# Patient Record
Sex: MALE | Race: WHITE | NOT HISPANIC OR LATINO | Employment: OTHER | URBAN - METROPOLITAN AREA
[De-identification: names, ages, dates, MRNs, and addresses within clinical notes are randomized per-mention and may not be internally consistent; named-entity substitution may affect disease eponyms.]

---

## 2017-02-07 ENCOUNTER — HOSPITAL ENCOUNTER (OUTPATIENT)
Dept: RADIOLOGY | Facility: HOSPITAL | Age: 82
Discharge: HOME | End: 2017-02-07
Attending: INTERNAL MEDICINE
Payer: MEDICARE

## 2017-02-07 DIAGNOSIS — I10 ESSENTIAL HYPERTENSION: ICD-10-CM

## 2017-02-07 DIAGNOSIS — R60.1 GENERALIZED EDEMA: ICD-10-CM

## 2017-02-07 DIAGNOSIS — R06.01 ORTHOPNEA: ICD-10-CM

## 2017-02-07 DIAGNOSIS — R06.01 ORTHOPNEA: Primary | ICD-10-CM

## 2017-02-07 PROCEDURE — 71020 XR CHEST PA AND LATERAL: CPT | Mod: TC,PO

## 2017-02-07 PROCEDURE — 71020 XR CHEST PA AND LATERAL: CPT | Mod: 26,,, | Performed by: RADIOLOGY

## 2017-06-10 ENCOUNTER — HOSPITAL ENCOUNTER (OUTPATIENT)
Facility: HOSPITAL | Age: 82
Discharge: HOME | End: 2017-06-12
Attending: EMERGENCY MEDICINE | Admitting: HOSPITALIST
Payer: MEDICARE

## 2017-06-10 DIAGNOSIS — R06.09 DOE (DYSPNEA ON EXERTION): ICD-10-CM

## 2017-06-10 DIAGNOSIS — D50.9 HYPOCHROMIC-MICROCYTIC ANEMIA: ICD-10-CM

## 2017-06-10 DIAGNOSIS — K44.9 HIATAL HERNIA: Chronic | ICD-10-CM

## 2017-06-10 DIAGNOSIS — D64.9 SYMPTOMATIC ANEMIA: ICD-10-CM

## 2017-06-10 DIAGNOSIS — D62 ACUTE POST-HEMORRHAGIC ANEMIA: Primary | ICD-10-CM

## 2017-06-10 DIAGNOSIS — K57.30 DIVERTICULOSIS OF LARGE INTESTINE WITHOUT HEMORRHAGE: Chronic | ICD-10-CM

## 2017-06-10 DIAGNOSIS — K31.811 ANGIODYSPLASIA OF DUODENUM WITH HEMORRHAGE: ICD-10-CM

## 2017-06-10 DIAGNOSIS — K92.1 MELENA: ICD-10-CM

## 2017-06-10 PROBLEM — N17.9 AKI (ACUTE KIDNEY INJURY): Status: ACTIVE | Noted: 2017-06-10

## 2017-06-10 PROBLEM — E03.9 ACQUIRED HYPOTHYROIDISM: Chronic | Status: ACTIVE | Noted: 2017-06-10

## 2017-06-10 PROBLEM — N18.30 CHRONIC KIDNEY DISEASE, STAGE 3: Chronic | Status: ACTIVE | Noted: 2017-06-10

## 2017-06-10 PROBLEM — I48.91 ATRIAL FIBRILLATION: Chronic | Status: ACTIVE | Noted: 2017-06-10

## 2017-06-10 LAB
ALBUMIN SERPL BCP-MCNC: 3.7 G/DL
ALP SERPL-CCNC: 63 U/L
ALT SERPL W/O P-5'-P-CCNC: 11 U/L
ANION GAP SERPL CALC-SCNC: 9 MMOL/L
ANISOCYTOSIS BLD QL SMEAR: SLIGHT
APTT BLDCRRT: 24.4 SEC
AST SERPL-CCNC: 12 U/L
BASOPHILS # BLD AUTO: 0.08 K/UL
BASOPHILS NFR BLD: 1.1 %
BILIRUB SERPL-MCNC: 0.5 MG/DL
BNP SERPL-MCNC: 634 PG/ML
BUN SERPL-MCNC: 30 MG/DL
CALCIUM SERPL-MCNC: 8.5 MG/DL
CHLORIDE SERPL-SCNC: 108 MMOL/L
CO2 SERPL-SCNC: 23 MMOL/L
CREAT SERPL-MCNC: 2 MG/DL
DACRYOCYTES BLD QL SMEAR: ABNORMAL
DIFFERENTIAL METHOD: ABNORMAL
EOSINOPHIL # BLD AUTO: 0.5 K/UL
EOSINOPHIL NFR BLD: 6.1 %
ERYTHROCYTE [DISTWIDTH] IN BLOOD BY AUTOMATED COUNT: 20.5 %
EST. GFR  (AFRICAN AMERICAN): 33.7 ML/MIN/1.73 M^2
EST. GFR  (NON AFRICAN AMERICAN): 29.1 ML/MIN/1.73 M^2
FERRITIN SERPL-MCNC: 5 NG/ML
GLUCOSE SERPL-MCNC: 147 MG/DL
HCT VFR BLD AUTO: 18.7 %
HGB BLD-MCNC: 4.8 G/DL
HYPOCHROMIA BLD QL SMEAR: ABNORMAL
INR PPP: 1.2
IRON SERPL-MCNC: 18 UG/DL
LYMPHOCYTES # BLD AUTO: 1.3 K/UL
LYMPHOCYTES NFR BLD: 16.9 %
MCH RBC QN AUTO: 18.3 PG
MCHC RBC AUTO-ENTMCNC: 25.7 %
MCV RBC AUTO: 71 FL
MONOCYTES # BLD AUTO: 0.9 K/UL
MONOCYTES NFR BLD: 11.5 %
NEUTROPHILS # BLD AUTO: 4.9 K/UL
NEUTROPHILS NFR BLD: 64 %
OVALOCYTES BLD QL SMEAR: ABNORMAL
PLATELET # BLD AUTO: 304 K/UL
PMV BLD AUTO: 9.3 FL
POIKILOCYTOSIS BLD QL SMEAR: SLIGHT
POLYCHROMASIA BLD QL SMEAR: ABNORMAL
POTASSIUM SERPL-SCNC: 4.8 MMOL/L
PROT SERPL-MCNC: 6.6 G/DL
PROTHROMBIN TIME: 12.7 SEC
RBC # BLD AUTO: 2.63 M/UL
RETICS/RBC NFR AUTO: 3.4 %
SATURATED IRON: 4 %
SODIUM SERPL-SCNC: 140 MMOL/L
SPHEROCYTES BLD QL SMEAR: ABNORMAL
STOMATOCYTES BLD QL SMEAR: PRESENT
TOTAL IRON BINDING CAPACITY: 462 UG/DL
TRANSFERRIN SERPL-MCNC: 312 MG/DL
TROPONIN I SERPL DL<=0.01 NG/ML-MCNC: 0.01 NG/ML
WBC # BLD AUTO: 7.57 K/UL

## 2017-06-10 PROCEDURE — 84484 ASSAY OF TROPONIN QUANT: CPT

## 2017-06-10 PROCEDURE — 36430 TRANSFUSION BLD/BLD COMPNT: CPT

## 2017-06-10 PROCEDURE — 25000003 PHARM REV CODE 250: Performed by: EMERGENCY MEDICINE

## 2017-06-10 PROCEDURE — 82728 ASSAY OF FERRITIN: CPT

## 2017-06-10 PROCEDURE — 93005 ELECTROCARDIOGRAM TRACING: CPT

## 2017-06-10 PROCEDURE — 85730 THROMBOPLASTIN TIME PARTIAL: CPT

## 2017-06-10 PROCEDURE — 83540 ASSAY OF IRON: CPT

## 2017-06-10 PROCEDURE — 85610 PROTHROMBIN TIME: CPT

## 2017-06-10 PROCEDURE — 83880 ASSAY OF NATRIURETIC PEPTIDE: CPT

## 2017-06-10 PROCEDURE — 86920 COMPATIBILITY TEST SPIN: CPT

## 2017-06-10 PROCEDURE — 80053 COMPREHEN METABOLIC PANEL: CPT

## 2017-06-10 PROCEDURE — G0378 HOSPITAL OBSERVATION PER HR: HCPCS

## 2017-06-10 PROCEDURE — 82607 VITAMIN B-12: CPT

## 2017-06-10 PROCEDURE — 85025 COMPLETE CBC W/AUTO DIFF WBC: CPT

## 2017-06-10 PROCEDURE — C9113 INJ PANTOPRAZOLE SODIUM, VIA: HCPCS | Performed by: EMERGENCY MEDICINE

## 2017-06-10 PROCEDURE — 99900035 HC TECH TIME PER 15 MIN (STAT)

## 2017-06-10 PROCEDURE — 63600175 PHARM REV CODE 636 W HCPCS: Performed by: EMERGENCY MEDICINE

## 2017-06-10 PROCEDURE — 85045 AUTOMATED RETICULOCYTE COUNT: CPT

## 2017-06-10 PROCEDURE — 82746 ASSAY OF FOLIC ACID SERUM: CPT

## 2017-06-10 PROCEDURE — 99285 EMERGENCY DEPT VISIT HI MDM: CPT | Mod: 25

## 2017-06-10 PROCEDURE — 96374 THER/PROPH/DIAG INJ IV PUSH: CPT

## 2017-06-10 RX ORDER — AMIODARONE HYDROCHLORIDE 200 MG/1
200 TABLET ORAL DAILY
Status: DISCONTINUED | OUTPATIENT
Start: 2017-06-11 | End: 2017-06-12 | Stop reason: HOSPADM

## 2017-06-10 RX ORDER — AMIODARONE HYDROCHLORIDE 200 MG/1
TABLET ORAL
Status: ON HOLD | COMMUNITY
Start: 2017-02-22 | End: 2017-09-14 | Stop reason: HOSPADM

## 2017-06-10 RX ORDER — METFORMIN HYDROCHLORIDE 750 MG/1
TABLET, EXTENDED RELEASE ORAL
Status: ON HOLD | COMMUNITY
Start: 2017-03-24 | End: 2017-06-12 | Stop reason: HOSPADM

## 2017-06-10 RX ORDER — AMLODIPINE BESYLATE 10 MG/1
10 TABLET ORAL DAILY
Status: DISCONTINUED | OUTPATIENT
Start: 2017-06-11 | End: 2017-06-12 | Stop reason: HOSPADM

## 2017-06-10 RX ORDER — METFORMIN HYDROCHLORIDE 750 MG/1
750 TABLET, EXTENDED RELEASE ORAL NIGHTLY
Status: DISCONTINUED | OUTPATIENT
Start: 2017-06-10 | End: 2017-06-11

## 2017-06-10 RX ORDER — METOPROLOL SUCCINATE 25 MG/1
25 TABLET, EXTENDED RELEASE ORAL DAILY
Status: DISCONTINUED | OUTPATIENT
Start: 2017-06-11 | End: 2017-06-12 | Stop reason: HOSPADM

## 2017-06-10 RX ORDER — CARVEDILOL 6.25 MG/1
6.25 TABLET ORAL 2 TIMES DAILY WITH MEALS
Status: DISCONTINUED | OUTPATIENT
Start: 2017-06-11 | End: 2017-06-12 | Stop reason: HOSPADM

## 2017-06-10 RX ORDER — LEVOTHYROXINE SODIUM 50 UG/1
50 TABLET ORAL
Status: DISCONTINUED | OUTPATIENT
Start: 2017-06-11 | End: 2017-06-12 | Stop reason: HOSPADM

## 2017-06-10 RX ORDER — PRAVASTATIN SODIUM 40 MG/1
40 TABLET ORAL
Status: ON HOLD | COMMUNITY
Start: 2017-02-07 | End: 2017-09-14 | Stop reason: HOSPADM

## 2017-06-10 RX ORDER — CARVEDILOL 6.25 MG/1
6.25 TABLET ORAL 2 TIMES DAILY WITH MEALS
COMMUNITY

## 2017-06-10 RX ORDER — SELENIUM 50 MCG
200 TABLET ORAL NIGHTLY
COMMUNITY

## 2017-06-10 RX ORDER — FERROUS SULFATE 325(65) MG
325 TABLET, DELAYED RELEASE (ENTERIC COATED) ORAL 2 TIMES DAILY
Status: DISCONTINUED | OUTPATIENT
Start: 2017-06-10 | End: 2017-06-12 | Stop reason: HOSPADM

## 2017-06-10 RX ORDER — FUROSEMIDE 10 MG/ML
80 INJECTION INTRAMUSCULAR; INTRAVENOUS ONCE
Status: COMPLETED | OUTPATIENT
Start: 2017-06-11 | End: 2017-06-11

## 2017-06-10 RX ORDER — PRAVASTATIN SODIUM 20 MG/1
40 TABLET ORAL DAILY
Status: DISCONTINUED | OUTPATIENT
Start: 2017-06-11 | End: 2017-06-12 | Stop reason: HOSPADM

## 2017-06-10 RX ORDER — HYDROCODONE BITARTRATE AND ACETAMINOPHEN 500; 5 MG/1; MG/1
TABLET ORAL
Status: DISCONTINUED | OUTPATIENT
Start: 2017-06-10 | End: 2017-06-12 | Stop reason: HOSPADM

## 2017-06-10 RX ORDER — PANTOPRAZOLE SODIUM 40 MG/10ML
40 INJECTION, POWDER, LYOPHILIZED, FOR SOLUTION INTRAVENOUS
Status: COMPLETED | OUTPATIENT
Start: 2017-06-10 | End: 2017-06-10

## 2017-06-10 RX ORDER — PANTOPRAZOLE SODIUM 40 MG/10ML
40 INJECTION, POWDER, LYOPHILIZED, FOR SOLUTION INTRAVENOUS 2 TIMES DAILY
Status: DISCONTINUED | OUTPATIENT
Start: 2017-06-11 | End: 2017-06-12

## 2017-06-10 RX ORDER — CETIRIZINE HYDROCHLORIDE 10 MG/1
10 TABLET ORAL DAILY
Status: DISCONTINUED | OUTPATIENT
Start: 2017-06-11 | End: 2017-06-12 | Stop reason: HOSPADM

## 2017-06-10 RX ADMIN — PANTOPRAZOLE SODIUM 40 MG: 40 INJECTION, POWDER, FOR SOLUTION INTRAVENOUS at 06:06

## 2017-06-10 RX ADMIN — FERROUS SULFATE TAB EC 325 MG (65 MG FE EQUIVALENT) 325 MG: 325 (65 FE) TABLET DELAYED RESPONSE at 11:06

## 2017-06-10 NOTE — ED PROVIDER NOTES
Encounter Date: 6/10/2017       History     Chief Complaint   Patient presents with    Weakness     generalized weakness and off balance for 3 days.     Review of patient's allergies indicates:  No Known Allergies  Here with family for evaluation of generalized weakness and a sense of disequilibrium.  He states symptoms have been present for about 2 weeks without much change.  He describes a feeling of being off balance with walking but no sense that the room is spinning or that his head is spinning.  No convincing positional change in symptoms.  He has not fallen.  He does feel a little more short of breath than usual.  No pain.  No visible sign of bleeding.  He has had anemia requiring transfusion in the past, details are not clear.  He is on an anticoagulant but is not sure why.  No recent change in medications.  No other complaints.      The history is provided by the patient and a relative. No  was used.     Past Medical History:   Diagnosis Date    Emphysema lung     Hypertension     Thyroid disease      Past Surgical History:   Procedure Laterality Date    ABDOMINAL SURGERY      NO PAST SURGERIES       History reviewed. No pertinent family history.  Social History   Substance Use Topics    Smoking status: Former Smoker    Smokeless tobacco: Never Used    Alcohol use No     Review of Systems   Constitutional: Negative for chills and fever.   HENT: Negative for congestion, facial swelling, nosebleeds and sinus pressure.    Eyes: Negative for pain and redness.   Respiratory: Positive for shortness of breath. Negative for chest tightness and wheezing.    Cardiovascular: Negative for chest pain, palpitations and leg swelling.   Gastrointestinal: Negative for abdominal distention, abdominal pain, diarrhea, nausea and vomiting.   Endocrine: Negative for cold intolerance, polydipsia and polyphagia.   Genitourinary: Negative for difficulty urinating, dysuria, frequency and hematuria.    Musculoskeletal: Negative for arthralgias, back pain, myalgias and neck pain.   Skin: Negative for color change and rash.   Neurological: Negative for dizziness, weakness, numbness and headaches.   Hematological: Negative for adenopathy. Does not bruise/bleed easily.   Psychiatric/Behavioral: Negative for agitation and behavioral problems.   All other systems reviewed and are negative.      Physical Exam     Initial Vitals [06/10/17 1727]   BP Pulse Resp Temp SpO2   134/60 67 20 98.1 °F (36.7 °C) (!) 88 %     Physical Exam    Nursing note and vitals reviewed.  Constitutional: He appears well-developed and well-nourished. He is not diaphoretic. No distress.   Mildly pale/ mildly obese   HENT:   Head: Normocephalic and atraumatic.   Mouth/Throat: Oropharynx is clear and moist. No oropharyngeal exudate.   Eyes: Conjunctivae and EOM are normal. Pupils are equal, round, and reactive to light. Right eye exhibits no discharge. Left eye exhibits no discharge. No scleral icterus.   Neck: Normal range of motion. Neck supple. No thyromegaly present. No tracheal deviation present. No JVD present.   Cardiovascular: Normal rate, regular rhythm and normal heart sounds. Exam reveals no gallop and no friction rub.    No murmur heard.  Pulmonary/Chest: No respiratory distress. He has no wheezes. He has rhonchi. He has no rales. He exhibits no tenderness.   Scattered minor rhonchi   Abdominal: Soft. Bowel sounds are normal. He exhibits no distension and no mass. There is no tenderness. There is no rebound and no guarding.   Musculoskeletal: Normal range of motion. He exhibits no edema or tenderness.   Lymphadenopathy:     He has no cervical adenopathy.   Neurological: He is alert and oriented to person, place, and time. He has normal strength. No cranial nerve deficit.   Skin: Skin is warm and dry. No rash noted. No erythema.   Psychiatric: He has a normal mood and affect. His behavior is normal. Judgment and thought content normal.          ED Course   Procedures  Labs Reviewed   CBC W/ AUTO DIFFERENTIAL - Abnormal; Notable for the following:        Result Value    RBC 2.63 (*)     Hemoglobin 4.8 (*)     Hematocrit 18.7 (*)     MCV 71 (*)     MCH 18.3 (*)     MCHC 25.7 (*)     RDW 20.5 (*)     Lymph% 16.9 (*)     All other components within normal limits    Narrative:     HGB and HCT     critical result(s) called and verbal readback   obtained from Iram Walls., 06/10/2017 18:07   COMPREHENSIVE METABOLIC PANEL   TROPONIN I   B-TYPE NATRIURETIC PEPTIDE   PROTIME-INR   APTT   OCCULT BLOOD X 1, STOOL   FERRITIN   IRON AND TIBC   RETICULOCYTES   VITAMIN B12   FOLATE   TYPE & SCREEN   PREPARE RBC SOFT          Imaging Results          X-Ray Chest PA And Lateral (In process)                CT Head Without Contrast (In process)                      Medical Decision Making:   ED Management:  All historical, clinical, radiographic, and laboratory findings were reviewed with the patient/family in detail along with the indications for transport to the facility in [place] in order to receive blood transfusion and anemia workup.  All remaining questions and concerns were addressed at this time and the patient/family communicates understanding and agrees to proceed accordingly.  Similarly, all pertinent details of the encounter were discussed with Dr. Hernandez who agrees to receive the patient at [facility] for further care as outlined above.  The [facility] secondary to a need for ongoing IVF, monitoring, and transfusion en route.  Reginald Llamas MD  6:55 PM                       ED Course     Clinical Impression:       1. Hypochromic-microcytic anemia    2. [place] (dyspnea on exertion)                Reginald lLamas MD  06/10/17 1939

## 2017-06-11 PROBLEM — K92.1 MELENA: Status: ACTIVE | Noted: 2017-06-11

## 2017-06-11 PROBLEM — D62 ACUTE POST-HEMORRHAGIC ANEMIA: Status: ACTIVE | Noted: 2017-06-11

## 2017-06-11 LAB
ABO GROUP BLD: NORMAL
ALBUMIN SERPL BCP-MCNC: 3.7 G/DL
ALP SERPL-CCNC: 68 U/L
ALT SERPL W/O P-5'-P-CCNC: 12 U/L
ANION GAP SERPL CALC-SCNC: 11 MMOL/L
AST SERPL-CCNC: 15 U/L
BASOPHILS # BLD AUTO: 0.05 K/UL
BASOPHILS NFR BLD: 0.6 %
BILIRUB SERPL-MCNC: 3.5 MG/DL
BLD GP AB SCN CELLS X3 SERPL QL: NORMAL
BLD PROD TYP BPU: NORMAL
BLOOD UNIT EXPIRATION DATE: NORMAL
BLOOD UNIT TYPE CODE: 6200
BLOOD UNIT TYPE CODE: 6200
BLOOD UNIT TYPE CODE: 9500
BLOOD UNIT TYPE: NORMAL
BUN SERPL-MCNC: 28 MG/DL
CALCIUM SERPL-MCNC: 8.8 MG/DL
CHLORIDE SERPL-SCNC: 109 MMOL/L
CO2 SERPL-SCNC: 24 MMOL/L
CODING SYSTEM: NORMAL
CREAT SERPL-MCNC: 2 MG/DL
DIFFERENTIAL METHOD: ABNORMAL
DISPENSE STATUS: NORMAL
EOSINOPHIL # BLD AUTO: 0.3 K/UL
EOSINOPHIL NFR BLD: 4.2 %
ERYTHROCYTE [DISTWIDTH] IN BLOOD BY AUTOMATED COUNT: 21.4 %
EST. GFR  (AFRICAN AMERICAN): 34 ML/MIN/1.73 M^2
EST. GFR  (NON AFRICAN AMERICAN): 29 ML/MIN/1.73 M^2
FOLATE SERPL-MCNC: 10.7 NG/ML
GLUCOSE SERPL-MCNC: 139 MG/DL
HCT VFR BLD AUTO: 26.8 %
HGB BLD-MCNC: 8 G/DL
LYMPHOCYTES # BLD AUTO: 1.1 K/UL
LYMPHOCYTES NFR BLD: 13.4 %
MCH RBC QN AUTO: 22 PG
MCHC RBC AUTO-ENTMCNC: 29.9 %
MCV RBC AUTO: 74 FL
MONOCYTES # BLD AUTO: 0.7 K/UL
MONOCYTES NFR BLD: 8.3 %
NEUTROPHILS # BLD AUTO: 5.8 K/UL
NEUTROPHILS NFR BLD: 73.5 %
NUM UNITS TRANS PACKED RBC: NORMAL
OB PNL STL: POSITIVE
PLATELET # BLD AUTO: 231 K/UL
PMV BLD AUTO: 9.3 FL
POTASSIUM SERPL-SCNC: 4.3 MMOL/L
PROT SERPL-MCNC: 6.6 G/DL
RBC # BLD AUTO: 3.64 M/UL
RH BLD: NORMAL
SODIUM SERPL-SCNC: 144 MMOL/L
VIT B12 SERPL-MCNC: 323 PG/ML
WBC # BLD AUTO: 7.94 K/UL

## 2017-06-11 PROCEDURE — 85025 COMPLETE CBC W/AUTO DIFF WBC: CPT

## 2017-06-11 PROCEDURE — 80053 COMPREHEN METABOLIC PANEL: CPT

## 2017-06-11 PROCEDURE — P9016 RBC LEUKOCYTES REDUCED: HCPCS

## 2017-06-11 PROCEDURE — 82272 OCCULT BLD FECES 1-3 TESTS: CPT

## 2017-06-11 PROCEDURE — 25000003 PHARM REV CODE 250: Performed by: INTERNAL MEDICINE

## 2017-06-11 PROCEDURE — 63600175 PHARM REV CODE 636 W HCPCS: Performed by: HOSPITALIST

## 2017-06-11 PROCEDURE — 36430 TRANSFUSION BLD/BLD COMPNT: CPT

## 2017-06-11 PROCEDURE — 86920 COMPATIBILITY TEST SPIN: CPT

## 2017-06-11 PROCEDURE — 86900 BLOOD TYPING SEROLOGIC ABO: CPT

## 2017-06-11 PROCEDURE — 86850 RBC ANTIBODY SCREEN: CPT

## 2017-06-11 PROCEDURE — 99205 OFFICE O/P NEW HI 60 MIN: CPT | Mod: ,,, | Performed by: INTERNAL MEDICINE

## 2017-06-11 PROCEDURE — G0378 HOSPITAL OBSERVATION PER HR: HCPCS

## 2017-06-11 PROCEDURE — 63600175 PHARM REV CODE 636 W HCPCS: Performed by: INTERNAL MEDICINE

## 2017-06-11 PROCEDURE — C9113 INJ PANTOPRAZOLE SODIUM, VIA: HCPCS | Performed by: HOSPITALIST

## 2017-06-11 PROCEDURE — 25000003 PHARM REV CODE 250: Performed by: EMERGENCY MEDICINE

## 2017-06-11 PROCEDURE — 25000003 PHARM REV CODE 250: Performed by: HOSPITALIST

## 2017-06-11 PROCEDURE — 86901 BLOOD TYPING SEROLOGIC RH(D): CPT

## 2017-06-11 PROCEDURE — 36415 COLL VENOUS BLD VENIPUNCTURE: CPT

## 2017-06-11 RX ORDER — BISACODYL 5 MG
10 TABLET, DELAYED RELEASE (ENTERIC COATED) ORAL ONCE
Status: COMPLETED | OUTPATIENT
Start: 2017-06-11 | End: 2017-06-11

## 2017-06-11 RX ORDER — SYRING-NEEDL,DISP,INSUL,0.3 ML 29 G X1/2"
296 SYRINGE, EMPTY DISPOSABLE MISCELLANEOUS ONCE
Status: COMPLETED | OUTPATIENT
Start: 2017-06-11 | End: 2017-06-11

## 2017-06-11 RX ORDER — POLYETHYLENE GLYCOL 3350, SODIUM SULFATE ANHYDROUS, SODIUM BICARBONATE, SODIUM CHLORIDE, POTASSIUM CHLORIDE 236; 22.74; 6.74; 5.86; 2.97 G/4L; G/4L; G/4L; G/4L; G/4L
4000 POWDER, FOR SOLUTION ORAL ONCE
Status: COMPLETED | OUTPATIENT
Start: 2017-06-11 | End: 2017-06-11

## 2017-06-11 RX ORDER — FUROSEMIDE 10 MG/ML
20 INJECTION INTRAMUSCULAR; INTRAVENOUS ONCE
Status: COMPLETED | OUTPATIENT
Start: 2017-06-11 | End: 2017-06-11

## 2017-06-11 RX ORDER — HYDROCODONE BITARTRATE AND ACETAMINOPHEN 500; 5 MG/1; MG/1
TABLET ORAL
Status: DISCONTINUED | OUTPATIENT
Start: 2017-06-11 | End: 2017-06-12 | Stop reason: HOSPADM

## 2017-06-11 RX ADMIN — PANTOPRAZOLE SODIUM 40 MG: 40 INJECTION, POWDER, FOR SOLUTION INTRAVENOUS at 08:06

## 2017-06-11 RX ADMIN — LEVOTHYROXINE SODIUM 50 MCG: 50 TABLET ORAL at 05:06

## 2017-06-11 RX ADMIN — FUROSEMIDE 80 MG: 10 INJECTION, SOLUTION INTRAMUSCULAR; INTRAVENOUS at 05:06

## 2017-06-11 RX ADMIN — METOPROLOL SUCCINATE 25 MG: 25 TABLET, EXTENDED RELEASE ORAL at 08:06

## 2017-06-11 RX ADMIN — AMLODIPINE BESYLATE 10 MG: 10 TABLET ORAL at 08:06

## 2017-06-11 RX ADMIN — CETIRIZINE HYDROCHLORIDE 10 MG: 10 TABLET, FILM COATED ORAL at 08:06

## 2017-06-11 RX ADMIN — CARVEDILOL 6.25 MG: 6.25 TABLET, FILM COATED ORAL at 08:06

## 2017-06-11 RX ADMIN — AMIODARONE HYDROCHLORIDE 200 MG: 200 TABLET ORAL at 08:06

## 2017-06-11 RX ADMIN — CARVEDILOL 6.25 MG: 6.25 TABLET, FILM COATED ORAL at 05:06

## 2017-06-11 RX ADMIN — POLYETHYLENE GLYCOL 3350, SODIUM SULFATE ANHYDROUS, SODIUM BICARBONATE, SODIUM CHLORIDE, POTASSIUM CHLORIDE 4000 ML: 236; 22.74; 6.74; 5.86; 2.97 POWDER, FOR SOLUTION ORAL at 05:06

## 2017-06-11 RX ADMIN — FUROSEMIDE 20 MG: 10 INJECTION, SOLUTION INTRAMUSCULAR; INTRAVENOUS at 07:06

## 2017-06-11 RX ADMIN — PRAVASTATIN SODIUM 40 MG: 20 TABLET ORAL at 08:06

## 2017-06-11 RX ADMIN — MAGNESIUM CITRATE 296 ML: 1.75 LIQUID ORAL at 12:06

## 2017-06-11 RX ADMIN — FERROUS SULFATE TAB EC 325 MG (65 MG FE EQUIVALENT) 325 MG: 325 (65 FE) TABLET DELAYED RESPONSE at 08:06

## 2017-06-11 RX ADMIN — Medication 1 TABLET: at 08:06

## 2017-06-11 RX ADMIN — BISACODYL 10 MG: 5 TABLET, COATED ORAL at 12:06

## 2017-06-11 NOTE — ASSESSMENT & PLAN NOTE
Baseline creatinine around 1.2-1.4; it is 2 tonight; will monitor; avoid nephrotoxins; repeat bmp in AM

## 2017-06-11 NOTE — HPI
Anemia: Patient presents for presents evaluation of anemia. Anemia was found by ER visit.  It has been present for several days.  Associated signs & symptoms: dizziness/lightheadedness, dyspnea, fatigue and pallor.   GI Bleeding: Patient complains of melena. Symptoms have been present for approximately several days. The symptoms are stable. This has been associated with shortness of breath.  He denies chest pain, choking on food, difficulty swallowing, early satiety, fullness after meals, heartburn, hematemesis, midespigastric pain, nausea, need to clear throat frequently, nocturnal burning, regurgitation of undigested food, unexpected weight loss and wheezing.  He has not used nonsteroidal anti-inflammatory drugs on a regular bases; he is anticoagulated.  The patient currently denies significant abdominal pain or discomfort.  There is not a past history of gastrointestinal bleeding.   Mr. Young never had a colonoscopy or an EGD. He denies family history of GI cancer and states that he went to the ED when he was having progressive weakness and fatigue. He states he had some black stools.

## 2017-06-11 NOTE — PROGRESS NOTES
Pt arrived via [facility]. Blood infusion in progress from [facility]. Pt AAO x4, no complaints. Pt oriented to room and call bell in reach. Family at bedside.

## 2017-06-11 NOTE — PROGRESS NOTES
Pt arrived to floor from [facility]. PRBC unit #[id number] infusing. O neg. Expiration date June 29 2017. Unable to place stop infusion rate. Not scanned prior to patient arrival to the floor.

## 2017-06-11 NOTE — ED NOTES
Remains alert & pleasant, family at bedside.  Chest remains clear jamilah.  Blood infusing without problems.  Will continue to monitor

## 2017-06-11 NOTE — PROGRESS NOTES
Order to give Lasix between 1st and 2nd unit of PRBC per MD Hernandez. Clarified order with Mary and stated to give between 2nd and 3rd unit. Will continue to monitor.

## 2017-06-11 NOTE — HPI
87 year old male with h/o Afib and HTN presented to IB complaining of progressive weakness/fatigue over the last 7-10 days.  States he just felt like he had no energy.  Denies any chest pain, palpitations or shortness of breath.  In ER, found to have a hgb of 4.8.  He was started on Eliquis 4-5 months ago due to afib.  Says he thinks his stools may have been dark/black recently.  Denies any grossly bloody stools.  No abdominal pain.  He was admitted in 2015 for symptomatic anemia.  He says he has had 2 colonoscopies in the past but family is not sure.

## 2017-06-11 NOTE — SUBJECTIVE & OBJECTIVE
Interval History:     Review of Systems   Constitutional: Positive for activity change and appetite change.   HENT: Negative.    Eyes: Negative.    Respiratory: Negative.    Cardiovascular: Negative for palpitations and leg swelling.   Gastrointestinal: Negative for abdominal pain.   Genitourinary: Negative.    Musculoskeletal: Negative.    Skin: Negative.    Allergic/Immunologic: Negative.    Neurological: Negative.    Hematological: Negative.    Psychiatric/Behavioral: Negative.      Objective:     Vital Signs (Most Recent):  Temp: 97.5 °F (36.4 °C) (06/11/17 1100)  Pulse: 64 (06/11/17 1100)  Resp: 18 (06/11/17 1100)  BP: 129/63 (06/11/17 1100)  SpO2: 97 % (06/11/17 1100) Vital Signs (24h Range):  Temp:  [97.5 °F (36.4 °C)-99 °F (37.2 °C)] 97.5 °F (36.4 °C)  Pulse:  [64-96] 64  Resp:  [18-25] 18  SpO2:  [88 %-100 %] 97 %  BP: (115-167)/(51-82) 129/63     Weight: 85.6 kg (188 lb 11.2 oz)  Body mass index is 28.69 kg/m².    Intake/Output Summary (Last 24 hours) at 06/11/17 1248  Last data filed at 06/11/17 1200   Gross per 24 hour   Intake           951.25 ml   Output             2350 ml   Net         -1398.75 ml      Physical Exam   Constitutional: He is oriented to person, place, and time. He appears well-developed and well-nourished.   HENT:   Head: Normocephalic and atraumatic.   Eyes: Conjunctivae and EOM are normal. Pupils are equal, round, and reactive to light.   Neck: Normal range of motion. Neck supple.   Cardiovascular: Normal rate and regular rhythm.    Pulmonary/Chest: Effort normal and breath sounds normal.   Abdominal: Soft. Bowel sounds are normal.   Musculoskeletal: Normal range of motion.   Neurological: He is alert and oriented to person, place, and time.   Skin: Skin is warm and dry.   Psychiatric: He has a normal mood and affect. His behavior is normal. Judgment and thought content normal.   Nursing note and vitals reviewed.      Significant Labs:   BMP:   Recent Labs  Lab 06/10/17  2841   GLU  147*      K 4.8      CO2 23   BUN 30*   CREATININE 2.0*   CALCIUM 8.5*     CBC:   Recent Labs  Lab 06/10/17  1751 06/11/17  1100   WBC 7.57 7.94   HGB 4.8* 8.0*   HCT 18.7* 26.8*    231     CMP:   Recent Labs  Lab 06/10/17  1751      K 4.8      CO2 23   *   BUN 30*   CREATININE 2.0*   CALCIUM 8.5*   PROT 6.6   ALBUMIN 3.7   BILITOT 0.5   ALKPHOS 63   AST 12   ALT 11   ANIONGAP 9   EGFRNONAA 29.1*       Magnesium:  No results for input(s): MG in the last 48 hours.    Significant Imaging: I have reviewed all pertinent imaging results/findings within the past 24 hours.

## 2017-06-11 NOTE — SUBJECTIVE & OBJECTIVE
Past Medical History:   Diagnosis Date    Atrial fibrillation     Emphysema lung     Hypertension     Thyroid disease        Past Surgical History:   Procedure Laterality Date    ABDOMINAL SURGERY      NO PAST SURGERIES         Review of patient's allergies indicates:  No Known Allergies  Family History     Problem Relation (Age of Onset)    Hypertension Father        Social History Main Topics    Smoking status: Former Smoker    Smokeless tobacco: Never Used    Alcohol use No    Drug use: No    Sexual activity: Not on file     Review of Systems   Constitutional: Positive for activity change and fatigue. Negative for appetite change, fever and unexpected weight change.   HENT: Negative for congestion, ear pain, hearing loss, mouth sores, trouble swallowing and voice change.    Eyes: Negative for pain, redness and itching.   Respiratory: Positive for shortness of breath. Negative for apnea, cough, choking, chest tightness and wheezing.    Cardiovascular: Negative for chest pain, palpitations and leg swelling.   Gastrointestinal: Positive for blood in stool and diarrhea. Negative for abdominal distention, abdominal pain, anal bleeding, constipation, nausea and vomiting.   Endocrine: Negative for cold intolerance, heat intolerance and polyphagia.   Genitourinary: Negative for dysuria, hematuria and urgency.   Musculoskeletal: Negative for arthralgias, joint swelling and neck pain.   Skin: Positive for pallor. Negative for rash.   Allergic/Immunologic: Negative for environmental allergies and food allergies.   Neurological: Negative for dizziness, facial asymmetry and light-headedness.   Hematological: Negative for adenopathy. Does not bruise/bleed easily.   Psychiatric/Behavioral: Negative for agitation, behavioral problems, confusion and decreased concentration.     Objective:     Vital Signs (Most Recent):  Temp: 97.5 °F (36.4 °C) (06/11/17 1000)  Pulse: 67 (06/11/17 1000)  Resp: 18 (06/11/17 1000)  BP:  (!) 126/59 (06/11/17 1000)  SpO2: (!) 94 % (06/11/17 1000) Vital Signs (24h Range):  Temp:  [97.5 °F (36.4 °C)-99 °F (37.2 °C)] 97.5 °F (36.4 °C)  Pulse:  [64-96] 67  Resp:  [18-25] 18  SpO2:  [88 %-100 %] 94 %  BP: (115-167)/(51-82) 126/59     Weight: 85.6 kg (188 lb 11.2 oz) (06/10/17 2200)  Body mass index is 28.69 kg/m².      Intake/Output Summary (Last 24 hours) at 06/11/17 1212  Last data filed at 06/11/17 1000   Gross per 24 hour   Intake           951.25 ml   Output             1600 ml   Net          -648.75 ml       Lines/Drains/Airways     Peripheral Intravenous Line                 Peripheral IV - Single Lumen 06/10/17 1749 Right Antecubital less than 1 day         Peripheral IV - Single Lumen 06/10/17 1830 Left Antecubital less than 1 day                Physical Exam   Constitutional: He is oriented to person, place, and time. He appears well-developed and well-nourished.   HENT:   Head: Normocephalic and atraumatic.   Eyes: Conjunctivae are normal. Pupils are equal, round, and reactive to light.   Neck: Normal range of motion. Neck supple. No tracheal deviation present. No thyromegaly present.   Cardiovascular: Normal rate and regular rhythm.    Pulmonary/Chest: Effort normal and breath sounds normal. He has no wheezes. He has no rales. He exhibits no tenderness.   Abdominal: Soft. Bowel sounds are normal. He exhibits no distension and no mass. There is no tenderness. There is no guarding.   Musculoskeletal: Normal range of motion.   Lymphadenopathy:     He has no cervical adenopathy.   Neurological: He is alert and oriented to person, place, and time. No cranial nerve deficit.   Skin: Skin is warm and dry.   Psychiatric: He has a normal mood and affect. His behavior is normal.   Nursing note and vitals reviewed.      Significant Labs:  Amylase: No results for input(s): AMYLASE in the last 48 hours.  CBC:   Recent Labs  Lab 06/10/17  1751   WBC 7.57   HGB 4.8*   HCT 18.7*        BMP:   Recent  Labs  Lab 06/10/17  1751   *      K 4.8      CO2 23   BUN 30*   CREATININE 2.0*   CALCIUM 8.5*     Coagulation:   Recent Labs  Lab 06/10/17  1830   INR 1.2   APTT 24.4     Lipase: No results for input(s): LIPASE in the last 48 hours.  Liver Function Test:   Recent Labs  Lab 06/10/17  1751   ALT 11   AST 12   ALKPHOS 63   BILITOT 0.5   PROT 6.6   ALBUMIN 3.7       Significant Imaging:  Imaging results within the past 24 hours have been reviewed.

## 2017-06-11 NOTE — CONSULTS
[facility] -   Gastroenterology  Consult Note    Patient Name: Jimmy Young  MRN: [id number]  Admission Date: 6/10/2017  Hospital Length of Stay: 0 days  Code Status: Full Code   Attending Provider: Cleo Crisostomo MD   Consulting Provider: Boaz Rivera MD  Primary Care Physician: Nacho Richadrson MD  Principal Problem:Symptomatic anemia    Inpatient consult to Gastroenterology  Consult performed by: BOAZ RIVERA  Consult ordered by: RIGOBERTO THOMAS  Reason for consult: Symptomatic anemia        Subjective:     HPI:  Anemia: Patient presents for presents evaluation of anemia. Anemia was found by ER visit.  It has been present for several days.  Associated signs & symptoms: dizziness/lightheadedness, dyspnea, fatigue and pallor.   GI Bleeding: Patient complains of melena. Symptoms have been present for approximately several days. The symptoms are stable. This has been associated with shortness of breath.  He denies chest pain, choking on food, difficulty swallowing, early satiety, fullness after meals, heartburn, hematemesis, midespigastric pain, nausea, need to clear throat frequently, nocturnal burning, regurgitation of undigested food, unexpected weight loss and wheezing.  He has not used nonsteroidal anti-inflammatory drugs on a regular bases; he is anticoagulated.  The patient currently denies significant abdominal pain or discomfort.  There is not a past history of gastrointestinal bleeding.   Mr. Young never had a colonoscopy or an EGD. He denies family history of GI cancer and states that he went to the ED when he was having progressive weakness and fatigue. He states he had some black stools.           No current facility-administered medications on file prior to encounter.      Current Outpatient Prescriptions on File Prior to Encounter   Medication Sig Dispense Refill    amlodipine (NORVASC) 10 MG tablet Take 10 mg by mouth once daily.      cetirizine (ZYRTEC) 10 MG tablet Take  10 mg by mouth once daily.      ferrous sulfate 325 (65 FE) MG EC tablet Take 1 tablet (325 mg total) by mouth 2 (two) times daily. 60 tablet 1    folic acid-vit B6-vit B12 2.5-25-2 mg (FOLBIC OR EQUIV) 2.5-25-2 mg Tab Take 1 tablet by mouth once daily. 30 tablet 1    hydrocodone-acetaminophen 5-325mg (NORCO) 5-325 mg per tablet Take 1 tablet by mouth every 6 (six) hours as needed for Pain. 12 tablet 0    aspirin (ECOTRIN) 81 MG EC tablet Take 81 mg by mouth once daily.      levothyroxine (SYNTHROID) 50 MCG tablet Take 1 tablet (50 mcg total) by mouth before breakfast. 30 tablet 1    metoprolol succinate (TOPROL-XL) 25 MG 24 hr tablet Take 25 mg by mouth once daily.           Past Medical History:   Diagnosis Date    Atrial fibrillation     Emphysema lung     Hypertension     Thyroid disease        Past Surgical History:   Procedure Laterality Date    ABDOMINAL SURGERY      NO PAST SURGERIES         Review of patient's allergies indicates:  No Known Allergies  Family History     Problem Relation (Age of Onset)    Hypertension Father        Social History Main Topics    Smoking status: Former Smoker    Smokeless tobacco: Never Used    Alcohol use No    Drug use: No    Sexual activity: Not on file     Review of Systems   Constitutional: Positive for activity change and fatigue. Negative for appetite change, fever and unexpected weight change.   HENT: Negative for congestion, ear pain, hearing loss, mouth sores, trouble swallowing and voice change.    Eyes: Negative for pain, redness and itching.   Respiratory: Positive for shortness of breath. Negative for apnea, cough, choking, chest tightness and wheezing.    Cardiovascular: Negative for chest pain, palpitations and leg swelling.   Gastrointestinal: Positive for blood in stool and diarrhea. Negative for abdominal distention, abdominal pain, anal bleeding, constipation, nausea and vomiting.   Endocrine: Negative for cold intolerance, heat intolerance  and polyphagia.   Genitourinary: Negative for dysuria, hematuria and urgency.   Musculoskeletal: Negative for arthralgias, joint swelling and neck pain.   Skin: Positive for pallor. Negative for rash.   Allergic/Immunologic: Negative for environmental allergies and food allergies.   Neurological: Negative for dizziness, facial asymmetry and light-headedness.   Hematological: Negative for adenopathy. Does not bruise/bleed easily.   Psychiatric/Behavioral: Negative for agitation, behavioral problems, confusion and decreased concentration.     Objective:     Vital Signs (Most Recent):  Temp: 97.5 °F (36.4 °C) (06/11/17 1000)  Pulse: 67 (06/11/17 1000)  Resp: 18 (06/11/17 1000)  BP: (!) 126/59 (06/11/17 1000)  SpO2: (!) 94 % (06/11/17 1000) Vital Signs (24h Range):  Temp:  [97.5 °F (36.4 °C)-99 °F (37.2 °C)] 97.5 °F (36.4 °C)  Pulse:  [64-96] 67  Resp:  [18-25] 18  SpO2:  [88 %-100 %] 94 %  BP: (115-167)/(51-82) 126/59     Weight: 85.6 kg (188 lb 11.2 oz) (06/10/17 2200)  Body mass index is 28.69 kg/m².      Intake/Output Summary (Last 24 hours) at 06/11/17 1212  Last data filed at 06/11/17 1000   Gross per 24 hour   Intake           951.25 ml   Output             1600 ml   Net          -648.75 ml       Lines/Drains/Airways     Peripheral Intravenous Line                 Peripheral IV - Single Lumen 06/10/17 1749 Right Antecubital less than 1 day         Peripheral IV - Single Lumen 06/10/17 1830 Left Antecubital less than 1 day                Physical Exam   Constitutional: He is oriented to person, place, and time. He appears well-developed and well-nourished.   HENT:   Head: Normocephalic and atraumatic.   Eyes: Conjunctivae are normal. Pupils are equal, round, and reactive to light.   Neck: Normal range of motion. Neck supple. No tracheal deviation present. No thyromegaly present.   Cardiovascular: Normal rate and regular rhythm.    Pulmonary/Chest: Effort normal and breath sounds normal. He has no wheezes. He has  no rales. He exhibits no tenderness.   Abdominal: Soft. Bowel sounds are normal. He exhibits no distension and no mass. There is no tenderness. There is no guarding.   Musculoskeletal: Normal range of motion.   Lymphadenopathy:     He has no cervical adenopathy.   Neurological: He is alert and oriented to person, place, and time. No cranial nerve deficit.   Skin: Skin is warm and dry.   Psychiatric: He has a normal mood and affect. His behavior is normal.   Nursing note and vitals reviewed.      Significant Labs:  Amylase: No results for input(s): AMYLASE in the last 48 hours.  CBC:   Recent Labs  Lab 06/10/17  1751   WBC 7.57   HGB 4.8*   HCT 18.7*        BMP:   Recent Labs  Lab 06/10/17  1751   *      K 4.8      CO2 23   BUN 30*   CREATININE 2.0*   CALCIUM 8.5*     Coagulation:   Recent Labs  Lab 06/10/17  1830   INR 1.2   APTT 24.4     Lipase: No results for input(s): LIPASE in the last 48 hours.  Liver Function Test:   Recent Labs  Lab 06/10/17  1751   ALT 11   AST 12   ALKPHOS 63   BILITOT 0.5   PROT 6.6   ALBUMIN 3.7       Significant Imaging:  Imaging results within the past 24 hours have been reviewed.    Assessment/Plan:     Melena    EGD and Colon tomorrow. Agree with PPI. Clear liquid diet today and bowel prep.     New Orders to be Acknowledged     polyethylene glycol (GoLYTELY) solution   Start: 06/11/17, 4,000 mL, Oral, Once, Status: Sent   When 06/11/17 1220    Ordering Provider Boaz Toussaint MD          Case request GI: ESOPHAGOGASTRODUODENOSCOPY (EGD), COLONOSCOPY   Once, Standing Count: 1 Occurrences, Prio: Routine, Status: Completed   When 06/11/17 1220    Ordering Provider Boaz Toussaint MD          bisacodyl EC tablet 10 mg   Start: 06/11/17, 10 mg, Oral, Once, Status: Sent   When 06/11/17 1220    Ordering Provider Boaz Toussaint MD          Diet NPO   Diet effective midnight, Until Specified, Prio: Routine   When 06/11/17 1220    Ordering Provider Boaz Toussaint MD           magnesium citrate solution 296 mL   Start: 06/11/17, 296 mL, Oral, Once, Status: Sent   When 06/11/17 1220    Ordering Provider Boaz Toussaint MD                    Acute post-hemorrhagic anemia    Patient never had GI work up. Will set him up for EGD/Colon tomorrow.   Risks, benefits and alternative options were discussed with the patient. Risks including but not limited to infection, bleeding, heart or respiratory problems and perforation that may require surgery were all explained to the patient. The patient had a chance for questions if there were doubts or concerns about the test. The referring provider will be notified that our consultation is complete and available through the patient's records.              Thank you for your consult. I will follow-up with patient. Please contact us if you have any additional questions.    Boaz Toussaint MD  Gastroenterology  [facility] -

## 2017-06-11 NOTE — ASSESSMENT & PLAN NOTE
EGD and Colon tomorrow. Agree with PPI. Clear liquid diet today and bowel prep.     New Orders to be Acknowledged     polyethylene glycol (GoLYTELY) solution   Start: 06/11/17, 4,000 mL, Oral, Once, Status: Sent   When 06/11/17 1220    Ordering Provider Boaz Toussaint MD          Case request GI: ESOPHAGOGASTRODUODENOSCOPY (EGD), COLONOSCOPY   Once, Standing Count: 1 Occurrences, Prio: Routine, Status: Completed   When 06/11/17 1220    Ordering Provider Boaz Toussaint MD          bisacodyl EC tablet 10 mg   Start: 06/11/17, 10 mg, Oral, Once, Status: Sent   When 06/11/17 1220    Ordering Provider Boaz Toussaint MD          Diet NPO   Diet effective midnight, Until Specified, Prio: Routine   When 06/11/17 1220    Ordering Provider Boaz Toussaint MD          magnesium citrate solution 296 mL   Start: 06/11/17, 296 mL, Oral, Once, Status: Sent   When 06/11/17 1220    Ordering Provider Boaz Toussaint MD

## 2017-06-11 NOTE — ASSESSMENT & PLAN NOTE
Patient never had GI work up. Will set him up for EGD/Colon tomorrow.   Risks, benefits and alternative options were discussed with the patient. Risks including but not limited to infection, bleeding, heart or respiratory problems and perforation that may require surgery were all explained to the patient. The patient had a chance for questions if there were doubts or concerns about the test. The referring provider will be notified that our consultation is complete and available through the patient's records.

## 2017-06-11 NOTE — ASSESSMENT & PLAN NOTE
Suspect GI bleed - will transfuse 3 units of PRBC and monitor H/H; check stool for occult blood; consult GI in AM; IV Protonix

## 2017-06-11 NOTE — ED NOTES
Unit of blood will not scan into epic using scanner. See note by GEETA Bales, regarding unit number for blood administered.

## 2017-06-11 NOTE — SUBJECTIVE & OBJECTIVE
Past Medical History:   Diagnosis Date    Atrial fibrillation     Emphysema lung     Hypertension     Thyroid disease        Past Surgical History:   Procedure Laterality Date    ABDOMINAL SURGERY      NO PAST SURGERIES         Review of patient's allergies indicates:  No Known Allergies    No current facility-administered medications on file prior to encounter.      Current Outpatient Prescriptions on File Prior to Encounter   Medication Sig    amlodipine (NORVASC) 10 MG tablet Take 10 mg by mouth once daily.    cetirizine (ZYRTEC) 10 MG tablet Take 10 mg by mouth once daily.    ferrous sulfate 325 (65 FE) MG EC tablet Take 1 tablet (325 mg total) by mouth 2 (two) times daily.    folic acid-vit B6-vit B12 2.5-25-2 mg (FOLBIC OR EQUIV) 2.5-25-2 mg Tab Take 1 tablet by mouth once daily.    hydrocodone-acetaminophen 5-325mg (NORCO) 5-325 mg per tablet Take 1 tablet by mouth every 6 (six) hours as needed for Pain.    aspirin (ECOTRIN) 81 MG EC tablet Take 81 mg by mouth once daily.    levothyroxine (SYNTHROID) 50 MCG tablet Take 1 tablet (50 mcg total) by mouth before breakfast.    metoprolol succinate (TOPROL-XL) 25 MG 24 hr tablet Take 25 mg by mouth once daily.     Family History     Problem Relation (Age of Onset)    Hypertension Father        Social History Main Topics    Smoking status: Former Smoker    Smokeless tobacco: Never Used    Alcohol use No    Drug use: No    Sexual activity: Not on file     Review of Systems   All other systems reviewed and are negative.    Objective:     Vital Signs (Most Recent):  Temp: 98 °F (36.7 °C) (06/10/17 2225)  Pulse: 71 (06/10/17 2225)  Resp: 20 (06/10/17 2225)  BP: 135/60 (06/10/17 2225)  SpO2: (!) 91 % (06/10/17 2225) Vital Signs (24h Range):  Temp:  [98 °F (36.7 °C)-98.5 °F (36.9 °C)] 98 °F (36.7 °C)  Pulse:  [66-96] 71  Resp:  [18-25] 20  SpO2:  [88 %-100 %] 91 %  BP: (125-167)/(59-82) 135/60     Weight: 85.6 kg (188 lb 11.2 oz)  Body mass index is  28.69 kg/m².    Physical Exam   Constitutional: He is oriented to person, place, and time. He appears well-developed and well-nourished.   HENT:   Head: Normocephalic and atraumatic.   Eyes: Conjunctivae are normal. Pupils are equal, round, and reactive to light. No scleral icterus.   Neck: Normal range of motion. Neck supple. No JVD present.   Cardiovascular: Normal rate and regular rhythm.    Pulmonary/Chest: Effort normal and breath sounds normal.   Abdominal: Soft. Bowel sounds are normal. He exhibits no distension. There is no tenderness.   Genitourinary:   Genitourinary Comments: deferred   Musculoskeletal: Normal range of motion. He exhibits no edema.   Neurological: He is alert and oriented to person, place, and time.   No gross focal motor deficits    Skin: Skin is warm and dry.   Psychiatric: He has a normal mood and affect. His behavior is normal.        Significant Labs:   Recent Lab Results       06/10/17  1830 06/10/17  1751      Albumin  3.7     Alkaline Phosphatase  63     ALT  11     Anion Gap  9     Aniso  Slight     aPTT 24.4  Comment:  aPTT therapeutic range = 39-69 seconds      AST  12     Baso #  0.08     Basophil%  1.1     Total Bilirubin  0.5  Comment:  For infants and newborns, interpretation of results should be based  on gestational age, weight and in agreement with clinical  observations.  Premature Infant recommended reference ranges:  Up to 24 hours.............<8.0 mg/dL  Up to 48 hours............<12.0 mg/dL  3-5 days..................<15.0 mg/dL  6-29 days.................<15.0 mg/dL       BNP  634  Comment:  Values of less than 100 pg/ml are consistent with non-CHF populations.(H)     BUN, Bld  30(H)     Calcium  8.5(L)     Chloride  108     CO2  23     Creatinine  2.0(H)     Differential Method  Automated     eGFR if   33.7(A)     eGFR if non   29.1  Comment:  Calculation used to obtain the estimated glomerular filtration  rate (eGFR) is the CKD-EPI  equation. Since race is unknown   in our information system, the eGFR values for   -American and Non--American patients are given   for each creatinine result.  (A)     Eos #  0.5     Eosinophil%  6.1     Glucose  147(H)     Gran #  4.9     Gran%  64.0     Hematocrit  18.7  Comment:  Results confirmed, test repeated  HGB and HCT     critical result(s) called and verbal readback   obtained from [employer] 06/10/2017 18:07  (LL)     Hemoglobin  4.8  Comment:  Results confirmed, test repeated  HGB and HCT     critical result(s) called and verbal readback   obtained from [employer] 06/10/2017 18:07  (LL)     Hypo  Marked     Coumadin Monitoring INR 1.2  Comment:  Coumadin Therapy:  2.0 - 3.0 for INR for all indicators except mechanical heart valves  and antiphospholipid syndromes which should use 2.5 - 3.5.        Iron 18(L)      Lymph #  1.3     Lymph%  16.9(L)     MCH  18.3(L)     MCHC  25.7(L)     MCV  71(L)     Mono #  0.9     Mono%  11.5     MPV  9.3     Ovalocytes  Occasional     Platelets  304     Poik  Slight     Poly  Occasional     Potassium  4.8     Total Protein  6.6     Protime 12.7(H)      RBC  2.63(L)     RDW  20.5(H)     Retic 3.4(H)      Saturated Iron 4(L)      Sodium  140     Spherocytes  Occasional     Stomatocytes  Present     Tear Drop Cells  Occasional     TIBC 462(H)      Transferrin 312      Troponin I  0.015  Comment:  The reference interval for Troponin I represents the 99th percentile   cutoff   for our facility and is consistent with 3rd generation assay   performance.       WBC  7.57           Significant Imaging:     CXR - Decreased pleural fluid compared to the previous scan.  Streaky opacity within the left lower lobe may relate to atelectasis.  Persistent cardiomegaly.    CT Head - No acute intracranial CT abnormality.

## 2017-06-11 NOTE — HOSPITAL COURSE
Patient received a total of 5 PRBC for acute blood loss anemia.    His hemoglobin is stable at 11.3    Eliquis was discontinued on discharge and ASA may be resumed.    EGD /colonoscopy -Multiple bleeding angiodysplastic lesions in the                         duodenum. Treated with bipolar cautery. Injected.                        - Biopsies were taken with a cold forceps for                         Helicobacter pylori testing                        - Moderate diverticulosis in the sigmoid colon and                         in the descending colon. There was no evidence of                         diverticular bleeding        Follow up EGD in 2 weeks         Seen and examined , stable for discharge

## 2017-06-11 NOTE — ED NOTES
Pt resting in bed. NAD, VSS except for O2 sat RR unlabored & states his oxygen level is always low & feels a little SOB tonight..  Pt c/o progressive weakness x past week or 10 days.  Also unsteady or off balance recently. Will continue to monitorPatient verbally verified and Spelled Full Name and Date of Birth. LOC: The patient is awake, alert and aware of environment with an appropriate affect, the patient is oriented x 3 and speaking appropriately.  APPEARANCE: Patient  comfortable, patient is clean and well groomed, patient's clothing is properly fastened.  HEENT: Brief WNL  SKIN: color pale  & pastey, cool & dry   MUSCULOSKELETAL: Brief WNL,no edema   RESPIRATORY: chest sounds clear without wheezes , rhonchi or rales  CARDIAC: Brief WNL, sinus rhythm at 68/min  GASTRO: abdomen soft & nontender, pt denies N,V or D, BM yesterday  (darker than usual, but divya bright red blood per pt) decreased appetite over past few months  : Brief WNL  Peripheral Vasc: Brief WNL  NEURO: Brief WNL  PSYCH: Brief WNL

## 2017-06-11 NOTE — PLAN OF CARE
Problem: Patient Care Overview  Goal: Plan of Care Review  Outcome: Ongoing (interventions implemented as appropriate)  Plan of care reviewed with patient. AAO x3. V/S stable. Patient complaining of 0/10 pain at this time. Patient on telemetry NS rhythm noted. On 2 LNC. 3/3 unit of PBRCs currently infusing. Fall precautions in place, patient free from fall/injury. Patient has no questions at this time. Will continue to monitor.

## 2017-06-11 NOTE — H&P
[facility] Hospital Medicine  History & Physical    Patient Name: Jimmy Young  MRN: [id number]  Admission Date: 6/10/2017  Attending Physician: Hansel Hernandez MD   Primary Care Provider: Nacho Richardson MD         Patient information was obtained from patient, past medical records and ER records.     Subjective:     Principal Problem:Symptomatic anemia    Chief Complaint:   Chief Complaint   Patient presents with    Weakness     generalized weakness and off balance for 3 days.        HPI: 87 year old male with h/o Afib and HTN presented to IB complaining of progressive weakness/fatigue over the last 7-10 days.  States he just felt like he had no energy.  Denies any chest pain, palpitations or shortness of breath.  In ER, found to have a hgb of 4.8.  He was started on Eliquis 4-5 months ago due to afib.  Says he thinks his stools may have been dark/black recently.  Denies any grossly bloody stools.  No abdominal pain.  He was admitted in 2015 for symptomatic anemia.  He says he has had 2 colonoscopies in the past but family is not sure.    Past Medical History:   Diagnosis Date    Atrial fibrillation     Emphysema lung     Hypertension     Thyroid disease        Past Surgical History:   Procedure Laterality Date    ABDOMINAL SURGERY      NO PAST SURGERIES         Review of patient's allergies indicates:  No Known Allergies    No current facility-administered medications on file prior to encounter.      Current Outpatient Prescriptions on File Prior to Encounter   Medication Sig    amlodipine (NORVASC) 10 MG tablet Take 10 mg by mouth once daily.    cetirizine (ZYRTEC) 10 MG tablet Take 10 mg by mouth once daily.    ferrous sulfate 325 (65 FE) MG EC tablet Take 1 tablet (325 mg total) by mouth 2 (two) times daily.    folic acid-vit B6-vit B12 2.5-25-2 mg (FOLBIC OR EQUIV) 2.5-25-2 mg Tab Take 1 tablet by mouth once daily.    hydrocodone-acetaminophen 5-325mg (NORCO) 5-325 mg per  tablet Take 1 tablet by mouth every 6 (six) hours as needed for Pain.    aspirin (ECOTRIN) 81 MG EC tablet Take 81 mg by mouth once daily.    levothyroxine (SYNTHROID) 50 MCG tablet Take 1 tablet (50 mcg total) by mouth before breakfast.    metoprolol succinate (TOPROL-XL) 25 MG 24 hr tablet Take 25 mg by mouth once daily.     Family History     Problem Relation (Age of Onset)    Hypertension Father        Social History Main Topics    Smoking status: Former Smoker    Smokeless tobacco: Never Used    Alcohol use No    Drug use: No    Sexual activity: Not on file     Review of Systems   All other systems reviewed and are negative.    Objective:     Vital Signs (Most Recent):  Temp: 98 °F (36.7 °C) (06/10/17 2225)  Pulse: 71 (06/10/17 2225)  Resp: 20 (06/10/17 2225)  BP: 135/60 (06/10/17 2225)  SpO2: (!) 91 % (06/10/17 2225) Vital Signs (24h Range):  Temp:  [98 °F (36.7 °C)-98.5 °F (36.9 °C)] 98 °F (36.7 °C)  Pulse:  [66-96] 71  Resp:  [18-25] 20  SpO2:  [88 %-100 %] 91 %  BP: (125-167)/(59-82) 135/60     Weight: 85.6 kg (188 lb 11.2 oz)  Body mass index is 28.69 kg/m².    Physical Exam   Constitutional: He is oriented to person, place, and time. He appears well-developed and well-nourished.   HENT:   Head: Normocephalic and atraumatic.   Eyes: Conjunctivae are normal. Pupils are equal, round, and reactive to light. No scleral icterus.   Neck: Normal range of motion. Neck supple. No JVD present.   Cardiovascular: Normal rate and regular rhythm.    Pulmonary/Chest: Effort normal and breath sounds normal.   Abdominal: Soft. Bowel sounds are normal. He exhibits no distension. There is no tenderness.   Genitourinary:   Genitourinary Comments: deferred   Musculoskeletal: Normal range of motion. He exhibits no edema.   Neurological: He is alert and oriented to person, place, and time.   No gross focal motor deficits    Skin: Skin is warm and dry.   Psychiatric: He has a normal mood and affect. His behavior is  normal.        Significant Labs:   Recent Lab Results       06/10/17  1830 06/10/17  1751      Albumin  3.7     Alkaline Phosphatase  63     ALT  11     Anion Gap  9     Aniso  Slight     aPTT 24.4  Comment:  aPTT therapeutic range = 39-69 seconds      AST  12     Baso #  0.08     Basophil%  1.1     Total Bilirubin  0.5  Comment:  For infants and newborns, interpretation of results should be based  on gestational age, weight and in agreement with clinical  observations.  Premature Infant recommended reference ranges:  Up to 24 hours.............<8.0 mg/dL  Up to 48 hours............<12.0 mg/dL  3-5 days..................<15.0 mg/dL  6-29 days.................<15.0 mg/dL       BNP  634  Comment:  Values of less than 100 pg/ml are consistent with non-CHF populations.(H)     BUN, Bld  30(H)     Calcium  8.5(L)     Chloride  108     CO2  23     Creatinine  2.0(H)     Differential Method  Automated     eGFR if   33.7(A)     eGFR if non   29.1  Comment:  Calculation used to obtain the estimated glomerular filtration  rate (eGFR) is the CKD-EPI equation. Since race is unknown   in our information system, the eGFR values for   -American and Non--American patients are given   for each creatinine result.  (A)     Eos #  0.5     Eosinophil%  6.1     Glucose  147(H)     Gran #  4.9     Gran%  64.0     Hematocrit  18.7  Comment:  Results confirmed, test repeated  HGB and HCT     critical result(s) called and verbal readback   obtained from [employer] 06/10/2017 18:07  (LL)     Hemoglobin  4.8  Comment:  Results confirmed, test repeated  HGB and HCT     critical result(s) called and verbal readback   obtained from [employer] 06/10/2017 18:07  (LL)     Hypo  Marked     Coumadin Monitoring INR 1.2  Comment:  Coumadin Therapy:  2.0 - 3.0 for INR for all indicators except mechanical heart valves  and antiphospholipid syndromes which should use 2.5 - 3.5.        Iron 18(L)       Lymph #  1.3     Lymph%  16.9(L)     MCH  18.3(L)     MCHC  25.7(L)     MCV  71(L)     Mono #  0.9     Mono%  11.5     MPV  9.3     Ovalocytes  Occasional     Platelets  304     Poik  Slight     Poly  Occasional     Potassium  4.8     Total Protein  6.6     Protime 12.7(H)      RBC  2.63(L)     RDW  20.5(H)     Retic 3.4(H)      Saturated Iron 4(L)      Sodium  140     Spherocytes  Occasional     Stomatocytes  Present     Tear Drop Cells  Occasional     TIBC 462(H)      Transferrin 312      Troponin I  0.015  Comment:  The reference interval for Troponin I represents the 99th percentile   cutoff   for our facility and is consistent with 3rd generation assay   performance.       WBC  7.57           Significant Imaging:     CXR - Decreased pleural fluid compared to the previous scan.  Streaky opacity within the left lower lobe may relate to atelectasis.  Persistent cardiomegaly.    CT Head - No acute intracranial CT abnormality.    Assessment/Plan:     * Symptomatic anemia    Suspect GI bleed - will transfuse 3 units of PRBC and monitor H/H; check stool for occult blood; consult GI in AM; IV Protonix          ROSELINE (acute kidney injury)    Baseline creatinine around 1.2-1.4; it is 2 tonight; will monitor; avoid nephrotoxins; repeat bmp in AM          Chronic kidney disease, stage 3    Baseline creatinine ~1.2-1.4         Atrial Fibrillation   Rate controlled with amiodarone and Coreg; hold Eliquis due to anemia       Acquired hypothyroidism    Resume Synthroid          Essential hypertension    Resume Norvasc and Coreg            VTE Risk Mitigation         Ordered     Medium Risk of VTE  Once      06/10/17 2217     Place SALUD hose  Until discontinued      06/10/17 2217     Place sequential compression device  Until discontinued      06/10/17 2217     Reason for No Pharmacological VTE Prophylaxis  Once      06/10/17 2217      No Lovenox due to anemia  Hansel Hernandez MD  Department of Hospital Medicine   [facility]

## 2017-06-12 ENCOUNTER — TELEPHONE (OUTPATIENT)
Dept: GASTROENTEROLOGY | Facility: HOSPITAL | Age: 82
End: 2017-06-12

## 2017-06-12 ENCOUNTER — ANESTHESIA (OUTPATIENT)
Dept: ENDOSCOPY | Facility: HOSPITAL | Age: 82
End: 2017-06-12
Payer: MEDICARE

## 2017-06-12 ENCOUNTER — SURGERY (OUTPATIENT)
Age: 82
End: 2017-06-12

## 2017-06-12 ENCOUNTER — ANESTHESIA EVENT (OUTPATIENT)
Dept: ENDOSCOPY | Facility: HOSPITAL | Age: 82
End: 2017-06-12
Payer: MEDICARE

## 2017-06-12 VITALS
RESPIRATION RATE: 18 BRPM | DIASTOLIC BLOOD PRESSURE: 59 MMHG | BODY MASS INDEX: 28.6 KG/M2 | HEART RATE: 67 BPM | OXYGEN SATURATION: 96 % | SYSTOLIC BLOOD PRESSURE: 135 MMHG | TEMPERATURE: 99 F | WEIGHT: 188.69 LBS | HEIGHT: 68 IN

## 2017-06-12 VITALS — RESPIRATION RATE: 12 BRPM

## 2017-06-12 DIAGNOSIS — K31.811 ANGIODYSPLASIA OF DUODENUM WITH HEMORRHAGE: Primary | ICD-10-CM

## 2017-06-12 PROBLEM — K57.30 DIVERTICULOSIS OF LARGE INTESTINE WITHOUT HEMORRHAGE: Chronic | Status: ACTIVE | Noted: 2017-06-12

## 2017-06-12 PROBLEM — D50.9 HYPOCHROMIC-MICROCYTIC ANEMIA: Status: RESOLVED | Noted: 2017-06-10 | Resolved: 2017-06-12

## 2017-06-12 PROBLEM — D62 ACUTE POST-HEMORRHAGIC ANEMIA: Status: RESOLVED | Noted: 2017-06-11 | Resolved: 2017-06-12

## 2017-06-12 PROBLEM — K44.9 HIATAL HERNIA: Chronic | Status: ACTIVE | Noted: 2017-06-12

## 2017-06-12 PROBLEM — K92.1 MELENA: Status: RESOLVED | Noted: 2017-06-11 | Resolved: 2017-06-12

## 2017-06-12 LAB
ALBUMIN SERPL BCP-MCNC: 4.1 G/DL
ALP SERPL-CCNC: 83 U/L
ALT SERPL W/O P-5'-P-CCNC: 13 U/L
ANION GAP SERPL CALC-SCNC: 13 MMOL/L
AST SERPL-CCNC: 17 U/L
BASOPHILS # BLD AUTO: 0.07 K/UL
BASOPHILS NFR BLD: 0.7 %
BILIRUB SERPL-MCNC: 1.9 MG/DL
BLD PROD TYP BPU: NORMAL
BLD PROD TYP BPU: NORMAL
BLOOD UNIT EXPIRATION DATE: NORMAL
BLOOD UNIT EXPIRATION DATE: NORMAL
BLOOD UNIT TYPE CODE: 6200
BLOOD UNIT TYPE CODE: 6200
BLOOD UNIT TYPE: NORMAL
BLOOD UNIT TYPE: NORMAL
BUN SERPL-MCNC: 22 MG/DL
CALCIUM SERPL-MCNC: 8.9 MG/DL
CHLORIDE SERPL-SCNC: 106 MMOL/L
CO2 SERPL-SCNC: 26 MMOL/L
CODING SYSTEM: NORMAL
CODING SYSTEM: NORMAL
CREAT SERPL-MCNC: 1.8 MG/DL
DIFFERENTIAL METHOD: ABNORMAL
DISPENSE STATUS: NORMAL
DISPENSE STATUS: NORMAL
EOSINOPHIL # BLD AUTO: 0.3 K/UL
EOSINOPHIL NFR BLD: 2.6 %
ERYTHROCYTE [DISTWIDTH] IN BLOOD BY AUTOMATED COUNT: 21.2 %
EST. GFR  (AFRICAN AMERICAN): 38 ML/MIN/1.73 M^2
EST. GFR  (NON AFRICAN AMERICAN): 33 ML/MIN/1.73 M^2
GLUCOSE SERPL-MCNC: 98 MG/DL
HCT VFR BLD AUTO: 36.2 %
HGB BLD-MCNC: 11.3 G/DL
HYPOCHROMIA BLD QL SMEAR: ABNORMAL
LYMPHOCYTES # BLD AUTO: 0.9 K/UL
LYMPHOCYTES NFR BLD: 8.2 %
MCH RBC QN AUTO: 23.3 PG
MCHC RBC AUTO-ENTMCNC: 31.2 %
MCV RBC AUTO: 75 FL
MONOCYTES # BLD AUTO: 1.1 K/UL
MONOCYTES NFR BLD: 10.1 %
NEUTROPHILS # BLD AUTO: 8.2 K/UL
NEUTROPHILS NFR BLD: 79.6 %
NUM UNITS TRANS PACKED RBC: NORMAL
NUM UNITS TRANS PACKED RBC: NORMAL
OVALOCYTES BLD QL SMEAR: ABNORMAL
PLATELET # BLD AUTO: 254 K/UL
PMV BLD AUTO: 9.7 FL
POIKILOCYTOSIS BLD QL SMEAR: SLIGHT
POLYCHROMASIA BLD QL SMEAR: ABNORMAL
POTASSIUM SERPL-SCNC: 3.4 MMOL/L
PROT SERPL-MCNC: 7.3 G/DL
RBC # BLD AUTO: 4.84 M/UL
SODIUM SERPL-SCNC: 145 MMOL/L
STOMATOCYTES BLD QL SMEAR: PRESENT
TARGETS BLD QL SMEAR: ABNORMAL
WBC # BLD AUTO: 10.42 K/UL

## 2017-06-12 PROCEDURE — 45378 DIAGNOSTIC COLONOSCOPY: CPT | Mod: ,,, | Performed by: INTERNAL MEDICINE

## 2017-06-12 PROCEDURE — 25000242 PHARM REV CODE 250 ALT 637 W/ HCPCS: Performed by: INTERNAL MEDICINE

## 2017-06-12 PROCEDURE — 25000003 PHARM REV CODE 250: Performed by: NURSE ANESTHETIST, CERTIFIED REGISTERED

## 2017-06-12 PROCEDURE — 43239 EGD BIOPSY SINGLE/MULTIPLE: CPT | Performed by: INTERNAL MEDICINE

## 2017-06-12 PROCEDURE — 27201012 HC FORCEPS, HOT/COLD, DISP: Performed by: INTERNAL MEDICINE

## 2017-06-12 PROCEDURE — 25000003 PHARM REV CODE 250: Performed by: INTERNAL MEDICINE

## 2017-06-12 PROCEDURE — 36415 COLL VENOUS BLD VENIPUNCTURE: CPT

## 2017-06-12 PROCEDURE — 85025 COMPLETE CBC W/AUTO DIFF WBC: CPT

## 2017-06-12 PROCEDURE — G0378 HOSPITAL OBSERVATION PER HR: HCPCS

## 2017-06-12 PROCEDURE — 63600175 PHARM REV CODE 636 W HCPCS: Performed by: NURSE ANESTHETIST, CERTIFIED REGISTERED

## 2017-06-12 PROCEDURE — 37000009 HC ANESTHESIA EA ADD 15 MINS: Performed by: INTERNAL MEDICINE

## 2017-06-12 PROCEDURE — 43255 EGD CONTROL BLEEDING ANY: CPT | Performed by: INTERNAL MEDICINE

## 2017-06-12 PROCEDURE — 27201038 HC PROBE, BI-POLAR: Performed by: INTERNAL MEDICINE

## 2017-06-12 PROCEDURE — 27000221 HC OXYGEN, UP TO 24 HOURS

## 2017-06-12 PROCEDURE — 99214 OFFICE O/P EST MOD 30 MIN: CPT | Mod: 25,,, | Performed by: INTERNAL MEDICINE

## 2017-06-12 PROCEDURE — 25000003 PHARM REV CODE 250: Performed by: EMERGENCY MEDICINE

## 2017-06-12 PROCEDURE — 88305 TISSUE EXAM BY PATHOLOGIST: CPT | Performed by: PATHOLOGY

## 2017-06-12 PROCEDURE — 94761 N-INVAS EAR/PLS OXIMETRY MLT: CPT

## 2017-06-12 PROCEDURE — 80053 COMPREHEN METABOLIC PANEL: CPT

## 2017-06-12 PROCEDURE — 43239 EGD BIOPSY SINGLE/MULTIPLE: CPT | Mod: 51,,, | Performed by: INTERNAL MEDICINE

## 2017-06-12 PROCEDURE — 45378 DIAGNOSTIC COLONOSCOPY: CPT | Performed by: INTERNAL MEDICINE

## 2017-06-12 PROCEDURE — P9016 RBC LEUKOCYTES REDUCED: HCPCS

## 2017-06-12 PROCEDURE — 43255 EGD CONTROL BLEEDING ANY: CPT | Mod: 59,,, | Performed by: INTERNAL MEDICINE

## 2017-06-12 PROCEDURE — 94640 AIRWAY INHALATION TREATMENT: CPT

## 2017-06-12 PROCEDURE — 37000008 HC ANESTHESIA 1ST 15 MINUTES: Performed by: INTERNAL MEDICINE

## 2017-06-12 PROCEDURE — 88305 TISSUE EXAM BY PATHOLOGIST: CPT | Mod: 26,,, | Performed by: PATHOLOGY

## 2017-06-12 PROCEDURE — 88342 IMHCHEM/IMCYTCHM 1ST ANTB: CPT | Mod: 26,,, | Performed by: PATHOLOGY

## 2017-06-12 RX ORDER — SODIUM CHLORIDE, SODIUM LACTATE, POTASSIUM CHLORIDE, CALCIUM CHLORIDE 600; 310; 30; 20 MG/100ML; MG/100ML; MG/100ML; MG/100ML
INJECTION, SOLUTION INTRAVENOUS CONTINUOUS PRN
Status: DISCONTINUED | OUTPATIENT
Start: 2017-06-12 | End: 2017-06-12

## 2017-06-12 RX ORDER — SODIUM CHLORIDE, SODIUM LACTATE, POTASSIUM CHLORIDE, CALCIUM CHLORIDE 600; 310; 30; 20 MG/100ML; MG/100ML; MG/100ML; MG/100ML
INJECTION, SOLUTION INTRAVENOUS CONTINUOUS
Status: DISCONTINUED | OUTPATIENT
Start: 2017-06-12 | End: 2017-06-12

## 2017-06-12 RX ORDER — POTASSIUM CHLORIDE 20 MEQ/1
40 TABLET, EXTENDED RELEASE ORAL ONCE
Status: COMPLETED | OUTPATIENT
Start: 2017-06-12 | End: 2017-06-12

## 2017-06-12 RX ORDER — PROPOFOL 10 MG/ML
INJECTION, EMULSION INTRAVENOUS
Status: DISCONTINUED | OUTPATIENT
Start: 2017-06-12 | End: 2017-06-12

## 2017-06-12 RX ORDER — SODIUM CHLORIDE, SODIUM LACTATE, POTASSIUM CHLORIDE, CALCIUM CHLORIDE 600; 310; 30; 20 MG/100ML; MG/100ML; MG/100ML; MG/100ML
INJECTION, SOLUTION INTRAVENOUS
Status: COMPLETED | OUTPATIENT
Start: 2017-06-12 | End: 2017-06-12

## 2017-06-12 RX ORDER — LIDOCAINE HCL/PF 100 MG/5ML
SYRINGE (ML) INTRAVENOUS
Status: DISCONTINUED | OUTPATIENT
Start: 2017-06-12 | End: 2017-06-12

## 2017-06-12 RX ORDER — PANTOPRAZOLE SODIUM 40 MG/1
40 TABLET, DELAYED RELEASE ORAL 2 TIMES DAILY
Qty: 30 TABLET | Refills: 2 | Status: SHIPPED | OUTPATIENT
Start: 2017-06-12 | End: 2018-04-09 | Stop reason: SDUPTHER

## 2017-06-12 RX ORDER — IPRATROPIUM BROMIDE AND ALBUTEROL SULFATE 2.5; .5 MG/3ML; MG/3ML
3 SOLUTION RESPIRATORY (INHALATION) EVERY 6 HOURS
Status: DISCONTINUED | OUTPATIENT
Start: 2017-06-12 | End: 2017-06-12 | Stop reason: HOSPADM

## 2017-06-12 RX ORDER — ETOMIDATE 2 MG/ML
INJECTION INTRAVENOUS
Status: DISCONTINUED | OUTPATIENT
Start: 2017-06-12 | End: 2017-06-12

## 2017-06-12 RX ORDER — PANTOPRAZOLE SODIUM 40 MG/1
40 TABLET, DELAYED RELEASE ORAL 2 TIMES DAILY
Qty: 60 TABLET | Refills: 0 | Status: ON HOLD | OUTPATIENT
Start: 2017-06-12 | End: 2017-07-03 | Stop reason: HOSPADM

## 2017-06-12 RX ORDER — PANTOPRAZOLE SODIUM 40 MG/1
40 TABLET, DELAYED RELEASE ORAL DAILY
Status: DISCONTINUED | OUTPATIENT
Start: 2017-06-12 | End: 2017-06-12 | Stop reason: HOSPADM

## 2017-06-12 RX ADMIN — PRAVASTATIN SODIUM 40 MG: 20 TABLET ORAL at 09:06

## 2017-06-12 RX ADMIN — POTASSIUM CHLORIDE 40 MEQ: 1500 TABLET, EXTENDED RELEASE ORAL at 09:06

## 2017-06-12 RX ADMIN — PROPOFOL 20 MG: 10 INJECTION, EMULSION INTRAVENOUS at 08:06

## 2017-06-12 RX ADMIN — AMIODARONE HYDROCHLORIDE 200 MG: 200 TABLET ORAL at 09:06

## 2017-06-12 RX ADMIN — LIDOCAINE HYDROCHLORIDE 50 MG: 20 INJECTION, SOLUTION INTRAVENOUS at 07:06

## 2017-06-12 RX ADMIN — SODIUM CHLORIDE, SODIUM LACTATE, POTASSIUM CHLORIDE, AND CALCIUM CHLORIDE: .6; .31; .03; .02 INJECTION, SOLUTION INTRAVENOUS at 07:06

## 2017-06-12 RX ADMIN — SODIUM CHLORIDE, SODIUM LACTATE, POTASSIUM CHLORIDE, AND CALCIUM CHLORIDE: 600; 310; 30; 20 INJECTION, SOLUTION INTRAVENOUS at 07:06

## 2017-06-12 RX ADMIN — PANTOPRAZOLE SODIUM 40 MG: 40 TABLET, DELAYED RELEASE ORAL at 09:06

## 2017-06-12 RX ADMIN — FERROUS SULFATE TAB EC 325 MG (65 MG FE EQUIVALENT) 325 MG: 325 (65 FE) TABLET DELAYED RESPONSE at 09:06

## 2017-06-12 RX ADMIN — CARVEDILOL 6.25 MG: 6.25 TABLET, FILM COATED ORAL at 09:06

## 2017-06-12 RX ADMIN — Medication 1 TABLET: at 09:06

## 2017-06-12 RX ADMIN — IPRATROPIUM BROMIDE AND ALBUTEROL SULFATE 3 ML: .5; 3 SOLUTION RESPIRATORY (INHALATION) at 11:06

## 2017-06-12 RX ADMIN — METOPROLOL SUCCINATE 25 MG: 25 TABLET, EXTENDED RELEASE ORAL at 09:06

## 2017-06-12 RX ADMIN — CETIRIZINE HYDROCHLORIDE 10 MG: 10 TABLET, FILM COATED ORAL at 09:06

## 2017-06-12 RX ADMIN — AMLODIPINE BESYLATE 10 MG: 10 TABLET ORAL at 09:06

## 2017-06-12 RX ADMIN — ETOMIDATE 10 MG: 2 INJECTION, SOLUTION INTRAVENOUS at 07:06

## 2017-06-12 NOTE — H&P (VIEW-ONLY)
[facility]   Gastroenterology  Consult Note    Patient Name: Jimmy Young  MRN: [id number]  Admission Date: 6/10/2017  Hospital Length of Stay: 0 days  Code Status: Full Code   Attending Provider: Cleo Crisostomo MD   Consulting Provider: Boza Rivera MD  Primary Care Physician: Nacho Richardson MD  Principal Problem:Symptomatic anemia    Inpatient consult to Gastroenterology  Consult performed by: BOAZ RIVERA  Consult ordered by: RIGOBERTO THOMAS  Reason for consult: Symptomatic anemia        Subjective:     HPI:  Anemia: Patient presents for presents evaluation of anemia. Anemia was found by ER visit.  It has been present for several days.  Associated signs & symptoms: dizziness/lightheadedness, dyspnea, fatigue and pallor.   GI Bleeding: Patient complains of melena. Symptoms have been present for approximately several days. The symptoms are stable. This has been associated with shortness of breath.  He denies chest pain, choking on food, difficulty swallowing, early satiety, fullness after meals, heartburn, hematemesis, midespigastric pain, nausea, need to clear throat frequently, nocturnal burning, regurgitation of undigested food, unexpected weight loss and wheezing.  He has not used nonsteroidal anti-inflammatory drugs on a regular bases; he is anticoagulated.  The patient currently denies significant abdominal pain or discomfort.  There is not a past history of gastrointestinal bleeding.   Mr. Young never had a colonoscopy or an EGD. He denies family history of GI cancer and states that he went to the ED when he was having progressive weakness and fatigue. He states he had some black stools.           No current facility-administered medications on file prior to encounter.      Current Outpatient Prescriptions on File Prior to Encounter   Medication Sig Dispense Refill    amlodipine (NORVASC) 10 MG tablet Take 10 mg by mouth once daily.      cetirizine (ZYRTEC) 10 MG tablet Take  10 mg by mouth once daily.      ferrous sulfate 325 (65 FE) MG EC tablet Take 1 tablet (325 mg total) by mouth 2 (two) times daily. 60 tablet 1    folic acid-vit B6-vit B12 2.5-25-2 mg (FOLBIC OR EQUIV) 2.5-25-2 mg Tab Take 1 tablet by mouth once daily. 30 tablet 1    hydrocodone-acetaminophen 5-325mg (NORCO) 5-325 mg per tablet Take 1 tablet by mouth every 6 (six) hours as needed for Pain. 12 tablet 0    aspirin (ECOTRIN) 81 MG EC tablet Take 81 mg by mouth once daily.      levothyroxine (SYNTHROID) 50 MCG tablet Take 1 tablet (50 mcg total) by mouth before breakfast. 30 tablet 1    metoprolol succinate (TOPROL-XL) 25 MG 24 hr tablet Take 25 mg by mouth once daily.           Past Medical History:   Diagnosis Date    Atrial fibrillation     Emphysema lung     Hypertension     Thyroid disease        Past Surgical History:   Procedure Laterality Date    ABDOMINAL SURGERY      NO PAST SURGERIES         Review of patient's allergies indicates:  No Known Allergies  Family History     Problem Relation (Age of Onset)    Hypertension Father        Social History Main Topics    Smoking status: Former Smoker    Smokeless tobacco: Never Used    Alcohol use No    Drug use: No    Sexual activity: Not on file     Review of Systems   Constitutional: Positive for activity change and fatigue. Negative for appetite change, fever and unexpected weight change.   HENT: Negative for congestion, ear pain, hearing loss, mouth sores, trouble swallowing and voice change.    Eyes: Negative for pain, redness and itching.   Respiratory: Positive for shortness of breath. Negative for apnea, cough, choking, chest tightness and wheezing.    Cardiovascular: Negative for chest pain, palpitations and leg swelling.   Gastrointestinal: Positive for blood in stool and diarrhea. Negative for abdominal distention, abdominal pain, anal bleeding, constipation, nausea and vomiting.   Endocrine: Negative for cold intolerance, heat intolerance  and polyphagia.   Genitourinary: Negative for dysuria, hematuria and urgency.   Musculoskeletal: Negative for arthralgias, joint swelling and neck pain.   Skin: Positive for pallor. Negative for rash.   Allergic/Immunologic: Negative for environmental allergies and food allergies.   Neurological: Negative for dizziness, facial asymmetry and light-headedness.   Hematological: Negative for adenopathy. Does not bruise/bleed easily.   Psychiatric/Behavioral: Negative for agitation, behavioral problems, confusion and decreased concentration.     Objective:     Vital Signs (Most Recent):  Temp: 97.5 °F (36.4 °C) (06/11/17 1000)  Pulse: 67 (06/11/17 1000)  Resp: 18 (06/11/17 1000)  BP: (!) 126/59 (06/11/17 1000)  SpO2: (!) 94 % (06/11/17 1000) Vital Signs (24h Range):  Temp:  [97.5 °F (36.4 °C)-99 °F (37.2 °C)] 97.5 °F (36.4 °C)  Pulse:  [64-96] 67  Resp:  [18-25] 18  SpO2:  [88 %-100 %] 94 %  BP: (115-167)/(51-82) 126/59     Weight: 85.6 kg (188 lb 11.2 oz) (06/10/17 2200)  Body mass index is 28.69 kg/m².      Intake/Output Summary (Last 24 hours) at 06/11/17 1212  Last data filed at 06/11/17 1000   Gross per 24 hour   Intake           951.25 ml   Output             1600 ml   Net          -648.75 ml       Lines/Drains/Airways     Peripheral Intravenous Line                 Peripheral IV - Single Lumen 06/10/17 1749 Right Antecubital less than 1 day         Peripheral IV - Single Lumen 06/10/17 1830 Left Antecubital less than 1 day                Physical Exam   Constitutional: He is oriented to person, place, and time. He appears well-developed and well-nourished.   HENT:   Head: Normocephalic and atraumatic.   Eyes: Conjunctivae are normal. Pupils are equal, round, and reactive to light.   Neck: Normal range of motion. Neck supple. No tracheal deviation present. No thyromegaly present.   Cardiovascular: Normal rate and regular rhythm.    Pulmonary/Chest: Effort normal and breath sounds normal. He has no wheezes. He has  no rales. He exhibits no tenderness.   Abdominal: Soft. Bowel sounds are normal. He exhibits no distension and no mass. There is no tenderness. There is no guarding.   Musculoskeletal: Normal range of motion.   Lymphadenopathy:     He has no cervical adenopathy.   Neurological: He is alert and oriented to person, place, and time. No cranial nerve deficit.   Skin: Skin is warm and dry.   Psychiatric: He has a normal mood and affect. His behavior is normal.   Nursing note and vitals reviewed.      Significant Labs:  Amylase: No results for input(s): AMYLASE in the last 48 hours.  CBC:   Recent Labs  Lab 06/10/17  1751   WBC 7.57   HGB 4.8*   HCT 18.7*        BMP:   Recent Labs  Lab 06/10/17  1751   *      K 4.8      CO2 23   BUN 30*   CREATININE 2.0*   CALCIUM 8.5*     Coagulation:   Recent Labs  Lab 06/10/17  1830   INR 1.2   APTT 24.4     Lipase: No results for input(s): LIPASE in the last 48 hours.  Liver Function Test:   Recent Labs  Lab 06/10/17  1751   ALT 11   AST 12   ALKPHOS 63   BILITOT 0.5   PROT 6.6   ALBUMIN 3.7       Significant Imaging:  Imaging results within the past 24 hours have been reviewed.    Assessment/Plan:     Melena    EGD and Colon tomorrow. Agree with PPI. Clear liquid diet today and bowel prep.     New Orders to be Acknowledged     polyethylene glycol (GoLYTELY) solution   Start: 06/11/17, 4,000 mL, Oral, Once, Status: Sent   When 06/11/17 1220    Ordering Provider Boaz Toussaint MD          Case request GI: ESOPHAGOGASTRODUODENOSCOPY (EGD), COLONOSCOPY   Once, Standing Count: 1 Occurrences, Prio: Routine, Status: Completed   When 06/11/17 1220    Ordering Provider Boaz Toussaint MD          bisacodyl EC tablet 10 mg   Start: 06/11/17, 10 mg, Oral, Once, Status: Sent   When 06/11/17 1220    Ordering Provider Boaz Toussaint MD          Diet NPO   Diet effective midnight, Until Specified, Prio: Routine   When 06/11/17 1220    Ordering Provider Boaz Toussaint MD           magnesium citrate solution 296 mL   Start: 06/11/17, 296 mL, Oral, Once, Status: Sent   When 06/11/17 1220    Ordering Provider Boaz Toussaint MD                    Acute post-hemorrhagic anemia    Patient never had GI work up. Will set him up for EGD/Colon tomorrow.   Risks, benefits and alternative options were discussed with the patient. Risks including but not limited to infection, bleeding, heart or respiratory problems and perforation that may require surgery were all explained to the patient. The patient had a chance for questions if there were doubts or concerns about the test. The referring provider will be notified that our consultation is complete and available through the patient's records.              Thank you for your consult. I will follow-up with patient. Please contact us if you have any additional questions.    Boaz Toussaint MD  Gastroenterology  [facility]

## 2017-06-12 NOTE — ANESTHESIA PREPROCEDURE EVALUATION
06/12/2017  Jimmy Young is a 87 y.o., male.    Anesthesia Evaluation    I have reviewed the Patient Summary Reports.    I have reviewed the Nursing Notes.   I have reviewed the Medications.     Review of Systems  Anesthesia Hx:  No problems with previous Anesthesia    Social:  Former Smoker, No Alcohol Use    Hematology/Oncology:     Oncology Normal    -- Anemia:   EENT/Dental:   chronic allergic rhinitis   Cardiovascular:   Hypertension, well controlled CAD   A fib.  Cardioversion 3 months ago with persistent a fib.   Pulmonary:   COPD, moderate Snore   Renal/:   Chronic Renal Disease    Musculoskeletal:   Arthritis     Neurological:  Neurology Normal    Endocrine:   Hypothyroidism    Dermatological:  Skin Normal    Psych:  Psychiatric Normal           Physical Exam  General:  Well nourished    Airway/Jaw/Neck:  Airway Findings: Mallampati: II                Anesthesia Plan  Type of Anesthesia, risks & benefits discussed:  Anesthesia Type:  MAC  Patient's Preference:   Intra-op Monitoring Plan:   Intra-op Monitoring Plan Comments:   Post Op Pain Control Plan:   Post Op Pain Control Plan Comments:   Induction:   IV  Beta Blocker:  Patient is on a Beta-Blocker and has received one dose within the past 24 hours (No further documentation required).       Informed Consent: Patient understands risks and agrees with Anesthesia plan.  Questions answered. Anesthesia consent signed with patient.  ASA Score: 3     Day of Surgery Review of History & Physical: I have interviewed and examined the patient. I have reviewed the patient's H&P dated: 06/12/17. There are no significant changes.  H&P update referred to the surgeon.         Ready For Surgery From Anesthesia Perspective.

## 2017-06-12 NOTE — ANESTHESIA RELEASE NOTE
"Anesthesia Release from PACU Note    Patient: Jimmy Young    Procedure(s) Performed: Procedure(s) (LRB):  ESOPHAGOGASTRODUODENOSCOPY (EGD) (Left)  COLONOSCOPY (Left)    Anesthesia type: MAC    Post pain: Adequate analgesia    Post assessment: no apparent anesthetic complications, tolerated procedure well and no evidence of recall    Last Vitals:   Visit Vitals  BP (!) 115/50 (BP Location: Left arm, Patient Position: Lying, BP Method: Automatic)   Pulse 76   Temp 36.7 °C (98.1 °F) (Oral)   Resp 16   Ht 5' 8" (1.727 m)   Wt 85.6 kg (188 lb 11.2 oz)   SpO2 (!) 93%   BMI 28.69 kg/m²       Post vital signs: stable    Level of consciousness: awake, alert  and oriented    Nausea/Vomiting: no nausea/no vomiting    Complications: none    Airway Patency: patent    Respiratory: unassisted, spontaneous ventilation, room air    Cardiovascular: stable    Hydration: euvolemic  "

## 2017-06-12 NOTE — PROGRESS NOTES
Pt returned from endo, pt AAOx3, VSS see flow sheet.  Pt and son at bedside aware of finding from EGD/Colonoscopy.  Pt okay to eat, pt brought sandwich, dietary called and confirmed they can view pt diet.  Will continue to monitor.

## 2017-06-12 NOTE — PROGRESS NOTES
Discharge orders received, orders reviewed with patient and family (son), pt instructed when to take each medicine next.  Pt given Rx and instructed to take it twice a day and start tonight. Pt and son informed of follow up appointments and apt with PCP and cardiologist that need to be made.  PIV out, tele off, pt dressed self in own clothing. Pt verbalized understanding and does not have any questions at this time.  Pt escorted to lobby via wheelchair via staff.  Pt personal belongings with pt and family.

## 2017-06-12 NOTE — INTERVAL H&P NOTE
The patient has been examined and the H&P has been reviewed:    I concur with the findings and no changes have occurred since H&P was written.    Anesthesia/Surgery risks, benefits and alternative options discussed and understood by patient/family.          Active Hospital Problems    Diagnosis  POA    *Symptomatic anemia [D64.9]  Yes     Priority: 1 - High    Atrial fibrillation [I48.91]  Yes     Priority: 3      Chronic    Acute post-hemorrhagic anemia [D62]  Yes    Melena [K92.1]  Yes    Acquired hypothyroidism [E03.9]  Yes     Chronic    ROSELINE (acute kidney injury) [N17.9]  Yes    Chronic kidney disease, stage 3 [N18.3]  Yes     Chronic    Essential hypertension [I10]  Yes     Chronic      Resolved Hospital Problems    Diagnosis Date Resolved POA   No resolved problems to display.

## 2017-06-12 NOTE — PLAN OF CARE
Problem: Patient Care Overview  Goal: Plan of Care Review  Outcome: Ongoing (interventions implemented as appropriate)  Plan of care reviewed with patient. AAO x3. V/S stable. Patient on telemetry NS rhythm noted. Pt received 2 units of PRBCs this shift. 1x dose of 20mg lasix. Pt completed golytely prep, BMs clear liquid, NPO for EGD/colonscopy. Fall precautions in place, patient free from fall/injury. Patient has no questions at this time. Will continue to monitor.

## 2017-06-12 NOTE — TELEPHONE ENCOUNTER
Angiodysplasia of duodenum with hemorrhage  -     Case request GI: ESOPHAGOGASTRODUODENOSCOPY (EGD)      Patient needs follow up EGD in 2 weeks and after he sees his cardiologist.     Thanks,    Boaz Toussaint

## 2017-06-12 NOTE — PLAN OF CARE
CM met with pt for d/c planning assessment - pt reports that he lives alone and is independent with ADLs.  Pt does have rolling walker and cane that he uses at home on as-needed basis.  Pt has son who lives nearby and provides care and support as needed.    D/C plan: home       06/12/17 1429   Discharge Assessment   Assessment Type Final Discharge Note   Confirmed/corrected address and phone number on facesheet? Yes   Assessment information obtained from? Patient;Medical Record   Expected Length of Stay (days) (TBD)   Prior to hospitilization cognitive status: Alert/Oriented   Prior to hospitalization functional status: Independent;Assistive Equipment   Current cognitive status: Alert/Oriented   Current Functional Status: Independent;Assistive Equipment   Arrived From home or self-care   Lives With alone   Able to Return to Prior Arrangements yes   Is patient able to care for self after discharge? Yes   How many people do you have in your home that can help with your care after discharge? 0   Who are your caregiver(s) and their phone number(s)? Chrissy Glover, daughter: [phone]   Patient's perception of discharge disposition home or selfcare   Readmission Within The Last 30 Days no previous admission in last 30 days   Patient currently being followed by outpatient case management? No   Patient currently receives home health services? No   Does the patient currently use HME? Yes   Patient currently receives private duty nursing? No   Patient currently receives any other outside agency services? No   Equipment Currently Used at Home walker, rolling;cane, straight   Do you have any problems affording any of your prescribed medications? No   Is the patient taking medications as prescribed? yes   Do you have any financial concerns preventing you from receiving the healthcare you need? No   Does the patient have transportation to healthcare appointments? Yes   Transportation Available family or friend will provide   On  Dialysis? No   Does the patient receive services at the Coumadin Clinic? No   Are there any open cases? No   Discharge Plan A Home   Discharge Plan B Home   Patient/Family In Agreement With Plan yes

## 2017-06-12 NOTE — ANESTHESIA POSTPROCEDURE EVALUATION
"Anesthesia Post Evaluation    Patient: Jimmy Young    Procedure(s) Performed: Procedure(s) (LRB):  ESOPHAGOGASTRODUODENOSCOPY (EGD) (Left)  COLONOSCOPY (Left)    Final Anesthesia Type: MAC  Patient location during evaluation: PACU  Patient participation: Yes- Able to Participate  Level of consciousness: awake and alert and oriented  Post-procedure vital signs: reviewed and stable  Pain management: adequate  Airway patency: patent  PONV status at discharge: No PONV  Anesthetic complications: no      Cardiovascular status: blood pressure returned to baseline  Respiratory status: unassisted, room air and spontaneous ventilation  Hydration status: euvolemic  Follow-up not needed.        Visit Vitals  BP (!) 115/50 (BP Location: Left arm, Patient Position: Lying, BP Method: Automatic)   Pulse 76   Temp 36.7 °C (98.1 °F) (Oral)   Resp 16   Ht 5' 8" (1.727 m)   Wt 85.6 kg (188 lb 11.2 oz)   SpO2 (!) 93%   BMI 28.69 kg/m²       Pain/Evan Score: Pain Assessment Performed: Yes (6/12/2017  7:35 AM)  Presence of Pain: denies (6/12/2017  7:35 AM)      "

## 2017-06-12 NOTE — PROGRESS NOTES
[facility] -   Gastroenterology  Progress Note    Patient Name: Jimmy Young  MRN: [id number]  Admission Date: 6/10/2017  Hospital Length of Stay: 0 days  Code Status: Full Code   Attending Provider: Cleo Crisostomo MD  Consulting Provider: Boaz Toussaint MD  Primary Care Physician: Nacho Richardson MD  Principal Problem: Symptomatic anemia    Subjective:     Interval History: EGD revealed several bleeding angiodysplasias in the duodenum s/p Bipolar thermal therapy and epinephrine injections. Hiatal hernia also found. Colonoscopy revealed diverticulosis.     Review of Systems   Constitutional: Negative for activity change, appetite change, fatigue, fever and unexpected weight change.   HENT: Negative for congestion, ear pain, hearing loss, mouth sores, trouble swallowing and voice change.    Eyes: Negative for pain, redness and itching.   Respiratory: Negative for apnea, cough, choking, chest tightness, shortness of breath and wheezing.    Cardiovascular: Negative for chest pain, palpitations and leg swelling.   Gastrointestinal: Positive for diarrhea. Negative for abdominal distention, abdominal pain, anal bleeding, blood in stool, constipation, nausea and vomiting.   Endocrine: Negative for cold intolerance, heat intolerance and polyphagia.   Genitourinary: Negative for dysuria, hematuria and urgency.   Musculoskeletal: Negative for arthralgias, joint swelling and neck pain.   Skin: Negative for pallor and rash.   Allergic/Immunologic: Negative for environmental allergies and food allergies.   Neurological: Negative for dizziness, facial asymmetry and light-headedness.   Hematological: Negative for adenopathy. Does not bruise/bleed easily.   Psychiatric/Behavioral: Negative for agitation, behavioral problems, confusion and decreased concentration.     Objective:     Vital Signs (Most Recent):  Temp: 98.1 °F (36.7 °C) (06/12/17 0730)  Pulse: 75 (06/12/17 0734)  Resp: 16 (06/12/17 0734)  BP: (!)  158/81 (06/12/17 0734)  SpO2: 95 % (06/12/17 0734) Vital Signs (24h Range):  Temp:  [97.1 °F (36.2 °C)-98.7 °F (37.1 °C)] 98.1 °F (36.7 °C)  Pulse:  [63-77] 75  Resp:  [12-35] 12  SpO2:  [91 %-97 %] 95 %  BP: (115-158)/(55-81) 158/81     Weight: 85.6 kg (188 lb 11.2 oz) (06/10/17 2200)  Body mass index is 28.69 kg/m².      Intake/Output Summary (Last 24 hours) at 06/12/17 0839  Last data filed at 06/12/17 0832   Gross per 24 hour   Intake          2537.08 ml   Output             1150 ml   Net          1387.08 ml       Lines/Drains/Airways     Airway                 Airway - Non-Surgical 06/12/17 0754 Nasal Cannula less than 1 day          Peripheral Intravenous Line                 Peripheral IV - Single Lumen 06/10/17 1749 Right Antecubital 1 day         Peripheral IV - Single Lumen 06/10/17 1830 Left Antecubital 1 day                Physical Exam   Constitutional: He is oriented to person, place, and time. He appears well-developed and well-nourished.   HENT:   Head: Normocephalic and atraumatic.   Eyes: Conjunctivae are normal. Pupils are equal, round, and reactive to light.   Neck: Normal range of motion. Neck supple. No tracheal deviation present. No thyromegaly present.   Cardiovascular: Normal rate and regular rhythm.    Pulmonary/Chest: Effort normal and breath sounds normal. He has no wheezes. He has no rales. He exhibits no tenderness.   Abdominal: Soft. Bowel sounds are normal. He exhibits no distension and no mass. There is no tenderness. There is no guarding.   Musculoskeletal: Normal range of motion.   Lymphadenopathy:     He has no cervical adenopathy.   Neurological: He is alert and oriented to person, place, and time. No cranial nerve deficit.   Skin: Skin is warm and dry.   Psychiatric: He has a normal mood and affect. His behavior is normal.   Nursing note and vitals reviewed.      Significant Labs:  CBC:   Recent Labs  Lab 06/10/17  1751 06/11/17  1100 06/12/17  0608   WBC 7.57 7.94 10.42   HGB  4.8* 8.0* 11.3*   HCT 18.7* 26.8* 36.2*    231 254     BMP:   Recent Labs  Lab 06/12/17  0608   GLU 98      K 3.4*      CO2 26   BUN 22   CREATININE 1.8*   CALCIUM 8.9     Coagulation:   Recent Labs  Lab 06/10/17  1830   INR 1.2   APTT 24.4     Liver Function Test:   Recent Labs  Lab 06/10/17  1751 06/11/17  1100 06/12/17  0608   ALT 11 12 13   AST 12 15 17   ALKPHOS 63 68 83   BILITOT 0.5 3.5* 1.9*   PROT 6.6 6.6 7.3   ALBUMIN 3.7 3.7 4.1         Significant Imaging:  None    Assessment/Plan:     Active Diagnoses:    Diagnosis Date Noted POA    PRINCIPAL PROBLEM:  Symptomatic anemia [D64.9] 10/03/2015 Yes    Atrial fibrillation [I48.91] 06/10/2017 Yes     Chronic    Angiodysplasia of duodenum with hemorrhage [K31.811] 06/12/2017 Yes    Hiatal hernia [K44.9] 06/12/2017 Yes     Chronic    Diverticulosis of large intestine without hemorrhage [K57.30] 06/12/2017 Yes     Chronic    Acute post-hemorrhagic anemia [D62] 06/11/2017 Yes    Melena [K92.1] 06/11/2017 Yes    Acquired hypothyroidism [E03.9] 06/10/2017 Yes     Chronic    ROSELINE (acute kidney injury) [N17.9] 06/10/2017 Yes    Chronic kidney disease, stage 3 [N18.3] 06/10/2017 Yes     Chronic    Hypochromic-microcytic anemia [D50.9] 06/10/2017 Yes    Essential hypertension [I10] 10/04/2015 Yes     Chronic      Problems Resolved During this Admission:    Diagnosis Date Noted Date Resolved POA       See EGD and COLON reports in the chart. Patient will need follow up EGD in 2 weeks to evaluate findings.     Thank you for your consult. I will sign off. Please contact us if you have any additional questions.    Boaz Toussaint MD  Gastroenterology  [facility] -

## 2017-06-12 NOTE — TRANSFER OF CARE
"Anesthesia Transfer of Care Note    Patient: Jimmy Young    Procedure(s) Performed: Procedure(s) (LRB):  ESOPHAGOGASTRODUODENOSCOPY (EGD) (Left)  COLONOSCOPY (Left)    Patient location: PACU    Anesthesia Type: MAC    Transport from OR: Transported from OR on room air with adequate spontaneous ventilation    Post pain: adequate analgesia    Post assessment: no apparent anesthetic complications    Post vital signs: stable    Level of consciousness: awake and alert    Nausea/Vomiting: no nausea/vomiting    Complications: none    Transfer of care protocol was followed      Last vitals:   Visit Vitals  BP (!) 115/50 (BP Location: Left arm, Patient Position: Lying, BP Method: Automatic)   Pulse 76   Temp 36.7 °C (98.1 °F) (Oral)   Resp 16   Ht 5' 8" (1.727 m)   Wt 85.6 kg (188 lb 11.2 oz)   SpO2 (!) 93%   BMI 28.69 kg/m²     "

## 2017-06-12 NOTE — DISCHARGE SUMMARY
[facility] Hospital Medicine  Discharge Summary      Patient Name: Jimmy Young  MRN: [id number]  Admission Date: 6/10/2017  Hospital Length of Stay: 0 days  Discharge Date and Time:  06/12/2017 12:51 PM  Attending Physician: Cleo Crisostomo MD   Discharging Provider: Cleo Crisostomo MD  Primary Care Provider: Nacho Richardson MD      HPI:   87 year old male with h/o Afib and HTN presented to  complaining of progressive weakness/fatigue over the last 7-10 days.  States he just felt like he had no energy.  Denies any chest pain, palpitations or shortness of breath.  In ER, found to have a hgb of 4.8.  He was started on Eliquis 4-5 months ago due to afib.  Says he thinks his stools may have been dark/black recently.  Denies any grossly bloody stools.  No abdominal pain.  He was admitted in 2015 for symptomatic anemia.  He says he has had 2 colonoscopies in the past but family is not sure.    Procedure(s) (LRB):  ESOPHAGOGASTRODUODENOSCOPY (EGD) (Left)  COLONOSCOPY (Left)      Indwelling Lines/Drains at time of discharge:   Lines/Drains/Airways     Airway                 Airway - Non-Surgical 06/12/17 0754 Nasal Cannula less than 1 day              Hospital Course:     Patient received a total of 5 PRBC for acute blood loss anemia.    His hemoglobin is stable at 11.3    Eliquis was discontinued on discharge and ASA may be resumed.    EGD /colonoscopy -Multiple bleeding angiodysplastic lesions in the                         duodenum. Treated with bipolar cautery. Injected.                        - Biopsies were taken with a cold forceps for                         Helicobacter pylori testing                        - Moderate diverticulosis in the sigmoid colon and                         in the descending colon. There was no evidence of                         diverticular bleeding        Follow up EGD in 2 weeks         Seen and examined , stable for discharge      Consults:   Consults          Status Ordering Provider     Inpatient consult to Gastroenterology  Once     Provider:  Boaz Toussaint MD    Completed RIGOBERTO THOMAS          Significant Diagnostic Studies: Labs:   BMP:   Recent Labs  Lab 06/10/17  1751 06/11/17  1100 06/12/17  0608   * 139* 98    144 145   K 4.8 4.3 3.4*    109 106   CO2 23 24 26   BUN 30* 28* 22   CREATININE 2.0* 2.0* 1.8*   CALCIUM 8.5* 8.8 8.9    and CMP   Recent Labs  Lab 06/10/17  1751 06/11/17  1100 06/12/17  0608    144 145   K 4.8 4.3 3.4*    109 106   CO2 23 24 26   * 139* 98   BUN 30* 28* 22   CREATININE 2.0* 2.0* 1.8*   CALCIUM 8.5* 8.8 8.9   PROT 6.6 6.6 7.3   ALBUMIN 3.7 3.7 4.1   BILITOT 0.5 3.5* 1.9*   ALKPHOS 63 68 83   AST 12 15 17   ALT 11 12 13   ANIONGAP 9 11 13   ESTGFRAFRICA 33.7* 34* 38*   EGFRNONAA 29.1* 29* 33*       Pending Diagnostic Studies:     None        Final Active Diagnoses:    Diagnosis Date Noted POA    Angiodysplasia of duodenum with hemorrhage [K31.811] 06/12/2017 Yes    Hiatal hernia [K44.9] 06/12/2017 Yes     Chronic    Diverticulosis of large intestine without hemorrhage [K57.30] 06/12/2017 Yes     Chronic    Acquired hypothyroidism [E03.9] 06/10/2017 Yes     Chronic    ROSELINE (acute kidney injury) [N17.9] 06/10/2017 Yes    Chronic kidney disease, stage 3 [N18.3] 06/10/2017 Yes     Chronic    Atrial fibrillation [I48.91] 06/10/2017 Yes     Chronic    Essential hypertension [I10] 10/04/2015 Yes     Chronic      Problems Resolved During this Admission:    Diagnosis Date Noted Date Resolved POA    PRINCIPAL PROBLEM:  Symptomatic anemia [D64.9] 10/03/2015 06/12/2017 Yes    Acute post-hemorrhagic anemia [D62] 06/11/2017 06/12/2017 Yes    Melena [K92.1] 06/11/2017 06/12/2017 Yes    Hypochromic-microcytic anemia [D50.9] 06/10/2017 06/12/2017 Yes      No new Assessment & Plan notes have been filed under this hospital service since the last note was generated.  Service: Hospital  Medicine      Discharged Condition: good    Disposition: Home or Self Care    Follow Up:  Follow-up Information     Nacho Richardson MD.    Specialty:  Internal Medicine  Contact information:  [address]  [place] [address]  [phone]             Boaz Toussaint MD.    Specialty:  Gastroenterology  Contact information:  [address]  [place] [id number] [phone]             Nacho Richardson MD.    Specialty:  Internal Medicine  Why:  Hold ASA and Eliquis due to GI bleed . ASA may be resumed at the discretion of PCP and Cardiologist after 2 weeks  Contact information:  [address]  [place] [address]  [phone]                 Patient Instructions:     Diet general       Medications:  Reconciled Home Medications:   Current Discharge Medication List      START taking these medications    Details   pantoprazole (PROTONIX) 40 MG tablet Take 1 tablet (40 mg total) by mouth 2 (two) times daily.  Qty: 30 tablet, Refills: 2    Associated Diagnoses: Hypochromic-microcytic anemia; Symptomatic anemia; Acute post-hemorrhagic anemia; Melena; Angiodysplasia of duodenum with hemorrhage; Hiatal hernia; Diverticulosis of large intestine without hemorrhage         CONTINUE these medications which have NOT CHANGED    Details   amiodarone (PACERONE) 200 MG Tab Take by mouth.      amlodipine (NORVASC) 10 MG tablet Take 10 mg by mouth once daily.      carvedilol (COREG) 6.25 MG tablet Take 6.25 mg by mouth.      cetirizine (ZYRTEC) 10 MG tablet Take 10 mg by mouth once daily.      ferrous sulfate 325 (65 FE) MG EC tablet Take 1 tablet (325 mg total) by mouth 2 (two) times daily.  Qty: 60 tablet, Refills: 1      folic acid-vit B6-vit B12 2.5-25-2 mg (FOLBIC OR EQUIV) 2.5-25-2 mg Tab Take 1 tablet by mouth once daily.  Qty: 30 tablet, Refills: 1      pravastatin (PRAVACHOL) 40 MG tablet Take 40 mg by mouth.      selenium 50 mcg Tab Take 200 mcg by mouth.      levothyroxine (SYNTHROID) 50  MCG tablet Take 1 tablet (50 mcg total) by mouth before breakfast.  Qty: 30 tablet, Refills: 1      metoprolol succinate (TOPROL-XL) 25 MG 24 hr tablet Take 25 mg by mouth once daily.         STOP taking these medications       apixaban 2.5 mg Tab Comments:   Reason for Stopping:         hydrocodone-acetaminophen 5-325mg (NORCO) 5-325 mg per tablet Comments:   Reason for Stopping:         metformin (GLUCOPHAGE-XR) 750 MG 24 hr tablet Comments:   Reason for Stopping:         aspirin (ECOTRIN) 81 MG EC tablet Comments:   Reason for Stopping:             Time spent on the discharge of patient: 40 minutes    Cleo Crisostomo MD  Department of Hospital Medicine  [facility]

## 2017-06-12 NOTE — DISCHARGE INSTRUCTIONS

## 2017-06-14 NOTE — PROGRESS NOTES
Please let the patient know that biopsies taken during procedure are essentially OK. No further action is needed.   Biopsies negative for H. Pylori, only showing chronic gastritis.    Boaz Toussaint M.D.

## 2017-06-15 ENCOUNTER — TELEPHONE (OUTPATIENT)
Dept: GASTROENTEROLOGY | Facility: CLINIC | Age: 82
End: 2017-06-15

## 2017-06-15 NOTE — TELEPHONE ENCOUNTER
----- Message from Usha Suero sent at 6/15/2017  7:53 AM CDT -----  Contact: pt  States he's returning a nurse call. Please call pt at [phone]. Thank you

## 2017-06-15 NOTE — TELEPHONE ENCOUNTER
Called patient and gave EGD results. Patient was also told that Dr. Toussaint wanted to repeat the EGD in 2 weeks after the cardiologist is seen. Patient stated he will call after he see the cardiologist he will call and make the repeat EGD appt.

## 2017-06-23 ENCOUNTER — TELEPHONE (OUTPATIENT)
Dept: GASTROENTEROLOGY | Facility: CLINIC | Age: 82
End: 2017-06-23

## 2017-06-23 NOTE — TELEPHONE ENCOUNTER
----- Message from Usha Suero sent at 6/23/2017 10:15 AM CDT -----  Contact: daughter/Chrissy Glover  States someone called Ms Saleem Cotton's office and he's not sure what they said. States he was treated by Dr Toussaint Monday 6/12, he was discharged from the hospital. Please call Chrissy Glover at [phone]. Thank you

## 2017-06-26 ENCOUNTER — OFFICE VISIT (OUTPATIENT)
Dept: GASTROENTEROLOGY | Facility: CLINIC | Age: 82
End: 2017-06-26
Payer: MEDICARE

## 2017-06-26 VITALS
BODY MASS INDEX: 28.63 KG/M2 | HEIGHT: 68 IN | WEIGHT: 188.94 LBS | DIASTOLIC BLOOD PRESSURE: 74 MMHG | SYSTOLIC BLOOD PRESSURE: 124 MMHG | HEART RATE: 79 BPM

## 2017-06-26 DIAGNOSIS — I48.91 ATRIAL FIBRILLATION, UNSPECIFIED TYPE: ICD-10-CM

## 2017-06-26 DIAGNOSIS — K31.811 ANGIODYSPLASIA OF DUODENUM WITH HEMORRHAGE: Primary | ICD-10-CM

## 2017-06-26 PROCEDURE — 99213 OFFICE O/P EST LOW 20 MIN: CPT | Mod: S$GLB,,, | Performed by: PHYSICIAN ASSISTANT

## 2017-06-26 PROCEDURE — 1126F AMNT PAIN NOTED NONE PRSNT: CPT | Mod: S$GLB,,, | Performed by: PHYSICIAN ASSISTANT

## 2017-06-26 PROCEDURE — 1159F MED LIST DOCD IN RCRD: CPT | Mod: S$GLB,,, | Performed by: PHYSICIAN ASSISTANT

## 2017-06-26 PROCEDURE — 99999 PR PBB SHADOW E&M-EST. PATIENT-LVL IV: CPT | Mod: PBBFAC,,, | Performed by: PHYSICIAN ASSISTANT

## 2017-06-26 NOTE — PROGRESS NOTES
Subjective:       Patient ID: Jimmy Young is a 87 y.o. male.    Chief Complaint: Follow-up    HPI   The patient is here for a hospital follow up. He had a recent admission for melena and symptomatic anemia. An EGD and colonoscopy were done which found a duodenal angiodysplastic lesion. It was treated with thermal therapy. He also was transfused and responded well. His Hgb was above 11 at discharge. He had been put on Eliquis a few months prior for Afib. He is currently off this and Aspirin. He is taking an iron supplement. The patient reports feeling well. He is eating good. He denies hematochezia, melena, abdominal pain, nausea or vomiting.     Review of Systems    As per HPI.     Objective:      Physical Exam   Constitutional: He is oriented to person, place, and time. He appears well-developed and well-nourished.   HENT:   Head: Normocephalic and atraumatic.   Cardiovascular: Normal rate and regular rhythm.    Pulmonary/Chest: Effort normal and breath sounds normal. No respiratory distress.   Abdominal: Soft. Bowel sounds are normal. He exhibits no distension. There is no tenderness.   Neurological: He is alert and oriented to person, place, and time.   Psychiatric: His behavior is normal.       Assessment:       1. Angiodysplasia of duodenum with hemorrhage    2. Atrial fibrillation, unspecified type        Plan:       Repeat EGD per Dr. Toussaint's recommendations. This will be scheduled with the patient while he is here today. We discussed AVMs and the possibility of other lesions. At this time, I recommend restarting Aspirin and holding Eliquis. After repeat EGD, we will decide if he can restart it. We also talked about his current iron supplement. I would recommend a short course during recovery and then stopping it. My concern is he won't be able to tell if he is bleeding because his stools are already dark from the iron. The patient and his daughter reported understanding of the discussions. Their  questions were answered.     Thank you for the opportunity to participate in the care of this patient. This consult was designated to me by my supervising physician. A report will be sent to the referring provider.   Saleem Cotton PA-C.

## 2017-06-26 NOTE — PATIENT INSTRUCTIONS
Consider stopping the iron supplement.   Ok to restart Aspirin.   Do not restart Eliquis at this time. This should be addressed after your repeat EGD.

## 2017-07-03 ENCOUNTER — SURGERY (OUTPATIENT)
Age: 82
End: 2017-07-03

## 2017-07-03 ENCOUNTER — ANESTHESIA (OUTPATIENT)
Dept: ENDOSCOPY | Facility: HOSPITAL | Age: 82
End: 2017-07-03
Payer: MEDICARE

## 2017-07-03 ENCOUNTER — ANESTHESIA EVENT (OUTPATIENT)
Dept: ENDOSCOPY | Facility: HOSPITAL | Age: 82
End: 2017-07-03
Payer: MEDICARE

## 2017-07-03 ENCOUNTER — HOSPITAL ENCOUNTER (OUTPATIENT)
Facility: HOSPITAL | Age: 82
Discharge: HOME | End: 2017-07-03
Attending: INTERNAL MEDICINE | Admitting: INTERNAL MEDICINE
Payer: MEDICARE

## 2017-07-03 VITALS
HEART RATE: 70 BPM | RESPIRATION RATE: 26 BRPM | HEIGHT: 68 IN | OXYGEN SATURATION: 95 % | TEMPERATURE: 98 F | DIASTOLIC BLOOD PRESSURE: 70 MMHG | RESPIRATION RATE: 18 BRPM | BODY MASS INDEX: 27.13 KG/M2 | SYSTOLIC BLOOD PRESSURE: 138 MMHG | WEIGHT: 179 LBS

## 2017-07-03 DIAGNOSIS — K44.9 HIATAL HERNIA: Chronic | ICD-10-CM

## 2017-07-03 DIAGNOSIS — K31.811 ANGIODYSPLASIA OF DUODENUM WITH HEMORRHAGE: Primary | ICD-10-CM

## 2017-07-03 LAB — POCT GLUCOSE: 145 MG/DL (ref 70–110)

## 2017-07-03 PROCEDURE — 43255 EGD CONTROL BLEEDING ANY: CPT | Performed by: INTERNAL MEDICINE

## 2017-07-03 PROCEDURE — 25000003 PHARM REV CODE 250: Performed by: INTERNAL MEDICINE

## 2017-07-03 PROCEDURE — 63600175 PHARM REV CODE 636 W HCPCS: Performed by: NURSE ANESTHETIST, CERTIFIED REGISTERED

## 2017-07-03 PROCEDURE — 37000009 HC ANESTHESIA EA ADD 15 MINS: Performed by: INTERNAL MEDICINE

## 2017-07-03 PROCEDURE — 82962 GLUCOSE BLOOD TEST: CPT | Performed by: INTERNAL MEDICINE

## 2017-07-03 PROCEDURE — 43255 EGD CONTROL BLEEDING ANY: CPT | Mod: ,,, | Performed by: INTERNAL MEDICINE

## 2017-07-03 PROCEDURE — 25000003 PHARM REV CODE 250: Performed by: NURSE ANESTHETIST, CERTIFIED REGISTERED

## 2017-07-03 PROCEDURE — 63600175 PHARM REV CODE 636 W HCPCS: Performed by: INTERNAL MEDICINE

## 2017-07-03 PROCEDURE — 37000008 HC ANESTHESIA 1ST 15 MINUTES: Performed by: INTERNAL MEDICINE

## 2017-07-03 RX ORDER — SODIUM CHLORIDE, SODIUM LACTATE, POTASSIUM CHLORIDE, CALCIUM CHLORIDE 600; 310; 30; 20 MG/100ML; MG/100ML; MG/100ML; MG/100ML
INJECTION, SOLUTION INTRAVENOUS CONTINUOUS
Status: DISCONTINUED | OUTPATIENT
Start: 2017-07-03 | End: 2017-07-03 | Stop reason: HOSPADM

## 2017-07-03 RX ORDER — PROPOFOL 10 MG/ML
INJECTION, EMULSION INTRAVENOUS
Status: DISCONTINUED | OUTPATIENT
Start: 2017-07-03 | End: 2017-07-03

## 2017-07-03 RX ORDER — GLYCOPYRROLATE 0.2 MG/ML
INJECTION INTRAMUSCULAR; INTRAVENOUS
Status: DISCONTINUED | OUTPATIENT
Start: 2017-07-03 | End: 2017-07-03

## 2017-07-03 RX ORDER — EPINEPHRINE 0.1 MG/ML
INJECTION INTRAVENOUS
Status: COMPLETED | OUTPATIENT
Start: 2017-07-03 | End: 2017-07-03

## 2017-07-03 RX ORDER — SODIUM CHLORIDE, SODIUM LACTATE, POTASSIUM CHLORIDE, CALCIUM CHLORIDE 600; 310; 30; 20 MG/100ML; MG/100ML; MG/100ML; MG/100ML
INJECTION, SOLUTION INTRAVENOUS CONTINUOUS PRN
Status: DISCONTINUED | OUTPATIENT
Start: 2017-07-03 | End: 2017-07-03

## 2017-07-03 RX ORDER — METFORMIN HYDROCHLORIDE 1000 MG/1
750 TABLET ORAL NIGHTLY
COMMUNITY
End: 2018-04-09 | Stop reason: SDUPTHER

## 2017-07-03 RX ORDER — LIDOCAINE HCL/PF 100 MG/5ML
SYRINGE (ML) INTRAVENOUS
Status: DISCONTINUED | OUTPATIENT
Start: 2017-07-03 | End: 2017-07-03

## 2017-07-03 RX ADMIN — PROPOFOL 20 MG: 10 INJECTION, EMULSION INTRAVENOUS at 02:07

## 2017-07-03 RX ADMIN — LIDOCAINE HYDROCHLORIDE 50 MG: 20 INJECTION, SOLUTION INTRAVENOUS at 02:07

## 2017-07-03 RX ADMIN — PROPOFOL 50 MG: 10 INJECTION, EMULSION INTRAVENOUS at 02:07

## 2017-07-03 RX ADMIN — SODIUM CHLORIDE, SODIUM LACTATE, POTASSIUM CHLORIDE, AND CALCIUM CHLORIDE: 600; 310; 30; 20 INJECTION, SOLUTION INTRAVENOUS at 01:07

## 2017-07-03 RX ADMIN — EPINEPHRINE 0.4 MG: 0.1 INJECTION, SOLUTION ENDOTRACHEAL; INTRACARDIAC; INTRAVENOUS at 02:07

## 2017-07-03 RX ADMIN — GLYCOPYRROLATE 0.2 MG: 0.2 INJECTION INTRAMUSCULAR; INTRAVENOUS at 01:07

## 2017-07-03 NOTE — ANESTHESIA POSTPROCEDURE EVALUATION
"Anesthesia Post Evaluation    Patient: Jimmy Young    Procedure(s) Performed: Procedure(s) (LRB):  ESOPHAGOGASTRODUODENOSCOPY (EGD) (N/A)    Final Anesthesia Type: MAC  Patient location during evaluation: PACU  Patient participation: Yes- Able to Participate  Level of consciousness: awake, awake and alert and oriented  Post-procedure vital signs: reviewed and stable  Pain management: adequate  Airway patency: patent  PONV status at discharge: No PONV  Anesthetic complications: no      Cardiovascular status: blood pressure returned to baseline  Respiratory status: spontaneous ventilation, unassisted and room air  Hydration status: euvolemic  Follow-up not needed.        Visit Vitals  BP (!) 150/77   Pulse 80   Temp 36.8 °C (98.3 °F) (Oral)   Resp 16   Ht 5' 8" (1.727 m)   Wt 81.2 kg (179 lb)   SpO2 (!) 91%   BMI 27.22 kg/m²       Pain/Evan Score: Pain Assessment Performed: Yes (7/3/2017 12:12 PM)  Presence of Pain: denies (7/3/2017 12:12 PM)  Evan Score: 10 (7/3/2017  2:18 PM)      "

## 2017-07-03 NOTE — PLAN OF CARE
Dr Toussaint came to bedside and discussed findings. NO N/V,  no abdominal pain, no GI bleeding, and vitals stable.  Pt discharged from unit.

## 2017-07-03 NOTE — INTERVAL H&P NOTE
The patient has been examined and the H&P has been reviewed:    I concur with the findings and no changes have occurred since H&P was written.    Anesthesia/Surgery risks, benefits and alternative options discussed and understood by patient/family.    Past Medical History:   Diagnosis Date    Aneurysm 2016    abdominal, stent placed    Arthritis     Asthma     Atrial fibrillation     Cancer     skin cancer on face    Diabetes mellitus     Emphysema lung     Encounter for blood transfusion     Hypertension     Thyroid disease      Past Surgical History:   Procedure Laterality Date    ABDOMINAL SURGERY      COLONOSCOPY Left 6/12/2017    Procedure: COLONOSCOPY;  Surgeon: Boaz Toussaint MD;  [facility];  Service: Endoscopy;  Laterality: Left;    NO PAST SURGERIES      VASCULAR SURGERY      abdominal aneurysm repair     No current facility-administered medications on file prior to encounter.      Current Outpatient Prescriptions on File Prior to Encounter   Medication Sig Dispense Refill    amiodarone (PACERONE) 200 MG Tab Take by mouth.      amlodipine (NORVASC) 10 MG tablet Take 10 mg by mouth once daily.      carvedilol (COREG) 6.25 MG tablet Take 6.25 mg by mouth.      cetirizine (ZYRTEC) 10 MG tablet Take 10 mg by mouth once daily.      levothyroxine (SYNTHROID) 50 MCG tablet Take 1 tablet (50 mcg total) by mouth before breakfast. 30 tablet 1    metoprolol succinate (TOPROL-XL) 25 MG 24 hr tablet Take 25 mg by mouth once daily.      pantoprazole (PROTONIX) 40 MG tablet Take 1 tablet (40 mg total) by mouth 2 (two) times daily. 30 tablet 2    pravastatin (PRAVACHOL) 40 MG tablet Take 40 mg by mouth.      selenium 50 mcg Tab Take 200 mcg by mouth.      [DISCONTINUED] ferrous sulfate 325 (65 FE) MG EC tablet Take 1 tablet (325 mg total) by mouth 2 (two) times daily. 60 tablet 1    pantoprazole (PROTONIX) 40 MG tablet Take 1 tablet (40 mg total) by mouth 2 (two) times daily. 60 tablet 0     [DISCONTINUED] folic acid-vit B6-vit B12 2.5-25-2 mg (FOLBIC OR EQUIV) 2.5-25-2 mg Tab Take 1 tablet by mouth once daily. 30 tablet 1     Review of patient's allergies indicates:  No Known Allergies          Active Hospital Problems    Diagnosis  POA    *Angiodysplasia of duodenum with hemorrhage [K31.811]  Yes     Priority: 1 - High      Resolved Hospital Problems    Diagnosis Date Resolved POA   No resolved problems to display.

## 2017-07-03 NOTE — DISCHARGE INSTRUCTIONS
If you develop fevers, severe abdominal pain, significant bleeding, nausea or vomiting, please contact us or come to our Emergency Department at [facility].  Our  contact number is [phone] available for emergencies or any question that you may have.      You may return to normal activities tomorrow.  Written discharge instructions were provided to you today and have them handy since you may not remember our conversation today.       I also recommend that you follow a high fiber diet and take calcium supplements daily as well as to continue your present medications.      If you take Aspirin, please resume taking it at your prior dose today. If we obtained biopsies or removed polyps during your procedure, we will contact you within 5 to 6 days with the results.      Thanks for trusting us with your healthcare needs.      Sincerely,     Boaz Toussaint M.D.        To rate your experience with Dr. Toussaint, please click on the link below     [email or web address]

## 2017-07-03 NOTE — ANESTHESIA PREPROCEDURE EVALUATION
07/03/2017  Jimmy Young is a 87 y.o., male.    Anesthesia Evaluation    I have reviewed the Patient Summary Reports.    I have reviewed the Nursing Notes.   I have reviewed the Medications.     Review of Systems  Anesthesia Hx:  No problems with previous Anesthesia  Denies Family Hx of Anesthesia complications.   Denies Personal Hx of Anesthesia complications.   Social:  Former Smoker    Hematology/Oncology:     Oncology Normal    -- Anemia:   EENT/Dental:EENT/Dental Normal   Cardiovascular:   Hypertension, well controlled Dysrhythmias atrial fibrillation hyperlipidemia ECG has been reviewed.    Pulmonary:   COPD, mild Asthma mild    Renal/:   Chronic Renal Disease, CRI    Hepatic/GI:   Hiatal Hernia,    Musculoskeletal:  Musculoskeletal Normal    Neurological:   Neuromuscular Disease,    Endocrine:   Diabetes, well controlled, type 2 Hypothyroidism    Dermatological:  Skin Normal    Psych:  Psychiatric Normal           Physical Exam  General:  Well nourished    Airway/Jaw/Neck:  Airway Findings: Mouth Opening: Normal Tongue: Normal  General Airway Assessment: Adult, Average  Mallampati: II  TM Distance: Normal, at least 6 cm       Chest/Lungs:  Chest/Lungs Findings: Normal Respiratory Rate     Heart/Vascular:  Heart Findings: Rate: Normal  Rhythm: Regular Rhythm        Mental Status:  Mental Status Findings:  Cooperative, Alert and Oriented         Anesthesia Plan  Type of Anesthesia, risks & benefits discussed:  Anesthesia Type:  MAC  Patient's Preference:   Intra-op Monitoring Plan:   Intra-op Monitoring Plan Comments:   Post Op Pain Control Plan:   Post Op Pain Control Plan Comments:   Induction:   IV  Beta Blocker:  Patient is on a Beta-Blocker and has received one dose within the past 24 hours (No further documentation required).       Informed Consent: Patient understands risks and agrees  with Anesthesia plan.  Questions answered. Anesthesia consent signed with patient.  ASA Score: 3     Day of Surgery Review of History & Physical: I have interviewed and examined the patient. I have reviewed the patient's H&P dated: 7/3/17. There are no significant changes.  H&P update referred to the surgeon.         Ready For Surgery From Anesthesia Perspective.

## 2017-07-03 NOTE — ANESTHESIA RELEASE NOTE
"Anesthesia Release from PACU Note    Patient: Jimmy Young    Procedure(s) Performed: Procedure(s) (LRB):  ESOPHAGOGASTRODUODENOSCOPY (EGD) (N/A)    Anesthesia type: MAC    Post pain: Adequate analgesia    Post assessment: no apparent anesthetic complications, tolerated procedure well and no evidence of recall    Last Vitals:   Visit Vitals  BP (!) 150/77   Pulse 80   Temp 36.8 °C (98.3 °F) (Oral)   Resp 16   Ht 5' 8" (1.727 m)   Wt 81.2 kg (179 lb)   SpO2 (!) 91%   BMI 27.22 kg/m²       Post vital signs: stable    Level of consciousness: awake and alert     Nausea/Vomiting: no nausea/no vomiting    Complications: none    Airway Patency: patent    Respiratory: unassisted    Cardiovascular: stable and blood pressure at baseline    Hydration: euvolemic  "

## 2017-07-03 NOTE — TRANSFER OF CARE
"Anesthesia Transfer of Care Note    Patient: Jimmy Young    Procedure(s) Performed: Procedure(s) (LRB):  ESOPHAGOGASTRODUODENOSCOPY (EGD) (N/A)    Patient location: PACU    Anesthesia Type: MAC    Transport from OR: Transported from OR on room air with adequate spontaneous ventilation    Post pain: adequate analgesia    Post assessment: no apparent anesthetic complications    Post vital signs: stable    Level of consciousness: awake and alert    Nausea/Vomiting: no nausea/vomiting    Complications: none    Transfer of care protocol was followed      Last vitals:   Visit Vitals  BP (!) 150/77   Pulse 80   Temp 36.8 °C (98.3 °F) (Oral)   Resp 16   Ht 5' 8" (1.727 m)   Wt 81.2 kg (179 lb)   SpO2 (!) 91%   BMI 27.22 kg/m²     "

## 2017-07-03 NOTE — DISCHARGE SUMMARY
Endoscopy Discharge Summary      Admit Date: 7/3/2017    Discharge Date and Time:  7/3/2017 2:22 PM    Attending Physician: Boaz Toussaint MD     Discharge Physician: Boaz Toussaint MD     Principal Admitting Diagnoses: Angiodysplasia of duodenum with hemorrhage         Discharge Diagnosis: The primary encounter diagnosis was Angiodysplasia of duodenum with hemorrhage. A diagnosis of Hiatal hernia was also pertinent to this visit.     Discharged Condition: Good    Indication for Admission: Angiodysplasia of duodenum with hemorrhage     Hospital Course: Patient was admitted for an inpatient procedure and tolerated the procedure well with no complications.    Significant Diagnostic Studies: EGD with thermal therapy.    Pathology (if any):  Specimen (12h ago through future)    None          Disposition: Home.    Bleeding: None    No Implants    Follow Up/Patient Instructions:   Current Discharge Medication List      CONTINUE these medications which have NOT CHANGED    Details   amiodarone (PACERONE) 200 MG Tab Take by mouth.      amlodipine (NORVASC) 10 MG tablet Take 10 mg by mouth once daily.      carvedilol (COREG) 6.25 MG tablet Take 6.25 mg by mouth.      cetirizine (ZYRTEC) 10 MG tablet Take 10 mg by mouth once daily.      levothyroxine (SYNTHROID) 50 MCG tablet Take 1 tablet (50 mcg total) by mouth before breakfast.  Qty: 30 tablet, Refills: 1      metformin (GLUCOPHAGE) 1000 MG tablet Take 1,000 mg by mouth daily with breakfast.      metoprolol succinate (TOPROL-XL) 25 MG 24 hr tablet Take 25 mg by mouth once daily.      pantoprazole (PROTONIX) 40 MG tablet Take 1 tablet (40 mg total) by mouth 2 (two) times daily.  Qty: 30 tablet, Refills: 2    Associated Diagnoses: Hypochromic-microcytic anemia; Symptomatic anemia; Acute post-hemorrhagic anemia; Melena; Angiodysplasia of duodenum with hemorrhage; Hiatal hernia; Diverticulosis of large intestine without hemorrhage      pravastatin (PRAVACHOL) 40 MG tablet Take  40 mg by mouth.      selenium 50 mcg Tab Take 200 mcg by mouth.               Discharge Procedure Orders  Diet general     Activity as tolerated     Call MD for:  temperature >100.4     Call MD for:  persistent nausea and vomiting     Call MD for:  severe uncontrolled pain     Call MD for:  difficulty breathing, headache or visual disturbances     Call MD for:  redness, tenderness, or signs of infection (pain, swelling, redness, odor or green/yellow discharge around incision site)     Call MD for:  hives     Call MD for:  persistent dizziness or light-headedness     No dressing needed         Follow-up Information     Nacho Richardson MD.    Specialty:  Internal Medicine  Why:  As needed  Contact information:  [address] [facility] [id number] [phone]

## 2017-08-21 ENCOUNTER — TELEPHONE (OUTPATIENT)
Dept: GASTROENTEROLOGY | Facility: CLINIC | Age: 82
End: 2017-08-21

## 2017-08-21 DIAGNOSIS — K31.811 ANGIODYSPLASIA OF DUODENUM WITH HEMORRHAGE: ICD-10-CM

## 2017-08-21 DIAGNOSIS — K57.30 DIVERTICULOSIS OF LARGE INTESTINE WITHOUT HEMORRHAGE: Primary | Chronic | ICD-10-CM

## 2017-08-21 NOTE — TELEPHONE ENCOUNTER
Diverticulosis of large intestine without hemorrhage    Angiodysplasia of duodenum with hemorrhage      Received a call from Dr. Mcfarland, with  Cardiology about Mr. Young.     He has been off Eliquis for a month or so in view the EGD findings.     He also has recurring anemia.     I spent a total of 20 minutes waiting on hold or being passed from one staff member to another. Apparently Dr. Mcfarland did not call or wanted to speak with me.     I called Chrissy Glover at [phone] as well and she did not answer her phone. He can resume Eliquis if he needs to.     Please let the patient know.     Boaz Toussaint

## 2017-08-21 NOTE — TELEPHONE ENCOUNTER
----- Message from Usha Suero sent at 8/21/2017  9:08 AM CDT -----  Contact: Chrissy Alvesley/daughter  States she needs to speak to the nurse regarding his UGI and colonoscopy. States Dr Laron Mcfarland would like to speak to Dr Toussaint regarding his eliquis. Please call Dr Mcfarland at [phone]. Please call Chrissy Glover at [phone]. Thank you

## 2017-08-21 NOTE — TELEPHONE ENCOUNTER
Left message for his daughter Belen to give us a call because she states her dad is 81 and does not here or understand.

## 2017-09-11 ENCOUNTER — HOSPITAL ENCOUNTER (INPATIENT)
Facility: HOSPITAL | Age: 82
LOS: 2 days | Discharge: HOME HEALTH | DRG: 378 | End: 2017-09-14
Attending: EMERGENCY MEDICINE | Admitting: INTERNAL MEDICINE
Payer: MEDICARE

## 2017-09-11 DIAGNOSIS — R53.1 WEAKNESS: ICD-10-CM

## 2017-09-11 DIAGNOSIS — K92.2 UPPER GI BLEED: ICD-10-CM

## 2017-09-11 DIAGNOSIS — D50.0 IRON DEFICIENCY ANEMIA DUE TO CHRONIC BLOOD LOSS: Primary | ICD-10-CM

## 2017-09-11 DIAGNOSIS — K92.2 GI BLEED: ICD-10-CM

## 2017-09-11 LAB
ALBUMIN SERPL BCP-MCNC: 3.4 G/DL
ALP SERPL-CCNC: 88 U/L
ALT SERPL W/O P-5'-P-CCNC: 13 U/L
ANION GAP SERPL CALC-SCNC: 12 MMOL/L
ANISOCYTOSIS BLD QL SMEAR: SLIGHT
APTT BLDCRRT: 23.4 SEC
AST SERPL-CCNC: 12 U/L
BASOPHILS # BLD AUTO: 0.05 K/UL
BASOPHILS NFR BLD: 0.7 %
BILIRUB SERPL-MCNC: 0.5 MG/DL
BUN SERPL-MCNC: 35 MG/DL
BURR CELLS BLD QL SMEAR: ABNORMAL
CALCIUM SERPL-MCNC: 8.8 MG/DL
CHLORIDE SERPL-SCNC: 104 MMOL/L
CK MB SERPL-MCNC: 2.5 NG/ML
CK MB SERPL-RTO: 3.5 %
CK SERPL-CCNC: 72 U/L
CK SERPL-CCNC: 72 U/L
CO2 SERPL-SCNC: 24 MMOL/L
CREAT SERPL-MCNC: 1.9 MG/DL
DIFFERENTIAL METHOD: ABNORMAL
EOSINOPHIL # BLD AUTO: 0.3 K/UL
EOSINOPHIL NFR BLD: 4.2 %
ERYTHROCYTE [DISTWIDTH] IN BLOOD BY AUTOMATED COUNT: 20.2 %
EST. GFR  (AFRICAN AMERICAN): 35.8 ML/MIN/1.73 M^2
EST. GFR  (NON AFRICAN AMERICAN): 31 ML/MIN/1.73 M^2
GLUCOSE SERPL-MCNC: 253 MG/DL
HCT VFR BLD AUTO: 17.9 %
HGB BLD-MCNC: 4.7 G/DL
HYPOCHROMIA BLD QL SMEAR: ABNORMAL
INR PPP: 1.2
LYMPHOCYTES # BLD AUTO: 1 K/UL
LYMPHOCYTES NFR BLD: 13.7 %
MCH RBC QN AUTO: 19 PG
MCHC RBC AUTO-ENTMCNC: 26.3 G/DL
MCV RBC AUTO: 72 FL
MONOCYTES # BLD AUTO: 0.7 K/UL
MONOCYTES NFR BLD: 9.4 %
NEUTROPHILS # BLD AUTO: 5.3 K/UL
NEUTROPHILS NFR BLD: 72 %
PLATELET # BLD AUTO: 339 K/UL
PMV BLD AUTO: 9.9 FL
POTASSIUM SERPL-SCNC: 3.6 MMOL/L
PROT SERPL-MCNC: 6.5 G/DL
PROTHROMBIN TIME: 12.4 SEC
RBC # BLD AUTO: 2.48 M/UL
SODIUM SERPL-SCNC: 140 MMOL/L
TROPONIN I SERPL DL<=0.01 NG/ML-MCNC: 0.04 NG/ML
WBC # BLD AUTO: 7.44 K/UL

## 2017-09-11 PROCEDURE — 85025 COMPLETE CBC W/AUTO DIFF WBC: CPT

## 2017-09-11 PROCEDURE — 25000003 PHARM REV CODE 250: Performed by: EMERGENCY MEDICINE

## 2017-09-11 PROCEDURE — 96374 THER/PROPH/DIAG INJ IV PUSH: CPT

## 2017-09-11 PROCEDURE — 96361 HYDRATE IV INFUSION ADD-ON: CPT

## 2017-09-11 PROCEDURE — 93010 ELECTROCARDIOGRAM REPORT: CPT | Mod: ,,, | Performed by: INTERNAL MEDICINE

## 2017-09-11 PROCEDURE — 63600175 PHARM REV CODE 636 W HCPCS: Performed by: EMERGENCY MEDICINE

## 2017-09-11 PROCEDURE — 80053 COMPREHEN METABOLIC PANEL: CPT

## 2017-09-11 PROCEDURE — 99900035 HC TECH TIME PER 15 MIN (STAT)

## 2017-09-11 PROCEDURE — 99285 EMERGENCY DEPT VISIT HI MDM: CPT | Mod: 25

## 2017-09-11 PROCEDURE — 82553 CREATINE MB FRACTION: CPT

## 2017-09-11 PROCEDURE — C9113 INJ PANTOPRAZOLE SODIUM, VIA: HCPCS | Performed by: EMERGENCY MEDICINE

## 2017-09-11 PROCEDURE — 85610 PROTHROMBIN TIME: CPT

## 2017-09-11 PROCEDURE — 85730 THROMBOPLASTIN TIME PARTIAL: CPT

## 2017-09-11 PROCEDURE — 93005 ELECTROCARDIOGRAM TRACING: CPT

## 2017-09-11 PROCEDURE — 84484 ASSAY OF TROPONIN QUANT: CPT

## 2017-09-11 RX ORDER — SODIUM CHLORIDE 9 MG/ML
1000 INJECTION, SOLUTION INTRAVENOUS
Status: COMPLETED | OUTPATIENT
Start: 2017-09-11 | End: 2017-09-11

## 2017-09-11 RX ORDER — PANTOPRAZOLE SODIUM 40 MG/10ML
80 INJECTION, POWDER, LYOPHILIZED, FOR SOLUTION INTRAVENOUS
Status: COMPLETED | OUTPATIENT
Start: 2017-09-11 | End: 2017-09-11

## 2017-09-11 RX ADMIN — SODIUM CHLORIDE 1000 ML: 0.9 INJECTION, SOLUTION INTRAVENOUS at 10:09

## 2017-09-11 RX ADMIN — PANTOPRAZOLE SODIUM 80 MG: 40 INJECTION, POWDER, FOR SOLUTION INTRAVENOUS at 10:09

## 2017-09-12 PROBLEM — R79.89 TROPONIN LEVEL ELEVATED: Status: ACTIVE | Noted: 2017-09-12

## 2017-09-12 PROBLEM — K92.2 GI BLEED: Status: ACTIVE | Noted: 2017-09-12

## 2017-09-12 LAB
ABO + RH BLD: NORMAL
ALBUMIN SERPL BCP-MCNC: 3 G/DL
ALP SERPL-CCNC: 76 U/L
ALT SERPL W/O P-5'-P-CCNC: 12 U/L
ANION GAP SERPL CALC-SCNC: 11 MMOL/L
AST SERPL-CCNC: 10 U/L
BASOPHILS # BLD AUTO: 0.03 K/UL
BASOPHILS NFR BLD: 0.4 %
BILIRUB SERPL-MCNC: 0.7 MG/DL
BLD GP AB SCN CELLS X3 SERPL QL: NORMAL
BLD PROD TYP BPU: NORMAL
BLOOD UNIT EXPIRATION DATE: NORMAL
BLOOD UNIT TYPE CODE: 6200
BLOOD UNIT TYPE: NORMAL
BUN SERPL-MCNC: 32 MG/DL
CALCIUM SERPL-MCNC: 8.4 MG/DL
CHLORIDE SERPL-SCNC: 107 MMOL/L
CO2 SERPL-SCNC: 27 MMOL/L
CODING SYSTEM: NORMAL
CREAT SERPL-MCNC: 1.6 MG/DL
DIFFERENTIAL METHOD: ABNORMAL
DISPENSE STATUS: NORMAL
EOSINOPHIL # BLD AUTO: 0.2 K/UL
EOSINOPHIL NFR BLD: 3.1 %
ERYTHROCYTE [DISTWIDTH] IN BLOOD BY AUTOMATED COUNT: 21 %
EST. GFR  (AFRICAN AMERICAN): 44 ML/MIN/1.73 M^2
EST. GFR  (NON AFRICAN AMERICAN): 38 ML/MIN/1.73 M^2
ESTIMATED AVG GLUCOSE: 137 MG/DL
GLUCOSE SERPL-MCNC: 120 MG/DL
HBA1C MFR BLD HPLC: 6.4 %
HCT VFR BLD AUTO: 20.5 %
HGB BLD-MCNC: 5.7 G/DL
LYMPHOCYTES # BLD AUTO: 0.8 K/UL
LYMPHOCYTES NFR BLD: 12.2 %
MCH RBC QN AUTO: 20.4 PG
MCHC RBC AUTO-ENTMCNC: 27.8 G/DL
MCV RBC AUTO: 73 FL
MONOCYTES # BLD AUTO: 0.6 K/UL
MONOCYTES NFR BLD: 8.3 %
NEUTROPHILS # BLD AUTO: 5.2 K/UL
NEUTROPHILS NFR BLD: 76 %
NUM UNITS TRANS PACKED RBC: NORMAL
PLATELET # BLD AUTO: 257 K/UL
PMV BLD AUTO: 9.3 FL
POCT GLUCOSE: 122 MG/DL (ref 70–110)
POCT GLUCOSE: 131 MG/DL (ref 70–110)
POCT GLUCOSE: 140 MG/DL (ref 70–110)
POCT GLUCOSE: 180 MG/DL (ref 70–110)
POTASSIUM SERPL-SCNC: 3.7 MMOL/L
PROT SERPL-MCNC: 5.8 G/DL
RBC # BLD AUTO: 2.8 M/UL
SODIUM SERPL-SCNC: 145 MMOL/L
T4 FREE SERPL-MCNC: 0.88 NG/DL
TROPONIN I SERPL DL<=0.01 NG/ML-MCNC: 0.02 NG/ML
TROPONIN I SERPL DL<=0.01 NG/ML-MCNC: 0.04 NG/ML
TSH SERPL DL<=0.005 MIU/L-ACNC: 5.43 UIU/ML
WBC # BLD AUTO: 6.88 K/UL

## 2017-09-12 PROCEDURE — 36415 COLL VENOUS BLD VENIPUNCTURE: CPT

## 2017-09-12 PROCEDURE — G8980 MOBILITY D/C STATUS: HCPCS | Mod: CH

## 2017-09-12 PROCEDURE — 97116 GAIT TRAINING THERAPY: CPT

## 2017-09-12 PROCEDURE — 63600175 PHARM REV CODE 636 W HCPCS: Performed by: EMERGENCY MEDICINE

## 2017-09-12 PROCEDURE — 27000221 HC OXYGEN, UP TO 24 HOURS

## 2017-09-12 PROCEDURE — 86850 RBC ANTIBODY SCREEN: CPT

## 2017-09-12 PROCEDURE — 36430 TRANSFUSION BLD/BLD COMPNT: CPT

## 2017-09-12 PROCEDURE — 84439 ASSAY OF FREE THYROXINE: CPT

## 2017-09-12 PROCEDURE — 80053 COMPREHEN METABOLIC PANEL: CPT

## 2017-09-12 PROCEDURE — 94761 N-INVAS EAR/PLS OXIMETRY MLT: CPT

## 2017-09-12 PROCEDURE — 99900035 HC TECH TIME PER 15 MIN (STAT)

## 2017-09-12 PROCEDURE — 83036 HEMOGLOBIN GLYCOSYLATED A1C: CPT

## 2017-09-12 PROCEDURE — 86920 COMPATIBILITY TEST SPIN: CPT

## 2017-09-12 PROCEDURE — G8978 MOBILITY CURRENT STATUS: HCPCS | Mod: CH

## 2017-09-12 PROCEDURE — 84443 ASSAY THYROID STIM HORMONE: CPT

## 2017-09-12 PROCEDURE — 85025 COMPLETE CBC W/AUTO DIFF WBC: CPT

## 2017-09-12 PROCEDURE — 25000003 PHARM REV CODE 250: Performed by: NURSE PRACTITIONER

## 2017-09-12 PROCEDURE — 84484 ASSAY OF TROPONIN QUANT: CPT | Mod: 91

## 2017-09-12 PROCEDURE — 97161 PT EVAL LOW COMPLEX 20 MIN: CPT

## 2017-09-12 PROCEDURE — 86900 BLOOD TYPING SEROLOGIC ABO: CPT

## 2017-09-12 PROCEDURE — P9016 RBC LEUKOCYTES REDUCED: HCPCS

## 2017-09-12 PROCEDURE — 21400001 HC TELEMETRY ROOM

## 2017-09-12 PROCEDURE — C9113 INJ PANTOPRAZOLE SODIUM, VIA: HCPCS | Performed by: EMERGENCY MEDICINE

## 2017-09-12 PROCEDURE — G8979 MOBILITY GOAL STATUS: HCPCS | Mod: CH

## 2017-09-12 PROCEDURE — 99223 1ST HOSP IP/OBS HIGH 75: CPT | Mod: ,,, | Performed by: INTERNAL MEDICINE

## 2017-09-12 RX ORDER — AMIODARONE HYDROCHLORIDE 200 MG/1
200 TABLET ORAL DAILY
Status: DISCONTINUED | OUTPATIENT
Start: 2017-09-12 | End: 2017-09-14 | Stop reason: HOSPADM

## 2017-09-12 RX ORDER — AMLODIPINE BESYLATE 10 MG/1
10 TABLET ORAL NIGHTLY
Status: DISCONTINUED | OUTPATIENT
Start: 2017-09-12 | End: 2017-09-14 | Stop reason: HOSPADM

## 2017-09-12 RX ORDER — GLUCAGON 1 MG
1 KIT INJECTION
Status: DISCONTINUED | OUTPATIENT
Start: 2017-09-12 | End: 2017-09-14 | Stop reason: HOSPADM

## 2017-09-12 RX ORDER — IPRATROPIUM BROMIDE AND ALBUTEROL 20; 100 UG/1; UG/1
SPRAY, METERED RESPIRATORY (INHALATION)
Refills: 2 | COMMUNITY
Start: 2017-07-10

## 2017-09-12 RX ORDER — IPRATROPIUM BROMIDE AND ALBUTEROL SULFATE 2.5; .5 MG/3ML; MG/3ML
3 SOLUTION RESPIRATORY (INHALATION) EVERY 6 HOURS PRN
Status: DISCONTINUED | OUTPATIENT
Start: 2017-09-12 | End: 2017-09-14 | Stop reason: HOSPADM

## 2017-09-12 RX ORDER — ONDANSETRON 2 MG/ML
4 INJECTION INTRAMUSCULAR; INTRAVENOUS EVERY 12 HOURS PRN
Status: DISCONTINUED | OUTPATIENT
Start: 2017-09-12 | End: 2017-09-14 | Stop reason: HOSPADM

## 2017-09-12 RX ORDER — METOPROLOL SUCCINATE 25 MG/1
25 TABLET, EXTENDED RELEASE ORAL DAILY
Status: DISCONTINUED | OUTPATIENT
Start: 2017-09-12 | End: 2017-09-12

## 2017-09-12 RX ORDER — CHOLECALCIFEROL (VITAMIN D3) 25 MCG
1000 TABLET ORAL
COMMUNITY
End: 2018-04-09 | Stop reason: SDUPTHER

## 2017-09-12 RX ORDER — IRON,CARBONYL/ASCORBIC ACID 100-250 MG
TABLET ORAL
COMMUNITY
Start: 2017-02-27

## 2017-09-12 RX ORDER — IRON,CARBONYL/ASCORBIC ACID 100-250 MG
1 TABLET ORAL DAILY
Status: DISCONTINUED | OUTPATIENT
Start: 2017-09-12 | End: 2017-09-12 | Stop reason: RX

## 2017-09-12 RX ORDER — PANTOPRAZOLE SODIUM 40 MG/10ML
40 INJECTION, POWDER, LYOPHILIZED, FOR SOLUTION INTRAVENOUS 2 TIMES DAILY
Status: DISCONTINUED | OUTPATIENT
Start: 2017-09-12 | End: 2017-09-14 | Stop reason: HOSPADM

## 2017-09-12 RX ORDER — HYDROCODONE BITARTRATE AND ACETAMINOPHEN 500; 5 MG/1; MG/1
TABLET ORAL
Status: DISCONTINUED | OUTPATIENT
Start: 2017-09-12 | End: 2017-09-14 | Stop reason: HOSPADM

## 2017-09-12 RX ORDER — CARVEDILOL 6.25 MG/1
6.25 TABLET ORAL 2 TIMES DAILY WITH MEALS
Status: DISCONTINUED | OUTPATIENT
Start: 2017-09-12 | End: 2017-09-14 | Stop reason: HOSPADM

## 2017-09-12 RX ORDER — INSULIN ASPART 100 [IU]/ML
0-5 INJECTION, SOLUTION INTRAVENOUS; SUBCUTANEOUS EVERY 6 HOURS PRN
Status: DISCONTINUED | OUTPATIENT
Start: 2017-09-12 | End: 2017-09-14 | Stop reason: HOSPADM

## 2017-09-12 RX ORDER — PRAVASTATIN SODIUM 20 MG/1
20 TABLET ORAL NIGHTLY
Status: DISCONTINUED | OUTPATIENT
Start: 2017-09-12 | End: 2017-09-14 | Stop reason: HOSPADM

## 2017-09-12 RX ADMIN — AMLODIPINE BESYLATE 10 MG: 10 TABLET ORAL at 09:09

## 2017-09-12 RX ADMIN — AMIODARONE HYDROCHLORIDE 200 MG: 200 TABLET ORAL at 08:09

## 2017-09-12 RX ADMIN — CARVEDILOL 6.25 MG: 6.25 TABLET, FILM COATED ORAL at 08:09

## 2017-09-12 RX ADMIN — PANTOPRAZOLE SODIUM 40 MG: 40 INJECTION, POWDER, FOR SOLUTION INTRAVENOUS at 05:09

## 2017-09-12 RX ADMIN — PANTOPRAZOLE SODIUM 40 MG: 40 INJECTION, POWDER, FOR SOLUTION INTRAVENOUS at 08:09

## 2017-09-12 RX ADMIN — PRAVASTATIN SODIUM 20 MG: 20 TABLET ORAL at 09:09

## 2017-09-12 RX ADMIN — CARVEDILOL 6.25 MG: 6.25 TABLET, FILM COATED ORAL at 05:09

## 2017-09-12 NOTE — PROGRESS NOTES
Pharmacist called inquiring about use of both carvedilol and metoprolol. The patient has history of afib and is also on amiodarone. I personally verified these medications with the patient and daughter at beside. The pharmacist reports she checked [facility] dispensing [facility] and notes carvedilol was last filled in May 2017 with a 30 day supply, and metoprolol is not listed. Will d/c metoprolol and monitor closely. Continue amiodarone and coreg at this time.

## 2017-09-12 NOTE — ASSESSMENT & PLAN NOTE
88 yo male with a h/o AMVs admitted with acute on chronic anemia secondary to GI bleed after restarting Eliquis.   Agree with transfusion. Continue to monitor H/H.  EGD tomorrow once Hgb improves.   Continue Protonix.   Hold Eliquis.

## 2017-09-12 NOTE — PLAN OF CARE
Problem: Patient Care Overview  Goal: Plan of Care Review  Outcome: Ongoing (interventions implemented as appropriate)  Reviewed POC, including indications and possible side effects of administered medications. Pt verbalized understanding and teach back.    12 hour chart check complete.

## 2017-09-12 NOTE — SUBJECTIVE & OBJECTIVE
Past Medical History:   Diagnosis Date    Aneurysm 2016    abdominal, stent placed    Arthritis     Asthma     Atrial fibrillation     Cancer     skin cancer on face    Diabetes mellitus     Emphysema lung     Encounter for blood transfusion     Hypertension        Past Surgical History:   Procedure Laterality Date    ABDOMINAL SURGERY      COLONOSCOPY Left 6/12/2017    Procedure: COLONOSCOPY;  Surgeon: Boaz Toussaint MD;  Location: [facility];  Service: Endoscopy;  Laterality: Left;    NO PAST SURGERIES      VASCULAR SURGERY      abdominal aneurysm repair       Review of patient's allergies indicates:  No Known Allergies    No current facility-administered medications on file prior to encounter.      Current Outpatient Prescriptions on File Prior to Encounter   Medication Sig    amiodarone (PACERONE) 200 MG Tab Take by mouth.    amlodipine (NORVASC) 10 MG tablet Take 10 mg by mouth once daily.    carvedilol (COREG) 6.25 MG tablet Take 6.25 mg by mouth.    cetirizine (ZYRTEC) 10 MG tablet Take 10 mg by mouth once daily.    metformin (GLUCOPHAGE) 1000 MG tablet Take 1,000 mg by mouth daily with breakfast.    metoprolol succinate (TOPROL-XL) 25 MG 24 hr tablet Take 25 mg by mouth once daily.    pravastatin (PRAVACHOL) 40 MG tablet Take 40 mg by mouth.    selenium 50 mcg Tab Take 200 mcg by mouth.    levothyroxine (SYNTHROID) 50 MCG tablet Take 1 tablet (50 mcg total) by mouth before breakfast.    pantoprazole (PROTONIX) 40 MG tablet Take 1 tablet (40 mg total) by mouth 2 (two) times daily.     Family History     Problem Relation (Age of Onset)    Cancer Mother    Heart attack Father    Hypertension Father        Social History Main Topics    Smoking status: Former Smoker     Packs/day: 2.00     Years: 20.00     Types: Cigarettes     Quit date: 1/1/1977    Smokeless tobacco: Never Used    Alcohol use No    Drug use: No    Sexual activity: Not Currently     Review of Systems   Constitutional:  Positive for fatigue. Negative for chills, diaphoresis and fever.   HENT: Negative.  Negative for congestion, sore throat and voice change.    Eyes: Negative.  Negative for photophobia and visual disturbance.   Respiratory: Negative.  Negative for cough, shortness of breath, wheezing and stridor.    Cardiovascular: Negative.  Negative for chest pain and leg swelling.   Gastrointestinal: Positive for blood in stool. Negative for abdominal distention, abdominal pain, constipation, diarrhea, nausea and vomiting.   Endocrine: Negative.  Negative for polydipsia, polyphagia and polyuria.   Genitourinary: Negative.  Negative for difficulty urinating, dysuria, flank pain, testicular pain and urgency.   Musculoskeletal: Negative.  Negative for back pain, joint swelling, neck pain and neck stiffness.   Skin: Negative.  Negative for color change and rash.   Allergic/Immunologic: Negative.  Negative for immunocompromised state.   Neurological: Positive for weakness. Negative for dizziness, syncope, numbness and headaches.   Hematological: Negative.  Does not bruise/bleed easily.   Psychiatric/Behavioral: Positive for confusion. Negative for agitation and behavioral problems.        Chronic, occasional at baseline per family       Objective:     Vital Signs (Most Recent):  Temp: 98.4 °F (36.9 °C) (09/12/17 0148)  Pulse: 76 (09/12/17 0300)  Resp: 20 (09/12/17 0148)  BP: (!) 186/77 (09/12/17 0148)  SpO2: 95 % (09/12/17 0148) Vital Signs (24h Range):  Temp:  [98.1 °F (36.7 °C)-98.4 °F (36.9 °C)] 98.4 °F (36.9 °C)  Pulse:  [74-84] 76  Resp:  [16-20] 20  SpO2:  [93 %-100 %] 95 %  BP: (156-198)/(76-85) 186/77     Weight: 81 kg (178 lb 9.6 oz)  Body mass index is 27.16 kg/m².    Physical Exam   Constitutional: He is oriented to person, place, and time. He appears well-developed and well-nourished. No distress.   Pleasant elderly male of stated age.     HENT:   Head: Normocephalic and atraumatic.   Nose: Nose normal.   Eyes:  Conjunctivae and EOM are normal. Pupils are equal, round, and reactive to light. No scleral icterus.   Neck: Normal range of motion. Neck supple. No tracheal deviation present.   Cardiovascular: Normal rate, regular rhythm, normal heart sounds and intact distal pulses.    No murmur heard.  Pulmonary/Chest: Effort normal and breath sounds normal. No stridor. No respiratory distress. He has no wheezes. He has no rales.   Abdominal: Soft. Bowel sounds are normal. He exhibits distension ( mild). There is no tenderness. There is no guarding.   Genitourinary:   Genitourinary Comments: Bloody stool        Musculoskeletal: Normal range of motion. He exhibits no edema or deformity.   Neurological: He is alert and oriented to person, place, and time. No cranial nerve deficit.   Skin: Skin is warm and dry. Capillary refill takes 2 to 3 seconds. No rash noted. He is not diaphoretic.   Psychiatric: He has a normal mood and affect. His behavior is normal. Judgment and thought content normal.   Nursing note and vitals reviewed.       Significant Labs: All pertinent labs within the past 24 hours have been reviewed.   Results for orders placed or performed during the hospital encounter of 09/11/17   CBC auto differential   Result Value Ref Range    WBC 7.44 3.90 - 12.70 K/uL    RBC 2.48 (L) 4.60 - 6.20 M/uL    Hemoglobin 4.7 (LL) 14.0 - 18.0 g/dL    Hematocrit 17.9 (LL) 40.0 - 54.0 %    MCV 72 (L) 82 - 98 fL    MCH 19.0 (L) 27.0 - 31.0 pg    MCHC 26.3 (L) 32.0 - 36.0 g/dL    RDW 20.2 (H) 11.5 - 14.5 %    Platelets 339 150 - 350 K/uL    MPV 9.9 9.2 - 12.9 fL    Gran # 5.3 1.8 - 7.7 K/uL    Lymph # 1.0 1.0 - 4.8 K/uL    Mono # 0.7 0.3 - 1.0 K/uL    Eos # 0.3 0.0 - 0.5 K/uL    Baso # 0.05 0.00 - 0.20 K/uL    Gran% 72.0 38.0 - 73.0 %    Lymph% 13.7 (L) 18.0 - 48.0 %    Mono% 9.4 4.0 - 15.0 %    Eosinophil% 4.2 0.0 - 8.0 %    Basophil% 0.7 0.0 - 1.9 %    Aniso Slight     Hypo Marked     [place] Cells Moderate     Differential Method  Automated    Comprehensive metabolic panel   Result Value Ref Range    Sodium 140 136 - 145 mmol/L    Potassium 3.6 3.5 - 5.1 mmol/L    Chloride 104 95 - 110 mmol/L    CO2 24 23 - 29 mmol/L    Glucose 253 (H) 70 - 110 mg/dL    BUN, Bld 35 (H) 8 - 23 mg/dL    Creatinine 1.9 (H) 0.5 - 1.4 mg/dL    Calcium 8.8 8.7 - 10.5 mg/dL    Total Protein 6.5 6.0 - 8.4 g/dL    Albumin 3.4 (L) 3.5 - 5.2 g/dL    Total Bilirubin 0.5 0.1 - 1.0 mg/dL    Alkaline Phosphatase 88 55 - 135 U/L    AST 12 10 - 40 U/L    ALT 13 10 - 44 U/L    Anion Gap 12 8 - 16 mmol/L    eGFR if African American 35.8 (A) >60 mL/min/1.73 m^2    eGFR if non  31.0 (A) >60 mL/min/1.73 m^2   APTT   Result Value Ref Range    aPTT 23.4 21.0 - 32.0 sec   Protime-INR   Result Value Ref Range    Prothrombin Time 12.4 9.0 - 12.5 sec    INR 1.2 0.8 - 1.2   CK   Result Value Ref Range    CPK 72 20 - 200 U/L   CK-MB   Result Value Ref Range    CPK 72 20 - 200 U/L    CPK MB 2.5 0.1 - 6.5 ng/mL    MB% 3.5 0.0 - 5.0 %   Troponin I   Result Value Ref Range    Troponin I 0.039 (H) 0.000 - 0.026 ng/mL   Type & Screen   Result Value Ref Range    Group & Rh A POS     Indirect Marga NEG    Prepare RBC 4 Units; Profound anemia with active GI bleed   Result Value Ref Range    UNIT NUMBER [id number]     PRODUCT CODE S4443O24     DISPENSE STATUS ISSUED     CODING SYSTEM IAHG155     Unit Blood Type Code 6200     Unit Blood Type A POS     Unit Expiration [id number]     UNIT NUMBER [id number]     PRODUCT CODE B9947J54     DISPENSE STATUS CROSSMATCHED     CODING SYSTEM FRBV678     Unit Blood Type Code 6200     Unit Blood Type A POS     Unit Expiration [id number]     UNIT NUMBER [id number]     PRODUCT CODE E5730U31     DISPENSE STATUS CROSSMATCHED     CODING SYSTEM JRKL497     Unit Blood Type Code 6200     Unit Blood Type A POS     Unit Expiration [id number]     UNIT NUMBER [id number]     PRODUCT CODE X9384N32     DISPENSE STATUS CROSSMATCHED     CODING  SYSTEM ARTO151     Unit Blood Type Code 6200     Unit Blood Type A POS     Unit Expiration [id number]          Significant Imaging: I have reviewed all pertinent imaging results/findings within the past 24 hours.   Imaging Results          X-Ray Chest AP Portable (Final result)  Result time 09/11/17 23:37:38    Final result by Pelon Peace MD (09/11/17 23:37:38)                 Impression:         No acute findings.  No change since previous exam.      Electronically signed by: PELON PEACE MD  Date:     09/11/17  Time:    23:37              Narrative:    Exam: XR CHEST AP PORTABLE,    Date:  09/11/17 23:11:11    History: Weakness.  Observation for malignancy.    Comparison:  06/10/2017.    Findings:     Lungs clear.  Heart size within normal limits.  Vascular calcifications of the aorta.  No suspicious bony findings.

## 2017-09-12 NOTE — PLAN OF CARE
"Met with patient and patient's daughter, Chrissy Glover, in hospital room for Discharge Assessment.  Patient states that he resides alone, with son and daughter living nearby and checking on him throughout day and evening.  Patient cooks himself and drives; however, family transports patient to/from MD appointments.  Patient also has someone who comes to clean his home weekly.  Patient indicates that he was receiving no HH services prior to this admission and has a rolling walker at home; however, was not using it much of the time.  Daughter requesting that patient be evaluated by PT/OT during this admission "due to patient appearing to be weaker than normal."  Informed daughter that this  will notify needed [facility] staff to ensure that PT/OT Evaluation will be ordered.  Patient and daughter report having no other needs or concerns at this time.  Daughter also indicates that patient has a MPOA and states that she will bring it to the hospital during this admission.    Provided patient and daughter with [facility] folder, D/C Planning Brochure, and information about [facility].  Also, provided patient with contact information for assigned , Sandra PHOENIX, and informed patient and daughter that Sandra will be following up with patient throughout this admission and will arrange any services he may need prior to discharge.    Also, notified Sandra that patient's daughter is requesting that PT/OT evaluate patient during this admission.       09/12/17 0930   Discharge Assessment   Assessment Type Discharge Planning Assessment   Confirmed/corrected address and phone number on facesheet? Yes   Assessment information obtained from? Patient;Caregiver;Medical Record   Expected Length of Stay (days) (TBD)   Communicated expected length of stay with patient/caregiver no   Prior to hospitilization cognitive status: Alert/Oriented   Prior to hospitalization functional " status: Assistive Equipment   Current cognitive status: Alert/Oriented   Current Functional Status: Assistive Equipment   Facility Arrived From: Home   Lives With alone  (Patient's daughter resides 5 min. away and patient's son resides behind patient's home)   Able to Return to Prior Arrangements unable to determine at this time (comments)   Is patient able to care for self after discharge? Unable to determine at this time (comments)   Who are your caregiver(s) and their phone number(s)? Chrissy Glover, Daughter: [phone]; Marcello Young and Trav Zavala    Patient's perception of discharge disposition home health   Readmission Within The Last 30 Days no previous admission in last 30 days   Patient currently being followed by outpatient case management? No   Patient currently receives any other outside agency services? No   Equipment Currently Used at Home walker, rolling;cane, straight   Do you have any problems affording any of your prescribed medications? No   Is the patient taking medications as prescribed? yes   Does the patient have transportation home? Yes   Transportation Available family or friend will provide   Does the patient receive services at the [facility]? No   Discharge Plan A Home;Home Health   Discharge Plan B Skilled Nursing Facility   Patient/Family In Agreement With Plan yes

## 2017-09-12 NOTE — PT/OT/SLP EVAL
Physical Therapy  Evaluation    Jimmy Young   MRN: [id number]   Admitting Diagnosis: GI bleed    PT Received On: 09/12/17  PT Start Time: 1356     PT Stop Time: 1420    PT Total Time (min): 24 min       Billable Minutes:  Evaluation 14 and Gait [id number]    Diagnosis: GI bleed      Past Medical History:   Diagnosis Date    Aneurysm 2016    abdominal, stent placed    Arthritis     Asthma     Atrial fibrillation     Cancer     skin cancer on face    Diabetes mellitus     Emphysema lung     Encounter for blood transfusion     Hypertension       Past Surgical History:   Procedure Laterality Date    ABDOMINAL SURGERY      COLONOSCOPY Left 6/12/2017    Procedure: COLONOSCOPY;  Surgeon: Boaz Toussaint MD;  Location: [facility] END[facility];  Service: Endoscopy;  Laterality: Left;    NO PAST SURGERIES      VASCULAR SURGERY      abdominal aneurysm repair         Date referred to PT: 9/12/2017      General Precautions: Standard, fall  Orthopedic Precautions: N/A   Braces:              Patient History:  Lives With: alone  Living Arrangements: house  Home Layout: Able to live on 1st floor  Stair Railings at Home: none  Transportation Available: family or friend will provide  Living Environment Comment: PT LIVES AT HOME ALONE AND DRIVES  DME owned (not currently used): rolling walker    Previous Level of Function:  Ambulation Skills: independent  Transfer Skills: independent  ADL Skills: independent  Work/Leisure Activity: independent    Subjective:  Communicated with NURSE AUGUSTE AND EPIC CHART REVIEW prior to session.  PT AGREED TO EVAL AND TX   Chief Complaint: NONE   Patient goals: GO HOME     Pain/Comfort  Pain Rating 1: 0/10  Pain Rating Post-Intervention 1: 0/10      Objective:   Patient found with: telemetry, peripheral IV     Cognitive Exam:  Oriented to: Person, Place, Time and Situation    Follows Commands/attention: Follows multistep  commands  Communication: clear/fluent  Safety awareness/insight to disability:  intact    Physical Exam:  Postural examination/scapula alignment: Head forward    Skin integrity: Visible skin intact  Edema: None noted     Sensation:   Intact    Lower Extremity Range of Motion:  Right Lower Extremity: WFL  Left Lower Extremity: WFL    Lower Extremity Strength:  Right Lower Extremity: WFL  Left Lower Extremity: WFL      Functional Mobility:  Bed Mobility:  Rolling/Turning to Left: Independent  Scooting/Bridging: Independent  Supine to Sit: Independent  Sit to Supine: Independent    Transfers:  Sit <> Stand Assistance: Independent  Sit <> Stand Assistive Device: No Assistive Device  Bed <> Chair Technique: Stand Pivot  Bed <> Chair Assistance: Independent  Bed <> Chair Assistive Device: No Assistive Device    Gait:   Gait Distance: PT IS IND X 250' WITH NO LOB    Therapeutic Activities and Exercises:  PT GT TRAINED AND RETURNED TO  T/F TO CHAIR . PT LEFT SEATED IN CHAIR WITH ALL NEEDS MET. PT EDUCATED ON ROLE OF MOBILITY PROGRAM     AM-[facility] 6 CLICK MOBILITY  How much help from another person does this patient currently need?   1 = Unable, Total/Dependent Assistance  2 = A lot, Maximum/Moderate Assistance  3 = A little, Minimum/Contact Guard/Supervision  4 = None, Modified [place]/Independent    Turning over in bed (including adjusting bedclothes, sheets and blankets)?: 4  Sitting down on and standing up from a chair with arms (e.g., wheelchair, bedside commode, etc.): 4  Moving from lying on back to sitting on the side of the bed?: 4  Moving to and from a bed to a chair (including a wheelchair)?: 4  Need to walk in hospital room?: 4  Climbing 3-5 steps with a railing?: 4  Total Score: 24     AM-PAC Raw Score CMS G-Code Modifier Level of Impairment Assistance   6 % Total / Unable   7 - 9 CM 80 - 100% Maximal Assist   10 - 14 CL 60 - 80% Moderate Assist   15 - 19 CK 40 - 60% Moderate Assist   20 - 22 CJ 20 - 40% Minimal Assist   23 CI 1-20% SBA / CGA   24 CH 0% Independent/ Mod I      Patient left up in chair with call button in reach.    Assessment:   Jimmy Young is a 87 y.o. male with a medical diagnosis of GI bleed and presents with NO GROSS FUNC MOBILITY DEFICITS AND WILL BE D/C TO MOBILITY PROGRAM.    Rehab identified problem list/impairments:      Discharge recommendations: Discharge Facility/Level Of Care Needs: home health PT (HOME SAFETY ASSESSMENT.)     Barriers to discharge: Barriers to Discharge: None    Equipment recommendations: Equipment Needed After Discharge: none   PLAN:    Patient to be seen   to address the above listed problems via therapeutic activities, gait training, therapeutic exercises  Plan of Care expires: 09/19/17  Plan of Care reviewed with: patient    Functional Assessment Tool Used: [place] AM PAC  Score: CH  Functional Limitation: Mobility: Walking and moving around  Mobility: Walking and Moving Around Current Status (): CH  Mobility: Walking and Moving Around Goal Status (): CH  Mobility: Walking and Moving Around Discharge Status (): YENNY Yanez, PT  09/12/2017

## 2017-09-12 NOTE — SUBJECTIVE & OBJECTIVE
Past Medical History:   Diagnosis Date    Aneurysm 2016    abdominal, stent placed    Arthritis     Asthma     Atrial fibrillation     Cancer     skin cancer on face    Diabetes mellitus     Emphysema lung     Encounter for blood transfusion     Hypertension        Past Surgical History:   Procedure Laterality Date    ABDOMINAL SURGERY      COLONOSCOPY Left 6/12/2017    Procedure: COLONOSCOPY;  Surgeon: Boaz Toussaint MD;  Location: [facility];  Service: Endoscopy;  Laterality: Left;    NO PAST SURGERIES      VASCULAR SURGERY      abdominal aneurysm repair       Review of patient's allergies indicates:  No Known Allergies  Family History     Problem Relation (Age of Onset)    Cancer Mother    Heart attack Father    Hypertension Father        Social History Main Topics    Smoking status: Former Smoker     Packs/day: 2.00     Years: 20.00     Types: Cigarettes     Quit date: 1/1/1977    Smokeless tobacco: Never Used    Alcohol use No    Drug use: No    Sexual activity: Not Currently     Review of Systems   Constitutional: Positive for fatigue. Negative for fever.   HENT: Negative for hearing loss.    Eyes: Negative for visual disturbance.   Respiratory: Negative for cough and shortness of breath.    Cardiovascular: Negative for chest pain and palpitations.   Gastrointestinal:        As per HPI.   Genitourinary: Negative for difficulty urinating, dysuria, frequency and hematuria.   Musculoskeletal: Negative for arthralgias and back pain.   Skin: Negative for color change and rash.   Neurological: Positive for weakness. Negative for seizures, syncope, numbness and headaches.   Hematological: Does not bruise/bleed easily.   Psychiatric/Behavioral: The patient is not nervous/anxious.      Objective:     Vital Signs (Most Recent):  Temp: 97.5 °F (36.4 °C) (09/12/17 1245)  Pulse: 69 (09/12/17 1245)  Resp: 20 (09/12/17 1245)  BP: (!) 151/68 (09/12/17 1245)  SpO2: 96 % (09/12/17 1245) Vital Signs (24h  Range):  Temp:  [97.3 °F (36.3 °C)-99.1 °F (37.3 °C)] 97.5 °F (36.4 °C)  Pulse:  [69-84] 69  Resp:  [16-22] 20  SpO2:  [88 %-100 %] 96 %  BP: (151-198)/(68-85) 151/68     Weight: 81 kg (178 lb 9.6 oz) (09/12/17 0148)  Body mass index is 27.16 kg/m².      Intake/Output Summary (Last 24 hours) at 09/12/17 1305  Last data filed at 09/12/17 0745   Gross per 24 hour   Intake              325 ml   Output                0 ml   Net              325 ml       Lines/Drains/Airways     Peripheral Intravenous Line                 Peripheral IV - Single Lumen 09/11/17 2247 Right Antecubital less than 1 day         Peripheral IV - Single Lumen 09/12/17 0030 Right Forearm less than 1 day                Physical Exam   Constitutional: He is oriented to person, place, and time. He appears well-developed and well-nourished.   HENT:   Head: Normocephalic and atraumatic.   Eyes: EOM are normal.   Neck: Normal range of motion. Neck supple.   Cardiovascular: Normal rate, regular rhythm and normal heart sounds.    No murmur heard.  Pulmonary/Chest: Effort normal and breath sounds normal. No respiratory distress. He has no wheezes.   Abdominal: Soft. Bowel sounds are normal. He exhibits no distension and no mass. There is no hepatomegaly. There is no tenderness.   Musculoskeletal: He exhibits edema.   Neurological: He is alert and oriented to person, place, and time. No cranial nerve deficit. Gait normal.   Skin: Skin is warm and dry. No rash noted.   Psychiatric: He has a normal mood and affect.       Significant Labs:  CBC:   Recent Labs  Lab 09/11/17 2247 09/12/17  0829   WBC 7.44 6.88   HGB 4.7* 5.7*   HCT 17.9* 20.5*    257     CMP:   Recent Labs  Lab 09/12/17  0829   *   CALCIUM 8.4*   ALBUMIN 3.0*   PROT 5.8*      K 3.7   CO2 27      BUN 32*   CREATININE 1.6*   ALKPHOS 76   ALT 12   AST 10   BILITOT 0.7     Coagulation:   Recent Labs  Lab 09/11/17 2247   INR 1.2   APTT 23.4       Significant  Imaging:  Imaging results within the past 24 hours have been reviewed.

## 2017-09-12 NOTE — ASSESSMENT & PLAN NOTE
-Admit Tele  -NPO  -PPI   -GI consult  -see Anemia  -Hold Eliquis     Better s/p Enteroscopy and 3 units of blood  Will d/c Eliquis and any blood thinners  Protonix 40 PO daily as per GI  Transfuse another unit of blood  D/C soon

## 2017-09-12 NOTE — ED NOTES
[facility] ([facility]) paged at this time per Dr. Do's request. Awaiting call back.

## 2017-09-12 NOTE — PROGRESS NOTES
Pt seen and examined, chart reviewed, GI consulted noted and appreciated.  Pleasant elderly gentleman in NAD, son and grand daughter present   Pt feels much better now, on his 3rd unit of blood. Weakness, dizziness improved. Still had some melena about 2 hours ago but weakness improved. H/h has improved to 5.7/ 22 now. Remains NPO for EGd tomorrow.  He was off Eliquis sine June 2017 and it was recently resumed by Dr. Caba 2 weeks ago for his Afib. Pt states that he did have intermittent LGI bleed since June 2017. Eliquis now has been discontinued.  VSS afeb  PE unremarkable.   Continue present care.

## 2017-09-12 NOTE — ED PROVIDER NOTES
Encounter Date: 9/11/2017       History     Chief Complaint   Patient presents with    Abnormal Lab     Patient sent to ER due to low HGB by Dr. Rios. Patient has HX of rectal bleed. Patient thinks the HGB result is 5. Patient pale. denies pain. last bloody stool this am     He was evaluated at [facility] in [place] this summer for profound anemia and GI bleeding, found on endoscopy to have angiodysplasia and was transfused.  Primary care is Dr. Richardson, reports most recent labs about a month ago, at that time hemoglobin was acceptable but he does not know the number.  He does report passing some amount of blood in stools on all or most all bowel movements, describing a very light color of melena by description.  His legs feel a little weak but he has no other symptoms and in particular denies chest pain, shortness of breath, abdominal pain, palpitations, syncope, presyncope, or hematemesis.  He had routine labs drawn earlier today and was called at home to come to the emergency room because of a hemoglobin of about 5.  Family is present.  Repeat lab on arrival confirms the accuracy of this value and he is recommended for admission for transfusion.  No other complaints.      The history is provided by the patient and a relative. No  was used.     Review of patient's allergies indicates:  No Known Allergies  Past Medical History:   Diagnosis Date    Aneurysm 2016    abdominal, stent placed    Arthritis     Asthma     Atrial fibrillation     Cancer     skin cancer on face    Diabetes mellitus     Emphysema lung     Encounter for blood transfusion     Hypertension      Past Surgical History:   Procedure Laterality Date    ABDOMINAL SURGERY      COLONOSCOPY Left 6/12/2017    Procedure: COLONOSCOPY;  Surgeon: Boaz Toussaint MD;  Location: [facility];  Service: Endoscopy;  Laterality: Left;    NO PAST SURGERIES      VASCULAR SURGERY      abdominal aneurysm repair     Family History    Problem Relation Age of Onset    Hypertension Father     Cancer Mother      bladder     Social History   Substance Use Topics    Smoking status: Former Smoker     Packs/day: 2.00     Years: 20.00     Types: Cigarettes     Quit date: 1/1/1977    Smokeless tobacco: Never Used    Alcohol use No     Review of Systems   Constitutional: Negative for chills and fever.   HENT: Negative for congestion, facial swelling, nosebleeds and sinus pressure.    Eyes: Negative for pain and redness.   Respiratory: Negative for chest tightness, shortness of breath and wheezing.    Cardiovascular: Negative for chest pain, palpitations and leg swelling.   Gastrointestinal: Positive for blood in stool. Negative for abdominal distention, abdominal pain, diarrhea, nausea and vomiting.   Endocrine: Negative for cold intolerance, polydipsia and polyphagia.   Genitourinary: Negative for difficulty urinating, dysuria, frequency and hematuria.   Musculoskeletal: Negative for arthralgias, back pain, myalgias and neck pain.   Skin: Negative for color change and rash.   Neurological: Positive for weakness. Negative for dizziness, numbness and headaches.        Legs feel a little weak   Hematological: Negative for adenopathy. Does not bruise/bleed easily.   Psychiatric/Behavioral: Negative for agitation and behavioral problems.   All other systems reviewed and are negative.      Physical Exam     Initial Vitals [09/11/17 2224]   BP Pulse Resp Temp SpO2   (!) 197/80 84 18 98.1 °F (36.7 °C) (!) 93 %      MAP       119         Physical Exam    Nursing note and vitals reviewed.  Constitutional: He appears well-developed and well-nourished. He is not diaphoretic. No distress.   HENT:   Head: Normocephalic and atraumatic.   Mouth/Throat: Oropharynx is clear and moist. No oropharyngeal exudate.   Eyes: Conjunctivae and EOM are normal. Pupils are equal, round, and reactive to light. Right eye exhibits no discharge. Left eye exhibits no discharge. No  scleral icterus.   Neck: Normal range of motion. Neck supple. No thyromegaly present. No tracheal deviation present. No JVD present.   Cardiovascular: Normal rate, regular rhythm and normal heart sounds. Exam reveals no gallop and no friction rub.    No murmur heard.  Pulmonary/Chest: Breath sounds normal. No respiratory distress. He has no wheezes. He has no rhonchi. He has no rales. He exhibits no tenderness.   Abdominal: Soft. Bowel sounds are normal. He exhibits no distension and no mass. There is no tenderness. There is no rebound and no guarding.   Musculoskeletal: Normal range of motion. He exhibits no edema or tenderness.   Lymphadenopathy:     He has no cervical adenopathy.   Neurological: He is alert and oriented to person, place, and time. He has normal strength. No cranial nerve deficit.   Skin: Skin is warm and dry. No rash noted. No erythema. There is pallor.   Psychiatric: He has a normal mood and affect. His behavior is normal. Judgment and thought content normal.         ED Course   Procedures  Labs Reviewed   CBC W/ AUTO DIFFERENTIAL - Abnormal; Notable for the following:        Result Value    RBC 2.48 (*)     Hemoglobin 4.7 (*)     Hematocrit 17.9 (*)     MCV 72 (*)     MCH 19.0 (*)     MCHC 26.3 (*)     RDW 20.2 (*)     Lymph% 13.7 (*)     All other components within normal limits    Narrative:      HGB and HCT  critical result(s) called and verbal readback obtained   from  Sonya Ramirez RN., 09/11/2017 23:01   COMPREHENSIVE METABOLIC PANEL - Abnormal; Notable for the following:     Glucose 253 (*)     BUN, Bld 35 (*)     Creatinine 1.9 (*)     Albumin 3.4 (*)     eGFR if  35.8 (*)     eGFR if non  31.0 (*)     All other components within normal limits   APTT   PROTIME-INR   CK   CK-MB   TROPONIN I     EKG Readings: (Independently Interpreted)   Initial Reading: No STEMI. Rhythm: Normal Sinus Rhythm. Heart Rate: 80. Ectopy: No Ectopy. Conduction: 1st Degree AV  Block. Clinical Impression: Normal Sinus Rhythm   Possible old IMI       Imaging Results          X-Ray Chest AP Portable (In process)                                        ED Course        11:07 PM I have discussed with the patient and his family the need for transfer for admission to [facility] for blood transfusion and possible further GI evaluation as these services are not available at this facility, and they have verbalized understanding.  He will be transferred by [facility] with routine medical monitoring en route.  He has been accepted by Dr. Kaur.  The risk of transfer includes motor vehicle accident and worsening of condition the benefit includes transfusion and further inpatient care.    Clinical Impression:     1. Iron deficiency anemia due to chronic blood loss    2. Weakness    3. Upper GI bleed          Disposition:   Disposition: Admitted  Condition: Fair                        Raheel Lei MD  09/11/17 2276

## 2017-09-12 NOTE — ED NOTES
Pt accepted to [facility] per Dr. Kaur. Family & pt aware, agree with plan of care.

## 2017-09-12 NOTE — ED NOTES
[facility] here for transport of pt. Daughter at bedside aware of pt's room number and processes of transportation of pt to [facility]. Pt NAD, resp e/u. No further complaints or concerns at this time.

## 2017-09-12 NOTE — CONSULTS
[facility] -   Gastroenterology  Consult Note    Patient Name: Jimmy Young  MRN: [id number]  Admission Date: 9/11/2017  Hospital Length of Stay: 0 days  Code Status: Full Code   Attending Provider: No att. providers found   Consulting Provider: Antony Cotton PA-C  Primary Care Physician: Nacho Richardson MD  Principal Problem:GI bleed    Inpatient consult to Gastroenterology  Consult performed by: ANTONY COTTON  Consult ordered by: VALERIANO SWANSON  Reason for consult: GI bleed  Assessment/Recommendations: EGD tomorrow        Subjective:     HPI:  The patient presented to the ER for weakness. He has a known history of duodenal AVMs. He was lasted scoped in July. He presented this time with gradual worsening. He reported having blood in his stool for a couple of weeks. He says Cardiology restarted his Eliquis three weeks ago. He denies nausea, vomiting or abdominal pain. He denies NSAID use. Hgb was 4.7 and admit. He is on his second unit of blood at this time. He is starting to feel better.     Past Medical History:   Diagnosis Date    Aneurysm 2016    abdominal, stent placed    Arthritis     Asthma     Atrial fibrillation     Cancer     skin cancer on face    Diabetes mellitus     Emphysema lung     Encounter for blood transfusion     Hypertension        Past Surgical History:   Procedure Laterality Date    ABDOMINAL SURGERY      COLONOSCOPY Left 6/12/2017    Procedure: COLONOSCOPY;  Surgeon: Boaz Toussaint MD;  Location: [facility];  Service: Endoscopy;  Laterality: Left;    NO PAST SURGERIES      VASCULAR SURGERY      abdominal aneurysm repair       Review of patient's allergies indicates:  No Known Allergies  Family History     Problem Relation (Age of Onset)    Cancer Mother    Heart attack Father    Hypertension Father        Social History Main Topics    Smoking status: Former Smoker     Packs/day: 2.00     Years: 20.00     Types: Cigarettes     Quit date: 1/1/1977     Smokeless tobacco: Never Used    Alcohol use No    Drug use: No    Sexual activity: Not Currently     Review of Systems   Constitutional: Positive for fatigue. Negative for fever.   HENT: Negative for hearing loss.    Eyes: Negative for visual disturbance.   Respiratory: Negative for cough and shortness of breath.    Cardiovascular: Negative for chest pain and palpitations.   Gastrointestinal:        As per HPI.   Genitourinary: Negative for difficulty urinating, dysuria, frequency and hematuria.   Musculoskeletal: Negative for arthralgias and back pain.   Skin: Negative for color change and rash.   Neurological: Positive for weakness. Negative for seizures, syncope, numbness and headaches.   Hematological: Does not bruise/bleed easily.   Psychiatric/Behavioral: The patient is not nervous/anxious.      Objective:     Vital Signs (Most Recent):  Temp: 97.5 °F (36.4 °C) (09/12/17 1245)  Pulse: 69 (09/12/17 1245)  Resp: 20 (09/12/17 1245)  BP: (!) 151/68 (09/12/17 1245)  SpO2: 96 % (09/12/17 1245) Vital Signs (24h Range):  Temp:  [97.3 °F (36.3 °C)-99.1 °F (37.3 °C)] 97.5 °F (36.4 °C)  Pulse:  [69-84] 69  Resp:  [16-22] 20  SpO2:  [88 %-100 %] 96 %  BP: (151-198)/(68-85) 151/68     Weight: 81 kg (178 lb 9.6 oz) (09/12/17 0148)  Body mass index is 27.16 kg/m².      Intake/Output Summary (Last 24 hours) at 09/12/17 1305  Last data filed at 09/12/17 0745   Gross per 24 hour   Intake              325 ml   Output                0 ml   Net              325 ml       Lines/Drains/Airways     Peripheral Intravenous Line                 Peripheral IV - Single Lumen 09/11/17 2247 Right Antecubital less than 1 day         Peripheral IV - Single Lumen 09/12/17 0030 Right Forearm less than 1 day                Physical Exam   Constitutional: He is oriented to person, place, and time. He appears well-developed and well-nourished.   HENT:   Head: Normocephalic and atraumatic.   Eyes: EOM are normal.   Neck: Normal range of motion.  Neck supple.   Cardiovascular: Normal rate, regular rhythm and normal heart sounds.    No murmur heard.  Pulmonary/Chest: Effort normal and breath sounds normal. No respiratory distress. He has no wheezes.   Abdominal: Soft. Bowel sounds are normal. He exhibits no distension and no mass. There is no hepatomegaly. There is no tenderness.   Musculoskeletal: He exhibits edema.   Neurological: He is alert and oriented to person, place, and time. No cranial nerve deficit. Gait normal.   Skin: Skin is warm and dry. No rash noted.   Psychiatric: He has a normal mood and affect.       Significant Labs:  CBC:   Recent Labs  Lab 09/11/17 2247 09/12/17  0829   WBC 7.44 6.88   HGB 4.7* 5.7*   HCT 17.9* 20.5*    257     CMP:   Recent Labs  Lab 09/12/17  0829   *   CALCIUM 8.4*   ALBUMIN 3.0*   PROT 5.8*      K 3.7   CO2 27      BUN 32*   CREATININE 1.6*   ALKPHOS 76   ALT 12   AST 10   BILITOT 0.7     Coagulation:   Recent Labs  Lab 09/11/17 2247   INR 1.2   APTT 23.4       Significant Imaging:  Imaging results within the past 24 hours have been reviewed.    Assessment/Plan:     * GI bleed    Blood in the stool reported.   See plan above.         Anemia due to chronic blood loss    86 yo male with a h/o AMVs admitted with acute on chronic anemia secondary to GI bleed after restarting Eliquis.   Agree with transfusion. Continue to monitor H/H.  EGD tomorrow once Hgb improves.   Continue Protonix.   Hold Eliquis.           Atrial fibrillation    Hold Eliquis at this time. May not be able to restart it due to recurrent bleeding.         Chronic kidney disease, stage 3    Stable.             Thank you for your consult. I will follow-up with patient. Please contact us if you have any additional questions.    Saleem Cotton PA-C  Gastroenterology  [facility]

## 2017-09-12 NOTE — ED NOTES
Pt c/o dry mouth. Asked MD if pt could be given ice chips. MD ok'd. Pt given ice chips. Family at bedside. Pt NAD, side rails up x2. Fall risk band on pt. Pt sitting up in stretcher, resp e/u. Updated on plan of care. Will continue to monitor.

## 2017-09-12 NOTE — HPI
The patient presented to the ER for weakness. He has a known history of duodenal AVMs. He was lasted scoped in July. He presented this time with gradual worsening. He reported having blood in his stool for a couple of weeks. He says Cardiology restarted his Eliquis three weeks ago. He denies nausea, vomiting or abdominal pain. He denies NSAID use. Hgb was 4.7 and admit. He is on his second unit of blood at this time. He is starting to feel better.

## 2017-09-12 NOTE — HPI
The patient presents as a transfer from [facility] for need blood transfusion. He reports that he was referred to the ER by his PCP, Dr. Richardson for abnormal hemoglobin of 5 resulting yesterday. Associated symptoms include melena stool and generalized fatigue/weakness. The daughter at beside reports the patient, was evaluated at [facility] in [place] this summer for profound anemia and GI bleeding, found on endoscopy to have angiodysplasia and was transfused. The patient denies fever, cough, congestion, CP, SOB, dizziness, rash, headaches, abd pain, constipation, hematemesis, palpitations or any other complaints.

## 2017-09-12 NOTE — H&P
[facility] Hospital Medicine  History & Physical    Patient Name: Jimmy Young  MRN: [id number]  Admission Date: 9/11/2017  Attending Physician: Shorty Kaur MD  Primary Care Provider: Nacho Richardson MD         Patient information was obtained from patient, past medical records and ER records.     Subjective:     Principal Problem:GI bleed    Chief Complaint:   Chief Complaint   Patient presents with    Abnormal Lab     Patient sent to ER due to low HGB by Dr. Rios. Patient has HX of rectal bleed. Patient thinks the HGB result is 5. Patient pale. denies pain. last bloody stool this am        HPI: The patient presents as a transfer from [facility] for need blood transfusion. He reports that he was referred to the ER by his PCP, Dr. Richardson for abnormal hemoglobin of 5 resulting yesterday. Associated symptoms include melena stool and generalized fatigue/weakness. The daughter at beside reports the patient, was evaluated at [facility] in [place] this summer for profound anemia and GI bleeding, found on endoscopy to have angiodysplasia and was transfused. The patient denies fever, cough, congestion, CP, SOB, dizziness, rash, headaches, abd pain, constipation, hematemesis, palpitations or any other complaints.      ADVANCE DIRECTIVES: The topic of advance directives was discussed with the patient and daughter IVAN Glover at bedside. Total time spent with discussion and education of advance directives was 16 mins. The Patient and daughter report they has never  developed documentation regarding advanced directives. We discussed MPOA, what it means and how it works; as well as, the patients code status.. At this time the patient and daughter refuse further information and want to be a full code.  I encouraged the patient and daughter at bedside to seek further information or assistance at anytime.     Blood Transfusion Consent  A need for blood transfusion has been discussed with the patient.  This procedure has been fully reviewed and I personally discussed the procedure in detail including the indication, risk, and benefits with the patient and daughter present at bedside. He acknowledges understanding and written informed consent has been obtained.    Good Outcome Following Attempted Resuscitation (GO-FAR) Score:  The Good Outcome Following Attempted Resuscitation (GO-FAR) score provides validated risk stratification for a patients chance of neurologically-intact survival to discharge should he/she be successfully resuscitated following in-hospital cardiopulmonary arrest. The clinical significance of the GO-FAR score may be discussed with the patient and/or family while coming to a decision about code status.     Age: 85+  Chronic neurologic deficits: No  Acute stroke during the current admission: No  Medical non-cardiac diagnosis: Yes  Major trauma: No  Metastatic or hematologic cancer: No  Septicemia: No  Pneumonia: No  Hepatic insufficiency: No  Renal insufficiency or dialysis: No  Acute hypotension or hypoperfusion: No  Respiratory insufficiency: No  Admit from SNF: No  GO-FAR Score: 3    Interpretation Key:   GO-FAR Score       Likelihood of NISD       -15 to - 6       Above average > 15 %         - 5 to 13       Average     >3 to 15 %         14 to 23       Low                1 to 3 %               > 23       Very low             < 1 %       Regarding the details of the discussion about code status:   Patient's likelihood of neurologically-intact survival to discharge after successful resuscitation: Average  Has the patient/proxy been made aware of his/her overall prognosis? Yes  Has the patient/proxy been made aware of his/her predicted survival to discharge after successful resuscitation? Yes  Code status discussion included: Nurse Practitioner  Code status was discussed with: Patient and Daughter  Code status preference was chosen by: Patient  Does the patient have an Advance Health Directive  (AHD)? Has NO advance directive - not interested in additional information  Does the AHD indicate a preference for Full Code? Not applicable  If not Full Code, does the AHD specify which life-sustaining interventions are to be taken, and in what circumstances? Not applicable    Code Status: Full Code        Past Medical History:   Diagnosis Date    Aneurysm 2016    abdominal, stent placed    Arthritis     Asthma     Atrial fibrillation     Cancer     skin cancer on face    Diabetes mellitus     Emphysema lung     Encounter for blood transfusion     Hypertension        Past Surgical History:   Procedure Laterality Date    ABDOMINAL SURGERY      COLONOSCOPY Left 6/12/2017    Procedure: COLONOSCOPY;  Surgeon: Boaz Toussaint MD;  Location: [facility];  Service: Endoscopy;  Laterality: Left;    NO PAST SURGERIES      VASCULAR SURGERY      abdominal aneurysm repair       Review of patient's allergies indicates:  No Known Allergies    No current facility-administered medications on file prior to encounter.      Current Outpatient Prescriptions on File Prior to Encounter   Medication Sig    amiodarone (PACERONE) 200 MG Tab Take by mouth.    amlodipine (NORVASC) 10 MG tablet Take 10 mg by mouth once daily.    carvedilol (COREG) 6.25 MG tablet Take 6.25 mg by mouth.    cetirizine (ZYRTEC) 10 MG tablet Take 10 mg by mouth once daily.    metformin (GLUCOPHAGE) 1000 MG tablet Take 1,000 mg by mouth daily with breakfast.    metoprolol succinate (TOPROL-XL) 25 MG 24 hr tablet Take 25 mg by mouth once daily.    pravastatin (PRAVACHOL) 40 MG tablet Take 40 mg by mouth.    selenium 50 mcg Tab Take 200 mcg by mouth.    levothyroxine (SYNTHROID) 50 MCG tablet Take 1 tablet (50 mcg total) by mouth before breakfast.    pantoprazole (PROTONIX) 40 MG tablet Take 1 tablet (40 mg total) by mouth 2 (two) times daily.     Family History     Problem Relation (Age of Onset)    Cancer Mother    Heart attack Father     Hypertension Father        Social History Main Topics    Smoking status: Former Smoker     Packs/day: 2.00     Years: 20.00     Types: Cigarettes     Quit date: 1/1/1977    Smokeless tobacco: Never Used    Alcohol use No    Drug use: No    Sexual activity: Not Currently     Review of Systems   Constitutional: Positive for fatigue. Negative for chills, diaphoresis and fever.   HENT: Negative.  Negative for congestion, sore throat and voice change.    Eyes: Negative.  Negative for photophobia and visual disturbance.   Respiratory: Negative.  Negative for cough, shortness of breath, wheezing and stridor.    Cardiovascular: Negative.  Negative for chest pain and leg swelling.   Gastrointestinal: Positive for blood in stool. Negative for abdominal distention, abdominal pain, constipation, diarrhea, nausea and vomiting.   Endocrine: Negative.  Negative for polydipsia, polyphagia and polyuria.   Genitourinary: Negative.  Negative for difficulty urinating, dysuria, flank pain, testicular pain and urgency.   Musculoskeletal: Negative.  Negative for back pain, joint swelling, neck pain and neck stiffness.   Skin: Negative.  Negative for color change and rash.   Allergic/Immunologic: Negative.  Negative for immunocompromised state.   Neurological: Positive for weakness. Negative for dizziness, syncope, numbness and headaches.   Hematological: Negative.  Does not bruise/bleed easily.   Psychiatric/Behavioral: Positive for confusion. Negative for agitation and behavioral problems.        Chronic, occasional at baseline per family       Objective:     Vital Signs (Most Recent):  Temp: 98.4 °F (36.9 °C) (09/12/17 0148)  Pulse: 76 (09/12/17 0300)  Resp: 20 (09/12/17 0148)  BP: (!) 186/77 (09/12/17 0148)  SpO2: 95 % (09/12/17 0148) Vital Signs (24h Range):  Temp:  [98.1 °F (36.7 °C)-98.4 °F (36.9 °C)] 98.4 °F (36.9 °C)  Pulse:  [74-84] 76  Resp:  [16-20] 20  SpO2:  [93 %-100 %] 95 %  BP: (156-198)/(76-85) 186/77     Weight: 81  kg (178 lb 9.6 oz)  Body mass index is 27.16 kg/m².    Physical Exam   Constitutional: He is oriented to person, place, and time. He appears well-developed and well-nourished. No distress.   Pleasant elderly male of stated age.     HENT:   Head: Normocephalic and atraumatic.   Nose: Nose normal.   Eyes: Conjunctivae and EOM are normal. Pupils are equal, round, and reactive to light. No scleral icterus.   Neck: Normal range of motion. Neck supple. No tracheal deviation present.   Cardiovascular: Normal rate, regular rhythm, normal heart sounds and intact distal pulses.    No murmur heard.  Pulmonary/Chest: Effort normal and breath sounds normal. No stridor. No respiratory distress. He has no wheezes. He has no rales.   Abdominal: Soft. Bowel sounds are normal. He exhibits distension ( mild). There is no tenderness. There is no guarding.   Genitourinary:   Genitourinary Comments: Bloody stool        Musculoskeletal: Normal range of motion. He exhibits no edema or deformity.   Neurological: He is alert and oriented to person, place, and time. No cranial nerve deficit.   Skin: Skin is warm and dry. Capillary refill takes 2 to 3 seconds. No rash noted. He is not diaphoretic.   Psychiatric: He has a normal mood and affect. His behavior is normal. Judgment and thought content normal.   Nursing note and vitals reviewed.       Significant Labs: All pertinent labs within the past 24 hours have been reviewed.   Results for orders placed or performed during the hospital encounter of 09/11/17   CBC auto differential   Result Value Ref Range    WBC 7.44 3.90 - 12.70 K/uL    RBC 2.48 (L) 4.60 - 6.20 M/uL    Hemoglobin 4.7 (LL) 14.0 - 18.0 g/dL    Hematocrit 17.9 (LL) 40.0 - 54.0 %    MCV 72 (L) 82 - 98 fL    MCH 19.0 (L) 27.0 - 31.0 pg    MCHC 26.3 (L) 32.0 - 36.0 g/dL    RDW 20.2 (H) 11.5 - 14.5 %    Platelets 339 150 - 350 K/uL    MPV 9.9 9.2 - 12.9 fL    Gran # 5.3 1.8 - 7.7 K/uL    Lymph # 1.0 1.0 - 4.8 K/uL    Mono # 0.7 0.3  - 1.0 K/uL    Eos # 0.3 0.0 - 0.5 K/uL    Baso # 0.05 0.00 - 0.20 K/uL    Gran% 72.0 38.0 - 73.0 %    Lymph% 13.7 (L) 18.0 - 48.0 %    Mono% 9.4 4.0 - 15.0 %    Eosinophil% 4.2 0.0 - 8.0 %    Basophil% 0.7 0.0 - 1.9 %    [place] Slight     Hypo Marked     [place] Cells Moderate     Differential Method Automated    Comprehensive metabolic panel   Result Value Ref Range    Sodium 140 136 - 145 mmol/L    Potassium 3.6 3.5 - 5.1 mmol/L    Chloride 104 95 - 110 mmol/L    CO2 24 23 - 29 mmol/L    Glucose 253 (H) 70 - 110 mg/dL    BUN, Bld 35 (H) 8 - 23 mg/dL    Creatinine 1.9 (H) 0.5 - 1.4 mg/dL    Calcium 8.8 8.7 - 10.5 mg/dL    Total Protein 6.5 6.0 - 8.4 g/dL    Albumin 3.4 (L) 3.5 - 5.2 g/dL    Total Bilirubin 0.5 0.1 - 1.0 mg/dL    Alkaline Phosphatase 88 55 - 135 U/L    AST 12 10 - 40 U/L    ALT 13 10 - 44 U/L    Anion Gap 12 8 - 16 mmol/L    eGFR if African American 35.8 (A) >60 mL/min/1.73 m^2    eGFR if non  31.0 (A) >60 mL/min/1.73 m^2   APTT   Result Value Ref Range    aPTT 23.4 21.0 - 32.0 sec   Protime-INR   Result Value Ref Range    Prothrombin Time 12.4 9.0 - 12.5 sec    INR 1.2 0.8 - 1.2   CK   Result Value Ref Range    CPK 72 20 - 200 U/L   CK-MB   Result Value Ref Range    CPK 72 20 - 200 U/L    CPK MB 2.5 0.1 - 6.5 ng/mL    MB% 3.5 0.0 - 5.0 %   Troponin I   Result Value Ref Range    Troponin I 0.039 (H) 0.000 - 0.026 ng/mL   Type & Screen   Result Value Ref Range    Group & Rh A POS     Indirect Marga NEG    Prepare RBC 4 Units; Profound anemia with active GI bleed   Result Value Ref Range    UNIT NUMBER [id number]     PRODUCT CODE R1306I23     DISPENSE STATUS ISSUED     CODING SYSTEM LHPB250     Unit Blood Type Code 6200     Unit Blood Type A POS     Unit Expiration [id number]     UNIT NUMBER [id number]     PRODUCT CODE X8011Y54     DISPENSE STATUS CROSSMATCHED     CODING SYSTEM MIUA164     Unit Blood Type Code 6200     Unit Blood Type A POS     Unit Expiration [id number]      UNIT NUMBER [id number]     PRODUCT CODE K0237G49     DISPENSE STATUS CROSSMATCHED     CODING SYSTEM TXCB188     Unit Blood Type Code 6200     Unit Blood Type A POS     Unit Expiration [id number]     UNIT NUMBER [id number]     PRODUCT CODE Q5379J86     DISPENSE STATUS CROSSMATCHED     CODING SYSTEM XKEE721     Unit Blood Type Code 6200     Unit Blood Type A POS     Unit Expiration [id number]          Significant Imaging: I have reviewed all pertinent imaging results/findings within the past 24 hours.   Imaging Results          X-Ray Chest AP Portable (Final result)  Result time 09/11/17 23:37:38    Final result by Pelon Peace MD (09/11/17 23:37:38)                 Impression:         No acute findings.  No change since previous exam.      Electronically signed by: PELON PEACE MD  Date:     09/11/17  Time:    23:37              Narrative:    Exam: XR CHEST AP PORTABLE,    Date:  09/11/17 23:11:11    History: Weakness.  Observation for malignancy.    Comparison:  06/10/2017.    Findings:     Lungs clear.  Heart size within normal limits.  Vascular calcifications of the aorta.  No suspicious bony findings.                                Assessment/Plan:     * GI bleed    -Admit Tele  -NPO  -PPI   -GI consult  -see Anemia            Iron deficiency anemia due to chronic blood loss    -Transfuse  -CBC  -monitor        Troponin level elevated    -Mildly elevated from baseline, trend troponin  -likely due to CKD and demand ischemia.           Diabetes mellitus type 2, uncontrolled, with renal complications    accu check  ssi  a1c           Atrial fibrillation    Continue amiodarone  EKG reviewed   See HTN          Chronic kidney disease, stage 3    -Stable  -UA  -monitor            Acquired hypothyroidism    Check TSH  Evaluate synthroid dosing once level resulted.           Essential hypertension    -Continue amlodipine, metoprolol, carvedilol   -Monitor and treat PRN          VTE Risk  Mitigation         Ordered     Medium Risk of VTE  Once      09/12/17 0448     Place sequential compression device  Until discontinued      09/12/17 0448     Reason for No Pharmacological VTE Prophylaxis  Active bleed      09/12/17 0448             Heber Mcclure NP  Department of Hospital Medicine   [facility]

## 2017-09-13 ENCOUNTER — ANESTHESIA (OUTPATIENT)
Dept: ENDOSCOPY | Facility: HOSPITAL | Age: 82
DRG: 378 | End: 2017-09-13
Payer: MEDICARE

## 2017-09-13 ENCOUNTER — ANESTHESIA EVENT (OUTPATIENT)
Dept: ENDOSCOPY | Facility: HOSPITAL | Age: 82
DRG: 378 | End: 2017-09-13
Payer: MEDICARE

## 2017-09-13 VITALS — RESPIRATION RATE: 37 BRPM

## 2017-09-13 LAB
ALBUMIN SERPL BCP-MCNC: 2.9 G/DL
ALP SERPL-CCNC: 81 U/L
ALT SERPL W/O P-5'-P-CCNC: 11 U/L
ANION GAP SERPL CALC-SCNC: 9 MMOL/L
AST SERPL-CCNC: 18 U/L
BASOPHILS # BLD AUTO: 0.04 K/UL
BASOPHILS NFR BLD: 0.6 %
BILIRUB SERPL-MCNC: 0.8 MG/DL
BLD PROD TYP BPU: NORMAL
BLOOD UNIT EXPIRATION DATE: NORMAL
BLOOD UNIT TYPE CODE: 600
BLOOD UNIT TYPE: NORMAL
BUN SERPL-MCNC: 26 MG/DL
CALCIUM SERPL-MCNC: 8.4 MG/DL
CHLORIDE SERPL-SCNC: 109 MMOL/L
CO2 SERPL-SCNC: 24 MMOL/L
CODING SYSTEM: NORMAL
CREAT SERPL-MCNC: 1.4 MG/DL
DIFFERENTIAL METHOD: ABNORMAL
DISPENSE STATUS: NORMAL
EOSINOPHIL # BLD AUTO: 0.3 K/UL
EOSINOPHIL NFR BLD: 4.9 %
ERYTHROCYTE [DISTWIDTH] IN BLOOD BY AUTOMATED COUNT: 19.8 %
EST. GFR  (AFRICAN AMERICAN): 52 ML/MIN/1.73 M^2
EST. GFR  (NON AFRICAN AMERICAN): 45 ML/MIN/1.73 M^2
GLUCOSE SERPL-MCNC: 106 MG/DL
HCT VFR BLD AUTO: 26.9 %
HCT VFR BLD AUTO: 28.5 %
HGB BLD-MCNC: 8 G/DL
HGB BLD-MCNC: 8.5 G/DL
LYMPHOCYTES # BLD AUTO: 1 K/UL
LYMPHOCYTES NFR BLD: 16.5 %
MCH RBC QN AUTO: 23.7 PG
MCHC RBC AUTO-ENTMCNC: 29.7 G/DL
MCV RBC AUTO: 80 FL
MONOCYTES # BLD AUTO: 0.7 K/UL
MONOCYTES NFR BLD: 11.2 %
NEUTROPHILS # BLD AUTO: 4.1 K/UL
NEUTROPHILS NFR BLD: 66.8 %
NUM UNITS TRANS PACKED RBC: NORMAL
PLATELET # BLD AUTO: 274 K/UL
PMV BLD AUTO: 9.2 FL
POCT GLUCOSE: 108 MG/DL (ref 70–110)
POCT GLUCOSE: 169 MG/DL (ref 70–110)
POTASSIUM SERPL-SCNC: 3.9 MMOL/L
PROT SERPL-MCNC: 5.7 G/DL
RBC # BLD AUTO: 3.38 M/UL
SODIUM SERPL-SCNC: 142 MMOL/L
WBC # BLD AUTO: 6.17 K/UL

## 2017-09-13 PROCEDURE — 27000221 HC OXYGEN, UP TO 24 HOURS

## 2017-09-13 PROCEDURE — 21400001 HC TELEMETRY ROOM

## 2017-09-13 PROCEDURE — 99900038 HC OT GENERIC THERAPY SCREENING (STAT)

## 2017-09-13 PROCEDURE — 44366 SMALL BOWEL ENDOSCOPY: CPT | Mod: ,,, | Performed by: INTERNAL MEDICINE

## 2017-09-13 PROCEDURE — 25000003 PHARM REV CODE 250: Performed by: NURSE PRACTITIONER

## 2017-09-13 PROCEDURE — 85018 HEMOGLOBIN: CPT

## 2017-09-13 PROCEDURE — 25000003 PHARM REV CODE 250: Performed by: INTERNAL MEDICINE

## 2017-09-13 PROCEDURE — 85014 HEMATOCRIT: CPT

## 2017-09-13 PROCEDURE — C9113 INJ PANTOPRAZOLE SODIUM, VIA: HCPCS | Performed by: EMERGENCY MEDICINE

## 2017-09-13 PROCEDURE — 37000008 HC ANESTHESIA 1ST 15 MINUTES: Performed by: INTERNAL MEDICINE

## 2017-09-13 PROCEDURE — 63600175 PHARM REV CODE 636 W HCPCS: Performed by: EMERGENCY MEDICINE

## 2017-09-13 PROCEDURE — 63600175 PHARM REV CODE 636 W HCPCS: Performed by: NURSE ANESTHETIST, CERTIFIED REGISTERED

## 2017-09-13 PROCEDURE — 80053 COMPREHEN METABOLIC PANEL: CPT

## 2017-09-13 PROCEDURE — 37000009 HC ANESTHESIA EA ADD 15 MINS: Performed by: INTERNAL MEDICINE

## 2017-09-13 PROCEDURE — 85025 COMPLETE CBC W/AUTO DIFF WBC: CPT

## 2017-09-13 PROCEDURE — P9016 RBC LEUKOCYTES REDUCED: HCPCS

## 2017-09-13 PROCEDURE — 27200959 HC CATHETER, ERBE, ARGON PLASMA: Performed by: INTERNAL MEDICINE

## 2017-09-13 PROCEDURE — 25000003 PHARM REV CODE 250: Performed by: NURSE ANESTHETIST, CERTIFIED REGISTERED

## 2017-09-13 PROCEDURE — 0W3P8ZZ CONTROL BLEEDING IN GASTROINTESTINAL TRACT, VIA NATURAL OR ARTIFICIAL OPENING ENDOSCOPIC: ICD-10-PCS | Performed by: INTERNAL MEDICINE

## 2017-09-13 PROCEDURE — 44366 SMALL BOWEL ENDOSCOPY: CPT | Performed by: INTERNAL MEDICINE

## 2017-09-13 PROCEDURE — 36415 COLL VENOUS BLD VENIPUNCTURE: CPT

## 2017-09-13 PROCEDURE — 94761 N-INVAS EAR/PLS OXIMETRY MLT: CPT

## 2017-09-13 RX ORDER — SODIUM CHLORIDE, SODIUM LACTATE, POTASSIUM CHLORIDE, CALCIUM CHLORIDE 600; 310; 30; 20 MG/100ML; MG/100ML; MG/100ML; MG/100ML
INJECTION, SOLUTION INTRAVENOUS CONTINUOUS
Status: DISCONTINUED | OUTPATIENT
Start: 2017-09-13 | End: 2017-09-14 | Stop reason: HOSPADM

## 2017-09-13 RX ORDER — SODIUM CHLORIDE, SODIUM LACTATE, POTASSIUM CHLORIDE, CALCIUM CHLORIDE 600; 310; 30; 20 MG/100ML; MG/100ML; MG/100ML; MG/100ML
INJECTION, SOLUTION INTRAVENOUS CONTINUOUS PRN
Status: DISCONTINUED | OUTPATIENT
Start: 2017-09-13 | End: 2017-09-13

## 2017-09-13 RX ORDER — HYDROCODONE BITARTRATE AND ACETAMINOPHEN 500; 5 MG/1; MG/1
TABLET ORAL
Status: DISCONTINUED | OUTPATIENT
Start: 2017-09-13 | End: 2017-09-14 | Stop reason: HOSPADM

## 2017-09-13 RX ORDER — LIDOCAINE HCL/PF 100 MG/5ML
SYRINGE (ML) INTRAVENOUS
Status: DISCONTINUED | OUTPATIENT
Start: 2017-09-13 | End: 2017-09-13

## 2017-09-13 RX ORDER — LEVOTHYROXINE SODIUM 50 UG/1
50 TABLET ORAL
Status: DISCONTINUED | OUTPATIENT
Start: 2017-09-14 | End: 2017-09-14 | Stop reason: HOSPADM

## 2017-09-13 RX ORDER — PROPOFOL 10 MG/ML
INJECTION, EMULSION INTRAVENOUS
Status: DISCONTINUED | OUTPATIENT
Start: 2017-09-13 | End: 2017-09-13

## 2017-09-13 RX ADMIN — PROPOFOL 30 MG: 10 INJECTION, EMULSION INTRAVENOUS at 10:09

## 2017-09-13 RX ADMIN — AMLODIPINE BESYLATE 10 MG: 10 TABLET ORAL at 08:09

## 2017-09-13 RX ADMIN — CARVEDILOL 6.25 MG: 6.25 TABLET, FILM COATED ORAL at 12:09

## 2017-09-13 RX ADMIN — PRAVASTATIN SODIUM 20 MG: 20 TABLET ORAL at 08:09

## 2017-09-13 RX ADMIN — PROPOFOL 140 MG: 10 INJECTION, EMULSION INTRAVENOUS at 10:09

## 2017-09-13 RX ADMIN — PANTOPRAZOLE SODIUM 40 MG: 40 INJECTION, POWDER, FOR SOLUTION INTRAVENOUS at 06:09

## 2017-09-13 RX ADMIN — SODIUM CHLORIDE, SODIUM LACTATE, POTASSIUM CHLORIDE, AND CALCIUM CHLORIDE: 600; 310; 30; 20 INJECTION, SOLUTION INTRAVENOUS at 10:09

## 2017-09-13 RX ADMIN — CARVEDILOL 6.25 MG: 6.25 TABLET, FILM COATED ORAL at 04:09

## 2017-09-13 RX ADMIN — AMIODARONE HYDROCHLORIDE 200 MG: 200 TABLET ORAL at 12:09

## 2017-09-13 RX ADMIN — LIDOCAINE HYDROCHLORIDE 100 MG: 20 INJECTION, SOLUTION INTRAVENOUS at 10:09

## 2017-09-13 RX ADMIN — SODIUM CHLORIDE, SODIUM LACTATE, POTASSIUM CHLORIDE, AND CALCIUM CHLORIDE: .6; .31; .03; .02 INJECTION, SOLUTION INTRAVENOUS at 12:09

## 2017-09-13 NOTE — PROGRESS NOTES
"Pt transferred back to room from [facility]  No discomfort at this time  No distress noted  Will continue to monitor  BP (!) 160/69 (BP Location: Left arm, Patient Position: Lying)   Pulse 61   Temp 98.5 °F (36.9 °C) (Oral)   Resp 18   Ht 5' 8" (1.727 m)   Wt 82.1 kg (181 lb)   SpO2 96%   BMI 27.52 kg/m²     "

## 2017-09-13 NOTE — INTERVAL H&P NOTE
The patient has been examined and the H&P has been reviewed:    I concur with the findings and no changes have occurred since H&P was written.    Anesthesia/Surgery risks, benefits and alternative options discussed and understood by patient/family.          Active Hospital Problems    Diagnosis  POA    *GI bleed [K92.2]  Yes    Diabetes mellitus type 2, uncontrolled, with renal complications [E11.29, E11.65]  Yes    Troponin level elevated [R74.8]  Yes    Anemia due to chronic blood loss [D50.0]  Yes    Chronic kidney disease, stage 3 [N18.3]  Yes     Chronic    Atrial fibrillation [I48.91]  Yes     Chronic    Acquired hypothyroidism [E03.9]  Yes     Chronic     Overview:   Last Assessment & Plan:   Resume Synthroid      Essential hypertension [I10]  Yes     Chronic      Resolved Hospital Problems    Diagnosis Date Resolved POA   No resolved problems to display.

## 2017-09-13 NOTE — PLAN OF CARE
Problem: Patient Care Overview  Goal: Plan of Care Review  PT IND WITH GROSS FUNC MOBILITY AND WILL BE D/C TO MOBILITY PROGRAM     Outcome: Ongoing (interventions implemented as appropriate)  Pt remained afebrile throughout this shift.   IV fluids administered per order.   Pt remained free of falls this shift.   Plan of care reviewed. Patient verbalizes understanding.   Pt moving/turing independently.   Patient SR on monitor.   Bed low, side rails up x 2, wheels locked, call light in reach.   Patient instructed to call for assistance.   Will continue to monitor.

## 2017-09-13 NOTE — TRANSFER OF CARE
"Anesthesia Transfer of Care Note    Patient: Jimmy Young    Procedure(s) Performed: Procedure(s) (LRB):  ESOPHAGOGASTRODUODENOSCOPY (EGD) (N/A)    Patient location: PACU    Anesthesia Type: MAC    Transport from OR: Transported from OR on room air with adequate spontaneous ventilation    Post pain: adequate analgesia    Post assessment: no apparent anesthetic complications    Post vital signs: stable    Level of consciousness: sedated    Nausea/Vomiting: no nausea/vomiting    Complications: none    Transfer of care protocol was followed      Last vitals:   Visit Vitals  BP (!) 107/50 (BP Location: Left leg, Patient Position: Lying)   Pulse 65   Temp 36.9 °C (98.4 °F) (Oral)   Resp 16   Ht 5' 8" (1.727 m)   Wt 82.1 kg (181 lb)   SpO2 (!) 93%   BMI 27.52 kg/m²     "

## 2017-09-13 NOTE — SUBJECTIVE & OBJECTIVE
Interval History: Doing much better, got three units of blood yesterday without any further melena or rectal bleeding yesterday. H/H now 8/24. 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 Pt tolerated procedure well. Post procedure had a large dark liquid stool in bed but no obvious blood. GI wants to transfuse another unit of blood and then discharge him later. He feels fine, no abd pain or discomfort, no NV, dizziness or weakness.      Review of Systems   Constitutional: Positive for fatigue. Negative for fever.   HENT: Negative for hearing loss.    Eyes: Negative for visual disturbance.   Respiratory: Negative for cough and shortness of breath.    Cardiovascular: Negative for chest pain and palpitations.   Gastrointestinal: Positive for diarrhea. Negative for abdominal distention, abdominal pain, blood in stool and vomiting.        As per HPI.   Genitourinary: Negative for difficulty urinating, dysuria, frequency and hematuria.   Musculoskeletal: Negative for arthralgias and back pain.   Skin: Negative for color change and rash.   Neurological: Positive for weakness. Negative for seizures, syncope, numbness and headaches.   Hematological: Does not bruise/bleed easily.   Psychiatric/Behavioral: The patient is not nervous/anxious.      Objective:     Vital Signs (Most Recent):  Temp: 98.5 °F (36.9 °C) (09/13/17 1150)  Pulse: 68 (09/13/17 1348)  Resp: 18 (09/13/17 1150)  BP: (!) 160/69 (09/13/17 1150)  SpO2: 96 % (09/13/17 1150) Vital Signs (24h Range):  Temp:  [96.6 °F (35.9 °C)-98.6 °F (37 °C)] 98.5 °F (36.9 °C)  Pulse:  [61-71] 68  Resp:  [9-37] 18  SpO2:  [85 %-96 %] 96 %  BP: (107-177)/(50-81) 160/69     Weight: 82.1 kg (181 lb)  Body mass index is 27.52 kg/m².    Intake/Output Summary (Last 24 hours) at 09/13/17 1449  Last data filed at 09/13/17 1042   Gross per 24 hour   Intake          1032.09 ml   Output                0 ml   Net           1032.09 ml      Physical Exam   Constitutional: He is oriented to person, place, and time. He appears well-developed and well-nourished. No distress.   Elderly man in NAD, looks much better   HENT:   Head: Normocephalic and atraumatic.   Mouth/Throat: Oropharynx is clear and moist.   Eyes: Conjunctivae and EOM are normal. Pupils are equal, round, and reactive to light.   Neck: Normal range of motion. Neck supple.   Cardiovascular: Normal rate, regular rhythm and normal heart sounds.    No murmur heard.  Pulmonary/Chest: Effort normal and breath sounds normal. No respiratory distress. He has no wheezes.   Abdominal: Soft. Bowel sounds are normal. He exhibits no distension and no mass. There is no hepatomegaly. There is no tenderness.   Musculoskeletal: Normal range of motion. He exhibits edema.   Neurological: He is alert and oriented to person, place, and time. No cranial nerve deficit. Gait normal.   Skin: Skin is warm and dry. No rash noted. He is not diaphoretic.   Psychiatric: He has a normal mood and affect.   Nursing note and vitals reviewed.      Significant Labs:   BMP:   Recent Labs  Lab 09/13/17  0455         K 3.9      CO2 24   BUN 26*   CREATININE 1.4   CALCIUM 8.4*     CBC:   Recent Labs  Lab 09/11/17  2247 09/12/17  0829 09/13/17  0007 09/13/17  0455   WBC 7.44 6.88  --  6.17   HGB 4.7* 5.7* 8.5* 8.0*   HCT 17.9* 20.5* 28.5* 26.9*    257  --  274     All pertinent labs within the past 24 hours have been reviewed.    Significant Imaging: I have reviewed all pertinent imaging results/findings within the past 24 hours.

## 2017-09-13 NOTE — ASSESSMENT & PLAN NOTE
-Transfuse x 3 units-- H/H is 8/24  -CBC  -monitor    - transfuse another unit of blood and watch him over nite then D/c in am

## 2017-09-13 NOTE — H&P (VIEW-ONLY)
[facility] -   Gastroenterology  Consult Note    Patient Name: Jimmy Young  MRN: [id number]  Admission Date: 9/11/2017  Hospital Length of Stay: 0 days  Code Status: Full Code   Attending Provider: No att. providers found   Consulting Provider: Antony Cotton PA-C  Primary Care Physician: Nacho Richardson MD  Principal Problem:GI bleed    Inpatient consult to Gastroenterology  Consult performed by: ANTONY COTTON  Consult ordered by: VALERIANO SWANSON  Reason for consult: GI bleed  Assessment/Recommendations: EGD tomorrow        Subjective:     HPI:  The patient presented to the ER for weakness. He has a known history of duodenal AVMs. He was lasted scoped in July. He presented this time with gradual worsening. He reported having blood in his stool for a couple of weeks. He says Cardiology restarted his Eliquis three weeks ago. He denies nausea, vomiting or abdominal pain. He denies NSAID use. Hgb was 4.7 and admit. He is on his second unit of blood at this time. He is starting to feel better.     Past Medical History:   Diagnosis Date    Aneurysm 2016    abdominal, stent placed    Arthritis     Asthma     Atrial fibrillation     Cancer     skin cancer on face    Diabetes mellitus     Emphysema lung     Encounter for blood transfusion     Hypertension        Past Surgical History:   Procedure Laterality Date    ABDOMINAL SURGERY      COLONOSCOPY Left 6/12/2017    Procedure: COLONOSCOPY;  Surgeon: Boaz Toussaint MD;  Location: [facility];  Service: Endoscopy;  Laterality: Left;    NO PAST SURGERIES      VASCULAR SURGERY      abdominal aneurysm repair       Review of patient's allergies indicates:  No Known Allergies  Family History     Problem Relation (Age of Onset)    Cancer Mother    Heart attack Father    Hypertension Father        Social History Main Topics    Smoking status: Former Smoker     Packs/day: 2.00     Years: 20.00     Types: Cigarettes     Quit date: 1/1/1977     Smokeless tobacco: Never Used    Alcohol use No    Drug use: No    Sexual activity: Not Currently     Review of Systems   Constitutional: Positive for fatigue. Negative for fever.   HENT: Negative for hearing loss.    Eyes: Negative for visual disturbance.   Respiratory: Negative for cough and shortness of breath.    Cardiovascular: Negative for chest pain and palpitations.   Gastrointestinal:        As per HPI.   Genitourinary: Negative for difficulty urinating, dysuria, frequency and hematuria.   Musculoskeletal: Negative for arthralgias and back pain.   Skin: Negative for color change and rash.   Neurological: Positive for weakness. Negative for seizures, syncope, numbness and headaches.   Hematological: Does not bruise/bleed easily.   Psychiatric/Behavioral: The patient is not nervous/anxious.      Objective:     Vital Signs (Most Recent):  Temp: 97.5 °F (36.4 °C) (09/12/17 1245)  Pulse: 69 (09/12/17 1245)  Resp: 20 (09/12/17 1245)  BP: (!) 151/68 (09/12/17 1245)  SpO2: 96 % (09/12/17 1245) Vital Signs (24h Range):  Temp:  [97.3 °F (36.3 °C)-99.1 °F (37.3 °C)] 97.5 °F (36.4 °C)  Pulse:  [69-84] 69  Resp:  [16-22] 20  SpO2:  [88 %-100 %] 96 %  BP: (151-198)/(68-85) 151/68     Weight: 81 kg (178 lb 9.6 oz) (09/12/17 0148)  Body mass index is 27.16 kg/m².      Intake/Output Summary (Last 24 hours) at 09/12/17 1305  Last data filed at 09/12/17 0745   Gross per 24 hour   Intake              325 ml   Output                0 ml   Net              325 ml       Lines/Drains/Airways     Peripheral Intravenous Line                 Peripheral IV - Single Lumen 09/11/17 2247 Right Antecubital less than 1 day         Peripheral IV - Single Lumen 09/12/17 0030 Right Forearm less than 1 day                Physical Exam   Constitutional: He is oriented to person, place, and time. He appears well-developed and well-nourished.   HENT:   Head: Normocephalic and atraumatic.   Eyes: EOM are normal.   Neck: Normal range of motion.  Neck supple.   Cardiovascular: Normal rate, regular rhythm and normal heart sounds.    No murmur heard.  Pulmonary/Chest: Effort normal and breath sounds normal. No respiratory distress. He has no wheezes.   Abdominal: Soft. Bowel sounds are normal. He exhibits no distension and no mass. There is no hepatomegaly. There is no tenderness.   Musculoskeletal: He exhibits edema.   Neurological: He is alert and oriented to person, place, and time. No cranial nerve deficit. Gait normal.   Skin: Skin is warm and dry. No rash noted.   Psychiatric: He has a normal mood and affect.       Significant Labs:  CBC:   Recent Labs  Lab 09/11/17 2247 09/12/17  0829   WBC 7.44 6.88   HGB 4.7* 5.7*   HCT 17.9* 20.5*    257     CMP:   Recent Labs  Lab 09/12/17  0829   *   CALCIUM 8.4*   ALBUMIN 3.0*   PROT 5.8*      K 3.7   CO2 27      BUN 32*   CREATININE 1.6*   ALKPHOS 76   ALT 12   AST 10   BILITOT 0.7     Coagulation:   Recent Labs  Lab 09/11/17 2247   INR 1.2   APTT 23.4       Significant Imaging:  Imaging results within the past 24 hours have been reviewed.    Assessment/Plan:     * GI bleed    Blood in the stool reported.   See plan above.         Anemia due to chronic blood loss    88 yo male with a h/o AMVs admitted with acute on chronic anemia secondary to GI bleed after restarting Eliquis.   Agree with transfusion. Continue to monitor H/H.  EGD tomorrow once Hgb improves.   Continue Protonix.   Hold Eliquis.           Atrial fibrillation    Hold Eliquis at this time. May not be able to restart it due to recurrent bleeding.         Chronic kidney disease, stage 3    Stable.             Thank you for your consult. I will follow-up with patient. Please contact us if you have any additional questions.    Saleem Cotton PA-C  Gastroenterology  [facility] [place]

## 2017-09-13 NOTE — PT/OT/SLP PROGRESS
Occupational Therapy      Jimmy Young  MRN: [id number]  EVAL INITIATED VIA CHART REVIEW. PT NOT SEEN DUE TO PT HAD EGD WHICH WARRANTS 24 HOUR HOLD. WILL COMPLETE WVAL ON LATER DATE.  Kristen Carrillo OT   0859  9/13/2017

## 2017-09-13 NOTE — PROGRESS NOTES
[facility] Hospital Medicine  Progress Note    Patient Name: Jimmy Young  MRN: [id number]  Patient Class: IP- Inpatient   Admission Date: 9/11/2017  Length of Stay: 1 days  Attending Physician: Suzanne Galindo MD  Primary Care Provider: Nacho Richardson MD        Subjective:     Principal Problem:GI bleed    HPI:  The patient presents as a transfer from [facility] for need blood transfusion. He reports that he was referred to the ER by his PCP, Dr. Richardson for abnormal hemoglobin of 5 resulting yesterday. Associated symptoms include melena stool and generalized fatigue/weakness. The daughter at [facility] reports the patient, was evaluated at [facility] in [place] this summer for profound anemia and GI bleeding, found on endoscopy to have angiodysplasia and was transfused. The patient denies fever, cough, congestion, CP, SOB, dizziness, rash, headaches, abd pain, constipation, hematemesis, palpitations or any other complaints.      Hospital Course:  Doing much better, got three units of blood yesterday without any further melena or rectal bleeding yesterday. H/H now 8/24. 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 Pt tolerated procedure well. Post procedure had a large dark liquid stool in bed but no obvious blood. GI wants to transfuse another unit of blood and then discharge him later. He feels fine, no abd pain or discomfort, no NV, dizziness or weakness.       Interval History: Doing much better, got three units of blood yesterday without any further melena or rectal bleeding yesterday. H/H now 8/24. 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 Pt tolerated procedure well. Post procedure had a large dark liquid stool in bed but no obvious blood. GI wants to transfuse another unit of blood and then discharge him later. He feels fine, no abd pain or discomfort,  no NV, dizziness or weakness.      Review of Systems   Constitutional: Positive for fatigue. Negative for fever.   HENT: Negative for hearing loss.    Eyes: Negative for visual disturbance.   Respiratory: Negative for cough and shortness of breath.    Cardiovascular: Negative for chest pain and palpitations.   Gastrointestinal: Positive for diarrhea. Negative for abdominal distention, abdominal pain, blood in stool and vomiting.        As per HPI.   Genitourinary: Negative for difficulty urinating, dysuria, frequency and hematuria.   Musculoskeletal: Negative for arthralgias and back pain.   Skin: Negative for color change and rash.   Neurological: Positive for weakness. Negative for seizures, syncope, numbness and headaches.   Hematological: Does not bruise/bleed easily.   Psychiatric/Behavioral: The patient is not nervous/anxious.      Objective:     Vital Signs (Most Recent):  Temp: 98.5 °F (36.9 °C) (09/13/17 1150)  Pulse: 68 (09/13/17 1348)  Resp: 18 (09/13/17 1150)  BP: (!) 160/69 (09/13/17 1150)  SpO2: 96 % (09/13/17 1150) Vital Signs (24h Range):  Temp:  [96.6 °F (35.9 °C)-98.6 °F (37 °C)] 98.5 °F (36.9 °C)  Pulse:  [61-71] 68  Resp:  [9-37] 18  SpO2:  [85 %-96 %] 96 %  BP: (107-177)/(50-81) 160/69     Weight: 82.1 kg (181 lb)  Body mass index is 27.52 kg/m².    Intake/Output Summary (Last 24 hours) at 09/13/17 1449  Last data filed at 09/13/17 1042   Gross per 24 hour   Intake          1032.09 ml   Output                0 ml   Net          1032.09 ml      Physical Exam   Constitutional: He is oriented to person, place, and time. He appears well-developed and well-nourished. No distress.   Elderly man in NAD, looks much better   HENT:   Head: Normocephalic and atraumatic.   Mouth/Throat: Oropharynx is clear and moist.   Eyes: Conjunctivae and EOM are normal. Pupils are equal, round, and reactive to light.   Neck: Normal range of motion. Neck supple.   Cardiovascular: Normal rate, regular rhythm and normal  heart sounds.    No murmur heard.  Pulmonary/Chest: Effort normal and breath sounds normal. No respiratory distress. He has no wheezes.   Abdominal: Soft. Bowel sounds are normal. He exhibits no distension and no mass. There is no hepatomegaly. There is no tenderness.   Musculoskeletal: Normal range of motion. He exhibits edema.   Neurological: He is alert and oriented to person, place, and time. No cranial nerve deficit. Gait normal.   Skin: Skin is warm and dry. No rash noted. He is not diaphoretic.   Psychiatric: He has a normal mood and affect.   Nursing note and vitals reviewed.      Significant Labs:   BMP:   Recent Labs  Lab 09/13/17  0455         K 3.9      CO2 24   BUN 26*   CREATININE 1.4   CALCIUM 8.4*     CBC:   Recent Labs  Lab 09/11/17  2247 09/12/17  0829 09/13/17  0007 09/13/17  0455   WBC 7.44 6.88  --  6.17   HGB 4.7* 5.7* 8.5* 8.0*   HCT 17.9* 20.5* 28.5* 26.9*    257  --  274     All pertinent labs within the past 24 hours have been reviewed.    Significant Imaging: I have reviewed all pertinent imaging results/findings within the past 24 hours.    Assessment/Plan:      * GI bleed    -Admit Tele  -NPO  -PPI   -GI consult  -see Anemia  -Hold Eliquis     Better s/p Enteroscopy and 3 units of blood  Will d/c Eliquis and any blood thinners  Protonix 40 PO daily as per GI  Transfuse another unit of blood  D/C soon        acute on Ch blood loss anemia    -Transfuse x 3 units-- H/H is 8/24  -CBC  -monitor    - transfuse another unit of blood and watch him over nite then D/c in am        Atrial fibrillation    Continue amiodarone  EKG reviewed   See HTN  No anticoagulation          Chronic kidney disease, stage 3    -Stable  -UA  -monitor  - stable          Diabetes mellitus type 2, uncontrolled, with renal complications    accu check  ssi  a1c   Under control          Acquired hypothyroidism    Check TSH  Evaluate synthroid dosing once level resulted.   Under control, resume  synthyroid          Essential hypertension    -Continue amlodipine, metoprolol, carvedilol   -Monitor and treat PRN  - stable        Troponin level elevated    -Mildly elevated from baseline, trend troponin  -likely due to CKD and demand ischemia.             VTE Risk Mitigation         Ordered     Medium Risk of VTE  Once      09/12/17 0448     Place sequential compression device  Until discontinued      09/12/17 0448     Reason for No Pharmacological VTE Prophylaxis  Once      09/12/17 0448              Suzanne Galindo MD  Department of Hospital Medicine   [facility]

## 2017-09-13 NOTE — HOSPITAL COURSE
Jimmy Young is a 87 year old male admitted for GI Bleed. Hgb/Hct upon admission was 4.7/17.9. Pt transfused with 3 units of PRBCs, responded appropriately. 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 Pt tolerated procedure well. Post procedure had a large dark liquid stool in bed but no obvious blood. GI recommended to transfuse another unit of blood and then discharge him later. He feels fine, no abd pain or discomfort, no N/V, dizziness or weakness. Eliquis has been discontinued. Low dose ASA recommended by GI. Hgb/Hct upon discharge 9.1/29.9. Pt to be discharged home with home health (PT/SN). Pt will follow up with PCP within 3-5 days after discharge for hospital follow up. Pt will also follow up with MANOLO Villa (GI) and Dr. Mcfarland (cardiology) within 1 week after discharge for hospital follow up. This patient was seen and examined on the date of discharge and determined suitable for discharge.

## 2017-09-13 NOTE — PLAN OF CARE
Problem: Patient Care Overview  Goal: Plan of Care Review  PT IND WITH GROSS FUNC MOBILITY AND WILL BE D/C TO MOBILITY PROGRAM     Outcome: Ongoing (interventions implemented as appropriate)  Pt on O2 tolerating well. No distress noted at this time.

## 2017-09-13 NOTE — PLAN OF CARE
Problem: Patient Care Overview  Goal: Plan of Care Review  PT IND WITH GROSS FUNC MOBILITY AND WILL BE D/C TO MOBILITY PROGRAM     Outcome: Ongoing (interventions implemented as appropriate)  Plan of care reviewed with patient, and patient verbalized understanding.  Patient received 4 units of blood today - vitals have remained stable.  Patient will be NPO after midnight for a possible EGD in the Am.  Patient has remained free of falls, accidents, and trama during the day shift.  Family is at the bedside continue to monitor.

## 2017-09-13 NOTE — ANESTHESIA PREPROCEDURE EVALUATION
09/13/2017  Jimmy Young is a 87 y.o., male.    Anesthesia Evaluation    I have reviewed the Patient Summary Reports.    I have reviewed the Nursing Notes.   I have reviewed the Medications.     Review of Systems  Anesthesia Hx:  No problems with previous Anesthesia    Social:  Social Alcohol Use, Former Smoker    Hematology/Oncology:  Hematology Normal       -- Cancer in past history:  Oncology Comments: Skin cancer     EENT/Dental:   chronic allergic rhinitis   Cardiovascular:   Hypertension, well controlled hyperlipidemia ECG has been reviewed. Sinus rhythm with 1st degree A-V block  Cannot exclude Inferior infarct ,age undetermined  Nonspecific ST and/or T wave abnormalities  Abnormal ECG   Pulmonary:   COPD, moderate Asthma moderate Shortness of breath Snore   Renal/:   Chronic Renal Disease    Hepatic/GI:   Hiatal Hernia, 0000 last drink of fluid.   Musculoskeletal:   Arthritis     Neurological:   Neuromuscular Disease,    Endocrine:   Diabetes, well controlled, type 2 Hypothyroidism    Dermatological:  Skin Normal    Psych:  Psychiatric Normal           Physical Exam  General:  Well nourished    Airway/Jaw/Neck:  Airway Findings: Mallampati: III                Anesthesia Plan  Type of Anesthesia, risks & benefits discussed:  Anesthesia Type:  MAC  Patient's Preference:   Intra-op Monitoring Plan:   Intra-op Monitoring Plan Comments:   Post Op Pain Control Plan:   Post Op Pain Control Plan Comments:   Induction:   IV  Beta Blocker:  Patient is on a Beta-Blocker and has received one dose within the past 24 hours (No further documentation required).       Informed Consent: Patient understands risks and agrees with Anesthesia plan.  Questions answered. Anesthesia consent signed with patient.  ASA Score: 3     Day of Surgery Review of History & Physical: I have interviewed and examined the patient.  I have reviewed the patient's H&P dated: 09/13/17. There are no significant changes.  H&P update referred to the surgeon.         Ready For Surgery From Anesthesia Perspective.

## 2017-09-13 NOTE — ANESTHESIA POSTPROCEDURE EVALUATION
"Anesthesia Post Evaluation    Patient: Jimmy Young    Procedure(s) Performed: Procedure(s) (LRB):  ESOPHAGOGASTRODUODENOSCOPY (EGD) (N/A)    Final Anesthesia Type: MAC  Patient location during evaluation: PACU  Patient participation: Yes- Able to Participate  Level of consciousness: awake and alert and oriented  Post-procedure vital signs: reviewed and stable  Pain management: adequate  Airway patency: patent  PONV status at discharge: No PONV  Anesthetic complications: no      Cardiovascular status: blood pressure returned to baseline  Respiratory status: unassisted, room air and spontaneous ventilation  Hydration status: euvolemic  Follow-up not needed.        Visit Vitals  BP (!) 107/50 (BP Location: Left leg, Patient Position: Lying)   Pulse 65   Temp 36.9 °C (98.4 °F) (Oral)   Resp 16   Ht 5' 8" (1.727 m)   Wt 82.1 kg (181 lb)   SpO2 (!) 93%   BMI 27.52 kg/m²       Pain/Evan Score: Pain Assessment Performed: Yes (9/13/2017  8:43 AM)  Presence of Pain: denies (9/13/2017  8:43 AM)  Evan Score: 9 (9/13/2017 10:48 AM)      "

## 2017-09-13 NOTE — PLAN OF CARE
Problem: Patient Care Overview  Goal: Plan of Care Review  PT IND WITH GROSS FUNC MOBILITY AND WILL BE D/C TO MOBILITY PROGRAM     Plan of care reviewed. Pt received 4 units of blood. H&H lab reordered. Pt stable, vitals WNL. No pain or distress noted. Will continue to monitor.

## 2017-09-13 NOTE — ANESTHESIA RELEASE NOTE
"Anesthesia Release from PACU Note    Patient: Jimmy Young    Procedure(s) Performed: Procedure(s) (LRB):  ESOPHAGOGASTRODUODENOSCOPY (EGD) (N/A)    Anesthesia type: MAC    Post pain: Adequate analgesia    Post assessment: no apparent anesthetic complications, tolerated procedure well and no evidence of recall    Last Vitals:   Visit Vitals  BP (!) 107/50 (BP Location: Left leg, Patient Position: Lying)   Pulse 65   Temp 36.9 °C (98.4 °F) (Oral)   Resp 16   Ht 5' 8" (1.727 m)   Wt 82.1 kg (181 lb)   SpO2 (!) 93%   BMI 27.52 kg/m²       Post vital signs: stable    Level of consciousness: awake    Nausea/Vomiting: no nausea/no vomiting    Complications: none    Airway Patency: patent    Respiratory: unassisted, spontaneous ventilation, nasal cannula    Cardiovascular: stable    Hydration: euvolemic  "

## 2017-09-14 VITALS
DIASTOLIC BLOOD PRESSURE: 63 MMHG | BODY MASS INDEX: 27.49 KG/M2 | SYSTOLIC BLOOD PRESSURE: 145 MMHG | WEIGHT: 181.38 LBS | OXYGEN SATURATION: 96 % | HEART RATE: 59 BPM | TEMPERATURE: 98 F | RESPIRATION RATE: 16 BRPM | HEIGHT: 68 IN

## 2017-09-14 LAB
ALBUMIN SERPL BCP-MCNC: 3 G/DL
ALP SERPL-CCNC: 84 U/L
ALT SERPL W/O P-5'-P-CCNC: 12 U/L
ANION GAP SERPL CALC-SCNC: 6 MMOL/L
ANION GAP SERPL CALC-SCNC: 7 MMOL/L
AST SERPL-CCNC: 15 U/L
BASOPHILS # BLD AUTO: 0.05 K/UL
BASOPHILS NFR BLD: 0.6 %
BILIRUB SERPL-MCNC: 0.8 MG/DL
BUN SERPL-MCNC: 27 MG/DL
BUN SERPL-MCNC: 28 MG/DL
CALCIUM SERPL-MCNC: 8.6 MG/DL
CALCIUM SERPL-MCNC: 8.6 MG/DL
CHLORIDE SERPL-SCNC: 108 MMOL/L
CHLORIDE SERPL-SCNC: 108 MMOL/L
CO2 SERPL-SCNC: 27 MMOL/L
CO2 SERPL-SCNC: 28 MMOL/L
CREAT SERPL-MCNC: 1.6 MG/DL
CREAT SERPL-MCNC: 1.6 MG/DL
DIFFERENTIAL METHOD: ABNORMAL
EOSINOPHIL # BLD AUTO: 0.4 K/UL
EOSINOPHIL NFR BLD: 4.7 %
ERYTHROCYTE [DISTWIDTH] IN BLOOD BY AUTOMATED COUNT: 20 %
EST. GFR  (AFRICAN AMERICAN): 44 ML/MIN/1.73 M^2
EST. GFR  (AFRICAN AMERICAN): 44 ML/MIN/1.73 M^2
EST. GFR  (NON AFRICAN AMERICAN): 38 ML/MIN/1.73 M^2
EST. GFR  (NON AFRICAN AMERICAN): 38 ML/MIN/1.73 M^2
GLUCOSE SERPL-MCNC: 107 MG/DL
GLUCOSE SERPL-MCNC: 108 MG/DL
HCT VFR BLD AUTO: 29.9 %
HGB BLD-MCNC: 9.1 G/DL
LYMPHOCYTES # BLD AUTO: 0.9 K/UL
LYMPHOCYTES NFR BLD: 11.8 %
MCH RBC QN AUTO: 24.5 PG
MCHC RBC AUTO-ENTMCNC: 30.4 G/DL
MCV RBC AUTO: 81 FL
MONOCYTES # BLD AUTO: 0.8 K/UL
MONOCYTES NFR BLD: 10.1 %
NEUTROPHILS # BLD AUTO: 5.7 K/UL
NEUTROPHILS NFR BLD: 72.8 %
PLATELET # BLD AUTO: 271 K/UL
PMV BLD AUTO: 9.1 FL
POCT GLUCOSE: 117 MG/DL (ref 70–110)
POCT GLUCOSE: 183 MG/DL (ref 70–110)
POTASSIUM SERPL-SCNC: 3.9 MMOL/L
POTASSIUM SERPL-SCNC: 4 MMOL/L
PROT SERPL-MCNC: 5.8 G/DL
RBC # BLD AUTO: 3.71 M/UL
SODIUM SERPL-SCNC: 142 MMOL/L
SODIUM SERPL-SCNC: 142 MMOL/L
WBC # BLD AUTO: 7.81 K/UL

## 2017-09-14 PROCEDURE — 25000003 PHARM REV CODE 250: Performed by: NURSE PRACTITIONER

## 2017-09-14 PROCEDURE — 36415 COLL VENOUS BLD VENIPUNCTURE: CPT

## 2017-09-14 PROCEDURE — 97530 THERAPEUTIC ACTIVITIES: CPT

## 2017-09-14 PROCEDURE — 94761 N-INVAS EAR/PLS OXIMETRY MLT: CPT

## 2017-09-14 PROCEDURE — 63600175 PHARM REV CODE 636 W HCPCS: Performed by: EMERGENCY MEDICINE

## 2017-09-14 PROCEDURE — C9113 INJ PANTOPRAZOLE SODIUM, VIA: HCPCS | Performed by: EMERGENCY MEDICINE

## 2017-09-14 PROCEDURE — G8988 SELF CARE GOAL STATUS: HCPCS | Mod: CH

## 2017-09-14 PROCEDURE — 85025 COMPLETE CBC W/AUTO DIFF WBC: CPT

## 2017-09-14 PROCEDURE — 97165 OT EVAL LOW COMPLEX 30 MIN: CPT

## 2017-09-14 PROCEDURE — G8989 SELF CARE D/C STATUS: HCPCS | Mod: CH

## 2017-09-14 PROCEDURE — 80048 BASIC METABOLIC PNL TOTAL CA: CPT

## 2017-09-14 PROCEDURE — G8987 SELF CARE CURRENT STATUS: HCPCS | Mod: CH

## 2017-09-14 PROCEDURE — 80053 COMPREHEN METABOLIC PANEL: CPT

## 2017-09-14 PROCEDURE — 27000221 HC OXYGEN, UP TO 24 HOURS

## 2017-09-14 RX ORDER — AMIODARONE HYDROCHLORIDE 200 MG/1
200 TABLET ORAL DAILY
Qty: 30 TABLET | Refills: 1 | Status: SHIPPED | OUTPATIENT
Start: 2017-09-15 | End: 2017-10-05 | Stop reason: ALTCHOICE

## 2017-09-14 RX ORDER — ASPIRIN 81 MG/1
81 TABLET ORAL DAILY
Qty: 30 TABLET | Refills: 1 | Status: ON HOLD | OUTPATIENT
Start: 2017-09-14 | End: 2018-04-10 | Stop reason: HOSPADM

## 2017-09-14 RX ORDER — PRAVASTATIN SODIUM 20 MG/1
20 TABLET ORAL NIGHTLY
Qty: 30 TABLET | Refills: 0 | Status: SHIPPED | OUTPATIENT
Start: 2017-09-14 | End: 2018-04-09 | Stop reason: SDUPTHER

## 2017-09-14 RX ORDER — LEVOTHYROXINE SODIUM 50 UG/1
50 TABLET ORAL
Qty: 30 TABLET | Refills: 1 | Status: SHIPPED | OUTPATIENT
Start: 2017-09-15 | End: 2020-12-09

## 2017-09-14 RX ADMIN — AMIODARONE HYDROCHLORIDE 200 MG: 200 TABLET ORAL at 08:09

## 2017-09-14 RX ADMIN — CARVEDILOL 6.25 MG: 6.25 TABLET, FILM COATED ORAL at 07:09

## 2017-09-14 RX ADMIN — LEVOTHYROXINE SODIUM 50 MCG: 50 TABLET ORAL at 05:09

## 2017-09-14 RX ADMIN — PANTOPRAZOLE SODIUM 40 MG: 40 INJECTION, POWDER, FOR SOLUTION INTRAVENOUS at 08:09

## 2017-09-14 NOTE — PROGRESS NOTES
Went over discharge instructions with patient.   Stressed importance of making and keeping all follow ups.   Patient verbalized understanding and has no questions in regards to discharge.  IV removed, catheter intact.  Telemetry box removed.  Patient dressed and awaiting ride.

## 2017-09-14 NOTE — PLAN OF CARE
09/14/17 1406   Final Note   Assessment Type Final Discharge Note   Discharge Disposition Home-Health   Met with patient and his daughter. Daughter provided advanced directive/POA, placed in blue folder to be scanned into the medical record.   Daughter expressed preference for [facility]. Fax[facility] referral to [facility]. Phoned admissions, [facility], notified of referral.   Daughter requested follow up with [facility] cardiology.

## 2017-09-14 NOTE — PLAN OF CARE
09/14/17 1431   Medicare Message   Important Message from Medicare regarding Discharge Appeal Rights Given to patient/caregiver;Explained to patient/caregiver;Signed/date by patient/caregiver   Date IMM was signed 09/14/17   Time IMM was signed 1439

## 2017-09-14 NOTE — PT/OT/SLP EVAL
Occupational Therapy  Evaluation    Jimmy Young   MRN: [id number]   Admitting Diagnosis: GI bleed    OT Date of Treatment: 09/14/17   OT Start Time: 0905  OT Stop Time: 0930  OT Total Time (min): 25 min    Billable Minutes:  Evaluation 10 minutes  Therapeutic Activity 15 minutes    Diagnosis: GI bleed    Past Medical History:   Diagnosis Date    Aneurysm 2016    abdominal, stent placed    Arthritis     Asthma     Atrial fibrillation     Cancer     skin cancer on face    Diabetes mellitus     Emphysema lung     Encounter for blood transfusion     Hypertension       Past Surgical History:   Procedure Laterality Date    ABDOMINAL SURGERY      COLONOSCOPY Left 6/12/2017    Procedure: COLONOSCOPY;  Surgeon: Boaz Toussaint MD;  Location: [facility] ENDO;  Service: Endoscopy;  Laterality: Left;    NO PAST SURGERIES      VASCULAR SURGERY      abdominal aneurysm repair       Referring physician: dr. wlash  Date referred to OT: 9-12-17    General Precautions: Standard, fall  Orthopedic Precautions: N/A  Braces:            Patient History:  Living Environment  Living Arrangements: house  Living Environment Comment: pt reports (i) home alone and drives  Equipment Currently Used at Home: walker, rolling, cane, straight    Prior level of function:   Bed Mobility/Transfers: independent  Grooming: independent  Bathing: independent  Upper Body Dressing: independent  Lower Body Dressing: independent  Toileting: independent  Driving License: Yes     Dominant hand: right    Subjective:  Communicated with nurse and epic chart review prior to session.    Chief Complaint:   Patient/Family stated goals:     Pain/Comfort  Pain Rating 1: 0/10    Objective:  Patient found with: telemetry, peripheral IV    Cognitive Exam:  Oriented to: Person, Place, Time and Situation  Follows Commands/attention: Follows multistep  commands  Communication: clear/fluent  Memory:  No Deficits noted  Safety awareness/insight to disability:  intact  Coping skills/emotional control: Appropriate to situation    Visual/perceptual:  Intact    Physical Exam:  Postural examination/scapula alignment: Rounded shoulder and Head forward  Skin integrity: Visible skin intact and Thin  Edema: None noted     Sensation:   Intact    Upper Extremity Range of Motion:  Right Upper Extremity: WFL  Left Upper Extremity: WFL    Upper Extremity Strength:  Right Upper Extremity: WFL  Left Upper Extremity: WFL   Strength: wfl    Fine motor coordination:   Intact    Gross motor coordination: WFL    Functional Mobility:  Bed Mobility:  Rolling/Turning to Left: Independent  Rolling/Turning Right: Independent  Scooting/Bridging: Independent  Supine to Sit: Independent  Sit to Supine: Independent    Transfers:  Sit <> Stand Assistance: Independent  Sit <> Stand Assistive Device: No Assistive Device  Bed <> Chair Technique: Stand Pivot  Bed <> Chair Transfer Assistance: Independent    Functional Ambulation: pt ambulated 40 feet (i)       Activities of Daily Living:     Feeding adaptive equipment: na  UE Dressing Level of Assistance: Independent  UE adaptive equipment: na  LE Dressing Level of Assistance: Independent  LE adaptive equipment: na        Toileting Where Assessed: Toilet  Toileting Level of Assistance: Independent (per pt report)        Bathing adaptive equipment: na    Balance:   Static Sit: NORMAL: No deviations seen in posture held statically  Dynamic Sit: NORMAL: No deviations seen in posture held dynamically  Static Stand: GOOD+: Takes MAXIMAL challenges from all directions  Dynamic stand: GOOD+: Independent gait (with or without assistive device)    Therapeutic Activities and Exercises:      AM-PAC 6 CLICK ADL  How much help from another person does this patient currently need?  1 = Unable, Total/Dependent Assistance  2 = A lot, Maximum/Moderate Assistance  3 = A little, Minimum/Contact Guard/Supervision  4 = None, Modified [place]/Independent    Putting on  "and taking off regular lower body clothing? : 4  Bathing (including washing, rinsing, drying)?: 4  Toileting, which includes using toilet, bedpan, or urinal? : 4  Putting on and taking off regular upper body clothing?: 4  Taking care of personal grooming such as brushing teeth?: 4  Eating meals?: 4  Total Score: 24    AM-PAC Raw Score CMS "G-Code Modifier Level of Impairment Assistance   6 % Total / Unable   7 - 9 CM 80 - 100% Maximal Assist   10-14 CL 60 - 80% Moderate Assist   15 - 19 CK 40 - 60% Moderate Assist   20 - 22 CJ 20 - 40% Minimal Assist   23 CI 1-20% SBA / CGA   24 CH 0% Independent/ Mod I       Patient left up in chair with all lines intact, call button in reach and nurse notified    Assessment:  Jimmy Young is a 87 y.o. male with a medical diagnosis of GI bleed and presents with (i) with adl's and functional mobility. Pt discharged from skilled o.t. And placed on people 's program.    Rehab identified problem list/impairments:      Rehab potential is good.    Activity tolerance: Good    Discharge recommendations: Discharge Facility/Level Of Care Needs: home     Barriers to discharge:      Equipment recommendations: none     GOALS:    Occupational Therapy Goals     Not on file                PLAN:  Patient to be seen   to address the above listed problems via    Plan of Care expires:    Plan of Care reviewed with: patient (family)         Kristen Carrillo OT  09/14/2017  "

## 2017-09-14 NOTE — DISCHARGE SUMMARY
[facility] Hospital Medicine  Discharge Summary      Patient Name: Jimmy Young  MRN: [id number]  Admission Date: 9/11/2017  Hospital Length of Stay: 2 days  Discharge Date and Time:  09/14/2017 1:44 PM  Attending Physician: Jose Salcedo MD   Discharging Provider: OG Li  Primary Care Provider: Nacho Richardson MD      HPI:   The patient presents as a transfer from [facility] for need blood transfusion. He reports that he was referred to the ER by his PCP, Dr. Richardson for abnormal hemoglobin of 5 resulting yesterday. Associated symptoms include melena stool and generalized fatigue/weakness. The daughter at [facility] reports the patient, was evaluated at [facility] in [place] this summer for profound anemia and GI bleeding, found on endoscopy to have angiodysplasia and was transfused. The patient denies fever, cough, congestion, CP, SOB, dizziness, rash, headaches, abd pain, constipation, hematemesis, palpitations or any other complaints.      Procedure(s) (LRB):  ESOPHAGOGASTRODUODENOSCOPY (EGD) (N/A)      Indwelling Lines/Drains at time of discharge:   Lines/Drains/Airways     Airway                 Airway - Non-Surgical 09/13/17 1010 Nasal Cannula 1 day              Hospital Course:   Jimmy Young is a 87 year old male admitted for GI Bleed. Hgb/Hct upon admission was 4.7/17.9. Pt transfused with 3 units of PRBCs, responded appropriately. 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 Pt tolerated procedure well. Post procedure had a large dark liquid stool in bed but no obvious blood. GI recommended to transfuse another unit of blood and then discharge him later. He feels fine, no abd pain or discomfort, no N/V, dizziness or weakness. Eliquis has been discontinued. Low dose ASA recommended by GI. Hgb/Hct upon discharge 9.1/29.9. Pt to be discharged home with home health (PT/SN). Pt will follow up with  PCP within 3-5 days after discharge for hospital follow up. Pt will also follow up with MANOLO Villa (GI) and Dr. Mcfarland (cardiology) within 1 week after discharge for hospital follow up. This patient was seen and examined on the date of discharge and determined suitable for discharge.      Consults:   Consults         Status Ordering Provider     Inpatient consult to Gastroenterology  Once     Provider:  Haroon García MD    Completed VALERIANO SWANSON          Significant Diagnostic Studies: Labs:   CMP   Recent Labs  Lab 09/13/17  0455 09/14/17  0436    142  142   K 3.9 3.9  4.0    108  108   CO2 24 27  28    108  107   BUN 26* 28*  27*   CREATININE 1.4 1.6*  1.6*   CALCIUM 8.4* 8.6*  8.6*   PROT 5.7* 5.8*   ALBUMIN 2.9* 3.0*   BILITOT 0.8 0.8   ALKPHOS 81 84   AST 18 15   ALT 11 12   ANIONGAP 9 7*  6*   ESTGFRAFRICA 52* 44*  44*   EGFRNONAA 45* 38*  38*    and CBC   Recent Labs  Lab 09/13/17  0007 09/13/17 0455 09/14/17  0436   WBC  --  6.17 7.81   HGB 8.5* 8.0* 9.1*   HCT 28.5* 26.9* 29.9*   PLT  --  274 271     Radiology:   Imaging Results          X-Ray Chest AP Portable (Final result)  Result time 09/11/17 23:37:38    Final result by Joan Guajardo MD (09/11/17 23:37:38)                 Impression:         No acute findings.  No change since previous exam.      Electronically signed by: JOAN GUAJARDO MD  Date:     09/11/17  Time:    23:37              Narrative:    Exam: XR CHEST AP PORTABLE,    Date:  09/11/17 23:11:11    History: Weakness.  Observation for malignancy.    Comparison:  06/10/2017.    Findings:     Lungs clear.  Heart size within normal limits.  Vascular calcifications of the aorta.  No suspicious bony findings.                              Pending Diagnostic Studies:     None        Final Active Diagnoses:    Diagnosis Date Noted POA    PRINCIPAL PROBLEM:  GI bleed [K92.2] 09/12/2017 Yes    Diabetes mellitus type 2, uncontrolled, with renal  complications [E11.29, E11.65] 09/12/2017 Yes    Troponin level elevated [R74.8] 09/12/2017 Yes    acute on Ch blood loss anemia [D50.0] 09/11/2017 Yes    Chronic kidney disease, stage 3 [N18.3] 06/10/2017 Yes     Chronic    Atrial fibrillation [I48.91] 06/10/2017 Yes     Chronic    Acquired hypothyroidism [E03.9] 06/10/2017 Yes     Chronic    Essential hypertension [I10] 10/04/2015 Yes     Chronic      Problems Resolved During this Admission:    Diagnosis Date Noted Date Resolved POA        Discharged Condition: stable    Disposition: Home or Self Care    Follow Up:  Follow-up Information     Nacho Richardson MD. Schedule an appointment as soon as possible for a visit in 3 days.    Specialty:  Internal Medicine  Why:  hospital follow up   Contact information:  [address] [facility] [id number] [phone]             Saleem Cotton PA-C. Schedule an appointment as soon as possible for a visit in 1 week.    Specialty:  Gastroenterology  Why:  hospital follow up  Contact information:  [address]  [place] [address] [phone]             Laron Mcfarland MD. Schedule an appointment as soon as possible for a visit in 1 week.    Specialty:  Cardiology  Why:  hospital follow up  Contact information:  [address] [facility] [address] [phone]                 Patient Instructions:     Ambulatory referral to Home Health   Referral Priority: Routine Referral Type: Home Health   Referral Reason: Specialty Services Required    Requested Specialty: Home Health Services    Number of Visits Requested: 1      Diet general   Order Specific Question Answer Comments   Additional restrictions: Cardiac (Low Na/Chol)      Activity as tolerated     Call MD for:  increased confusion or weakness     Call MD for:  persistent dizziness, light-headedness, or visual disturbances     Call MD for:  difficulty breathing or increased cough       Medications:  Reconciled Home Medications:   Current  Discharge Medication List      START taking these medications    Details   aspirin (ECOTRIN) 81 MG EC tablet Take 1 tablet (81 mg total) by mouth once daily.  Qty: 30 tablet, Refills: 1         CONTINUE these medications which have CHANGED    Details   amiodarone (PACERONE) 200 MG Tab Take 1 tablet (200 mg total) by mouth once daily.  Qty: 30 tablet, Refills: 1      levothyroxine (SYNTHROID) 50 MCG tablet Take 1 tablet (50 mcg total) by mouth before breakfast.  Qty: 30 tablet, Refills: 1      pravastatin (PRAVACHOL) 20 MG tablet Take 1 tablet (20 mg total) by mouth every evening.  Qty: 30 tablet, Refills: 0         CONTINUE these medications which have NOT CHANGED    Details   amlodipine (NORVASC) 10 MG tablet Take 10 mg by mouth once daily.      carvedilol (COREG) 6.25 MG tablet Take 6.25 mg by mouth.      cetirizine (ZYRTEC) 10 MG tablet Take 10 mg by mouth once daily.      iron-vitamin C 100-250 mg, ICAR-C, 100-250 mg Tab TAKE ONE TABLET BY MOUTH ONCE DAILY      metformin (GLUCOPHAGE) 1000 MG tablet Take 1,000 mg by mouth daily with breakfast.      pantoprazole (PROTONIX) 40 MG tablet Take 1 tablet (40 mg total) by mouth 2 (two) times daily.  Qty: 30 tablet, Refills: 2    Associated Diagnoses: Hypochromic-microcytic anemia; Symptomatic anemia; Acute post-hemorrhagic anemia; Melena; Angiodysplasia of duodenum with hemorrhage; Hiatal hernia; Diverticulosis of large intestine without hemorrhage      selenium 50 mcg Tab Take 200 mcg by mouth.      vitamin D 1000 units Tab Take 1,000 Units by mouth.      COMBIVENT RESPIMAT  mcg/actuation inhaler USE 1 PUFF EVERY SIX HOURS AS NEEDED FOR WHEEZING  Refills: 2         STOP taking these medications       apixaban (ELIQUIS) 5 mg Tab Comments:   Reason for Stopping:         metoprolol succinate (TOPROL-XL) 25 MG 24 hr tablet Comments:   Reason for Stopping:             Time spent on the discharge of patient: 35 minutes    HOS POC IP DISCHARGE SUMMARY    Camila Atkins,  FNP  Department of Hospital Medicine  [facility]

## 2017-09-14 NOTE — PLAN OF CARE
Problem: Patient Care Overview  Goal: Plan of Care Review  PT IND WITH GROSS FUNC MOBILITY AND WILL BE D/C TO MOBILITY PROGRAM     Outcome: Ongoing (interventions implemented as appropriate)  Plan of care reviewed. No acute events overnight. Pt remains free of falls. No pain or distress noted. Will continue to monitor.

## 2017-09-14 NOTE — PHYSICIAN QUERY
PT Name: Jimmy Young  MR #: [id number]    Physician Query Form - Atrial Fibrillation Specificity     CDS/: Laly Keyes               Contact information: [email or web address]     This form is a permanent document in the medical record.     Query Date: September 14, 2017    By submitting this query, we are merely seeking further clarification of documentation. Please utilize your independent clinical judgment when addressing the question(s) below.    The medical record contains the following:   Indicators     Supporting Clinical Findings Location in Medical Record   x Atrial Fibrillation Atrial fibrillation  Hospital Medicine H&P   x EKG resultsx EKG reviewed    EKG Readings:  Rhythm: Normal Sinus Rhythm. Heart Rate: 80. Conduction: 1st Degree AV Block   Hospital Medicine H&P    ED Provider Note, EKG   x Medication Continue  amiodarone and coreg   Hospital Medicine PN 09/12    Treatment     x Other H/o AMVs admitted with acute on chronic anemia secondary to GI bleed after restarting Eliquis.     Off Eliquis sine June 2017 and it was recently resumed by Dr. Caba 2 weeks ago for his Afib     GI Consult Note 09/12          Hospital Medicine PN 09/12       Provider, please further specify the Atrial Fibrillation diagnosis.    [ x ] Paroxysmal  [  ] Other (please specify): ____________________________  [  ] Clinically Undetermined    Please document in your progress notes daily for the duration of treatment until resolved, and include in your discharge summary.

## 2017-09-15 ENCOUNTER — PATIENT OUTREACH (OUTPATIENT)
Dept: ADMINISTRATIVE | Facility: CLINIC | Age: 82
End: 2017-09-15

## 2017-09-15 RX ORDER — FUROSEMIDE 20 MG/1
20 TABLET ORAL 2 TIMES DAILY
Status: ON HOLD | COMMUNITY
End: 2020-01-07 | Stop reason: SDUPTHER

## 2017-09-15 NOTE — PATIENT INSTRUCTIONS
Lower GI Bleeding (Stable)  You have signs of blood in your stool. This is called rectal bleeding. The bleeding may have begun in another part of your gastrointestinal (GI) tract. If the blood is bright red, it is likely coming from the lower part of the GI tract. If the blood is black or dark, it might be coming from higher up in the GI tract. Very small amounts of GI bleeding may not be visible and can only be discovered during a test on your stool. Possible causes of lower GI bleeding include:  · Hemorrhoids  · Anal fissures  · Diverticulitis  · Inflammatory bowel disease (Crohn's disease or ulcerative colitis)  · Polyps (growths) in the intestine  Note: Iron supplements and medicines for diarrhea or upset stomach can cause black stools. Foods such as licorice and red beets can also discolor the stool and be mistaken for bleeding. These are not bleeding and are not a cause for alarm.  Home care  You have not lost a large amount of blood and your condition appears stable at this time. You may resume normal activity as long as you feel well.  Avoid NSAIDs, such as aspirin, ibuprofen, or naproxen. They can irritate the stomach and cause further bleeding. If you are taking these medicines for other medical reasons, talk to your healthcare provider before you stop them.   Follow-up care  Follow up with your healthcare provider as advised. Further tests may be needed to find the cause of your bleeding.  When to seek medical advice  Call your healthcare provider for any of the following:  · Large amount of rectal bleeding   · Increasing abdominal pain  · Weakness, dizziness  Call 911  Get emergency medical care if any of the following occur:  · Loss of consciousness  · Vomiting blood  Date Last Reviewed: 6/24/2015  © 9475-8997 [employer]. [address] [place] [address]. All rights reserved. This information is not intended as a substitute for professional medical care. Always follow your  healthcare professional's instructions.

## 2017-09-21 ENCOUNTER — OFFICE VISIT (OUTPATIENT)
Dept: GASTROENTEROLOGY | Facility: CLINIC | Age: 82
End: 2017-09-21
Payer: MEDICARE

## 2017-09-21 VITALS
BODY MASS INDEX: 28.03 KG/M2 | HEIGHT: 68 IN | DIASTOLIC BLOOD PRESSURE: 64 MMHG | SYSTOLIC BLOOD PRESSURE: 140 MMHG | HEART RATE: 77 BPM | WEIGHT: 184.94 LBS

## 2017-09-21 DIAGNOSIS — K31.811 ANGIODYSPLASIA OF DUODENUM WITH HEMORRHAGE: Primary | ICD-10-CM

## 2017-09-21 DIAGNOSIS — I48.91 ATRIAL FIBRILLATION, UNSPECIFIED TYPE: ICD-10-CM

## 2017-09-21 PROCEDURE — 1159F MED LIST DOCD IN RCRD: CPT | Mod: S$GLB,,, | Performed by: PHYSICIAN ASSISTANT

## 2017-09-21 PROCEDURE — 1157F ADVNC CARE PLAN IN RCRD: CPT | Mod: S$GLB,,, | Performed by: PHYSICIAN ASSISTANT

## 2017-09-21 PROCEDURE — 99999 PR PBB SHADOW E&M-EST. PATIENT-LVL IV: CPT | Mod: PBBFAC,,, | Performed by: PHYSICIAN ASSISTANT

## 2017-09-21 PROCEDURE — 99213 OFFICE O/P EST LOW 20 MIN: CPT | Mod: S$GLB,,, | Performed by: PHYSICIAN ASSISTANT

## 2017-09-21 PROCEDURE — 3008F BODY MASS INDEX DOCD: CPT | Mod: S$GLB,,, | Performed by: PHYSICIAN ASSISTANT

## 2017-09-21 PROCEDURE — 1126F AMNT PAIN NOTED NONE PRSNT: CPT | Mod: S$GLB,,, | Performed by: PHYSICIAN ASSISTANT

## 2017-09-21 NOTE — PROGRESS NOTES
Subjective:       Patient ID: Jimmy Young is a 87 y.o. male.    Chief Complaint: Follow-up    HPI   The patient presents to clinic for a hospital follow up. The patient was recently admitted to [facility] for a GI bleed. He has a known history of AVMs. An EGD with SBE was done this admission and duodenal AVMs were noted and treated. The patient is feeling. He denies nausea, vomiting, abdominal pain or melena. He reports good appetite. He is off Eliquis and taking Aspirin. He saw his PCP earlier this week.     Review of Systems    As per HPI.     Objective:      Physical Exam   Constitutional: He is oriented to person, place, and time. He appears well-developed and well-nourished. No distress.   HENT:   Head: Normocephalic and atraumatic.   Cardiovascular: Normal rate and regular rhythm.    Pulmonary/Chest: Effort normal and breath sounds normal. No respiratory distress.   Abdominal: Soft. Bowel sounds are normal. He exhibits no distension. There is no tenderness.   Neurological: He is alert and oriented to person, place, and time. No cranial nerve deficit.   Psychiatric: His behavior is normal.       Assessment:       1. Angiodysplasia of duodenum with hemorrhage    2. Atrial fibrillation, unspecified type        Plan:       Overall, the patient is doing well. No further work up needed at this time. He should continue Protonix daily. He can also continue taking Mobic. He didn't have an ulcer on EGD; however, I cautioned patient about risk of ulcers with NSAIDs. He also asked about restarted Metoprolol which was stopped by  on discharge. He says he takes his BP daily and they have been good. I recommend he call his PCP on Monday with his recent readings and get recommendations from Dr. Richardson. The patient and his daughter had questions which were answered. They reported understanding of the discussion. They are in the process of switching to [facility].     Thank you for the opportunity to participate in  the care of this patient.   Saleem Cotton PA-C.

## 2017-09-27 ENCOUNTER — LAB VISIT (OUTPATIENT)
Dept: LAB | Facility: HOSPITAL | Age: 82
End: 2017-09-27
Attending: INTERNAL MEDICINE
Payer: MEDICARE

## 2017-09-27 DIAGNOSIS — K92.2 HEMORRHAGE OF GASTROINTESTINAL TRACT, UNSPECIFIED: Primary | ICD-10-CM

## 2017-09-27 LAB
BASOPHILS # BLD AUTO: 0.1 K/UL
BASOPHILS NFR BLD: 1.5 %
DIFFERENTIAL METHOD: ABNORMAL
EOSINOPHIL # BLD AUTO: 0.4 K/UL
EOSINOPHIL NFR BLD: 6.6 %
ERYTHROCYTE [DISTWIDTH] IN BLOOD BY AUTOMATED COUNT: 20.5 %
HCT VFR BLD AUTO: 29.2 %
HGB BLD-MCNC: 8.7 G/DL
LYMPHOCYTES # BLD AUTO: 1.2 K/UL
LYMPHOCYTES NFR BLD: 17.9 %
MCH RBC QN AUTO: 22.9 PG
MCHC RBC AUTO-ENTMCNC: 29.8 G/DL
MCV RBC AUTO: 77 FL
MONOCYTES # BLD AUTO: 0.9 K/UL
MONOCYTES NFR BLD: 13.4 %
NEUTROPHILS # BLD AUTO: 3.9 K/UL
NEUTROPHILS NFR BLD: 60.4 %
PLATELET # BLD AUTO: 460 K/UL
PMV BLD AUTO: 9.6 FL
RBC # BLD AUTO: 3.8 M/UL
WBC # BLD AUTO: 6.47 K/UL

## 2017-09-27 PROCEDURE — 85025 COMPLETE CBC W/AUTO DIFF WBC: CPT | Mod: PO

## 2017-10-05 ENCOUNTER — OFFICE VISIT (OUTPATIENT)
Dept: CARDIOLOGY | Facility: CLINIC | Age: 82
End: 2017-10-05
Payer: MEDICARE

## 2017-10-05 VITALS
DIASTOLIC BLOOD PRESSURE: 58 MMHG | BODY MASS INDEX: 28.06 KG/M2 | WEIGHT: 185.13 LBS | HEART RATE: 68 BPM | HEIGHT: 68 IN | SYSTOLIC BLOOD PRESSURE: 138 MMHG

## 2017-10-05 DIAGNOSIS — I48.0 PAROXYSMAL ATRIAL FIBRILLATION: Primary | Chronic | ICD-10-CM

## 2017-10-05 DIAGNOSIS — E78.00 HYPERCHOLESTEROLEMIA: ICD-10-CM

## 2017-10-05 DIAGNOSIS — K92.2 GASTROINTESTINAL HEMORRHAGE, UNSPECIFIED GASTROINTESTINAL HEMORRHAGE TYPE: ICD-10-CM

## 2017-10-05 DIAGNOSIS — J44.9 CHRONIC OBSTRUCTIVE PULMONARY DISEASE, UNSPECIFIED COPD TYPE: ICD-10-CM

## 2017-10-05 DIAGNOSIS — N18.30 CHRONIC KIDNEY DISEASE, STAGE 3: Chronic | ICD-10-CM

## 2017-10-05 DIAGNOSIS — I10 ESSENTIAL HYPERTENSION: Chronic | ICD-10-CM

## 2017-10-05 PROCEDURE — 99204 OFFICE O/P NEW MOD 45 MIN: CPT | Mod: S$GLB,,, | Performed by: INTERNAL MEDICINE

## 2017-10-05 PROCEDURE — 99999 PR PBB SHADOW E&M-EST. PATIENT-LVL III: CPT | Mod: PBBFAC,,, | Performed by: INTERNAL MEDICINE

## 2017-10-05 RX ORDER — DILTIAZEM HYDROCHLORIDE 240 MG/1
240 CAPSULE, COATED, EXTENDED RELEASE ORAL DAILY
Qty: 30 CAPSULE | Refills: 11 | Status: SHIPPED | OUTPATIENT
Start: 2017-10-05 | End: 2018-10-04 | Stop reason: SDUPTHER

## 2017-10-05 NOTE — PROGRESS NOTES
Subjective:   Patient ID:  Jimmy Young is a 87 y.o. male who presents for evaluation of Atrial Fibrillation      86 yo male, came in for care [facility].  PMH PAF, off ELiqus after AVM comfirmed by EGD recently, HTN, Skin cancer, AAA, CKD, COPD. F/u with Jeff Caba and Hans  No chest pain,. Dyspnea, palpitation, dizziness, orthopnea and syncope  Ano smoking/drining.  Lives by himself and relatively independent.     Addendum on 10-. information provided by [facility].   Cath in  LM, and LAD madhuri, LCx 50 to 60%, RCA 40 to 50%.  NUKE in  EF 12%  ECHO in  normal EF, normal LV size.        Past Medical History:   Diagnosis Date    Aneurysm 2016    abdominal, stent placed    Arthritis     Asthma     Atrial fibrillation     Cancer     skin cancer on face    Diabetes mellitus     Emphysema lung     Encounter for blood transfusion     Hypertension        Past Surgical History:   Procedure Laterality Date    ABDOMINAL SURGERY      COLONOSCOPY Left 6/12/2017    Procedure: COLONOSCOPY;  Surgeon: Boaz Toussaint MD;  Location: [facility];  Service: Endoscopy;  Laterality: Left;    NO PAST SURGERIES      VASCULAR SURGERY      abdominal aneurysm repair       Social History   Substance Use Topics    Smoking status: Former Smoker     Packs/day: 2.00     Years: 20.00     Types: Cigarettes     Quit date: 1/1/1977    Smokeless tobacco: Former User    Alcohol use No       Family History   Problem Relation Age of Onset    Hypertension Father     Heart attack Father     Cancer Mother      bladder       Review of Systems   Constitution: Negative for decreased appetite, diaphoresis, fever, weakness, malaise/fatigue and night sweats.   HENT: Negative for nosebleeds.    Eyes: Negative for blurred vision and double vision.   Cardiovascular: Negative for chest pain, claudication, dyspnea on exertion, irregular heartbeat, leg swelling, near-syncope, orthopnea, palpitations, paroxysmal nocturnal  dyspnea and syncope.   Respiratory: Negative for cough, shortness of breath, sleep disturbances due to breathing, snoring, sputum production and wheezing.    Endocrine: Negative for cold intolerance and polyuria.   Hematologic/Lymphatic: Does not bruise/bleed easily.   Skin: Negative for rash.   Musculoskeletal: Negative for back pain, falls, joint pain, joint swelling and neck pain.   Gastrointestinal: Negative for abdominal pain, heartburn, nausea and vomiting.   Genitourinary: Negative for dysuria, frequency and hematuria.   Neurological: Negative for difficulty with concentration, dizziness, focal weakness, headaches, light-headedness, numbness and seizures.   Psychiatric/Behavioral: Negative for depression, memory loss and substance abuse. The patient does not have insomnia.    Allergic/Immunologic: Negative for HIV exposure and hives.       Objective:   Physical Exam   Constitutional: He is oriented to person, place, and time. He appears well-nourished.   HENT:   Head: Normocephalic.   Eyes: Pupils are equal, round, and reactive to light.   Neck: Normal carotid pulses and no JVD present. Carotid bruit is not present. No thyromegaly present.   Cardiovascular: Normal rate, regular rhythm, normal heart sounds and normal pulses.   No extrasystoles are present. PMI is not displaced.  Exam reveals no gallop and no S3.    No murmur heard.  Pulmonary/Chest: Breath sounds normal. No stridor. No respiratory distress.   + bronchi   Abdominal: Soft. Bowel sounds are normal. There is no tenderness. There is no rebound.   Musculoskeletal: Normal range of motion.   Neurological: He is alert and oriented to person, place, and time.   Skin: Skin is intact. No rash noted.   Face skin looks blue.     Psychiatric: His behavior is normal.       No results found for: CHOL  No results found for: HDL  No results found for: LDLCALC  No results found for: TRIG  No results found for: CHOLHDL    Chemistry        Component Value Date/Time      09/14/2017 0436     09/14/2017 0436    K 3.9 09/14/2017 0436    K 4.0 09/14/2017 0436     09/14/2017 0436     09/14/2017 0436    CO2 27 09/14/2017 0436    CO2 28 09/14/2017 0436    BUN 28 (H) 09/14/2017 0436    BUN 27 (H) 09/14/2017 0436    CREATININE 1.6 (H) 09/14/2017 0436    CREATININE 1.6 (H) 09/14/2017 0436     09/14/2017 0436     09/14/2017 0436        Component Value Date/Time    CALCIUM 8.6 (L) 09/14/2017 0436    CALCIUM 8.6 (L) 09/14/2017 0436    ALKPHOS 84 09/14/2017 0436    AST 15 09/14/2017 0436    ALT 12 09/14/2017 0436    BILITOT 0.8 09/14/2017 0436    ESTGFRAFRICA 44 (A) 09/14/2017 0436    ESTGFRAFRICA 44 (A) 09/14/2017 0436    EGFRNONAA 38 (A) 09/14/2017 0436    EGFRNONAA 38 (A) 09/14/2017 0436          Lab Results   Component Value Date    TSH 5.434 (H) 09/12/2017     Lab Results   Component Value Date    INR 1.2 09/11/2017    INR 1.2 06/10/2017    INR 1.1 10/03/2015     Lab Results   Component Value Date    WBC 6.47 09/27/2017    HGB 8.7 (L) 09/27/2017    HCT 29.2 (L) 09/27/2017    MCV 77 (L) 09/27/2017     (H) 09/27/2017     BNP  @LABRCNTIP(BNP,BNPTRIAGEBLO)@  CrCl cannot be calculated (Patient's most recent lab result is older than the maximum 7 days allowed.).     Assessment:      1. Paroxysmal atrial fibrillation    2. Essential hypertension    3. Hypercholesterolemia    4. Chronic kidney disease, stage 3    5. Gastrointestinal hemorrhage, unspecified gastrointestinal hemorrhage type    6. Uncontrolled type 2 diabetes mellitus with stage 3 chronic kidney disease, without long-term current use of insulin    7. Chronic obstructive pulmonary disease, unspecified COPD type      Sinus rhythm  Can not tolerate anticoagulation due GI AVM and GI bleeding    Plan:   D/c amio and amlodipine  Add Cardizem 240 gm daily  Continue ASA 81 mg, coreg, lasix and statin  Echo ordered  RTC in 3 months

## 2017-10-05 NOTE — LETTER
October 5, 2017      Nacho Richardson MD  [id number] [facility] [address]  [place] [address]           [facility]  [address] [place] [address] [address]  [place] [address]  Phone: [phone]          Patient: Jimmy Young   MR Number: [id number]   YOB: 1930   Date of Visit: 10/5/2017       Dear Dr. Nacho Richardson:    Thank you for referring Jimmy Young to me for evaluation. Attached you will find relevant portions of my assessment and plan of care.    If you have questions, please do not hesitate to call me. I look forward to following Jimmy Young along with you.    Sincerely,    Dmitriy Davis MD    Enclosure  CC:  No Recipients    If you would like to receive this communication electronically, please contact [email or web address] or [phone] to request more information on [employer] Link access.    For providers and/or their staff who would like to refer a patient to [facility], please contact us through our one-stop-shop provider referral line, Paul Nichols, at [phone].    If you feel you have received this communication in error or would no longer like to receive these types of communications, please e-mail [email or web address]

## 2017-10-06 ENCOUNTER — HOSPITAL ENCOUNTER (OUTPATIENT)
Dept: CARDIOLOGY | Facility: CLINIC | Age: 82
Discharge: HOME | End: 2017-10-06
Payer: MEDICARE

## 2017-10-06 DIAGNOSIS — I48.0 PAROXYSMAL ATRIAL FIBRILLATION: Chronic | ICD-10-CM

## 2017-10-06 LAB
AORTIC VALVE REGURGITATION: ABNORMAL
DIASTOLIC DYSFUNCTION: YES
ESTIMATED PA SYSTOLIC PRESSURE: 44.47
MITRAL VALVE REGURGITATION: ABNORMAL
RETIRED EF AND QEF - SEE NOTES: 60 (ref 55–65)
TRICUSPID VALVE REGURGITATION: ABNORMAL

## 2017-10-06 PROCEDURE — 93306 TTE W/DOPPLER COMPLETE: CPT | Mod: S$GLB,,, | Performed by: INTERNAL MEDICINE

## 2018-04-09 ENCOUNTER — HOSPITAL ENCOUNTER (OUTPATIENT)
Facility: HOSPITAL | Age: 83
Discharge: HOME | End: 2018-04-10
Attending: EMERGENCY MEDICINE | Admitting: EMERGENCY MEDICINE
Payer: MEDICARE

## 2018-04-09 DIAGNOSIS — D64.9 SYMPTOMATIC ANEMIA: Primary | ICD-10-CM

## 2018-04-09 LAB
ABO + RH BLD: NORMAL
ALBUMIN SERPL BCP-MCNC: 3.6 G/DL
ALP SERPL-CCNC: 108 U/L
ALT SERPL W/O P-5'-P-CCNC: 12 U/L
ANION GAP SERPL CALC-SCNC: 13 MMOL/L
ANISOCYTOSIS BLD QL SMEAR: ABNORMAL
APTT BLDCRRT: 25.1 SEC
AST SERPL-CCNC: 18 U/L
BASOPHILS # BLD AUTO: 0.08 K/UL
BASOPHILS NFR BLD: 0.8 %
BILIRUB SERPL-MCNC: 0.5 MG/DL
BILIRUB UR QL STRIP: NEGATIVE
BLD GP AB SCN CELLS X3 SERPL QL: NORMAL
BNP SERPL-MCNC: 138 PG/ML
BUN SERPL-MCNC: 44 MG/DL
CALCIUM SERPL-MCNC: 8.9 MG/DL
CHLORIDE SERPL-SCNC: 103 MMOL/L
CK SERPL-CCNC: 41 U/L
CLARITY UR REFRACT.AUTO: CLEAR
CO2 SERPL-SCNC: 25 MMOL/L
COLOR UR AUTO: YELLOW
CREAT SERPL-MCNC: 2 MG/DL
DIFFERENTIAL METHOD: ABNORMAL
EOSINOPHIL # BLD AUTO: 0.4 K/UL
EOSINOPHIL NFR BLD: 4 %
ERYTHROCYTE [DISTWIDTH] IN BLOOD BY AUTOMATED COUNT: 21.8 %
ERYTHROCYTE [DISTWIDTH] IN BLOOD BY AUTOMATED COUNT: 22.3 %
EST. GFR  (AFRICAN AMERICAN): 33.7 ML/MIN/1.73 M^2
EST. GFR  (NON AFRICAN AMERICAN): 29.1 ML/MIN/1.73 M^2
GLUCOSE SERPL-MCNC: 137 MG/DL
GLUCOSE UR QL STRIP: NEGATIVE
HCT VFR BLD AUTO: 26.6 %
HCT VFR BLD AUTO: 28 %
HGB BLD-MCNC: 6.9 G/DL
HGB BLD-MCNC: 7.2 G/DL
HGB UR QL STRIP: NEGATIVE
HYPOCHROMIA BLD QL SMEAR: ABNORMAL
INR PPP: 1.1
KETONES UR QL STRIP: NEGATIVE
LEUKOCYTE ESTERASE UR QL STRIP: NEGATIVE
LYMPHOCYTES # BLD AUTO: 1.5 K/UL
LYMPHOCYTES NFR BLD: 14.4 %
MAGNESIUM SERPL-MCNC: 2.2 MG/DL
MCH RBC QN AUTO: 17.2 PG
MCH RBC QN AUTO: 17.4 PG
MCHC RBC AUTO-ENTMCNC: 25.7 G/DL
MCHC RBC AUTO-ENTMCNC: 25.9 G/DL
MCV RBC AUTO: 66 FL
MCV RBC AUTO: 68 FL
MONOCYTES # BLD AUTO: 0.9 K/UL
MONOCYTES NFR BLD: 9 %
NEUTROPHILS # BLD AUTO: 7.2 K/UL
NEUTROPHILS NFR BLD: 71.8 %
NITRITE UR QL STRIP: NEGATIVE
OVALOCYTES BLD QL SMEAR: ABNORMAL
PH UR STRIP: 6 [PH] (ref 5–8)
PHOSPHATE SERPL-MCNC: 3.8 MG/DL
PLATELET # BLD AUTO: 385 K/UL
PLATELET # BLD AUTO: 465 K/UL
PMV BLD AUTO: 9 FL
PMV BLD AUTO: 9.2 FL
POCT GLUCOSE: 104 MG/DL (ref 70–110)
POIKILOCYTOSIS BLD QL SMEAR: SLIGHT
POLYCHROMASIA BLD QL SMEAR: ABNORMAL
POTASSIUM SERPL-SCNC: 4.2 MMOL/L
PROT SERPL-MCNC: 6.9 G/DL
PROT UR QL STRIP: NEGATIVE
PROTHROMBIN TIME: 11.7 SEC
RBC # BLD AUTO: 4.01 M/UL
RBC # BLD AUTO: 4.13 M/UL
SCHISTOCYTES BLD QL SMEAR: PRESENT
SODIUM SERPL-SCNC: 141 MMOL/L
SP GR UR STRIP: 1.01 (ref 1–1.03)
TROPONIN I SERPL DL<=0.01 NG/ML-MCNC: 0.02 NG/ML
TROPONIN I SERPL DL<=0.01 NG/ML-MCNC: 0.03 NG/ML
URN SPEC COLLECT METH UR: NORMAL
UROBILINOGEN UR STRIP-ACNC: NEGATIVE EU/DL
WBC # BLD AUTO: 10.17 K/UL
WBC # BLD AUTO: 8.51 K/UL

## 2018-04-09 PROCEDURE — 99900035 HC TECH TIME PER 15 MIN (STAT)

## 2018-04-09 PROCEDURE — 85025 COMPLETE CBC W/AUTO DIFF WBC: CPT

## 2018-04-09 PROCEDURE — 36415 COLL VENOUS BLD VENIPUNCTURE: CPT

## 2018-04-09 PROCEDURE — 25000003 PHARM REV CODE 250: Performed by: EMERGENCY MEDICINE

## 2018-04-09 PROCEDURE — 83735 ASSAY OF MAGNESIUM: CPT

## 2018-04-09 PROCEDURE — G0378 HOSPITAL OBSERVATION PER HR: HCPCS

## 2018-04-09 PROCEDURE — 80053 COMPREHEN METABOLIC PANEL: CPT

## 2018-04-09 PROCEDURE — 86920 COMPATIBILITY TEST SPIN: CPT

## 2018-04-09 PROCEDURE — 81003 URINALYSIS AUTO W/O SCOPE: CPT

## 2018-04-09 PROCEDURE — P9016 RBC LEUKOCYTES REDUCED: HCPCS

## 2018-04-09 PROCEDURE — 25000003 PHARM REV CODE 250: Performed by: NURSE PRACTITIONER

## 2018-04-09 PROCEDURE — 93010 ELECTROCARDIOGRAM REPORT: CPT | Mod: ,,, | Performed by: NUCLEAR MEDICINE

## 2018-04-09 PROCEDURE — 85610 PROTHROMBIN TIME: CPT

## 2018-04-09 PROCEDURE — 63600175 PHARM REV CODE 636 W HCPCS: Performed by: NURSE PRACTITIONER

## 2018-04-09 PROCEDURE — 85027 COMPLETE CBC AUTOMATED: CPT | Mod: 59

## 2018-04-09 PROCEDURE — 82607 VITAMIN B-12: CPT

## 2018-04-09 PROCEDURE — 27000221 HC OXYGEN, UP TO 24 HOURS

## 2018-04-09 PROCEDURE — 82746 ASSAY OF FOLIC ACID SERUM: CPT

## 2018-04-09 PROCEDURE — 82550 ASSAY OF CK (CPK): CPT

## 2018-04-09 PROCEDURE — 84484 ASSAY OF TROPONIN QUANT: CPT

## 2018-04-09 PROCEDURE — 85730 THROMBOPLASTIN TIME PARTIAL: CPT

## 2018-04-09 PROCEDURE — 82728 ASSAY OF FERRITIN: CPT

## 2018-04-09 PROCEDURE — 93005 ELECTROCARDIOGRAM TRACING: CPT

## 2018-04-09 PROCEDURE — 83880 ASSAY OF NATRIURETIC PEPTIDE: CPT

## 2018-04-09 PROCEDURE — 63600175 PHARM REV CODE 636 W HCPCS: Performed by: EMERGENCY MEDICINE

## 2018-04-09 PROCEDURE — 83540 ASSAY OF IRON: CPT

## 2018-04-09 PROCEDURE — 86850 RBC ANTIBODY SCREEN: CPT

## 2018-04-09 PROCEDURE — 99285 EMERGENCY DEPT VISIT HI MDM: CPT | Mod: 25

## 2018-04-09 PROCEDURE — 84100 ASSAY OF PHOSPHORUS: CPT

## 2018-04-09 PROCEDURE — 84484 ASSAY OF TROPONIN QUANT: CPT | Mod: 91

## 2018-04-09 PROCEDURE — C9113 INJ PANTOPRAZOLE SODIUM, VIA: HCPCS | Performed by: EMERGENCY MEDICINE

## 2018-04-09 RX ORDER — LEVOTHYROXINE SODIUM 50 UG/1
50 TABLET ORAL
Status: DISCONTINUED | OUTPATIENT
Start: 2018-04-10 | End: 2018-04-10 | Stop reason: HOSPADM

## 2018-04-09 RX ORDER — FERROUS SULFATE 325(65) MG
325 TABLET, DELAYED RELEASE (ENTERIC COATED) ORAL DAILY
Status: DISCONTINUED | OUTPATIENT
Start: 2018-04-10 | End: 2018-04-10 | Stop reason: HOSPADM

## 2018-04-09 RX ORDER — CARVEDILOL 6.25 MG/1
6.25 TABLET ORAL 2 TIMES DAILY WITH MEALS
Status: DISCONTINUED | OUTPATIENT
Start: 2018-04-09 | End: 2018-04-10 | Stop reason: HOSPADM

## 2018-04-09 RX ORDER — IRON,CARBONYL/ASCORBIC ACID 100-250 MG
1 TABLET ORAL DAILY
Status: DISCONTINUED | OUTPATIENT
Start: 2018-04-10 | End: 2018-04-09

## 2018-04-09 RX ORDER — IBUPROFEN 200 MG
16 TABLET ORAL
Status: DISCONTINUED | OUTPATIENT
Start: 2018-04-09 | End: 2018-04-10 | Stop reason: HOSPADM

## 2018-04-09 RX ORDER — METFORMIN HYDROCHLORIDE 750 MG/1
750 TABLET, EXTENDED RELEASE ORAL NIGHTLY
COMMUNITY
Start: 2018-02-27

## 2018-04-09 RX ORDER — PRAVASTATIN SODIUM 20 MG/1
40 TABLET ORAL NIGHTLY
Status: DISCONTINUED | OUTPATIENT
Start: 2018-04-09 | End: 2018-04-10 | Stop reason: HOSPADM

## 2018-04-09 RX ORDER — GLUCAGON 1 MG
1 KIT INJECTION
Status: DISCONTINUED | OUTPATIENT
Start: 2018-04-09 | End: 2018-04-10 | Stop reason: HOSPADM

## 2018-04-09 RX ORDER — ONDANSETRON 2 MG/ML
4 INJECTION INTRAMUSCULAR; INTRAVENOUS EVERY 12 HOURS PRN
Status: DISCONTINUED | OUTPATIENT
Start: 2018-04-09 | End: 2018-04-10 | Stop reason: HOSPADM

## 2018-04-09 RX ORDER — FUROSEMIDE 20 MG/1
20 TABLET ORAL 2 TIMES DAILY
Status: DISCONTINUED | OUTPATIENT
Start: 2018-04-09 | End: 2018-04-09

## 2018-04-09 RX ORDER — CHOLECALCIFEROL (VITAMIN D3) 25 MCG
1 TABLET ORAL DAILY
COMMUNITY

## 2018-04-09 RX ORDER — CETIRIZINE HYDROCHLORIDE 5 MG/1
5 TABLET ORAL DAILY
Status: DISCONTINUED | OUTPATIENT
Start: 2018-04-10 | End: 2018-04-10 | Stop reason: HOSPADM

## 2018-04-09 RX ORDER — IBUPROFEN 100 MG/5ML
1000 SUSPENSION, ORAL (FINAL DOSE FORM) ORAL DAILY
COMMUNITY
Start: 2017-12-11

## 2018-04-09 RX ORDER — ACETAMINOPHEN 325 MG/1
650 TABLET ORAL EVERY 8 HOURS PRN
Status: DISCONTINUED | OUTPATIENT
Start: 2018-04-09 | End: 2018-04-10 | Stop reason: HOSPADM

## 2018-04-09 RX ORDER — PANTOPRAZOLE SODIUM 40 MG/10ML
40 INJECTION, POWDER, LYOPHILIZED, FOR SOLUTION INTRAVENOUS 2 TIMES DAILY
Status: DISCONTINUED | OUTPATIENT
Start: 2018-04-10 | End: 2018-04-09

## 2018-04-09 RX ORDER — DILTIAZEM HYDROCHLORIDE 240 MG/1
240 CAPSULE, COATED, EXTENDED RELEASE ORAL DAILY
Status: DISCONTINUED | OUTPATIENT
Start: 2018-04-10 | End: 2018-04-10 | Stop reason: HOSPADM

## 2018-04-09 RX ORDER — ASCORBIC ACID 500 MG
500 TABLET ORAL DAILY
Status: DISCONTINUED | OUTPATIENT
Start: 2018-04-10 | End: 2018-04-10 | Stop reason: HOSPADM

## 2018-04-09 RX ORDER — FUROSEMIDE 20 MG/1
20 TABLET ORAL 2 TIMES DAILY
Status: DISCONTINUED | OUTPATIENT
Start: 2018-04-10 | End: 2018-04-10 | Stop reason: HOSPADM

## 2018-04-09 RX ORDER — FUROSEMIDE 10 MG/ML
40 INJECTION INTRAMUSCULAR; INTRAVENOUS ONCE
Status: COMPLETED | OUTPATIENT
Start: 2018-04-09 | End: 2018-04-09

## 2018-04-09 RX ORDER — IBUPROFEN 200 MG
24 TABLET ORAL
Status: DISCONTINUED | OUTPATIENT
Start: 2018-04-09 | End: 2018-04-10 | Stop reason: HOSPADM

## 2018-04-09 RX ORDER — PANTOPRAZOLE SODIUM 40 MG/10ML
80 INJECTION, POWDER, LYOPHILIZED, FOR SOLUTION INTRAVENOUS ONCE
Status: DISCONTINUED | OUTPATIENT
Start: 2018-04-09 | End: 2018-04-09

## 2018-04-09 RX ORDER — SODIUM CHLORIDE 0.9 % (FLUSH) 0.9 %
5 SYRINGE (ML) INJECTION
Status: DISCONTINUED | OUTPATIENT
Start: 2018-04-09 | End: 2018-04-10 | Stop reason: HOSPADM

## 2018-04-09 RX ORDER — PANTOPRAZOLE SODIUM 40 MG/1
1 TABLET, DELAYED RELEASE ORAL DAILY
COMMUNITY
Start: 2018-01-08

## 2018-04-09 RX ORDER — HYDROCODONE BITARTRATE AND ACETAMINOPHEN 500; 5 MG/1; MG/1
TABLET ORAL
Status: DISCONTINUED | OUTPATIENT
Start: 2018-04-09 | End: 2018-04-10 | Stop reason: HOSPADM

## 2018-04-09 RX ORDER — PRAVASTATIN SODIUM 40 MG/1
40 TABLET ORAL NIGHTLY
COMMUNITY
Start: 2017-11-02 | End: 2019-05-03 | Stop reason: SDUPTHER

## 2018-04-09 RX ORDER — HYDROCODONE BITARTRATE AND ACETAMINOPHEN 500; 5 MG/1; MG/1
TABLET ORAL
Status: DISCONTINUED | OUTPATIENT
Start: 2018-04-09 | End: 2018-04-09

## 2018-04-09 RX ADMIN — CARVEDILOL 6.25 MG: 6.25 TABLET, FILM COATED ORAL at 09:04

## 2018-04-09 RX ADMIN — PRAVASTATIN SODIUM 40 MG: 20 TABLET ORAL at 09:04

## 2018-04-09 RX ADMIN — DEXTROSE MONOHYDRATE 40 MG: 5 INJECTION, SOLUTION INTRAVENOUS at 09:04

## 2018-04-09 RX ADMIN — FUROSEMIDE 40 MG: 10 INJECTION, SOLUTION INTRAMUSCULAR; INTRAVENOUS at 09:04

## 2018-04-09 NOTE — ED PROVIDER NOTES
Encounter Date: 4/9/2018       History     Chief Complaint   Patient presents with    Fatigue     generalized weakness for 1 week and cough . also decreased appetite     Patient states that he has had worsening fatigue and dyspnea on exertion for the last week.  States that he was anemic the last time he had these symptoms.      The history is provided by the patient.   Fatigue   This is a recurrent problem. The current episode started more than 1 week ago. The problem occurs constantly. The problem has been gradually worsening. Pertinent negatives include no chest pain, no abdominal pain, no headaches and no shortness of breath. The symptoms are aggravated by walking. Nothing relieves the symptoms.     Review of patient's allergies indicates:  No Known Allergies  Past Medical History:   Diagnosis Date    Aneurysm 2016    abdominal, stent placed    Arthritis     Asthma     Atrial fibrillation     Cancer     skin cancer on face    Diabetes mellitus     Emphysema lung     Encounter for blood transfusion     Hypertension      Past Surgical History:   Procedure Laterality Date    ABDOMINAL AORTIC ANEURYSM REPAIR      Stent placed    ABDOMINAL SURGERY      COLONOSCOPY Left 6/12/2017    Procedure: COLONOSCOPY;  Surgeon: Boaz Toussaint MD;  [facility];  Service: Endoscopy;  Laterality: Left;    VASCULAR SURGERY      abdominal aneurysm repair     Family History   Problem Relation Age of Onset    Hypertension Father     Heart attack Father     Cancer Mother      bladder     Social History   Substance Use Topics    Smoking status: Former Smoker     Packs/day: 2.00     Years: 20.00     Types: Cigarettes     Quit date: 1/1/1977    Smokeless tobacco: Former User    Alcohol use No     Review of Systems   Constitutional: Positive for fatigue. Negative for fever.   HENT: Negative for sore throat.    Respiratory: Negative for shortness of breath.    Cardiovascular: Negative for chest pain.    Gastrointestinal: Negative for abdominal pain and nausea.   Genitourinary: Negative for dysuria.   Musculoskeletal: Negative for back pain.   Skin: Negative for rash.   Neurological: Negative for weakness and headaches.   Hematological: Does not bruise/bleed easily.       Physical Exam     Initial Vitals [04/09/18 1321]   BP Pulse Resp Temp SpO2   (!) 153/65 87 20 97.9 °F (36.6 °C) (!) 80 %      MAP       94.33         Physical Exam    Nursing note and vitals reviewed.  Constitutional: He appears well-developed and well-nourished. No distress.   HENT:   Head: Normocephalic and atraumatic.   Mouth/Throat: Oropharynx is clear and moist.   Eyes: Conjunctivae and EOM are normal. Pupils are equal, round, and reactive to light.   Neck: Normal range of motion. Neck supple.   Cardiovascular: Normal rate, regular rhythm and normal heart sounds. Exam reveals no gallop and no friction rub.    No murmur heard.  Pulmonary/Chest: Breath sounds normal. No respiratory distress. He has no wheezes. He has no rhonchi. He has no rales.   Abdominal: Soft. Bowel sounds are normal. He exhibits no distension and no mass. There is no tenderness. There is no rebound and no guarding.   Musculoskeletal: Normal range of motion. He exhibits no edema.   Neurological: He is alert and oriented to person, place, and time. He has normal strength.   Skin: Skin is warm and dry. No rash noted. There is pallor.   Psychiatric: He has a normal mood and affect. Thought content normal.         ED Course   Procedures  Labs Reviewed   CBC W/ AUTO DIFFERENTIAL - Abnormal; Notable for the following:        Result Value    RBC 4.01 (*)     Hemoglobin 6.9 (*)     Hematocrit 26.6 (*)     MCV 66 (*)     MCH 17.2 (*)     MCHC 25.9 (*)     RDW 22.3 (*)     Platelets 465 (*)     MPV 9.0 (*)     All other components within normal limits   COMPREHENSIVE METABOLIC PANEL - Abnormal; Notable for the following:     Glucose 137 (*)     BUN, Bld 44 (*)     Creatinine 2.0 (*)  "    eGFR if  33.7 (*)     eGFR if non  29.1 (*)     All other components within normal limits   B-TYPE NATRIURETIC PEPTIDE - Abnormal; Notable for the following:      (*)     All other components within normal limits   CK   TROPONIN I   URINALYSIS        ED Vital Signs:  Vitals:    04/09/18 1321 04/09/18 1347   BP: (!) 153/65    Pulse: 87 74   Resp: 20    Temp: 97.9 °F (36.6 °C)    TempSrc: Oral    SpO2: (!) 80% (!) 93%   Weight: 79 kg (174 lb 2 oz)    Height: 5' 8" (1.727 m)          Abnormal Lab Results:  Labs Reviewed   CBC W/ AUTO DIFFERENTIAL - Abnormal; Notable for the following:        Result Value    RBC 4.01 (*)     Hemoglobin 6.9 (*)     Hematocrit 26.6 (*)     MCV 66 (*)     MCH 17.2 (*)     MCHC 25.9 (*)     RDW 22.3 (*)     Platelets 465 (*)     MPV 9.0 (*)     All other components within normal limits   COMPREHENSIVE METABOLIC PANEL - Abnormal; Notable for the following:     Glucose 137 (*)     BUN, Bld 44 (*)     Creatinine 2.0 (*)     eGFR if  33.7 (*)     eGFR if non  29.1 (*)     All other components within normal limits   B-TYPE NATRIURETIC PEPTIDE - Abnormal; Notable for the following:      (*)     All other components within normal limits   CK   TROPONIN I   URINALYSIS          All Lab Results:  Results for orders placed or performed during the hospital encounter of 04/09/18   CBC auto differential   Result Value Ref Range    WBC 10.17 3.90 - 12.70 K/uL    RBC 4.01 (L) 4.60 - 6.20 M/uL    Hemoglobin 6.9 (L) 14.0 - 18.0 g/dL    Hematocrit 26.6 (L) 40.0 - 54.0 %    MCV 66 (L) 82 - 98 fL    MCH 17.2 (L) 27.0 - 31.0 pg    MCHC 25.9 (L) 32.0 - 36.0 g/dL    RDW 22.3 (H) 11.5 - 14.5 %    Platelets 465 (H) 150 - 350 K/uL    MPV 9.0 (L) 9.2 - 12.9 fL   Comprehensive metabolic panel   Result Value Ref Range    Sodium 141 136 - 145 mmol/L    Potassium 4.2 3.5 - 5.1 mmol/L    Chloride 103 95 - 110 mmol/L    CO2 25 23 - 29 mmol/L "    Glucose 137 (H) 70 - 110 mg/dL    BUN, Bld 44 (H) 8 - 23 mg/dL    Creatinine 2.0 (H) 0.5 - 1.4 mg/dL    Calcium 8.9 8.7 - 10.5 mg/dL    Total Protein 6.9 6.0 - 8.4 g/dL    Albumin 3.6 3.5 - 5.2 g/dL    Total Bilirubin 0.5 0.1 - 1.0 mg/dL    Alkaline Phosphatase 108 55 - 135 U/L    AST 18 10 - 40 U/L    ALT 12 10 - 44 U/L    Anion Gap 13 8 - 16 mmol/L    eGFR if African American 33.7 (A) >60 mL/min/1.73 m^2    eGFR if non  29.1 (A) >60 mL/min/1.73 m^2   Brain natriuretic peptide   Result Value Ref Range     (H) 0 - 99 pg/mL   CK   Result Value Ref Range    CPK 41 20 - 200 U/L   Troponin I   Result Value Ref Range    Troponin I 0.021 0.000 - 0.026 ng/mL           Imaging Results:  Imaging Results          X-Ray Chest AP Portable (Final result)  Result time 04/09/18 13:51:03    Final result by MATTIE Lynn Sr., MD (04/09/18 13:51:03)                 Impression:      There is a mild amount of interstitial and alveolar opacities in the bases of both lungs with Kerley B lines visualized bilaterally. This is characteristic of pulmonary edema.      Electronically signed by: MATTIE LYNN MD  Date:     04/09/18  Time:    13:51              Narrative:    One-view chest x-ray    Clinical History: Shortness of breath    Finding: Comparison was made to a prior examination performed on 9/11/2017. The size of the heart is normal. There is a mild amount of interstitial and alveolar opacities in the bases of both lungs with Kerley B lines visualized bilaterally. There is no pneumothorax.  The costophrenic angles are sharp.                                 The Emergency Provider reviewed the vital signs and test results, which are outlined above.    ED Discussions:  2:34 PM: Re-evaluated pt. I have discussed test results, shared treatment plan, and the need for admission with patient and family at bedside. Pt and family express understanding at this time and agree with all information. All questions  answered. Pt and family have no further questions or concerns at this time. Pt is ready for admit.    All historical, clinical, radiographic, and laboratory findings were reviewed with the patient/family in detail along with the indications for transport to the facility in [place] in order to receive blood transfusion and possible GI Consult.  All remaining questions and concerns were addressed at this time and the patient/family communicates understanding and agrees to proceed accordingly.  Similarly, all pertinent details of the encounter were discussed with Dr. Galindo via [facility] FRENCH who agrees to receive the patient at [facility] for further care as outlined above.  The patient will be transferred by [facility] ambulance services secondary to a need for ongoing oxygen and cardiac monitoring en route.  João Awad MD  2:34 PM                                  Clinical Impression:       ICD-10-CM ICD-9-CM   1. Symptomatic anemia D64.9 285.9         Disposition:   Disposition: Placed in Observation  Condition: Fair                        João Awad MD  04/09/18 [id number]

## 2018-04-09 NOTE — ED NOTES
Pt sitting up in stretcher, NAD. Resp e/u. Family member at bedside. Updated on plan of care. Verbalized understanding. NSR on monitor. Will continue to monitor. No change in status.

## 2018-04-09 NOTE — ED NOTES
Pt c/o generalized fatigue over past 2-3 days. Hx of GI bleed. Pt called son today to bring him. Last time patient felt like this his hemoglobin was between 4-5 and patient had to receive multiple units of blood. Had duodenal lesions cauterized by GI during that hospitalization in Sept. 2017.  Denies bloody stool, diarrhea, nausea, vomiting, constipation, cough, hemoptysis, or chest pain.       Level of Consciousness: Patient is awake, alert, oriented to person, place, time, and situation.    Appearance: Pt resting comfortably in stretcher, no acute distress at this time. Clothing appropriately placed and clean. Hygiene is appropriate.   Skin: Skin is warm, dry, and intact. Skin turgor is normal/elastic. Mucous membranes moist. Skin color is pale for ethnicity. No skin breakdown noted.  Musculoskeletal: Moves all extremities well. Full active ROM. No deformities noted. Generalized fatigue.  Gait unwitnessed. Reports difficulty with ambulation over past couple days. Denies fall.   Respiratory: Airway open and patent. Respirations equal and unlabored. Breath sounds clear to auscultation. SOB and fatigue with exertion. None at rest.   Cardiac: Regular rate and rhythm. No peripheral edema noted. Radial and pedal pulses present and normal. Capillary refill is within normal limits. Denies chest pain.    GI: Abdomen soft, non-tender to all quadrants with palpitation. Bowel sounds present and active in all quads. Abdomen symmetric with no distention noted. Denies any N/V/D. Reports decreased appetite for approx 6 months with 10 lb weight loss over past 2 years.    Neurological: Symmetrical expressions noted to face. Normal sensation reported to all extremities. No obvious neurological deficits noted.   Psychosocial: Speech spontaneous, clear, and coherent. Appropriate to situation. Son at bedside. Pt is calm and cooperative.     Pt informed of plan of care, verbalizes understanding, and denies any other questions,  complaints, or concerns at this time. Bed in locked in lowest position, siderails up x2, call light within reach. Pt placed on cardiac monitor, blood pressure cuff and continuous pulse ox in place. Will continue to monitor.

## 2018-04-10 VITALS
RESPIRATION RATE: 20 BRPM | WEIGHT: 174.13 LBS | BODY MASS INDEX: 26.39 KG/M2 | OXYGEN SATURATION: 85 % | SYSTOLIC BLOOD PRESSURE: 139 MMHG | HEIGHT: 68 IN | HEART RATE: 69 BPM | TEMPERATURE: 98 F | DIASTOLIC BLOOD PRESSURE: 62 MMHG

## 2018-04-10 PROBLEM — N18.9 ACUTE-ON-CHRONIC KIDNEY INJURY: Status: ACTIVE | Noted: 2018-04-10

## 2018-04-10 PROBLEM — N17.9 ACUTE-ON-CHRONIC KIDNEY INJURY: Status: ACTIVE | Noted: 2018-04-10

## 2018-04-10 LAB
ALLENS TEST: ABNORMAL
ANION GAP SERPL CALC-SCNC: 13 MMOL/L
ANISOCYTOSIS BLD QL SMEAR: SLIGHT
BASO STIPL BLD QL SMEAR: ABNORMAL
BASOPHILS # BLD AUTO: 0.05 K/UL
BASOPHILS NFR BLD: 0.6 %
BUN SERPL-MCNC: 39 MG/DL
CALCIUM SERPL-MCNC: 8.8 MG/DL
CHLORIDE SERPL-SCNC: 101 MMOL/L
CHOLEST SERPL-MCNC: 71 MG/DL
CHOLEST/HDLC SERPL: 3.2 {RATIO}
CO2 SERPL-SCNC: 30 MMOL/L
CREAT SERPL-MCNC: 1.7 MG/DL
DELSYS: ABNORMAL
DIFFERENTIAL METHOD: ABNORMAL
EOSINOPHIL # BLD AUTO: 0.3 K/UL
EOSINOPHIL NFR BLD: 3.5 %
ERYTHROCYTE [DISTWIDTH] IN BLOOD BY AUTOMATED COUNT: 22.9 %
EST. GFR  (AFRICAN AMERICAN): 41 ML/MIN/1.73 M^2
EST. GFR  (NON AFRICAN AMERICAN): 35 ML/MIN/1.73 M^2
ESTIMATED AVG GLUCOSE: 123 MG/DL
FERRITIN SERPL-MCNC: 6 NG/ML
FIO2: 21
FOLATE SERPL-MCNC: 12.6 NG/ML
GLUCOSE SERPL-MCNC: 87 MG/DL
HBA1C MFR BLD HPLC: 5.9 %
HCO3 UR-SCNC: 30.1 MMOL/L (ref 24–28)
HCT VFR BLD AUTO: 30.1 %
HCT VFR BLD AUTO: 31.1 %
HDLC SERPL-MCNC: 22 MG/DL
HDLC SERPL: 31 %
HGB BLD-MCNC: 8.1 G/DL
HGB BLD-MCNC: 8.3 G/DL
HYPOCHROMIA BLD QL SMEAR: ABNORMAL
IRON SERPL-MCNC: 11 UG/DL
LDLC SERPL CALC-MCNC: 22.4 MG/DL
LYMPHOCYTES # BLD AUTO: 1.2 K/UL
LYMPHOCYTES NFR BLD: 15.5 %
MCH RBC QN AUTO: 18.7 PG
MCHC RBC AUTO-ENTMCNC: 26.7 G/DL
MCV RBC AUTO: 70 FL
MODE: ABNORMAL
MONOCYTES # BLD AUTO: 0.8 K/UL
MONOCYTES NFR BLD: 10.2 %
NEUTROPHILS # BLD AUTO: 5.6 K/UL
NEUTROPHILS NFR BLD: 71.5 %
NONHDLC SERPL-MCNC: 49 MG/DL
OVALOCYTES BLD QL SMEAR: ABNORMAL
PCO2 BLDA: 47.2 MMHG (ref 35–45)
PH SMN: 7.41 [PH] (ref 7.35–7.45)
PLATELET # BLD AUTO: 394 K/UL
PLATELET BLD QL SMEAR: ABNORMAL
PMV BLD AUTO: 9.4 FL
PO2 BLDA: 39 MMHG (ref 80–100)
POC BE: 6 MMOL/L
POC SATURATED O2: 73 % (ref 95–100)
POCT GLUCOSE: 100 MG/DL (ref 70–110)
POIKILOCYTOSIS BLD QL SMEAR: SLIGHT
POLYCHROMASIA BLD QL SMEAR: ABNORMAL
POTASSIUM SERPL-SCNC: 3.7 MMOL/L
RBC # BLD AUTO: 4.45 M/UL
SAMPLE: ABNORMAL
SATURATED IRON: 3 %
SITE: ABNORMAL
SODIUM SERPL-SCNC: 144 MMOL/L
STOMATOCYTES BLD QL SMEAR: PRESENT
TOTAL IRON BINDING CAPACITY: 422 UG/DL
TRANSFERRIN SERPL-MCNC: 285 MG/DL
TRIGL SERPL-MCNC: 133 MG/DL
TROPONIN I SERPL DL<=0.01 NG/ML-MCNC: 0.03 NG/ML
VIT B12 SERPL-MCNC: 1305 PG/ML
WBC # BLD AUTO: 7.93 K/UL

## 2018-04-10 PROCEDURE — 94761 N-INVAS EAR/PLS OXIMETRY MLT: CPT

## 2018-04-10 PROCEDURE — 80061 LIPID PANEL: CPT

## 2018-04-10 PROCEDURE — 36430 TRANSFUSION BLD/BLD COMPNT: CPT

## 2018-04-10 PROCEDURE — 96376 TX/PRO/DX INJ SAME DRUG ADON: CPT

## 2018-04-10 PROCEDURE — 25000003 PHARM REV CODE 250: Performed by: NURSE PRACTITIONER

## 2018-04-10 PROCEDURE — 27000221 HC OXYGEN, UP TO 24 HOURS

## 2018-04-10 PROCEDURE — 25000003 PHARM REV CODE 250: Performed by: EMERGENCY MEDICINE

## 2018-04-10 PROCEDURE — 36415 COLL VENOUS BLD VENIPUNCTURE: CPT

## 2018-04-10 PROCEDURE — 82803 BLOOD GASES ANY COMBINATION: CPT

## 2018-04-10 PROCEDURE — 85014 HEMATOCRIT: CPT | Mod: 59

## 2018-04-10 PROCEDURE — 36600 WITHDRAWAL OF ARTERIAL BLOOD: CPT

## 2018-04-10 PROCEDURE — 63600175 PHARM REV CODE 636 W HCPCS: Performed by: EMERGENCY MEDICINE

## 2018-04-10 PROCEDURE — 94640 AIRWAY INHALATION TREATMENT: CPT

## 2018-04-10 PROCEDURE — 25000242 PHARM REV CODE 250 ALT 637 W/ HCPCS: Performed by: NURSE PRACTITIONER

## 2018-04-10 PROCEDURE — G0378 HOSPITAL OBSERVATION PER HR: HCPCS

## 2018-04-10 PROCEDURE — 96365 THER/PROPH/DIAG IV INF INIT: CPT

## 2018-04-10 PROCEDURE — 99900035 HC TECH TIME PER 15 MIN (STAT)

## 2018-04-10 PROCEDURE — 84484 ASSAY OF TROPONIN QUANT: CPT

## 2018-04-10 PROCEDURE — C9113 INJ PANTOPRAZOLE SODIUM, VIA: HCPCS | Performed by: EMERGENCY MEDICINE

## 2018-04-10 PROCEDURE — 85025 COMPLETE CBC W/AUTO DIFF WBC: CPT

## 2018-04-10 PROCEDURE — 80048 BASIC METABOLIC PNL TOTAL CA: CPT

## 2018-04-10 PROCEDURE — 85018 HEMOGLOBIN: CPT

## 2018-04-10 PROCEDURE — 83036 HEMOGLOBIN GLYCOSYLATED A1C: CPT

## 2018-04-10 PROCEDURE — 96375 TX/PRO/DX INJ NEW DRUG ADDON: CPT

## 2018-04-10 RX ORDER — LEVALBUTEROL INHALATION SOLUTION 0.63 MG/3ML
0.63 SOLUTION RESPIRATORY (INHALATION) EVERY 12 HOURS PRN
Status: DISCONTINUED | OUTPATIENT
Start: 2018-04-10 | End: 2018-04-10 | Stop reason: HOSPADM

## 2018-04-10 RX ORDER — IPRATROPIUM BROMIDE 0.5 MG/2.5ML
0.5 SOLUTION RESPIRATORY (INHALATION) EVERY 8 HOURS
Status: DISCONTINUED | OUTPATIENT
Start: 2018-04-10 | End: 2018-04-10 | Stop reason: HOSPADM

## 2018-04-10 RX ORDER — BUDESONIDE 0.5 MG/2ML
0.5 INHALANT ORAL EVERY 12 HOURS
Status: DISCONTINUED | OUTPATIENT
Start: 2018-04-10 | End: 2018-04-10 | Stop reason: HOSPADM

## 2018-04-10 RX ADMIN — FUROSEMIDE 20 MG: 20 TABLET ORAL at 08:04

## 2018-04-10 RX ADMIN — DILTIAZEM HYDROCHLORIDE 240 MG: 240 CAPSULE, COATED, EXTENDED RELEASE ORAL at 08:04

## 2018-04-10 RX ADMIN — LEVOTHYROXINE SODIUM 50 MCG: 50 TABLET ORAL at 06:04

## 2018-04-10 RX ADMIN — CARVEDILOL 6.25 MG: 6.25 TABLET, FILM COATED ORAL at 08:04

## 2018-04-10 RX ADMIN — IPRATROPIUM BROMIDE 0.5 MG: 0.5 SOLUTION RESPIRATORY (INHALATION) at 08:04

## 2018-04-10 RX ADMIN — BUDESONIDE 0.5 MG: 0.5 SUSPENSION RESPIRATORY (INHALATION) at 08:04

## 2018-04-10 RX ADMIN — CETIRIZINE HYDROCHLORIDE 5 MG: 5 TABLET, FILM COATED ORAL at 08:04

## 2018-04-10 RX ADMIN — OXYCODONE HYDROCHLORIDE AND ACETAMINOPHEN 500 MG: 500 TABLET ORAL at 08:04

## 2018-04-10 RX ADMIN — FERROUS SULFATE TAB EC 325 MG (65 MG FE EQUIVALENT) 325 MG: 325 (65 FE) TABLET DELAYED RESPONSE at 08:04

## 2018-04-10 RX ADMIN — DEXTROSE 40 MG: 50 INJECTION, SOLUTION INTRAVENOUS at 08:04

## 2018-04-10 NOTE — PROGRESS NOTES
Home Oxygen Evaluation    Date Performed: 4/10/2018    1) Patient's Home O2 Sat on room air, while at rest: 75        If O2 sats on room air at rest are 88% or below, patient qualifies. No additional testing needed. Document N/A in steps 2 and 3. If 89% or above, complete steps 2.      2) Patient's O2 Sat on room air while exercising: na        If O2 sats on room air while exercising remain 89% or above patient does not qualify, no further testing needed Document N/A in step 3. If O2 sats on room air while exercising are 88% or below, continue to step 3.      3) Patient's O2 Sat while exercising on O2: na at na LPM         (Must show improvement from #2 for patients to qualify)    If O2 sats improve on oxygen, patient qualifies for portable oxygen. If not, the patient does not qualify.

## 2018-04-10 NOTE — SUBJECTIVE & OBJECTIVE
Past Medical History:   Diagnosis Date    Aneurysm 2016    abdominal, stent placed    Arthritis     Asthma     Atrial fibrillation     Cancer     skin cancer on face    Diabetes mellitus     Emphysema lung     Encounter for blood transfusion     Hypertension        Past Surgical History:   Procedure Laterality Date    ABDOMINAL AORTIC ANEURYSM REPAIR      Stent placed    ABDOMINAL SURGERY      COLONOSCOPY Left 6/12/2017    Procedure: COLONOSCOPY;  Surgeon: Boaz Toussaint MD;  Location: [facility];  Service: Endoscopy;  Laterality: Left;    VASCULAR SURGERY      abdominal aneurysm repair       Review of patient's allergies indicates:  No Known Allergies    No current facility-administered medications on file prior to encounter.      Current Outpatient Prescriptions on File Prior to Encounter   Medication Sig    aspirin (ECOTRIN) 81 MG EC tablet Take 1 tablet (81 mg total) by mouth once daily.    carvedilol (COREG) 6.25 MG tablet Take 6.25 mg by mouth 2 (two) times daily with meals.     cetirizine (ZYRTEC) 10 MG tablet Take 10 mg by mouth once daily.    COMBIVENT RESPIMAT  mcg/actuation inhaler USE 1 PUFF EVERY SIX HOURS AS NEEDED FOR WHEEZING    diltiaZEM (CARDIZEM CD) 240 MG 24 hr capsule Take 1 capsule (240 mg total) by mouth once daily.    furosemide (LASIX) 20 MG tablet Take 20 mg by mouth 2 (two) times daily.    iron-vitamin C 100-250 mg, ICAR-C, 100-250 mg Tab TAKE ONE TABLET BY MOUTH ONCE DAILY    levothyroxine (SYNTHROID) 50 MCG tablet Take 1 tablet (50 mcg total) by mouth before breakfast.    selenium 50 mcg Tab Take 200 mcg by mouth nightly.     [DISCONTINUED] metformin (GLUCOPHAGE) 1000 MG tablet Take 750 mg by mouth every evening.     [DISCONTINUED] pantoprazole (PROTONIX) 40 MG tablet Take 1 tablet (40 mg total) by mouth 2 (two) times daily.    [DISCONTINUED] pravastatin (PRAVACHOL) 20 MG tablet Take 1 tablet (20 mg total) by mouth every evening.    [DISCONTINUED] vitamin D  1000 units Tab Take 1,000 Units by mouth.     Family History     Problem Relation (Age of Onset)    Cancer Mother    Heart attack Father    Hypertension Father        Social History Main Topics    Smoking status: Former Smoker     Packs/day: 2.00     Years: 20.00     Types: Cigarettes     Quit date: 1/1/1977    Smokeless tobacco: Former User    Alcohol use No    Drug use: No    Sexual activity: Not Currently     Review of Systems   Constitutional: Positive for activity change and fatigue. Negative for chills, diaphoresis and fever.   HENT: Negative for congestion, sore throat and voice change.    Eyes: Negative for photophobia and visual disturbance.   Respiratory: Negative for cough, shortness of breath, wheezing and stridor.    Cardiovascular: Negative for chest pain and leg swelling.   Gastrointestinal: Negative for abdominal distention, abdominal pain, blood in stool, constipation, diarrhea, nausea and vomiting.   Endocrine: Negative for polydipsia, polyphagia and polyuria.   Genitourinary: Negative for difficulty urinating, dysuria, flank pain, testicular pain and urgency.   Musculoskeletal: Negative for back pain, joint swelling, neck pain and neck stiffness.   Skin: Negative for color change and rash.   Allergic/Immunologic: Negative for immunocompromised state.   Neurological: Negative for dizziness, syncope, weakness, numbness and headaches.   Hematological: Does not bruise/bleed easily.   Psychiatric/Behavioral: Negative for agitation, behavioral problems and confusion.     Objective:     Vital Signs (Most Recent):  Temp: 96.6 °F (35.9 °C) (04/09/18 1918)  Pulse: 69 (04/09/18 1918)  Resp: 20 (04/09/18 1918)  BP: (!) 149/67 (04/09/18 1918)  SpO2: (!) 93 % (04/09/18 1918) Vital Signs (24h Range):  Temp:  [96.6 °F (35.9 °C)-98 °F (36.7 °C)] 96.6 °F (35.9 °C)  Pulse:  [69-87] 69  Resp:  [16-20] 20  SpO2:  [80 %-94 %] 93 %  BP: (134-154)/(61-69) 149/67     Weight: 79 kg (174 lb 2 oz)  Body mass index is  26.48 kg/m².    Physical Exam   Constitutional: He is oriented to person, place, and time. He appears well-developed. No distress.   elderly   HENT:   Head: Normocephalic and atraumatic.   Nose: Nose normal.   Eyes: Conjunctivae and EOM are normal. Pupils are equal, round, and reactive to light. No scleral icterus.   Neck: Normal range of motion. Neck supple. No tracheal deviation present.   Cardiovascular: Normal rate, regular rhythm, normal heart sounds and intact distal pulses.    No murmur heard.  Pulmonary/Chest: Effort normal and breath sounds normal. No stridor. No respiratory distress. He has no wheezes. He has no rales.   Abdominal: Soft. Bowel sounds are normal. He exhibits mass ( chronic). He exhibits no distension. There is no tenderness. There is no guarding.   Genitourinary:   Genitourinary Comments: ua neg  Check occult stool      Musculoskeletal: Normal range of motion. He exhibits no edema or deformity.   Neurological: He is alert and oriented to person, place, and time. No cranial nerve deficit.   Skin: Skin is warm and dry. Capillary refill takes less than 2 seconds. No rash noted. He is not diaphoretic. There is pallor.   Psychiatric: He has a normal mood and affect. His behavior is normal. Judgment and thought content normal.   Nursing note and vitals reviewed.        CRANIAL NERVES     CN III, IV, VI   Pupils are equal, round, and reactive to light.  Extraocular motions are normal.        Significant Labs: All pertinent labs within the past 24 hours have been reviewed.  Results for orders placed or performed during the hospital encounter of 04/09/18   CBC auto differential   Result Value Ref Range    WBC 10.17 3.90 - 12.70 K/uL    RBC 4.01 (L) 4.60 - 6.20 M/uL    Hemoglobin 6.9 (L) 14.0 - 18.0 g/dL    Hematocrit 26.6 (L) 40.0 - 54.0 %    MCV 66 (L) 82 - 98 fL    MCH 17.2 (L) 27.0 - 31.0 pg    MCHC 25.9 (L) 32.0 - 36.0 g/dL    RDW 22.3 (H) 11.5 - 14.5 %    Platelets 465 (H) 150 - 350 K/uL    MPV  9.0 (L) 9.2 - 12.9 fL    Gran # (ANC) 7.2 1.8 - 7.7 K/uL    Lymph # 1.5 1.0 - 4.8 K/uL    Mono # 0.9 0.3 - 1.0 K/uL    Eos # 0.4 0.0 - 0.5 K/uL    Baso # 0.08 0.00 - 0.20 K/uL    Gran% 71.8 38.0 - 73.0 %    Lymph% 14.4 (L) 18.0 - 48.0 %    Mono% 9.0 4.0 - 15.0 %    Eosinophil% 4.0 0.0 - 8.0 %    Basophil% 0.8 0.0 - 1.9 %    Aniso Moderate     Poik Slight     Poly Occasional     Hypo Moderate     Ovalocytes Occasional     Schistocytes Present     Differential Method Automated    Comprehensive metabolic panel   Result Value Ref Range    Sodium 141 136 - 145 mmol/L    Potassium 4.2 3.5 - 5.1 mmol/L    Chloride 103 95 - 110 mmol/L    CO2 25 23 - 29 mmol/L    Glucose 137 (H) 70 - 110 mg/dL    BUN, Bld 44 (H) 8 - 23 mg/dL    Creatinine 2.0 (H) 0.5 - 1.4 mg/dL    Calcium 8.9 8.7 - 10.5 mg/dL    Total Protein 6.9 6.0 - 8.4 g/dL    Albumin 3.6 3.5 - 5.2 g/dL    Total Bilirubin 0.5 0.1 - 1.0 mg/dL    Alkaline Phosphatase 108 55 - 135 U/L    AST 18 10 - 40 U/L    ALT 12 10 - 44 U/L    Anion Gap 13 8 - 16 mmol/L    eGFR if African American 33.7 (A) >60 mL/min/1.73 m^2    eGFR if non  29.1 (A) >60 mL/min/1.73 m^2   Urinalysis   Result Value Ref Range    Specimen UA Urine, Clean Catch     Color, UA Yellow Yellow, Straw, Randa    Appearance, UA Clear Clear    pH, UA 6.0 5.0 - 8.0    Specific Gravity, UA 1.010 1.005 - 1.030    Protein, UA Negative Negative    Glucose, UA Negative Negative    Ketones, UA Negative Negative    Bilirubin (UA) Negative Negative    Occult Blood UA Negative Negative    Nitrite, UA Negative Negative    Urobilinogen, UA Negative <2.0 EU/dL    Leukocytes, UA Negative Negative   Brain natriuretic peptide   Result Value Ref Range     (H) 0 - 99 pg/mL   CK   Result Value Ref Range    CPK 41 20 - 200 U/L   Troponin I   Result Value Ref Range    Troponin I 0.021 0.000 - 0.026 ng/mL       Significant Imaging: I have reviewed all pertinent imaging results/findings within the past 24 hours.    Imaging Results          X-Ray Chest AP Portable (Final result)  Result time 04/09/18 13:51:03    Final result by MATTIE Lynn Sr., MD (04/09/18 13:51:03)                 Impression:      There is a mild amount of interstitial and alveolar opacities in the bases of both lungs with Kerley B lines visualized bilaterally. This is characteristic of pulmonary edema.      Electronically signed by: MATTIE LYNN MD  Date:     04/09/18  Time:    13:51              Narrative:    One-view chest x-ray    Clinical History: Shortness of breath    Finding: Comparison was made to a prior examination performed on 9/11/2017. The size of the heart is normal. There is a mild amount of interstitial and alveolar opacities in the bases of both lungs with Kerley B lines visualized bilaterally. There is no pneumothorax.  The costophrenic angles are sharp.

## 2018-04-10 NOTE — NURSING
Met with daughter regarding possible diabetes educational needs following chart review  millieeindalton is asleep  She reports he sees his PCP every 4-5 weeks and glucose is monitored at that time  He is consistent with taking his diabetes med's  She does not identify any diabetes educational needs at this time and is a diabetic also so can assist if needed

## 2018-04-10 NOTE — ASSESSMENT & PLAN NOTE
Likely chronic   Check stool sample for occult  PPI BID   Monitor H/H  Consider GI consult pending course

## 2018-04-10 NOTE — PLAN OF CARE
04/10/18 1033   RENDON Message   Medicare Outpatient and Observation Notification regarding financial responsibility Given to patient/caregiver;Explained to patient/caregiver;Signed/date by patient/caregiver   Date RENDON was signed 04/10/18   Time RENDON was signed 1021

## 2018-04-10 NOTE — H&P
[facility] Hospital Medicine  History & Physical    Patient Name: Jimmy Young  MRN: [id number]  Admission Date: 4/9/2018  Attending Physician: Shorty Kaur MD  Primary Care Provider: Nacho Richardson MD         Patient information was obtained from patient, past medical records and ER records.     Subjective:     Principal Problem:Symptomatic anemia    Chief Complaint:   Chief Complaint   Patient presents with    Fatigue     generalized weakness for 1 week and cough . also decreased appetite        HPI: Patient is transfer from [facility]. Patient states that he has had worsening fatigue and dyspnea on exertion for the last week. Nothing relieves the symptoms. States that he was anemic the last time he had these symptoms. To note patient with past GI bleed. Patient denies current bleed. Patient only on daily asa. No other complaints or concerns. H/H 6.9/26.6. Unit of blood ordered for transfusion.  Hospital medicine was consulted for admission. Patient placed in obs for further evaluation        Past Medical History:   Diagnosis Date    Aneurysm 2016    abdominal, stent placed    Arthritis     Asthma     Atrial fibrillation     Cancer     skin cancer on face    Diabetes mellitus     Emphysema lung     Encounter for blood transfusion     Hypertension        Past Surgical History:   Procedure Laterality Date    ABDOMINAL AORTIC ANEURYSM REPAIR      Stent placed    ABDOMINAL SURGERY      COLONOSCOPY Left 6/12/2017    Procedure: COLONOSCOPY;  Surgeon: Boaz Toussaint MD;  Location: [facility];  Service: Endoscopy;  Laterality: Left;    VASCULAR SURGERY      abdominal aneurysm repair       Review of patient's allergies indicates:  No Known Allergies    No current facility-administered medications on file prior to encounter.      Current Outpatient Prescriptions on File Prior to Encounter   Medication Sig    aspirin (ECOTRIN) 81 MG EC tablet Take 1 tablet (81 mg total) by mouth once  daily.    carvedilol (COREG) 6.25 MG tablet Take 6.25 mg by mouth 2 (two) times daily with meals.     cetirizine (ZYRTEC) 10 MG tablet Take 10 mg by mouth once daily.    COMBIVENT RESPIMAT  mcg/actuation inhaler USE 1 PUFF EVERY SIX HOURS AS NEEDED FOR WHEEZING    diltiaZEM (CARDIZEM CD) 240 MG 24 hr capsule Take 1 capsule (240 mg total) by mouth once daily.    furosemide (LASIX) 20 MG tablet Take 20 mg by mouth 2 (two) times daily.    iron-vitamin C 100-250 mg, ICAR-C, 100-250 mg Tab TAKE ONE TABLET BY MOUTH ONCE DAILY    levothyroxine (SYNTHROID) 50 MCG tablet Take 1 tablet (50 mcg total) by mouth before breakfast.    selenium 50 mcg Tab Take 200 mcg by mouth nightly.     [DISCONTINUED] metformin (GLUCOPHAGE) 1000 MG tablet Take 750 mg by mouth every evening.     [DISCONTINUED] pantoprazole (PROTONIX) 40 MG tablet Take 1 tablet (40 mg total) by mouth 2 (two) times daily.    [DISCONTINUED] pravastatin (PRAVACHOL) 20 MG tablet Take 1 tablet (20 mg total) by mouth every evening.    [DISCONTINUED] vitamin D 1000 units Tab Take 1,000 Units by mouth.     Family History     Problem Relation (Age of Onset)    Cancer Mother    Heart attack Father    Hypertension Father        Social History Main Topics    Smoking status: Former Smoker     Packs/day: 2.00     Years: 20.00     Types: Cigarettes     Quit date: 1/1/1977    Smokeless tobacco: Former User    Alcohol use No    Drug use: No    Sexual activity: Not Currently     Review of Systems   Constitutional: Positive for activity change and fatigue. Negative for chills, diaphoresis and fever.   HENT: Negative for congestion, sore throat and voice change.    Eyes: Negative for photophobia and visual disturbance.   Respiratory: Negative for cough, shortness of breath, wheezing and stridor.    Cardiovascular: Negative for chest pain and leg swelling.   Gastrointestinal: Negative for abdominal distention, abdominal pain, blood in stool, constipation,  diarrhea, nausea and vomiting.   Endocrine: Negative for polydipsia, polyphagia and polyuria.   Genitourinary: Negative for difficulty urinating, dysuria, flank pain, testicular pain and urgency.   Musculoskeletal: Negative for back pain, joint swelling, neck pain and neck stiffness.   Skin: Negative for color change and rash.   Allergic/Immunologic: Negative for immunocompromised state.   Neurological: Negative for dizziness, syncope, weakness, numbness and headaches.   Hematological: Does not bruise/bleed easily.   Psychiatric/Behavioral: Negative for agitation, behavioral problems and confusion.     Objective:     Vital Signs (Most Recent):  Temp: 96.6 °F (35.9 °C) (04/09/18 1918)  Pulse: 69 (04/09/18 1918)  Resp: 20 (04/09/18 1918)  BP: (!) 149/67 (04/09/18 1918)  SpO2: (!) 93 % (04/09/18 1918) Vital Signs (24h Range):  Temp:  [96.6 °F (35.9 °C)-98 °F (36.7 °C)] 96.6 °F (35.9 °C)  Pulse:  [69-87] 69  Resp:  [16-20] 20  SpO2:  [80 %-94 %] 93 %  BP: (134-154)/(61-69) 149/67     Weight: 79 kg (174 lb 2 oz)  Body mass index is 26.48 kg/m².    Physical Exam   Constitutional: He is oriented to person, place, and time. He appears well-developed. No distress.   elderly   HENT:   Head: Normocephalic and atraumatic.   Nose: Nose normal.   Eyes: Conjunctivae and EOM are normal. Pupils are equal, round, and reactive to light. No scleral icterus.   Neck: Normal range of motion. Neck supple. No tracheal deviation present.   Cardiovascular: Normal rate, regular rhythm, normal heart sounds and intact distal pulses.    No murmur heard.  Pulmonary/Chest: Effort normal and breath sounds normal. No stridor. No respiratory distress. He has no wheezes. He has no rales.   Abdominal: Soft. Bowel sounds are normal. He exhibits mass ( chronic). He exhibits no distension. There is no tenderness. There is no guarding.   Genitourinary:   Genitourinary Comments: ua neg  Check occult stool      Musculoskeletal: Normal range of motion. He  exhibits no edema or deformity.   Neurological: He is alert and oriented to person, place, and time. No cranial nerve deficit.   Skin: Skin is warm and dry. Capillary refill takes less than 2 seconds. No rash noted. He is not diaphoretic. There is pallor.   Psychiatric: He has a normal mood and affect. His behavior is normal. Judgment and thought content normal.   Nursing note and vitals reviewed.        CRANIAL NERVES     CN III, IV, VI   Pupils are equal, round, and reactive to light.  Extraocular motions are normal.        Significant Labs: All pertinent labs within the past 24 hours have been reviewed.  Results for orders placed or performed during the hospital encounter of 04/09/18   CBC auto differential   Result Value Ref Range    WBC 10.17 3.90 - 12.70 K/uL    RBC 4.01 (L) 4.60 - 6.20 M/uL    Hemoglobin 6.9 (L) 14.0 - 18.0 g/dL    Hematocrit 26.6 (L) 40.0 - 54.0 %    MCV 66 (L) 82 - 98 fL    MCH 17.2 (L) 27.0 - 31.0 pg    MCHC 25.9 (L) 32.0 - 36.0 g/dL    RDW 22.3 (H) 11.5 - 14.5 %    Platelets 465 (H) 150 - 350 K/uL    MPV 9.0 (L) 9.2 - 12.9 fL    Gran # (ANC) 7.2 1.8 - 7.7 K/uL    Lymph # 1.5 1.0 - 4.8 K/uL    Mono # 0.9 0.3 - 1.0 K/uL    Eos # 0.4 0.0 - 0.5 K/uL    Baso # 0.08 0.00 - 0.20 K/uL    Gran% 71.8 38.0 - 73.0 %    Lymph% 14.4 (L) 18.0 - 48.0 %    Mono% 9.0 4.0 - 15.0 %    Eosinophil% 4.0 0.0 - 8.0 %    Basophil% 0.8 0.0 - 1.9 %    Aniso Moderate     Poik Slight     Poly Occasional     Hypo Moderate     Ovalocytes Occasional     Schistocytes Present     Differential Method Automated    Comprehensive metabolic panel   Result Value Ref Range    Sodium 141 136 - 145 mmol/L    Potassium 4.2 3.5 - 5.1 mmol/L    Chloride 103 95 - 110 mmol/L    CO2 25 23 - 29 mmol/L    Glucose 137 (H) 70 - 110 mg/dL    BUN, Bld 44 (H) 8 - 23 mg/dL    Creatinine 2.0 (H) 0.5 - 1.4 mg/dL    Calcium 8.9 8.7 - 10.5 mg/dL    Total Protein 6.9 6.0 - 8.4 g/dL    Albumin 3.6 3.5 - 5.2 g/dL    Total Bilirubin 0.5 0.1 - 1.0 mg/dL     Alkaline Phosphatase 108 55 - 135 U/L    AST 18 10 - 40 U/L    ALT 12 10 - 44 U/L    Anion Gap 13 8 - 16 mmol/L    eGFR if African American 33.7 (A) >60 mL/min/1.73 m^2    eGFR if non  29.1 (A) >60 mL/min/1.73 m^2   Urinalysis   Result Value Ref Range    Specimen UA Urine, Clean Catch     Color, UA Yellow Yellow, Straw, Randa    Appearance, UA Clear Clear    pH, UA 6.0 5.0 - 8.0    Specific Gravity, UA 1.010 1.005 - 1.030    Protein, UA Negative Negative    Glucose, UA Negative Negative    Ketones, UA Negative Negative    Bilirubin (UA) Negative Negative    Occult Blood UA Negative Negative    Nitrite, UA Negative Negative    Urobilinogen, UA Negative <2.0 EU/dL    Leukocytes, UA Negative Negative   Brain natriuretic peptide   Result Value Ref Range     (H) 0 - 99 pg/mL   CK   Result Value Ref Range    CPK 41 20 - 200 U/L   Troponin I   Result Value Ref Range    Troponin I 0.021 0.000 - 0.026 ng/mL       Significant Imaging: I have reviewed all pertinent imaging results/findings within the past 24 hours.   Imaging Results          X-Ray Chest AP Portable (Final result)  Result time 04/09/18 13:51:03    Final result by MATTIE Lynn Sr., MD (04/09/18 13:51:03)                 Impression:      There is a mild amount of interstitial and alveolar opacities in the bases of both lungs with Kerley B lines visualized bilaterally. This is characteristic of pulmonary edema.      Electronically signed by: MATTIE LYNN MD  Date:     04/09/18  Time:    13:51              Narrative:    One-view chest x-ray    Clinical History: Shortness of breath    Finding: Comparison was made to a prior examination performed on 9/11/2017. The size of the heart is normal. There is a mild amount of interstitial and alveolar opacities in the bases of both lungs with Kerley B lines visualized bilaterally. There is no pneumothorax.  The costophrenic angles are sharp.                              I have personally reviewed  the patients labs, imaging, and discussed the patient case in detail with the Er provider    Assessment/Plan:     * Symptomatic anemia    Correct aneimia   Check iron studies.           GI bleed acute vs chronic    Likely chronic   Check stool sample for occult  PPI BID   Monitor H/H  Consider GI consult pending course        Acute-on-chronic kidney injury    Correct anemia  monitor  Renal consult if need          Essential hypertension    Monitor trends  Continue home meds        COPD (chronic obstructive pulmonary disease)    Stable  Continue neb/ICS        Diabetes mellitus type 2, uncontrolled, with renal complications    Check A1C  SSI  Monitor             VTE Risk Mitigation         Ordered     Place SALUD hose  Until discontinued      04/09/18 1953     Place sequential compression device  Until discontinued      04/09/18 1953     Reason for No Pharmacological VTE Prophylaxis  Once      04/09/18 1953     IP VTE HIGH RISK PATIENT  Once      04/09/18 1953     Place sequential compression device  Until discontinued      04/09/18 1900             Heber Mcclure NP  Department of Hospital Medicine   [facility]

## 2018-04-10 NOTE — HPI
Jimmy Young is an 87 year old male with HTN, atrial fibrillation and COPD who presented to the [facility] ED with complaints of worsening fatigue and dyspnea on exertion for the last week. Nothing relieves his symptoms. He states that he was anemic the last time he had these symptoms. 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 The patient currently denies symptoms of bleeding including melena and BRBPR. The patient takes a daily 81 mg ASA. No other complaints or concerns. In the ED, hemoglobin was 6.9 and hematocrit was 26.6. One unit of PRBC was ordered for transfusion. Hospital Medicine was consulted. The patient will be placed in Observation for further evaluation

## 2018-04-10 NOTE — CONSULTS
Met with patient, daughter bedside. Informed them that referral has been made to [facility]. Provided advanced directives forms. Daughter stated that they have documents with her being medical POA.

## 2018-04-10 NOTE — PLAN OF CARE
Problem: Patient Care Overview  Goal: Plan of Care Review  Outcome: Ongoing (interventions implemented as appropriate)  Patient doing well since arriving to unit. Vital signs stable. Received 1 unit PRBCs-no transfusion reaction. Blood glucose monitored. No complaints of pain. Will continue to monitor patient.

## 2018-04-10 NOTE — PROGRESS NOTES
D/C instructions were given, all questions were answered, no signs of distress were noted. IV access was removed with catheter intact. Heart monitor was removed. Patient's portable oxygen was transported

## 2018-04-10 NOTE — PROGRESS NOTES
Pt denies any sob at this time. Spoke with sin FRENCH regarding findings. Pt placed on room air for abg. PT and family educated about abg and its uses.

## 2018-04-11 NOTE — HOSPITAL COURSE
The patient was admitted for symptomatic anemia. He is known to have a history of bleeding AVMs. Prior to admission, the patient was taking a daily 81 mg ASA. He received a unit of PRBCs and responded appropriately. Following transfusion, the patient's symptoms of fatigue and [place] resolved. However, his room air oxygenation was low. Initially, this was thought to be due to poor perfusion and inaccurate transcutaneous measurements. However, ABG confirmed significant hypoxia on room air. The patient required 5 liters of nasal cannula oxygen to improve oxygenation to greater than 88%. This was thought to be due to his COPD. Outpatient follow up with Pulmonology was arranged. Additionally, the patient was referred to Nephrology for management of his CKD.

## 2018-04-13 LAB
BLD PROD TYP BPU: NORMAL
BLD PROD TYP BPU: NORMAL
BLOOD UNIT EXPIRATION DATE: NORMAL
BLOOD UNIT EXPIRATION DATE: NORMAL
BLOOD UNIT TYPE CODE: 6200
BLOOD UNIT TYPE CODE: 6200
BLOOD UNIT TYPE: NORMAL
BLOOD UNIT TYPE: NORMAL
CODING SYSTEM: NORMAL
CODING SYSTEM: NORMAL
DISPENSE STATUS: NORMAL
DISPENSE STATUS: NORMAL
NUM UNITS TRANS PACKED RBC: NORMAL
NUM UNITS TRANS PACKED RBC: NORMAL

## 2018-04-13 NOTE — DISCHARGE SUMMARY
[facility] Hospital Medicine  Discharge Summary      Patient Name: Jimmy Young  MRN: [id number]  Admission Date: 4/9/2018  Hospital Length of Stay: 0 days  Discharge Date and Time: 4/10/2018  2:22 PM  Attending Physician: No att. providers found   Discharging Provider: MANOLO Causey  Primary Care Provider: Nacho Richardson MD      HPI:   Jimmy Young is an 87 year old male with HTN, atrial fibrillation and COPD who presented to the [facility] ED with complaints of worsening fatigue and dyspnea on exertion for the last week. Nothing relieves  his symptoms. He states that he was anemic the last time he had these symptoms. 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 The patient currently denies symptoms of bleeding including melena and BRBPR. The patient takes a daily 81 mg ASA. No other complaints or concerns. In the ED, hemoglobin was 6.9 and hematocrit was 26.6. One unit of PRBC was ordered for transfusion. Hospital Medicine was consulted. The patient will be placed in Observation for further evaluation        * No surgery found *      Hospital Course:   The patient was admitted for symptomatic anemia. He is known to have a history of bleeding AVMs. Prior to admission, the patient was taking a daily 81 mg ASA. He received a unit of PRBCs and responded appropriately. Following transfusion, the patient's symptoms of fatigue and [place] resolved. However, his room air oxygenation was low. Initially, this was thought to be due to poor perfusion and inaccurate transcutaneous measurements. However, ABG confirmed significant hypoxia on room air. The patient required 5 liters of nasal cannula oxygen to improve oxygenation to greater than 88%. This was thought to be due to his COPD. Outpatient follow up with Pulmonology was arranged. Additionally, the patient was referred to Nephrology for management of his CKD.     "    Consults:   Consults         Status Ordering Provider     Inpatient consult to Social Work  Once     Provider:  (Not yet assigned)    LIDIA Kincaid        Final Active Diagnoses:    Diagnosis Date Noted POA    PRINCIPAL PROBLEM:  Symptomatic anemia [D64.9] 04/09/2018 Yes    Acute-on-chronic kidney injury [N17.9, N18.9] 04/10/2018 Yes    COPD (chronic obstructive pulmonary disease) [J44.9] 10/05/2017 Yes    GI bleed acute vs chronic [K92.2] 09/12/2017 Yes    Diabetes mellitus type 2, uncontrolled, with renal complications [E11.29, E11.65] 09/12/2017 Yes    Essential hypertension [I10] 10/04/2015 Yes     Chronic      Problems Resolved During this Admission:    Diagnosis Date Noted Date Resolved POA       Discharged Condition: stable    Disposition: Home or Self Care    Follow Up:  Follow-up Information     Nacho Richardson MD In 3 days.    Specialty:  Internal Medicine  Contact information:  [address] [facility] [address]  [phone]             [facility] In 2 weeks.    Specialty:  Nephrology  Contact information:  [address] [place] [address]  [phone]           [facility] In 1 week.    Specialty:  Gastroenterology  Contact information:  [address] [place] [address]  [phone]           [facility] PULMONARY MEDICINE In 1 week.    Specialty:  Pulmonology  Contact information:  [address] [place] [id number] [phone]               Patient Instructions:     OXYGEN FOR HOME USE   Order Specific Question Answer Comments   Liter Flow 5    Duration Continuous    Qualifying SpO2: 73%    Testing done at: Rest    Route nasal cannula    Device home concentrator with portable unit    Patient condition with qualifying saturation COPD    Height: 5' 8" (1.727 m)    Weight: 79 kg (174 lb 2 oz)    Alternative treatment measures have been tried or considered and deemed " clinically ineffective. Yes      Ambulatory Referral to Nephrology   Referral Priority: Routine Referral Type: Consultation   Referral Reason: Specialty Services Required    Requested Specialty: Nephrology    Number of Visits Requested: 1      Ambulatory consult to Gastroenterology   Referral Priority: Routine Referral Type: Consultation   Referral Reason: Specialty Services Required    Requested Specialty: Gastroenterology    Number of Visits Requested: 1      Diet diabetic     Activity as tolerated         Significant Diagnostic Studies  Recent Labs  Lab 04/10/18  0334   TROPONINI 0.025    and All labs within the past 24 hours have been reviewed    Pending Diagnostic Studies:     None         Medications:  Reconciled Home Medications:      Medication List      CONTINUE taking these medications    ascorbic acid (vitamin C) 1000 MG tablet  Commonly known as:  VITAMIN C  Take 1,000 mg by mouth Daily.     carvedilol 6.25 MG tablet  Commonly known as:  COREG  Take 6.25 mg by mouth 2 (two) times daily with meals.     cetirizine 10 MG tablet  Commonly known as:  ZYRTEC  Take 10 mg by mouth once daily.     COMBIVENT RESPIMAT  mcg/actuation inhaler  Generic drug:  ipratropium-albuterol  USE 1 PUFF EVERY SIX HOURS AS NEEDED FOR WHEEZING     diltiaZEM 240 MG 24 hr capsule  Commonly known as:  CARDIZEM CD  Take 1 capsule (240 mg total) by mouth once daily.     furosemide 20 MG tablet  Commonly known as:  LASIX  Take 20 mg by mouth 2 (two) times daily.     iron-vitamin C 100-250 mg (ICAR-C) 100-250 mg Tab  TAKE ONE TABLET BY MOUTH ONCE DAILY     levothyroxine 50 MCG tablet  Commonly known as:  SYNTHROID  Take 1 tablet (50 mcg total) by mouth before breakfast.     metFORMIN 750 MG 24 hr tablet  Commonly known as:  GLUCOPHAGE-XR  Take 750 mg by mouth every evening.     pantoprazole 40 MG tablet  Commonly known as:  PROTONIX  Take 1 tablet by mouth Daily.     pravastatin 40 MG tablet  Commonly known as:  PRAVACHOL  Take 40  mg by mouth nightly.     selenium 50 mcg Tab  Take 200 mcg by mouth nightly.     vitamin D 1000 units Tab  Take 1 tablet by mouth Daily.        STOP taking these medications    aspirin 81 MG EC tablet  Commonly known as:  ECOTRIN            Indwelling Lines/Drains at time of discharge:   Lines/Drains/Airways          No matching active lines, drains, or airways          Time spent on the discharge of patient: Greater than 30 minutes. Patient was seen and examined on the date of discharge and determined to be suitable for discharge.         MANOLO Causey  Department of Hospital Medicine  [facility]

## 2018-04-16 ENCOUNTER — TELEPHONE (OUTPATIENT)
Dept: GASTROENTEROLOGY | Facility: CLINIC | Age: 83
End: 2018-04-16

## 2018-04-16 NOTE — TELEPHONE ENCOUNTER
----- Message from rSi Shipleyite sent at 4/16/2018  8:42 AM CDT -----  Contact: Chrissy (pt's daughter)   Chrissy called and stated that the pt could not keep his appt today but she is requesting that the pt be squeezed into the schedule next week. She can be reached at [phone].    Thanks,  TF

## 2018-04-20 ENCOUNTER — OFFICE VISIT (OUTPATIENT)
Dept: CARDIOLOGY | Facility: CLINIC | Age: 83
End: 2018-04-20
Payer: MEDICARE

## 2018-04-20 VITALS
WEIGHT: 174 LBS | SYSTOLIC BLOOD PRESSURE: 146 MMHG | DIASTOLIC BLOOD PRESSURE: 66 MMHG | BODY MASS INDEX: 26.37 KG/M2 | HEART RATE: 73 BPM | HEIGHT: 68 IN

## 2018-04-20 DIAGNOSIS — D64.9 SYMPTOMATIC ANEMIA: ICD-10-CM

## 2018-04-20 DIAGNOSIS — I48.0 PAROXYSMAL ATRIAL FIBRILLATION: Primary | ICD-10-CM

## 2018-04-20 DIAGNOSIS — E78.00 HYPERCHOLESTEROLEMIA: ICD-10-CM

## 2018-04-20 DIAGNOSIS — I10 ESSENTIAL HYPERTENSION: Chronic | ICD-10-CM

## 2018-04-20 DIAGNOSIS — K31.811 ANGIODYSPLASIA OF DUODENUM WITH HEMORRHAGE: ICD-10-CM

## 2018-04-20 PROCEDURE — 99214 OFFICE O/P EST MOD 30 MIN: CPT | Mod: S$GLB,,, | Performed by: INTERNAL MEDICINE

## 2018-04-20 PROCEDURE — 99999 PR PBB SHADOW E&M-EST. PATIENT-LVL III: CPT | Mod: PBBFAC,,, | Performed by: INTERNAL MEDICINE

## 2018-04-20 NOTE — PROGRESS NOTES
Subjective:   Patient ID:  Jimmy Young is a 87 y.o. male who presents for follow up of Follow-up      88 yo male, came in for routine f/u.  PMH PAF, off ELiqus after AVM comfirmed by EGD recently, HTN, Skin cancer, AAA, CKD, COPD. F/u with Jeff Caba and Hans  Admitted to [facility] recently for copd exacerbation. Now on home O2. Also had PRBC due to anemia.  EKG was sinus with 1st AVB.  No chest pain, palpitation, dizziness, orthopnea and syncope  Ano smoking/drining.  Lives by himself and relatively independent.     Addendum on 10-. information provided by [facility].   Cath in  LM, and LAD madhuri, LCx 50 to 60%, RCA 40 to 50%.  NUKE in  EF 12%  ECHO in  normal EF, normal LV size.        Past Medical History:   Diagnosis Date    Aneurysm 2016    abdominal, stent placed    Arthritis     Asthma     Atrial fibrillation     Cancer     skin cancer on face    Diabetes mellitus     Emphysema lung     Encounter for blood transfusion     Hypertension        Past Surgical History:   Procedure Laterality Date    ABDOMINAL AORTIC ANEURYSM REPAIR      Stent placed    ABDOMINAL SURGERY      COLONOSCOPY Left 6/12/2017    Procedure: COLONOSCOPY;  Surgeon: Boaz Toussaint MD;  Location: [facility] ENDO;  Service: Endoscopy;  Laterality: Left;    VASCULAR SURGERY      abdominal aneurysm repair       Social History   Substance Use Topics    Smoking status: Former Smoker     Packs/day: 2.00     Years: 20.00     Types: Cigarettes     Quit date: 1/1/1977    Smokeless tobacco: Former User    Alcohol use No       Family History   Problem Relation Age of Onset    Hypertension Father     Heart attack Father     Cancer Mother      bladder         Review of Systems   Constitution: Negative for decreased appetite, diaphoresis, fever, weakness, malaise/fatigue and night sweats.   HENT: Negative for nosebleeds.    Eyes: Negative for blurred vision and double vision.   Cardiovascular: Negative for chest pain,  claudication, dyspnea on exertion, irregular heartbeat, leg swelling, near-syncope, orthopnea, palpitations, paroxysmal nocturnal dyspnea and syncope.   Respiratory: Positive for shortness of breath. Negative for cough, sleep disturbances due to breathing, snoring, sputum production and wheezing.    Endocrine: Negative for cold intolerance and polyuria.   Hematologic/Lymphatic: Does not bruise/bleed easily.   Skin: Negative for rash.   Musculoskeletal: Negative for back pain, falls, joint pain, joint swelling and neck pain.   Gastrointestinal: Negative for abdominal pain, heartburn, nausea and vomiting.   Genitourinary: Negative for dysuria, frequency and hematuria.   Neurological: Negative for difficulty with concentration, dizziness, focal weakness, headaches, light-headedness, numbness and seizures.   Psychiatric/Behavioral: Negative for depression, memory loss and substance abuse. The patient does not have insomnia.    Allergic/Immunologic: Negative for HIV exposure and hives.       Objective:   Physical Exam   Constitutional: He is oriented to person, place, and time. He appears well-nourished.   HENT:   Head: Normocephalic.   Eyes: Pupils are equal, round, and reactive to light.   Neck: Normal carotid pulses and no JVD present. Carotid bruit is not present. No thyromegaly present.   Cardiovascular: Normal rate, regular rhythm, normal heart sounds and normal pulses.   No extrasystoles are present. PMI is not displaced.  Exam reveals no gallop and no S3.    No murmur heard.  Pulmonary/Chest: Breath sounds normal. No stridor. No respiratory distress.   + bronchi   Abdominal: Soft. Bowel sounds are normal. There is no tenderness. There is no rebound.   Musculoskeletal: Normal range of motion.   Neurological: He is alert and oriented to person, place, and time.   Skin: Skin is intact. No rash noted.   Face skin looks blue.     Psychiatric: His behavior is normal.       Lab Results   Component Value Date    CHOL 71  (L) 04/10/2018     Lab Results   Component Value Date    HDL 22 (L) 04/10/2018     Lab Results   Component Value Date    LDLCALC 22.4 (L) 04/10/2018     Lab Results   Component Value Date    TRIG 133 04/10/2018     Lab Results   Component Value Date    CHOLHDL 31.0 04/10/2018       Chemistry        Component Value Date/Time     04/10/2018 0334    K 3.7 04/10/2018 0334     04/10/2018 0334    CO2 30 (H) 04/10/2018 0334    BUN 39 (H) 04/10/2018 0334    CREATININE 1.7 (H) 04/10/2018 0334    GLU 87 04/10/2018 0334        Component Value Date/Time    CALCIUM 8.8 04/10/2018 0334    ALKPHOS 108 04/09/2018 1345    AST 18 04/09/2018 1345    ALT 12 04/09/2018 1345    BILITOT 0.5 04/09/2018 1345    ESTGFRAFRICA 41 (A) 04/10/2018 0334    EGFRNONAA 35 (A) 04/10/2018 0334          Lab Results   Component Value Date    TSH 5.434 (H) 09/12/2017     Lab Results   Component Value Date    INR 1.1 04/09/2018    INR 1.2 09/11/2017    INR 1.2 06/10/2017     Lab Results   Component Value Date    WBC 7.93 04/10/2018    HGB 8.1 (L) 04/10/2018    HCT 30.1 (L) 04/10/2018    MCV 70 (L) 04/10/2018     (H) 04/10/2018     BMP  Sodium   Date Value Ref Range Status   04/10/2018 144 136 - 145 mmol/L Final     Potassium   Date Value Ref Range Status   04/10/2018 3.7 3.5 - 5.1 mmol/L Final     Chloride   Date Value Ref Range Status   04/10/2018 101 95 - 110 mmol/L Final     CO2   Date Value Ref Range Status   04/10/2018 30 (H) 23 - 29 mmol/L Final     BUN, Bld   Date Value Ref Range Status   04/10/2018 39 (H) 8 - 23 mg/dL Final     Creatinine   Date Value Ref Range Status   04/10/2018 1.7 (H) 0.5 - 1.4 mg/dL Final     Calcium   Date Value Ref Range Status   04/10/2018 8.8 8.7 - 10.5 mg/dL Final     Anion Gap   Date Value Ref Range Status   04/10/2018 13 8 - 16 mmol/L Final     eGFR if    Date Value Ref Range Status   04/10/2018 41 (A) >60 mL/min/1.73 m^2 Final     eGFR if non    Date Value Ref Range  Status   04/10/2018 35 (A) >60 mL/min/1.73 m^2 Final     Comment:     Calculation used to obtain the estimated glomerular filtration  rate (eGFR) is the CKD-EPI equation.        CrCl cannot be calculated (Patient's most recent lab result is older than the maximum 7 days allowed.).     Assessment:      1. Paroxysmal atrial fibrillation    2. Essential hypertension    3. Hypercholesterolemia    4. Angiodysplasia of duodenum with hemorrhage    5. Symptomatic anemia      PAF on sinus  Can not tolerate anticoagulation due to AVM and anemia    Plan:   Continue Coreg, statin and Lisinopril  On Lasix  Fall precaution  RTC in 1 yr

## 2018-10-04 DIAGNOSIS — I48.0 PAROXYSMAL ATRIAL FIBRILLATION: Chronic | ICD-10-CM

## 2018-10-05 RX ORDER — DILTIAZEM HYDROCHLORIDE 240 MG/1
CAPSULE, EXTENDED RELEASE ORAL
Qty: 30 CAPSULE | Refills: 11 | Status: SHIPPED | OUTPATIENT
Start: 2018-10-05 | End: 2019-09-30 | Stop reason: SDUPTHER

## 2019-04-13 ENCOUNTER — HOSPITAL ENCOUNTER (EMERGENCY)
Facility: HOSPITAL | Age: 84
Discharge: HOME | End: 2019-04-13
Attending: EMERGENCY MEDICINE
Payer: MEDICARE

## 2019-04-13 VITALS
TEMPERATURE: 99 F | BODY MASS INDEX: 26.48 KG/M2 | HEART RATE: 70 BPM | DIASTOLIC BLOOD PRESSURE: 77 MMHG | OXYGEN SATURATION: 94 % | WEIGHT: 174.19 LBS | RESPIRATION RATE: 18 BRPM | SYSTOLIC BLOOD PRESSURE: 169 MMHG

## 2019-04-13 DIAGNOSIS — M25.572 ANKLE PAIN, LEFT: ICD-10-CM

## 2019-04-13 DIAGNOSIS — S93.402A SPRAIN OF LEFT ANKLE, UNSPECIFIED LIGAMENT, INITIAL ENCOUNTER: Primary | ICD-10-CM

## 2019-04-13 PROCEDURE — 99283 EMERGENCY DEPT VISIT LOW MDM: CPT | Mod: ER

## 2019-04-13 PROCEDURE — 25000003 PHARM REV CODE 250: Mod: ER | Performed by: NURSE PRACTITIONER

## 2019-04-13 RX ORDER — TRAMADOL HYDROCHLORIDE 50 MG/1
50 TABLET ORAL
Status: COMPLETED | OUTPATIENT
Start: 2019-04-13 | End: 2019-04-13

## 2019-04-13 RX ORDER — TRAMADOL HYDROCHLORIDE 50 MG/1
50 TABLET ORAL EVERY 6 HOURS PRN
Qty: 12 TABLET | Refills: 0 | Status: SHIPPED | OUTPATIENT
Start: 2019-04-13 | End: 2019-05-03

## 2019-04-13 RX ADMIN — TRAMADOL HYDROCHLORIDE 50 MG: 50 TABLET, COATED ORAL at 02:04

## 2019-04-13 NOTE — ED PROVIDER NOTES
Encounter Date: 4/13/2019       History     Chief Complaint   Patient presents with    Ankle Pain     L foot injury 1-2 wks ago. still having pain. swelling noted. pain with walking     The history is provided by the patient and a relative.   Leg Pain    The incident occurred at home. The injury mechanism was torsion. The incident occurred several weeks ago (rolled left ankle while tilling garden ). The pain is present in the left ankle. The quality of the pain is described as aching. The pain is at a severity of 5/10. The pain has been intermittent since onset. Pertinent negatives include no numbness, no inability to bear weight, no loss of motion, no muscle weakness, no loss of sensation and no tingling. He reports no foreign bodies present. The symptoms are aggravated by bearing weight. He has tried nothing for the symptoms. The treatment provided no relief.     Review of patient's allergies indicates:  No Known Allergies  Past Medical History:   Diagnosis Date    Aneurysm 2016    abdominal, stent placed    Arthritis     Asthma     Atrial fibrillation     Cancer     skin cancer on face    Diabetes mellitus     Emphysema lung     Encounter for blood transfusion     Hypertension      Past Surgical History:   Procedure Laterality Date    ABDOMINAL AORTIC ANEURYSM REPAIR      Stent placed    ABDOMINAL SURGERY      COLONOSCOPY Left 6/12/2017    [facility]    ESOPHAGOGASTRODUODENOSCOPY (EGD) N/A 9/13/2017    Performed by Haroon García MD at [facility]    ESOPHAGOGASTRODUODENOSCOPY (EGD) N/A 7/3/2017    [facility] at [facility]    ESOPHAGOGASTRODUODENOSCOPY (EGD) Left 6/12/2017    Per[facility] at [facility]    VASCULAR SURGERY      abdominal aneurysm repair     Family History   Problem Relation Age of Onset    Hypertension Father     Heart attack Father     Cancer Mother         bladder     Social History     Tobacco Use     Smoking status: Former Smoker     Packs/day: 2.00     Years: 20.00     Pack years: 40.00     Types: Cigarettes     Last attempt to quit: 1977     Years since quittin.3    Smokeless tobacco: Former User   Substance Use Topics    Alcohol use: No    Drug use: No     Review of Systems   Constitutional: Negative for fever.   HENT: Negative for sore throat.    Respiratory: Negative for shortness of breath.    Cardiovascular: Negative for chest pain.   Gastrointestinal: Negative for nausea.   Genitourinary: Negative for dysuria.   Musculoskeletal: Negative for back pain.        Left ankle pain   Skin: Negative for rash.   Neurological: Negative for tingling, weakness and numbness.   Hematological: Does not bruise/bleed easily.   All other systems reviewed and are negative.      Physical Exam     Initial Vitals [19 1335]   BP Pulse Resp Temp SpO2   (!) 178/79 60 20 97.9 °F (36.6 °C) (!) 89 %      MAP       --         Physical Exam    Nursing note and vitals reviewed.  Constitutional: Vital signs are normal. He appears well-developed and well-nourished. He is not diaphoretic. He is cooperative.  Non-toxic appearance. He does not have a sickly appearance. He does not appear ill. No distress.   HENT:   Head: Normocephalic. Head is without laceration.   Right Ear: External ear normal.   Left Ear: External ear normal.   Eyes: Conjunctivae and lids are normal. Lids are everted and swept, no foreign bodies found.   Neck: Trachea normal, normal range of motion, full passive range of motion without pain and phonation normal. Neck supple. No thyromegaly present.   Cardiovascular: Regular rhythm, normal heart sounds, intact distal pulses and normal pulses.   Abdominal: Soft. Normal appearance and bowel sounds are normal. He exhibits no distension, no ascites and no mass. There is no tenderness.   Musculoskeletal:        Left ankle: He exhibits decreased range of motion. He exhibits no swelling, no ecchymosis, no  deformity, no laceration and normal pulse. Tenderness. Lateral malleolus and medial malleolus tenderness found. No posterior TFL tenderness found.        Feet:    Lymphadenopathy:     He has no cervical adenopathy.     He has no axillary adenopathy.   Neurological: He is alert and oriented to person, place, and time. He has normal reflexes. No cranial nerve deficit or sensory deficit. GCS eye subscore is 4. GCS verbal subscore is 5. GCS motor subscore is 6.   Skin: Skin is warm, dry and intact. Capillary refill takes less than 2 seconds. No laceration, no rash and no abscess noted. No erythema.   Psychiatric: He has a normal mood and affect. His speech is normal and behavior is normal. Cognition and memory are normal.         ED Course   Procedures  Labs Reviewed - No data to display       Imaging Results    None           Imaging Results          CT Ankle (Including Hindfoot) Without Contrast Left (Final result)  Result time 04/13/19 15:26:16    Final result by MATTIE Lynn Sr., MD (04/13/19 15:26:16)                 Impression:      1. There is a 4 mm oval shaped osseous density adjacent to the medial aspect of the distal metaphyseal portion of the fibula.  This is characteristic of an age-indeterminate fracture fragment versus an unfused accessory ossification center.  2. There is a mild amount of subcutaneous edema around the ankle with more being located lateral to the ankle.  3. There are calcifications adjacent to the medial and lateral malleoli.  This is characteristic of prior trauma.  All CT scans at this facility use dose modulation, iterative reconstruction, and/or weight base dosing when appropriate to reduce radiation dose when appropriate to reduce radiation dose to as low as reasonably achievable.      Electronically signed by: Pelon Lynn MD  Date:    04/13/2019  Time:    15:26             Narrative:    EXAMINATION:  CT ANKLE (INCLUDING HINDFOOT) WITHOUT CONTRAST LEFT    CLINICAL  HISTORY:  questionable fracture;    TECHNIQUE:  Standard cervical spine CT protocol was performed without IV contrast.    COMPARISON:  A plain film examination of the left ankle performed on 04/13/2019.    FINDINGS:  There is a 4 mm oval shaped osseous density adjacent to the medial aspect of the distal metaphyseal portion of the fibula.  There is no dislocation.  There are calcifications adjacent to the medial and lateral malleoli.  There is a mild amount of subcutaneous edema around the ankle with more being located lateral to the ankle.  There is a mild amount of atherosclerosis.                               X-Ray Ankle Complete Left (Final result)  Result time 04/13/19 14:18:30    Final result by MATTIE Lynn Sr., MD (04/13/19 14:18:30)                 Impression:      1. An incomplete oblique fracture in the distal metaphyseal portion of the fibula cannot be excluded.  If additional imaging evaluation is clinically indicated, recommend consideration of a CT examination of the left ankle without IV contrast.  2. There are several osseous densities adjacent to the medial malleolus. The larger 1 measures 4 mm.  These are characteristic of fracture fragments or unfused accessory ossification centers.  3. There is mild prominence of the soft tissue thickness lateral to the left ankle.  This is characteristic of a soft tissue contusion or cellulitis.  4. There is a mild amount of atherosclerosis.      Electronically signed by: Pelon Lynn MD  Date:    04/13/2019  Time:    14:18             Narrative:    EXAMINATION:  XR ANKLE COMPLETE 3 VIEW LEFT    CLINICAL HISTORY:  Pain in left ankle and joints of left foot    COMPARISON:  None    FINDINGS:  There are several osseous densities adjacent to the medial malleolus.  The larger 1 measures 4 mm.  An incomplete oblique fracture in the distal metaphyseal portion of the fibula cannot be excluded.  There is no dislocation.  There is mild prominence of the soft  tissue thickness lateral to the left ankle.  There is a mild amount of atherosclerosis.                              Pain decreased with tramadol     Regarding ANKLE STRAIN, at present, the patient's mechanism of injury as well as the location leans heavily towards ankle strain.  There are no findings worrisome for neurologic deficit or infection.  Strength, sensation, incontinence are all well-maintained. Accordingly, the patient will be managed with anti-inflammatory agents along with muscle relaxants.  The patient has been encouraged to return to the emergency room immediately with any signs of deterioration and to follow up with primary care provider in 3-5 days following a period of relative rest with avoidance of heavy lifting or strenuous activity. Patient encouraged to participate in activity as tolerated though ambulation and range of motion exercises.           All historical, clinical, radiographic, and laboratory findings were reviewed with the patient in detail.  Findings are consistent with a diagnosis of ankle strain.  All remaining questions and concerns were addressed at that time.  Patient has been counseled regarding the need for follow-up as well as the indication to return to the emergency room should new or worrisome developments occur.             Clinical Impression:       ICD-10-CM ICD-9-CM   1. Sprain of left ankle, unspecified ligament, initial encounter S93.402A 845.00   2. Ankle pain, left M25.572 719.47                                Kareem Miller NP  04/13/19 4430

## 2019-04-13 NOTE — ED NOTES
Pt was tilling in the garden 3 weeks ago when he stepped on the uneven ground and twisted his ankle. Nothing was done for the ankle and the pain naturally subsided within 2 days.Today he called his son reporting pain and asked to be brought to the doctor. There is pain with movement but no pain at rest.

## 2019-04-30 DIAGNOSIS — I48.0 PAROXYSMAL ATRIAL FIBRILLATION: Primary | ICD-10-CM

## 2019-05-03 ENCOUNTER — HOSPITAL ENCOUNTER (OUTPATIENT)
Dept: CARDIOLOGY | Facility: CLINIC | Age: 84
Discharge: HOME | End: 2019-05-03
Payer: MEDICARE

## 2019-05-03 ENCOUNTER — OFFICE VISIT (OUTPATIENT)
Dept: CARDIOLOGY | Facility: CLINIC | Age: 84
End: 2019-05-03
Payer: MEDICARE

## 2019-05-03 VITALS
DIASTOLIC BLOOD PRESSURE: 74 MMHG | BODY MASS INDEX: 26.23 KG/M2 | HEART RATE: 74 BPM | TEMPERATURE: 97 F | WEIGHT: 173.06 LBS | SYSTOLIC BLOOD PRESSURE: 167 MMHG | OXYGEN SATURATION: 89 % | HEIGHT: 68 IN | RESPIRATION RATE: 19 BRPM

## 2019-05-03 DIAGNOSIS — E78.00 HYPERCHOLESTEROLEMIA: ICD-10-CM

## 2019-05-03 DIAGNOSIS — N18.30 CHRONIC KIDNEY DISEASE, STAGE 3: Chronic | ICD-10-CM

## 2019-05-03 DIAGNOSIS — I48.0 PAROXYSMAL ATRIAL FIBRILLATION: Primary | Chronic | ICD-10-CM

## 2019-05-03 DIAGNOSIS — I10 ESSENTIAL HYPERTENSION: Chronic | ICD-10-CM

## 2019-05-03 DIAGNOSIS — I48.0 PAROXYSMAL ATRIAL FIBRILLATION: ICD-10-CM

## 2019-05-03 PROCEDURE — 99214 PR OFFICE/OUTPT VISIT, EST, LEVL IV, 30-39 MIN: ICD-10-PCS | Mod: S$GLB,,, | Performed by: INTERNAL MEDICINE

## 2019-05-03 PROCEDURE — 99214 OFFICE O/P EST MOD 30 MIN: CPT | Mod: S$GLB,,, | Performed by: INTERNAL MEDICINE

## 2019-05-03 PROCEDURE — 1101F PR PT FALLS ASSESS DOC 0-1 FALLS W/OUT INJ PAST YR: ICD-10-PCS | Mod: CPTII,S$GLB,, | Performed by: INTERNAL MEDICINE

## 2019-05-03 PROCEDURE — 99999 PR PBB SHADOW E&M-EST. PATIENT-LVL III: CPT | Mod: PBBFAC,,, | Performed by: INTERNAL MEDICINE

## 2019-05-03 PROCEDURE — 99999 PR PBB SHADOW E&M-EST. PATIENT-LVL III: ICD-10-PCS | Mod: PBBFAC,,, | Performed by: INTERNAL MEDICINE

## 2019-05-03 PROCEDURE — 93000 ELECTROCARDIOGRAM COMPLETE: CPT | Mod: S$GLB,,, | Performed by: INTERNAL MEDICINE

## 2019-05-03 PROCEDURE — 1101F PT FALLS ASSESS-DOCD LE1/YR: CPT | Mod: CPTII,S$GLB,, | Performed by: INTERNAL MEDICINE

## 2019-05-03 PROCEDURE — 93000 EKG 12-LEAD: ICD-10-PCS | Mod: S$GLB,,, | Performed by: INTERNAL MEDICINE

## 2019-05-03 RX ORDER — LOSARTAN POTASSIUM 25 MG/1
25 TABLET ORAL DAILY
Qty: 90 TABLET | Refills: 3 | Status: SHIPPED | OUTPATIENT
Start: 2019-05-03 | End: 2020-04-22 | Stop reason: SDUPTHER

## 2019-05-03 RX ORDER — PRAVASTATIN SODIUM 20 MG/1
20 TABLET ORAL NIGHTLY
Qty: 30 TABLET | Refills: 5 | Status: SHIPPED | OUTPATIENT
Start: 2019-05-03

## 2019-05-03 NOTE — PROGRESS NOTES
Subjective:   Patient ID:  Jimmy Young is a 88 y.o. male who presents for follow up of Follow-up (1 year)      86 yo male, came in for routine f/u.  PMH PAF, off ELiqus after AVM comfirmed by EGD recently, HTN, Skin cancer, AAA s/p stent at [facility] by Dr. Ayala, CKD, COPD. F/u with Jeff Caba and Hans  Admitted to [facility] recently for copd exacerbation. Now on home O2. Also had PRBC due to anemia.  Today EKG was sinus with 1st AVB.  No chest pain, palpitation, dizziness, orthopnea and syncope  Ano smoking/drining.  Lives by himself and relatively independent.   Knee pain stressed and take sTramodol    Addendum on 10-. information provided by [facility].   Cath in  LM, and LAD madhuri, LCx 50 to 60%, RCA 40 to 50%.  NUKE in  EF 12%  ECHO in  normal EF, normal LV size.      Past Medical History:   Diagnosis Date    Aneurysm 2016    abdominal, stent placed    Arthritis     Asthma     Atrial fibrillation     Cancer     skin cancer on face    Diabetes mellitus     Emphysema lung     Encounter for blood transfusion     Hypertension        Past Surgical History:   Procedure Laterality Date    ABDOMINAL AORTIC ANEURYSM REPAIR      Stent placed    ABDOMINAL SURGERY      COLONOSCOPY Left 2017    Performed by Boaz Toussaint MD at [facility] ENDO    ESOPHAGOGASTRODUODENOSCOPY (EGD) N/A 2017    Performed by Haroon García MD at [facility] ENDO    ESOPHAGOGASTRODUODENOSCOPY (EGD) N/A 7/3/2017    Performed by Boaz Toussaint MD at [facility] ENDO    ESOPHAGOGASTRODUODENOSCOPY (EGD) Left 2017    Performed by Boaz Toussaint MD at [facility] ENDO    VASCULAR SURGERY      abdominal aneurysm repair       Social History     Tobacco Use    Smoking status: Former Smoker     Packs/day: 2.00     Years: 20.00     Pack years: 40.00     Types: Cigarettes     Last attempt to quit: 1977     Years since quittin.3    Smokeless tobacco: Former User   Substance Use Topics    Alcohol use: No    Drug  use: No       Family History   Problem Relation Age of Onset    Hypertension Father     Heart attack Father     Cancer Mother         bladder         Review of Systems   Constitution: Negative for decreased appetite, diaphoresis, fever, malaise/fatigue and night sweats.   HENT: Negative for nosebleeds.    Eyes: Negative for blurred vision and double vision.   Cardiovascular: Negative for chest pain, claudication, dyspnea on exertion, irregular heartbeat, leg swelling, near-syncope, orthopnea, palpitations, paroxysmal nocturnal dyspnea and syncope.   Respiratory: Positive for shortness of breath. Negative for cough, sleep disturbances due to breathing, snoring, sputum production and wheezing.    Endocrine: Negative for cold intolerance and polyuria.   Hematologic/Lymphatic: Does not bruise/bleed easily.   Skin: Negative for rash.   Musculoskeletal: Negative for back pain, falls, joint pain, joint swelling and neck pain.   Gastrointestinal: Negative for abdominal pain, heartburn, nausea and vomiting.   Genitourinary: Negative for dysuria, frequency and hematuria.   Neurological: Negative for difficulty with concentration, dizziness, focal weakness, headaches, light-headedness, numbness, seizures and weakness.   Psychiatric/Behavioral: Negative for depression, memory loss and substance abuse. The patient does not have insomnia.    Allergic/Immunologic: Negative for HIV exposure and hives.       Objective:   Physical Exam   Constitutional: He is oriented to person, place, and time. He appears well-nourished.   HENT:   Head: Normocephalic.   Eyes: Pupils are equal, round, and reactive to light.   Neck: Normal carotid pulses and no JVD present. Carotid bruit is not present. No thyromegaly present.   Cardiovascular: Normal rate, regular rhythm, normal heart sounds and normal pulses.  No extrasystoles are present. PMI is not displaced. Exam reveals no gallop and no S3.   No murmur heard.  Pulmonary/Chest: Breath sounds  normal. No stridor. No respiratory distress.   + bronchi   Abdominal: Soft. Bowel sounds are normal. There is no tenderness. There is no rebound.   Musculoskeletal: Normal range of motion.   Neurological: He is alert and oriented to person, place, and time.   Skin: Skin is intact. No rash noted.   Face skin looks blue.     Psychiatric: His behavior is normal.       Lab Results   Component Value Date    CHOL 71 (L) 04/10/2018     Lab Results   Component Value Date    HDL 22 (L) 04/10/2018     Lab Results   Component Value Date    LDLCALC 22.4 (L) 04/10/2018     Lab Results   Component Value Date    TRIG 133 04/10/2018     Lab Results   Component Value Date    CHOLHDL 31.0 04/10/2018       Chemistry        Component Value Date/Time     04/10/2018 0334    K 3.7 04/10/2018 0334     04/10/2018 0334    CO2 30 (H) 04/10/2018 0334    BUN 39 (H) 04/10/2018 0334    CREATININE 1.7 (H) 04/10/2018 0334    GLU 87 04/10/2018 0334        Component Value Date/Time    CALCIUM 8.8 04/10/2018 0334    ALKPHOS 108 04/09/2018 1345    AST 18 04/09/2018 1345    ALT 12 04/09/2018 1345    BILITOT 0.5 04/09/2018 1345    ESTGFRAFRICA 41 (A) 04/10/2018 0334    EGFRNONAA 35 (A) 04/10/2018 0334          Lab Results   Component Value Date    HGBA1C 5.9 (H) 04/10/2018     Lab Results   Component Value Date    TSH 5.434 (H) 09/12/2017     Lab Results   Component Value Date    INR 1.1 04/09/2018    INR 1.2 09/11/2017    INR 1.2 06/10/2017     Lab Results   Component Value Date    WBC 7.93 04/10/2018    HGB 8.1 (L) 04/10/2018    HCT 30.1 (L) 04/10/2018    MCV 70 (L) 04/10/2018     (H) 04/10/2018     BMP  Sodium   Date Value Ref Range Status   04/10/2018 144 136 - 145 mmol/L Final     Potassium   Date Value Ref Range Status   04/10/2018 3.7 3.5 - 5.1 mmol/L Final     Chloride   Date Value Ref Range Status   04/10/2018 101 95 - 110 mmol/L Final     CO2   Date Value Ref Range Status   04/10/2018 30 (H) 23 - 29 mmol/L Final     BUN, Bld    Date Value Ref Range Status   04/10/2018 39 (H) 8 - 23 mg/dL Final     Creatinine   Date Value Ref Range Status   04/10/2018 1.7 (H) 0.5 - 1.4 mg/dL Final     Calcium   Date Value Ref Range Status   04/10/2018 8.8 8.7 - 10.5 mg/dL Final     Anion Gap   Date Value Ref Range Status   04/10/2018 13 8 - 16 mmol/L Final     eGFR if    Date Value Ref Range Status   04/10/2018 41 (A) >60 mL/min/1.73 m^2 Final     eGFR if non    Date Value Ref Range Status   04/10/2018 35 (A) >60 mL/min/1.73 m^2 Final     Comment:     Calculation used to obtain the estimated glomerular filtration  rate (eGFR) is the CKD-EPI equation.        BNP  @LABRCNTIP(BNP,BNPTRIAGEBLO)@  @LABRCNTIP(troponini)@  CrCl cannot be calculated (Patient's most recent lab result is older than the maximum 7 days allowed.).  No results found in the last 24 hours.  No results found in the last 24 hours.  No results found in the last 24 hours.    Assessment:      1. Paroxysmal atrial fibrillation    2. Essential hypertension    3. Hypercholesterolemia    4. Chronic kidney disease, stage 3      PAF on sinus   High BP  LDL 22    Plan:   Decrease Pravastatin from 40 mg to 20 mg and his LDL was 22  Add Losartan 25 mg daily for HTN  Continue coreg for HTN and PAF  DASH  RTC in 1 yr

## 2019-08-01 ENCOUNTER — LAB VISIT (OUTPATIENT)
Dept: LAB | Facility: HOSPITAL | Age: 84
End: 2019-08-01
Attending: INTERNAL MEDICINE
Payer: MEDICARE

## 2019-08-01 DIAGNOSIS — E78.00 HYPERCHOLESTEROLEMIA: ICD-10-CM

## 2019-08-01 LAB
CHOLEST SERPL-MCNC: 126 MG/DL (ref 120–199)
CHOLEST/HDLC SERPL: 3.6 {RATIO} (ref 2–5)
HDLC SERPL-MCNC: 35 MG/DL (ref 40–75)
HDLC SERPL: 27.8 % (ref 20–50)
LDLC SERPL CALC-MCNC: 65.2 MG/DL (ref 63–159)
NONHDLC SERPL-MCNC: 91 MG/DL
TRIGL SERPL-MCNC: 129 MG/DL (ref 30–150)

## 2019-08-01 PROCEDURE — 80061 LIPID PANEL: CPT

## 2019-08-01 PROCEDURE — 36415 COLL VENOUS BLD VENIPUNCTURE: CPT | Mod: PO

## 2019-08-06 ENCOUNTER — TELEPHONE (OUTPATIENT)
Dept: CARDIOLOGY | Facility: CLINIC | Age: 84
End: 2019-08-06

## 2019-08-06 NOTE — TELEPHONE ENCOUNTER
Attempted without success to contact pt with LDL 65 controlled.  Left vm to return call.     ----- Message from Dmitriy Davis MD sent at 8/5/2019  6:00 PM CDT -----  LDL 65 controlled

## 2019-08-06 NOTE — TELEPHONE ENCOUNTER
Spoke with pt with ldl results.  Pt verbalized understanding.     ----- Message from Chelita Andrews sent at 8/6/2019 11:08 AM CDT -----  Contact: pt   Type:  Patient Returning Call    Who Called:pt   Who Left Message for Patient:  Does the patient know what this is regarding?:test results   Would the patient rather a call back or a response via [facility]? Phone   Best Call Back Number:[phone]  Additional Information: #

## 2019-09-30 DIAGNOSIS — I48.0 PAROXYSMAL ATRIAL FIBRILLATION: Chronic | ICD-10-CM

## 2019-09-30 RX ORDER — DILTIAZEM HYDROCHLORIDE 240 MG/1
CAPSULE, EXTENDED RELEASE ORAL
Qty: 30 CAPSULE | Refills: 11 | Status: SHIPPED | OUTPATIENT
Start: 2019-09-30

## 2020-01-04 ENCOUNTER — HOSPITAL ENCOUNTER (OUTPATIENT)
Facility: HOSPITAL | Age: 85
Discharge: NURSING, REHAB OR LONG-TERM CARE | End: 2020-01-07
Attending: EMERGENCY MEDICINE | Admitting: EMERGENCY MEDICINE
Payer: MEDICARE

## 2020-01-04 DIAGNOSIS — E86.0 DEHYDRATION: ICD-10-CM

## 2020-01-04 DIAGNOSIS — E87.0 HYPERNATREMIA: ICD-10-CM

## 2020-01-04 DIAGNOSIS — J44.9 CHRONIC OBSTRUCTIVE PULMONARY DISEASE, UNSPECIFIED COPD TYPE: ICD-10-CM

## 2020-01-04 DIAGNOSIS — R91.8 LEFT LOWER LOBE PULMONARY INFILTRATE: Primary | ICD-10-CM
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
## 2020-01-04 DIAGNOSIS — T14.8XXA ABRASION: ICD-10-CM

## 2020-01-04 DIAGNOSIS — R53.1 WEAKNESS: ICD-10-CM

## 2020-01-04 PROBLEM — A41.9 SEVERE SEPSIS: Status: ACTIVE | Noted: 2020-01-04

## 2020-01-04 PROBLEM — H05.239 PERIORBITAL HEMATOMA: Status: ACTIVE | Noted: 2020-01-04

## 2020-01-04 PROBLEM — J18.9 PNEUMONIA: Status: ACTIVE | Noted: 2020-01-04

## 2020-01-04 PROBLEM — R74.8 ELEVATED CPK: Status: ACTIVE | Noted: 2020-01-04

## 2020-01-04 PROBLEM — R65.20 SEVERE SEPSIS: Status: ACTIVE | Noted: 2020-01-04

## 2020-01-04 LAB
ALBUMIN SERPL BCP-MCNC: 3.2 G/DL (ref 3.5–5.2)
ALLENS TEST: ABNORMAL
ALP SERPL-CCNC: 152 U/L (ref 55–135)
ALT SERPL W/O P-5'-P-CCNC: 25 U/L (ref 10–44)
ANION GAP SERPL CALC-SCNC: 18 MMOL/L (ref 8–16)
APTT BLDCRRT: 28.5 SEC (ref 21–32)
AST SERPL-CCNC: 38 U/L (ref 10–40)
BASOPHILS # BLD AUTO: 0.05 K/UL (ref 0–0.2)
BASOPHILS NFR BLD: 0.4 % (ref 0–1.9)
BILIRUB SERPL-MCNC: 0.7 MG/DL (ref 0.1–1)
BNP SERPL-MCNC: 58 PG/ML (ref 0–99)
BUN SERPL-MCNC: 78 MG/DL (ref 8–23)
CALCIUM SERPL-MCNC: 10.1 MG/DL (ref 8.7–10.5)
CHLORIDE SERPL-SCNC: 105 MMOL/L (ref 95–110)
CK MB SERPL-MCNC: 10.1 NG/ML (ref 0.1–6.5)
CK MB SERPL-RTO: 1.8 % (ref 0–5)
CK SERPL-CCNC: 576 U/L (ref 20–200)
CK SERPL-CCNC: 576 U/L (ref 20–200)
CO2 SERPL-SCNC: 28 MMOL/L (ref 23–29)
CREAT SERPL-MCNC: 1.8 MG/DL (ref 0.5–1.4)
DELSYS: ABNORMAL
DIFFERENTIAL METHOD: ABNORMAL
EOSINOPHIL # BLD AUTO: 0 K/UL (ref 0–0.5)
EOSINOPHIL NFR BLD: 0.3 % (ref 0–8)
ERYTHROCYTE [DISTWIDTH] IN BLOOD BY AUTOMATED COUNT: 13.9 % (ref 11.5–14.5)
EST. GFR  (AFRICAN AMERICAN): 37.7 ML/MIN/1.73 M^2
EST. GFR  (NON AFRICAN AMERICAN): 32.6 ML/MIN/1.73 M^2
FIO2: 21
GLUCOSE SERPL-MCNC: 164 MG/DL (ref 70–110)
HCO3 UR-SCNC: 27.7 MMOL/L (ref 24–28)
HCT VFR BLD AUTO: 53.7 % (ref 40–54)
HGB BLD-MCNC: 16.5 G/DL (ref 14–18)
IMM GRANULOCYTES # BLD AUTO: 0.18 K/UL (ref 0–0.04)
IMM GRANULOCYTES NFR BLD AUTO: 1.3 % (ref 0–0.5)
INR PPP: 1.2 (ref 0.8–1.2)
LACTATE SERPL-SCNC: 1.1 MMOL/L (ref 0.5–2.2)
LACTATE SERPL-SCNC: 2.1 MMOL/L (ref 0.5–2.2)
LYMPHOCYTES # BLD AUTO: 1.1 K/UL (ref 1–4.8)
LYMPHOCYTES NFR BLD: 8 % (ref 18–48)
MCH RBC QN AUTO: 28.4 PG (ref 27–31)
MCHC RBC AUTO-ENTMCNC: 30.7 G/DL (ref 32–36)
MCV RBC AUTO: 92 FL (ref 82–98)
MODE: ABNORMAL
MONOCYTES # BLD AUTO: 1 K/UL (ref 0.3–1)
MONOCYTES NFR BLD: 7.6 % (ref 4–15)
NEUTROPHILS # BLD AUTO: 11.2 K/UL (ref 1.8–7.7)
NEUTROPHILS NFR BLD: 82.4 % (ref 38–73)
NRBC BLD-RTO: 0 /100 WBC
PCO2 BLDA: 46 MMHG (ref 35–45)
PH SMN: 7.39 [PH] (ref 7.35–7.45)
PLATELET # BLD AUTO: 467 K/UL (ref 150–350)
PMV BLD AUTO: 9.7 FL (ref 9.2–12.9)
PO2 BLDA: 67 MMHG (ref 80–100)
POC BE: 3 MMOL/L
POC SATURATED O2: 93 % (ref 95–100)
POCT GLUCOSE: 106 MG/DL (ref 70–110)
POCT GLUCOSE: 147 MG/DL (ref 70–110)
POTASSIUM SERPL-SCNC: 4 MMOL/L (ref 3.5–5.1)
PROT SERPL-MCNC: 8.1 G/DL (ref 6–8.4)
PROTHROMBIN TIME: 12.1 SEC (ref 9–12.5)
RBC # BLD AUTO: 5.82 M/UL (ref 4.6–6.2)
SAMPLE: ABNORMAL
SITE: ABNORMAL
SODIUM SERPL-SCNC: 151 MMOL/L (ref 136–145)
TROPONIN I SERPL DL<=0.01 NG/ML-MCNC: 0.02 NG/ML (ref 0–0.03)
TSH SERPL DL<=0.005 MIU/L-ACNC: 3.02 UIU/ML (ref 0.4–4)
WBC # BLD AUTO: 13.62 K/UL (ref 3.9–12.7)

## 2020-01-04 PROCEDURE — 83036 HEMOGLOBIN GLYCOSYLATED A1C: CPT

## 2020-01-04 PROCEDURE — 84443 ASSAY THYROID STIM HORMONE: CPT | Mod: ER

## 2020-01-04 PROCEDURE — 96375 TX/PRO/DX INJ NEW DRUG ADDON: CPT

## 2020-01-04 PROCEDURE — G0378 HOSPITAL OBSERVATION PER HR: HCPCS | Mod: ER

## 2020-01-04 PROCEDURE — 25000003 PHARM REV CODE 250: Performed by: NURSE PRACTITIONER

## 2020-01-04 PROCEDURE — 90471 IMMUNIZATION ADMIN: CPT | Mod: ER | Performed by: EMERGENCY MEDICINE

## 2020-01-04 PROCEDURE — 25000242 PHARM REV CODE 250 ALT 637 W/ HCPCS: Mod: ER | Performed by: EMERGENCY MEDICINE

## 2020-01-04 PROCEDURE — 85025 COMPLETE CBC W/AUTO DIFF WBC: CPT | Mod: ER

## 2020-01-04 PROCEDURE — 94761 N-INVAS EAR/PLS OXIMETRY MLT: CPT

## 2020-01-04 PROCEDURE — 63600175 PHARM REV CODE 636 W HCPCS: Mod: ER | Performed by: EMERGENCY MEDICINE

## 2020-01-04 PROCEDURE — 90715 TDAP VACCINE 7 YRS/> IM: CPT | Mod: ER | Performed by: EMERGENCY MEDICINE

## 2020-01-04 PROCEDURE — 99285 EMERGENCY DEPT VISIT HI MDM: CPT | Mod: 25,ER

## 2020-01-04 PROCEDURE — 84484 ASSAY OF TROPONIN QUANT: CPT | Mod: ER

## 2020-01-04 PROCEDURE — 96361 HYDRATE IV INFUSION ADD-ON: CPT | Mod: ER

## 2020-01-04 PROCEDURE — 87040 BLOOD CULTURE FOR BACTERIA: CPT | Mod: 59

## 2020-01-04 PROCEDURE — 85730 THROMBOPLASTIN TIME PARTIAL: CPT | Mod: ER

## 2020-01-04 PROCEDURE — 93010 EKG 12-LEAD: ICD-10-PCS | Mod: ,,, | Performed by: INTERNAL MEDICINE

## 2020-01-04 PROCEDURE — 99900035 HC TECH TIME PER 15 MIN (STAT)

## 2020-01-04 PROCEDURE — 96361 HYDRATE IV INFUSION ADD-ON: CPT

## 2020-01-04 PROCEDURE — 93005 ELECTROCARDIOGRAM TRACING: CPT | Mod: ER

## 2020-01-04 PROCEDURE — 82550 ASSAY OF CK (CPK): CPT | Mod: ER

## 2020-01-04 PROCEDURE — 85610 PROTHROMBIN TIME: CPT | Mod: ER

## 2020-01-04 PROCEDURE — 83605 ASSAY OF LACTIC ACID: CPT | Mod: ER

## 2020-01-04 PROCEDURE — 83605 ASSAY OF LACTIC ACID: CPT | Mod: 91

## 2020-01-04 PROCEDURE — 93010 ELECTROCARDIOGRAM REPORT: CPT | Mod: ,,, | Performed by: INTERNAL MEDICINE

## 2020-01-04 PROCEDURE — 99900035 HC TECH TIME PER 15 MIN (STAT): Mod: ER

## 2020-01-04 PROCEDURE — 83880 ASSAY OF NATRIURETIC PEPTIDE: CPT | Mod: ER

## 2020-01-04 PROCEDURE — 63600175 PHARM REV CODE 636 W HCPCS: Performed by: NURSE PRACTITIONER

## 2020-01-04 PROCEDURE — 82962 GLUCOSE BLOOD TEST: CPT | Mod: ER

## 2020-01-04 PROCEDURE — 96365 THER/PROPH/DIAG IV INF INIT: CPT | Mod: ER

## 2020-01-04 PROCEDURE — 36600 WITHDRAWAL OF ARTERIAL BLOOD: CPT | Mod: ER

## 2020-01-04 PROCEDURE — 82553 CREATINE MB FRACTION: CPT | Mod: ER

## 2020-01-04 PROCEDURE — 94640 AIRWAY INHALATION TREATMENT: CPT | Mod: ER

## 2020-01-04 PROCEDURE — 27000221 HC OXYGEN, UP TO 24 HOURS

## 2020-01-04 PROCEDURE — 36415 COLL VENOUS BLD VENIPUNCTURE: CPT

## 2020-01-04 PROCEDURE — 80053 COMPREHEN METABOLIC PANEL: CPT | Mod: ER

## 2020-01-04 PROCEDURE — G0378 HOSPITAL OBSERVATION PER HR: HCPCS

## 2020-01-04 RX ORDER — LEVOTHYROXINE SODIUM 50 UG/1
50 TABLET ORAL
Status: DISCONTINUED | OUTPATIENT
Start: 2020-01-05 | End: 2020-01-07 | Stop reason: HOSPADM

## 2020-01-04 RX ORDER — IPRATROPIUM BROMIDE AND ALBUTEROL SULFATE 2.5; .5 MG/3ML; MG/3ML
3 SOLUTION RESPIRATORY (INHALATION) EVERY 6 HOURS PRN
Status: DISCONTINUED | OUTPATIENT
Start: 2020-01-04 | End: 2020-01-07 | Stop reason: HOSPADM

## 2020-01-04 RX ORDER — IBUPROFEN 200 MG
24 TABLET ORAL
Status: DISCONTINUED | OUTPATIENT
Start: 2020-01-04 | End: 2020-01-07 | Stop reason: HOSPADM

## 2020-01-04 RX ORDER — IBUPROFEN 200 MG
16 TABLET ORAL
Status: DISCONTINUED | OUTPATIENT
Start: 2020-01-04 | End: 2020-01-07 | Stop reason: HOSPADM

## 2020-01-04 RX ORDER — PRAVASTATIN SODIUM 20 MG/1
20 TABLET ORAL NIGHTLY
Status: DISCONTINUED | OUTPATIENT
Start: 2020-01-04 | End: 2020-01-07 | Stop reason: HOSPADM

## 2020-01-04 RX ORDER — PANTOPRAZOLE SODIUM 40 MG/1
40 TABLET, DELAYED RELEASE ORAL DAILY
Status: DISCONTINUED | OUTPATIENT
Start: 2020-01-04 | End: 2020-01-07 | Stop reason: HOSPADM

## 2020-01-04 RX ORDER — SODIUM CHLORIDE 9 MG/ML
1000 INJECTION, SOLUTION INTRAVENOUS
Status: COMPLETED | OUTPATIENT
Start: 2020-01-04 | End: 2020-01-04

## 2020-01-04 RX ORDER — CARVEDILOL 6.25 MG/1
6.25 TABLET ORAL 2 TIMES DAILY WITH MEALS
Status: DISCONTINUED | OUTPATIENT
Start: 2020-01-04 | End: 2020-01-07 | Stop reason: HOSPADM

## 2020-01-04 RX ORDER — SODIUM CHLORIDE 0.9 % (FLUSH) 0.9 %
10 SYRINGE (ML) INJECTION
Status: DISCONTINUED | OUTPATIENT
Start: 2020-01-04 | End: 2020-01-07 | Stop reason: HOSPADM

## 2020-01-04 RX ORDER — IPRATROPIUM BROMIDE AND ALBUTEROL SULFATE 2.5; .5 MG/3ML; MG/3ML
3 SOLUTION RESPIRATORY (INHALATION)
Status: COMPLETED | OUTPATIENT
Start: 2020-01-04 | End: 2020-01-04

## 2020-01-04 RX ORDER — SODIUM CHLORIDE 450 MG/100ML
INJECTION, SOLUTION INTRAVENOUS CONTINUOUS
Status: DISCONTINUED | OUTPATIENT
Start: 2020-01-04 | End: 2020-01-07 | Stop reason: HOSPADM

## 2020-01-04 RX ORDER — SODIUM CHLORIDE 9 MG/ML
1000 INJECTION, SOLUTION INTRAVENOUS
Status: DISCONTINUED | OUTPATIENT
Start: 2020-01-04 | End: 2020-01-04

## 2020-01-04 RX ORDER — ENOXAPARIN SODIUM 100 MG/ML
30 INJECTION SUBCUTANEOUS EVERY 24 HOURS
Status: DISCONTINUED | OUTPATIENT
Start: 2020-01-04 | End: 2020-01-04

## 2020-01-04 RX ORDER — GLUCAGON 1 MG
1 KIT INJECTION
Status: DISCONTINUED | OUTPATIENT
Start: 2020-01-04 | End: 2020-01-07 | Stop reason: HOSPADM

## 2020-01-04 RX ORDER — SODIUM CHLORIDE 9 MG/ML
INJECTION, SOLUTION INTRAVENOUS CONTINUOUS
Status: DISCONTINUED | OUTPATIENT
Start: 2020-01-04 | End: 2020-01-04

## 2020-01-04 RX ORDER — INSULIN ASPART 100 [IU]/ML
1-10 INJECTION, SOLUTION INTRAVENOUS; SUBCUTANEOUS
Status: DISCONTINUED | OUTPATIENT
Start: 2020-01-04 | End: 2020-01-07 | Stop reason: HOSPADM

## 2020-01-04 RX ADMIN — SODIUM CHLORIDE 1000 ML: 0.9 INJECTION, SOLUTION INTRAVENOUS at 11:01

## 2020-01-04 RX ADMIN — CARVEDILOL 6.25 MG: 6.25 TABLET, FILM COATED ORAL at 08:01

## 2020-01-04 RX ADMIN — SODIUM CHLORIDE 500 ML: 0.9 INJECTION, SOLUTION INTRAVENOUS at 12:01

## 2020-01-04 RX ADMIN — PANTOPRAZOLE SODIUM 40 MG: 40 TABLET, DELAYED RELEASE ORAL at 08:01

## 2020-01-04 RX ADMIN — CLOSTRIDIUM TETANI TOXOID ANTIGEN (FORMALDEHYDE INACTIVATED), CORYNEBACTERIUM DIPHTHERIAE TOXOID ANTIGEN (FORMALDEHYDE INACTIVATED), BORDETELLA PERTUSSIS TOXOID ANTIGEN (GLUTARALDEHYDE INACTIVATED), BORDETELLA PERTUSSIS FILAMENTOUS HEMAGGLUTININ ANTIGEN (FORMALDEHYDE INACTIVATED), BORDETELLA PERTUSSIS PERTACTIN ANTIGEN, AND BORDETELLA PERTUSSIS FIMBRIAE 2/3 ANTIGEN 0.5 ML: 5; 2; 2.5; 5; 3; 5 INJECTION, SUSPENSION INTRAMUSCULAR at 11:01

## 2020-01-04 RX ADMIN — AZITHROMYCIN MONOHYDRATE 500 MG: 500 INJECTION, POWDER, LYOPHILIZED, FOR SOLUTION INTRAVENOUS at 08:01

## 2020-01-04 RX ADMIN — CEFTRIAXONE 1 G: 1 INJECTION, SOLUTION INTRAVENOUS at 12:01

## 2020-01-04 RX ADMIN — PRAVASTATIN SODIUM 20 MG: 20 TABLET ORAL at 08:01

## 2020-01-04 RX ADMIN — SODIUM CHLORIDE: 0.45 INJECTION, SOLUTION INTRAVENOUS at 08:01

## 2020-01-04 RX ADMIN — IPRATROPIUM BROMIDE AND ALBUTEROL SULFATE 3 ML: .5; 3 SOLUTION RESPIRATORY (INHALATION) at 11:01

## 2020-01-04 NOTE — ED NOTES
Accepted to  206 at [facility].  Pt is resting- easily awakened. Fly X 2 at bedside.  Pt denied c/o pain. Tolerated Rocephin with adverse reactions.  VS stable. O2 at 3lNC. IV NS infusing at 150ml/hr.

## 2020-01-04 NOTE — ED NOTES
Pt arrives alert and orient. Neighbor noted paper outside wasn't picked up and found pt on living room floor. Fell x2, unknown downtime. Clothing saturated in urine. Nailbeds cyanotic. Denies pain.

## 2020-01-04 NOTE — ED NOTES
Pt was brought in per EMS- since EMS arrival, pt has had 2 RN's and 1 ERT at bedside for treatment.  Complete head to toe assessment done, pt bathed, hx received, IV started labs drawn, CBG done, EKG done, orthostatics done, ABG done per Resp.   Dr. Tinoco has been at bedside and aware of all.

## 2020-01-04 NOTE — ED NOTES
Fly and pt understand need for hospital admission- pt accepted for transfer to [facility].  Awaiting a bed placement.

## 2020-01-04 NOTE — ED PROVIDER NOTES
Encounter Date: 1/4/2020       History     Chief Complaint   Patient presents with    Fall     fell approx 2 days ago in kitchen,crawled to living room, fell 2nd time attempting to get up, laid on floor until neighbor found down this am     Lives alone, has a history of COPD, on oxygen at home.  Family checks on him regularly.  He it appears that he fell sometime in the last day or so, the exact timing is not clear.  As best we can tell from the patient, paramedics, and family, he appears to have fallen in the kitchen, crawled to another room, stood up, and fallen again.  He then lay on the floor for an indeterminate period of time.  It is unclear if he had any true loss of consciousness.  Noted to be incontinent, have scattered minor abrasions to his knees, and a mild skin tear to his left arm.  No do have pressure changes on left upper face and forehead region suggestive of prolonged pressure, already improving by the time of arrival by paramedics.  Denies neck pain, back pain, extremity pain, pelvic pain, chest pain, or trouble breathing.  Apparently he did have access to his oxygen at least some of the time while he was on the floor.  On arrival, scattered abrasions and other skin changes as described below, awake, alert, mildly tremulous but no distress.    The history is provided by the patient, a relative and the EMS personnel. No  was used.     Review of patient's allergies indicates:  No Known Allergies  Past Medical History:   Diagnosis Date    Aneurysm 2016    abdominal, stent placed    Arthritis     Asthma     Atrial fibrillation     Cancer     skin cancer on face    Diabetes mellitus     Emphysema lung     Encounter for blood transfusion     Hypertension      Past Surgical History:   Procedure Laterality Date    ABDOMINAL AORTIC ANEURYSM REPAIR      Stent placed    ABDOMINAL SURGERY      COLONOSCOPY Left 6/12/2017    Procedure: COLONOSCOPY;  Surgeon: Boaz Toussaint MD;   Location: [facility];  Service: Endoscopy;  Laterality: Left;    VASCULAR SURGERY      abdominal aneurysm repair     Family History   Problem Relation Age of Onset    Hypertension Father     Heart attack Father     Cancer Mother         bladder     Social History     Tobacco Use    Smoking status: Former Smoker     Packs/day: 2.00     Years: 20.00     Pack years: 40.00     Types: Cigarettes     Last attempt to quit: 1977     Years since quittin.0    Smokeless tobacco: Former User   Substance Use Topics    Alcohol use: No    Drug use: No     Review of Systems   Constitutional: Negative for chills and fever.   HENT: Negative for congestion, facial swelling, nosebleeds and sinus pressure.    Eyes: Negative for pain and redness.   Respiratory: Negative for chest tightness, shortness of breath and wheezing.    Cardiovascular: Negative for chest pain, palpitations and leg swelling.   Gastrointestinal: Negative for abdominal distention, abdominal pain, diarrhea, nausea and vomiting.   Endocrine: Negative for cold intolerance, polydipsia and polyphagia.   Genitourinary: Negative for difficulty urinating, dysuria, frequency and hematuria.   Musculoskeletal: Negative for arthralgias, back pain, myalgias and neck pain.   Skin: Negative for color change and rash.   Neurological: Positive for weakness. Negative for dizziness, numbness and headaches.        Generalized weakness, see above   Hematological: Negative for adenopathy. Does not bruise/bleed easily.   Psychiatric/Behavioral: Negative for agitation and behavioral problems.   All other systems reviewed and are negative.      Physical Exam     Initial Vitals [20 1039]   BP Pulse Resp Temp SpO2   (!) 156/109 93 20 97.3 °F (36.3 °C) (!) 93 %      MAP       --         Physical Exam    Nursing note and vitals reviewed.  Constitutional: He appears well-developed and well-nourished. He is not diaphoretic. No distress.   Frail but alert, NAD   HENT:   Head:  Normocephalic and atraumatic.   Mouth/Throat: Oropharynx is clear and moist. No oropharyngeal exudate.   Ear canals are occluded with cerumen; see below regarding facial skin pressure changes   Eyes: Conjunctivae and EOM are normal. Pupils are equal, round, and reactive to light. Right eye exhibits no discharge. Left eye exhibits no discharge. No scleral icterus.   Neck: Normal range of motion. Neck supple. No thyromegaly present. No tracheal deviation present. No JVD present.   Cardiovascular: Normal rate, regular rhythm and normal heart sounds. Exam reveals no gallop and no friction rub.    No murmur heard.  Pulmonary/Chest: No respiratory distress. He has wheezes. He has no rhonchi. He has no rales. He exhibits no tenderness.   Fair air entry with scattered bilateral expiratory wheezing, mild   Abdominal: Soft. Bowel sounds are normal. He exhibits no distension and no mass. There is no tenderness. There is no rebound and no guarding.   Musculoskeletal: Normal range of motion. He exhibits no edema or tenderness.   Lymphadenopathy:     He has no cervical adenopathy.   Neurological: He is alert and oriented to person, place, and time. He has normal strength. No cranial nerve deficit.   Mild general tremor, no lateralizing findings   Skin: Skin is warm and dry. No rash noted. No erythema.   Mild early pressure changes and edema of the left forehead and left temple region, improving as per paramedics.  Scattered minor abrasions on both knees, appear about a day old, no sign of infection.  Mild skin tear, left arm, dressed prior to arrival by paramedics.  No overt signs of infection.  Finger tips and toes are somewhat vasoconstricted but not frankly cyanotic.  No decubitus ulcer.  Right groin/ inguinal region has wet, macerated changes consistent with urine exposure, no sign of secondary infection.   Psychiatric: He has a normal mood and affect. His behavior is normal. Judgment and thought content normal.         ED  Course   Procedures  Labs Reviewed   CBC W/ AUTO DIFFERENTIAL - Abnormal; Notable for the following components:       Result Value    WBC 13.62 (*)     Mean Corpuscular Hemoglobin Conc 30.7 (*)     Platelets 467 (*)     Immature Granulocytes 1.3 (*)     Gran # (ANC) 11.2 (*)     Immature Grans (Abs) 0.18 (*)     Gran% 82.4 (*)     Lymph% 8.0 (*)     All other components within normal limits   COMPREHENSIVE METABOLIC PANEL - Abnormal; Notable for the following components:    Sodium 151 (*)     Glucose 164 (*)     BUN, Bld 78 (*)     Creatinine 1.8 (*)     Albumin 3.2 (*)     Alkaline Phosphatase 152 (*)     Anion Gap 18 (*)     eGFR if  37.7 (*)     eGFR if non  32.6 (*)     All other components within normal limits   CK - Abnormal; Notable for the following components:     (*)     All other components within normal limits   CK-MB - Abnormal; Notable for the following components:     (*)     CPK MB 10.1 (*)     All other components within normal limits   POCT GLUCOSE - Abnormal; Notable for the following components:    POCT Glucose 147 (*)     All other components within normal limits   ISTAT PROCEDURE - Abnormal; Notable for the following components:    POC PCO2 46.0 (*)     POC PO2 67 (*)     POC SATURATED O2 93 (*)     All other components within normal limits   CULTURE, BLOOD   CULTURE, BLOOD   PROTIME-INR   APTT   TSH   TROPONIN I   B-TYPE NATRIURETIC PEPTIDE   LACTIC ACID, PLASMA   URINALYSIS, REFLEX TO URINE CULTURE   POCT GLUCOSE MONITORING CONTINUOUS     EKG Readings: (Independently Interpreted)   Initial Reading: No STEMI. Rhythm: Normal Sinus Rhythm. Heart Rate: 89. Ectopy: No Ectopy. Conduction: Normal.   Normal sinus rhythm with left axis deviation, nonspecific ST abnormality, no acute concerning change       Imaging Results          X-Ray Chest AP Portable (Final result)  Result time 01/04/20 11:55:36    Final result by Reynaldo Lamb MD (01/04/20 11:55:36)                  Impression:      Left basilar infiltrate versus atelectasis.      Electronically signed by: Reynaldo Lamb MD  Date:    01/04/2020  Time:    11:55             Narrative:    EXAMINATION:  XR CHEST AP PORTABLE    CLINICAL HISTORY:  Chest Pain;    TECHNIQUE:  AP view of the chest was performed.    COMPARISON:  04/09/2018    FINDINGS:  The cardiac and mediastinal silhouettes appear within normal limits. Aortic atherosclerosis.  Patchy alveolar opacities in the left lung base.  No acute osseous findings demonstrated.                               CT CERVICAL SPINE WITHOUT CONTRAST (Final result)  Result time 01/04/20 12:01:43    Final result by Reynaldo Lamb MD (01/04/20 12:01:43)                 Impression:      No acute fracture or subluxation.    Degenerative changes.    Subsegmental left apical infiltrate.  Recommend chest radiograph follow-up after treatment.    All CT scans at this facility use dose modulation, iterative reconstruction and/or weight based dosing when appropriate to reduce radiation dose to as low as reasonably achievable.      Electronically signed by: Reynaldo Lamb MD  Date:    01/04/2020  Time:    12:01             Narrative:    EXAMINATION:  CT CERVICAL SPINE WITHOUT CONTRAST    CLINICAL HISTORY:  C-spine trauma, NEXUS/CCR positive, +risk factor(s); neck pain    TECHNIQUE:  Axial CT imaging was performed through the cervical spine without intravenous contrast. Multiplanar reformats were reviewed and interpreted.    COMPARISON:  None    FINDINGS:  Vertebral body heights are normal.Alignment is normal.  Mild to moderate degenerative disc disease from C4-5 to C6-7.  Advanced facet DJD throughout the cervical spine.  No soft tissue abnormality detected.    Subsegmental alveolar opacity in the left lung apex.                               CT Maxillofacial Without Contrast (Final result)  Result time 01/04/20 11:52:06    Final result by Reynaldo Lamb MD (01/04/20 11:52:06)                  Impression:      No acute fracture. Left periorbital hematoma.    All CT scans at this facility use dose modulation, iterative reconstruction and/or weight based dosing when appropriat      Electronically signed by: Reynaldo Lamb MD  Date:    01/04/2020  Time:    11:52             Narrative:    EXAMINATION:  CT MAXILLOFACIAL WITHOUT CONTRAST    CLINICAL HISTORY:  Maxface trauma blunt; facial pain    TECHNIQUE:  Axial CT imaging was performed through the face without intravenous contrast. Multiplanar reformats were performed and interpreted.    COMPARISON:  None    FINDINGS:  The paranasal sinuses are clear.    No fractures are identified.  Left periorbital soft tissue swelling.  The globes are symmetric.  No retrobulbar hemorrhage.    The orbital structures appear intact.    Degenerative disc disease and facet DJD seen throughout the cervical spine.    No intracranial hemorrhage is identified.                               CT Head Without Contrast (Final result)  Result time 01/04/20 11:49:51    Final result by Reynaldo Lamb MD (01/04/20 11:49:51)                 Impression:      No CT evidence of acute intracranial abnormality.    All CT scans at this facility are performed  using dose modulation techniques as appropriate to performed exam including the following:  automated exposure control; adjustment of mA and/or kV according to the patients size (this includes techniques or standardized protocols for targeted exams where dose is matched to indication/reason for exam: i.e. extremities or head);  iterative reconstruction technique.      Electronically signed by: Reynaldo Lamb MD  Date:    01/04/2020  Time:    11:49             Narrative:    EXAMINATION:  CT HEAD WITHOUT CONTRAST    CLINICAL HISTORY:  Confusion/delirium, altered LOC, unexplained;    TECHNIQUE:  Axial CT imaging was performed through the head without intravenous contrast.    COMPARISON:  None    FINDINGS:  No hydrocephalus, midline shift, mass effect, or  acute intracranial hemorrhage. Cortical sulcal pattern and gray-white matter differentiation is normal.  Age related cerebral atrophy.  Basilar cisterns and posterior fossa are normal. The visualized paranasal sinuses and mastoid air cells are clear. The skull is intact.                                12:51 PM Multiple re-evaluations.  Patient stable, improving.  Family remains at bedside.  Counseled regarding findings and plans.  Admitting to Hospital Medicine.      All historical, clinical, radiographic, and laboratory findings were reviewed with the patient/family in detail along with the indications for transport to the facility in [place] in order to receive IV fluids, antibiotics.  All remaining questions and concerns were addressed at this time and the patient/family communicates understanding and agrees to proceed accordingly.  Similarly, all pertinent details of the encounter were discussed with Dr. Galindo who agrees to receive the patient at [facility] for further care as outlined above.  The patient will be transferred by [facility] ambulance services secondary to a need for ongoing IV fluids, oxygen en route.  Raheel Lei MD  12:52 PM         Critical care time 40 minutes                                         Clinical Impression:     1. Fall, initial encounter    2. Weakness    3. Abrasion    4. Chronic obstructive pulmonary disease, unspecified COPD type    5. Dehydration    6. Hypernatremia    7. Left lower lobe pulmonary infiltrate         Disposition:   Disposition: Placed in Observation  Condition: Stable  [facility]/ [facility] Medicine/ Telemetry/ Dr. Mateo Lei MD  01/04/20 [id number]

## 2020-01-04 NOTE — ED NOTES
EMS here for tranfer. Report given. Pt asleep with resp even and unlabored. Easily awakens to verbal stimuli. Denied pain or any acute distress. NS infusing without difficulty. O2 at 3lNC. VS stable.

## 2020-01-04 NOTE — ED NOTES
"Pt states "I make my own meds every month, I don't know names of them." Son hunting and unavailable on arrival.   "

## 2020-01-05 LAB
ALBUMIN SERPL BCP-MCNC: 2.4 G/DL (ref 3.5–5.2)
ALP SERPL-CCNC: 112 U/L (ref 55–135)
ALT SERPL W/O P-5'-P-CCNC: 19 U/L (ref 10–44)
ANION GAP SERPL CALC-SCNC: 13 MMOL/L (ref 8–16)
AST SERPL-CCNC: 27 U/L (ref 10–40)
BASOPHILS # BLD AUTO: 0.02 K/UL (ref 0–0.2)
BASOPHILS NFR BLD: 0.2 % (ref 0–1.9)
BILIRUB SERPL-MCNC: 0.6 MG/DL (ref 0.1–1)
BUN SERPL-MCNC: 74 MG/DL (ref 8–23)
CALCIUM SERPL-MCNC: 8 MG/DL (ref 8.7–10.5)
CHLORIDE SERPL-SCNC: 104 MMOL/L (ref 95–110)
CK SERPL-CCNC: 286 U/L (ref 20–200)
CO2 SERPL-SCNC: 26 MMOL/L (ref 23–29)
CREAT SERPL-MCNC: 1.6 MG/DL (ref 0.5–1.4)
DIFFERENTIAL METHOD: ABNORMAL
EOSINOPHIL # BLD AUTO: 0.2 K/UL (ref 0–0.5)
EOSINOPHIL NFR BLD: 1.7 % (ref 0–8)
ERYTHROCYTE [DISTWIDTH] IN BLOOD BY AUTOMATED COUNT: 13.9 % (ref 11.5–14.5)
EST. GFR  (AFRICAN AMERICAN): 44 ML/MIN/1.73 M^2
EST. GFR  (NON AFRICAN AMERICAN): 38 ML/MIN/1.73 M^2
ESTIMATED AVG GLUCOSE: 169 MG/DL (ref 68–131)
GLUCOSE SERPL-MCNC: 102 MG/DL (ref 70–110)
HBA1C MFR BLD HPLC: 7.5 % (ref 4–5.6)
HCT VFR BLD AUTO: 40.7 % (ref 40–54)
HGB BLD-MCNC: 12.7 G/DL (ref 14–18)
IMM GRANULOCYTES # BLD AUTO: 0.07 K/UL (ref 0–0.04)
IMM GRANULOCYTES NFR BLD AUTO: 0.8 % (ref 0–0.5)
LACTATE SERPL-SCNC: 1 MMOL/L (ref 0.5–2.2)
LYMPHOCYTES # BLD AUTO: 0.9 K/UL (ref 1–4.8)
LYMPHOCYTES NFR BLD: 10.1 % (ref 18–48)
MCH RBC QN AUTO: 29.3 PG (ref 27–31)
MCHC RBC AUTO-ENTMCNC: 31.2 G/DL (ref 32–36)
MCV RBC AUTO: 94 FL (ref 82–98)
MONOCYTES # BLD AUTO: 0.9 K/UL (ref 0.3–1)
MONOCYTES NFR BLD: 9.7 % (ref 4–15)
NEUTROPHILS # BLD AUTO: 6.8 K/UL (ref 1.8–7.7)
NEUTROPHILS NFR BLD: 77.5 % (ref 38–73)
NRBC BLD-RTO: 0 /100 WBC
PLATELET # BLD AUTO: 307 K/UL (ref 150–350)
PMV BLD AUTO: 9.8 FL (ref 9.2–12.9)
POCT GLUCOSE: 159 MG/DL (ref 70–110)
POCT GLUCOSE: 95 MG/DL (ref 70–110)
POTASSIUM SERPL-SCNC: 3.6 MMOL/L (ref 3.5–5.1)
PROT SERPL-MCNC: 5.7 G/DL (ref 6–8.4)
RBC # BLD AUTO: 4.33 M/UL (ref 4.6–6.2)
SODIUM SERPL-SCNC: 143 MMOL/L (ref 136–145)
WBC # BLD AUTO: 8.83 K/UL (ref 3.9–12.7)

## 2020-01-05 PROCEDURE — 97162 PT EVAL MOD COMPLEX 30 MIN: CPT

## 2020-01-05 PROCEDURE — 96361 HYDRATE IV INFUSION ADD-ON: CPT

## 2020-01-05 PROCEDURE — 96372 THER/PROPH/DIAG INJ SC/IM: CPT | Mod: 59

## 2020-01-05 PROCEDURE — G0378 HOSPITAL OBSERVATION PER HR: HCPCS

## 2020-01-05 PROCEDURE — 63600175 PHARM REV CODE 636 W HCPCS: Performed by: NURSE PRACTITIONER

## 2020-01-05 PROCEDURE — 96376 TX/PRO/DX INJ SAME DRUG ADON: CPT

## 2020-01-05 PROCEDURE — 25000003 PHARM REV CODE 250: Performed by: NURSE PRACTITIONER

## 2020-01-05 PROCEDURE — 36415 COLL VENOUS BLD VENIPUNCTURE: CPT

## 2020-01-05 PROCEDURE — 97110 THERAPEUTIC EXERCISES: CPT

## 2020-01-05 PROCEDURE — 94799 UNLISTED PULMONARY SVC/PX: CPT

## 2020-01-05 PROCEDURE — 80053 COMPREHEN METABOLIC PANEL: CPT

## 2020-01-05 PROCEDURE — 96374 THER/PROPH/DIAG INJ IV PUSH: CPT | Mod: 59

## 2020-01-05 PROCEDURE — 85025 COMPLETE CBC W/AUTO DIFF WBC: CPT

## 2020-01-05 PROCEDURE — 27000221 HC OXYGEN, UP TO 24 HOURS

## 2020-01-05 PROCEDURE — 97530 THERAPEUTIC ACTIVITIES: CPT

## 2020-01-05 PROCEDURE — 94761 N-INVAS EAR/PLS OXIMETRY MLT: CPT

## 2020-01-05 PROCEDURE — 82550 ASSAY OF CK (CPK): CPT

## 2020-01-05 PROCEDURE — 83605 ASSAY OF LACTIC ACID: CPT

## 2020-01-05 RX ADMIN — CARVEDILOL 6.25 MG: 6.25 TABLET, FILM COATED ORAL at 08:01

## 2020-01-05 RX ADMIN — CEFTRIAXONE 1 G: 1 INJECTION, SOLUTION INTRAVENOUS at 03:01

## 2020-01-05 RX ADMIN — CARVEDILOL 6.25 MG: 6.25 TABLET, FILM COATED ORAL at 05:01

## 2020-01-05 RX ADMIN — PANTOPRAZOLE SODIUM 40 MG: 40 TABLET, DELAYED RELEASE ORAL at 05:01

## 2020-01-05 RX ADMIN — LEVOTHYROXINE SODIUM 50 MCG: 50 TABLET ORAL at 05:01

## 2020-01-05 RX ADMIN — AZITHROMYCIN MONOHYDRATE 500 MG: 500 INJECTION, POWDER, LYOPHILIZED, FOR SOLUTION INTRAVENOUS at 09:01

## 2020-01-05 RX ADMIN — INSULIN ASPART 1 UNITS: 100 INJECTION, SOLUTION INTRAVENOUS; SUBCUTANEOUS at 09:01

## 2020-01-05 RX ADMIN — PRAVASTATIN SODIUM 20 MG: 20 TABLET ORAL at 09:01

## 2020-01-05 NOTE — SUBJECTIVE & OBJECTIVE
Past Medical History:   Diagnosis Date    Aneurysm 2016    abdominal, stent placed    Arthritis     Asthma     Atrial fibrillation     Cancer     skin cancer on face    Diabetes mellitus     Emphysema lung     Encounter for blood transfusion     Hypertension        Past Surgical History:   Procedure Laterality Date    ABDOMINAL AORTIC ANEURYSM REPAIR      Stent placed    ABDOMINAL SURGERY      COLONOSCOPY Left 6/12/2017    Procedure: COLONOSCOPY;  Surgeon: Boaz Toussaint MD;  Location: [facility];  Service: Endoscopy;  Laterality: Left;    VASCULAR SURGERY      abdominal aneurysm repair       Review of patient's allergies indicates:  No Known Allergies    No current facility-administered medications on file prior to encounter.      Current Outpatient Medications on File Prior to Encounter   Medication Sig    ascorbic acid, vitamin C, (VITAMIN C) 1000 MG tablet Take 1,000 mg by mouth Daily.    CARTIA  mg 24 hr capsule TAKE ONE CAPSULE BY MOUTH ONCE DAILY    carvedilol (COREG) 6.25 MG tablet Take 6.25 mg by mouth 2 (two) times daily with meals.     cetirizine (ZYRTEC) 10 MG tablet Take 10 mg by mouth once daily.    COMBIVENT RESPIMAT  mcg/actuation inhaler USE 1 PUFF EVERY SIX HOURS AS NEEDED FOR WHEEZING    furosemide (LASIX) 20 MG tablet Take 20 mg by mouth 2 (two) times daily.    iron-vitamin C 100-250 mg, ICAR-C, 100-250 mg Tab TAKE ONE TABLET BY MOUTH ONCE DAILY    levothyroxine (SYNTHROID) 50 MCG tablet Take 1 tablet (50 mcg total) by mouth before breakfast.    losartan (COZAAR) 25 MG tablet Take 1 tablet (25 mg total) by mouth once daily.    metFORMIN (GLUCOPHAGE-XR) 750 MG 24 hr tablet Take 750 mg by mouth every evening.    pantoprazole (PROTONIX) 40 MG tablet Take 1 tablet by mouth Daily.    pravastatin (PRAVACHOL) 20 MG tablet Take 1 tablet (20 mg total) by mouth nightly.    selenium 50 mcg Tab Take 200 mcg by mouth nightly.     vitamin D 1000 units Tab Take 1 tablet by  mouth Daily.     Family History     Problem Relation (Age of Onset)    Cancer Mother    Heart attack Father    Hypertension Father        Tobacco Use    Smoking status: Former Smoker     Packs/day: 2.00     Years: 20.00     Pack years: 40.00     Types: Cigarettes     Last attempt to quit: 1977     Years since quittin.0    Smokeless tobacco: Former User   Substance and Sexual Activity    Alcohol use: No    Drug use: No    Sexual activity: Not Currently     Review of Systems   Constitutional: Negative for activity change, appetite change, fatigue and fever.   HENT: Positive for postnasal drip and sneezing. Negative for sinus pressure, sore throat and trouble swallowing.    Eyes: Positive for visual disturbance.   Respiratory: Positive for cough. Negative for wheezing.    Cardiovascular: Negative for chest pain, palpitations and leg swelling.   Gastrointestinal: Negative for abdominal distention, abdominal pain, diarrhea, nausea and vomiting.   Endocrine: Negative for cold intolerance and heat intolerance.   Genitourinary: Negative for difficulty urinating, dysuria, flank pain, frequency and urgency.   Musculoskeletal: Positive for myalgias. Negative for arthralgias and gait problem.   Skin: Positive for wound. Negative for color change.   Allergic/Immunologic: Negative for environmental allergies and food allergies.   Neurological: Negative for syncope, weakness and headaches.   Psychiatric/Behavioral: Negative for agitation, confusion and sleep disturbance. The patient is not nervous/anxious.      Objective:     Vital Signs (Most Recent):  Temp: 97.6 °F (36.4 °C) (20 1321)  Pulse: 89 (20 1700)  Resp: 19 (20 1433)  BP: 138/75 (20 1433)  SpO2: (!) 93 % (20 1433) Vital Signs (24h Range):  Temp:  [97.3 °F (36.3 °C)-97.6 °F (36.4 °C)] 97.6 °F (36.4 °C)  Pulse:  [80-95] 89  Resp:  [16-22] 19  SpO2:  [91 %-93 %] 93 %  BP: (119-192)/() 138/75     Weight: 76.8 kg (169 lb 5  oz)  Body mass index is 25.74 kg/m².    Physical Exam   Constitutional: He is oriented to person, place, and time. He appears well-developed and well-nourished.   HENT:   Head: Normocephalic.   Nose: Nose normal.   Mouth/Throat: Mucous membranes are dry.   Eyes: Conjunctivae are normal.   Neck: Normal range of motion. Neck supple.   Cardiovascular: Normal rate, regular rhythm, normal heart sounds and intact distal pulses.   Pulmonary/Chest: Effort normal and breath sounds normal. No respiratory distress.   Abdominal: Soft. Bowel sounds are normal. He exhibits no distension. There is no tenderness.   Genitourinary:   Genitourinary Comments: Deferred    Musculoskeletal: Normal range of motion.   Neurological: He is alert and oriented to person, place, and time.   Skin: Skin is warm and dry. There is erythema (foreheard ).   Multiple abrasions to bilateral knees and elbows    Psychiatric: He has a normal mood and affect. His behavior is normal.           Significant Labs:   CBC:   Recent Labs   Lab 01/04/20  1057   WBC 13.62*   HGB 16.5   HCT 53.7   *     CMP:   Recent Labs   Lab 01/04/20  1057   *   K 4.0      CO2 28   *   BUN 78*   CREATININE 1.8*   CALCIUM 10.1   PROT 8.1   ALBUMIN 3.2*   BILITOT 0.7   ALKPHOS 152*   AST 38   ALT 25   ANIONGAP 18*   EGFRNONAA 32.6*     Cardiac Markers:   Recent Labs   Lab 01/04/20  1057   BNP 58     Lactic Acid:   Recent Labs   Lab 01/04/20  1057   LACTATE 2.1     Troponin:   Recent Labs   Lab 01/04/20  1057   TROPONINI 0.019       Significant Imaging:   Imaging Results          X-Ray Chest AP Portable (Final result)  Result time 01/04/20 11:55:36    Final result by Reynaldo Lamb MD (01/04/20 11:55:36)                 Impression:      Left basilar infiltrate versus atelectasis.      Electronically signed by: Reynaldo Lamb MD  Date:    01/04/2020  Time:    11:55             Narrative:    EXAMINATION:  XR CHEST AP PORTABLE    CLINICAL HISTORY:  Chest  Pain;    TECHNIQUE:  AP view of the chest was performed.    COMPARISON:  04/09/2018    FINDINGS:  The cardiac and mediastinal silhouettes appear within normal limits. Aortic atherosclerosis.  Patchy alveolar opacities in the left lung base.  No acute osseous findings demonstrated.                               CT CERVICAL SPINE WITHOUT CONTRAST (Final result)  Result time 01/04/20 12:01:43    Final result by Reynaldo Lamb MD (01/04/20 12:01:43)                 Impression:      No acute fracture or subluxation.    Degenerative changes.    Subsegmental left apical infiltrate.  Recommend chest radiograph follow-up after treatment.    All CT scans at this facility use dose modulation, iterative reconstruction and/or weight based dosing when appropriate to reduce radiation dose to as low as reasonably achievable.      Electronically signed by: Reynaldo Lamb MD  Date:    01/04/2020  Time:    12:01             Narrative:    EXAMINATION:  CT CERVICAL SPINE WITHOUT CONTRAST    CLINICAL HISTORY:  C-spine trauma, NEXUS/CCR positive, +risk factor(s); neck pain    TECHNIQUE:  Axial CT imaging was performed through the cervical spine without intravenous contrast. Multiplanar reformats were reviewed and interpreted.    COMPARISON:  None    FINDINGS:  Vertebral body heights are normal.Alignment is normal.  Mild to moderate degenerative disc disease from C4-5 to C6-7.  Advanced facet DJD throughout the cervical spine.  No soft tissue abnormality detected.    Subsegmental alveolar opacity in the left lung apex.                               CT Maxillofacial Without Contrast (Final result)  Result time 01/04/20 11:52:06    Final result by Reynaldo Lamb MD (01/04/20 11:52:06)                 Impression:      No acute fracture. Left periorbital hematoma.    All CT scans at this facility use dose modulation, iterative reconstruction and/or weight based dosing when appropriat      Electronically signed by: Reynaldo Lamb  MD  Date:    01/04/2020  Time:    11:52             Narrative:    EXAMINATION:  CT MAXILLOFACIAL WITHOUT CONTRAST    CLINICAL HISTORY:  Maxface trauma blunt; facial pain    TECHNIQUE:  Axial CT imaging was performed through the face without intravenous contrast. Multiplanar reformats were performed and interpreted.    COMPARISON:  None    FINDINGS:  The paranasal sinuses are clear.    No fractures are identified.  Left periorbital soft tissue swelling.  The globes are symmetric.  No retrobulbar hemorrhage.    The orbital structures appear intact.    Degenerative disc disease and facet DJD seen throughout the cervical spine.    No intracranial hemorrhage is identified.                               CT Head Without Contrast (Final result)  Result time 01/04/20 11:49:51    Final result by Reynaldo Lamb MD (01/04/20 11:49:51)                 Impression:      No CT evidence of acute intracranial abnormality.    All CT scans at this facility are performed  using dose modulation techniques as appropriate to performed exam including the following:  automated exposure control; adjustment of mA and/or kV according to the patients size (this includes techniques or standardized protocols for targeted exams where dose is matched to indication/reason for exam: i.e. extremities or head);  iterative reconstruction technique.      Electronically signed by: Reynaldo Lamb MD  Date:    01/04/2020  Time:    11:49             Narrative:    EXAMINATION:  CT HEAD WITHOUT CONTRAST    CLINICAL HISTORY:  Confusion/delirium, altered LOC, unexplained;    TECHNIQUE:  Axial CT imaging was performed through the head without intravenous contrast.    COMPARISON:  None    FINDINGS:  No hydrocephalus, midline shift, mass effect, or acute intracranial hemorrhage. Cortical sulcal pattern and gray-white matter differentiation is normal.  Age related cerebral atrophy.  Basilar cisterns and posterior fossa are normal. The visualized paranasal sinuses and  mastoid air cells are clear. The skull is intact.

## 2020-01-05 NOTE — PROGRESS NOTES
Patient spo2 86 on 5 L NC after walking with PT. Placed patient on venti mask to reach 91 % . Patient family member at bedside said this always happens when walking

## 2020-01-05 NOTE — SUBJECTIVE & OBJECTIVE
Interval History: pt stable and sitting up to side of bed during exam.  Pt verbalized symptom improvement.  PT/OT evaluation completed with SNF placement recommended.  H/H stable.  CPK trending down.  Na normalized.  Leukocytosis resolved.  CM consulted to assist with discharge planning. Current plan of therapy continued and blood cultures with [facility].      Review of Systems   Constitutional: Negative for activity change, appetite change, fatigue and fever.   HENT: Negative for postnasal drip, sinus pressure, sneezing, sore throat and trouble swallowing.    Eyes: Negative for visual disturbance.   Respiratory: Positive for cough and wheezing.    Cardiovascular: Negative for chest pain, palpitations and leg swelling.   Gastrointestinal: Negative for abdominal distention, abdominal pain, diarrhea, nausea and vomiting.   Endocrine: Negative for cold intolerance and heat intolerance.   Genitourinary: Negative for difficulty urinating, dysuria, flank pain, frequency and urgency.   Musculoskeletal: Positive for myalgias. Negative for arthralgias and gait problem.   Skin: Positive for wound. Negative for color change.   Allergic/Immunologic: Negative for environmental allergies and food allergies.   Neurological: Negative for syncope, weakness and headaches.   Psychiatric/Behavioral: Negative for agitation, confusion and sleep disturbance. The patient is not nervous/anxious.      Objective:     Vital Signs (Most Recent):  Temp: 96.2 °F (35.7 °C) (01/05/20 1144)  Pulse: (!) 58 (01/05/20 1144)  Resp: 20 (01/05/20 1144)  BP: (!) 141/75 (01/05/20 1144)  SpO2: 95 % (01/05/20 1144) Vital Signs (24h Range):  Temp:  [96.2 °F (35.7 °C)-98 °F (36.7 °C)] 96.2 °F (35.7 °C)  Pulse:  [58-89] 58  Resp:  [18-22] 20  SpO2:  [86 %-98 %] 95 %  BP: (102-150)/(55-75) 141/75     Weight: 70.4 kg (155 lb 3.3 oz)  Body mass index is 23.6 kg/m².    Intake/Output Summary (Last 24 hours) at 1/5/2020 1156  Last data filed at 1/5/2020 0600  Gross per 24  hour   Intake 651.67 ml   Output 200 ml   Net 451.67 ml      Physical Exam   Constitutional: He is oriented to person, place, and time. He appears well-developed and well-nourished.   HENT:   Head: Normocephalic.   Nose: Nose normal.   Mouth/Throat: Mucous membranes are not dry.   Eyes: Conjunctivae are normal.   Neck: Normal range of motion. Neck supple.   Cardiovascular: Normal rate, regular rhythm, normal heart sounds and intact distal pulses.   Pulmonary/Chest: Effort normal and breath sounds normal. No respiratory distress.   Abdominal: Soft. Bowel sounds are normal. He exhibits no distension. There is no tenderness.   Genitourinary:   Genitourinary Comments: Deferred    Musculoskeletal: Normal range of motion.   Neurological: He is alert and oriented to person, place, and time.   Skin: Skin is warm and dry. There is erythema (foreheard ).   Multiple abrasions to bilateral knees and elbows    Psychiatric: He has a normal mood and affect. His behavior is normal.       Significant Labs:   CBC:   Recent Labs   Lab 01/04/20  1057 01/05/20  0341   WBC 13.62* 8.83   HGB 16.5 12.7*   HCT 53.7 40.7   * 307     CMP:   Recent Labs   Lab 01/04/20  1057 01/05/20  0341   * 143   K 4.0 3.6    104   CO2 28 26   * 102   BUN 78* 74*   CREATININE 1.8* 1.6*   CALCIUM 10.1 8.0*   PROT 8.1 5.7*   ALBUMIN 3.2* 2.4*   BILITOT 0.7 0.6   ALKPHOS 152* 112   AST 38 27   ALT 25 19   ANIONGAP 18* 13   EGFRNONAA 32.6* 38*     Troponin:   Recent Labs   Lab 01/04/20  1057   TROPONINI 0.019       Significant Imaging:   Imaging Results          X-Ray Chest AP Portable (Final result)  Result time 01/04/20 11:55:36    Final result by Reynaldo Lamb MD (01/04/20 11:55:36)                 Impression:      Left basilar infiltrate versus atelectasis.      Electronically signed by: Reynaldo Lamb MD  Date:    01/04/2020  Time:    11:55             Narrative:    EXAMINATION:  XR CHEST AP PORTABLE    CLINICAL HISTORY:  Chest  Pain;    TECHNIQUE:  AP view of the chest was performed.    COMPARISON:  04/09/2018    FINDINGS:  The cardiac and mediastinal silhouettes appear within normal limits. Aortic atherosclerosis.  Patchy alveolar opacities in the left lung base.  No acute osseous findings demonstrated.                               CT CERVICAL SPINE WITHOUT CONTRAST (Final result)  Result time 01/04/20 12:01:43    Final result by Reynaldo Lamb MD (01/04/20 12:01:43)                 Impression:      No acute fracture or subluxation.    Degenerative changes.    Subsegmental left apical infiltrate.  Recommend chest radiograph follow-up after treatment.    All CT scans at this facility use dose modulation, iterative reconstruction and/or weight based dosing when appropriate to reduce radiation dose to as low as reasonably achievable.      Electronically signed by: Reynaldo Lamb MD  Date:    01/04/2020  Time:    12:01             Narrative:    EXAMINATION:  CT CERVICAL SPINE WITHOUT CONTRAST    CLINICAL HISTORY:  C-spine trauma, NEXUS/CCR positive, +risk factor(s); neck pain    TECHNIQUE:  Axial CT imaging was performed through the cervical spine without intravenous contrast. Multiplanar reformats were reviewed and interpreted.    COMPARISON:  None    FINDINGS:  Vertebral body heights are normal.Alignment is normal.  Mild to moderate degenerative disc disease from C4-5 to C6-7.  Advanced facet DJD throughout the cervical spine.  No soft tissue abnormality detected.    Subsegmental alveolar opacity in the left lung apex.                               CT Maxillofacial Without Contrast (Final result)  Result time 01/04/20 11:52:06    Final result by Reynaldo Lamb MD (01/04/20 11:52:06)                 Impression:      No acute fracture. Left periorbital hematoma.    All CT scans at this facility use dose modulation, iterative reconstruction and/or weight based dosing when appropriat      Electronically signed by: Reynaldo Lamb  MD  Date:    01/04/2020  Time:    11:52             Narrative:    EXAMINATION:  CT MAXILLOFACIAL WITHOUT CONTRAST    CLINICAL HISTORY:  Maxface trauma blunt; facial pain    TECHNIQUE:  Axial CT imaging was performed through the face without intravenous contrast. Multiplanar reformats were performed and interpreted.    COMPARISON:  None    FINDINGS:  The paranasal sinuses are clear.    No fractures are identified.  Left periorbital soft tissue swelling.  The globes are symmetric.  No retrobulbar hemorrhage.    The orbital structures appear intact.    Degenerative disc disease and facet DJD seen throughout the cervical spine.    No intracranial hemorrhage is identified.                               CT Head Without Contrast (Final result)  Result time 01/04/20 11:49:51    Final result by Reynaldo Lamb MD (01/04/20 11:49:51)                 Impression:      No CT evidence of acute intracranial abnormality.    All CT scans at this facility are performed  using dose modulation techniques as appropriate to performed exam including the following:  automated exposure control; adjustment of mA and/or kV according to the patients size (this includes techniques or standardized protocols for targeted exams where dose is matched to indication/reason for exam: i.e. extremities or head);  iterative reconstruction technique.      Electronically signed by: Reynaldo Lamb MD  Date:    01/04/2020  Time:    11:49             Narrative:    EXAMINATION:  CT HEAD WITHOUT CONTRAST    CLINICAL HISTORY:  Confusion/delirium, altered LOC, unexplained;    TECHNIQUE:  Axial CT imaging was performed through the head without intravenous contrast.    COMPARISON:  None    FINDINGS:  No hydrocephalus, midline shift, mass effect, or acute intracranial hemorrhage. Cortical sulcal pattern and gray-white matter differentiation is normal.  Age related cerebral atrophy.  Basilar cisterns and posterior fossa are normal. The visualized paranasal sinuses and  mastoid air cells are clear. The skull is intact.

## 2020-01-05 NOTE — PROGRESS NOTES
[facility] Hospital Medicine  Progress Note    Patient Name: Jimmy Young  MRN: [id number]  Patient Class: OP- Observation   Admission Date: 1/4/2020  Length of Stay: 0 days  Attending Physician: Suzanne Galindo MD  Primary Care Provider: Nacho Richardson MD        Subjective:     Principal Problem: Pneumonia         HPI:  Jimmy Young is a 88 yo male with PMHX of HTN, Emphysema/COPD on home oxygen, DM, Cancer (skin), Atrial fibrillation, and Asthma who presented to [facility] ED s/p fall x 2 on yesterday.  Pt lives alone and was found down by family this morning.    Pt  appears to have fallen in the kitchen and crawled to another room where he fell a second time while attempting to stand.  Pt remained on the floor for an indeterminate period of time.   Associated symptoms include: knee pain, cough (white phelgm), sneezing, runny nose, vision changes, and left arm pain.   Denies neck pain, back pain, pelvic pain, chest pain, or trouble breathing.   Pt is on home oxygen and was able to access oxygen while he was on the floor.   Pt reports compliance with prescribed medications.  Pt is followed outpatient by Dr. Banks (PCP).  Pt denies smoking and use of ETOH.  Pt is a DNR and Chrissy Glover (daughter) at [phone] is the surrogate decision maker.  ER work up showed: WBC 13.62, Na 151, pO2 67, BUN 78, Creatinine 1.8, Glucose 164, , and Lactic 2.1.  CT of maxillofacial showed no acute fracture and L periorbital hematoma.  CT of head showed no acute intracranial abnormality.  CT of cervical spine showed no acute fracture or subluxation with degenerative changes and subsegmental left apical infiltrate. Pt transferred to [facility] and Hospital Medicine contacted for admission to Observation Unit for further evaluation.      Overview/Hospital Course:  Pt admitted to Observation Unit s/p fall x 2 and found to have PNA and IVVD.  Pt treated with IV antibiotics, supplemental oxygen, and  Duonebs as needed.  Imaging reviewed with periorbital hematoma noted.  Leukocytosis resolved.  H/H remains stable.  CPK and creatinine trended down.  PT/OT evaluation completed with SNF placement recommended and CM consulted to assist with discharge planning.      Interval History: pt stable and sitting up to side of bed during exam.  Pt verbalized symptom improvement.  PT/OT evaluation completed with SNF placement recommended.  H/H stable.  CPK trending down.  Na normalized.  Leukocytosis resolved.  CM consulted to assist with discharge planning. Current plan of therapy continued and blood cultures with NGTD.      Review of Systems   Constitutional: Negative for activity change, appetite change, fatigue and fever.   HENT: Negative for postnasal drip, sinus pressure, sneezing, sore throat and trouble swallowing.    Eyes: Negative for visual disturbance.   Respiratory: Positive for cough and wheezing.    Cardiovascular: Negative for chest pain, palpitations and leg swelling.   Gastrointestinal: Negative for abdominal distention, abdominal pain, diarrhea, nausea and vomiting.   Endocrine: Negative for cold intolerance and heat intolerance.   Genitourinary: Negative for difficulty urinating, dysuria, flank pain, frequency and urgency.   Musculoskeletal: Positive for myalgias. Negative for arthralgias and gait problem.   Skin: Positive for wound. Negative for color change.   Allergic/Immunologic: Negative for environmental allergies and food allergies.   Neurological: Negative for syncope, weakness and headaches.   Psychiatric/Behavioral: Negative for agitation, confusion and sleep disturbance. The patient is not nervous/anxious.      Objective:     Vital Signs (Most Recent):  Temp: 96.2 °F (35.7 °C) (01/05/20 1144)  Pulse: (!) 58 (01/05/20 1144)  Resp: 20 (01/05/20 1144)  BP: (!) 141/75 (01/05/20 1144)  SpO2: 95 % (01/05/20 1144) Vital Signs (24h Range):  Temp:  [96.2 °F (35.7 °C)-98 °F (36.7 °C)] 96.2 °F (35.7  °C)  Pulse:  [58-89] 58  Resp:  [18-22] 20  SpO2:  [86 %-98 %] 95 %  BP: (102-150)/(55-75) 141/75     Weight: 70.4 kg (155 lb 3.3 oz)  Body mass index is 23.6 kg/m².    Intake/Output Summary (Last 24 hours) at 1/5/2020 1156  Last data filed at 1/5/2020 0600  Gross per 24 hour   Intake 651.67 ml   Output 200 ml   Net 451.67 ml      Physical Exam   Constitutional: He is oriented to person, place, and time. He appears well-developed and well-nourished.   HENT:   Head: Normocephalic.   Nose: Nose normal.   Mouth/Throat: Mucous membranes are not dry.   Eyes: Conjunctivae are normal.   Neck: Normal range of motion. Neck supple.   Cardiovascular: Normal rate, regular rhythm, normal heart sounds and intact distal pulses.   Pulmonary/Chest: Effort normal and breath sounds normal. No respiratory distress.   Abdominal: Soft. Bowel sounds are normal. He exhibits no distension. There is no tenderness.   Genitourinary:   Genitourinary Comments: Deferred    Musculoskeletal: Normal range of motion.   Neurological: He is alert and oriented to person, place, and time.   Skin: Skin is warm and dry. There is erythema (foreheard ).   Multiple abrasions to bilateral knees and elbows    Psychiatric: He has a normal mood and affect. His behavior is normal.       Significant Labs:   CBC:   Recent Labs   Lab 01/04/20  1057 01/05/20  0341   WBC 13.62* 8.83   HGB 16.5 12.7*   HCT 53.7 40.7   * 307     CMP:   Recent Labs   Lab 01/04/20  1057 01/05/20  0341   * 143   K 4.0 3.6    104   CO2 28 26   * 102   BUN 78* 74*   CREATININE 1.8* 1.6*   CALCIUM 10.1 8.0*   PROT 8.1 5.7*   ALBUMIN 3.2* 2.4*   BILITOT 0.7 0.6   ALKPHOS 152* 112   AST 38 27   ALT 25 19   ANIONGAP 18* 13   EGFRNONAA 32.6* 38*     Troponin:   Recent Labs   Lab 01/04/20  1057   TROPONINI 0.019       Significant Imaging:   Imaging Results          X-Ray Chest AP Portable (Final result)  Result time 01/04/20 11:55:36    Final result by Reynaldo Lamb MD  (01/04/20 11:55:36)                 Impression:      Left basilar infiltrate versus atelectasis.      Electronically signed by: Reynaldo Lamb MD  Date:    01/04/2020  Time:    11:55             Narrative:    EXAMINATION:  XR CHEST AP PORTABLE    CLINICAL HISTORY:  Chest Pain;    TECHNIQUE:  AP view of the chest was performed.    COMPARISON:  04/09/2018    FINDINGS:  The cardiac and mediastinal silhouettes appear within normal limits. Aortic atherosclerosis.  Patchy alveolar opacities in the left lung base.  No acute osseous findings demonstrated.                               CT CERVICAL SPINE WITHOUT CONTRAST (Final result)  Result time 01/04/20 12:01:43    Final result by Reynaldo Lamb MD (01/04/20 12:01:43)                 Impression:      No acute fracture or subluxation.    Degenerative changes.    Subsegmental left apical infiltrate.  Recommend chest radiograph follow-up after treatment.    All CT scans at this facility use dose modulation, iterative reconstruction and/or weight based dosing when appropriate to reduce radiation dose to as low as reasonably achievable.      Electronically signed by: Reynaldo Lamb MD  Date:    01/04/2020  Time:    12:01             Narrative:    EXAMINATION:  CT CERVICAL SPINE WITHOUT CONTRAST    CLINICAL HISTORY:  C-spine trauma, NEXUS/CCR positive, +risk factor(s); neck pain    TECHNIQUE:  Axial CT imaging was performed through the cervical spine without intravenous contrast. Multiplanar reformats were reviewed and interpreted.    COMPARISON:  None    FINDINGS:  Vertebral body heights are normal.Alignment is normal.  Mild to moderate degenerative disc disease from C4-5 to C6-7.  Advanced facet DJD throughout the cervical spine.  No soft tissue abnormality detected.    Subsegmental alveolar opacity in the left lung apex.                               CT Maxillofacial Without Contrast (Final result)  Result time 01/04/20 11:52:06    Final result by Reynaldo Lamb MD (01/04/20 11:52:06)                  Impression:      No acute fracture. Left periorbital hematoma.    All CT scans at this facility use dose modulation, iterative reconstruction and/or weight based dosing when appropriat      Electronically signed by: Reynaldo Lamb MD  Date:    01/04/2020  Time:    11:52             Narrative:    EXAMINATION:  CT MAXILLOFACIAL WITHOUT CONTRAST    CLINICAL HISTORY:  Maxface trauma blunt; facial pain    TECHNIQUE:  Axial CT imaging was performed through the face without intravenous contrast. Multiplanar reformats were performed and interpreted.    COMPARISON:  None    FINDINGS:  The paranasal sinuses are clear.    No fractures are identified.  Left periorbital soft tissue swelling.  The globes are symmetric.  No retrobulbar hemorrhage.    The orbital structures appear intact.    Degenerative disc disease and facet DJD seen throughout the cervical spine.    No intracranial hemorrhage is identified.                               CT Head Without Contrast (Final result)  Result time 01/04/20 11:49:51    Final result by Reynaldo Lamb MD (01/04/20 11:49:51)                 Impression:      No CT evidence of acute intracranial abnormality.    All CT scans at this facility are performed  using dose modulation techniques as appropriate to performed exam including the following:  automated exposure control; adjustment of mA and/or kV according to the patients size (this includes techniques or standardized protocols for targeted exams where dose is matched to indication/reason for exam: i.e. extremities or head);  iterative reconstruction technique.      Electronically signed by: Reynaldo Lamb MD  Date:    01/04/2020  Time:    11:49             Narrative:    EXAMINATION:  CT HEAD WITHOUT CONTRAST    CLINICAL HISTORY:  Confusion/delirium, altered LOC, unexplained;    TECHNIQUE:  Axial CT imaging was performed through the head without intravenous contrast.    COMPARISON:  None    FINDINGS:  No hydrocephalus, midline shift,  mass effect, or acute intracranial hemorrhage. Cortical sulcal pattern and gray-white matter differentiation is normal.  Age related cerebral atrophy.  Basilar cisterns and posterior fossa are normal. The visualized paranasal sinuses and mastoid air cells are clear. The skull is intact.                                Assessment/Plan:      Periorbital hematoma  -s/p fall   -neuro checks   -Lovenox held   -SCDs   -will monitor         Severe sepsis  -WBC 13.62, RR 22, HR 95, and Lactic 2.1  -chest xray + for PNA   -IV hydration   -IV antibiotics  -blood cultures penidng   -UA results pending   -will follow repeat lactic acid   -repeat CBC in am   -1/5/2020- Leukocytosis resolved and lactic acid 1        Hypernatremia  ->>143  -repeat CMP in am       Elevated CPK  >>286   -IV hydration   -repeat CPK in am       Pneumonia  -Pt admitted to Observation Unit   -IV antibiotics   -blood cultures with no growth to date    -IV hydration   -incentive spirometry   -Duonebs as needed   -supplemental oxygen to keep O2 sats > 88%      Fall  PT/OT evaluation completed with SNF recommended   -analgesia as needed   -imaging results reviewed       Abrasion  S/p fall   -clean and apply bandage to wounds as needed      COPD (chronic obstructive pulmonary disease)  -Duonebs   -supplemental oxygen   -will resume nebulizer treatments       Diabetes mellitus type 2, uncontrolled, with renal complications  -Accuchecks and SSI   -Cardiac/diabetic diet   -HbA1c 7.5      Atrial fibrillation  -stable   -Cardizem continued       Chronic kidney disease, stage 3  -BUN/Creatinine 1.8>>1.6  -IV hydration   -nephrotoxic medications held   -CMP in am      Acquired hypothyroidism  TSH normal   -Synthroid continued       Essential hypertension  -stable   -will resume home medications   -Lasix and ARB held due to kidney function         VTE Risk Mitigation (From admission, onward)         Ordered     IP VTE HIGH RISK PATIENT  Once       01/04/20 1820                      Verna Jeronimo NP  Department of Hospital Medicine   [facility]

## 2020-01-05 NOTE — ASSESSMENT & PLAN NOTE
PT/OT evaluation completed with SNF recommended   -analgesia as needed   -imaging results reviewed

## 2020-01-05 NOTE — PT/OT/SLP EVAL
Physical Therapy Evaluation    Patient Name:  Jimmy Young   MRN:  [id number]    Recommendations:     Discharge Recommendations:  nursing facility, skilled   Discharge Equipment Recommendations: none   Barriers to discharge: Decreased caregiver support    Assessment:     Jimmy Young is a 89 y.o. male admitted with a medical diagnosis of <principal problem not specified>.  He presents with the following impairments/functional limitations:  weakness, gait instability, impaired endurance, impaired balance, decreased lower extremity function, decreased safety awareness, impaired self care skills, impaired functional mobilty, decreased coordination.    Rehab Prognosis: Good; patient would benefit from acute skilled PT services to address these deficits and reach maximum level of function.    Recent Surgery: * No surgery found *      Plan:     During this hospitalization, patient to be seen 5 x/week to address the identified rehab impairments via gait training, therapeutic activities, therapeutic exercises and progress toward the following goals:    · Plan of Care Expires:  01/12/20    Subjective     Chief Complaint: weakness; soreness  Patient/Family Comments/goals: to get stronger, return to his home and to plof  Pain/Comfort:  · Pain Rating 1: 4/10  · Location - Side 1: Bilateral  · Location - Orientation 1: generalized  · Location 1: (multiple joints - soreness)  · Pain Addressed 1: Reposition, Cessation of Activity, Distraction  · Pain Rating Post-Intervention 1: (none at rest)    Patients cultural, spiritual, [employer] conflicts given the current situation:      Living Environment:  Lives alone; one son lives close; no steps to enter home  Prior to admission, patients level of function was mod indep/RW at times.  Equipment used at home: walker, rolling, oxygen, grab bar(only uses when his ankles bother him - should use more often; also has access to a shower chair).  DME owned (not currently used):  shower chair.  Upon discharge, patient will have assistance from some help from family - but they all work in the day. Patient will need SNF at current ability pending progress made in hospital.    Objective:     Communicated with GEETA Bird prior to session.  Patient found HOB elevated with telemetry, oxygen, peripheral IV  upon PT entry to room.    General Precautions: Standard, fall, hearing impaired, respiratory   Orthopedic Precautions:N/A   Braces: N/A     Exams:  · B LE strength grossly 4-/5 and ROM WFL    Functional Mobility:  · Bed Mobility - supine to/from sit min/mod A with cues for log-rolling and use of bed rail  · Transfers - sit to/from stand with RW, min A and vc/tc's for safe hand p[acement  · Gt - Amb 20ft RW in room min A for safe rw use and balance - on 4LO2 via NC      Therapeutic Activities and Exercises:   PT educated patient on POC, supine B LE TE x 10-20 reps with tc/vc's for correct technique to dec stiffness/prep mobility and safety/fall/O2 precautions with mobility using RW.    AM-PAC 6 CLICK MOBILITY  Total Score:15     Patient left up in chair with all lines intact, call button in reach, RN notified and son present. Patient will return to bed if son leaves.    GOALS:   Multidisciplinary Problems     Physical Therapy Goals        Problem: Physical Therapy Goal    Goal Priority Disciplines Outcome Goal Variances Interventions   Physical Therapy Goal     PT, PT/OT      Description:  1. Patient will perform supine to/from sit min a  2. Patient will perform sit to/from stand cga with RW use and improved safety  3. Patient will ambulate > 100ft RW cga no gross LOB                    History:     Past Medical History:   Diagnosis Date    Aneurysm 2016    abdominal, stent placed    Arthritis     Asthma     Atrial fibrillation     Cancer     skin cancer on face    Diabetes mellitus     Emphysema lung     Encounter for blood transfusion     Hypertension        Past Surgical History:    Procedure Laterality Date    ABDOMINAL AORTIC ANEURYSM REPAIR      Stent placed    ABDOMINAL SURGERY      COLONOSCOPY Left 6/12/2017    Procedure: COLONOSCOPY;  Surgeon: Boaz Toussaint MD;  [facility];  Service: Endoscopy;  Laterality: Left;    VASCULAR SURGERY      abdominal aneurysm repair       Time Tracking:     PT Received On: 01/04/20  PT Start Time: 0850     PT Stop Time: 0930  PT Total Time (min): 40 min     Billable Minutes: Evaluation 15, Therapeutic Activity 10 and Therapeutic Exercise 15      Michael Rueda, PT  01/05/2020

## 2020-01-05 NOTE — HOSPITAL COURSE
Pt admitted to Observation Unit s/p fall x 2 and found to have PNA and IVVD.  Pt treated with IV antibiotics, supplemental oxygen, and Duonebs as needed.  Imaging reviewed with periorbital hematoma noted.  Leukocytosis resolved.  H/H remains stable.  CPK and creatinine trended down.  PT/OT evaluation completed with SNF placement recommended and CM consulted to assist with discharge planning.  On 1/6/2020, pt reported wrist pain with xray showing scapholunate widening suspicious for tendinous/ligamentous injury with no appreciable fracture and soft tissue swelling about the wrist.  Will apply wrap/splint to area.  Leukocytosis resolved.  Kidney function improved and creatinine normalized.  CM assisting with SNF placement.  On 01/7/2020, pt remains stable and awaiting placement.  Current therapy continued.  No signs of acute distress noted.  On 1/8/2020, pt verbalized symptom improvement with increased mobility noted.  Pt seen and examined on the date of discharge and deemed suitable for discharge to [facility].  Pt denies any changes to vision, HA, dizziness, and gait instability prior to discharge.  CPK normalized.  No signs of acute distress and vital signs stable prior to discharge.  Current medications resumed with Azithromycin prescribed.  Pt/family instructed to follow up with PCP upon discharge for further evaluation.

## 2020-01-05 NOTE — ASSESSMENT & PLAN NOTE
-WBC 13.62, RR 22, HR 95, and Lactic 2.1  -chest xray + for PNA   -IV hydration   -IV antibiotics  -blood cultures penidng   -UA results pending   -will follow repeat lactic acid   -repeat CBC in am

## 2020-01-05 NOTE — ASSESSMENT & PLAN NOTE
-Pt admitted to Observation Unit   -IV antibiotics   -blood cultures with no growth to date    -IV hydration   -incentive spirometry   -Duonebs as needed   -supplemental oxygen to keep O2 sats > 88%

## 2020-01-05 NOTE — ASSESSMENT & PLAN NOTE
-Pt admitted to Observation Unit   -IV antibiotics   -blood cultures pending   -V hydration   -incentive spirometry   -Duonebs as needed   -supplemental oxygen to keep O2 sats > 88%

## 2020-01-05 NOTE — PLAN OF CARE
Ongoing (interventions implemented as appropriate)  Pt AAO x4.  VSS  Pt able to make needs known.  Pt remained afebrile throughout this shift.    Pt remained free of falls this shift.   Pt denies pain this shift.  Plan of care reviewed. Patient verbalizes understanding.   Pt moving/turing independent. Frequent weight shifting encouraged.  Patient sinus rhythm on monitor.   Bed low, side rails up x 2, wheels locked, call light in reach.   Hourly rounding completed.   Will continue to monitor.

## 2020-01-05 NOTE — HPI
Jimmy Young is a 89 yo male with PMHX of HTN, Emphysema/COPD on home oxygen, DM, Cancer (skin), Atrial fibrillation, and Asthma who presented to [facility] ED s/p fall x 2 on yesterday.  Pt lives alone and was found down by family this morning.    Pt appears to have fallen in the kitchen and crawled to another room where he fell a second time while attempting to stand.  Pt remained on the floor for an indeterminate period of time.   Associated symptoms include: knee pain, cough (white phelgm), sneezing, runny nose, vision changes, and left arm pain.   Denies neck pain, back pain, pelvic pain, chest pain, or trouble breathing.  Pt is on home oxygen and was able to access oxygen while he was on the floor.   Pt reports compliance with prescribed medications.  Pt is followed outpatient by Dr. Banks (PCP).  Pt denies smoking and use of ETOH.  Pt is a DNR and Chrissy Glover (daughter) at [phone] is the surrogate decision maker.  ER work up showed: WBC 13.62, Na 151, pO2 67, BUN 78, Creatinine 1.8, Glucose 164, , and Lactic 2.1.  CT of maxillofacial showed no acute fracture and L periorbital hematoma.  CT of head showed no acute intracranial abnormality.  CT of cervical spine showed no acute fracture or subluxation with degenerative changes and subsegmental left apical infiltrate. Pt transferred to [facility] and Hospital Medicine contacted for admission to Observation Unit for further evaluation.

## 2020-01-05 NOTE — H&P
[facility]  History & Physical    Patient Name: Jimmy Young  MRN: [id number]  Admission Date: 1/4/2020  Attending Physician: Suzanne Galindo MD   Primary Care Provider: Nacho Richardson MD         Patient information was obtained from patient, relative(s) and ER records.     Subjective:     Principal Problem: Severe Sepsis     Chief Complaint:   Chief Complaint   Patient presents with    Fall     fell approx 2 days ago in kitchen,crawled to living room, fell 2nd time attempting to get up, laid on floor until neighbor found down this am        HPI: Jimmy Young is a 88 yo male with PMHX of HTN, Emphysema/COPD on home oxygen, DM, Cancer (skin), Atrial fibrillation, and Asthma who presented to [facility] ED s/p fall x 2 on yesterday.  Pt lives alone and was found down by family this morning.    Pt  appears to have fallen in the kitchen and crawled to another room where he fell a second time while attempting to stand.  Pt remained on the floor for an indeterminate period of time.   Associated symptoms include: knee pain, cough (white phelgm), sneezing, runny nose, vision changes, and left arm pain.   Denies neck pain, back pain, pelvic pain, chest pain, or trouble breathing.   Pt is on home oxygen and was able to access oxygen while he was on the floor.   Pt reports compliance with prescribed medications.  Pt is followed outpatient by Dr. Banks (PCP).  Pt denies smoking and use of ETOH.  Pt is a DNR and Chrissy Glover (daughter) at [phone] is the surrogate decision maker.  ER work up showed: WBC 13.62, Na 151, pO2 67, BUN 78, Creatinine 1.8, Glucose 164, , and Lactic 2.1.  CT of maxillofacial showed no acute fracture and L periorbital hematoma.  CT of head showed no acute intracranial abnormality.  CT of cervical spine showed no acute fracture or subluxation with degenerative changes and subsegmental left apical infiltrate. [facility]  Medicine contacted for admission to Observation Unit for further evaluation.      Past Medical History:   Diagnosis Date    Aneurysm 2016    abdominal, stent placed    Arthritis     Asthma     Atrial fibrillation     Cancer     skin cancer on face    Diabetes mellitus     Emphysema lung     Encounter for blood transfusion     Hypertension        Past Surgical History:   Procedure Laterality Date    ABDOMINAL AORTIC ANEURYSM REPAIR      Stent placed    ABDOMINAL SURGERY      COLONOSCOPY Left 6/12/2017    Procedure: COLONOSCOPY;  Surgeon: Boaz Toussaint MD;  Location: [facility];  Service: Endoscopy;  Laterality: Left;    VASCULAR SURGERY      abdominal aneurysm repair       Review of patient's allergies indicates:  No Known Allergies    No current facility-administered medications on file prior to encounter.      Current Outpatient Medications on File Prior to Encounter   Medication Sig    ascorbic acid, vitamin C, (VITAMIN C) 1000 MG tablet Take 1,000 mg by mouth Daily.    CARTIA  mg 24 hr capsule TAKE ONE CAPSULE BY MOUTH ONCE DAILY    carvedilol (COREG) 6.25 MG tablet Take 6.25 mg by mouth 2 (two) times daily with meals.     cetirizine (ZYRTEC) 10 MG tablet Take 10 mg by mouth once daily.    COMBIVENT RESPIMAT  mcg/actuation inhaler USE 1 PUFF EVERY SIX HOURS AS NEEDED FOR WHEEZING    furosemide (LASIX) 20 MG tablet Take 20 mg by mouth 2 (two) times daily.    iron-vitamin C 100-250 mg, ICAR-C, 100-250 mg Tab TAKE ONE TABLET BY MOUTH ONCE DAILY    levothyroxine (SYNTHROID) 50 MCG tablet Take 1 tablet (50 mcg total) by mouth before breakfast.    losartan (COZAAR) 25 MG tablet Take 1 tablet (25 mg total) by mouth once daily.    metFORMIN (GLUCOPHAGE-XR) 750 MG 24 hr tablet Take 750 mg by mouth every evening.    pantoprazole (PROTONIX) 40 MG tablet Take 1 tablet by mouth Daily.    pravastatin (PRAVACHOL) 20 MG tablet Take 1 tablet (20 mg total) by mouth nightly.    selenium 50 mcg  Tab Take 200 mcg by mouth nightly.     vitamin D 1000 units Tab Take 1 tablet by mouth Daily.     Family History     Problem Relation (Age of Onset)    Cancer Mother    Heart attack Father    Hypertension Father        Tobacco Use    Smoking status: Former Smoker     Packs/day: 2.00     Years: 20.00     Pack years: 40.00     Types: Cigarettes     Last attempt to quit: 1977     Years since quittin.0    Smokeless tobacco: Former User   Substance and Sexual Activity    Alcohol use: No    Drug use: No    Sexual activity: Not Currently     Review of Systems   Constitutional: Negative for activity change, appetite change, fatigue and fever.   HENT: Positive for postnasal drip and sneezing. Negative for sinus pressure, sore throat and trouble swallowing.    Eyes: Positive for visual disturbance.   Respiratory: Positive for cough. Negative for wheezing.    Cardiovascular: Negative for chest pain, palpitations and leg swelling.   Gastrointestinal: Negative for abdominal distention, abdominal pain, diarrhea, nausea and vomiting.   Endocrine: Negative for cold intolerance and heat intolerance.   Genitourinary: Negative for difficulty urinating, dysuria, flank pain, frequency and urgency.   Musculoskeletal: Positive for myalgias. Negative for arthralgias and gait problem.   Skin: Positive for wound. Negative for color change.   Allergic/Immunologic: Negative for environmental allergies and food allergies.   Neurological: Negative for syncope, weakness and headaches.   Psychiatric/Behavioral: Negative for agitation, confusion and sleep disturbance. The patient is not nervous/anxious.      Objective:     Vital Signs (Most Recent):  Temp: 97.6 °F (36.4 °C) (20 1321)  Pulse: 89 (20 1700)  Resp: 19 (20 1433)  BP: 138/75 (20 1433)  SpO2: (!) 93 % (20 1433) Vital Signs (24h Range):  Temp:  [97.3 °F (36.3 °C)-97.6 °F (36.4 °C)] 97.6 °F (36.4 °C)  Pulse:  [80-95] 89  Resp:  [16-22] 19  SpO2:   [91 %-93 %] 93 %  BP: (119-192)/() 138/75     Weight: 76.8 kg (169 lb 5 oz)  Body mass index is 25.74 kg/m².    Physical Exam   Constitutional: He is oriented to person, place, and time. He appears well-developed and well-nourished.   HENT:   Head: Normocephalic.   Nose: Nose normal.   Mouth/Throat: Mucous membranes are dry.   Eyes: Conjunctivae are normal.   Neck: Normal range of motion. Neck supple.   Cardiovascular: Normal rate, regular rhythm, normal heart sounds and intact distal pulses.   Pulmonary/Chest: Effort normal and breath sounds normal. No respiratory distress.   Abdominal: Soft. Bowel sounds are normal. He exhibits no distension. There is no tenderness.   Genitourinary:   Genitourinary Comments: Deferred    Musculoskeletal: Normal range of motion.   Neurological: He is alert and oriented to person, place, and time.   Skin: Skin is warm and dry. There is erythema (foreheard ).   Multiple abrasions to bilateral knees and elbows    Psychiatric: He has a normal mood and affect. His behavior is normal.           Significant Labs:   CBC:   Recent Labs   Lab 01/04/20  1057   WBC 13.62*   HGB 16.5   HCT 53.7   *     CMP:   Recent Labs   Lab 01/04/20  1057   *   K 4.0      CO2 28   *   BUN 78*   CREATININE 1.8*   CALCIUM 10.1   PROT 8.1   ALBUMIN 3.2*   BILITOT 0.7   ALKPHOS 152*   AST 38   ALT 25   ANIONGAP 18*   EGFRNONAA 32.6*     Cardiac Markers:   Recent Labs   Lab 01/04/20  1057   BNP 58     Lactic Acid:   Recent Labs   Lab 01/04/20  1057   LACTATE 2.1     Troponin:   Recent Labs   Lab 01/04/20  1057   TROPONINI 0.019       Significant Imaging:   Imaging Results          X-Ray Chest AP Portable (Final result)  Result time 01/04/20 11:55:36    Final result by Reynaldo Lamb MD (01/04/20 11:55:36)                 Impression:      Left basilar infiltrate versus atelectasis.      Electronically signed by: Reynaldo Lamb MD  Date:    01/04/2020  Time:    11:55             Narrative:     EXAMINATION:  XR CHEST AP PORTABLE    CLINICAL HISTORY:  Chest Pain;    TECHNIQUE:  AP view of the chest was performed.    COMPARISON:  04/09/2018    FINDINGS:  The cardiac and mediastinal silhouettes appear within normal limits. Aortic atherosclerosis.  Patchy alveolar opacities in the left lung base.  No acute osseous findings demonstrated.                               CT CERVICAL SPINE WITHOUT CONTRAST (Final result)  Result time 01/04/20 12:01:43    Final result by Reynaldo Lamb MD (01/04/20 12:01:43)                 Impression:      No acute fracture or subluxation.    Degenerative changes.    Subsegmental left apical infiltrate.  Recommend chest radiograph follow-up after treatment.    All CT scans at this facility use dose modulation, iterative reconstruction and/or weight based dosing when appropriate to reduce radiation dose to as low as reasonably achievable.      Electronically signed by: Reynaldo Lamb MD  Date:    01/04/2020  Time:    12:01             Narrative:    EXAMINATION:  CT CERVICAL SPINE WITHOUT CONTRAST    CLINICAL HISTORY:  C-spine trauma, NEXUS/CCR positive, +risk factor(s); neck pain    TECHNIQUE:  Axial CT imaging was performed through the cervical spine without intravenous contrast. Multiplanar reformats were reviewed and interpreted.    COMPARISON:  None    FINDINGS:  Vertebral body heights are normal.Alignment is normal.  Mild to moderate degenerative disc disease from C4-5 to C6-7.  Advanced facet DJD throughout the cervical spine.  No soft tissue abnormality detected.    Subsegmental alveolar opacity in the left lung apex.                               CT Maxillofacial Without Contrast (Final result)  Result time 01/04/20 11:52:06    Final result by Reynaldo Lamb MD (01/04/20 11:52:06)                 Impression:      No acute fracture. Left periorbital hematoma.    All CT scans at this facility use dose modulation, iterative reconstruction and/or weight based dosing when  appropriat      Electronically signed by: Reynaldo Lamb MD  Date:    01/04/2020  Time:    11:52             Narrative:    EXAMINATION:  CT MAXILLOFACIAL WITHOUT CONTRAST    CLINICAL HISTORY:  Maxface trauma blunt; facial pain    TECHNIQUE:  Axial CT imaging was performed through the face without intravenous contrast. Multiplanar reformats were performed and interpreted.    COMPARISON:  None    FINDINGS:  The paranasal sinuses are clear.    No fractures are identified.  Left periorbital soft tissue swelling.  The globes are symmetric.  No retrobulbar hemorrhage.    The orbital structures appear intact.    Degenerative disc disease and facet DJD seen throughout the cervical spine.    No intracranial hemorrhage is identified.                               CT Head Without Contrast (Final result)  Result time 01/04/20 11:49:51    Final result by Reynaldo Lamb MD (01/04/20 11:49:51)                 Impression:      No CT evidence of acute intracranial abnormality.    All CT scans at this facility are performed  using dose modulation techniques as appropriate to performed exam including the following:  automated exposure control; adjustment of mA and/or kV according to the patients size (this includes techniques or standardized protocols for targeted exams where dose is matched to indication/reason for exam: i.e. extremities or head);  iterative reconstruction technique.      Electronically signed by: Reynaldo Lamb MD  Date:    01/04/2020  Time:    11:49             Narrative:    EXAMINATION:  CT HEAD WITHOUT CONTRAST    CLINICAL HISTORY:  Confusion/delirium, altered LOC, unexplained;    TECHNIQUE:  Axial CT imaging was performed through the head without intravenous contrast.    COMPARISON:  None    FINDINGS:  No hydrocephalus, midline shift, mass effect, or acute intracranial hemorrhage. Cortical sulcal pattern and gray-white matter differentiation is normal.  Age related cerebral atrophy.  Basilar cisterns and posterior fossa  are normal. The visualized paranasal sinuses and mastoid air cells are clear. The skull is intact.                              Assessment/Plan:     Periorbital hematoma  -s/p fall   -neuro checks   -Lovenox held   -SCDs         Severe sepsis  -WBC 13.62, RR 22, HR 95, and Lactic 2.1  -chest xray + for PNA   -IV hydration   -IV antibiotics  -blood cultures penidng   -UA results pending   -will follow repeat lactic acid   -repeat CBC in am         Hypernatremia  -   -repeat CMP in am       Elevated CPK     -IV hydration   -repeat CPK in am       Pneumonia  -Pt admitted to Observation Unit   -IV antibiotics   -blood cultures pending   -V hydration   -incentive spirometry   -Duonebs as needed   -supplemental oxygen to keep O2 sats > 88%      Fall  PT/OT evaluation   -analgesia as needed   -imaging results reviewed       Abrasion  S/p fall   -clean and apply bandage to wounds as needed      COPD (chronic obstructive pulmonary disease)  -Duonebs   -supplemental oxygen       Diabetes mellitus type 2, uncontrolled, with renal complications  -Accuchecks and SSI   -Cardiac/diabetic diet   -HbA1c pending       Atrial fibrillation  -stable   -Cardizem continued       Chronic kidney disease, stage 3  -BUN/Creatinine 1.8  -IV hydration   -nephrotoxic medications held   -CMP in am      Acquired hypothyroidism  TSH normal       Essential hypertension  -stable   -will resume home medications   -Lasix and ARB held due to kidney function         VTE Risk Mitigation (From admission, onward)         Ordered     IP VTE HIGH RISK PATIENT  Once      01/04/20 1820                   Verna Jeronimo NP  Department of Hospital Medicine   [facility]

## 2020-01-05 NOTE — ASSESSMENT & PLAN NOTE
-WBC 13.62, RR 22, HR 95, and Lactic 2.1  -chest xray + for PNA   -IV hydration   -IV antibiotics  -blood cultures penidng   -UA results pending   -will follow repeat lactic acid   -repeat CBC in am   -1/5/2020- Leukocytosis resolved and lactic acid 1

## 2020-01-06 PROBLEM — M25.532 LEFT WRIST PAIN: Status: ACTIVE | Noted: 2020-01-06

## 2020-01-06 PROBLEM — E87.0 HYPERNATREMIA: Status: RESOLVED | Noted: 2020-01-04 | Resolved: 2020-01-06

## 2020-01-06 LAB
ALBUMIN SERPL BCP-MCNC: 2.2 G/DL (ref 3.5–5.2)
ALP SERPL-CCNC: 117 U/L (ref 55–135)
ALT SERPL W/O P-5'-P-CCNC: 24 U/L (ref 10–44)
ANION GAP SERPL CALC-SCNC: 9 MMOL/L (ref 8–16)
AST SERPL-CCNC: 30 U/L (ref 10–40)
BASOPHILS # BLD AUTO: 0.03 K/UL (ref 0–0.2)
BASOPHILS NFR BLD: 0.3 % (ref 0–1.9)
BILIRUB SERPL-MCNC: 0.5 MG/DL (ref 0.1–1)
BILIRUB UR QL STRIP: NEGATIVE
BUN SERPL-MCNC: 52 MG/DL (ref 8–23)
CALCIUM SERPL-MCNC: 8 MG/DL (ref 8.7–10.5)
CHLORIDE SERPL-SCNC: 105 MMOL/L (ref 95–110)
CLARITY UR: CLEAR
CO2 SERPL-SCNC: 28 MMOL/L (ref 23–29)
COLOR UR: YELLOW
CREAT SERPL-MCNC: 1.4 MG/DL (ref 0.5–1.4)
DIFFERENTIAL METHOD: ABNORMAL
EOSINOPHIL # BLD AUTO: 0.3 K/UL (ref 0–0.5)
EOSINOPHIL NFR BLD: 2.9 % (ref 0–8)
ERYTHROCYTE [DISTWIDTH] IN BLOOD BY AUTOMATED COUNT: 13.6 % (ref 11.5–14.5)
EST. GFR  (AFRICAN AMERICAN): 51 ML/MIN/1.73 M^2
EST. GFR  (NON AFRICAN AMERICAN): 44 ML/MIN/1.73 M^2
GLUCOSE SERPL-MCNC: 127 MG/DL (ref 70–110)
GLUCOSE UR QL STRIP: NEGATIVE
HCT VFR BLD AUTO: 39.6 % (ref 40–54)
HGB BLD-MCNC: 12.2 G/DL (ref 14–18)
HGB UR QL STRIP: NEGATIVE
IMM GRANULOCYTES # BLD AUTO: 0.21 K/UL (ref 0–0.04)
IMM GRANULOCYTES NFR BLD AUTO: 2.2 % (ref 0–0.5)
KETONES UR QL STRIP: NEGATIVE
LEUKOCYTE ESTERASE UR QL STRIP: NEGATIVE
LYMPHOCYTES # BLD AUTO: 1 K/UL (ref 1–4.8)
LYMPHOCYTES NFR BLD: 10.4 % (ref 18–48)
MCH RBC QN AUTO: 29 PG (ref 27–31)
MCHC RBC AUTO-ENTMCNC: 30.8 G/DL (ref 32–36)
MCV RBC AUTO: 94 FL (ref 82–98)
MONOCYTES # BLD AUTO: 0.9 K/UL (ref 0.3–1)
MONOCYTES NFR BLD: 9 % (ref 4–15)
NEUTROPHILS # BLD AUTO: 7.1 K/UL (ref 1.8–7.7)
NEUTROPHILS NFR BLD: 75.2 % (ref 38–73)
NITRITE UR QL STRIP: NEGATIVE
NRBC BLD-RTO: 0 /100 WBC
PH UR STRIP: 6 [PH] (ref 5–8)
PLATELET # BLD AUTO: 264 K/UL (ref 150–350)
PMV BLD AUTO: 10 FL (ref 9.2–12.9)
POCT GLUCOSE: 120 MG/DL (ref 70–110)
POCT GLUCOSE: 127 MG/DL (ref 70–110)
POCT GLUCOSE: 148 MG/DL (ref 70–110)
POCT GLUCOSE: 189 MG/DL (ref 70–110)
POCT GLUCOSE: 227 MG/DL (ref 70–110)
POTASSIUM SERPL-SCNC: 3.7 MMOL/L (ref 3.5–5.1)
PROT SERPL-MCNC: 5.5 G/DL (ref 6–8.4)
PROT UR QL STRIP: NEGATIVE
RBC # BLD AUTO: 4.2 M/UL (ref 4.6–6.2)
SODIUM SERPL-SCNC: 142 MMOL/L (ref 136–145)
SP GR UR STRIP: 1.01 (ref 1–1.03)
URN SPEC COLLECT METH UR: NORMAL
UROBILINOGEN UR STRIP-ACNC: NEGATIVE EU/DL
WBC # BLD AUTO: 9.51 K/UL (ref 3.9–12.7)

## 2020-01-06 PROCEDURE — 97530 THERAPEUTIC ACTIVITIES: CPT

## 2020-01-06 PROCEDURE — 94761 N-INVAS EAR/PLS OXIMETRY MLT: CPT

## 2020-01-06 PROCEDURE — 25000242 PHARM REV CODE 250 ALT 637 W/ HCPCS: Performed by: NURSE PRACTITIONER

## 2020-01-06 PROCEDURE — 85025 COMPLETE CBC W/AUTO DIFF WBC: CPT

## 2020-01-06 PROCEDURE — 94799 UNLISTED PULMONARY SVC/PX: CPT

## 2020-01-06 PROCEDURE — 80053 COMPREHEN METABOLIC PANEL: CPT

## 2020-01-06 PROCEDURE — 36415 COLL VENOUS BLD VENIPUNCTURE: CPT

## 2020-01-06 PROCEDURE — 97116 GAIT TRAINING THERAPY: CPT | Mod: 59

## 2020-01-06 PROCEDURE — G0378 HOSPITAL OBSERVATION PER HR: HCPCS

## 2020-01-06 PROCEDURE — 25000003 PHARM REV CODE 250: Performed by: NURSE PRACTITIONER

## 2020-01-06 PROCEDURE — 63600175 PHARM REV CODE 636 W HCPCS: Performed by: NURSE PRACTITIONER

## 2020-01-06 PROCEDURE — 96361 HYDRATE IV INFUSION ADD-ON: CPT

## 2020-01-06 PROCEDURE — 94760 N-INVAS EAR/PLS OXIMETRY 1: CPT

## 2020-01-06 PROCEDURE — 81003 URINALYSIS AUTO W/O SCOPE: CPT

## 2020-01-06 PROCEDURE — 97166 OT EVAL MOD COMPLEX 45 MIN: CPT | Performed by: PHYSICAL THERAPIST

## 2020-01-06 PROCEDURE — 27000221 HC OXYGEN, UP TO 24 HOURS

## 2020-01-06 PROCEDURE — 97530 THERAPEUTIC ACTIVITIES: CPT | Performed by: PHYSICAL THERAPIST

## 2020-01-06 PROCEDURE — 96376 TX/PRO/DX INJ SAME DRUG ADON: CPT

## 2020-01-06 PROCEDURE — 96372 THER/PROPH/DIAG INJ SC/IM: CPT

## 2020-01-06 RX ORDER — BUDESONIDE 0.5 MG/2ML
0.5 INHALANT ORAL EVERY 12 HOURS
Status: DISCONTINUED | OUTPATIENT
Start: 2020-01-06 | End: 2020-01-07 | Stop reason: HOSPADM

## 2020-01-06 RX ORDER — ARFORMOTEROL TARTRATE 15 UG/2ML
15 SOLUTION RESPIRATORY (INHALATION) 2 TIMES DAILY
Status: DISCONTINUED | OUTPATIENT
Start: 2020-01-06 | End: 2020-01-07 | Stop reason: HOSPADM

## 2020-01-06 RX ADMIN — LEVOTHYROXINE SODIUM 50 MCG: 50 TABLET ORAL at 05:01

## 2020-01-06 RX ADMIN — CARVEDILOL 6.25 MG: 6.25 TABLET, FILM COATED ORAL at 08:01

## 2020-01-06 RX ADMIN — AZITHROMYCIN MONOHYDRATE 500 MG: 500 INJECTION, POWDER, LYOPHILIZED, FOR SOLUTION INTRAVENOUS at 07:01

## 2020-01-06 RX ADMIN — BUDESONIDE 0.5 MG: 0.5 SUSPENSION RESPIRATORY (INHALATION) at 07:01

## 2020-01-06 RX ADMIN — CARVEDILOL 6.25 MG: 6.25 TABLET, FILM COATED ORAL at 05:01

## 2020-01-06 RX ADMIN — SODIUM CHLORIDE: 0.45 INJECTION, SOLUTION INTRAVENOUS at 02:01

## 2020-01-06 RX ADMIN — CEFTRIAXONE 1 G: 1 INJECTION, SOLUTION INTRAVENOUS at 02:01

## 2020-01-06 RX ADMIN — PRAVASTATIN SODIUM 20 MG: 20 TABLET ORAL at 09:01

## 2020-01-06 RX ADMIN — ARFORMOTEROL TARTRATE 15 MCG: 15 SOLUTION RESPIRATORY (INHALATION) at 07:01

## 2020-01-06 RX ADMIN — PANTOPRAZOLE SODIUM 40 MG: 40 TABLET, DELAYED RELEASE ORAL at 05:01

## 2020-01-06 RX ADMIN — INSULIN ASPART 4 UNITS: 100 INJECTION, SOLUTION INTRAVENOUS; SUBCUTANEOUS at 04:01

## 2020-01-06 NOTE — SUBJECTIVE & OBJECTIVE
Interval History: pt stable in bed during exam.  Pt verbalized improvement and reports pain to L wrist with edema. Xray suspicious for tendinous/ligamentous injury with no appreciable fracture and soft tissue swelling about the wrist.  Analgesia as needed and ACE wrap placed to affected area.      Review of Systems   Constitutional: Negative for activity change, appetite change, fatigue and fever.   HENT: Negative for postnasal drip, sinus pressure, sneezing, sore throat and trouble swallowing.    Eyes: Negative for visual disturbance.   Respiratory: Positive for cough and wheezing.    Cardiovascular: Negative for chest pain, palpitations and leg swelling.   Gastrointestinal: Negative for abdominal distention, abdominal pain, diarrhea, nausea and vomiting.   Endocrine: Negative for cold intolerance and heat intolerance.   Genitourinary: Negative for difficulty urinating, dysuria, flank pain, frequency and urgency.   Musculoskeletal: Positive for arthralgias, joint swelling (wrist ) and myalgias. Negative for gait problem.   Skin: Positive for wound. Negative for color change.   Allergic/Immunologic: Negative for environmental allergies and food allergies.   Neurological: Negative for syncope, weakness and headaches.   Psychiatric/Behavioral: Negative for agitation, confusion and sleep disturbance. The patient is not nervous/anxious.      Objective:     Vital Signs (Most Recent):  Temp: 98.6 °F (37 °C) (01/06/20 1542)  Pulse: 71 (01/06/20 1542)  Resp: (!) 22 (01/06/20 1542)  BP: 128/60 (01/06/20 1542)  SpO2: (!) 93 % (01/06/20 1115) Vital Signs (24h Range):  Temp:  [97.6 °F (36.4 °C)-98.6 °F (37 °C)] 98.6 °F (37 °C)  Pulse:  [63-77] 71  Resp:  [18-22] 22  SpO2:  [90 %-94 %] 93 %  BP: (128-143)/(59-65) 128/60     Weight: 77.7 kg (171 lb 4.8 oz)  Body mass index is 26.05 kg/m².    Intake/Output Summary (Last 24 hours) at 1/6/2020 1612  Last data filed at 1/6/2020 0845  Gross per 24 hour   Intake 1640 ml   Output 925 ml    Net 715 ml      Physical Exam   Constitutional: He is oriented to person, place, and time. He appears well-developed and well-nourished.   HENT:   Head: Normocephalic.   Nose: Nose normal.   Mouth/Throat: Mucous membranes are not dry.   Eyes: Conjunctivae are normal.   Neck: Normal range of motion. Neck supple.   Cardiovascular: Normal rate, regular rhythm, normal heart sounds and intact distal pulses.   Pulmonary/Chest: Effort normal and breath sounds normal. No respiratory distress.   Abdominal: Soft. Bowel sounds are normal. He exhibits no distension. There is no tenderness.   Genitourinary:   Genitourinary Comments: Deferred    Musculoskeletal: Normal range of motion. He exhibits edema (wrist -L).   Neurological: He is alert and oriented to person, place, and time.   Skin: Skin is warm and dry. There is erythema (foreheard ).   Multiple abrasions to bilateral knees and elbows    Psychiatric: He has a normal mood and affect. His behavior is normal.       Significant Labs:   CBC:   Recent Labs   Lab 01/05/20  0341 01/06/20  0355   WBC 8.83 9.51   HGB 12.7* 12.2*   HCT 40.7 39.6*    264     CMP:   Recent Labs   Lab 01/05/20  0341 01/06/20  0355    142   K 3.6 3.7    105   CO2 26 28    127*   BUN 74* 52*   CREATININE 1.6* 1.4   CALCIUM 8.0* 8.0*   PROT 5.7* 5.5*   ALBUMIN 2.4* 2.2*   BILITOT 0.6 0.5   ALKPHOS 112 117   AST 27 30   ALT 19 24   ANIONGAP 13 9   EGFRNONAA 38* 44*       Significant Imaging:   Imaging Results          X-Ray Chest AP Portable (Final result)  Result time 01/04/20 11:55:36    Final result by Reynaldo Lamb MD (01/04/20 11:55:36)                 Impression:      Left basilar infiltrate versus atelectasis.      Electronically signed by: Reynaldo Lamb MD  Date:    01/04/2020  Time:    11:55             Narrative:    EXAMINATION:  XR CHEST AP PORTABLE    CLINICAL HISTORY:  Chest Pain;    TECHNIQUE:  AP view of the chest was  performed.    COMPARISON:  04/09/2018    FINDINGS:  The cardiac and mediastinal silhouettes appear within normal limits. Aortic atherosclerosis.  Patchy alveolar opacities in the left lung base.  No acute osseous findings demonstrated.                               CT CERVICAL SPINE WITHOUT CONTRAST (Final result)  Result time 01/04/20 12:01:43    Final result by Reynaldo Lamb MD (01/04/20 12:01:43)                 Impression:      No acute fracture or subluxation.    Degenerative changes.    Subsegmental left apical infiltrate.  Recommend chest radiograph follow-up after treatment.    All CT scans at this facility use dose modulation, iterative reconstruction and/or weight based dosing when appropriate to reduce radiation dose to as low as reasonably achievable.      Electronically signed by: Reynaldo Lamb MD  Date:    01/04/2020  Time:    12:01             Narrative:    EXAMINATION:  CT CERVICAL SPINE WITHOUT CONTRAST    CLINICAL HISTORY:  C-spine trauma, NEXUS/CCR positive, +risk factor(s); neck pain    TECHNIQUE:  Axial CT imaging was performed through the cervical spine without intravenous contrast. Multiplanar reformats were reviewed and interpreted.    COMPARISON:  None    FINDINGS:  Vertebral body heights are normal.Alignment is normal.  Mild to moderate degenerative disc disease from C4-5 to C6-7.  Advanced facet DJD throughout the cervical spine.  No soft tissue abnormality detected.    Subsegmental alveolar opacity in the left lung apex.                               CT Maxillofacial Without Contrast (Final result)  Result time 01/04/20 11:52:06    Final result by Reynaldo Lamb MD (01/04/20 11:52:06)                 Impression:      No acute fracture. Left periorbital hematoma.    All CT scans at this facility use dose modulation, iterative reconstruction and/or weight based dosing when appropriat      Electronically signed by: Reynaldo Lamb MD  Date:    01/04/2020  Time:    11:52             Narrative:     EXAMINATION:  CT MAXILLOFACIAL WITHOUT CONTRAST    CLINICAL HISTORY:  Maxface trauma blunt; facial pain    TECHNIQUE:  Axial CT imaging was performed through the face without intravenous contrast. Multiplanar reformats were performed and interpreted.    COMPARISON:  None    FINDINGS:  The paranasal sinuses are clear.    No fractures are identified.  Left periorbital soft tissue swelling.  The globes are symmetric.  No retrobulbar hemorrhage.    The orbital structures appear intact.    Degenerative disc disease and facet DJD seen throughout the cervical spine.    No intracranial hemorrhage is identified.                               CT Head Without Contrast (Final result)  Result time 01/04/20 11:49:51    Final result by Reynaldo Lamb MD (01/04/20 11:49:51)                 Impression:      No CT evidence of acute intracranial abnormality.    All CT scans at this facility are performed  using dose modulation techniques as appropriate to performed exam including the following:  automated exposure control; adjustment of mA and/or kV according to the patients size (this includes techniques or standardized protocols for targeted exams where dose is matched to indication/reason for exam: i.e. extremities or head);  iterative reconstruction technique.      Electronically signed by: Reynaldo Lamb MD  Date:    01/04/2020  Time:    11:49             Narrative:    EXAMINATION:  CT HEAD WITHOUT CONTRAST    CLINICAL HISTORY:  Confusion/delirium, altered LOC, unexplained;    TECHNIQUE:  Axial CT imaging was performed through the head without intravenous contrast.    COMPARISON:  None    FINDINGS:  No hydrocephalus, midline shift, mass effect, or acute intracranial hemorrhage. Cortical sulcal pattern and gray-white matter differentiation is normal.  Age related cerebral atrophy.  Basilar cisterns and posterior fossa are normal. The visualized paranasal sinuses and mastoid air cells are clear. The skull is intact.

## 2020-01-06 NOTE — ASSESSMENT & PLAN NOTE
PT/OT evaluation completed with SNF recommended   -analgesia as needed   -imaging results reviewed   -CM consulted to assist with placement

## 2020-01-06 NOTE — PROGRESS NOTES
[facility] Hospital Medicine  Progress Note    Patient Name: Jimmy Young  MRN: [id number]  Patient Class: OP- Observation   Admission Date: 1/4/2020  Length of Stay: 0 days  Attending Physician: Suzanne Galindo MD  Primary Care Provider: Nacoh Richardson MD        Subjective:     Principal Problem: Pneumonia         HPI:  Jimmy Young is a 89 yo male with PMHX of HTN, Emphysema/COPD on home oxygen, DM, Cancer (skin), Atrial fibrillation, and Asthma who presented to [facility] ED s/p fall x 2 on yesterday.  Pt lives alone and was found down by family this morning.    Pt  appears to have fallen in the kitchen and crawled to another room where he fell a second time while attempting to stand.  Pt remained on the floor for an indeterminate period of time.   Associated symptoms include: knee pain, cough (white phelgm), sneezing, runny nose, vision changes, and left arm pain.   Denies neck pain, back pain, pelvic pain, chest pain, or trouble breathing.   Pt is on home oxygen and was able to access oxygen while he was on the floor.   Pt reports compliance with prescribed medications.  Pt is followed outpatient by Dr. Banks (PCP).  Pt denies smoking and use of ETOH.  Pt is a DNR and Chrissy Glover (daughter) at [phone] is the surrogate decision maker.  ER work up showed: WBC 13.62, Na 151, pO2 67, BUN 78, Creatinine 1.8, Glucose 164, , and Lactic 2.1.  CT of maxillofacial showed no acute fracture and L periorbital hematoma.  CT of head showed no acute intracranial abnormality.  CT of cervical spine showed no acute fracture or subluxation with degenerative changes and subsegmental left apical infiltrate. Pt transferred to [facility] and Hospital Medicine contacted for admission to Observation Unit for further evaluation.      Overview/Hospital Course:  Pt admitted to Observation Unit s/p fall x 2 and found to have PNA and IVVD.  Pt treated with IV antibiotics, supplemental oxygen, and  Duonebs as needed.  Imaging reviewed with periorbital hematoma noted.  Leukocytosis resolved.  H/H remains stable.  CPK and creatinine trended down.  PT/OT evaluation completed with SNF placement recommended and CM consulted to assist with discharge planning.  On 1/6/2020, pt reported wrist pain with xray showing scapholunate widening suspicious for tendinous/ligamentous injury with nappreciable fracture and soft tissue swelling about the wrist.  Will apply wrap/splint to area.  Leukocytosis resolved.  Kidney function improved and creatinine normalized.  CM assisting with SNF placement.      Interval History: pt stable in bed during exam.  Pt verbalized improvement and reports pain to L wrist with edema. Xray suspicious for tendinous/ligamentous injury with no appreciable fracture and soft tissue swelling about the wrist.  Analgesia as needed and ACE wrap placed to affected area.      Review of Systems   Constitutional: Negative for activity change, appetite change, fatigue and fever.   HENT: Negative for postnasal drip, sinus pressure, sneezing, sore throat and trouble swallowing.    Eyes: Negative for visual disturbance.   Respiratory: Positive for cough and wheezing.    Cardiovascular: Negative for chest pain, palpitations and leg swelling.   Gastrointestinal: Negative for abdominal distention, abdominal pain, diarrhea, nausea and vomiting.   Endocrine: Negative for cold intolerance and heat intolerance.   Genitourinary: Negative for difficulty urinating, dysuria, flank pain, frequency and urgency.   Musculoskeletal: Positive for arthralgias, joint swelling (wrist ) and myalgias. Negative for gait problem.   Skin: Positive for wound. Negative for color change.   Allergic/Immunologic: Negative for environmental allergies and food allergies.   Neurological: Negative for syncope, weakness and headaches.   Psychiatric/Behavioral: Negative for agitation, confusion and sleep disturbance. The patient is not  nervous/anxious.      Objective:     Vital Signs (Most Recent):  Temp: 98.6 °F (37 °C) (01/06/20 1542)  Pulse: 71 (01/06/20 1542)  Resp: (!) 22 (01/06/20 1542)  BP: 128/60 (01/06/20 1542)  SpO2: (!) 93 % (01/06/20 1115) Vital Signs (24h Range):  Temp:  [97.6 °F (36.4 °C)-98.6 °F (37 °C)] 98.6 °F (37 °C)  Pulse:  [63-77] 71  Resp:  [18-22] 22  SpO2:  [90 %-94 %] 93 %  BP: (128-143)/(59-65) 128/60     Weight: 77.7 kg (171 lb 4.8 oz)  Body mass index is 26.05 kg/m².    Intake/Output Summary (Last 24 hours) at 1/6/2020 1612  Last data filed at 1/6/2020 0845  Gross per 24 hour   Intake 1640 ml   Output 925 ml   Net 715 ml      Physical Exam   Constitutional: He is oriented to person, place, and time. He appears well-developed and well-nourished.   HENT:   Head: Normocephalic.   Nose: Nose normal.   Mouth/Throat: Mucous membranes are not dry.   Eyes: Conjunctivae are normal.   Neck: Normal range of motion. Neck supple.   Cardiovascular: Normal rate, regular rhythm, normal heart sounds and intact distal pulses.   Pulmonary/Chest: Effort normal and breath sounds normal. No respiratory distress.   Abdominal: Soft. Bowel sounds are normal. He exhibits no distension. There is no tenderness.   Genitourinary:   Genitourinary Comments: Deferred    Musculoskeletal: Normal range of motion. He exhibits edema (wrist -L).   Neurological: He is alert and oriented to person, place, and time.   Skin: Skin is warm and dry. There is erythema (foreheard ).   Multiple abrasions to bilateral knees and elbows    Psychiatric: He has a normal mood and affect. His behavior is normal.       Significant Labs:   CBC:   Recent Labs   Lab 01/05/20  0341 01/06/20  0355   WBC 8.83 9.51   HGB 12.7* 12.2*   HCT 40.7 39.6*    264     CMP:   Recent Labs   Lab 01/05/20  0341 01/06/20  0355    142   K 3.6 3.7    105   CO2 26 28    127*   BUN 74* 52*   CREATININE 1.6* 1.4   CALCIUM 8.0* 8.0*   PROT 5.7* 5.5*   ALBUMIN 2.4* 2.2*    BILITOT 0.6 0.5   ALKPHOS 112 117   AST 27 30   ALT 19 24   ANIONGAP 13 9   EGFRNONAA 38* 44*       Significant Imaging:   Imaging Results          X-Ray Chest AP Portable (Final result)  Result time 01/04/20 11:55:36    Final result by Reynaldo Lamb MD (01/04/20 11:55:36)                 Impression:      Left basilar infiltrate versus atelectasis.      Electronically signed by: Reynaldo Lamb MD  Date:    01/04/2020  Time:    11:55             Narrative:    EXAMINATION:  XR CHEST AP PORTABLE    CLINICAL HISTORY:  Chest Pain;    TECHNIQUE:  AP view of the chest was performed.    COMPARISON:  04/09/2018    FINDINGS:  The cardiac and mediastinal silhouettes appear within normal limits. Aortic atherosclerosis.  Patchy alveolar opacities in the left lung base.  No acute osseous findings demonstrated.                               CT CERVICAL SPINE WITHOUT CONTRAST (Final result)  Result time 01/04/20 12:01:43    Final result by Reynaldo Lamb MD (01/04/20 12:01:43)                 Impression:      No acute fracture or subluxation.    Degenerative changes.    Subsegmental left apical infiltrate.  Recommend chest radiograph follow-up after treatment.    All CT scans at this facility use dose modulation, iterative reconstruction and/or weight based dosing when appropriate to reduce radiation dose to as low as reasonably achievable.      Electronically signed by: Reynaldo Lamb MD  Date:    01/04/2020  Time:    12:01             Narrative:    EXAMINATION:  CT CERVICAL SPINE WITHOUT CONTRAST    CLINICAL HISTORY:  C-spine trauma, NEXUS/CCR positive, +risk factor(s); neck pain    TECHNIQUE:  Axial CT imaging was performed through the cervical spine without intravenous contrast. Multiplanar reformats were reviewed and interpreted.    COMPARISON:  None    FINDINGS:  Vertebral body heights are normal.Alignment is normal.  Mild to moderate degenerative disc disease from C4-5 to C6-7.  Advanced facet DJD throughout the cervical spine.  No  soft tissue abnormality detected.    Subsegmental alveolar opacity in the left lung apex.                               CT Maxillofacial Without Contrast (Final result)  Result time 01/04/20 11:52:06    Final result by Reynaldo Lamb MD (01/04/20 11:52:06)                 Impression:      No acute fracture. Left periorbital hematoma.    All CT scans at this facility use dose modulation, iterative reconstruction and/or weight based dosing when appropriat      Electronically signed by: Reynaldo Lamb MD  Date:    01/04/2020  Time:    11:52             Narrative:    EXAMINATION:  CT MAXILLOFACIAL WITHOUT CONTRAST    CLINICAL HISTORY:  Maxface trauma blunt; facial pain    TECHNIQUE:  Axial CT imaging was performed through the face without intravenous contrast. Multiplanar reformats were performed and interpreted.    COMPARISON:  None    FINDINGS:  The paranasal sinuses are clear.    No fractures are identified.  Left periorbital soft tissue swelling.  The globes are symmetric.  No retrobulbar hemorrhage.    The orbital structures appear intact.    Degenerative disc disease and facet DJD seen throughout the cervical spine.    No intracranial hemorrhage is identified.                               CT Head Without Contrast (Final result)  Result time 01/04/20 11:49:51    Final result by Reynaldo Lamb MD (01/04/20 11:49:51)                 Impression:      No CT evidence of acute intracranial abnormality.    All CT scans at this facility are performed  using dose modulation techniques as appropriate to performed exam including the following:  automated exposure control; adjustment of mA and/or kV according to the patients size (this includes techniques or standardized protocols for targeted exams where dose is matched to indication/reason for exam: i.e. extremities or head);  iterative reconstruction technique.      Electronically signed by: Reynaldo Lamb MD  Date:    01/04/2020  Time:    11:49             Narrative:     EXAMINATION:  CT HEAD WITHOUT CONTRAST    CLINICAL HISTORY:  Confusion/delirium, altered LOC, unexplained;    TECHNIQUE:  Axial CT imaging was performed through the head without intravenous contrast.    COMPARISON:  None    FINDINGS:  No hydrocephalus, midline shift, mass effect, or acute intracranial hemorrhage. Cortical sulcal pattern and gray-white matter differentiation is normal.  Age related cerebral atrophy.  Basilar cisterns and posterior fossa are normal. The visualized paranasal sinuses and mastoid air cells are clear. The skull is intact.                                Assessment/Plan:      Left wrist pain  -s/p fall   -xray showed Scapholunate interval suspicious for tendinous/ligamentous injury.  No appreciable fracture.  Soft tissue swelling about the wrist.  -ACE wrap to affected area   -affected area elevated  -analgesia as needed       Periorbital hematoma  -s/p fall   -stable   -neuro checks   -Lovenox held   -SCDs   -will monitor         Severe sepsis  -WBC 13.62, RR 22, HR 95, and Lactic 2.1  -chest xray + for PNA   -IV hydration   -IV antibiotics  -blood cultures penidng   -UA results pending   -will follow repeat lactic acid   -repeat CBC in am   -1/5/2020- Leukocytosis resolved and lactic acid 1  -1/6/2020- current plan continued as prescribed         Elevated CPK  >>286   -IV hydration   -repeat CPK in am       Pneumonia  -Pt admitted to Observation Unit   -IV antibiotics   -blood cultures with no growth to date    -IV hydration   -incentive spirometry   -Duonebs as needed   -supplemental oxygen to keep O2 sats > 88%      Fall  PT/OT evaluation completed with SNF recommended   -analgesia as needed   -imaging results reviewed   -CM consulted to assist with placement       Abrasion  S/p fall   -clean and apply bandage to wounds as needed      COPD (chronic obstructive pulmonary disease)  -Duonebs   -supplemental oxygen   -nebulizer treatments resumed       Diabetes mellitus type 2,  uncontrolled, with renal complications  -Accuchecks and SSI   -Cardiac/diabetic diet   -HbA1c 7.5      Atrial fibrillation  -stable   -Cardizem continued       Chronic kidney disease, stage 3  -BUN/Creatinine 1.8>>1.6>>1.4- normalized   -IV hydration   -nephrotoxic medications held   -CMP in am      Acquired hypothyroidism  TSH normal   -Synthroid continued       Essential hypertension  -stable   -will resume home medications   -Lasix and ARB held due to kidney function         VTE Risk Mitigation (From admission, onward)         Ordered     IP VTE HIGH RISK PATIENT  Once      01/04/20 1820                      Verna Jeronimo NP  Department of Hospital Medicine   [facility]

## 2020-01-06 NOTE — ASSESSMENT & PLAN NOTE
-BUN/Creatinine 1.8>>1.6>>1.4- normalized   -IV hydration   -nephrotoxic medications held   -CMP in am

## 2020-01-06 NOTE — PT/OT/SLP EVAL
Occupational Therapy   Evaluation    Name: Jimmy Young  MRN: [id number]  Admitting Diagnosis:  <principal problem not specified>      Recommendations:     Discharge Recommendations: nursing facility, skilled  Discharge Equipment Recommendations:  none  Barriers to discharge:       Assessment:     Jimmy Young is a 89 y.o. male with a medical diagnosis of <principal problem not specified>.  He presents with impaired functional mobility and ADLs. Performance deficits affecting function: weakness, impaired endurance, impaired self care skills, impaired functional mobilty, decreased coordination, edema, pain, decreased safety awareness, impaired balance, decreased lower extremity function, decreased upper extremity function, gait instability, decreased ROM.      Rehab Prognosis: Good; patient would benefit from acute skilled OT services to address these deficits and reach maximum level of function.       Plan:     Patient to be seen 3 x/week to address the above listed problems via self-care/home management, therapeutic activities, therapeutic exercises  · Plan of Care Expires: 01/13/20  · Plan of Care Reviewed with: patient, son    Subjective     Chief Complaint: (L) wrist pain  Patient/Family Comments/goals: to get stronger    Occupational Profile:  Living Environment: lives alone in 1 story house with no steps; son lives near by  Previous level of function: Mod I with ADLs and Ambulates (I) but sometimes uses RW when ankles are hurting  Roles and Routines: unknown  Equipment Used at Home:  walker, rolling, oxygen, grab bar(only uses RW when his ankles bother him - should use more often; also has access to a shower chair)  Assistance upon Discharge: family    Pain/Comfort:  · Pain Rating 1: 5/10  · Location - Side 1: Bilateral  · Location - Orientation 1: generalized  · Location 1: leg(hip, knee, ankle)  · Pain Addressed 1: Reposition, Distraction, Cessation of Activity  · Pain Rating Post-Intervention 1:  5/10  · Pain Rating 2: 7/10  · Location - Side 2: Left  · Location 2: wrist  · Pain Addressed 2: Reposition, Distraction, Cessation of Activity, Nurse notified  · Pain Rating Post-Intervention 2: 7/10    Patients cultural, spiritual, Restorationism conflicts given the current situation: no    Objective:     Communicated with: Nurse Allen and epic chart review prior to session.  Patient found supine with peripheral IV, telemetry, oxygen upon OT entry to room.    General Precautions: Standard, fall, hearing impaired, respiratory   Orthopedic Precautions:N/A   Braces: N/A     Occupational Performance:    Bed Mobility:    · Patient completed Rolling/Turning to Left with  minimum assistance  · Patient completed Rolling/Turning to Right with minimum assistance  · Patient completed Scooting/Bridging with contact guard assistance  · Patient completed Supine to Sit with moderate assistance    Functional Mobility/Transfers:  · Patient completed Sit <> Stand Transfer with minimum assistance  with  rolling walker   · Patient completed Bed <> Chair Transfer using Step Transfer technique with minimum assistance with rolling walker  · Functional Mobility: ~50ft x 2 using RW with 1 standing rest break, 5L O2 in tow     Activities of Daily Living:  · Upper Body Dressing: moderate assistance .  · Lower Body Dressing: total assistance secondary to knee pain    Cognitive/Visual Perceptual:  Cognitive/Psychosocial Skills:     -       Oriented to: Person, Place, Time and Situation   -       Follows Commands/attention:Follows multistep  commands  -       Communication: clear/fluent  -       Memory: No Deficits noted    Physical Exam:  Edema:  (L) wrist swollen, Nurse Allen notified  Dominant hand:    -       right hand  Upper Extremity Range of Motion:     -       Right Upper Extremity: WFL  -       Left Upper Extremity: WFL except decreased at (L) wrist  Upper Extremity Strength:    -       Right Upper Extremity: WFL  -       Left Upper  Extremity: WFL except (L) Wrist decreased secondary to pain   Strength:    -       Right Upper Extremity: WFL  -       Left Upper Extremity: decreased    AMPAC 6 Click ADL:  AMPAC Total Score: 15    Treatment & Education:  OT eval completed as listed above. Pt educated in role of OT and POC.   Education:    Patient left up in chair with all lines intact, call button in reach, chair alarm on, Nurse Tiffany notified and son present    GOALS:   Multidisciplinary Problems     Occupational Therapy Goals        Problem: Occupational Therapy Goal    Goal Priority Disciplines Outcome Interventions   Occupational Therapy Goal     OT, PT/OT     Description:  LTGs to be met by 1/13/2020:  1. Pt will perform toilet t/fs with SBA  2. Pt will perform LE dressing with Mod A  3. Pt will perform UE dressing with Min A  4. Pt will perform (B) UE ROM exercises 1 x 20 reps                    History:     Past Medical History:   Diagnosis Date    Aneurysm 2016    abdominal, stent placed    Arthritis     Asthma     Atrial fibrillation     Cancer     skin cancer on face    Diabetes mellitus     Emphysema lung     Encounter for blood transfusion     Hypertension        Past Surgical History:   Procedure Laterality Date    ABDOMINAL AORTIC ANEURYSM REPAIR      Stent placed    ABDOMINAL SURGERY      COLONOSCOPY Left 6/12/2017    Procedure: COLONOSCOPY;  Surgeon: Boaz Toussaint MD;  [facility] ENDO;  Service: Endoscopy;  Laterality: Left;    VASCULAR SURGERY      abdominal aneurysm repair       Time Tracking:     OT Date of Treatment: 01/06/20  OT Start Time: 0910  OT Stop Time: 0935  OT Total Time (min): 25 min    Billable Minutes:Evaluation 15 min  Therapeutic Activity 10 min    Lizeth Weeks, PT/OT  1/6/2020

## 2020-01-06 NOTE — ASSESSMENT & PLAN NOTE
-WBC 13.62, RR 22, HR 95, and Lactic 2.1  -chest xray + for PNA   -IV hydration   -IV antibiotics  -blood cultures penidng   -UA results pending   -will follow repeat lactic acid   -repeat CBC in am   -1/5/2020- Leukocytosis resolved and lactic acid 1  -1/6/2020- current plan continued as prescribed

## 2020-01-06 NOTE — NURSING
Skin assessment performed.  Large scab noted to left elbow.  Bruising to BUE and BLE.  Multiple abrasions and skin tears noted to BLE.  Blanchable redness noted to sacrum.  Skin intact to heels and sacrum.  No discoloration to heels.

## 2020-01-06 NOTE — NURSING
Chart reviewed for diabetes  patient seen by this nurse on previous admit  Glucose stable   No further action unless diabetes educational needs are identified

## 2020-01-06 NOTE — PLAN OF CARE
SW met at the bedside to discuss with patient and family recommendation for him to participate in SNF services.  A patient choice was reviewed with and signed by the patient.  Patient was given a list of SNF providers and patient selected three potential placements.  [facility] sent referrals in [facility].         01/06/20 0989   Post-Acute Status   Post-Acute Authorization Placement   Post-Acute Placement Status Referrals Sent   Patient choice form signed by patient/caregiver List with quality metrics by geographic area provided

## 2020-01-06 NOTE — PT/OT/SLP PROGRESS
Physical Therapy  Treatment    Jimmy Young   MRN: [id number]   Admitting Diagnosis: <principal problem not specified>    PT Received On: 01/06/20  PT Start Time: 0845     PT Stop Time: 0910    PT Total Time (min): 25 min       Billable Minutes:  Gait Training 15 and Therapeutic Activity 10    Treatment Type: Treatment  PT/PTA: PT       General Precautions: Standard, fall, hearing impaired, respiratory  Orthopedic Precautions: N/A   Braces: N/A    Subjective:  Communicated with NURSE BECKMAN prior to session.  Pain/Comfort  Pain Rating 1: 5/10  Location - Side 1: Bilateral  Location - Orientation 1: generalized  Location 1: leg    Objective:   Patient found with: telemetry, peripheral IV, oxygen    Functional Mobility:  Therapeutic Activities and Exercises:  PT FOUND SUPINE IN BED UPON ARRIVAL, AGREEABLE TO TX., C/O FATIGUE AND L WRIST/HAND SWELLING, NOTIFIED NURSE KARUNA, SUP>SIT WITH MODA, SEATED SCOOT TO EOB WITH GEETA, REVIEW RW USE AND SAFETY DURING TF'S AND GAIT, SIT>STAND WITH WITH RW AND GEETA, PT AMB 50' X 2 TRIALS WITH RW AND GEETA, SLOW PACE WITH STANDING REST, CUES FOR UPRIGHT POSTURE DUE TO HEAD DOWN, PT RETURN TO ROOM TO BEDSIDE CHAIR, REVIEW BLE THEREX TO PERFORM WHILE SEATED IN CHAIR    AM-PAC 6 CLICK MOBILITY  How much help from another person does this patient currently need?   1 = Unable, Total/Dependent Assistance  2 = A lot, Maximum/Moderate Assistance  3 = A little, Minimum/Contact Guard/Supervision  4 = None, Modified [place]/Independent    Turning over in bed (including adjusting bedclothes, sheets and blankets)?: 3  Sitting down on and standing up from a chair with arms (e.g., wheelchair, bedside commode, etc.): 3  Moving from lying on back to sitting on the side of the bed?: 2  Moving to and from a bed to a chair (including a wheelchair)?: 3  Need to walk in hospital room?: 3  Climbing 3-5 steps with a railing?: 1  Basic Mobility Total Score: 15    AM-PAC Raw Score CMS G-Code  Modifier Level of Impairment Assistance   6 % Total / Unable   7 - 9 CM 80 - 100% Maximal Assist   10 - 14 CL 60 - 80% Moderate Assist   15 - 19 CK 40 - 60% Moderate Assist   20 - 22 CJ 20 - 40% Minimal Assist   23 CI 1-20% SBA / CGA   24 CH 0% Independent/ Mod I     Patient left up in chair with all lines intact, call button in reach, chair alarm on, NURSE notified and SON present.    Assessment:  Jimmy Young is a 89 y.o. male with a medical diagnosis of <principal problem not specified> and presents with IMPAIRED FUNCTIONAL MOBILITY. PT WILL BENEFIT FROM CONT. SKILLED P.T. TO ADDRESS IMPAIRMENTS    Rehab identified problem list/impairments: Rehab identified problem list/impairments: weakness, impaired functional mobilty, gait instability, impaired endurance, impaired balance, decreased coordination, decreased safety awareness    Rehab potential is good.    Activity tolerance: Good    Discharge recommendations: Discharge Facility/Level of Care Needs: nursing facility, skilled     Barriers to discharge:      Equipment recommendations: Equipment Needed After Discharge: none     GOALS:   Multidisciplinary Problems     Physical Therapy Goals        Problem: Physical Therapy Goal    Goal Priority Disciplines Outcome Goal Variances Interventions   Physical Therapy Goal     PT, PT/OT Ongoing, Progressing     Description:  1. Patient will perform supine to/from sit min a  2. Patient will perform sit to/from stand cga with RW use and improved safety  3. Patient will ambulate > 100ft RW cga no gross LOB                    PLAN:    Patient to be seen 5 x/week  to address the above listed problems via gait training, therapeutic exercises, therapeutic activities  Plan of Care expires: 01/12/20  Plan of Care reviewed with: patient, son         Gunjan Wilhelm, PT  01/06/2020

## 2020-01-06 NOTE — ASSESSMENT & PLAN NOTE
-s/p fall   -xray showed Scapholunate interval suspicious for tendinous/ligamentous injury.  No appreciable fracture.  Soft tissue swelling about the wrist.  -ACE wrap to affected area   -analgesia as needed

## 2020-01-07 VITALS
OXYGEN SATURATION: 90 % | DIASTOLIC BLOOD PRESSURE: 64 MMHG | BODY MASS INDEX: 26.19 KG/M2 | TEMPERATURE: 99 F | WEIGHT: 172.81 LBS | SYSTOLIC BLOOD PRESSURE: 132 MMHG | RESPIRATION RATE: 19 BRPM | HEIGHT: 68 IN | HEART RATE: 72 BPM

## 2020-01-07 LAB
ALBUMIN SERPL BCP-MCNC: 2.3 G/DL (ref 3.5–5.2)
ALP SERPL-CCNC: 124 U/L (ref 55–135)
ALT SERPL W/O P-5'-P-CCNC: 22 U/L (ref 10–44)
ANION GAP SERPL CALC-SCNC: 10 MMOL/L (ref 8–16)
AST SERPL-CCNC: 30 U/L (ref 10–40)
BASOPHILS # BLD AUTO: 0.03 K/UL (ref 0–0.2)
BASOPHILS NFR BLD: 0.4 % (ref 0–1.9)
BILIRUB SERPL-MCNC: 0.5 MG/DL (ref 0.1–1)
BUN SERPL-MCNC: 31 MG/DL (ref 8–23)
CALCIUM SERPL-MCNC: 8.1 MG/DL (ref 8.7–10.5)
CHLORIDE SERPL-SCNC: 109 MMOL/L (ref 95–110)
CK SERPL-CCNC: 70 U/L (ref 20–200)
CO2 SERPL-SCNC: 21 MMOL/L (ref 23–29)
CREAT SERPL-MCNC: 1.2 MG/DL (ref 0.5–1.4)
DIFFERENTIAL METHOD: ABNORMAL
EOSINOPHIL # BLD AUTO: 0.2 K/UL (ref 0–0.5)
EOSINOPHIL NFR BLD: 2.5 % (ref 0–8)
ERYTHROCYTE [DISTWIDTH] IN BLOOD BY AUTOMATED COUNT: 13.3 % (ref 11.5–14.5)
EST. GFR  (AFRICAN AMERICAN): >60 ML/MIN/1.73 M^2
EST. GFR  (NON AFRICAN AMERICAN): 53 ML/MIN/1.73 M^2
GLUCOSE SERPL-MCNC: 145 MG/DL (ref 70–110)
HCT VFR BLD AUTO: 41.8 % (ref 40–54)
HGB BLD-MCNC: 12.9 G/DL (ref 14–18)
IMM GRANULOCYTES # BLD AUTO: 0.25 K/UL (ref 0–0.04)
IMM GRANULOCYTES NFR BLD AUTO: 2.9 % (ref 0–0.5)
LYMPHOCYTES # BLD AUTO: 1 K/UL (ref 1–4.8)
LYMPHOCYTES NFR BLD: 12 % (ref 18–48)
MCH RBC QN AUTO: 29.4 PG (ref 27–31)
MCHC RBC AUTO-ENTMCNC: 30.9 G/DL (ref 32–36)
MCV RBC AUTO: 95 FL (ref 82–98)
MONOCYTES # BLD AUTO: 0.7 K/UL (ref 0.3–1)
MONOCYTES NFR BLD: 8.6 % (ref 4–15)
NEUTROPHILS # BLD AUTO: 6.3 K/UL (ref 1.8–7.7)
NEUTROPHILS NFR BLD: 73.6 % (ref 38–73)
NRBC BLD-RTO: 0 /100 WBC
PLATELET # BLD AUTO: 215 K/UL (ref 150–350)
PMV BLD AUTO: 10.7 FL (ref 9.2–12.9)
POCT GLUCOSE: 112 MG/DL (ref 70–110)
POCT GLUCOSE: 142 MG/DL (ref 70–110)
POTASSIUM SERPL-SCNC: 4.2 MMOL/L (ref 3.5–5.1)
PROT SERPL-MCNC: 5.8 G/DL (ref 6–8.4)
RBC # BLD AUTO: 4.39 M/UL (ref 4.6–6.2)
SODIUM SERPL-SCNC: 140 MMOL/L (ref 136–145)
WBC # BLD AUTO: 8.51 K/UL (ref 3.9–12.7)

## 2020-01-07 PROCEDURE — 25000003 PHARM REV CODE 250: Performed by: NURSE PRACTITIONER

## 2020-01-07 PROCEDURE — 97110 THERAPEUTIC EXERCISES: CPT

## 2020-01-07 PROCEDURE — 97116 GAIT TRAINING THERAPY: CPT

## 2020-01-07 PROCEDURE — 94761 N-INVAS EAR/PLS OXIMETRY MLT: CPT

## 2020-01-07 PROCEDURE — 85025 COMPLETE CBC W/AUTO DIFF WBC: CPT

## 2020-01-07 PROCEDURE — 99900035 HC TECH TIME PER 15 MIN (STAT)

## 2020-01-07 PROCEDURE — 94799 UNLISTED PULMONARY SVC/PX: CPT

## 2020-01-07 PROCEDURE — G0378 HOSPITAL OBSERVATION PER HR: HCPCS

## 2020-01-07 PROCEDURE — 25000242 PHARM REV CODE 250 ALT 637 W/ HCPCS: Performed by: NURSE PRACTITIONER

## 2020-01-07 PROCEDURE — 94640 AIRWAY INHALATION TREATMENT: CPT

## 2020-01-07 PROCEDURE — 97110 THERAPEUTIC EXERCISES: CPT | Performed by: PHYSICAL THERAPIST

## 2020-01-07 PROCEDURE — 63600175 PHARM REV CODE 636 W HCPCS: Performed by: NURSE PRACTITIONER

## 2020-01-07 PROCEDURE — 96376 TX/PRO/DX INJ SAME DRUG ADON: CPT

## 2020-01-07 PROCEDURE — 82550 ASSAY OF CK (CPK): CPT

## 2020-01-07 PROCEDURE — 36415 COLL VENOUS BLD VENIPUNCTURE: CPT

## 2020-01-07 PROCEDURE — 80053 COMPREHEN METABOLIC PANEL: CPT

## 2020-01-07 PROCEDURE — 27000221 HC OXYGEN, UP TO 24 HOURS

## 2020-01-07 PROCEDURE — 97530 THERAPEUTIC ACTIVITIES: CPT | Performed by: PHYSICAL THERAPIST

## 2020-01-07 RX ORDER — FUROSEMIDE 20 MG/1
20 TABLET ORAL DAILY
Qty: 30 TABLET | Refills: 0 | Status: SHIPPED | OUTPATIENT
Start: 2020-01-08

## 2020-01-07 RX ORDER — IPRATROPIUM BROMIDE AND ALBUTEROL SULFATE 2.5; .5 MG/3ML; MG/3ML
3 SOLUTION RESPIRATORY (INHALATION) EVERY 6 HOURS PRN
Qty: 90 ML | Refills: 0 | Status: SHIPPED | OUTPATIENT
Start: 2020-01-07 | End: 2020-01-21

## 2020-01-07 RX ORDER — LOSARTAN POTASSIUM 25 MG/1
25 TABLET ORAL DAILY
Status: DISCONTINUED | OUTPATIENT
Start: 2020-01-08 | End: 2020-01-07 | Stop reason: HOSPADM

## 2020-01-07 RX ORDER — AZITHROMYCIN 250 MG/1
TABLET, FILM COATED ORAL
Qty: 6 TABLET | Refills: 0 | Status: SHIPPED | OUTPATIENT
Start: 2020-01-07 | End: 2020-01-07 | Stop reason: SDUPTHER

## 2020-01-07 RX ORDER — AZITHROMYCIN 250 MG/1
TABLET, FILM COATED ORAL
Qty: 6 TABLET | Refills: 0 | Status: SHIPPED | OUTPATIENT
Start: 2020-01-08 | End: 2020-01-12

## 2020-01-07 RX ADMIN — CARVEDILOL 6.25 MG: 6.25 TABLET, FILM COATED ORAL at 08:01

## 2020-01-07 RX ADMIN — ARFORMOTEROL TARTRATE 15 MCG: 15 SOLUTION RESPIRATORY (INHALATION) at 07:01

## 2020-01-07 RX ADMIN — BUDESONIDE 0.5 MG: 0.5 SUSPENSION RESPIRATORY (INHALATION) at 07:01

## 2020-01-07 RX ADMIN — AZITHROMYCIN MONOHYDRATE 500 MG: 500 INJECTION, POWDER, LYOPHILIZED, FOR SOLUTION INTRAVENOUS at 02:01

## 2020-01-07 RX ADMIN — LEVOTHYROXINE SODIUM 50 MCG: 50 TABLET ORAL at 05:01

## 2020-01-07 NOTE — ASSESSMENT & PLAN NOTE
-WBC 13.62, RR 22, HR 95, and Lactic 2.1  -chest xray + for PNA   -IV hydration   -IV antibiotics  -blood cultures penidng   -UA results pending   -will follow repeat lactic acid   -repeat CBC in am   -1/5/2020- Leukocytosis resolved and lactic acid 1  -1/6/2020- current plan continued as prescribed   -1/7/2020- current plan continued as prescribed

## 2020-01-07 NOTE — ASSESSMENT & PLAN NOTE
PT/OT evaluation completed with SNF recommended   -analgesia as needed   -imaging results reviewed   -CM consulted to assist with SNF placement

## 2020-01-07 NOTE — PROGRESS NOTES
[facility] Hospital Medicine  Progress Note    Patient Name: Jimmy Young  MRN: [id number]  Patient Class: OP- Observation   Admission Date: 1/4/2020  Length of Stay: 0 days  Attending Physician: Suzanne Galindo MD  Primary Care Provider: Nacho Richardson MD        Subjective:     Principal Problem:<principal problem not specified>        HPI:  Jimmy Young is a 88 yo male with PMHX of HTN, Emphysema/COPD on home oxygen, DM, Cancer (skin), Atrial fibrillation, and Asthma who presented to [facility] ED s/p fall x 2 on yesterday.  Pt lives alone and was found down by family this morning.    Pt  appears to have fallen in the kitchen and crawled to another room where he fell a second time while attempting to stand.  Pt remained on the floor for an indeterminate period of time.   Associated symptoms include: knee pain, cough (white phelgm), sneezing, runny nose, vision changes, and left arm pain.   Denies neck pain, back pain, pelvic pain, chest pain, or trouble breathing.   Pt is on home oxygen and was able to access oxygen while he was on the floor.   Pt reports compliance with prescribed medications.  Pt is followed outpatient by Dr. Banks (PCP).  Pt denies smoking and use of ETOH.  Pt is a DNR and Chrissy Glover (daughter) at [phone] is the surrogate decision maker.  ER work up showed: WBC 13.62, Na 151, pO2 67, BUN 78, Creatinine 1.8, Glucose 164, , and Lactic 2.1.  CT of maxillofacial showed no acute fracture and L periorbital hematoma.  CT of head showed no acute intracranial abnormality.  CT of cervical spine showed no acute fracture or subluxation with degenerative changes and subsegmental left apical infiltrate. Pt transferred to [facility] and Hospital Medicine contacted for admission to Observation Unit for further evaluation.      Overview/Hospital Course:  Pt admitted to Observation Unit s/p fall x 2 and found to have PNA and IVVD.  Pt treated with IV antibiotics,  supplemental oxygen, and Duonebs as needed.  Imaging reviewed with periorbital hematoma noted.  Leukocytosis resolved.  H/H remains stable.  CPK and creatinine trended down.  PT/OT evaluation completed with SNF placement recommended and CM consulted to assist with discharge planning.  On 1/6/2020, pt reported wrist pain with xray showing scapholunate widening suspicious for tendinous/ligamentous injury with nappreciable fracture and soft tissue swelling about the wrist.  Will apply wrap/splint to area.  Leukocytosis resolved.  Kidney function improved and creatinine normalized.  CM assisting with SNF placement.  On 01/7/2020, pt remains stable and awaiting placement.  Current therapy continued.  No signs of acute distress noted.      Interval History: pt remains stable with increased mobility and ambulation in hallway of note.  L wrist elevated.  Blood cultures with no growth to date.  No signs of acute distress.  Pt awaiting SNF placement.      Review of Systems   Constitutional: Negative for activity change, appetite change, fatigue and fever.   HENT: Negative for congestion, postnasal drip, sinus pressure, sneezing, sore throat and trouble swallowing.    Eyes: Negative for visual disturbance.   Respiratory: Positive for cough. Negative for wheezing.    Cardiovascular: Negative for chest pain, palpitations and leg swelling.   Gastrointestinal: Negative for abdominal distention, abdominal pain, diarrhea, nausea and vomiting.   Endocrine: Negative for cold intolerance and heat intolerance.   Genitourinary: Negative for difficulty urinating, dysuria, flank pain, frequency and urgency.   Musculoskeletal: Positive for arthralgias, joint swelling (wrist ) and myalgias. Negative for gait problem.   Skin: Positive for wound. Negative for color change.   Allergic/Immunologic: Negative for environmental allergies and food allergies.   Neurological: Negative for dizziness, syncope, weakness and headaches.    Psychiatric/Behavioral: Negative for agitation, confusion and sleep disturbance. The patient is not nervous/anxious.      Objective:     Vital Signs (Most Recent):  Temp: 98.1 °F (36.7 °C) (01/07/20 0745)  Pulse: 70 (01/07/20 1100)  Resp: (!) 22 (01/07/20 0745)  BP: (!) 148/67 (01/07/20 0745)  SpO2: (!) 90 % (01/07/20 0745) Vital Signs (24h Range):  Temp:  [97.8 °F (36.6 °C)-98.6 °F (37 °C)] 98.1 °F (36.7 °C)  Pulse:  [63-76] 70  Resp:  [17-22] 22  SpO2:  [90 %-93 %] 90 %  BP: (128-157)/(60-67) 148/67     Weight: 78.4 kg (172 lb 13.5 oz)  Body mass index is 26.28 kg/m².    Intake/Output Summary (Last 24 hours) at 1/7/2020 1157  Last data filed at 1/7/2020 0700  Gross per 24 hour   Intake 2765 ml   Output 675 ml   Net 2090 ml      Physical Exam   Constitutional: He is oriented to person, place, and time. He appears well-developed and well-nourished.   HENT:   Head: Normocephalic.   Nose: Nose normal.   Mouth/Throat: Mucous membranes are not dry.   Eyes: Conjunctivae are normal.   Neck: Normal range of motion. Neck supple.   Cardiovascular: Normal rate, regular rhythm, normal heart sounds and intact distal pulses.   Pulmonary/Chest: Effort normal and breath sounds normal. No respiratory distress.   Abdominal: Soft. Bowel sounds are normal. He exhibits no distension. There is no tenderness.   Genitourinary:   Genitourinary Comments: Deferred    Musculoskeletal: Normal range of motion. He exhibits edema (wrist -L).   Neurological: He is alert and oriented to person, place, and time.   Skin: Skin is warm and dry. There is erythema (foreheard ).   Multiple abrasions to bilateral knees and elbows    Psychiatric: He has a normal mood and affect. His behavior is normal.       Significant Labs:   CBC:   Recent Labs   Lab 01/06/20  0355 01/07/20  0929   WBC 9.51 8.51   HGB 12.2* 12.9*   HCT 39.6* 41.8    215     CMP:   Recent Labs   Lab 01/06/20  0355 01/07/20  0928    140   K 3.7 4.2    109   CO2 28 21*    * 145*   BUN 52* 31*   CREATININE 1.4 1.2   CALCIUM 8.0* 8.1*   PROT 5.5* 5.8*   ALBUMIN 2.2* 2.3*   BILITOT 0.5 0.5   ALKPHOS 117 124   AST 30 30   ALT 24 22   ANIONGAP 9 10   EGFRNONAA 44* 53*       Significant Imaging:   Imaging Results          X-Ray Chest AP Portable (Final result)  Result time 01/04/20 11:55:36    Final result by Reynaldo Lamb MD (01/04/20 11:55:36)                 Impression:      Left basilar infiltrate versus atelectasis.      Electronically signed by: Reynaldo Lamb MD  Date:    01/04/2020  Time:    11:55             Narrative:    EXAMINATION:  XR CHEST AP PORTABLE    CLINICAL HISTORY:  Chest Pain;    TECHNIQUE:  AP view of the chest was performed.    COMPARISON:  04/09/2018    FINDINGS:  The cardiac and mediastinal silhouettes appear within normal limits. Aortic atherosclerosis.  Patchy alveolar opacities in the left lung base.  No acute osseous findings demonstrated.                               CT CERVICAL SPINE WITHOUT CONTRAST (Final result)  Result time 01/04/20 12:01:43    Final result by Reynaldo Lamb MD (01/04/20 12:01:43)                 Impression:      No acute fracture or subluxation.    Degenerative changes.    Subsegmental left apical infiltrate.  Recommend chest radiograph follow-up after treatment.    All CT scans at this facility use dose modulation, iterative reconstruction and/or weight based dosing when appropriate to reduce radiation dose to as low as reasonably achievable.      Electronically signed by: Reynaldo Lamb MD  Date:    01/04/2020  Time:    12:01             Narrative:    EXAMINATION:  CT CERVICAL SPINE WITHOUT CONTRAST    CLINICAL HISTORY:  C-spine trauma, NEXUS/CCR positive, +risk factor(s); neck pain    TECHNIQUE:  Axial CT imaging was performed through the cervical spine without intravenous contrast. Multiplanar reformats were reviewed and interpreted.    COMPARISON:  None    FINDINGS:  Vertebral body heights are normal.Alignment is normal.  Mild to  moderate degenerative disc disease from C4-5 to C6-7.  Advanced facet DJD throughout the cervical spine.  No soft tissue abnormality detected.    Subsegmental alveolar opacity in the left lung apex.                               CT Maxillofacial Without Contrast (Final result)  Result time 01/04/20 11:52:06    Final result by Reynaldo Lamb MD (01/04/20 11:52:06)                 Impression:      No acute fracture. Left periorbital hematoma.    All CT scans at this facility use dose modulation, iterative reconstruction and/or weight based dosing when appropriat      Electronically signed by: Reynaldo Lamb MD  Date:    01/04/2020  Time:    11:52             Narrative:    EXAMINATION:  CT MAXILLOFACIAL WITHOUT CONTRAST    CLINICAL HISTORY:  Maxface trauma blunt; facial pain    TECHNIQUE:  Axial CT imaging was performed through the face without intravenous contrast. Multiplanar reformats were performed and interpreted.    COMPARISON:  None    FINDINGS:  The paranasal sinuses are clear.    No fractures are identified.  Left periorbital soft tissue swelling.  The globes are symmetric.  No retrobulbar hemorrhage.    The orbital structures appear intact.    Degenerative disc disease and facet DJD seen throughout the cervical spine.    No intracranial hemorrhage is identified.                               CT Head Without Contrast (Final result)  Result time 01/04/20 11:49:51    Final result by Reynaldo Labm MD (01/04/20 11:49:51)                 Impression:      No CT evidence of acute intracranial abnormality.    All CT scans at this facility are performed  using dose modulation techniques as appropriate to performed exam including the following:  automated exposure control; adjustment of mA and/or kV according to the patients size (this includes techniques or standardized protocols for targeted exams where dose is matched to indication/reason for exam: i.e. extremities or head);  iterative reconstruction  technique.      Electronically signed by: Reynaldo Lamb MD  Date:    01/04/2020  Time:    11:49             Narrative:    EXAMINATION:  CT HEAD WITHOUT CONTRAST    CLINICAL HISTORY:  Confusion/delirium, altered LOC, unexplained;    TECHNIQUE:  Axial CT imaging was performed through the head without intravenous contrast.    COMPARISON:  None    FINDINGS:  No hydrocephalus, midline shift, mass effect, or acute intracranial hemorrhage. Cortical sulcal pattern and gray-white matter differentiation is normal.  Age related cerebral atrophy.  Basilar cisterns and posterior fossa are normal. The visualized paranasal sinuses and mastoid air cells are clear. The skull is intact.                                Assessment/Plan:      Left wrist pain  -s/p fall   -xray showed Scapholunate interval suspicious for tendinous/ligamentous injury.  No appreciable fracture.  Soft tissue swelling about the wrist.  -ACE wrap to affected area   - elevation encouraged  -analgesia as needed       Periorbital hematoma  -s/p fall   -stable   -neuro checks   -Lovenox held   -SCDs   -will monitor         Severe sepsis  -WBC 13.62, RR 22, HR 95, and Lactic 2.1  -chest xray + for PNA   -IV hydration   -IV antibiotics  -blood cultures penidng   -UA results pending   -will follow repeat lactic acid   -repeat CBC in am   -1/5/2020- Leukocytosis resolved and lactic acid 1  -1/6/2020- current plan continued as prescribed   -1/7/2020- current plan continued as prescribed         Elevated CPK  >>286>>70-resolved   -IV hydration   -repeat CPK in am       Pneumonia  -Pt admitted to Observation Unit   -IV antibiotics   -blood cultures with no growth to date    -IV hydration   -incentive spirometry   -Duonebs as needed   -supplemental oxygen to keep O2 sats > 88%      Fall  PT/OT evaluation completed with SNF recommended   -analgesia as needed   -imaging results reviewed   -CM consulted to assist with SNF placement       Abrasion  S/p fall   -clean and  apply bandage to wounds as needed      COPD (chronic obstructive pulmonary disease)  -Duonebs   -supplemental oxygen   -nebulizer treatments resumed       Diabetes mellitus type 2, uncontrolled, with renal complications  -Accuchecks and SSI   -Cardiac/diabetic diet   -HbA1c 7.5      Atrial fibrillation  -stable   -Cardizem continued       Chronic kidney disease, stage 3  -BUN/Creatinine 1.8>>1.6>>1.4- normalized   -IV hydration   -nephrotoxic medications held   -CMP in am      Acquired hypothyroidism  TSH normal   -Synthroid continued       Essential hypertension  -stable   -will resume home medications   -Lasix held due to kidney function   -ARB resumed         VTE Risk Mitigation (From admission, onward)         Ordered     IP VTE HIGH RISK PATIENT  Once      01/04/20 1820                      Verna Jeronimo NP  Department of Hospital Medicine   [facility]

## 2020-01-07 NOTE — PLAN OF CARE
Bed mobility Mod A; educated on (L) hand/ wrist exercises and positioning to help decrease swelling; t/f Min A using RW

## 2020-01-07 NOTE — PT/OT/SLP PROGRESS
"Occupational Therapy  Treatment    Jimmy Yonug   MRN: [id number]   Admitting Diagnosis: <principal problem not specified>    OT Date of Treatment: 01/07/20   OT Start Time: 0940  OT Stop Time: 1005  OT Total Time (min): 25 min    Billable Minutes:  Therapeutic Activity 15 min and Therapeutic Exercise 10 min    General Precautions: Standard, fall, respiratory  Orthopedic Precautions: N/A  Braces: N/A         Subjective:  Communicated with Nurse Kirk and epic chart review prior to session.  Pt found supine in bed and agreeable to tx at this time.    Pain/Comfort  Pain Rating 1: 8/10  Location - Side 1: Left  Location 1: wrist  Pain Addressed 1: Reposition, Distraction, Cessation of Activity    Objective:  Patient found with: peripheral IV, telemetry, oxygen     Functional Mobility:   Therapeutic Activities and Exercises:  Pt performed supine>sit with Mod A, scooted to EOB with Min A, donned gown as a robe with Mod A, pt educated in (L) wrist/ hand exercises and proper positioning to help decrease swelling. Pt performed sit>stand using RW with Min A, functional mobility using RW ~50ft x 2 with Min A, t/f to chair using RW with Min A. Pt performed (B) UE ROM exercises 1 x 15 reps sitting in chair: shoulder flex, chest press, bicep curls, finger flex/ ext.     AM-PAC 6 CLICK ADL   How much help from another person does this patient currently need?   1 = Unable, Total/Dependent Assistance  2 = A lot, Maximum/Moderate Assistance  3 = A little, Minimum/Contact Guard/Supervision  4 = None, Modified [place]/Independent    Putting on and taking off regular lower body clothing? : 2  Bathing (including washing, rinsing, drying)?: 2  Toileting, which includes using toilet, bedpan, or urinal? : 2  Putting on and taking off regular upper body clothing?: 2  Taking care of personal grooming such as brushing teeth?: 3  Eating meals?: 4  Daily Activity Total Score: 15     AM-PAC Raw Score CMS "G-Code Modifier Level of " Impairment Assistance   6 % Total / Unable   7 - 8 CM 80 - 100% Maximal Assist   9-13 CL 60 - 80% Moderate Assist   14 - 19 CK 40 - 60% Moderate Assist   20 - 22 CJ 20 - 40% Minimal Assist   23 CI 1-20% SBA / CGA   24 CH 0% Independent/ Mod I       Patient left up in chair with all lines intact, call button in reach, chair alarm on, Nurse Kirk notified and son present    ASSESSMENT:  Jimmy Young is a 89 y.o. male with a medical diagnosis of <principal problem not specified> and presents with impaired functional mobility and ADLs. Pt will benefit from continued skilled OT in order to address impairments.     Rehab identified problem list/impairments: Rehab identified problem list/impairments: weakness, impaired endurance, impaired self care skills, impaired functional mobilty, decreased coordination, edema, pain, decreased safety awareness, decreased lower extremity function, impaired balance, gait instability, decreased upper extremity function, decreased ROM    Rehab potential is good.    Activity tolerance: Good    Discharge recommendations: Discharge Facility/Level of Care Needs: nursing facility, skilled     Barriers to discharge:  none    Equipment recommendations: none     GOALS:   Multidisciplinary Problems     Occupational Therapy Goals        Problem: Occupational Therapy Goal    Goal Priority Disciplines Outcome Interventions   Occupational Therapy Goal     OT, PT/OT Ongoing, Progressing    Description:  LTGs to be met by 1/13/2020:  1. Pt will perform toilet t/fs with SBA  2. Pt will perform LE dressing with Mod A  3. Pt will perform UE dressing with Min A  4. Pt will perform (B) UE ROM exercises 1 x 20 reps                    Plan:  Patient to be seen 3 x/week to address the above listed problems via self-care/home management, therapeutic activities, therapeutic exercises  Plan of Care expires: 01/13/20  Plan of Care reviewed with: patient, son    OT G-codes  Functional Assessment Tool  Used: [facility]  Score: 15    Lizeth Weeks, PT/OT  01/07/2020

## 2020-01-07 NOTE — NURSING
Transportation to [facility] arrived to  patient. PIV removed, catheter intact. Tele box removed and returned to monitor tech. Folder with patient info and dc orders sent with transporter.

## 2020-01-07 NOTE — PLAN OF CARE
Per orders of Hospital Medicine Staff, SW met with patient and family to discuss recommendations for SNF.  Provided family with choice form and list of SNF Providers.  SW reviewed and signed choice form with family.  Family selected three possible SNF facilities, with [facility] being their first choice.  Referrals were sent and LOCET and PASRR completed and faxed by [facility].  Received authorization and acceptance for patient on today.  Patient will discharge to [facility] on today.         01/07/20 8265   Post-Acute Status   Post-Acute Authorization Placement   Post-Acute Placement Status Set-up Complete   Patient choice form signed by patient/caregiver List with quality metrics by geographic area provided

## 2020-01-07 NOTE — PLAN OF CARE
Pt free from fall and injury. Glucose checked and covered with insulin ACHS.  No further skin damage noted.  Dressing to left elbow changed.  Left wrist wrapped in ACE, elevated, and ice applied.  IV fluids continued.  IV antibiotics administered.  Oxygen maintained.

## 2020-01-07 NOTE — PLAN OF CARE
Home Oxygen Evaluation    Date Performed: 1/7/2020    1) Patient's Home O2 Sat on room air, while at rest: 79%        If O2 sats on room air at rest are 88% or below, patient qualifies. No additional testing needed. Document N/A in steps 2 and 3. If 89% or above, complete steps 2.      2) Patient's O2 Sat on room air while exercising: na        If O2 sats on room air while exercising remain 89% or above patient does not qualify, no further testing needed Document N/A in step 3. If O2 sats on room air while exercising are 88% or below, continue to step 3.      3) Patient's O2 Sat while exercising on O2: 92% at 5 LPM         (Must show improvement from #2 for patients to qualify)    If O2 sats improve on oxygen, patient qualifies for portable oxygen. If not, the patient does not qualify.

## 2020-01-07 NOTE — ASSESSMENT & PLAN NOTE
-s/p fall   -xray showed Scapholunate interval suspicious for tendinous/ligamentous injury.  No appreciable fracture.  Soft tissue swelling about the wrist.  -ACE wrap to affected area   - elevation encouraged  -analgesia as needed

## 2020-01-07 NOTE — SUBJECTIVE & OBJECTIVE
Interval History: pt remains stable with increased mobility and ambulation in hallway of note.  L wrist elevated.  Blood cultures with no growth to date.  No signs of acute distress.  Pt awaiting SNF placement.      Review of Systems   Constitutional: Negative for activity change, appetite change, fatigue and fever.   HENT: Negative for congestion, postnasal drip, sinus pressure, sneezing, sore throat and trouble swallowing.    Eyes: Negative for visual disturbance.   Respiratory: Positive for cough. Negative for wheezing.    Cardiovascular: Negative for chest pain, palpitations and leg swelling.   Gastrointestinal: Negative for abdominal distention, abdominal pain, diarrhea, nausea and vomiting.   Endocrine: Negative for cold intolerance and heat intolerance.   Genitourinary: Negative for difficulty urinating, dysuria, flank pain, frequency and urgency.   Musculoskeletal: Positive for arthralgias, joint swelling (wrist ) and myalgias. Negative for gait problem.   Skin: Positive for wound. Negative for color change.   Allergic/Immunologic: Negative for environmental allergies and food allergies.   Neurological: Negative for dizziness, syncope, weakness and headaches.   Psychiatric/Behavioral: Negative for agitation, confusion and sleep disturbance. The patient is not nervous/anxious.      Objective:     Vital Signs (Most Recent):  Temp: 98.1 °F (36.7 °C) (01/07/20 0745)  Pulse: 70 (01/07/20 1100)  Resp: (!) 22 (01/07/20 0745)  BP: (!) 148/67 (01/07/20 0745)  SpO2: (!) 90 % (01/07/20 0745) Vital Signs (24h Range):  Temp:  [97.8 °F (36.6 °C)-98.6 °F (37 °C)] 98.1 °F (36.7 °C)  Pulse:  [63-76] 70  Resp:  [17-22] 22  SpO2:  [90 %-93 %] 90 %  BP: (128-157)/(60-67) 148/67     Weight: 78.4 kg (172 lb 13.5 oz)  Body mass index is 26.28 kg/m².    Intake/Output Summary (Last 24 hours) at 1/7/2020 1157  Last data filed at 1/7/2020 0700  Gross per 24 hour   Intake 2765 ml   Output 675 ml   Net 2090 ml      Physical Exam    Constitutional: He is oriented to person, place, and time. He appears well-developed and well-nourished.   HENT:   Head: Normocephalic.   Nose: Nose normal.   Mouth/Throat: Mucous membranes are not dry.   Eyes: Conjunctivae are normal.   Neck: Normal range of motion. Neck supple.   Cardiovascular: Normal rate, regular rhythm, normal heart sounds and intact distal pulses.   Pulmonary/Chest: Effort normal and breath sounds normal. No respiratory distress.   Abdominal: Soft. Bowel sounds are normal. He exhibits no distension. There is no tenderness.   Genitourinary:   Genitourinary Comments: Deferred    Musculoskeletal: Normal range of motion. He exhibits edema (wrist -L).   Neurological: He is alert and oriented to person, place, and time.   Skin: Skin is warm and dry. There is erythema (foreheard ).   Multiple abrasions to bilateral knees and elbows    Psychiatric: He has a normal mood and affect. His behavior is normal.       Significant Labs:   CBC:   Recent Labs   Lab 01/06/20  0355 01/07/20  0929   WBC 9.51 8.51   HGB 12.2* 12.9*   HCT 39.6* 41.8    215     CMP:   Recent Labs   Lab 01/06/20  0355 01/07/20  0928    140   K 3.7 4.2    109   CO2 28 21*   * 145*   BUN 52* 31*   CREATININE 1.4 1.2   CALCIUM 8.0* 8.1*   PROT 5.5* 5.8*   ALBUMIN 2.2* 2.3*   BILITOT 0.5 0.5   ALKPHOS 117 124   AST 30 30   ALT 24 22   ANIONGAP 9 10   EGFRNONAA 44* 53*       Significant Imaging:   Imaging Results          X-Ray Chest AP Portable (Final result)  Result time 01/04/20 11:55:36    Final result by Reynaldo Lamb MD (01/04/20 11:55:36)                 Impression:      Left basilar infiltrate versus atelectasis.      Electronically signed by: Reynaldo Lamb MD  Date:    01/04/2020  Time:    11:55             Narrative:    EXAMINATION:  XR CHEST AP PORTABLE    CLINICAL HISTORY:  Chest Pain;    TECHNIQUE:  AP view of the chest was performed.    COMPARISON:  04/09/2018    FINDINGS:  The cardiac and mediastinal  silhouettes appear within normal limits. Aortic atherosclerosis.  Patchy alveolar opacities in the left lung base.  No acute osseous findings demonstrated.                               CT CERVICAL SPINE WITHOUT CONTRAST (Final result)  Result time 01/04/20 12:01:43    Final result by Reynaldo Lamb MD (01/04/20 12:01:43)                 Impression:      No acute fracture or subluxation.    Degenerative changes.    Subsegmental left apical infiltrate.  Recommend chest radiograph follow-up after treatment.    All CT scans at this facility use dose modulation, iterative reconstruction and/or weight based dosing when appropriate to reduce radiation dose to as low as reasonably achievable.      Electronically signed by: Reynaldo Lamb MD  Date:    01/04/2020  Time:    12:01             Narrative:    EXAMINATION:  CT CERVICAL SPINE WITHOUT CONTRAST    CLINICAL HISTORY:  C-spine trauma, NEXUS/CCR positive, +risk factor(s); neck pain    TECHNIQUE:  Axial CT imaging was performed through the cervical spine without intravenous contrast. Multiplanar reformats were reviewed and interpreted.    COMPARISON:  None    FINDINGS:  Vertebral body heights are normal.Alignment is normal.  Mild to moderate degenerative disc disease from C4-5 to C6-7.  Advanced facet DJD throughout the cervical spine.  No soft tissue abnormality detected.    Subsegmental alveolar opacity in the left lung apex.                               CT Maxillofacial Without Contrast (Final result)  Result time 01/04/20 11:52:06    Final result by Reynaldo Lamb MD (01/04/20 11:52:06)                 Impression:      No acute fracture. Left periorbital hematoma.    All CT scans at this facility use dose modulation, iterative reconstruction and/or weight based dosing when appropriat      Electronically signed by: Reynaldo Lamb MD  Date:    01/04/2020  Time:    11:52             Narrative:    EXAMINATION:  CT MAXILLOFACIAL WITHOUT CONTRAST    CLINICAL HISTORY:  Maxface trauma  blunt; facial pain    TECHNIQUE:  Axial CT imaging was performed through the face without intravenous contrast. Multiplanar reformats were performed and interpreted.    COMPARISON:  None    FINDINGS:  The paranasal sinuses are clear.    No fractures are identified.  Left periorbital soft tissue swelling.  The globes are symmetric.  No retrobulbar hemorrhage.    The orbital structures appear intact.    Degenerative disc disease and facet DJD seen throughout the cervical spine.    No intracranial hemorrhage is identified.                               CT Head Without Contrast (Final result)  Result time 01/04/20 11:49:51    Final result by Reynaldo Lamb MD (01/04/20 11:49:51)                 Impression:      No CT evidence of acute intracranial abnormality.    All CT scans at this facility are performed  using dose modulation techniques as appropriate to performed exam including the following:  automated exposure control; adjustment of mA and/or kV according to the patients size (this includes techniques or standardized protocols for targeted exams where dose is matched to indication/reason for exam: i.e. extremities or head);  iterative reconstruction technique.      Electronically signed by: Reynaldo Lamb MD  Date:    01/04/2020  Time:    11:49             Narrative:    EXAMINATION:  CT HEAD WITHOUT CONTRAST    CLINICAL HISTORY:  Confusion/delirium, altered LOC, unexplained;    TECHNIQUE:  Axial CT imaging was performed through the head without intravenous contrast.    COMPARISON:  None    FINDINGS:  No hydrocephalus, midline shift, mass effect, or acute intracranial hemorrhage. Cortical sulcal pattern and gray-white matter differentiation is normal.  Age related cerebral atrophy.  Basilar cisterns and posterior fossa are normal. The visualized paranasal sinuses and mastoid air cells are clear. The skull is intact.

## 2020-01-07 NOTE — PT/OT/SLP PROGRESS
Physical Therapy  Treatment    Jimmy Young   MRN: [id number]   Admitting Diagnosis: Pneumonia    PT Received On: 01/07/20  PT Start Time: 0930     PT Stop Time: 0955    PT Total Time (min): 25 min       Billable Minutes:  Gait Training 15 and Therapeutic Exercise 10    Treatment Type: Treatment  PT/PTA: PT         General Precautions: Standard, fall, respiratory  Orthopedic Precautions: N/A   Braces: N/A    Subjective:  Communicated with NURSE IFRAH prior to session.  Pain/Comfort  Pain Rating 1: 0/10    Objective:   Patient found with: telemetry, peripheral IV, oxygen    Functional Mobility:  Therapeutic Activities and Exercises:  PT FOUND SUPINE IN BED UPON ARRIVAL, AGREEABLE TO TX., SUP>SIT WITH MODA , SEATED SCOOT TO EOB WITH GEETA, SIT>STAND WITH GEETA, PT AMB 50' X 2 TRIALS WITH RW AND GEETA, SLOW PACE, MINIMAL SOB WITH O2 IN TOW, PT RETURN TO ROOM TO BEDSIDE CHAIR, PT EDUCATED IN AND PERFORMED BLE THEREX X 15 REPS AROM WITH REST    AM-PAC 6 CLICK MOBILITY  How much help from another person does this patient currently need?   1 = Unable, Total/Dependent Assistance  2 = A lot, Maximum/Moderate Assistance  3 = A little, Minimum/Contact Guard/Supervision  4 = None, Modified [place]/Independent    Turning over in bed (including adjusting bedclothes, sheets and blankets)?: 3  Sitting down on and standing up from a chair with arms (e.g., wheelchair, bedside commode, etc.): 3  Moving from lying on back to sitting on the side of the bed?: 2  Moving to and from a bed to a chair (including a wheelchair)?: 3  Need to walk in hospital room?: 3  Climbing 3-5 steps with a railing?: 1  Basic Mobility Total Score: 15    AM-PAC Raw Score CMS G-Code Modifier Level of Impairment Assistance   6 % Total / Unable   7 - 9 CM 80 - 100% Maximal Assist   10 - 14 CL 60 - 80% Moderate Assist   15 - 19 CK 40 - 60% Moderate Assist   20 - 22 CJ 20 - 40% Minimal Assist   23 CI 1-20% SBA / CGA   24 CH 0% Independent/ Mod I      Patient left up in chair with all lines intact, call button in reach, chair alarm on, NURSE notified and SON present.    Assessment:  Jimmy Young is a 89 y.o. male with a medical diagnosis of Pneumonia and presents with IMPAIRED FUNCTIONAL MOBILITY. PT WILL BENEFIT FROM CONT. SKILLED P.T. TO ADDRESS IMPAIRMENTS    Rehab identified problem list/impairments: Rehab identified problem list/impairments: weakness, impaired functional mobilty, impaired endurance, gait instability, impaired balance, decreased coordination, decreased safety awareness    Rehab potential is good.    Activity tolerance: Good    Discharge recommendations: Discharge Facility/Level of Care Needs: nursing facility, skilled     Barriers to discharge:      Equipment recommendations: Equipment Needed After Discharge: none     GOALS:   Multidisciplinary Problems     Physical Therapy Goals        Problem: Physical Therapy Goal    Goal Priority Disciplines Outcome Goal Variances Interventions   Physical Therapy Goal     PT, PT/OT Ongoing, Progressing     Description:  1. Patient will perform supine to/from sit min a  2. Patient will perform sit to/from stand cga with RW use and improved safety  3. Patient will ambulate > 100ft RW cga no gross LOB                    PLAN:    Patient to be seen 5 x/week  to address the above listed problems via gait training, therapeutic activities, therapeutic exercises  Plan of Care expires: 01/12/20  Plan of Care reviewed with: patient, son    Gunjan Wilhelm, PT  01/07/2020

## 2020-01-10 LAB
BACTERIA BLD CULT: NORMAL
BACTERIA BLD CULT: NORMAL

## 2020-01-10 NOTE — DISCHARGE SUMMARY
[facility] Hospital Medicine  Discharge Summary      Patient Name: Jimmy Young  MRN: [id number]  Admission Date: 1/4/2020  Hospital Length of Stay: 0 days  Discharge Date and Time: 1/7/2020  4:54 PM  Attending Physician: Dr. Suzanne Galindo   Discharging Provider: Verna Jeronimo NP  Primary Care Provider: Nacho Richardson MD      HPI:   Jimmy Young is a 89 yo male with PMHX of HTN, Emphysema/COPD on home oxygen, DM, Cancer (skin), Atrial fibrillation, and Asthma who presented to [facility] ED s/p fall x 2 on yesterday.  Pt lives alone and was found down by family this morning.    Pt  appears to have fallen in the kitchen and crawled to another room where he fell a second time while attempting to stand.  Pt remained on the floor for an indeterminate period of time.   Associated symptoms include: knee pain, cough (white phelgm), sneezing, runny nose, vision changes, and left arm pain.   Denies neck pain, back pain, pelvic pain, chest pain, or trouble breathing.   Pt is on home oxygen and was able to access oxygen while he was on the floor.   Pt reports compliance with prescribed medications.  Pt is followed outpatient by Dr. Banks (PCP).  Pt denies smoking and use of ETOH.  Pt is a DNR and Chrissy Glover (daughter) at [phone] is the surrogate decision maker.  ER work up showed: WBC 13.62, Na 151, pO2 67, BUN 78, Creatinine 1.8, Glucose 164, , and Lactic 2.1.  CT of maxillofacial showed no acute fracture and L periorbital hematoma.  CT of head showed no acute intracranial abnormality.  CT of cervical spine showed no acute fracture or subluxation with degenerative changes and subsegmental left apical infiltrate. [facility] and Hospital Medicine contacted for admission to Observation Unit for further evaluation.      * No surgery found *      Hospital Course:   Pt admitted to Observation Unit s/p fall x 2 and found to have PNA and IVVD.  Pt treated with IV antibiotics,  supplemental oxygen, and Duonebs as needed.  Imaging reviewed with periorbital hematoma noted.  Leukocytosis resolved.  H/H remains stable.  CPK and creatinine trended down.  PT/OT evaluation completed with SNF placement recommended and CM consulted to assist with discharge planning.  On 1/6/2020, pt reported wrist pain with xray showing scapholunate widening suspicious for tendinous/ligamentous injury with no appreciable fracture and soft tissue swelling about the wrist.  Will apply wrap/splint to area.  Leukocytosis resolved.  Kidney function improved and creatinine normalized.  CM assisting with SNF placement.  On 01/7/2020, pt remains stable and awaiting placement.  Current therapy continued.  No signs of acute distress noted.  On 1/8/2020, pt verbalized symptom improvement with increased mobility noted.  Pt seen and examined on the date of discharge and deemed suitable for discharge to [facility].  Pt denies any changes to vision, HA, dizziness, and gait instability prior to discharge.  CPK normalized.  Blood cultures with no growth to date.  No signs of acute distress and vital signs stable prior to discharge.  Current medications resumed with Azithromycin prescribed.  Pt/family instructed to follow up with PCP upon discharge for further evaluation.       Consults:   Consults (From admission, onward)        Status Ordering Provider     Inpatient consult to Social Work/Case Management  Once     Provider:  (Not yet assigned)    Completed VINITA HUMPHRIES     IP consult to case management  Once     Provider:  (Not yet assigned)    Completed GREG PEREZ          Final Active Diagnoses:    Diagnosis Date Noted POA    PRINCIPAL PROBLEM:  Pneumonia [J18.9] 01/04/2020 Yes    Left wrist pain [M25.532] 01/06/2020 Unknown    Abrasion [T14.8XXA] 01/04/2020 Yes    Fall [W19.XXXA] 01/04/2020 Yes    Elevated CPK [R74.8] 01/04/2020 Unknown    Severe sepsis [A41.9, R65.20] 01/04/2020 Unknown    Periorbital hematoma  [H05.239] 01/04/2020 Unknown    COPD (chronic obstructive pulmonary disease) [J44.9] 10/05/2017 Yes    Diabetes mellitus type 2, uncontrolled, with renal complications [E11.29, E11.65] 09/12/2017 Yes    Acquired hypothyroidism [E03.9] 06/10/2017 Yes     Chronic    Atrial fibrillation [I48.91] 06/10/2017 Yes     Chronic    Chronic kidney disease, stage 3 [N18.3] 06/10/2017 Yes     Chronic    Essential hypertension [I10] 10/04/2015 Yes     Chronic      Problems Resolved During this Admission:    Diagnosis Date Noted Date Resolved POA    Hypernatremia [E87.0] 01/04/2020 01/06/2020 Unknown       Discharged Condition: stable    Disposition: Skilled Nursing Facility    Follow Up:  Follow-up Information     Nacho Richardson MD In 1 week.    Specialty:  Internal Medicine  Why:  -hospital follow up   Contact information:  [address] [facility] [address]  [phone]                 Patient Instructions:      Diet Cardiac     Diet diabetic     Notify your health care provider if you experience any of the following:  temperature >100.4     Notify your health care provider if you experience any of the following:  persistent nausea and vomiting or diarrhea     Notify your health care provider if you experience any of the following:  persistent dizziness, light-headedness, or visual disturbances     Notify your health care provider if you experience any of the following:  severe persistent headache     Notify your health care provider if you experience any of the following:  difficulty breathing or increased cough     Notify your health care provider if you experience any of the following:  redness, tenderness, or signs of infection (pain, swelling, redness, odor or green/yellow discharge around incision site)     Notify your health care provider if you experience any of the following:  increased confusion or weakness     Activity as tolerated       Significant Diagnostic Studies: Labs: CMP No results for  input(s): NA, K, CL, CO2, GLU, BUN, CREATININE, CALCIUM, PROT, ALBUMIN, BILITOT, ALKPHOS, AST, ALT, ANIONGAP, ESTGFRAFRICA, EGFRNONAA in the last 48 hours. and CBC No results for input(s): WBC, HGB, HCT, PLT in the last 48 hours.    Pending Diagnostic Studies:     None        Imaging Results          X-Ray Chest AP Portable (Final result)  Result time 01/04/20 11:55:36    Final result by Reynaldo Lamb MD (01/04/20 11:55:36)                 Impression:      Left basilar infiltrate versus atelectasis.      Electronically signed by: Reynaldo Lamb MD  Date:    01/04/2020  Time:    11:55             Narrative:    EXAMINATION:  XR CHEST AP PORTABLE    CLINICAL HISTORY:  Chest Pain;    TECHNIQUE:  AP view of the chest was performed.    COMPARISON:  04/09/2018    FINDINGS:  The cardiac and mediastinal silhouettes appear within normal limits. Aortic atherosclerosis.  Patchy alveolar opacities in the left lung base.  No acute osseous findings demonstrated.                               CT CERVICAL SPINE WITHOUT CONTRAST (Final result)  Result time 01/04/20 12:01:43    Final result by Reynaldo Lamb MD (01/04/20 12:01:43)                 Impression:      No acute fracture or subluxation.    Degenerative changes.    Subsegmental left apical infiltrate.  Recommend chest radiograph follow-up after treatment.    All CT scans at this facility use dose modulation, iterative reconstruction and/or weight based dosing when appropriate to reduce radiation dose to as low as reasonably achievable.      Electronically signed by: Reynaldo Lamb MD  Date:    01/04/2020  Time:    12:01             Narrative:    EXAMINATION:  CT CERVICAL SPINE WITHOUT CONTRAST    CLINICAL HISTORY:  C-spine trauma, NEXUS/CCR positive, +risk factor(s); neck pain    TECHNIQUE:  Axial CT imaging was performed through the cervical spine without intravenous contrast. Multiplanar reformats were reviewed and interpreted.    COMPARISON:  None    FINDINGS:  Vertebral body  heights are normal.Alignment is normal.  Mild to moderate degenerative disc disease from C4-5 to C6-7.  Advanced facet DJD throughout the cervical spine.  No soft tissue abnormality detected.    Subsegmental alveolar opacity in the left lung apex.                               CT Maxillofacial Without Contrast (Final result)  Result time 01/04/20 11:52:06    Final result by Reynaldo Lamb MD (01/04/20 11:52:06)                 Impression:      No acute fracture. Left periorbital hematoma.    All CT scans at this facility use dose modulation, iterative reconstruction and/or weight based dosing when appropriat      Electronically signed by: Reynaldo Lamb MD  Date:    01/04/2020  Time:    11:52             Narrative:    EXAMINATION:  CT MAXILLOFACIAL WITHOUT CONTRAST    CLINICAL HISTORY:  Maxface trauma blunt; facial pain    TECHNIQUE:  Axial CT imaging was performed through the face without intravenous contrast. Multiplanar reformats were performed and interpreted.    COMPARISON:  None    FINDINGS:  The paranasal sinuses are clear.    No fractures are identified.  Left periorbital soft tissue swelling.  The globes are symmetric.  No retrobulbar hemorrhage.    The orbital structures appear intact.    Degenerative disc disease and facet DJD seen throughout the cervical spine.    No intracranial hemorrhage is identified.                               CT Head Without Contrast (Final result)  Result time 01/04/20 11:49:51    Final result by Reynaldo Lamb MD (01/04/20 11:49:51)                 Impression:      No CT evidence of acute intracranial abnormality.    All CT scans at this facility are performed  using dose modulation techniques as appropriate to performed exam including the following:  automated exposure control; adjustment of mA and/or kV according to the patients size (this includes techniques or standardized protocols for targeted exams where dose is matched to indication/reason for exam: i.e. extremities or  head);  iterative reconstruction technique.      Electronically signed by: Reynaldo Lamb MD  Date:    01/04/2020  Time:    11:49             Narrative:    EXAMINATION:  CT HEAD WITHOUT CONTRAST    CLINICAL HISTORY:  Confusion/delirium, altered LOC, unexplained;    TECHNIQUE:  Axial CT imaging was performed through the head without intravenous contrast.    COMPARISON:  None    FINDINGS:  No hydrocephalus, midline shift, mass effect, or acute intracranial hemorrhage. Cortical sulcal pattern and gray-white matter differentiation is normal.  Age related cerebral atrophy.  Basilar cisterns and posterior fossa are normal. The visualized paranasal sinuses and mastoid air cells are clear. The skull is intact.                               Medications:  Reconciled Home Medications:      Medication List      START taking these medications    azithromycin 250 MG tablet  Commonly known as:  Z-BETO  Take 2 tablets by mouth on day 1; Take 1 tablet by mouth on days 2-5        CHANGE how you take these medications    * Combivent Respimat  mcg/actuation inhaler  Generic drug:  ipratropium-albuterol  USE 1 PUFF EVERY SIX HOURS AS NEEDED FOR WHEEZING  What changed:  Another medication with the same name was added. Make sure you understand how and when to take each.     * albuterol-ipratropium 2.5 mg-0.5 mg/3 mL nebulizer solution  Commonly known as:  DUO-NEB  Take 3 mLs by nebulization every 6 (six) hours as needed for Wheezing or Shortness of Breath. Rescue  What changed:  You were already taking a medication with the same name, and this prescription was added. Make sure you understand how and when to take each.     furosemide 20 MG tablet  Commonly known as:  LASIX  Take 1 tablet (20 mg total) by mouth once daily.  What changed:  when to take this         * This list has 2 medication(s) that are the same as other medications prescribed for you. Read the directions carefully, and ask your doctor or other care provider to review  them with you.            CONTINUE taking these medications    ascorbic acid (vitamin C) 1000 MG tablet  Commonly known as:  VITAMIN C  Take 1,000 mg by mouth Daily.     Cartia  MG 24 hr capsule  Generic drug:  diltiaZEM  TAKE ONE CAPSULE BY MOUTH ONCE DAILY     carvedilol 6.25 MG tablet  Commonly known as:  COREG  Take 6.25 mg by mouth 2 (two) times daily with meals.     cetirizine 10 MG tablet  Commonly known as:  ZYRTEC  Take 10 mg by mouth once daily.     iron-vitamin C 100-250 mg (ICAR-C) 100-250 mg Tab  TAKE ONE TABLET BY MOUTH ONCE DAILY     levothyroxine 50 MCG tablet  Commonly known as:  SYNTHROID  Take 1 tablet (50 mcg total) by mouth before breakfast.     losartan 25 MG tablet  Commonly known as:  COZAAR  Take 1 tablet (25 mg total) by mouth once daily.     metFORMIN 750 MG 24 hr tablet  Commonly known as:  GLUCOPHAGE-XR  Take 750 mg by mouth every evening.     pantoprazole 40 MG tablet  Commonly known as:  PROTONIX  Take 1 tablet by mouth Daily.     pravastatin 20 MG tablet  Commonly known as:  PRAVACHOL  Take 1 tablet (20 mg total) by mouth nightly.     selenium 50 mcg Tab  Take 200 mcg by mouth nightly.     vitamin D 1000 units Tab  Commonly known as:  VITAMIN D3  Take 1 tablet by mouth Daily.            Indwelling Lines/Drains at time of discharge:   Lines/Drains/Airways     None                 Time spent on the discharge of patient: > 36 minutes  Patient was seen and examined on the date of discharge and determined to be suitable for discharge.         Verna Jeronimo NP  Department of Hospital Medicine  [facility]

## 2020-02-21 ENCOUNTER — LAB VISIT (OUTPATIENT)
Dept: LAB | Facility: HOSPITAL | Age: 85
End: 2020-02-21
Attending: INTERNAL MEDICINE
Payer: MEDICARE

## 2020-02-21 DIAGNOSIS — E11.22 TYPE 2 DIABETES MELLITUS WITH END-STAGE RENAL DISEASE: ICD-10-CM

## 2020-02-21 DIAGNOSIS — N18.6 TYPE 2 DIABETES MELLITUS WITH END-STAGE RENAL DISEASE: ICD-10-CM

## 2020-02-21 DIAGNOSIS — J44.1 OBSTRUCTIVE CHRONIC BRONCHITIS WITH EXACERBATION: Primary | ICD-10-CM

## 2020-02-21 LAB
ANION GAP SERPL CALC-SCNC: 11 MMOL/L (ref 8–16)
BNP SERPL-MCNC: 41 PG/ML (ref 0–99)
BUN SERPL-MCNC: 22 MG/DL (ref 8–23)
CALCIUM SERPL-MCNC: 9 MG/DL (ref 8.7–10.5)
CHLORIDE SERPL-SCNC: 105 MMOL/L (ref 95–110)
CO2 SERPL-SCNC: 26 MMOL/L (ref 23–29)
CREAT SERPL-MCNC: 1.3 MG/DL (ref 0.5–1.4)
EST. GFR  (AFRICAN AMERICAN): 55.9 ML/MIN/1.73 M^2
EST. GFR  (NON AFRICAN AMERICAN): 48.4 ML/MIN/1.73 M^2
GLUCOSE SERPL-MCNC: 148 MG/DL (ref 70–110)
POTASSIUM SERPL-SCNC: 4.3 MMOL/L (ref 3.5–5.1)
SODIUM SERPL-SCNC: 142 MMOL/L (ref 136–145)

## 2020-02-21 PROCEDURE — 80048 BASIC METABOLIC PNL TOTAL CA: CPT | Mod: PO

## 2020-02-21 PROCEDURE — 83880 ASSAY OF NATRIURETIC PEPTIDE: CPT | Mod: PO

## 2020-04-22 RX ORDER — LOSARTAN POTASSIUM 25 MG/1
25 TABLET ORAL DAILY
Qty: 90 TABLET | Refills: 3 | Status: ON HOLD | OUTPATIENT
Start: 2020-04-22 | End: 2020-06-03 | Stop reason: SDUPTHER

## 2020-06-01 ENCOUNTER — HOSPITAL ENCOUNTER (OUTPATIENT)
Facility: HOSPITAL | Age: 85
Discharge: HOME HEALTH | End: 2020-06-03
Attending: EMERGENCY MEDICINE | Admitting: INTERNAL MEDICINE
Payer: MEDICARE

## 2020-06-01 DIAGNOSIS — Z20.822 COVID-19 VIRUS NOT DETECTED: ICD-10-CM

## 2020-06-01 DIAGNOSIS — R09.02 HYPOXEMIA: ICD-10-CM

## 2020-06-01 DIAGNOSIS — J18.9 PNEUMONIA OF RIGHT LOWER LOBE DUE TO INFECTIOUS ORGANISM: Primary | ICD-10-CM

## 2020-06-01 DIAGNOSIS — Z20.822 SUSPECTED COVID-19 VIRUS INFECTION: ICD-10-CM

## 2020-06-01 LAB
ALBUMIN SERPL BCP-MCNC: 3.8 G/DL (ref 3.5–5.2)
ALP SERPL-CCNC: 115 U/L (ref 55–135)
ALT SERPL W/O P-5'-P-CCNC: 14 U/L (ref 10–44)
ANION GAP SERPL CALC-SCNC: 7 MMOL/L (ref 8–16)
AST SERPL-CCNC: 17 U/L (ref 10–40)
BACTERIA #/AREA URNS AUTO: NORMAL /HPF
BASOPHILS # BLD AUTO: 0.06 K/UL (ref 0–0.2)
BASOPHILS NFR BLD: 0.3 % (ref 0–1.9)
BILIRUB SERPL-MCNC: 0.4 MG/DL (ref 0.1–1)
BILIRUB UR QL STRIP: NEGATIVE
BNP SERPL-MCNC: 125 PG/ML (ref 0–99)
BUN SERPL-MCNC: 20 MG/DL (ref 8–23)
CALCIUM SERPL-MCNC: 9.4 MG/DL (ref 8.7–10.5)
CHLORIDE SERPL-SCNC: 103 MMOL/L (ref 95–110)
CK SERPL-CCNC: 70 U/L (ref 20–200)
CLARITY UR REFRACT.AUTO: CLEAR
CO2 SERPL-SCNC: 35 MMOL/L (ref 23–29)
COLOR UR AUTO: YELLOW
CREAT SERPL-MCNC: 1.3 MG/DL (ref 0.5–1.4)
CRP SERPL-MCNC: 8.6 MG/L (ref 0–8.2)
DIFFERENTIAL METHOD: ABNORMAL
EOSINOPHIL # BLD AUTO: 0.3 K/UL (ref 0–0.5)
EOSINOPHIL NFR BLD: 1.6 % (ref 0–8)
ERYTHROCYTE [DISTWIDTH] IN BLOOD BY AUTOMATED COUNT: 12.8 % (ref 11.5–14.5)
EST. GFR  (AFRICAN AMERICAN): 55.5 ML/MIN/1.73 M^2
EST. GFR  (NON AFRICAN AMERICAN): 48 ML/MIN/1.73 M^2
GLUCOSE SERPL-MCNC: 201 MG/DL (ref 70–110)
GLUCOSE UR QL STRIP: NEGATIVE
HCT VFR BLD AUTO: 44.2 % (ref 40–54)
HGB BLD-MCNC: 13.7 G/DL (ref 14–18)
HGB UR QL STRIP: NEGATIVE
HYALINE CASTS UR QL AUTO: 1 /LPF
IMM GRANULOCYTES # BLD AUTO: 0.27 K/UL (ref 0–0.04)
IMM GRANULOCYTES NFR BLD AUTO: 1.4 % (ref 0–0.5)
KETONES UR QL STRIP: NEGATIVE
LACTATE SERPL-SCNC: 0.8 MMOL/L (ref 0.5–2.2)
LDH SERPL L TO P-CCNC: 169 U/L (ref 110–260)
LEUKOCYTE ESTERASE UR QL STRIP: NEGATIVE
LYMPHOCYTES # BLD AUTO: 2.8 K/UL (ref 1–4.8)
LYMPHOCYTES NFR BLD: 14.2 % (ref 18–48)
MCH RBC QN AUTO: 30 PG (ref 27–31)
MCHC RBC AUTO-ENTMCNC: 31 G/DL (ref 32–36)
MCV RBC AUTO: 97 FL (ref 82–98)
MICROSCOPIC COMMENT: NORMAL
MONOCYTES # BLD AUTO: 1 K/UL (ref 0.3–1)
MONOCYTES NFR BLD: 5.3 % (ref 4–15)
NEUTROPHILS # BLD AUTO: 15 K/UL (ref 1.8–7.7)
NEUTROPHILS NFR BLD: 77.2 % (ref 38–73)
NITRITE UR QL STRIP: NEGATIVE
NRBC BLD-RTO: 0 /100 WBC
PH UR STRIP: 5 [PH] (ref 5–8)
PLATELET # BLD AUTO: 293 K/UL (ref 150–350)
PMV BLD AUTO: 9.6 FL (ref 9.2–12.9)
POCT GLUCOSE: 153 MG/DL (ref 70–110)
POTASSIUM SERPL-SCNC: 4.4 MMOL/L (ref 3.5–5.1)
PROCALCITONIN SERPL IA-MCNC: 0.02 NG/ML
PROT SERPL-MCNC: 7.7 G/DL (ref 6–8.4)
PROT UR QL STRIP: ABNORMAL
RBC # BLD AUTO: 4.57 M/UL (ref 4.6–6.2)
RBC #/AREA URNS AUTO: 2 /HPF (ref 0–4)
SARS-COV-2 RDRP RESP QL NAA+PROBE: NEGATIVE
SODIUM SERPL-SCNC: 145 MMOL/L (ref 136–145)
SP GR UR STRIP: 1.02 (ref 1–1.03)
TROPONIN I SERPL DL<=0.01 NG/ML-MCNC: 0.02 NG/ML (ref 0–0.03)
URATE CRY UR QL COMP ASSIST: NORMAL
URN SPEC COLLECT METH UR: ABNORMAL
UROBILINOGEN UR STRIP-ACNC: NEGATIVE EU/DL
WBC # BLD AUTO: 19.44 K/UL (ref 3.9–12.7)
WBC #/AREA URNS AUTO: 0 /HPF (ref 0–5)

## 2020-06-01 PROCEDURE — 86140 C-REACTIVE PROTEIN: CPT | Mod: ER

## 2020-06-01 PROCEDURE — 63600175 PHARM REV CODE 636 W HCPCS: Mod: ER | Performed by: EMERGENCY MEDICINE

## 2020-06-01 PROCEDURE — G0378 HOSPITAL OBSERVATION PER HR: HCPCS

## 2020-06-01 PROCEDURE — 87040 BLOOD CULTURE FOR BACTERIA: CPT

## 2020-06-01 PROCEDURE — 94640 AIRWAY INHALATION TREATMENT: CPT | Mod: ER

## 2020-06-01 PROCEDURE — 83605 ASSAY OF LACTIC ACID: CPT | Mod: ER

## 2020-06-01 PROCEDURE — 99291 CRITICAL CARE FIRST HOUR: CPT | Mod: 25,ER

## 2020-06-01 PROCEDURE — 83036 HEMOGLOBIN GLYCOSYLATED A1C: CPT

## 2020-06-01 PROCEDURE — G0378 HOSPITAL OBSERVATION PER HR: HCPCS | Mod: ER

## 2020-06-01 PROCEDURE — 82728 ASSAY OF FERRITIN: CPT

## 2020-06-01 PROCEDURE — 25000003 PHARM REV CODE 250: Performed by: NURSE PRACTITIONER

## 2020-06-01 PROCEDURE — 93010 EKG 12-LEAD: ICD-10-PCS | Mod: ,,, | Performed by: INTERNAL MEDICINE

## 2020-06-01 PROCEDURE — 36415 COLL VENOUS BLD VENIPUNCTURE: CPT

## 2020-06-01 PROCEDURE — 93010 ELECTROCARDIOGRAM REPORT: CPT | Mod: ,,, | Performed by: INTERNAL MEDICINE

## 2020-06-01 PROCEDURE — 96375 TX/PRO/DX INJ NEW DRUG ADDON: CPT | Mod: ER

## 2020-06-01 PROCEDURE — 96376 TX/PRO/DX INJ SAME DRUG ADON: CPT

## 2020-06-01 PROCEDURE — 85025 COMPLETE CBC W/AUTO DIFF WBC: CPT | Mod: ER

## 2020-06-01 PROCEDURE — 93005 ELECTROCARDIOGRAM TRACING: CPT | Mod: ER

## 2020-06-01 PROCEDURE — 80053 COMPREHEN METABOLIC PANEL: CPT | Mod: ER

## 2020-06-01 PROCEDURE — 83880 ASSAY OF NATRIURETIC PEPTIDE: CPT | Mod: ER

## 2020-06-01 PROCEDURE — 81000 URINALYSIS NONAUTO W/SCOPE: CPT | Mod: ER

## 2020-06-01 PROCEDURE — 84145 PROCALCITONIN (PCT): CPT | Mod: ER

## 2020-06-01 PROCEDURE — U0002 COVID-19 LAB TEST NON-CDC: HCPCS | Mod: ER

## 2020-06-01 PROCEDURE — 82550 ASSAY OF CK (CPK): CPT | Mod: ER

## 2020-06-01 PROCEDURE — 83615 LACTATE (LD) (LDH) ENZYME: CPT | Mod: ER

## 2020-06-01 PROCEDURE — 25000242 PHARM REV CODE 250 ALT 637 W/ HCPCS: Mod: ER | Performed by: EMERGENCY MEDICINE

## 2020-06-01 PROCEDURE — 99292 CRITICAL CARE ADDL 30 MIN: CPT | Mod: ER

## 2020-06-01 PROCEDURE — 63600175 PHARM REV CODE 636 W HCPCS: Performed by: NURSE PRACTITIONER

## 2020-06-01 PROCEDURE — 96365 THER/PROPH/DIAG IV INF INIT: CPT | Mod: ER

## 2020-06-01 PROCEDURE — 84484 ASSAY OF TROPONIN QUANT: CPT | Mod: ER

## 2020-06-01 RX ORDER — FUROSEMIDE 20 MG/1
20 TABLET ORAL DAILY
Status: DISCONTINUED | OUTPATIENT
Start: 2020-06-02 | End: 2020-06-03 | Stop reason: HOSPADM

## 2020-06-01 RX ORDER — CLONIDINE HYDROCHLORIDE 0.1 MG/1
0.1 TABLET ORAL
Status: DISCONTINUED | OUTPATIENT
Start: 2020-06-01 | End: 2020-06-01

## 2020-06-01 RX ORDER — CARVEDILOL 6.25 MG/1
6.25 TABLET ORAL 2 TIMES DAILY WITH MEALS
Status: DISCONTINUED | OUTPATIENT
Start: 2020-06-02 | End: 2020-06-03 | Stop reason: HOSPADM

## 2020-06-01 RX ORDER — IBUPROFEN 200 MG
24 TABLET ORAL
Status: DISCONTINUED | OUTPATIENT
Start: 2020-06-01 | End: 2020-06-03 | Stop reason: HOSPADM

## 2020-06-01 RX ORDER — ALBUTEROL SULFATE 90 UG/1
1-2 AEROSOL, METERED RESPIRATORY (INHALATION) EVERY 6 HOURS PRN
Qty: 8 G | Refills: 0 | Status: SHIPPED | OUTPATIENT
Start: 2020-06-01 | End: 2020-06-01 | Stop reason: CLARIF

## 2020-06-01 RX ORDER — CETIRIZINE HYDROCHLORIDE 10 MG/1
10 TABLET ORAL DAILY
Status: DISCONTINUED | OUTPATIENT
Start: 2020-06-02 | End: 2020-06-03 | Stop reason: HOSPADM

## 2020-06-01 RX ORDER — FERROUS SULFATE 325(65) MG
325 TABLET, DELAYED RELEASE (ENTERIC COATED) ORAL DAILY
Status: DISCONTINUED | OUTPATIENT
Start: 2020-06-02 | End: 2020-06-03 | Stop reason: HOSPADM

## 2020-06-01 RX ORDER — GUAIFENESIN 100 MG/5ML
200 SOLUTION ORAL EVERY 4 HOURS PRN
Status: DISCONTINUED | OUTPATIENT
Start: 2020-06-01 | End: 2020-06-03 | Stop reason: HOSPADM

## 2020-06-01 RX ORDER — DILTIAZEM HYDROCHLORIDE 120 MG/1
240 CAPSULE, COATED, EXTENDED RELEASE ORAL DAILY
Status: DISCONTINUED | OUTPATIENT
Start: 2020-06-02 | End: 2020-06-03 | Stop reason: HOSPADM

## 2020-06-01 RX ORDER — PRAVASTATIN SODIUM 20 MG/1
20 TABLET ORAL NIGHTLY
Status: DISCONTINUED | OUTPATIENT
Start: 2020-06-01 | End: 2020-06-03 | Stop reason: HOSPADM

## 2020-06-01 RX ORDER — ONDANSETRON 2 MG/ML
4 INJECTION INTRAMUSCULAR; INTRAVENOUS EVERY 6 HOURS PRN
Status: DISCONTINUED | OUTPATIENT
Start: 2020-06-01 | End: 2020-06-03 | Stop reason: HOSPADM

## 2020-06-01 RX ORDER — LEVOFLOXACIN 750 MG/1
750 TABLET ORAL DAILY
Qty: 7 TABLET | Refills: 0 | Status: SHIPPED | OUTPATIENT
Start: 2020-06-01 | End: 2020-06-01 | Stop reason: CLARIF

## 2020-06-01 RX ORDER — IPRATROPIUM BROMIDE AND ALBUTEROL SULFATE 2.5; .5 MG/3ML; MG/3ML
3 SOLUTION RESPIRATORY (INHALATION) EVERY 4 HOURS PRN
Status: DISCONTINUED | OUTPATIENT
Start: 2020-06-01 | End: 2020-06-03 | Stop reason: HOSPADM

## 2020-06-01 RX ORDER — ASCORBIC ACID 500 MG
1000 TABLET ORAL DAILY
Status: DISCONTINUED | OUTPATIENT
Start: 2020-06-02 | End: 2020-06-03 | Stop reason: HOSPADM

## 2020-06-01 RX ORDER — METHYLPREDNISOLONE SOD SUCC 125 MG
125 VIAL (EA) INJECTION
Status: COMPLETED | OUTPATIENT
Start: 2020-06-01 | End: 2020-06-01

## 2020-06-01 RX ORDER — ACETAMINOPHEN 325 MG/1
650 TABLET ORAL EVERY 6 HOURS PRN
Status: DISCONTINUED | OUTPATIENT
Start: 2020-06-01 | End: 2020-06-03 | Stop reason: HOSPADM

## 2020-06-01 RX ORDER — IPRATROPIUM BROMIDE AND ALBUTEROL SULFATE 2.5; .5 MG/3ML; MG/3ML
3 SOLUTION RESPIRATORY (INHALATION)
Status: COMPLETED | OUTPATIENT
Start: 2020-06-01 | End: 2020-06-01

## 2020-06-01 RX ORDER — HEPARIN SODIUM 5000 [USP'U]/ML
5000 INJECTION, SOLUTION INTRAVENOUS; SUBCUTANEOUS EVERY 8 HOURS
Status: DISCONTINUED | OUTPATIENT
Start: 2020-06-02 | End: 2020-06-03 | Stop reason: HOSPADM

## 2020-06-01 RX ORDER — LEVOTHYROXINE SODIUM 50 UG/1
50 TABLET ORAL
Status: DISCONTINUED | OUTPATIENT
Start: 2020-06-02 | End: 2020-06-03 | Stop reason: HOSPADM

## 2020-06-01 RX ORDER — ALBUTEROL SULFATE 1.25 MG/3ML
1.25 SOLUTION RESPIRATORY (INHALATION) EVERY 6 HOURS PRN
Qty: 1 BOX | Refills: 0 | Status: SHIPPED | OUTPATIENT
Start: 2020-06-01 | End: 2020-06-01 | Stop reason: CLARIF

## 2020-06-01 RX ORDER — INSULIN ASPART 100 [IU]/ML
0-5 INJECTION, SOLUTION INTRAVENOUS; SUBCUTANEOUS
Status: DISCONTINUED | OUTPATIENT
Start: 2020-06-01 | End: 2020-06-03 | Stop reason: HOSPADM

## 2020-06-01 RX ORDER — PANTOPRAZOLE SODIUM 40 MG/1
40 TABLET, DELAYED RELEASE ORAL DAILY
Status: DISCONTINUED | OUTPATIENT
Start: 2020-06-02 | End: 2020-06-03 | Stop reason: HOSPADM

## 2020-06-01 RX ORDER — GLUCAGON 1 MG
1 KIT INJECTION
Status: DISCONTINUED | OUTPATIENT
Start: 2020-06-01 | End: 2020-06-03 | Stop reason: HOSPADM

## 2020-06-01 RX ORDER — LEVOFLOXACIN 5 MG/ML
750 INJECTION, SOLUTION INTRAVENOUS
Status: COMPLETED | OUTPATIENT
Start: 2020-06-01 | End: 2020-06-01

## 2020-06-01 RX ORDER — MELOXICAM 7.5 MG/1
TABLET ORAL
COMMUNITY
Start: 2020-03-25

## 2020-06-01 RX ORDER — PRAVASTATIN SODIUM 40 MG/1
40 TABLET ORAL NIGHTLY
COMMUNITY
Start: 2020-03-25 | End: 2020-06-01 | Stop reason: CLARIF

## 2020-06-01 RX ORDER — CHOLECALCIFEROL (VITAMIN D3) 25 MCG
1000 TABLET ORAL DAILY
Status: DISCONTINUED | OUTPATIENT
Start: 2020-06-02 | End: 2020-06-03 | Stop reason: HOSPADM

## 2020-06-01 RX ORDER — LOSARTAN POTASSIUM 25 MG/1
25 TABLET ORAL DAILY
Status: DISCONTINUED | OUTPATIENT
Start: 2020-06-02 | End: 2020-06-02

## 2020-06-01 RX ORDER — IRON,CARBONYL/ASCORBIC ACID 100-250 MG
1 TABLET ORAL DAILY
Status: DISCONTINUED | OUTPATIENT
Start: 2020-06-02 | End: 2020-06-01

## 2020-06-01 RX ORDER — NICARDIPINE HYDROCHLORIDE 0.2 MG/ML
5 INJECTION INTRAVENOUS CONTINUOUS
Status: DISCONTINUED | OUTPATIENT
Start: 2020-06-01 | End: 2020-06-01

## 2020-06-01 RX ORDER — IBUPROFEN 200 MG
16 TABLET ORAL
Status: DISCONTINUED | OUTPATIENT
Start: 2020-06-01 | End: 2020-06-03 | Stop reason: HOSPADM

## 2020-06-01 RX ADMIN — METHYLPREDNISOLONE SODIUM SUCCINATE 125 MG: 125 INJECTION, POWDER, FOR SOLUTION INTRAMUSCULAR; INTRAVENOUS at 07:06

## 2020-06-01 RX ADMIN — CEFTRIAXONE 1 G: 1 INJECTION, SOLUTION INTRAVENOUS at 11:06

## 2020-06-01 RX ADMIN — LEVOFLOXACIN 750 MG: 5 INJECTION, SOLUTION INTRAVENOUS at 07:06

## 2020-06-01 RX ADMIN — IPRATROPIUM BROMIDE AND ALBUTEROL SULFATE 3 ML: .5; 3 SOLUTION RESPIRATORY (INHALATION) at 07:06

## 2020-06-01 RX ADMIN — PRAVASTATIN SODIUM 20 MG: 20 TABLET ORAL at 11:06

## 2020-06-01 NOTE — ED PROVIDER NOTES
Encounter Date: 2020       History     Chief Complaint   Patient presents with    Shortness of Breath     The history is provided by the patient and a relative.   Shortness of Breath   This is a new problem. The problem occurs continuously.The current episode started 1 to 2 hours ago. The problem has been rapidly worsening. Associated symptoms include wheezing. Pertinent negatives include no fever, no rhinorrhea, no sore throat, no cough, no sputum production, no hemoptysis, no orthopnea, no chest pain, no syncope, no vomiting, no abdominal pain, no leg pain and no leg swelling. He has tried nothing for the symptoms.     Review of patient's allergies indicates:  No Known Allergies  Past Medical History:   Diagnosis Date    Aneurysm 2016    abdominal, stent placed    Arthritis     Asthma     Atrial fibrillation     Cancer     skin cancer on face    Diabetes mellitus     Emphysema lung     Encounter for blood transfusion     Hypertension      Past Surgical History:   Procedure Laterality Date    ABDOMINAL AORTIC ANEURYSM REPAIR      Stent placed    ABDOMINAL SURGERY      COLONOSCOPY Left 2017    Procedure: COLONOSCOPY;  Surgeon: Boaz Toussaint MD;  Location: [facility];  Service: Endoscopy;  Laterality: Left;    VASCULAR SURGERY      abdominal aneurysm repair     Family History   Problem Relation Age of Onset    Hypertension Father     Heart attack Father     Cancer Mother         bladder     Social History     Tobacco Use    Smoking status: Former Smoker     Packs/day: 2.00     Years: 20.00     Pack years: 40.00     Types: Cigarettes     Last attempt to quit: 1977     Years since quittin.4    Smokeless tobacco: Former User   Substance Use Topics    Alcohol use: No    Drug use: No     Review of Systems   Constitutional: Negative for fever.   HENT: Negative for rhinorrhea and sore throat.    Respiratory: Positive for shortness of breath and wheezing. Negative for cough, hemoptysis  and sputum production.    Cardiovascular: Negative for chest pain, orthopnea, leg swelling and syncope.   Gastrointestinal: Negative for abdominal pain and vomiting.   All other systems reviewed and are negative.      Physical Exam     Initial Vitals [06/01/20 1618]   BP Pulse Resp Temp SpO2   (!) 219/95 80 (!) 25 98.8 °F (37.1 °C) (!) 73 %      MAP       --         Physical Exam    Nursing note and vitals reviewed.  Constitutional: He appears well-developed and well-nourished.   Elderly   HENT:   Head: Normocephalic and atraumatic.   Mouth/Throat: Oropharynx is clear and moist.   Eyes: Conjunctivae and EOM are normal. Pupils are equal, round, and reactive to light.   Neck: Normal range of motion. Neck supple.   Cardiovascular: Normal rate, regular rhythm and normal heart sounds.   Pulmonary/Chest: He is in respiratory distress. He has wheezes.   Abdominal: Soft. Bowel sounds are normal.   Musculoskeletal: Normal range of motion.   Neurological: He is alert and oriented to person, place, and time. He has normal strength.   Skin: Skin is warm and dry.   Psychiatric: He has a normal mood and affect. Thought content normal.         ED Course   Critical Care  Date/Time: 6/1/2020 8:36 PM  Performed by: Sushil Corcoran Jr., MD  Authorized by: Sushil Corcoran Jr., MD   Direct patient critical care time: 10 minutes  Additional history critical care time: 10 minutes  Ordering / reviewing critical care time: 10 minutes  Documentation critical care time: 10 minutes  Consulting other physicians critical care time: 10 minutes  Consult with family critical care time: 10 minutes  Other critical care time: 15 minutes  Total critical care time (exclusive of procedural time) : 75 minutes  Critical care time was exclusive of separately billable procedures and treating other patients and teaching time.  Critical care was necessary to treat or prevent imminent or life-threatening deterioration of the following conditions: respiratory  failure.  Critical care was time spent personally by me on the following activities: development of treatment plan with patient or surrogate, blood draw for specimens, discussions with consultants, interpretation of cardiac output measurements, evaluation of patient's response to treatment, examination of patient, obtaining history from patient or surrogate, ordering and performing treatments and interventions, ordering and review of laboratory studies, ordering and review of radiographic studies, re-evaluation of patient's condition, pulse oximetry and review of old charts.        Labs Reviewed   CBC W/ AUTO DIFFERENTIAL - Abnormal; Notable for the following components:       Result Value    WBC 19.44 (*)     RBC 4.57 (*)     Hemoglobin 13.7 (*)     Mean Corpuscular Hemoglobin Conc 31.0 (*)     Immature Granulocytes 1.4 (*)     Gran # (ANC) 15.0 (*)     Immature Grans (Abs) 0.27 (*)     Gran% 77.2 (*)     Lymph% 14.2 (*)     All other components within normal limits   COMPREHENSIVE METABOLIC PANEL - Abnormal; Notable for the following components:    CO2 35 (*)     Glucose 201 (*)     Anion Gap 7 (*)     eGFR if  55.5 (*)     eGFR if non  48.0 (*)     All other components within normal limits   C-REACTIVE PROTEIN - Abnormal; Notable for the following components:    CRP 8.6 (*)     All other components within normal limits   B-TYPE NATRIURETIC PEPTIDE - Abnormal; Notable for the following components:     (*)     All other components within normal limits   CULTURE, BLOOD   CULTURE, BLOOD   LACTATE DEHYDROGENASE   CK   LACTIC ACID, PLASMA   TROPONIN I   SARS-COV-2 RNA AMPLIFICATION, QUAL   PROCALCITONIN   FERRITIN   URINALYSIS, REFLEX TO URINE CULTURE     EKG Readings: (Independently Interpreted)   Initial Reading: No STEMI. Previous EKG: Compared with most recent EKG Rhythm: Normal Sinus Rhythm. Heart Rate: 89. Ectopy: PVCs. Conduction: 1st Degree AV Block. ST Segments: Normal ST  "Segments. T Waves: Normal. Axis: Normal. Clinical Impression: Normal Sinus Rhythm with PVCs       Imaging Results          X-Ray Chest AP Portable (Final result)  Result time 06/01/20 17:59:09    Final result by Jose Diaz MD (06/01/20 17:59:09)                 Impression:      Low-grade right lung base infiltrate.  Question retrocardiac consolidation versus atelectasis left lower lobe.    Short-term follow-up advised      Electronically signed by: Jose Diaz MD  Date:    06/01/2020  Time:    17:59             Narrative:    EXAMINATION:  XR CHEST AP PORTABLE    CLINICAL HISTORY:  Suspected Covid-19 Virus Infection;    TECHNIQUE:  Single frontal view of the chest was performed.    COMPARISON:  01/04/2020    FINDINGS:  Aortic atherosclerosis.  Normal heart size.    Patchy mixed ground-glass and interstitial opacities at the right lung base.  Increased density in the retrocardiac left lower lobe though difficult to evaluate because of technique.    No effusions.                                    Vitals:    06/01/20 1618 06/01/20 1625 06/01/20 1626 06/01/20 1640   BP: (!) 219/95 (!) 219/95  (!) 202/88   Pulse: 80 86 88 93   Resp: (!) 25 (!) 28  (!) 27   Temp: 98.8 °F (37.1 °C)      TempSrc: Oral      SpO2: (!) 73% (!) 94%  (!) 92%   Weight: 72 kg (158 lb 11.7 oz)      Height: 5' 8" (1.727 m)       06/01/20 1644 06/01/20 1647 06/01/20 1701 06/01/20 1732   BP: (!) 186/85 (!) 186/85 (!) 172/75 (!) 181/78   Pulse:  89 86 81   Resp:  (!) 26 (!) 26 (!) 26   Temp:       TempSrc:       SpO2:  (!) 93% 95% (!) 94%   Weight:       Height:        06/01/20 1802 06/01/20 1832 06/01/20 1909   BP: (!) 182/79 (!) 184/81    Pulse: 88 82 86   Resp: (!) 26 18 (!) 24   Temp:      TempSrc:      SpO2: (!) 93% (!) 94% (!) 94%   Weight:      Height:          Results for orders placed or performed during the hospital encounter of 06/01/20   CBC auto differential   Result Value Ref Range    WBC 19.44 (H) 3.90 - 12.70 K/uL    RBC 4.57 (L) " 4.60 - 6.20 M/uL    Hemoglobin 13.7 (L) 14.0 - 18.0 g/dL    Hematocrit 44.2 40.0 - 54.0 %    Mean Corpuscular Volume 97 82 - 98 fL    Mean Corpuscular Hemoglobin 30.0 27.0 - 31.0 pg    Mean Corpuscular Hemoglobin Conc 31.0 (L) 32.0 - 36.0 g/dL    RDW 12.8 11.5 - 14.5 %    Platelets 293 150 - 350 K/uL    MPV 9.6 9.2 - 12.9 fL    Immature Granulocytes 1.4 (H) 0.0 - 0.5 %    Gran # (ANC) 15.0 (H) 1.8 - 7.7 K/uL    Immature Grans (Abs) 0.27 (H) 0.00 - 0.04 K/uL    Lymph # 2.8 1.0 - 4.8 K/uL    Mono # 1.0 0.3 - 1.0 K/uL    Eos # 0.3 0.0 - 0.5 K/uL    Baso # 0.06 0.00 - 0.20 K/uL    nRBC 0 0 /100 WBC    Gran% 77.2 (H) 38.0 - 73.0 %    Lymph% 14.2 (L) 18.0 - 48.0 %    Mono% 5.3 4.0 - 15.0 %    Eosinophil% 1.6 0.0 - 8.0 %    Basophil% 0.3 0.0 - 1.9 %    Differential Method Automated    Comprehensive metabolic panel   Result Value Ref Range    Sodium 145 136 - 145 mmol/L    Potassium 4.4 3.5 - 5.1 mmol/L    Chloride 103 95 - 110 mmol/L    CO2 35 (H) 23 - 29 mmol/L    Glucose 201 (H) 70 - 110 mg/dL    BUN, Bld 20 8 - 23 mg/dL    Creatinine 1.3 0.5 - 1.4 mg/dL    Calcium 9.4 8.7 - 10.5 mg/dL    Total Protein 7.7 6.0 - 8.4 g/dL    Albumin 3.8 3.5 - 5.2 g/dL    Total Bilirubin 0.4 0.1 - 1.0 mg/dL    Alkaline Phosphatase 115 55 - 135 U/L    AST 17 10 - 40 U/L    ALT 14 10 - 44 U/L    Anion Gap 7 (L) 8 - 16 mmol/L    eGFR if African American 55.5 (A) >60 mL/min/1.73 m^2    eGFR if non  48.0 (A) >60 mL/min/1.73 m^2   C-Reactive Protein   Result Value Ref Range    CRP 8.6 (H) 0.0 - 8.2 mg/L   Lactate Dehydrogenase   Result Value Ref Range     110 - 260 U/L   CK   Result Value Ref Range    CPK 70 20 - 200 U/L   Lactic Acid, Plasma   Result Value Ref Range    Lactate (Lactic Acid) 0.8 0.5 - 2.2 mmol/L   Troponin I   Result Value Ref Range    Troponin I 0.021 0.000 - 0.026 ng/mL   COVID-19 Rapid Screening   Result Value Ref Range    SARS-CoV-2 RNA, Amplification, Qual Negative Negative   Procalcitonin   Result  Value Ref Range    Procalcitonin 0.02 <0.25 ng/mL   Brain Natriuretic Peptide   Result Value Ref Range     (H) 0 - 99 pg/mL         Imaging Results          X-Ray Chest AP Portable (Final result)  Result time 06/01/20 17:59:09    Final result by Jose Diaz MD (06/01/20 17:59:09)                 Impression:      Low-grade right lung base infiltrate.  Question retrocardiac consolidation versus atelectasis left lower lobe.    Short-term follow-up advised      Electronically signed by: Jose Diaz MD  Date:    06/01/2020  Time:    17:59             Narrative:    EXAMINATION:  XR CHEST AP PORTABLE    CLINICAL HISTORY:  Suspected Covid-19 Virus Infection;    TECHNIQUE:  Single frontal view of the chest was performed.    COMPARISON:  01/04/2020    FINDINGS:  Aortic atherosclerosis.  Normal heart size.    Patchy mixed ground-glass and interstitial opacities at the right lung base.  Increased density in the retrocardiac left lower lobe though difficult to evaluate because of technique.    No effusions.                                Medications   levoFLOXacin 750 mg/150 mL IVPB 750 mg (750 mg Intravenous New Bag 6/1/20 1936)   albuterol-ipratropium 2.5 mg-0.5 mg/3 mL nebulizer solution 3 mL (3 mLs Nebulization Given 6/1/20 1909)   methylPREDNISolone sodium succinate injection 125 mg (125 mg Intravenous Given 6/1/20 1934)       6:00 PM - Transfer of Care: Patient care transferred from Dr. Roper to Dr. Corcoran, pending labs and studies.      6:07 PM - Re-evaluation:  The patient is resting comfortably and is in no acute distress.  Agree with Dr. Roper's assessment.  Crackles/wheezing in bases bilaterally, worse on right.  cxr concerning for RLL pneumonia. Ordered duoneb, solumedrol, and iv levaquin. Discussed test results and notified of pending labs. Answered questions at this time.     8:19 PM - CONSULT: Dr. Corcoran discussed the case with . Agrees with current management. Recommends admit and will  see pt in hospital room.  Admitting Service: Hospital Medicine   Admitting Physician: Dr. Salcedo   Admit to med/tele    8:19 PM - Re-evaluation:  Discussed test results, shared treatment plan, and the need for admission with patient and family.  Discussed results and plan c pt and pt's daughter, Chrissy, who is pt's power of .  Due to pt living alone, need for more supplemental oxygen, age/risk factors, and labs/cxr concerning for pneumonia, will admit pt to observation for continued oxygen, iv abx and close monitoring.  They understand and agree to the plan as discussed. Answered questions at this time.     All historical, clinical, radiographic, and laboratory findings were reviewed with the patient/family in detail along with the indications for transport to the facility in [place] in order to receive further evaluation and treatment.  All remaining questions and concerns were addressed at this time and the patient/family communicates understanding and agrees to proceed accordingly.  Similarly, all pertinent details of the encounter were discussed with Dr. Salcedo who agrees to receive the patient at [facility] for further care as outlined above.  The patient will be transferred by [facility] ambulance services secondary to a need for ongoing iv abx, cardiac monitoring, oxygen, pulse ox en route.  Sushil Corcoran MD  8:19 PM        Follow-up Information     Nacho Richardson MD. Schedule an appointment as soon as possible for a visit in 3 days.    Specialty:  Internal Medicine  Contact information:  [address] [address]  [facility] [address]  [phone]             [facility].    Specialty:  Emergency Medicine  Why:  As needed, If symptoms worsen  Contact information:  [address]  [facility] [id number] [phone]                    Medication List      ASK your doctor about these medications    ascorbic acid (vitamin C) 1000 MG tablet  Commonly known as:   VITAMIN C     CARTIA  MG 24 hr capsule  Generic drug:  diltiaZEM  TAKE ONE CAPSULE BY MOUTH ONCE DAILY     carvediloL 6.25 MG tablet  Commonly known as:  COREG     cetirizine 10 MG tablet  Commonly known as:  ZYRTEC     COMBIVENT RESPIMAT  mcg/actuation inhaler  Generic drug:  ipratropium-albuteroL     furosemide 20 MG tablet  Commonly known as:  LASIX  Take 1 tablet (20 mg total) by mouth once daily.     iron-vitamin C 100-250 mg (ICAR-C) 100-250 mg Tab     levothyroxine 50 MCG tablet  Commonly known as:  SYNTHROID  Take 1 tablet (50 mcg total) by mouth before breakfast.     losartan 25 MG tablet  Commonly known as:  COZAAR  Take 1 tablet (25 mg total) by mouth once daily.     meloxicam 7.5 MG tablet  Commonly known as:  MOBIC     metFORMIN 750 MG XR 24hr tablet  Commonly known as:  GLUCOPHAGE-XR     pantoprazole 40 MG tablet  Commonly known as:  PROTONIX     pravastatin 20 MG tablet  Commonly known as:  PRAVACHOL  Take 1 tablet (20 mg total) by mouth nightly.     selenium 50 mcg Tab     vitamin D 1000 units Tab  Commonly known as:  VITAMIN D3           Current Discharge Medication List            ED Diagnosis  1. Pneumonia of right lower lobe due to infectious organism    2. Suspected Covid-19 Virus Infection    3. Covid-19 Virus not Detected    4. Hypoxemia                                        Clinical Impression:       ICD-10-CM ICD-9-CM   1. Pneumonia of right lower lobe due to infectious organism J18.1 486   2. Suspected Covid-19 Virus Infection R68.89    3. Covid-19 Virus not Detected [id number]    4. Hypoxemia R09.02 799.02         Disposition:   Disposition: Placed in Observation  Condition: Stable     ED Disposition Condition    Observation                           Sushil Corcoran Jr., MD  06/01/20 7571

## 2020-06-02 PROBLEM — H05.239 PERIORBITAL HEMATOMA: Status: RESOLVED | Noted: 2020-01-04 | Resolved: 2020-06-02

## 2020-06-02 PROBLEM — K92.2 GI BLEED: Status: RESOLVED | Noted: 2017-09-12 | Resolved: 2020-06-02

## 2020-06-02 PROBLEM — D64.9 SYMPTOMATIC ANEMIA: Status: RESOLVED | Noted: 2018-04-09 | Resolved: 2020-06-02

## 2020-06-02 PROBLEM — J44.1 COPD EXACERBATION: Status: ACTIVE | Noted: 2017-10-05

## 2020-06-02 PROBLEM — R79.89 TROPONIN LEVEL ELEVATED: Status: RESOLVED | Noted: 2017-09-12 | Resolved: 2020-06-02

## 2020-06-02 PROBLEM — A41.9 SEVERE SEPSIS: Status: RESOLVED | Noted: 2020-01-04 | Resolved: 2020-06-02

## 2020-06-02 PROBLEM — K31.811 ANGIODYSPLASIA OF DUODENUM WITH HEMORRHAGE: Status: RESOLVED | Noted: 2017-06-12 | Resolved: 2020-06-02

## 2020-06-02 PROBLEM — Z66 DNR (DO NOT RESUSCITATE): Status: ACTIVE | Noted: 2020-06-02

## 2020-06-02 PROBLEM — N18.30 CKD STAGE 3 SECONDARY TO DIABETES: Status: ACTIVE | Noted: 2018-04-10

## 2020-06-02 PROBLEM — R65.20 SEVERE SEPSIS: Status: RESOLVED | Noted: 2020-01-04 | Resolved: 2020-06-02

## 2020-06-02 PROBLEM — D50.0 ANEMIA DUE TO CHRONIC BLOOD LOSS: Status: RESOLVED | Noted: 2017-09-11 | Resolved: 2020-06-02

## 2020-06-02 PROBLEM — N18.30 CHRONIC KIDNEY DISEASE, STAGE 3: Chronic | Status: RESOLVED | Noted: 2017-06-10 | Resolved: 2020-06-02

## 2020-06-02 PROBLEM — J44.9 COPD (CHRONIC OBSTRUCTIVE PULMONARY DISEASE): Status: RESOLVED | Noted: 2017-10-05 | Resolved: 2020-06-02

## 2020-06-02 PROBLEM — K57.30 DIVERTICULOSIS OF LARGE INTESTINE WITHOUT HEMORRHAGE: Chronic | Status: RESOLVED | Noted: 2017-06-12 | Resolved: 2020-06-02
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
## 2020-06-02 PROBLEM — N17.9 AKI (ACUTE KIDNEY INJURY): Status: RESOLVED | Noted: 2017-06-10 | Resolved: 2020-06-02

## 2020-06-02 PROBLEM — R74.8 ELEVATED CPK: Status: RESOLVED | Noted: 2020-01-04 | Resolved: 2020-06-02

## 2020-06-02 PROBLEM — E11.22 CKD STAGE 3 SECONDARY TO DIABETES: Status: ACTIVE | Noted: 2018-04-10

## 2020-06-02 PROBLEM — M25.532 LEFT WRIST PAIN: Status: RESOLVED | Noted: 2020-01-06 | Resolved: 2020-06-02

## 2020-06-02 LAB
ALBUMIN SERPL BCP-MCNC: 3.2 G/DL (ref 3.5–5.2)
ALP SERPL-CCNC: 109 U/L (ref 55–135)
ALT SERPL W/O P-5'-P-CCNC: 13 U/L (ref 10–44)
ANION GAP SERPL CALC-SCNC: 10 MMOL/L (ref 8–16)
AST SERPL-CCNC: 13 U/L (ref 10–40)
BASOPHILS # BLD AUTO: 0.01 K/UL (ref 0–0.2)
BASOPHILS NFR BLD: 0.1 % (ref 0–1.9)
BILIRUB SERPL-MCNC: 0.4 MG/DL (ref 0.1–1)
BUN SERPL-MCNC: 19 MG/DL (ref 8–23)
CALCIUM SERPL-MCNC: 8.9 MG/DL (ref 8.7–10.5)
CHLORIDE SERPL-SCNC: 101 MMOL/L (ref 95–110)
CO2 SERPL-SCNC: 31 MMOL/L (ref 23–29)
CREAT SERPL-MCNC: 1.2 MG/DL (ref 0.5–1.4)
DIFFERENTIAL METHOD: ABNORMAL
EOSINOPHIL # BLD AUTO: 0 K/UL (ref 0–0.5)
EOSINOPHIL NFR BLD: 0 % (ref 0–8)
ERYTHROCYTE [DISTWIDTH] IN BLOOD BY AUTOMATED COUNT: 12.5 % (ref 11.5–14.5)
EST. GFR  (AFRICAN AMERICAN): >60 ML/MIN/1.73 M^2
EST. GFR  (NON AFRICAN AMERICAN): 53 ML/MIN/1.73 M^2
ESTIMATED AVG GLUCOSE: 131 MG/DL (ref 68–131)
FERRITIN SERPL-MCNC: 185 NG/ML (ref 20–300)
GLUCOSE SERPL-MCNC: 204 MG/DL (ref 70–110)
HBA1C MFR BLD HPLC: 6.2 % (ref 4–5.6)
HCT VFR BLD AUTO: 39.1 % (ref 40–54)
HGB BLD-MCNC: 12.4 G/DL (ref 14–18)
IMM GRANULOCYTES # BLD AUTO: 0.09 K/UL (ref 0–0.04)
IMM GRANULOCYTES NFR BLD AUTO: 0.8 % (ref 0–0.5)
LYMPHOCYTES # BLD AUTO: 0.8 K/UL (ref 1–4.8)
LYMPHOCYTES NFR BLD: 6.7 % (ref 18–48)
MAGNESIUM SERPL-MCNC: 1.7 MG/DL (ref 1.6–2.6)
MCH RBC QN AUTO: 30.2 PG (ref 27–31)
MCHC RBC AUTO-ENTMCNC: 31.7 G/DL (ref 32–36)
MCV RBC AUTO: 95 FL (ref 82–98)
MONOCYTES # BLD AUTO: 0.1 K/UL (ref 0.3–1)
MONOCYTES NFR BLD: 0.5 % (ref 4–15)
NEUTROPHILS # BLD AUTO: 10.7 K/UL (ref 1.8–7.7)
NEUTROPHILS NFR BLD: 91.9 % (ref 38–73)
NRBC BLD-RTO: 0 /100 WBC
PHOSPHATE SERPL-MCNC: 2.8 MG/DL (ref 2.7–4.5)
PLATELET # BLD AUTO: 235 K/UL (ref 150–350)
PMV BLD AUTO: 9.6 FL (ref 9.2–12.9)
POCT GLUCOSE: 185 MG/DL (ref 70–110)
POCT GLUCOSE: 205 MG/DL (ref 70–110)
POCT GLUCOSE: 210 MG/DL (ref 70–110)
POCT GLUCOSE: 267 MG/DL (ref 70–110)
POTASSIUM SERPL-SCNC: 4.1 MMOL/L (ref 3.5–5.1)
PROT SERPL-MCNC: 6.9 G/DL (ref 6–8.4)
RBC # BLD AUTO: 4.1 M/UL (ref 4.6–6.2)
SODIUM SERPL-SCNC: 142 MMOL/L (ref 136–145)
TSH SERPL DL<=0.005 MIU/L-ACNC: 0.72 UIU/ML (ref 0.4–4)
WBC # BLD AUTO: 11.67 K/UL (ref 3.9–12.7)

## 2020-06-02 PROCEDURE — 63600175 PHARM REV CODE 636 W HCPCS: Performed by: NURSE PRACTITIONER

## 2020-06-02 PROCEDURE — 96372 THER/PROPH/DIAG INJ SC/IM: CPT | Mod: 59

## 2020-06-02 PROCEDURE — 97161 PT EVAL LOW COMPLEX 20 MIN: CPT

## 2020-06-02 PROCEDURE — 25000242 PHARM REV CODE 250 ALT 637 W/ HCPCS: Performed by: INTERNAL MEDICINE

## 2020-06-02 PROCEDURE — 85025 COMPLETE CBC W/AUTO DIFF WBC: CPT

## 2020-06-02 PROCEDURE — 27000221 HC OXYGEN, UP TO 24 HOURS

## 2020-06-02 PROCEDURE — 94640 AIRWAY INHALATION TREATMENT: CPT

## 2020-06-02 PROCEDURE — G0378 HOSPITAL OBSERVATION PER HR: HCPCS

## 2020-06-02 PROCEDURE — 96376 TX/PRO/DX INJ SAME DRUG ADON: CPT

## 2020-06-02 PROCEDURE — 25000003 PHARM REV CODE 250: Performed by: INTERNAL MEDICINE

## 2020-06-02 PROCEDURE — 25000003 PHARM REV CODE 250: Performed by: NURSE PRACTITIONER

## 2020-06-02 PROCEDURE — 99900035 HC TECH TIME PER 15 MIN (STAT)

## 2020-06-02 PROCEDURE — 80053 COMPREHEN METABOLIC PANEL: CPT

## 2020-06-02 PROCEDURE — 63600175 PHARM REV CODE 636 W HCPCS: Performed by: INTERNAL MEDICINE

## 2020-06-02 PROCEDURE — 97530 THERAPEUTIC ACTIVITIES: CPT

## 2020-06-02 PROCEDURE — 96375 TX/PRO/DX INJ NEW DRUG ADDON: CPT

## 2020-06-02 PROCEDURE — 97165 OT EVAL LOW COMPLEX 30 MIN: CPT

## 2020-06-02 PROCEDURE — 94761 N-INVAS EAR/PLS OXIMETRY MLT: CPT

## 2020-06-02 PROCEDURE — 83735 ASSAY OF MAGNESIUM: CPT

## 2020-06-02 PROCEDURE — 84443 ASSAY THYROID STIM HORMONE: CPT

## 2020-06-02 PROCEDURE — 96372 THER/PROPH/DIAG INJ SC/IM: CPT

## 2020-06-02 PROCEDURE — 97116 GAIT TRAINING THERAPY: CPT

## 2020-06-02 PROCEDURE — 36415 COLL VENOUS BLD VENIPUNCTURE: CPT

## 2020-06-02 PROCEDURE — 84100 ASSAY OF PHOSPHORUS: CPT

## 2020-06-02 RX ORDER — AZITHROMYCIN 250 MG/1
250 TABLET, FILM COATED ORAL DAILY
Status: DISCONTINUED | OUTPATIENT
Start: 2020-06-03 | End: 2020-06-03 | Stop reason: HOSPADM

## 2020-06-02 RX ORDER — PREDNISONE 20 MG/1
20 TABLET ORAL 2 TIMES DAILY
Status: DISCONTINUED | OUTPATIENT
Start: 2020-06-02 | End: 2020-06-03 | Stop reason: HOSPADM

## 2020-06-02 RX ORDER — LOSARTAN POTASSIUM 25 MG/1
25 TABLET ORAL ONCE
Status: COMPLETED | OUTPATIENT
Start: 2020-06-02 | End: 2020-06-02

## 2020-06-02 RX ORDER — ARFORMOTEROL TARTRATE 15 UG/2ML
15 SOLUTION RESPIRATORY (INHALATION) 2 TIMES DAILY
Status: DISCONTINUED | OUTPATIENT
Start: 2020-06-02 | End: 2020-06-03 | Stop reason: HOSPADM

## 2020-06-02 RX ORDER — LOSARTAN POTASSIUM 50 MG/1
50 TABLET ORAL DAILY
Status: DISCONTINUED | OUTPATIENT
Start: 2020-06-03 | End: 2020-06-03 | Stop reason: HOSPADM

## 2020-06-02 RX ORDER — FLUTICASONE FUROATE AND VILANTEROL 100; 25 UG/1; UG/1
1 POWDER RESPIRATORY (INHALATION) DAILY
Status: DISCONTINUED | OUTPATIENT
Start: 2020-06-02 | End: 2020-06-02

## 2020-06-02 RX ORDER — BUDESONIDE 0.5 MG/2ML
0.5 INHALANT ORAL EVERY 12 HOURS
Status: DISCONTINUED | OUTPATIENT
Start: 2020-06-02 | End: 2020-06-02

## 2020-06-02 RX ORDER — BUDESONIDE 0.5 MG/2ML
0.5 INHALANT ORAL EVERY 12 HOURS
Status: DISCONTINUED | OUTPATIENT
Start: 2020-06-02 | End: 2020-06-03 | Stop reason: HOSPADM

## 2020-06-02 RX ORDER — IPRATROPIUM BROMIDE AND ALBUTEROL SULFATE 2.5; .5 MG/3ML; MG/3ML
3 SOLUTION RESPIRATORY (INHALATION) EVERY 8 HOURS
Status: DISCONTINUED | OUTPATIENT
Start: 2020-06-02 | End: 2020-06-03 | Stop reason: HOSPADM

## 2020-06-02 RX ADMIN — METHYLPREDNISOLONE SODIUM SUCCINATE 80 MG: 40 INJECTION, POWDER, FOR SOLUTION INTRAMUSCULAR; INTRAVENOUS at 06:06

## 2020-06-02 RX ADMIN — METHYLPREDNISOLONE SODIUM SUCCINATE 80 MG: 40 INJECTION, POWDER, FOR SOLUTION INTRAMUSCULAR; INTRAVENOUS at 12:06

## 2020-06-02 RX ADMIN — AZITHROMYCIN MONOHYDRATE 500 MG: 500 INJECTION, POWDER, LYOPHILIZED, FOR SOLUTION INTRAVENOUS at 12:06

## 2020-06-02 RX ADMIN — INSULIN ASPART 1 UNITS: 100 INJECTION, SOLUTION INTRAVENOUS; SUBCUTANEOUS at 09:06

## 2020-06-02 RX ADMIN — CETIRIZINE HYDROCHLORIDE 10 MG: 10 TABLET, FILM COATED ORAL at 09:06

## 2020-06-02 RX ADMIN — INSULIN ASPART 3 UNITS: 100 INJECTION, SOLUTION INTRAVENOUS; SUBCUTANEOUS at 05:06

## 2020-06-02 RX ADMIN — PANTOPRAZOLE SODIUM 40 MG: 40 TABLET, DELAYED RELEASE ORAL at 05:06

## 2020-06-02 RX ADMIN — PRAVASTATIN SODIUM 20 MG: 20 TABLET ORAL at 09:06

## 2020-06-02 RX ADMIN — LEVOTHYROXINE SODIUM 50 MCG: 50 TABLET ORAL at 06:06

## 2020-06-02 RX ADMIN — PREDNISONE 20 MG: 20 TABLET ORAL at 11:06

## 2020-06-02 RX ADMIN — FUROSEMIDE 20 MG: 20 TABLET ORAL at 09:06

## 2020-06-02 RX ADMIN — DILTIAZEM HYDROCHLORIDE 240 MG: 120 CAPSULE, COATED, EXTENDED RELEASE ORAL at 09:06

## 2020-06-02 RX ADMIN — MELATONIN 1000 UNITS: at 05:06

## 2020-06-02 RX ADMIN — PREDNISONE 20 MG: 20 TABLET ORAL at 09:06

## 2020-06-02 RX ADMIN — CARVEDILOL 6.25 MG: 6.25 TABLET, FILM COATED ORAL at 07:06

## 2020-06-02 RX ADMIN — BUDESONIDE 0.5 MG: 0.5 INHALANT RESPIRATORY (INHALATION) at 08:06

## 2020-06-02 RX ADMIN — ARFORMOTEROL TARTRATE 15 MCG: 15 SOLUTION RESPIRATORY (INHALATION) at 07:06

## 2020-06-02 RX ADMIN — OXYCODONE HYDROCHLORIDE AND ACETAMINOPHEN 1000 MG: 500 TABLET ORAL at 05:06

## 2020-06-02 RX ADMIN — HEPARIN SODIUM 5000 UNITS: 5000 INJECTION INTRAVENOUS; SUBCUTANEOUS at 02:06

## 2020-06-02 RX ADMIN — ARFORMOTEROL TARTRATE 15 MCG: 15 SOLUTION RESPIRATORY (INHALATION) at 08:06

## 2020-06-02 RX ADMIN — FERROUS SULFATE TAB EC 325 MG (65 MG FE EQUIVALENT) 325 MG: 325 (65 FE) TABLET DELAYED RESPONSE at 09:06

## 2020-06-02 RX ADMIN — LOSARTAN POTASSIUM 25 MG: 25 TABLET ORAL at 09:06

## 2020-06-02 RX ADMIN — INSULIN ASPART 2 UNITS: 100 INJECTION, SOLUTION INTRAVENOUS; SUBCUTANEOUS at 11:06

## 2020-06-02 RX ADMIN — HEPARIN SODIUM 5000 UNITS: 5000 INJECTION INTRAVENOUS; SUBCUTANEOUS at 06:06

## 2020-06-02 RX ADMIN — LOSARTAN POTASSIUM 25 MG: 25 TABLET ORAL at 11:06

## 2020-06-02 RX ADMIN — IPRATROPIUM BROMIDE AND ALBUTEROL SULFATE 3 ML: .5; 3 SOLUTION RESPIRATORY (INHALATION) at 03:06

## 2020-06-02 RX ADMIN — HEPARIN SODIUM 5000 UNITS: 5000 INJECTION INTRAVENOUS; SUBCUTANEOUS at 09:06

## 2020-06-02 RX ADMIN — CARVEDILOL 6.25 MG: 6.25 TABLET, FILM COATED ORAL at 05:06

## 2020-06-02 NOTE — PLAN OF CARE
Rested comfortably throughout the night, no complaints of pain or discomfort. Shortness of breath noted on exertion, O2 at 4L nasal cannula, reports using 3.5L at home. Lung field remain coarse, IV antibiotics. Bed alarm set, call bell within reach, safety precautions continued, chart check complete, will continue to monitor.

## 2020-06-02 NOTE — PT/OT/SLP EVAL
Physical Therapy Evaluation and Discharge Note    Patient Name:  Jimmy Young   MRN:  [id number]    Recommendations:     Discharge Recommendations:  home health PT, home   Discharge Equipment Recommendations: none   Barriers to discharge: None    Assessment:     Jimmy Young is a 89 y.o. male admitted with a medical diagnosis of Pneumonia of right lower lobe due to infectious organism. .  At this time, patient is functioning at their prior level of function and does not require further acute PT services.     Recent Surgery: * No surgery found *      Plan:     During this hospitalization, patient does not require further acute PT services.  Please re-consult if situation changes.      Subjective     Chief Complaint:   Patient/Family Comments/goals:   Pain/Comfort:  · Pain Rating 1: 0/10    Patients cultural, spiritual, Taoism conflicts given the current situation:      Living Environment:  PT LIVES ALONE BUT HIS SON AND DAUGHTER LIVE CLOSE BY TO ASSIST AS NEEDED, 1 STORY HOUSE NO STEPS, AMB WITH RW COMMUNITY DISTANCES, O2 DEPENDENT, INDEP WITH ADL'S  Prior to admission, patients level of function was NIGEL.  Equipment used at home: walker, rolling, cane, straight, oxygen, respiratory supplies.  DME owned (not currently used): none.  Upon discharge, patient will have assistance from FAMILY.    Objective:     Communicated with NURSE TY prior to session.  Patient found supine with telemetry, oxygen, peripheral IV upon PT entry to room.    General Precautions: Standard, fall, respiratory   Orthopedic Precautions:N/A   Braces: N/A     Exams:  · Cognitive Exam:  Patient is oriented to Person, Place, Time and Situation  · Postural Exam:  Patient presented with the following abnormalities:    · -       No postural abnormalities identified  · Sensation:    · -       Intact  · RLE ROM: WFL  · RLE Strength: WFL  · LLE ROM: WFL  · LLE Strength: WFL    Functional Mobility:  · Bed Mobility:     · Rolling Left:   modified independence  · Scooting: modified independence  · Supine to Sit: modified independence  · Transfers:     · Sit to Stand:  supervision with rolling walker  · Bed to Chair: supervision with  rolling walker  using  Step Transfer  · Gait: PT ' WITH RW AND SPV, NO LOB OR SOB WITH O2 IN TOW  · Balance: GOOD    AM-PAC 6 CLICK MOBILITY  Total Score:21     Therapeutic Activities and Exercises:   PT EDUCATED IN ROLE OF P.T., PT EDUCATED IN BLE THEREX TO PERFORM WHILE SEATED IN CHAIR    AM-PAC 6 CLICK MOBILITY  Total Score:21     Patient left up in chair with all lines intact, call button in reach, chair alarm on and NURSE notified.    GOALS:   Multidisciplinary Problems     Physical Therapy Goals     Not on file                History:     Past Medical History:   Diagnosis Date    Aneurysm 2016    abdominal, stent placed    Arthritis     Asthma     Atrial fibrillation     Cancer     skin cancer on face    COPD (chronic obstructive pulmonary disease) 10/5/2017    Diabetes mellitus     Emphysema lung     Encounter for blood transfusion     Hypertension        Past Surgical History:   Procedure Laterality Date    ABDOMINAL AORTIC ANEURYSM REPAIR      Stent placed    ABDOMINAL SURGERY      COLONOSCOPY Left 6/12/2017    Procedure: COLONOSCOPY;  Surgeon: Boaz Toussaint MD;  Location: [facility];  Service: Endoscopy;  Laterality: Left;    VASCULAR SURGERY      abdominal aneurysm repair       Time Tracking:     PT Received On: 06/02/20  PT Start Time: 1040     PT Stop Time: 1105  PT Total Time (min): 25 min     Billable Minutes: Evaluation 15 and Gait Training 10    Gunjan Wilhelm, PT  06/02/2020

## 2020-06-02 NOTE — PT/OT/SLP EVAL
Occupational Therapy   Evaluation and Discharge Note    Name: Jimmy Young  MRN: [id number]  Admitting Diagnosis:  Pneumonia of right lower lobe due to infectious organism      Recommendations:     Discharge Recommendations: home  Discharge Equipment Recommendations:  none  Barriers to discharge:       Assessment:     Jimmy Young is a 89 y.o. male with a medical diagnosis of Pneumonia of right lower lobe due to infectious organism. At this time, patient is functioning at their prior level of function and does not require further acute OT services.     Plan:     During this hospitalization, patient does not require further acute OT services.  Please re-consult if situation changes.    · Plan of Care Reviewed with: patient   · Pt placed on people 's program    Subjective     Chief Complaint:   Patient/Family Comments/goals:     Occupational Profile:  Living Environment:   Previous level of function:   Roles and Routines: occupational therapy  Equipment Used at home:  walker, rolling, oxygen, cane, straight, bedside commode, shower chair, grab bar  Assistance upon Discharge:     Pain/Comfort:  · Pain Rating 1: 0/10    Patients cultural, spiritual, Jew conflicts given the current situation:      Objective:     Communicated with: nurse and epic chart  prior to session.  Patient found up in chair with   upon OT entry to room.    General Precautions: Standard,     Orthopedic Precautions:N/A   Braces: N/A     Occupational Performance:    Functional Mobility/Transfers:  · Patient completed Sit <> Stand Transfer with stand by assistance  with  no assistive device   Functional Mobility:  sba with marching place and few steps forward and backward  Activities of Daily Living:  · Upper Body Dressing: stand by assistance .  · Lower Body Dressing: stand by assistance .    Cognitive/Visual Perceptual:  Cognitive/Psychosocial Skills:     -       Oriented to: Person, Place, Time and Situation   -       Follows  Commands/attention:Follows multistep  commands  -       Communication: clear/fluent  -       Memory: No Deficits noted  -       Safety awareness/insight to disability: intact   Visual/Perceptual:      -Intact .    Physical Exam:  Upper Extremity Range of Motion:     -       Right Upper Extremity: WFL  -       Left Upper Extremity: WFL  Upper Extremity Strength:    -       Right Upper Extremity: WFL  -       Left Upper Extremity: WFL   Strength:    -       Right Upper Extremity: WFL  -       Left Upper Extremity: WFL    AMPAC 6 Click ADL:  AMPAC Total Score: 24    Treatment & Education:    Education:    Patient left up in chair with all lines intact, call button in reach, chair alarm on and nurse notified    GOALS:   Multidisciplinary Problems     Occupational Therapy Goals     Not on file                History:     Past Medical History:   Diagnosis Date    Aneurysm 2016    abdominal, stent placed    Arthritis     Asthma     Atrial fibrillation     Cancer     skin cancer on face    COPD (chronic obstructive pulmonary disease) 10/5/2017    Diabetes mellitus     Emphysema lung     Encounter for blood transfusion     Hypertension        Past Surgical History:   Procedure Laterality Date    ABDOMINAL AORTIC ANEURYSM REPAIR      Stent placed    ABDOMINAL SURGERY      COLONOSCOPY Left 6/12/2017    Procedure: COLONOSCOPY;  Surgeon: Boaz Toussaint MD;  Location[facility];  Service: Endoscopy;  Laterality: Left;    VASCULAR SURGERY      abdominal aneurysm repair       Time Tracking:     OT Date of Treatment: 06/02/20  OT Start Time: 1041  OT Stop Time: 1104  OT Total Time (min): 23 min    Billable Minutes:Evaluation 10 minutes  Therapeutic Activity 13 minutes    Kristen Carrillo OT  6/2/2020

## 2020-06-02 NOTE — SIGNIFICANT EVENT
89 y/o wm admitted with a dx of acute on chronic copd exacerbation and CAP . He was started on IVAB and IV steroid with an improvement of his sx .   Cont current tx   Add budesonide and schedule duoneb

## 2020-06-02 NOTE — PLAN OF CARE
SW met with patient at bedside to complete discharge planning assessment. Patient lives alone and states he is independent with his needs. He states his son lives on his property and his neighbor helps him if needed. Patient currently uses a walker and cane. He uses home oxygen and states it is continuous at 3 1/2 L. His oxygen is supplied by [employer] and he believes his machine wasn't working properly. He would like a new oxygen tank at discharge. He denies using any other medical assistive equipment at this time. He denies any current home health services and outpatient treatment. He has a living will and his daughter is his POA. No other needs identified at this time. Patient board was updated and notified to contact CM if any needs.   His son will pick him up upon discharge.     D/C plan: Home with oxygen  [facility] - [place], [place] - [address]  [address]  [place] [address]  Phone: [phone] Fax: [phone]    Transportation: Son  Bedside:yes  Portal: pending     06/02/20 1239   Discharge Assessment   Assessment Type Discharge Planning Assessment   Confirmed/corrected address and phone number on facesheet? Yes   Assessment information obtained from? Patient   Communicated expected length of stay with patient/caregiver no   Prior to hospitilization cognitive status: Alert/Oriented   Prior to hospitalization functional status: Assistive Equipment;Independent   Current cognitive status: Alert/Oriented   Current Functional Status: Independent;Assistive Equipment   Lives With alone   Able to Return to Prior Arrangements yes   Is patient able to care for self after discharge? Yes   Who are your caregiver(s) and their phone number(s)? Belen Lowe daughter [phone]   Readmission Within the Last 30 Days no previous admission in last 30 days   Patient currently being followed by outpatient case management? No   Patient currently receives any other outside agency  services? No   Equipment Currently Used at Home walker, rolling;cane, quad;oxygen   Do you have any problems affording any of your prescribed medications? TBD   Is the patient taking medications as prescribed? yes   Does the patient have transportation home? Yes   Transportation Anticipated family or friend will provide   Does the patient receive services at [facility]? No   Discharge Plan A Home   Discharge Plan B Home with family   DME Needed Upon Discharge  oxygen   Patient/Family in Agreement with Plan yes

## 2020-06-02 NOTE — SUBJECTIVE & OBJECTIVE
Past Medical History:   Diagnosis Date    Aneurysm 2016    abdominal, stent placed    Arthritis     Asthma     Atrial fibrillation     Cancer     skin cancer on face    Diabetes mellitus     Emphysema lung     Encounter for blood transfusion     Hypertension        Past Surgical History:   Procedure Laterality Date    ABDOMINAL AORTIC ANEURYSM REPAIR      Stent placed    ABDOMINAL SURGERY      COLONOSCOPY Left 6/12/2017    Procedure: COLONOSCOPY;  Surgeon: Boaz Toussaint MD;  Location: [facility];  Service: Endoscopy;  Laterality: Left;    VASCULAR SURGERY      abdominal aneurysm repair       Review of patient's allergies indicates:  No Known Allergies    No current facility-administered medications on file prior to encounter.      Current Outpatient Medications on File Prior to Encounter   Medication Sig    ascorbic acid, vitamin C, (VITAMIN C) 1000 MG tablet Take 1,000 mg by mouth Daily.    carvedilol (COREG) 6.25 MG tablet Take 6.25 mg by mouth 2 (two) times daily with meals.     cetirizine (ZYRTEC) 10 MG tablet Take 10 mg by mouth once daily.    COMBIVENT RESPIMAT  mcg/actuation inhaler USE 1 PUFF EVERY SIX HOURS AS NEEDED FOR WHEEZING    furosemide (LASIX) 20 MG tablet Take 1 tablet (20 mg total) by mouth once daily.    iron-vitamin C 100-250 mg, ICAR-C, 100-250 mg Tab TAKE ONE TABLET BY MOUTH ONCE DAILY    losartan (COZAAR) 25 MG tablet Take 1 tablet (25 mg total) by mouth once daily.    metFORMIN (GLUCOPHAGE-XR) 750 MG 24 hr tablet Take 750 mg by mouth every evening.    pantoprazole (PROTONIX) 40 MG tablet Take 1 tablet by mouth Daily.    pravastatin (PRAVACHOL) 20 MG tablet Take 1 tablet (20 mg total) by mouth nightly.    selenium 50 mcg Tab Take 200 mcg by mouth nightly.     vitamin D 1000 units Tab Take 1 tablet by mouth Daily.    CARTIA  mg 24 hr capsule TAKE ONE CAPSULE BY MOUTH ONCE DAILY    levothyroxine (SYNTHROID) 50 MCG tablet Take 1 tablet (50 mcg total) by  mouth before breakfast.    meloxicam (MOBIC) 7.5 MG tablet TAKE ONE TABLET BY MOUTH DAILY AS NEEDED FOR ARTHRITIS     Family History     Problem Relation (Age of Onset)    Cancer Mother    Heart attack Father    Hypertension Father        Tobacco Use    Smoking status: Former Smoker     Packs/day: 2.00     Years: 20.00     Pack years: 40.00     Types: Cigarettes     Last attempt to quit: 1977     Years since quittin.4    Smokeless tobacco: Former User   Substance and Sexual Activity    Alcohol use: No    Drug use: No    Sexual activity: Not Currently     Review of Systems   Constitutional: Negative for activity change, appetite change, chills, diaphoresis and fatigue.   HENT: Negative for congestion, dental problem, ear discharge, ear pain and facial swelling.    Eyes: Negative for pain, discharge and itching.   Respiratory: Positive for shortness of breath and wheezing. Negative for apnea, cough and chest tightness.    Cardiovascular: Negative for chest pain and leg swelling.   Gastrointestinal: Negative for abdominal distention and abdominal pain.   Endocrine: Negative for cold intolerance, heat intolerance and polydipsia.   Genitourinary: Negative for difficulty urinating, dysuria and enuresis.   Musculoskeletal: Negative for arthralgias and back pain.   Skin: Negative for color change and pallor.   Allergic/Immunologic: Negative for environmental allergies and food allergies.   Neurological: Negative for dizziness, facial asymmetry, light-headedness and headaches.   Hematological: Negative for adenopathy. Does not bruise/bleed easily.   Psychiatric/Behavioral: Negative for agitation and behavioral problems.     Objective:     Vital Signs (Most Recent):  Temp: 98.6 °F (37 °C) (20)  Pulse: 84 (20)  Resp: (!) 21 (20)  BP: (!) 165/71 (20)  SpO2: (!) 93 % (20) Vital Signs (24h Range):  Temp:  [98.6 °F (37 °C)-98.8 °F (37.1 °C)] 98.6 °F (37  °C)  Pulse:  [80-93] 84  Resp:  [18-28] 21  SpO2:  [73 %-96 %] 93 %  BP: (165-219)/(71-95) 165/71     Weight: 74 kg (163 lb 2.3 oz)  Body mass index is 24.44 kg/m².    Physical Exam   Constitutional: He is oriented to person, place, and time. He appears well-developed and well-nourished.   HENT:   Head: Normocephalic and atraumatic.   Nose: Nose normal.   Eyes: EOM are normal.   PERRL   Neck: Normal range of motion. Neck supple.   Cardiovascular: Normal rate, regular rhythm and normal heart sounds.   Pulmonary/Chest: Effort normal. No respiratory distress. He has wheezes. He has no rales.   Abdominal: There is no tenderness.   Musculoskeletal: Normal range of motion. He exhibits no edema.   Neurological: He is alert and oriented to person, place, and time.   Skin: Skin is dry.   Nursing note and vitals reviewed.        CRANIAL NERVES     CN III, IV, VI   Extraocular motions are normal.        Significant Labs:   BMP:   Recent Labs   Lab 06/01/20  1626   *      K 4.4      CO2 35*   BUN 20   CREATININE 1.3   CALCIUM 9.4     CBC:   Recent Labs   Lab 06/01/20  1626   WBC 19.44*   HGB 13.7*   HCT 44.2        All pertinent labs within the past 24 hours have been reviewed.    Significant Imaging: I have reviewed and interpreted all pertinent imaging results/findings within the past 24 hours.

## 2020-06-02 NOTE — PLAN OF CARE
Pt remain free falls and injuries this shift, pt is resting comfortably with call light within reach, pt states he doesn't have pain at this time, will continue to monitor

## 2020-06-02 NOTE — ASSESSMENT & PLAN NOTE
Will continue rocephin,zithromax,  Will add steroids, duonebs , continue home oxygen regime .  Needs close follow up

## 2020-06-02 NOTE — HPI
89 year old man with history of hypertension , Emphysema/COPD on home oxygen, DM,  Atrial fibrillation who presented with history of shortness of breath that started this morning . He denies any history of cough ,fever or chills but has associated wheezing .  In the ED, initial Ed vitals -  Initial Vitals [06/01/20 1618]   BP Pulse Resp Temp SpO2   (!) 219/95 80 (!) 25 98.8 °F (37.1 °C) (!) 73 %     Chest x-ray showed-  Low-grade right lung base infiltrate. CBC showed   Component      Latest Ref Rng & Units 6/1/2020 1/7/2020              WBC      3.90 - 12.70 K/uL 19.44 (H) 8.51   ED treatment -he was given one dose of Levaquin, duonebs and steroids.

## 2020-06-02 NOTE — DISCHARGE INSTRUCTIONS
Regarding PNEUMONIA, for treatment, I encouraged patient to refrain from smoking, drink plenty of fluids, rest, take medications as prescribed, and to use a humidifier or steam in the bathroom. Patient instructed to notify primary care provider if: they have a cough most days or have a cough that returns frequently; are coughing up blood; have a high fever or shaking chills; have a low-grade fever for three or more days; develop thick, greenish mucus, especially if it has a bad smell; and feel short of breath or have chest pain. For prevention, discussed with patient the importance of not smoking, getting annual influenza vaccines, reducing exposure to air pollution, and to frequently wash hands to avoid spread of infection.  Instructed patient to return to emergency department if they experience chest pain or shortness of breath and to follow up with primary care provider for re-evaluation.

## 2020-06-02 NOTE — H&P
[facility] Hospital Medicine  History & Physical    Patient Name: Jimmy Young  MRN: [id number]  Admission Date: 6/1/2020  Attending Physician: Jose Salcedo MD   Primary Care Provider: Nacho Richardson MD         Patient information was obtained from patient, past medical records and ER records.     Subjective:     Principal Problem:Pneumonia of right lower lobe due to infectious organism    Chief Complaint:   Chief Complaint   Patient presents with    Shortness of Breath        HPI:   89 year old man with history of hypertension , Emphysema/COPD on home oxygen, DM,  Atrial fibrillation who presented with history of shortness of breath that started this morning . He denies any history of cough ,fever or chills but has associated wheezing .  In the ED, initial Ed vitals -         Initial Vitals [06/01/20 1618]   BP Pulse Resp Temp SpO2   (!) 219/95 80 (!) 25 98.8 °F (37.1 °C) (!) 73 %     Chest x-ray showed-  Low-grade right lung base infiltrate. CBC showed   Component      Latest Ref Rng & Units 6/1/2020 1/7/2020              WBC      3.90 - 12.70 K/uL 19.44 (H) 8.51   ED treatment -he was given one dose of Levaquin, duonebs and steroids.    Past Medical History:   Diagnosis Date    Aneurysm 2016    abdominal, stent placed    Arthritis     Asthma     Atrial fibrillation     Cancer     skin cancer on face    Diabetes mellitus     Emphysema lung     Encounter for blood transfusion     Hypertension        Past Surgical History:   Procedure Laterality Date    ABDOMINAL AORTIC ANEURYSM REPAIR      Stent placed    ABDOMINAL SURGERY      COLONOSCOPY Left 6/12/2017    Procedure: COLONOSCOPY;  Surgeon: Boaz Toussaint MD;  Location: [facility];  Service: Endoscopy;  Laterality: Left;    VASCULAR SURGERY      abdominal aneurysm repair       Review of patient's allergies indicates:  No Known Allergies    No current facility-administered medications on file prior to encounter.      Current  Outpatient Medications on File Prior to Encounter   Medication Sig    ascorbic acid, vitamin C, (VITAMIN C) 1000 MG tablet Take 1,000 mg by mouth Daily.    carvedilol (COREG) 6.25 MG tablet Take 6.25 mg by mouth 2 (two) times daily with meals.     cetirizine (ZYRTEC) 10 MG tablet Take 10 mg by mouth once daily.    COMBIVENT RESPIMAT  mcg/actuation inhaler USE 1 PUFF EVERY SIX HOURS AS NEEDED FOR WHEEZING    furosemide (LASIX) 20 MG tablet Take 1 tablet (20 mg total) by mouth once daily.    iron-vitamin C 100-250 mg, ICAR-C, 100-250 mg Tab TAKE ONE TABLET BY MOUTH ONCE DAILY    losartan (COZAAR) 25 MG tablet Take 1 tablet (25 mg total) by mouth once daily.    metFORMIN (GLUCOPHAGE-XR) 750 MG 24 hr tablet Take 750 mg by mouth every evening.    pantoprazole (PROTONIX) 40 MG tablet Take 1 tablet by mouth Daily.    pravastatin (PRAVACHOL) 20 MG tablet Take 1 tablet (20 mg total) by mouth nightly.    selenium 50 mcg Tab Take 200 mcg by mouth nightly.     vitamin D 1000 units Tab Take 1 tablet by mouth Daily.    CARTIA  mg 24 hr capsule TAKE ONE CAPSULE BY MOUTH ONCE DAILY    levothyroxine (SYNTHROID) 50 MCG tablet Take 1 tablet (50 mcg total) by mouth before breakfast.    meloxicam (MOBIC) 7.5 MG tablet TAKE ONE TABLET BY MOUTH DAILY AS NEEDED FOR ARTHRITIS     Family History     Problem Relation (Age of Onset)    Cancer Mother    Heart attack Father    Hypertension Father        Tobacco Use    Smoking status: Former Smoker     Packs/day: 2.00     Years: 20.00     Pack years: 40.00     Types: Cigarettes     Last attempt to quit: 1977     Years since quittin.4    Smokeless tobacco: Former User   Substance and Sexual Activity    Alcohol use: No    Drug use: No    Sexual activity: Not Currently     Review of Systems   Constitutional: Negative for activity change, appetite change, chills, diaphoresis and fatigue.   HENT: Negative for congestion, dental problem, ear discharge, ear pain  and facial swelling.    Eyes: Negative for pain, discharge and itching.   Respiratory: Positive for shortness of breath and wheezing. Negative for apnea, cough and chest tightness.    Cardiovascular: Negative for chest pain and leg swelling.   Gastrointestinal: Negative for abdominal distention and abdominal pain.   Endocrine: Negative for cold intolerance, heat intolerance and polydipsia.   Genitourinary: Negative for difficulty urinating, dysuria and enuresis.   Musculoskeletal: Negative for arthralgias and back pain.   Skin: Negative for color change and pallor.   Allergic/Immunologic: Negative for environmental allergies and food allergies.   Neurological: Negative for dizziness, facial asymmetry, light-headedness and headaches.   Hematological: Negative for adenopathy. Does not bruise/bleed easily.   Psychiatric/Behavioral: Negative for agitation and behavioral problems.     Objective:     Vital Signs (Most Recent):  Temp: 98.6 °F (37 °C) (06/01/20 2321)  Pulse: 84 (06/01/20 2321)  Resp: (!) 21 (06/01/20 2321)  BP: (!) 165/71 (06/01/20 2321)  SpO2: (!) 93 % (06/01/20 2321) Vital Signs (24h Range):  Temp:  [98.6 °F (37 °C)-98.8 °F (37.1 °C)] 98.6 °F (37 °C)  Pulse:  [80-93] 84  Resp:  [18-28] 21  SpO2:  [73 %-96 %] 93 %  BP: (165-219)/(71-95) 165/71     Weight: 74 kg (163 lb 2.3 oz)  Body mass index is 24.44 kg/m².    Physical Exam   Constitutional: He is oriented to person, place, and time. He appears well-developed and well-nourished.   HENT:   Head: Normocephalic and atraumatic.   Nose: Nose normal.   Eyes: EOM are normal.   PERRL   Neck: Normal range of motion. Neck supple.   Cardiovascular: Normal rate, regular rhythm and normal heart sounds.   Pulmonary/Chest: Effort normal. No respiratory distress. He has wheezes. He has no rales.   Abdominal: There is no tenderness.   Musculoskeletal: Normal range of motion. He exhibits no edema.   Neurological: He is alert and oriented to person, place, and time.    Skin: Skin is dry.   Nursing note and vitals reviewed.        CRANIAL NERVES     CN III, IV, VI   Extraocular motions are normal.        Significant Labs:   BMP:   Recent Labs   Lab 06/01/20  1626   *      K 4.4      CO2 35*   BUN 20   CREATININE 1.3   CALCIUM 9.4     CBC:   Recent Labs   Lab 06/01/20  1626   WBC 19.44*   HGB 13.7*   HCT 44.2        All pertinent labs within the past 24 hours have been reviewed.    Significant Imaging: I have reviewed and interpreted all pertinent imaging results/findings within the past 24 hours.    Assessment/Plan:     * Pneumonia of right lower lobe due to infectious organism    Will continue Rocephin. zithromax , will check SLP ,follow CBC in AM     DNR (do not resuscitate) partial    Discussed end of life care -he wants CPR without intubation     CKD stage 3 secondary to diabetes  Will monitor serum creatinine , avoid nephrotoxic meds       COPD exacerbation    Will continue rocephin,zithromax,  Will add steroids, duonebs , continue home oxygen regime .  Needs close follow up     Diabetes mellitus type 2, uncontrolled, with renal complications    Will continue diabetic diet , hold metformin , insulin sliding scale, check A1c     Hypercholesterolemia    Will continue Pravastatin -his home regime    Atrial fibrillation    Telemetry monitoring , will continue diltiazem    Acquired hypothyroidism    Will continue synthroid , check TSH in AM     Essential hypertension    Will continue Losartan, coreg and diltiazem , close BP monitoring        VTE Risk Mitigation (From admission, onward)         Ordered     heparin (porcine) injection 5,000 Units  Every 8 hours      06/01/20 2302     IP VTE HIGH RISK PATIENT  Once      06/01/20 2303     Place sequential compression device  Until discontinued      06/01/20 2303                   Jose Salcedo MD  Department of Hospital Medicine   [facility]

## 2020-06-03 VITALS
WEIGHT: 163.13 LBS | BODY MASS INDEX: 24.16 KG/M2 | SYSTOLIC BLOOD PRESSURE: 175 MMHG | OXYGEN SATURATION: 93 % | TEMPERATURE: 98 F | DIASTOLIC BLOOD PRESSURE: 81 MMHG | HEART RATE: 64 BPM | RESPIRATION RATE: 18 BRPM | HEIGHT: 69 IN

## 2020-06-03 LAB
ANION GAP SERPL CALC-SCNC: 15 MMOL/L (ref 8–16)
BASOPHILS # BLD AUTO: 0.02 K/UL (ref 0–0.2)
BASOPHILS NFR BLD: 0.1 % (ref 0–1.9)
BUN SERPL-MCNC: 38 MG/DL (ref 8–23)
CALCIUM SERPL-MCNC: 9.6 MG/DL (ref 8.7–10.5)
CHLORIDE SERPL-SCNC: 101 MMOL/L (ref 95–110)
CO2 SERPL-SCNC: 25 MMOL/L (ref 23–29)
CREAT SERPL-MCNC: 1.6 MG/DL (ref 0.5–1.4)
DIFFERENTIAL METHOD: ABNORMAL
EOSINOPHIL # BLD AUTO: 0 K/UL (ref 0–0.5)
EOSINOPHIL NFR BLD: 0 % (ref 0–8)
ERYTHROCYTE [DISTWIDTH] IN BLOOD BY AUTOMATED COUNT: 12.7 % (ref 11.5–14.5)
EST. GFR  (AFRICAN AMERICAN): 43 ML/MIN/1.73 M^2
EST. GFR  (NON AFRICAN AMERICAN): 37 ML/MIN/1.73 M^2
GLUCOSE SERPL-MCNC: 192 MG/DL (ref 70–110)
HCT VFR BLD AUTO: 43.2 % (ref 40–54)
HGB BLD-MCNC: 13.4 G/DL (ref 14–18)
IMM GRANULOCYTES # BLD AUTO: 0.16 K/UL (ref 0–0.04)
IMM GRANULOCYTES NFR BLD AUTO: 0.8 % (ref 0–0.5)
LYMPHOCYTES # BLD AUTO: 1.2 K/UL (ref 1–4.8)
LYMPHOCYTES NFR BLD: 6.3 % (ref 18–48)
MCH RBC QN AUTO: 29.5 PG (ref 27–31)
MCHC RBC AUTO-ENTMCNC: 31 G/DL (ref 32–36)
MCV RBC AUTO: 95 FL (ref 82–98)
MONOCYTES # BLD AUTO: 0.4 K/UL (ref 0.3–1)
MONOCYTES NFR BLD: 2.3 % (ref 4–15)
NEUTROPHILS # BLD AUTO: 17.2 K/UL (ref 1.8–7.7)
NEUTROPHILS NFR BLD: 90.5 % (ref 38–73)
NRBC BLD-RTO: 0 /100 WBC
PLATELET # BLD AUTO: 295 K/UL (ref 150–350)
PMV BLD AUTO: 10 FL (ref 9.2–12.9)
POCT GLUCOSE: 176 MG/DL (ref 70–110)
POCT GLUCOSE: 193 MG/DL (ref 70–110)
POTASSIUM SERPL-SCNC: 4.5 MMOL/L (ref 3.5–5.1)
RBC # BLD AUTO: 4.54 M/UL (ref 4.6–6.2)
SODIUM SERPL-SCNC: 141 MMOL/L (ref 136–145)
WBC # BLD AUTO: 19 K/UL (ref 3.9–12.7)

## 2020-06-03 PROCEDURE — 25000003 PHARM REV CODE 250: Performed by: NURSE PRACTITIONER

## 2020-06-03 PROCEDURE — 85025 COMPLETE CBC W/AUTO DIFF WBC: CPT

## 2020-06-03 PROCEDURE — 96372 THER/PROPH/DIAG INJ SC/IM: CPT | Mod: 59

## 2020-06-03 PROCEDURE — 63600175 PHARM REV CODE 636 W HCPCS: Performed by: NURSE PRACTITIONER

## 2020-06-03 PROCEDURE — 63600175 PHARM REV CODE 636 W HCPCS: Performed by: INTERNAL MEDICINE

## 2020-06-03 PROCEDURE — 94640 AIRWAY INHALATION TREATMENT: CPT

## 2020-06-03 PROCEDURE — 25000003 PHARM REV CODE 250: Performed by: INTERNAL MEDICINE

## 2020-06-03 PROCEDURE — 36415 COLL VENOUS BLD VENIPUNCTURE: CPT

## 2020-06-03 PROCEDURE — 94761 N-INVAS EAR/PLS OXIMETRY MLT: CPT

## 2020-06-03 PROCEDURE — G0378 HOSPITAL OBSERVATION PER HR: HCPCS

## 2020-06-03 PROCEDURE — 27000221 HC OXYGEN, UP TO 24 HOURS

## 2020-06-03 PROCEDURE — 25000242 PHARM REV CODE 250 ALT 637 W/ HCPCS: Performed by: INTERNAL MEDICINE

## 2020-06-03 PROCEDURE — 96365 THER/PROPH/DIAG IV INF INIT: CPT | Mod: 59

## 2020-06-03 PROCEDURE — 63700000 PHARM REV CODE 250 ALT 637 W/O HCPCS: Performed by: INTERNAL MEDICINE

## 2020-06-03 PROCEDURE — 99900035 HC TECH TIME PER 15 MIN (STAT)

## 2020-06-03 PROCEDURE — 80048 BASIC METABOLIC PNL TOTAL CA: CPT

## 2020-06-03 RX ORDER — LOSARTAN POTASSIUM 50 MG/1
50 TABLET ORAL DAILY
Qty: 90 TABLET | Refills: 1 | Status: SHIPPED | OUTPATIENT
Start: 2020-06-03 | End: 2020-12-24 | Stop reason: SDUPTHER

## 2020-06-03 RX ORDER — PREDNISONE 10 MG/1
10 TABLET ORAL DAILY
Qty: 30 TABLET | Refills: 0 | Status: SHIPPED | OUTPATIENT
Start: 2020-06-03

## 2020-06-03 RX ORDER — AMOXICILLIN AND CLAVULANATE POTASSIUM 875; 125 MG/1; MG/1
1 TABLET, FILM COATED ORAL EVERY 12 HOURS
Qty: 14 TABLET | Refills: 0 | Status: SHIPPED | OUTPATIENT
Start: 2020-06-03 | End: 2020-06-10

## 2020-06-03 RX ADMIN — HEPARIN SODIUM 5000 UNITS: 5000 INJECTION INTRAVENOUS; SUBCUTANEOUS at 06:06

## 2020-06-03 RX ADMIN — FERROUS SULFATE TAB EC 325 MG (65 MG FE EQUIVALENT) 325 MG: 325 (65 FE) TABLET DELAYED RESPONSE at 09:06

## 2020-06-03 RX ADMIN — IPRATROPIUM BROMIDE AND ALBUTEROL SULFATE 3 ML: .5; 3 SOLUTION RESPIRATORY (INHALATION) at 12:06

## 2020-06-03 RX ADMIN — AZITHROMYCIN MONOHYDRATE 250 MG: 250 TABLET ORAL at 09:06

## 2020-06-03 RX ADMIN — FUROSEMIDE 20 MG: 20 TABLET ORAL at 09:06

## 2020-06-03 RX ADMIN — BUDESONIDE 0.5 MG: 0.5 INHALANT RESPIRATORY (INHALATION) at 07:06

## 2020-06-03 RX ADMIN — LOSARTAN POTASSIUM 50 MG: 50 TABLET, FILM COATED ORAL at 09:06

## 2020-06-03 RX ADMIN — CEFTRIAXONE 1 G: 1 INJECTION, SOLUTION INTRAVENOUS at 12:06

## 2020-06-03 RX ADMIN — ARFORMOTEROL TARTRATE 15 MCG: 15 SOLUTION RESPIRATORY (INHALATION) at 07:06

## 2020-06-03 RX ADMIN — PREDNISONE 20 MG: 20 TABLET ORAL at 09:06

## 2020-06-03 RX ADMIN — LEVOTHYROXINE SODIUM 50 MCG: 50 TABLET ORAL at 06:06

## 2020-06-03 RX ADMIN — CARVEDILOL 6.25 MG: 6.25 TABLET, FILM COATED ORAL at 09:06

## 2020-06-03 RX ADMIN — DILTIAZEM HYDROCHLORIDE 240 MG: 120 CAPSULE, COATED, EXTENDED RELEASE ORAL at 09:06

## 2020-06-03 RX ADMIN — CETIRIZINE HYDROCHLORIDE 10 MG: 10 TABLET, FILM COATED ORAL at 09:06

## 2020-06-03 RX ADMIN — IPRATROPIUM BROMIDE AND ALBUTEROL SULFATE 3 ML: .5; 3 SOLUTION RESPIRATORY (INHALATION) at 07:06

## 2020-06-03 NOTE — PLAN OF CARE
Rested comfortably throughout the night, no complaints of pain or discomfort. O2 sat 90-94% on 4L NC, reports that shortness of breath on exertion is a little better. Reminded to call for assistance prior to getting out of bed. Bed alarm is set, safety precautions continued, chart check complete, will continue to monitor.

## 2020-06-03 NOTE — PLAN OF CARE
Preference obtained for [facility].  Referral sent via [facility].       06/03/20 8219   Post-Acute Status   Post-Acute Authorization Home [facility]   Home Health Status Set-up Complete

## 2020-06-03 NOTE — PLAN OF CARE
06/03/20 1732   Final Note   Assessment Type Final Discharge Note   Anticipated Discharge Disposition Home-Health   Right Care Referral Info   Post Acute Recommendation Home-care   Facility Name [facility]

## 2020-06-03 NOTE — NURSING
Discharge instructions reviewed with pt. Voices understanding, no questions noted @ this time. Awaiting family to deliver home O2, tank.

## 2020-06-05 NOTE — DISCHARGE SUMMARY
[facility] Hospital Medicine  Discharge Summary      Patient Name: Jimmy Young  MRN: [id number]  Admission Date: 6/1/2020  Hospital Length of Stay: 0 days  Discharge Date and Time: 6/3/2020  1:22 PM  Attending Physician: No att. providers found   Discharging Provider: Basilio Ruelas MD  Primary Care Provider: Nacho Richardson MD      HPI:   89 year old man with history of hypertension , Emphysema/COPD on home oxygen, DM,  Atrial fibrillation who presented with history of shortness of breath that started this morning . He denies any history of cough ,fever or chills but has associated wheezing .  In the ED, initial Ed vitals -         Initial Vitals [06/01/20 1618]   BP Pulse Resp Temp SpO2   (!) 219/95 80 (!) 25 98.8 °F (37.1 °C) (!) 73 %     Chest x-ray showed-  Low-grade right lung base infiltrate. CBC showed   Component      Latest Ref Rng & Units 6/1/2020 1/7/2020              WBC      3.90 - 12.70 K/uL 19.44 (H) 8.51   ED treatment -he was given one dose of Levaquin, duonebs and steroids.    * No surgery found *      Hospital Course:   89 y/o wm admitted with a dx of acute on chronic copd exacerbation and CAP . He was started on IVAB and IV steroid with an improvement of his sx  6/3 Pt was seen and examined at bedside . He was determined to be suitable for d/c   He was admitted with a dx os acute  On chronic copd exacerbation and CAP .  He was started on IVAB and IV steroid with an improvement opf his sx .  He will be d/c home with   [facility]  There was no acute event  Since admission.       Consults:     * Pneumonia of right lower lobe due to infectious organism    Will continue Rocephin. zithromax , will check SLP ,follow CBC in AM     CKD stage 3 secondary to diabetes  Will monitor serum creatinine , avoid nephrotoxic meds       COPD exacerbation    Will continue rocephin,zithromax,  Will add steroids, duonebs , continue home oxygen  regime .  Needs close follow up     Diabetes mellitus type 2, uncontrolled, with renal complications    Will continue diabetic diet , hold metformin , insulin sliding scale, check A1c     Hypercholesterolemia    Will continue Pravastatin -his home regime    Atrial fibrillation    Telemetry monitoring , will continue diltiazem    Acquired hypothyroidism    Will continue synthroid , check TSH in AM     Essential hypertension    Will continue Losartan, coreg and diltiazem , close BP monitoring        Final Active Diagnoses:    Diagnosis Date Noted POA    PRINCIPAL PROBLEM:  Pneumonia of right lower lobe due to infectious organism [J18.1] 01/04/2020 Yes    DNR (do not resuscitate) partial [Z66] 06/02/2020 Yes    Hypoxemia [R09.02] 06/01/2020 Yes    CKD stage 3 secondary to diabetes [E11.22, N18.3] 04/10/2018 Yes    COPD exacerbation [J44.1] 10/05/2017 Unknown    Diabetes mellitus type 2, uncontrolled, with renal complications [E11.29, E11.65] 09/12/2017 Yes    Atrial fibrillation [I48.91] 06/10/2017 Yes     Chronic    Acquired hypothyroidism [E03.9] 06/10/2017 Yes     Chronic    Hypercholesterolemia [E78.00] 10/27/2016 Yes    Essential hypertension [I10] 10/04/2015 Yes     Chronic      Problems Resolved During this Admission:       Discharged Condition: stable    Disposition: Home-Health Care [facility]    Follow Up:  Follow-up Information     Nacho Richardson MD. Schedule an appointment as soon as possible for a visit in 3 days.    Specialty:  Internal Medicine  Contact information:  [address] [facility] [address]  [place] [address]  [phone]             [facility].    Specialty:  Emergency Medicine  Why:  As needed, If symptoms worsen  Contact information:  [address]  [place] [id number] [phone]           Nacho Richardson MD In 1 week.    Specialty:  Internal Medicine  Contact information:  [address] [facility]  [place] [address]  [phone]                  Patient Instructions:      Ambulatory referral/consult to Home Health   Standing Status: Future   Referral Priority: Routine Referral Type: Home Health   Referral Reason: Specialty Services Required   Requested Specialty: Home Health Services   Number of Visits Requested: 1     Diet diabetic     Notify your health care provider if you experience any of the following:  temperature >100.4     Notify your health care provider if you experience any of the following:  persistent nausea and vomiting or diarrhea     Notify your health care provider if you experience any of the following:  severe uncontrolled pain     Notify your health care provider if you experience any of the following:  redness, tenderness, or signs of infection (pain, swelling, redness, odor or green/yellow discharge around incision site)     Notify your health care provider if you experience any of the following:  difficulty breathing or increased cough     Notify your health care provider if you experience any of the following:  severe persistent headache     Notify your health care provider if you experience any of the following:  worsening rash     Notify your health care provider if you experience any of the following:  persistent dizziness, light-headedness, or visual disturbances     Notify your health care provider if you experience any of the following:  increased confusion or weakness     Notify your health care provider if you experience any of the following:     Activity as tolerated       Significant Diagnostic Studies: Labs: BMP: No results for input(s): GLU, NA, K, CL, CO2, BUN, CREATININE, CALCIUM, MG in the last 48 hours., CMP No results for input(s): NA, K, CL, CO2, GLU, BUN, CREATININE, CALCIUM, PROT, ALBUMIN, BILITOT, ALKPHOS, AST, ALT, ANIONGAP, ESTGFRAFRICA, EGFRNONAA in the last 48 hours. and CBC No results for input(s): WBC, HGB, HCT, PLT in the last 48 hours.  Microbiology:   Blood Culture   Lab Results   Component Value  Date    LABBLOO No Growth to date 06/01/2020    LABBLOO No Growth to date 06/01/2020    LABBLOO No Growth to date 06/01/2020    LABBLOO No Growth to date 06/01/2020       Pending Diagnostic Studies:     Procedure Component Value Units Date/Time    X-Ray Chest 1 View [[id number]] Resulted:  06/03/20 1136    Order Status:  Sent Lab Status:  No result Updated:  06/03/20 3616         Medications:  Reconciled Home Medications:      Medication List      START taking these medications    amoxicillin-clavulanate 875-125mg 875-125 mg per tablet  Commonly known as:  AUGMENTIN  Take 1 tablet by mouth every 12 (twelve) hours. for 7 days     predniSONE 10 MG tablet  Commonly known as:  DELTASONE  Take 1 tablet (10 mg total) by mouth once daily.        CHANGE how you take these medications    losartan 50 MG tablet  Commonly known as:  COZAAR  Take 1 tablet (50 mg total) by mouth once daily.  What changed:    · medication strength  · how much to take        CONTINUE taking these medications    ascorbic acid (vitamin C) 1000 MG tablet  Commonly known as:  VITAMIN C  Take 1,000 mg by mouth Daily.     CARTIA  MG 24 hr capsule  Generic drug:  diltiaZEM  TAKE ONE CAPSULE BY MOUTH ONCE DAILY     carvediloL 6.25 MG tablet  Commonly known as:  COREG  Take 6.25 mg by mouth 2 (two) times daily with meals.     cetirizine 10 MG tablet  Commonly known as:  ZYRTEC  Take 10 mg by mouth once daily.     COMBIVENT RESPIMAT  mcg/actuation inhaler  Generic drug:  ipratropium-albuteroL  USE 1 PUFF EVERY SIX HOURS AS NEEDED FOR WHEEZING     furosemide 20 MG tablet  Commonly known as:  LASIX  Take 1 tablet (20 mg total) by mouth once daily.     iron-vitamin C 100-250 mg (ICAR-C) 100-250 mg Tab  TAKE ONE TABLET BY MOUTH ONCE DAILY     levothyroxine 50 MCG tablet  Commonly known as:  SYNTHROID  Take 1 tablet (50 mcg total) by mouth before breakfast.     meloxicam 7.5 MG tablet  Commonly known as:  MOBIC  TAKE ONE TABLET BY MOUTH DAILY AS NEEDED  FOR ARTHRITIS     metFORMIN 750 MG XR 24hr tablet  Commonly known as:  GLUCOPHAGE-XR  Take 750 mg by mouth every evening.     pantoprazole 40 MG tablet  Commonly known as:  PROTONIX  Take 1 tablet by mouth Daily.     pravastatin 20 MG tablet  Commonly known as:  PRAVACHOL  Take 1 tablet (20 mg total) by mouth nightly.     selenium 50 mcg Tab  Take 200 mcg by mouth nightly.     vitamin D 1000 units Tab  Commonly known as:  VITAMIN D3  Take 1 tablet by mouth Daily.            Indwelling Lines/Drains at time of discharge:   Lines/Drains/Airways     None                 Time spent on the discharge of patient: 35 minutes  Patient was seen and examined on the date of discharge and determined to be suitable for discharge.         Basilio Ruelas MD  Department of Hospital Medicine  [facility]

## 2020-06-05 NOTE — HOSPITAL COURSE
89 y/o wm admitted with a dx of acute on chronic copd exacerbation and CAP . He was started on IVAB and IV steroid with an improvement of his sx  6/3 Pt was seen and examined at bedside . He was determined to be suitable for d/c   He was admitted with a dx os acute  On chronic copd exacerbation and CAP .  He was started on IVAB and IV steroid with an improvement opf his sx .  He will be d/c home with   [facility] lt  [facility].  There was no acute event  Since admission.

## 2020-06-07 LAB
BACTERIA BLD CULT: NORMAL
BACTERIA BLD CULT: NORMAL

## 2020-06-18 ENCOUNTER — EXTERNAL HOME HEALTH (OUTPATIENT)
Dept: HOME HEALTH SERVICES | Facility: HOSPITAL | Age: 85
End: 2020-06-18
Payer: MEDICARE

## 2020-06-19 ENCOUNTER — OFFICE VISIT (OUTPATIENT)
Dept: CARDIOLOGY | Facility: CLINIC | Age: 85
End: 2020-06-19
Payer: MEDICARE

## 2020-06-19 VITALS
OXYGEN SATURATION: 90 % | DIASTOLIC BLOOD PRESSURE: 76 MMHG | HEART RATE: 80 BPM | WEIGHT: 163.13 LBS | BODY MASS INDEX: 24.44 KG/M2 | SYSTOLIC BLOOD PRESSURE: 172 MMHG

## 2020-06-19 DIAGNOSIS — I48.0 PAROXYSMAL ATRIAL FIBRILLATION: Chronic | ICD-10-CM

## 2020-06-19 DIAGNOSIS — I25.118 CORONARY ARTERY DISEASE OF NATIVE ARTERY OF NATIVE HEART WITH STABLE ANGINA PECTORIS: ICD-10-CM

## 2020-06-19 DIAGNOSIS — I10 ESSENTIAL HYPERTENSION: Primary | Chronic | ICD-10-CM

## 2020-06-19 DIAGNOSIS — I50.22 CHRONIC SYSTOLIC CONGESTIVE HEART FAILURE: ICD-10-CM

## 2020-06-19 DIAGNOSIS — E78.00 HYPERCHOLESTEROLEMIA: ICD-10-CM

## 2020-06-19 PROCEDURE — 99999 PR PBB SHADOW E&M-EST. PATIENT-LVL III: ICD-10-PCS | Mod: PBBFAC,,, | Performed by: INTERNAL MEDICINE

## 2020-06-19 PROCEDURE — 1159F MED LIST DOCD IN RCRD: CPT | Mod: S$GLB,,, | Performed by: INTERNAL MEDICINE

## 2020-06-19 PROCEDURE — 1159F PR MEDICATION LIST DOCUMENTED IN MEDICAL RECORD: ICD-10-PCS | Mod: S$GLB,,, | Performed by: INTERNAL MEDICINE

## 2020-06-19 PROCEDURE — 99999 PR PBB SHADOW E&M-EST. PATIENT-LVL III: CPT | Mod: PBBFAC,,, | Performed by: INTERNAL MEDICINE

## 2020-06-19 PROCEDURE — 99214 PR OFFICE/OUTPT VISIT, EST, LEVL IV, 30-39 MIN: ICD-10-PCS | Mod: S$GLB,,, | Performed by: INTERNAL MEDICINE

## 2020-06-19 PROCEDURE — 1126F PR PAIN SEVERITY QUANTIFIED, NO PAIN PRESENT: ICD-10-PCS | Mod: S$GLB,,, | Performed by: INTERNAL MEDICINE

## 2020-06-19 PROCEDURE — 99214 OFFICE O/P EST MOD 30 MIN: CPT | Mod: S$GLB,,, | Performed by: INTERNAL MEDICINE

## 2020-06-19 PROCEDURE — 1126F AMNT PAIN NOTED NONE PRSNT: CPT | Mod: S$GLB,,, | Performed by: INTERNAL MEDICINE

## 2020-06-19 NOTE — PROGRESS NOTES
Subjective:   Patient ID:  Jimmy Young is a 89 y.o. male who presents for follow up of No chief complaint on file.      89 yo male, came in for routine f/u.  PMH PAF, off ELiqus after AVM comfirmed by EGD recently, CAD, CHFrEF 12% in  and 55% in , HTN, Skin cancer, AAA s/p stent at [facility] by Dr. Ayala, CKD, COPD on home o2. F/u with Jeff Caba and Hans  Admitted to [facility] recently for copd exacerbation. Now on home O2. Also had PRBC due to anemia.  Today EKG was sinus with 1st AVB.  No chest pain, palpitation, dizziness, orthopnea and syncope  Sleeps with 1 pillow  Chronic [place]  No leg swelling  No smoking/drining.  Lives by himself and relatively independent.   Knee pain stressed and take sTramodol    Cath in  LM, and LAD madhuri, LCx 50 to 60%, RCA 40 to 50% at [facility]  NUKE in  EF 12%  ECHO in  normal EF, normal LV size.      Past Medical History:   Diagnosis Date    Aneurysm 2016    abdominal, stent placed    Arthritis     Asthma     Atrial fibrillation     Cancer     skin cancer on face    COPD (chronic obstructive pulmonary disease) 10/5/2017    Diabetes mellitus     Emphysema lung     Encounter for blood transfusion     Hypertension        Past Surgical History:   Procedure Laterality Date    ABDOMINAL AORTIC ANEURYSM REPAIR      Stent placed    ABDOMINAL SURGERY      COLONOSCOPY Left 2017    Procedure: COLONOSCOPY;  Surgeon: Boaz Toussaint MD;  [facility];  Service: Endoscopy;  Laterality: Left;    VASCULAR SURGERY      abdominal aneurysm repair       Social History     Tobacco Use    Smoking status: Former Smoker     Packs/day: 2.00     Years: 20.00     Pack years: 40.00     Types: Cigarettes     Quit date: 1977     Years since quittin.4    Smokeless tobacco: Former User   Substance Use Topics    Alcohol use: No    Drug use: No       Family History   Problem Relation Age of Onset    Hypertension Father     Heart attack Father      Cancer Mother         bladder         Review of Systems   Constitution: Negative for decreased appetite, diaphoresis, fever, malaise/fatigue and night sweats.   HENT: Negative for nosebleeds.    Eyes: Negative for blurred vision and double vision.   Cardiovascular: Negative for chest pain, claudication, dyspnea on exertion, irregular heartbeat, leg swelling, near-syncope, orthopnea, palpitations, paroxysmal nocturnal dyspnea and syncope.   Respiratory: Positive for shortness of breath. Negative for cough, sleep disturbances due to breathing, snoring, sputum production and wheezing.    Endocrine: Negative for cold intolerance and polyuria.   Hematologic/Lymphatic: Does not bruise/bleed easily.   Skin: Negative for rash.   Musculoskeletal: Positive for arthritis, back pain and joint pain. Negative for falls, joint swelling and neck pain.   Gastrointestinal: Negative for abdominal pain, heartburn, nausea and vomiting.   Genitourinary: Negative for dysuria, frequency and hematuria.   Neurological: Negative for difficulty with concentration, dizziness, focal weakness, headaches, light-headedness, numbness, seizures and weakness.   Psychiatric/Behavioral: Negative for depression, memory loss and substance abuse. The patient does not have insomnia.    Allergic/Immunologic: Negative for HIV exposure and hives.       Objective:   Physical Exam   Constitutional: He is oriented to person, place, and time. He appears well-nourished.   HENT:   Head: Normocephalic.   Eyes: Pupils are equal, round, and reactive to light.   Neck: Normal carotid pulses and no JVD present. Carotid bruit is not present. No thyromegaly present.   Cardiovascular: Normal rate, regular rhythm, normal heart sounds and normal pulses.  No extrasystoles are present. PMI is not displaced. Exam reveals no gallop and no S3.   No murmur heard.  Pulmonary/Chest: Breath sounds normal. No stridor. No respiratory distress.   + bronchi   Abdominal: Soft. Bowel sounds  are normal. There is no abdominal tenderness. There is no rebound.   Musculoskeletal: Normal range of motion.   Neurological: He is alert and oriented to person, place, and time.   Skin: Skin is intact. No rash noted.   Face skin looks blue.     Psychiatric: His behavior is normal.       Lab Results   Component Value Date    CHOL 126 08/01/2019    CHOL 71 (L) 04/10/2018     Lab Results   Component Value Date    HDL 35 (L) 08/01/2019    HDL 22 (L) 04/10/2018     Lab Results   Component Value Date    LDLCALC 65.2 08/01/2019    LDLCALC 22.4 (L) 04/10/2018     Lab Results   Component Value Date    TRIG 129 08/01/2019    TRIG 133 04/10/2018     Lab Results   Component Value Date    CHOLHDL 27.8 08/01/2019    CHOLHDL 31.0 04/10/2018       Chemistry        Component Value Date/Time     06/03/2020 0806    K 4.5 06/03/2020 0806     06/03/2020 0806    CO2 25 06/03/2020 0806    BUN 38 (H) 06/03/2020 0806    CREATININE 1.6 (H) 06/03/2020 0806     (H) 06/03/2020 0806        Component Value Date/Time    CALCIUM 9.6 06/03/2020 0806    ALKPHOS 109 06/02/2020 0435    AST 13 06/02/2020 0435    ALT 13 06/02/2020 0435    BILITOT 0.4 06/02/2020 0435    ESTGFRAFRICA 43 (A) 06/03/2020 0806    EGFRNONAA 37 (A) 06/03/2020 0806          Lab Results   Component Value Date    HGBA1C 6.2 (H) 06/01/2020     Lab Results   Component Value Date    TSH 0.720 06/02/2020     Lab Results   Component Value Date    INR 1.2 01/04/2020    INR 1.1 04/09/2018    INR 1.2 09/11/2017     Lab Results   Component Value Date    WBC 19.00 (H) 06/03/2020    HGB 13.4 (L) 06/03/2020    HCT 43.2 06/03/2020    MCV 95 06/03/2020     06/03/2020     BMP  Sodium   Date Value Ref Range Status   06/03/2020 141 136 - 145 mmol/L Final     Potassium   Date Value Ref Range Status   06/03/2020 4.5 3.5 - 5.1 mmol/L Final     Chloride   Date Value Ref Range Status   06/03/2020 101 95 - 110 mmol/L Final     CO2   Date Value Ref Range Status   06/03/2020 25  23 - 29 mmol/L Final     BUN, Bld   Date Value Ref Range Status   06/03/2020 38 (H) 8 - 23 mg/dL Final     Creatinine   Date Value Ref Range Status   06/03/2020 1.6 (H) 0.5 - 1.4 mg/dL Final     Calcium   Date Value Ref Range Status   06/03/2020 9.6 8.7 - 10.5 mg/dL Final     Anion Gap   Date Value Ref Range Status   06/03/2020 15 8 - 16 mmol/L Final     eGFR if    Date Value Ref Range Status   06/03/2020 43 (A) >60 mL/min/1.73 m^2 Final     eGFR if non    Date Value Ref Range Status   06/03/2020 37 (A) >60 mL/min/1.73 m^2 Final     Comment:     Calculation used to obtain the estimated glomerular filtration  rate (eGFR) is the CKD-EPI equation.        BNP  @LABRCNTIP(BNP,BNPTRIAGEBLO)@  @LABRCNTIP(troponini)@  CrCl cannot be calculated (Patient's most recent lab result is older than the maximum 7 days allowed.).  No results found in the last 24 hours.  No results found in the last 24 hours.  No results found in the last 24 hours.    Assessment:      1. Essential hypertension    2. Hypercholesterolemia    3. Paroxysmal atrial fibrillation    4. Coronary artery disease of native artery of native heart with stable angina pectoris    5. Chronic systolic congestive heart failure        Plan:   Continue CCB, coreg, losartan, Lasix, and statin  Counseled DASH  Check Lipid profile in 6 months  Recommend heart-healthy diet, weight control and regular exercise.  Sally. Risk modification.   RTC in 6 months    I have reviewed all pertinent labs and cardiac studies independently. Plans and recommendations have been formulated under my direct supervision. All questions answered and patient voiced understanding. Patient to continue current medications.   Return sooner for concerns or questions.   If symptoms persist go to the ED

## 2020-06-20 PROBLEM — I25.118 CORONARY ARTERY DISEASE OF NATIVE ARTERY OF NATIVE HEART WITH STABLE ANGINA PECTORIS: Status: ACTIVE | Noted: 2020-06-20

## 2020-06-20 PROBLEM — I50.22 CHRONIC SYSTOLIC CONGESTIVE HEART FAILURE: Status: ACTIVE | Noted: 2020-06-20

## 2020-07-23 ENCOUNTER — NURSE TRIAGE (OUTPATIENT)
Dept: ADMINISTRATIVE | Facility: CLINIC | Age: 85
End: 2020-07-23

## 2020-07-23 NOTE — TELEPHONE ENCOUNTER
"Spoke with pt:     cardiologist  changed med to 50 mg and pharmacist requesting to call and verify.   I spoke with pharmacy:   Losartan 50 mg once daily- needs Rx for med- has 25 mg on file. (pt has 50 mg losartan in home but went to refill and this pharmacy does not have a Rx on file for this dose of losartan) please verify and send Rx to [facility] p# [phone].     Instructed pt I would send message to  cardiologist re request. Verbalizes understanding.     Reason for Disposition   [1] Pharmacy calling with prescription questions AND [2] triager unable to answer question    Additional Information   Negative: MORE THAN A DOUBLE DOSE of a prescription or over-the-counter (OTC) drug   Negative: [1] DOUBLE DOSE (an extra dose or lesser amount) of over-the-counter (OTC) drug AND [2] any symptoms (e.g., dizziness, nausea, pain, sleepiness)   Negative: [1] DOUBLE DOSE (an extra dose or lesser amount) of prescription drug AND [2] any symptoms (e.g., dizziness, nausea, pain, sleepiness)   Negative: Took another person's prescription drug   Negative: [1] Prescription not at pharmacy AND [2] was prescribed by PCP recently   Negative: [1] Request for URGENT new prescription or refill of "essential" medication (i.e., likelihood of harm to patient if not taken) AND [2] triager unable to fill per unit policy   Negative: [1] DOUBLE DOSE (an extra dose or lesser amount) of prescription drug AND [2] NO symptoms (Exception: a double dose of antibiotics)   Negative: Diabetes drug error or overdose (e.g., took wrong type of insulin or took extra dose)    Protocols used: MEDICATION QUESTION CALL-A-AH      "

## 2020-07-24 ENCOUNTER — TELEPHONE (OUTPATIENT)
Dept: CARDIOLOGY | Facility: CLINIC | Age: 85
End: 2020-07-24

## 2020-07-24 NOTE — TELEPHONE ENCOUNTER
----- Message from Julien Pickering sent at 7/24/2020  9:34 AM CDT -----  ..Type:  Patient Returning Call    Who Called: pt   Who Left Message for Patient  Does the patient know what this is regarding?:unknown  Would the patient rather a call back or a response via "[employer]ner?  Call back   Best Call Back Number:[phone]  Additional Information:  pt is returning a call to nurse.

## 2020-07-24 NOTE — TELEPHONE ENCOUNTER
Patient contacted, left voicemail to discuss medication changes and to make a follow up appointment.

## 2020-10-07 ENCOUNTER — TELEPHONE (OUTPATIENT)
Dept: CARDIOLOGY | Facility: CLINIC | Age: 85
End: 2020-10-07

## 2020-10-07 NOTE — TELEPHONE ENCOUNTER
Pt contacted, lvm for pt to return call.          ----- Message from Stacia Schmitt sent at 10/7/2020 10:49 AM CDT -----  Contact: self/[phone]  Type:  Needs Medical Advice    Who Called: Patient  Symptoms (please be specific):    How long has patient had these symptoms:    Pharmacy name and phone #:    Would the patient rather a call back or a response via [facility]? Call Back  Best Call Back Number: [phone]  Additional Information: Patient would like to speak with nurse    Helga/ROMAINE

## 2020-12-09 ENCOUNTER — OFFICE VISIT (OUTPATIENT)
Dept: CARDIOLOGY | Facility: CLINIC | Age: 85
End: 2020-12-09
Payer: MEDICARE

## 2020-12-09 VITALS
SYSTOLIC BLOOD PRESSURE: 184 MMHG | DIASTOLIC BLOOD PRESSURE: 86 MMHG | OXYGEN SATURATION: 88 % | WEIGHT: 164.25 LBS | HEART RATE: 73 BPM | BODY MASS INDEX: 24.61 KG/M2

## 2020-12-09 DIAGNOSIS — I25.118 CORONARY ARTERY DISEASE OF NATIVE ARTERY OF NATIVE HEART WITH STABLE ANGINA PECTORIS: ICD-10-CM

## 2020-12-09 DIAGNOSIS — I10 ESSENTIAL HYPERTENSION: Primary | ICD-10-CM

## 2020-12-09 DIAGNOSIS — I48.0 PAROXYSMAL ATRIAL FIBRILLATION: ICD-10-CM

## 2020-12-09 DIAGNOSIS — I35.1 AORTIC EJECTION MURMUR: ICD-10-CM

## 2020-12-09 DIAGNOSIS — I50.22 CHRONIC SYSTOLIC CONGESTIVE HEART FAILURE: ICD-10-CM

## 2020-12-09 PROCEDURE — 1159F PR MEDICATION LIST DOCUMENTED IN MEDICAL RECORD: ICD-10-PCS | Mod: S$GLB,,, | Performed by: INTERNAL MEDICINE

## 2020-12-09 PROCEDURE — 99214 OFFICE O/P EST MOD 30 MIN: CPT | Mod: S$GLB,,, | Performed by: INTERNAL MEDICINE

## 2020-12-09 PROCEDURE — 99214 PR OFFICE/OUTPT VISIT, EST, LEVL IV, 30-39 MIN: ICD-10-PCS | Mod: S$GLB,,, | Performed by: INTERNAL MEDICINE

## 2020-12-09 PROCEDURE — 1159F MED LIST DOCD IN RCRD: CPT | Mod: S$GLB,,, | Performed by: INTERNAL MEDICINE

## 2020-12-09 PROCEDURE — 1126F AMNT PAIN NOTED NONE PRSNT: CPT | Mod: S$GLB,,, | Performed by: INTERNAL MEDICINE

## 2020-12-09 PROCEDURE — 1126F PR PAIN SEVERITY QUANTIFIED, NO PAIN PRESENT: ICD-10-PCS | Mod: S$GLB,,, | Performed by: INTERNAL MEDICINE

## 2020-12-09 PROCEDURE — 1157F ADVNC CARE PLAN IN RCRD: CPT | Mod: S$GLB,,, | Performed by: INTERNAL MEDICINE

## 2020-12-09 PROCEDURE — 1157F PR ADVANCE CARE PLAN OR EQUIV PRESENT IN MEDICAL RECORD: ICD-10-PCS | Mod: S$GLB,,, | Performed by: INTERNAL MEDICINE

## 2020-12-09 PROCEDURE — 99999 PR PBB SHADOW E&M-EST. PATIENT-LVL IV: CPT | Mod: PBBFAC,,, | Performed by: INTERNAL MEDICINE

## 2020-12-09 PROCEDURE — 99999 PR PBB SHADOW E&M-EST. PATIENT-LVL IV: ICD-10-PCS | Mod: PBBFAC,,, | Performed by: INTERNAL MEDICINE

## 2020-12-09 RX ORDER — DEXTROSE 4 G
TABLET,CHEWABLE ORAL
COMMUNITY
Start: 2020-01-29

## 2020-12-09 RX ORDER — AMLODIPINE BESYLATE 5 MG/1
5 TABLET ORAL DAILY
Qty: 30 TABLET | Refills: 11 | Status: SHIPPED | OUTPATIENT
Start: 2020-12-09 | End: 2021-12-09

## 2020-12-09 RX ORDER — BUDESONIDE AND FORMOTEROL FUMARATE DIHYDRATE 80; 4.5 UG/1; UG/1
AEROSOL RESPIRATORY (INHALATION)
COMMUNITY
Start: 2020-11-30

## 2020-12-09 NOTE — PROGRESS NOTES
Subjective:   Patient ID:  Jimmy Young is a 89 y.o. male who presents for follow up of No chief complaint on file.      89 yo male, came in for routine f/u.  PMH PAF, off ELiqus after AVM comfirmed by EGD recently, CAD, CHFrEF 12% in  and 55% in , HTN, Skin cancer, AAA s/p stent at [facility] by Dr. Ayala, CKD, COPD on home o2. F/u with Jeff Caba and Hans  Admitted to [facility] recently 2020for copd exacerbation. Now on home O2. Also had PRBC due to anemia.  Most recent EKG was sinus with 1st AVB.  No chest pain, palpitation, dizziness, orthopnea and syncope  Sleeps with 1 pillow  Chronic [place]  No leg swelling  No smoking/drining.  Lives by himself and relatively independent.     Cath in  LM, and LAD madhuri, LCx 50 to 60%, RCA 40 to 50% at [facility]   NUKE in  EF 12%  ECHO in  normal EF, normal LV size.          Past Medical History:   Diagnosis Date    Aneurysm 2016    abdominal, stent placed    Arthritis     Asthma     Atrial fibrillation     Cancer     skin cancer on face    COPD (chronic obstructive pulmonary disease) 10/5/2017    Diabetes mellitus     Emphysema lung     Encounter for blood transfusion     Hypertension        Past Surgical History:   Procedure Laterality Date    ABDOMINAL AORTIC ANEURYSM REPAIR      Stent placed    ABDOMINAL SURGERY      COLONOSCOPY Left 2017    Procedure: COLONOSCOPY;  Surgeon: Boaz Toussaint MD;  Location: [facility] ENDO;  Service: Endoscopy;  Laterality: Left;    VASCULAR SURGERY      abdominal aneurysm repair       Social History     Tobacco Use    Smoking status: Former Smoker     Packs/day: 2.00     Years: 20.00     Pack years: 40.00     Types: Cigarettes     Quit date: 1977     Years since quittin.9    Smokeless tobacco: Former User   Substance Use Topics    Alcohol use: No    Drug use: No       Family History   Problem Relation Age of Onset    Hypertension Father     Heart attack Father     Cancer Mother          bladder         Review of Systems   Constitution: Negative for decreased appetite, diaphoresis, fever, malaise/fatigue and night sweats.   HENT: Negative for nosebleeds.    Eyes: Negative for blurred vision and double vision.   Cardiovascular: Negative for chest pain, claudication, dyspnea on exertion, irregular heartbeat, leg swelling, near-syncope, orthopnea, palpitations, paroxysmal nocturnal dyspnea and syncope.   Respiratory: Positive for shortness of breath. Negative for cough, sleep disturbances due to breathing, snoring, sputum production and wheezing.    Endocrine: Negative for cold intolerance and polyuria.   Hematologic/Lymphatic: Does not bruise/bleed easily.   Skin: Negative for rash.   Musculoskeletal: Positive for arthritis, back pain and joint pain. Negative for falls, joint swelling and neck pain.   Gastrointestinal: Negative for abdominal pain, heartburn, nausea and vomiting.   Genitourinary: Negative for dysuria, frequency and hematuria.   Neurological: Negative for difficulty with concentration, dizziness, focal weakness, headaches, light-headedness, numbness, seizures and weakness.   Psychiatric/Behavioral: Negative for depression, memory loss and substance abuse. The patient does not have insomnia.    Allergic/Immunologic: Negative for HIV exposure and hives.       Objective:   Physical Exam   Constitutional: He is oriented to person, place, and time. He appears well-nourished.   HENT:   Head: Normocephalic.   Eyes: Pupils are equal, round, and reactive to light.   Neck: Normal carotid pulses and no JVD present. Carotid bruit is not present. No thyromegaly present.   + bilateral carotids murmur    Cardiovascular: Normal rate, regular rhythm and normal pulses.  No extrasystoles are present. PMI is not displaced. Exam reveals no gallop and no S3.   No murmur heard.  Pulmonary/Chest: Breath sounds normal. No stridor. No respiratory distress.   + bronchi   Abdominal: Soft. Bowel sounds are normal.  There is no abdominal tenderness. There is no rebound.   Musculoskeletal: Normal range of motion.   Neurological: He is alert and oriented to person, place, and time.   Skin: Skin is intact. No rash noted.   Face skin looks blue.     Psychiatric: His behavior is normal.     Last 3 sets of Vitals    Vitals - 1 value per visit 6/3/2020 6/19/2020 12/9/2020   SYSTOLIC 175 172 184   DIASTOLIC 81 76 86   PULSE 64 80 73   TEMPERATURE 98.1 - -   RESPIRATIONS 18 - -   SPO2 93 90 88   Weight (lb) - 163.14 164.24   Weight (kg) - 74 74.5   HEIGHT - - -   BODY MASS INDEX - 24.44 24.61   VISIT REPORT - - -   Pain Score  - 0 0       Lab Results   Component Value Date    CHOL 126 08/01/2019    CHOL 71 (L) 04/10/2018     Lab Results   Component Value Date    HDL 35 (L) 08/01/2019    HDL 22 (L) 04/10/2018     Lab Results   Component Value Date    LDLCALC 65.2 08/01/2019    LDLCALC 22.4 (L) 04/10/2018     Lab Results   Component Value Date    TRIG 129 08/01/2019    TRIG 133 04/10/2018     Lab Results   Component Value Date    CHOLHDL 27.8 08/01/2019    CHOLHDL 31.0 04/10/2018       Chemistry        Component Value Date/Time     06/03/2020 0806    K 4.5 06/03/2020 0806     06/03/2020 0806    CO2 25 06/03/2020 0806    BUN 38 (H) 06/03/2020 0806    CREATININE 1.6 (H) 06/03/2020 0806     (H) 06/03/2020 0806        Component Value Date/Time    CALCIUM 9.6 06/03/2020 0806    ALKPHOS 109 06/02/2020 0435    AST 13 06/02/2020 0435    ALT 13 06/02/2020 0435    BILITOT 0.4 06/02/2020 0435    ESTGFRAFRICA 43 (A) 06/03/2020 0806    EGFRNONAA 37 (A) 06/03/2020 0806          Lab Results   Component Value Date    HGBA1C 6.2 (H) 06/01/2020     Lab Results   Component Value Date    TSH 0.720 06/02/2020     Lab Results   Component Value Date    INR 1.2 01/04/2020    INR 1.1 04/09/2018    INR 1.2 09/11/2017     Lab Results   Component Value Date    WBC 19.00 (H) 06/03/2020    HGB 13.4 (L) 06/03/2020    HCT 43.2 06/03/2020    MCV 95  06/03/2020     06/03/2020     BMP  Sodium   Date Value Ref Range Status   06/03/2020 141 136 - 145 mmol/L Final     Potassium   Date Value Ref Range Status   06/03/2020 4.5 3.5 - 5.1 mmol/L Final     Chloride   Date Value Ref Range Status   06/03/2020 101 95 - 110 mmol/L Final     CO2   Date Value Ref Range Status   06/03/2020 25 23 - 29 mmol/L Final     BUN   Date Value Ref Range Status   06/03/2020 38 (H) 8 - 23 mg/dL Final     Creatinine   Date Value Ref Range Status   06/03/2020 1.6 (H) 0.5 - 1.4 mg/dL Final     Calcium   Date Value Ref Range Status   06/03/2020 9.6 8.7 - 10.5 mg/dL Final     Anion Gap   Date Value Ref Range Status   06/03/2020 15 8 - 16 mmol/L Final     eGFR if    Date Value Ref Range Status   06/03/2020 43 (A) >60 mL/min/1.73 m^2 Final     eGFR if non    Date Value Ref Range Status   06/03/2020 37 (A) >60 mL/min/1.73 m^2 Final     Comment:     Calculation used to obtain the estimated glomerular filtration  rate (eGFR) is the CKD-EPI equation.        BNP  @LABRCNTIP(BNP,BNPTRIAGEBLO)@  @LABRCNTIP(troponini)@  CrCl cannot be calculated (Patient's most recent lab result is older than the maximum 7 days allowed.).  No results found in the last 24 hours.  No results found in the last 24 hours.  No results found in the last 24 hours.    Assessment:      1. Essential hypertension    2. Aortic ejection murmur    3. Paroxysmal atrial fibrillation    4. Chronic systolic congestive heart failure    5. Coronary artery disease of native artery of native heart with stable angina pectoris    6. Bilateral carotid murmur     Plan:   Continue CCB, coreg, losartan, Lasix, and statin  Not on AC due to h/o stomach AVM;s and GI bleeding   Will add amlodipine BP uncontrolled at home 170's and elevated in clinic today   Counseled DASH  Recommend heart-healthy diet, weight control and regular exercise.  Sally. Risk modification.   RTC in 6 months

## 2020-12-24 RX ORDER — LOSARTAN POTASSIUM 50 MG/1
50 TABLET ORAL DAILY
Qty: 90 TABLET | Refills: 1 | Status: SHIPPED | OUTPATIENT
Start: 2020-12-24 | End: 2021-06-21

## 2021-01-08 ENCOUNTER — HOSPITAL ENCOUNTER (OUTPATIENT)
Dept: CARDIOLOGY | Facility: HOSPITAL | Age: 86
Discharge: HOME | End: 2021-01-08
Attending: INTERNAL MEDICINE
Payer: MEDICARE

## 2021-01-08 VITALS
DIASTOLIC BLOOD PRESSURE: 80 MMHG | BODY MASS INDEX: 24.29 KG/M2 | HEIGHT: 69 IN | WEIGHT: 164 LBS | SYSTOLIC BLOOD PRESSURE: 178 MMHG

## 2021-01-08 VITALS
HEIGHT: 69 IN | BODY MASS INDEX: 24.29 KG/M2 | DIASTOLIC BLOOD PRESSURE: 86 MMHG | WEIGHT: 164 LBS | SYSTOLIC BLOOD PRESSURE: 184 MMHG

## 2021-01-08 DIAGNOSIS — I35.1 AORTIC EJECTION MURMUR: ICD-10-CM

## 2021-01-08 LAB
AORTIC ROOT ANNULUS: 3.75 CM
AV INDEX (PROSTH): 0.83
AV LVOT PEAK GRADIENT: 5 MMHG
AV MEAN GRADIENT: 5 MMHG
AV PEAK GRADIENT: 9 MMHG
AV VALVE AREA: 2.74 CM2
AV VELOCITY RATIO: 0.72
BSA FOR ECHO PROCEDURE: 1.9 M2
CV ECHO LV RWT: 0.56 CM
DOP CALC AO PEAK VEL: 1.52 M/S
DOP CALC AO VTI: 32.02 CM
DOP CALC LVOT AREA: 3.3 CM2
DOP CALC LVOT DIAMETER: 2.05 CM
DOP CALC LVOT PEAK VEL: 1.09 M/S
DOP CALC LVOT STROKE VOLUME: 87.82 CM3
DOP CALC RVOT PEAK VEL: 0.92 M/S
DOP CALC RVOT VTI: 21.93 CM
DOP CALCLVOT PEAK VEL VTI: 26.62 CM
E WAVE DECELERATION TIME: 161.69 MSEC
E/A RATIO: 0.57
E/E' RATIO: 8 M/S
ECHO EF ESTIMATED: 56 %
ECHO LV POSTERIOR WALL: 1.37 CM (ref 0.6–1.1)
FRACTIONAL SHORTENING: 30 % (ref 28–44)
INTERVENTRICULAR SEPTUM: 1.45 CM (ref 0.6–1.1)
IVRT: 159.17 MSEC
LA MAJOR: 4.18 CM
LA MINOR: 4.85 CM
LA WIDTH: 3.4 CM
LEFT ARM DIASTOLIC BLOOD PRESSURE: 80 MMHG
LEFT ARM SYSTOLIC BLOOD PRESSURE: 174 MMHG
LEFT ATRIUM SIZE: 3.47 CM
LEFT ATRIUM VOLUME INDEX: 23.8 ML/M2
LEFT ATRIUM VOLUME: 45.03 CM3
LEFT CBA DIAS: 8 CM/S
LEFT CBA SYS: 57 CM/S
LEFT CCA DIST DIAS: 12 CM/S
LEFT CCA DIST SYS: 70 CM/S
LEFT CCA MID DIAS: 16 CM/S
LEFT CCA MID SYS: 79 CM/S
LEFT CCA PROX DIAS: 14 CM/S
LEFT CCA PROX SYS: 83 CM/S
LEFT ECA DIAS: 7 CM/S
LEFT ECA SYS: 111 CM/S
LEFT ICA DIST DIAS: 16 CM/S
LEFT ICA DIST SYS: 92 CM/S
LEFT ICA MID DIAS: 22 CM/S
LEFT ICA MID SYS: 78 CM/S
LEFT ICA PROX DIAS: 10 CM/S
LEFT ICA PROX SYS: 65 CM/S
LEFT INTERNAL DIMENSION IN SYSTOLE: 3.46 CM (ref 2.1–4)
LEFT VENTRICLE DIASTOLIC VOLUME INDEX: 59.95 ML/M2
LEFT VENTRICLE DIASTOLIC VOLUME: 113.22 ML
LEFT VENTRICLE MASS INDEX: 152 G/M2
LEFT VENTRICLE SYSTOLIC VOLUME INDEX: 26.3 ML/M2
LEFT VENTRICLE SYSTOLIC VOLUME: 49.64 ML
LEFT VENTRICULAR INTERNAL DIMENSION IN DIASTOLE: 4.91 CM (ref 3.5–6)
LEFT VENTRICULAR MASS: 286.41 G
LEFT VERTEBRAL DIAS: 13 CM/S
LEFT VERTEBRAL SYS: 49 CM/S
LV LATERAL E/E' RATIO: 8.57 M/S
LV SEPTAL E/E' RATIO: 7.5 M/S
MV PEAK A VEL: 1.05 M/S
MV PEAK E VEL: 0.6 M/S
OHS CV CAROTID RIGHT ICA EDV HIGHEST: 24
OHS CV CAROTID ULTRASOUND LEFT ICA/CCA RATIO: 1.31
OHS CV CAROTID ULTRASOUND RIGHT ICA/CCA RATIO: 2.54
OHS CV PV CAROTID LEFT HIGHEST CCA: 83
OHS CV PV CAROTID LEFT HIGHEST ICA: 92
OHS CV PV CAROTID RIGHT HIGHEST CCA: 87
OHS CV PV CAROTID RIGHT HIGHEST ICA: 150
OHS CV US CAROTID LEFT HIGHEST EDV: 22
PISA TR MAX VEL: 2.68 M/S
PROX AORTA: 3.75 CM
PV MEAN GRADIENT: 2 MMHG
PV PEAK VELOCITY: 1.07 CM/S
RA MAJOR: 4.21 CM
RA PRESSURE: 3 MMHG
RA WIDTH: 3.51 CM
RIGHT ARM DIASTOLIC BLOOD PRESSURE: 86 MMHG
RIGHT ARM SYSTOLIC BLOOD PRESSURE: 180 MMHG
RIGHT CBA DIAS: 5 CM/S
RIGHT CBA SYS: 45 CM/S
RIGHT CCA DIST DIAS: 8 CM/S
RIGHT CCA DIST SYS: 59 CM/S
RIGHT CCA MID DIAS: 12 CM/S
RIGHT CCA MID SYS: 87 CM/S
RIGHT CCA PROX DIAS: 13 CM/S
RIGHT CCA PROX SYS: 87 CM/S
RIGHT ECA DIAS: 11 CM/S
RIGHT ECA SYS: 221 CM/S
RIGHT ICA DIST DIAS: 19 CM/S
RIGHT ICA DIST SYS: 103 CM/S
RIGHT ICA MID DIAS: 24 CM/S
RIGHT ICA MID SYS: 150 CM/S
RIGHT ICA PROX DIAS: 12 CM/S
RIGHT ICA PROX SYS: 73 CM/S
RIGHT VENTRICULAR END-DIASTOLIC DIMENSION: 3.07 CM
RIGHT VERTEBRAL DIAS: 8 CM/S
RIGHT VERTEBRAL SYS: 50 CM/S
SINUS: 3.57 CM
STJ: 3.54 CM
TDI LATERAL: 0.07 M/S
TDI SEPTAL: 0.08 M/S
TDI: 0.08 M/S
TR MAX PG: 29 MMHG
TRICUSPID ANNULAR PLANE SYSTOLIC EXCURSION: 1.56 CM
TV REST PULMONARY ARTERY PRESSURE: 32 MMHG

## 2021-01-08 PROCEDURE — 93880 CV US DOPPLER CAROTID (CUPID ONLY): ICD-10-PCS | Mod: 26,,, | Performed by: INTERNAL MEDICINE

## 2021-01-08 PROCEDURE — 93306 TTE W/DOPPLER COMPLETE: CPT | Mod: PO

## 2021-01-08 PROCEDURE — 93880 EXTRACRANIAL BILAT STUDY: CPT | Mod: PO

## 2021-01-08 PROCEDURE — 93880 EXTRACRANIAL BILAT STUDY: CPT | Mod: 26,,, | Performed by: INTERNAL MEDICINE

## 2021-01-08 PROCEDURE — 93306 TTE W/DOPPLER COMPLETE: CPT | Mod: 26,,, | Performed by: INTERNAL MEDICINE

## 2021-01-08 PROCEDURE — 93306 ECHO (CUPID ONLY): ICD-10-PCS | Mod: 26,,, | Performed by: INTERNAL MEDICINE

## 2021-01-11 ENCOUNTER — TELEPHONE (OUTPATIENT)
Dept: CARDIOLOGY | Facility: CLINIC | Age: 86
End: 2021-01-11

## 2021-06-08 DIAGNOSIS — I48.0 PAROXYSMAL ATRIAL FIBRILLATION: ICD-10-CM

## 2021-06-08 DIAGNOSIS — I10 ESSENTIAL HYPERTENSION: Primary | ICD-10-CM

## 2021-06-08 DIAGNOSIS — I25.118 CORONARY ARTERY DISEASE OF NATIVE ARTERY OF NATIVE HEART WITH STABLE ANGINA PECTORIS: ICD-10-CM

## 2021-06-08 DIAGNOSIS — I50.22 CHRONIC SYSTOLIC CONGESTIVE HEART FAILURE: ICD-10-CM

## 2021-06-08 DIAGNOSIS — E78.00 HYPERCHOLESTEROLEMIA: ICD-10-CM

## 2023-01-03 ENCOUNTER — LAB VISIT (OUTPATIENT)
Dept: LAB | Facility: HOSPITAL | Age: 88
End: 2023-01-03
Attending: NURSE PRACTITIONER
Payer: MEDICARE

## 2023-01-03 DIAGNOSIS — N18.4 CHRONIC KIDNEY DISEASE, STAGE IV (SEVERE): ICD-10-CM

## 2023-01-03 LAB
ALBUMIN SERPL BCP-MCNC: 3.4 G/DL (ref 3.5–5.2)
ANION GAP SERPL CALC-SCNC: 10 MMOL/L (ref 8–16)
BASOPHILS # BLD AUTO: 0.03 K/UL (ref 0–0.2)
BASOPHILS NFR BLD: 0.5 % (ref 0–1.9)
BUN SERPL-MCNC: 24 MG/DL (ref 10–30)
CALCIUM SERPL-MCNC: 8.5 MG/DL (ref 8.7–10.5)
CHLORIDE SERPL-SCNC: 101 MMOL/L (ref 95–110)
CO2 SERPL-SCNC: 32 MMOL/L (ref 23–29)
CREAT SERPL-MCNC: 2.2 MG/DL (ref 0.5–1.4)
DIFFERENTIAL METHOD: ABNORMAL
EOSINOPHIL # BLD AUTO: 0.5 K/UL (ref 0–0.5)
EOSINOPHIL NFR BLD: 7.1 % (ref 0–8)
ERYTHROCYTE [DISTWIDTH] IN BLOOD BY AUTOMATED COUNT: 13.2 % (ref 11.5–14.5)
EST. GFR  (NO RACE VARIABLE): 27.4 ML/MIN/1.73 M^2
GLUCOSE SERPL-MCNC: 205 MG/DL (ref 70–110)
HCT VFR BLD AUTO: 35.6 % (ref 40–54)
HGB BLD-MCNC: 11.6 G/DL (ref 14–18)
IMM GRANULOCYTES # BLD AUTO: 0.05 K/UL (ref 0–0.04)
IMM GRANULOCYTES NFR BLD AUTO: 0.8 % (ref 0–0.5)
IRON SERPL-MCNC: 58 UG/DL (ref 45–160)
LYMPHOCYTES # BLD AUTO: 1.3 K/UL (ref 1–4.8)
LYMPHOCYTES NFR BLD: 19.6 % (ref 18–48)
MCH RBC QN AUTO: 29.6 PG (ref 27–31)
MCHC RBC AUTO-ENTMCNC: 32.6 G/DL (ref 32–36)
MCV RBC AUTO: 91 FL (ref 82–98)
MONOCYTES # BLD AUTO: 0.5 K/UL (ref 0.3–1)
MONOCYTES NFR BLD: 8.1 % (ref 4–15)
NEUTROPHILS # BLD AUTO: 4.2 K/UL (ref 1.8–7.7)
NEUTROPHILS NFR BLD: 63.9 % (ref 38–73)
NRBC BLD-RTO: 0 /100 WBC
PHOSPHATE SERPL-MCNC: 2.5 MG/DL (ref 2.7–4.5)
PLATELET # BLD AUTO: 200 K/UL (ref 150–450)
PMV BLD AUTO: 9.3 FL (ref 9.2–12.9)
POTASSIUM SERPL-SCNC: 3.3 MMOL/L (ref 3.5–5.1)
RBC # BLD AUTO: 3.92 M/UL (ref 4.6–6.2)
SATURATED IRON: 21 % (ref 20–50)
SODIUM SERPL-SCNC: 143 MMOL/L (ref 136–145)
TOTAL IRON BINDING CAPACITY: 278 UG/DL (ref 250–450)
TRANSFERRIN SERPL-MCNC: 188 MG/DL (ref 200–375)
WBC # BLD AUTO: 6.52 K/UL (ref 3.9–12.7)

## 2023-01-03 PROCEDURE — 82728 ASSAY OF FERRITIN: CPT | Performed by: NURSE PRACTITIONER

## 2023-01-03 PROCEDURE — 36415 COLL VENOUS BLD VENIPUNCTURE: CPT | Mod: PO | Performed by: NURSE PRACTITIONER

## 2023-01-03 PROCEDURE — 85025 COMPLETE CBC W/AUTO DIFF WBC: CPT | Mod: PO | Performed by: NURSE PRACTITIONER

## 2023-01-03 PROCEDURE — 84466 ASSAY OF TRANSFERRIN: CPT | Performed by: NURSE PRACTITIONER

## 2023-01-03 PROCEDURE — 80069 RENAL FUNCTION PANEL: CPT | Mod: PO | Performed by: NURSE PRACTITIONER

## 2023-01-04 LAB — FERRITIN SERPL-MCNC: 48 NG/ML (ref 20–300)

## 2024-12-10 ENCOUNTER — HOSPITAL ENCOUNTER (INPATIENT)
Facility: HOSPITAL | Age: 89
LOS: 16 days | Discharge: HOSPICE | DRG: 177 | End: 2024-12-26
Attending: EMERGENCY MEDICINE | Admitting: SPECIALIST
Payer: MEDICARE

## 2024-12-10 DIAGNOSIS — Z86.2 HISTORY OF ANEMIA: ICD-10-CM

## 2024-12-10 DIAGNOSIS — M79.89 SWELLING OF LOWER LEG: ICD-10-CM

## 2024-12-10 DIAGNOSIS — R06.02 SHORTNESS OF BREATH: ICD-10-CM

## 2024-12-10 DIAGNOSIS — J96.21 ACUTE ON CHRONIC RESPIRATORY FAILURE WITH HYPOXIA: ICD-10-CM

## 2024-12-10 DIAGNOSIS — N18.9 CHRONIC KIDNEY DISEASE, UNSPECIFIED CKD STAGE: ICD-10-CM

## 2024-12-10 DIAGNOSIS — I50.9 CHF (CONGESTIVE HEART FAILURE): ICD-10-CM

## 2024-12-10 DIAGNOSIS — J18.9 PNEUMONIA: Primary | ICD-10-CM

## 2024-12-10 DIAGNOSIS — I48.92 ATRIAL FLUTTER: ICD-10-CM

## 2024-12-10 LAB
ALBUMIN SERPL BCP-MCNC: 3.2 G/DL (ref 3.5–5.2)
ALP SERPL-CCNC: 112 U/L (ref 40–150)
ALT SERPL W/O P-5'-P-CCNC: 10 U/L (ref 10–44)
ANION GAP SERPL CALC-SCNC: 11 MMOL/L (ref 8–16)
AST SERPL-CCNC: 15 U/L (ref 10–40)
BASOPHILS # BLD AUTO: 0.04 K/UL (ref 0–0.2)
BASOPHILS NFR BLD: 0.5 % (ref 0–1.9)
BILIRUB SERPL-MCNC: 0.5 MG/DL (ref 0.1–1)
BNP SERPL-MCNC: 88 PG/ML (ref 0–99)
BUN SERPL-MCNC: 29 MG/DL (ref 10–30)
CALCIUM SERPL-MCNC: 8.6 MG/DL (ref 8.7–10.5)
CHLORIDE SERPL-SCNC: 103 MMOL/L (ref 95–110)
CO2 SERPL-SCNC: 31 MMOL/L (ref 23–29)
CREAT SERPL-MCNC: 2.2 MG/DL (ref 0.5–1.4)
CTP QC/QA: YES
CTP QC/QA: YES
DIFFERENTIAL METHOD BLD: ABNORMAL
EOSINOPHIL # BLD AUTO: 0.3 K/UL (ref 0–0.5)
EOSINOPHIL NFR BLD: 3.9 % (ref 0–8)
ERYTHROCYTE [DISTWIDTH] IN BLOOD BY AUTOMATED COUNT: 13.2 % (ref 11.5–14.5)
EST. GFR  (NO RACE VARIABLE): 27.1 ML/MIN/1.73 M^2
GLUCOSE SERPL-MCNC: 136 MG/DL (ref 70–110)
HCT VFR BLD AUTO: 36.1 % (ref 40–54)
HCV AB SERPL QL IA: NEGATIVE
HEP C VIRUS HOLD SPECIMEN: NORMAL
HGB BLD-MCNC: 11.4 G/DL (ref 14–18)
HIV 1+2 AB+HIV1 P24 AG SERPL QL IA: NEGATIVE
IMM GRANULOCYTES # BLD AUTO: 0.04 K/UL (ref 0–0.04)
IMM GRANULOCYTES NFR BLD AUTO: 0.5 % (ref 0–0.5)
INR PPP: 1.1 (ref 0.8–1.2)
LACTATE SERPL-SCNC: 1.6 MMOL/L (ref 0.5–2.2)
LYMPHOCYTES # BLD AUTO: 1.4 K/UL (ref 1–4.8)
LYMPHOCYTES NFR BLD: 16.6 % (ref 18–48)
MAGNESIUM SERPL-MCNC: 1.2 MG/DL (ref 1.6–2.6)
MCH RBC QN AUTO: 29.7 PG (ref 27–31)
MCHC RBC AUTO-ENTMCNC: 31.6 G/DL (ref 32–36)
MCV RBC AUTO: 94 FL (ref 82–98)
MONOCYTES # BLD AUTO: 0.7 K/UL (ref 0.3–1)
MONOCYTES NFR BLD: 8.6 % (ref 4–15)
NEUTROPHILS # BLD AUTO: 5.9 K/UL (ref 1.8–7.7)
NEUTROPHILS NFR BLD: 69.9 % (ref 38–73)
NRBC BLD-RTO: 0 /100 WBC
PLATELET # BLD AUTO: 233 K/UL (ref 150–450)
PMV BLD AUTO: 9.7 FL (ref 9.2–12.9)
POC MOLECULAR INFLUENZA A AGN: NEGATIVE
POC MOLECULAR INFLUENZA B AGN: NEGATIVE
POTASSIUM SERPL-SCNC: 3.5 MMOL/L (ref 3.5–5.1)
PROT SERPL-MCNC: 6.4 G/DL (ref 6–8.4)
PROTHROMBIN TIME: 12.3 SEC (ref 9–12.5)
RBC # BLD AUTO: 3.84 M/UL (ref 4.6–6.2)
SARS-COV-2 RDRP RESP QL NAA+PROBE: NEGATIVE
SODIUM SERPL-SCNC: 145 MMOL/L (ref 136–145)
TROPONIN I SERPL DL<=0.01 NG/ML-MCNC: 0.02 NG/ML (ref 0–0.03)
WBC # BLD AUTO: 8.48 K/UL (ref 3.9–12.7)

## 2024-12-10 PROCEDURE — 36415 COLL VENOUS BLD VENIPUNCTURE: CPT | Performed by: NURSE PRACTITIONER

## 2024-12-10 PROCEDURE — 87077 CULTURE AEROBIC IDENTIFY: CPT | Mod: 59 | Performed by: EMERGENCY MEDICINE

## 2024-12-10 PROCEDURE — 93005 ELECTROCARDIOGRAM TRACING: CPT | Mod: ER

## 2024-12-10 PROCEDURE — 99900035 HC TECH TIME PER 15 MIN (STAT)

## 2024-12-10 PROCEDURE — 87186 SC STD MICRODIL/AGAR DIL: CPT | Mod: 59 | Performed by: EMERGENCY MEDICINE

## 2024-12-10 PROCEDURE — 25000003 PHARM REV CODE 250: Mod: ER | Performed by: EMERGENCY MEDICINE

## 2024-12-10 PROCEDURE — 87389 HIV-1 AG W/HIV-1&-2 AB AG IA: CPT | Performed by: EMERGENCY MEDICINE

## 2024-12-10 PROCEDURE — 85610 PROTHROMBIN TIME: CPT | Mod: ER | Performed by: EMERGENCY MEDICINE

## 2024-12-10 PROCEDURE — 83735 ASSAY OF MAGNESIUM: CPT | Performed by: NURSE PRACTITIONER

## 2024-12-10 PROCEDURE — 25000003 PHARM REV CODE 250: Performed by: NURSE PRACTITIONER

## 2024-12-10 PROCEDURE — 96375 TX/PRO/DX INJ NEW DRUG ADDON: CPT | Mod: ER

## 2024-12-10 PROCEDURE — 94761 N-INVAS EAR/PLS OXIMETRY MLT: CPT | Mod: ER

## 2024-12-10 PROCEDURE — 87502 INFLUENZA DNA AMP PROBE: CPT | Mod: ER

## 2024-12-10 PROCEDURE — 99900035 HC TECH TIME PER 15 MIN (STAT): Mod: ER

## 2024-12-10 PROCEDURE — 85025 COMPLETE CBC W/AUTO DIFF WBC: CPT | Mod: ER | Performed by: EMERGENCY MEDICINE

## 2024-12-10 PROCEDURE — 87635 SARS-COV-2 COVID-19 AMP PRB: CPT | Mod: ER | Performed by: EMERGENCY MEDICINE

## 2024-12-10 PROCEDURE — 83605 ASSAY OF LACTIC ACID: CPT | Mod: ER | Performed by: EMERGENCY MEDICINE

## 2024-12-10 PROCEDURE — 83880 ASSAY OF NATRIURETIC PEPTIDE: CPT | Mod: ER | Performed by: EMERGENCY MEDICINE

## 2024-12-10 PROCEDURE — 93010 ELECTROCARDIOGRAM REPORT: CPT | Mod: ,,, | Performed by: INTERNAL MEDICINE

## 2024-12-10 PROCEDURE — 21400001 HC TELEMETRY ROOM

## 2024-12-10 PROCEDURE — 87040 BLOOD CULTURE FOR BACTERIA: CPT | Mod: 59 | Performed by: EMERGENCY MEDICINE

## 2024-12-10 PROCEDURE — 63600175 PHARM REV CODE 636 W HCPCS: Mod: ER | Performed by: EMERGENCY MEDICINE

## 2024-12-10 PROCEDURE — 99285 EMERGENCY DEPT VISIT HI MDM: CPT | Mod: 25,ER

## 2024-12-10 PROCEDURE — 80053 COMPREHEN METABOLIC PANEL: CPT | Mod: ER | Performed by: EMERGENCY MEDICINE

## 2024-12-10 PROCEDURE — 86803 HEPATITIS C AB TEST: CPT | Performed by: EMERGENCY MEDICINE

## 2024-12-10 PROCEDURE — 94799 UNLISTED PULMONARY SVC/PX: CPT | Mod: ER

## 2024-12-10 PROCEDURE — 83036 HEMOGLOBIN GLYCOSYLATED A1C: CPT | Performed by: NURSE PRACTITIONER

## 2024-12-10 PROCEDURE — 63600175 PHARM REV CODE 636 W HCPCS: Performed by: NURSE PRACTITIONER

## 2024-12-10 PROCEDURE — 27000221 HC OXYGEN, UP TO 24 HOURS: Mod: ER

## 2024-12-10 PROCEDURE — 87154 CUL TYP ID BLD PTHGN 6+ TRGT: CPT | Performed by: EMERGENCY MEDICINE

## 2024-12-10 PROCEDURE — 96374 THER/PROPH/DIAG INJ IV PUSH: CPT | Mod: ER

## 2024-12-10 PROCEDURE — 84484 ASSAY OF TROPONIN QUANT: CPT | Mod: ER | Performed by: EMERGENCY MEDICINE

## 2024-12-10 RX ORDER — PANTOPRAZOLE SODIUM 40 MG/1
40 TABLET, DELAYED RELEASE ORAL DAILY
Status: DISCONTINUED | OUTPATIENT
Start: 2024-12-11 | End: 2024-12-26 | Stop reason: HOSPADM

## 2024-12-10 RX ORDER — CEFTRIAXONE 1 G/1
1 INJECTION, POWDER, FOR SOLUTION INTRAMUSCULAR; INTRAVENOUS
Status: DISCONTINUED | OUTPATIENT
Start: 2024-12-11 | End: 2024-12-14

## 2024-12-10 RX ORDER — DOXYCYCLINE HYCLATE 100 MG
100 TABLET ORAL
Status: COMPLETED | OUTPATIENT
Start: 2024-12-10 | End: 2024-12-10

## 2024-12-10 RX ORDER — DOXYCYCLINE HYCLATE 100 MG
100 TABLET ORAL EVERY 12 HOURS
Status: DISCONTINUED | OUTPATIENT
Start: 2024-12-11 | End: 2024-12-14

## 2024-12-10 RX ORDER — CETIRIZINE HYDROCHLORIDE 5 MG/1
5 TABLET ORAL DAILY PRN
COMMUNITY
End: 2024-12-10

## 2024-12-10 RX ORDER — LEVOTHYROXINE SODIUM 100 UG/1
1 TABLET ORAL EVERY MORNING
COMMUNITY
Start: 2024-12-06 | End: 2024-12-10

## 2024-12-10 RX ORDER — FUROSEMIDE 10 MG/ML
20 INJECTION INTRAMUSCULAR; INTRAVENOUS DAILY
Status: DISCONTINUED | OUTPATIENT
Start: 2024-12-11 | End: 2024-12-11

## 2024-12-10 RX ORDER — LINAGLIPTIN 5 MG/1
1 TABLET, FILM COATED ORAL DAILY
COMMUNITY
Start: 2024-12-06 | End: 2024-12-10

## 2024-12-10 RX ORDER — DILTIAZEM HYDROCHLORIDE 300 MG/1
1 CAPSULE, COATED, EXTENDED RELEASE ORAL EVERY MORNING
Status: ON HOLD | COMMUNITY
Start: 2024-12-06 | End: 2024-12-26

## 2024-12-10 RX ORDER — PRAVASTATIN SODIUM 20 MG/1
40 TABLET ORAL DAILY
Status: DISCONTINUED | OUTPATIENT
Start: 2024-12-11 | End: 2024-12-26 | Stop reason: HOSPADM

## 2024-12-10 RX ORDER — CEFTRIAXONE 2 G/1
2 INJECTION, POWDER, FOR SOLUTION INTRAMUSCULAR; INTRAVENOUS
Status: COMPLETED | OUTPATIENT
Start: 2024-12-10 | End: 2024-12-10

## 2024-12-10 RX ORDER — SODIUM CHLORIDE 0.9 % (FLUSH) 0.9 %
10 SYRINGE (ML) INJECTION
Status: DISCONTINUED | OUTPATIENT
Start: 2024-12-10 | End: 2024-12-26 | Stop reason: HOSPADM

## 2024-12-10 RX ORDER — ENOXAPARIN SODIUM 100 MG/ML
30 INJECTION SUBCUTANEOUS EVERY 24 HOURS
Status: DISCONTINUED | OUTPATIENT
Start: 2024-12-10 | End: 2024-12-26 | Stop reason: HOSPADM

## 2024-12-10 RX ORDER — IMIQUIMOD 12.5 MG/.25G
CREAM TOPICAL
COMMUNITY
Start: 2024-12-06 | End: 2024-12-10

## 2024-12-10 RX ORDER — CARVEDILOL 12.5 MG/1
12.5 TABLET ORAL 2 TIMES DAILY WITH MEALS
Status: ON HOLD | COMMUNITY
Start: 2024-12-06 | End: 2024-12-26

## 2024-12-10 RX ORDER — LEVOTHYROXINE SODIUM 50 UG/1
50 TABLET ORAL
Status: DISCONTINUED | OUTPATIENT
Start: 2024-12-11 | End: 2024-12-26 | Stop reason: HOSPADM

## 2024-12-10 RX ORDER — LANCETS
EACH MISCELLANEOUS
COMMUNITY
Start: 2024-08-13 | End: 2024-12-10

## 2024-12-10 RX ORDER — AMLODIPINE BESYLATE 5 MG/1
5 TABLET ORAL DAILY
Status: DISCONTINUED | OUTPATIENT
Start: 2024-12-11 | End: 2024-12-10

## 2024-12-10 RX ORDER — FUROSEMIDE 10 MG/ML
40 INJECTION INTRAMUSCULAR; INTRAVENOUS
Status: COMPLETED | OUTPATIENT
Start: 2024-12-10 | End: 2024-12-10

## 2024-12-10 RX ORDER — HYDRALAZINE HYDROCHLORIDE 10 MG/1
10 TABLET, FILM COATED ORAL EVERY 12 HOURS
Status: ON HOLD | COMMUNITY
Start: 2024-12-06 | End: 2024-12-26

## 2024-12-10 RX ORDER — PRAVASTATIN SODIUM 40 MG/1
1 TABLET ORAL DAILY
Status: ON HOLD | COMMUNITY
Start: 2024-12-06 | End: 2024-12-26

## 2024-12-10 RX ORDER — CARVEDILOL 12.5 MG/1
12.5 TABLET ORAL 2 TIMES DAILY WITH MEALS
Status: DISCONTINUED | OUTPATIENT
Start: 2024-12-10 | End: 2024-12-11

## 2024-12-10 RX ORDER — HYDRALAZINE HYDROCHLORIDE 10 MG/1
10 TABLET, FILM COATED ORAL EVERY 12 HOURS
Status: DISCONTINUED | OUTPATIENT
Start: 2024-12-10 | End: 2024-12-11

## 2024-12-10 RX ADMIN — FUROSEMIDE 40 MG: 10 INJECTION, SOLUTION INTRAMUSCULAR; INTRAVENOUS at 10:12

## 2024-12-10 RX ADMIN — CEFTRIAXONE 2 G: 2 INJECTION, POWDER, FOR SOLUTION INTRAMUSCULAR; INTRAVENOUS at 11:12

## 2024-12-10 RX ADMIN — ENOXAPARIN SODIUM 30 MG: 30 INJECTION SUBCUTANEOUS at 09:12

## 2024-12-10 RX ADMIN — CARVEDILOL 12.5 MG: 12.5 TABLET, FILM COATED ORAL at 09:12

## 2024-12-10 RX ADMIN — DOXYCYCLINE HYCLATE 100 MG: 100 TABLET, COATED ORAL at 11:12

## 2024-12-10 RX ADMIN — HYDRALAZINE HYDROCHLORIDE 10 MG: 10 TABLET, FILM COATED ORAL at 09:12

## 2024-12-10 NOTE — ED PROVIDER NOTES
Emergency Medicine Provider Note - 12/10/2024       History     Chief Complaint   Patient presents with    Leg Swelling     Was seen at primary care this morning. Told to come to ER for worsening leg swelling. Pt always on home oxygen. History CHF       Allergies:  Review of patient's allergies indicates:  No Known Allergies     History of Present Illness   HPI    12/10/2024, 10:34 AM  The history is provided by the old chart son and patient    Jimmy Young is a 89 y.o. male presenting to the ED for swelling of the legs.  Patient has history of hypertension, diabetes, paroxysmal atrial fibrillation, hyperlipidemia, hyperlipidemia, chronic respiratory failure on home oxygen on 4 L oxygen, stage 3 kidney disease.  Patient was referred from his primary care physician today for worsening lower extremity edema, shortness of breath, and cough.    Patient reports that he has been compliant with his Lasix.  He states that he started experiencing swelling to the lower extremities starting on Friday, December 6.  Patient reports that he has been compliant with his medication.  He reports that there has been no change in his weight.  Patient denies any exertional dyspnea, orthopnea, fever, chills, chest pain, chest pressure, shortness of breath, nausea, vomiting, diarrhea, dysuria, urgency, frequency, blood in stool.  Nothing makes it worse, nothing makes it better.      Arrival mode: Private Vehicle     PCP: Nacho Richardson MD     Past Medical History:  Past Medical History:   Diagnosis Date    Aneurysm 2016    abdominal, stent placed    Arthritis     Asthma     Atrial fibrillation     Cancer     skin cancer on face    COPD (chronic obstructive pulmonary disease) 10/5/2017    Diabetes mellitus     Emphysema lung     Encounter for blood transfusion     Hypertension        Past Surgical History:  Past Surgical History:   Procedure Laterality Date    ABDOMINAL AORTIC ANEURYSM REPAIR      Stent placed    ABDOMINAL  SURGERY      COLONOSCOPY Left 2017    Procedure: COLONOSCOPY;  Surgeon: Boaz Toussaint MD;  Location: [facility];  Service: Endoscopy;  Laterality: Left;    VASCULAR SURGERY      abdominal aneurysm repair         Family History:  Family History   Problem Relation Name Age of Onset    Hypertension Father      Heart attack Father      Cancer Mother          bladder       Social History:  Social History     Tobacco Use    Smoking status: Former     Current packs/day: 0.00     Average packs/day: 2.0 packs/day for 20.0 years (40.0 ttl pk-yrs)     Types: Cigarettes     Start date: 1957     Quit date: 1977     Years since quittin.9    Smokeless tobacco: Former   Substance and Sexual Activity    Alcohol use: No    Drug use: No    Sexual activity: Not Currently       The Past Medical History, Social History, Surgical History and Family History was reviewed as documented above.     Review of Systems   Review of Systems   Constitutional:  Negative for chills and fever.   HENT:  Negative for congestion, postnasal drip and rhinorrhea.    Respiratory:  Positive for cough. Negative for chest tightness.    Cardiovascular:  Positive for leg swelling. Negative for chest pain and palpitations.   Gastrointestinal:  Negative for abdominal pain, blood in stool, nausea and vomiting.   Genitourinary:  Negative for dysuria.          Physical Exam     Initial Vitals [12/10/24 1026]   BP Pulse Resp Temp SpO2   (!) 122/58 67 (!) 24 97.8 °F (36.6 °C) (!) 89 %      MAP       --          Physical Exam    Nursing Notes and Vital Signs Reviewed.  Constitutional: Patient is in NAD . Well-developed and well-nourished.  Head: Atraumatic. Normocephalic.  Eyes: PERRL. EOM intact. Conjunctivae are not pale. No scleral icterus.  ENT: Mucous membranes are moist. Oropharynx is clear and symmetric.    Neck: Supple. Full ROM. No lymphadenopathy.  Cardiovascular:  Irregular irregular. No murmurs, rubs, or gallops. Distal pulses are 2+ and  "symmetric.  Pulmonary/Chest: No respiratory distress. Clear to auscultation bilaterally. No wheezing or rales.  Abdominal: Soft and non-distended.  There is no tenderness.  No rebound, guarding, or rigidity. Good bowel sounds.  Musculoskeletal: Moves all extremities. No obvious deformities. (+) 3 edema. No calf tenderness.  Genitourinary:   Skin: Warm and dry.  Neurological:  Alert, awake, and appropriate.  Normal speech.  No acute focal neurological deficits are appreciated.  Psychiatric: Normal affect. Good eye contact. Appropriate in content.     ED Course   ED Procedures:  Procedures    ED Vital Signs:  Vitals:    12/10/24 1026 12/10/24 1034 12/10/24 1035 12/10/24 1046   BP: (!) 122/58  137/61 123/60   Pulse: 67 63 66 63   Resp: (!) 24  (!) 24 (!) 23   Temp: 97.8 °F (36.6 °C)      TempSrc: Oral      SpO2: (!) 89%  (!) 93% (!) 93%   Weight: 81.6 kg (180 lb)      Height: 5' 8" (1.727 m)       12/10/24 1101 12/10/24 1116 12/10/24 1156 12/10/24 1206   BP: 131/63 126/60  (!) 120/56   Pulse: 62 62 61 62   Resp: (!) 22 20 (!) 25 (!) 22   Temp:       TempSrc:       SpO2: (!) 93% (!) 93% (!) 93% (!) 94%   Weight:       Height:        12/10/24 1216 12/10/24 1246 12/10/24 1301   BP: (!) 132/59 (!) 126/55 130/60   Pulse: 61 61 61   Resp: (!) 25 19 (!) 22   Temp:      TempSrc:      SpO2: (!) 93% (!) 93% (!) 94%   Weight:      Height:          All Lab Results:  Results for orders placed or performed during the hospital encounter of 12/10/24   CBC auto differential    Collection Time: 12/10/24 10:55 AM   Result Value Ref Range    WBC 8.48 3.90 - 12.70 K/uL    RBC 3.84 (L) 4.60 - 6.20 M/uL    Hemoglobin 11.4 (L) 14.0 - 18.0 g/dL    Hematocrit 36.1 (L) 40.0 - 54.0 %    MCV 94 82 - 98 fL    MCH 29.7 27.0 - 31.0 pg    MCHC 31.6 (L) 32.0 - 36.0 g/dL    RDW 13.2 11.5 - 14.5 %    Platelets 233 150 - 450 K/uL    MPV 9.7 9.2 - 12.9 fL    Immature Granulocytes 0.5 0.0 - 0.5 %    Gran # (ANC) 5.9 1.8 - 7.7 K/uL    Immature Grans (Abs) " 0.04 0.00 - 0.04 K/uL    Lymph # 1.4 1.0 - 4.8 K/uL    Mono # 0.7 0.3 - 1.0 K/uL    Eos # 0.3 0.0 - 0.5 K/uL    Baso # 0.04 0.00 - 0.20 K/uL    nRBC 0 0 /100 WBC    Gran % 69.9 38.0 - 73.0 %    Lymph % 16.6 (L) 18.0 - 48.0 %    Mono % 8.6 4.0 - 15.0 %    Eosinophil % 3.9 0.0 - 8.0 %    Basophil % 0.5 0.0 - 1.9 %    Differential Method Automated    Comprehensive metabolic panel    Collection Time: 12/10/24 10:55 AM   Result Value Ref Range    Sodium 145 136 - 145 mmol/L    Potassium 3.5 3.5 - 5.1 mmol/L    Chloride 103 95 - 110 mmol/L    CO2 31 (H) 23 - 29 mmol/L    Glucose 136 (H) 70 - 110 mg/dL    BUN 29 10 - 30 mg/dL    Creatinine 2.2 (H) 0.5 - 1.4 mg/dL    Calcium 8.6 (L) 8.7 - 10.5 mg/dL    Total Protein 6.4 6.0 - 8.4 g/dL    Albumin 3.2 (L) 3.5 - 5.2 g/dL    Total Bilirubin 0.5 0.1 - 1.0 mg/dL    Alkaline Phosphatase 112 40 - 150 U/L    AST 15 10 - 40 U/L    ALT 10 10 - 44 U/L    eGFR 27.1 (A) >60 mL/min/1.73 m^2    Anion Gap 11 8 - 16 mmol/L   Troponin I    Collection Time: 12/10/24 10:55 AM   Result Value Ref Range    Troponin I 0.022 0.000 - 0.026 ng/mL   Brain natriuretic peptide    Collection Time: 12/10/24 10:55 AM   Result Value Ref Range    BNP 88 0 - 99 pg/mL   Protime-INR    Collection Time: 12/10/24 10:55 AM   Result Value Ref Range    Prothrombin Time 12.3 9.0 - 12.5 sec    INR 1.1 0.8 - 1.2   Lactic acid, plasma    Collection Time: 12/10/24 10:55 AM   Result Value Ref Range    Lactate (Lactic Acid) 1.6 0.5 - 2.2 mmol/L   POCT COVID-19 Rapid Screening    Collection Time: 12/10/24 12:32 PM   Result Value Ref Range    POC Rapid COVID Negative Negative     Acceptable Yes    POCT Influenza A/B Molecular    Collection Time: 12/10/24 12:32 PM   Result Value Ref Range    POC Molecular Influenza A Ag Negative Negative    POC Molecular Influenza B Ag Negative Negative     Acceptable Yes          Reviewed Prior Labs:   Latest Reference Range & Units 01/03/23 09:08 12/10/24 10:55    BUN 10 - 30 mg/dL 24 29   Creatinine 0.5 - 1.4 mg/dL 2.2 (H) 2.2 (H)      Latest Reference Range & Units 01/03/23 09:08 07/03/23 08:22 12/10/24 10:55   Hemoglobin 14.0 - 18.0 g/dL 11.6 (L) 11.3 (L) (E) 11.4 (L)   Hematocrit 40.0 - 54.0 % 35.6 (L) 34.5 (L) (E) 36.1 (L)       The EKG was ordered, reviewed, and independently interpreted by the ED provider:      ECG Results              EKG 12-lead (Preliminary result)  Result time 12/10/24 10:48:21      Wet Read by Felecia Grossman DO (12/10/24 10:48:21, [facility] Emergency Dept, Emergency Medicine)    64 beats per minute.  Atrial flutter with variable block.  Left axis deviation.  No ST segment elevation.  No STEMI.                                     Imaging Results:  Imaging Results              US Lower Extremity Veins Bilateral (Final result)  Result time 12/10/24 13:35:40      Final result by Ever Jaimes MD (12/10/24 13:35:40)                   Impression:      No evidence of deep venous thrombosis in either lower extremity.      Electronically signed by: Ever Jaimes  Date:    12/10/2024  Time:    13:35               Narrative:    EXAMINATION:  US LOWER EXTREMITY VEINS BILATERAL    CLINICAL HISTORY:  Other specified soft tissue disorders    TECHNIQUE:  Duplex and color flow Doppler and dynamic compression was performed of the bilateral lower extremity veins was performed.    COMPARISON:  None    FINDINGS:  Right thigh veins: The common femoral, femoral, popliteal, upper greater saphenous, and deep femoral veins are patent and free of thrombus. The veins are normally compressible and have normal phasic flow and augmentation response.    Right calf veins: The visualized calf veins are patent.    Left thigh veins: The common femoral, femoral, popliteal, upper greater saphenous, and deep femoral veins are patent and free of thrombus. The veins are normally compressible and have normal phasic flow and augmentation response.    Left calf veins: The  visualized calf veins are patent.                                       X-Ray Chest AP Portable (Final result)  Result time 12/10/24 11:02:57      Final result by David Frost MD (12/10/24 11:02:57)                   Impression:      1.  Lateral left upper lobe opacity, most likely early pneumonia.  Treatment for presumed pneumonia and follow-up x-rays in 4-6 weeks recommended to ensure resolution after adequate treatment.    2.  Low lung volumes with vascular crowding or atelectasis in the lung bases.    3.  Stable findings as noted above.      Electronically signed by: David Frost MD  Date:    12/10/2024  Time:    11:02               Narrative:    EXAMINATION:  XR CHEST AP PORTABLE    CLINICAL HISTORY:  CHF;    COMPARISON:  June 1, 2020    FINDINGS:  EKG leads overlie the chest.  Low lung volumes with vascular crowding or atelectasis in the lung bases.  Possible small effusions.  There appears to be a left upper lung infiltrate.  The lungs are otherwise clear.  The cardiac silhouette size is enlarged.  The trachea is midline and the mediastinal width is normal. Negative for pneumothorax.  Pulmonary vasculature is normal. Negative for osseous abnormalities. Tortuous aorta with calcifications of the aortic knob.  There are degenerative changes of the spine and both shoulder girdles.                                            The Emergency Provider reviewed the vital signs and test results, which are outlined above.     ED Discussion   ED Medication(s):  Medications   furosemide injection 40 mg (40 mg Intravenous Given 12/10/24 1059)   cefTRIAXone injection 2 g (2 g Intravenous Given 12/10/24 1143)   doxycycline tablet 100 mg (100 mg Oral Given 12/10/24 1143)       ED Course as of 12/10/24 1442   Tue Dec 10, 2024   1156 Glucose(!): 136 [LB]   1156 Creatinine(!): 2.2 [LB]   1156 BUN: 29 [LB]   1156 BNP: 88 [LB]   1211 Updated sister and family regarding labs/imaging.  They are requesting [facility]. [LB]      ED Course User Index  [LB] Felecia Grossman DO               12:59 PM:  Discussed recommendation of observation.  Patient and family requested [facility].  Secure chat with  OG Edmondson. CRISTIAN Daly MD agrees with current care and management of pt and accepts admission.   Admitting/Observing Service: Hospital Medicine Service   Admitting Physician: CRISTIAN Daly MD  Floor:Telemetry    1:00 PM   All historical, clinical, radiographic, and laboratory findings were reviewed with the patient/family in detail.  I discussed the indications and treatment need: (Internal Medicine and Cardiology) .    Patient/Family requests transfer to: [facility]    Patient/Family understands that [facility] [place] [facility] does provide (Internal Medicine and Cardiology) services.     Patient/family verbalized understanding.   All remaining questions and concerns were addressed at that time and the patient/family agrees to proceed accordingly.  Similarly all pertinent details of the encounter were discussed with OG Edmondson at [facility] . CRISTIAN Daly MD agrees to accept the patient in transfer based on the needs/patient preferences outlined above.  Patient will be transferred by [facility] , secondary to a need for ongoing Cardiac Monitoring and Oxygen  en route.  Risks: of transfer:    inclement weather, loss of vitals signs, permanent neurologic damage, MVC, loss of current lifestyle,  and/or death.  Benefits of transfer: Internal Medicine and Cardiology.  Patient and/or family agree and verbalize understanding.     Felecia Grossman DO,  FACEP       MIPS Measures     Smoker? No     Hypertension: Patient has a known history of hypertension.     Medical Decision Making                 Medical Decision Making  Differential diagnosis: COVID, pneumonia, influenza, congestive heart failure, atypical  ACS, DVT    ED course: EKG shows atrial flutter, rate controlled.  Blood cultures obtained.  WBC 8.48.  Hemoglobin/hematocrit 11.4/36.1.  Previous 11.3/34.5 on July 3, 2023.  BUN 29.  Creatinine 2.2.  Note this is at patient's baseline.  Liver enzymes normal.  Anion gap normal.  Troponin 0.0 2 2.  BNP 88.  Lactic 1.6.  Chest x-ray shows left lateral lobe opacity.  I believe I see a right lower lobe infiltrate as well.  Likely early pneumonia.  Patient was given 40 mg IV Lasix.  Rocephin 2 g.  Doxycycline 100 mg.  Recommend transfer for treatment of pneumonia.  US negative for DVT.    Amount and/or Complexity of Data Reviewed  Labs: ordered. Decision-making details documented in ED Course.  Radiology: ordered and independent interpretation performed. Decision-making details documented in ED Course.     Details: Left upper lobe infiltrate, right lower lobe infiltrate.  ECG/medicine tests: ordered and independent interpretation performed. Decision-making details documented in ED Course.    Risk  Prescription drug management.  Decision regarding hospitalization.        Coding    Referrals:  No orders of the defined types were placed in this encounter.      Prescription Management: I performed a review of the patient's current Rx medication list as input by nursing staff.    Patient's Medications   New Prescriptions    No medications on file   Previous Medications    AMLODIPINE (NORVASC) 5 MG TABLET    Take 1 tablet (5 mg total) by mouth once daily.    CARVEDILOL (COREG) 12.5 MG TABLET    Take 12.5 mg by mouth 2 (two) times daily with meals.    DILTIAZEM (CARDIZEM CD) 300 MG 24 HR CAPSULE    Take 1 capsule by mouth every morning.    FUROSEMIDE (LASIX) 20 MG TABLET    Take 1 tablet (20 mg total) by mouth once daily.    HYDRALAZINE (APRESOLINE) 10 MG TABLET    Take 10 mg by mouth every 12 (twelve) hours.    LEVOTHYROXINE (SYNTHROID) 50 MCG TABLET    Take 1 tablet (50 mcg total) by mouth before breakfast.    PANTOPRAZOLE  (PROTONIX) 40 MG TABLET    Take 1 tablet by mouth Daily.    PRAVASTATIN (PRAVACHOL) 40 MG TABLET    Take 1 tablet by mouth once daily.   Modified Medications    No medications on file   Discontinued Medications    ASCORBIC ACID, VITAMIN C, (VITAMIN C) 1000 MG TABLET    Take 1,000 mg by mouth Daily.    BLOOD SUGAR DIAGNOSTIC STRP    Test once a day    BLOOD-GLUCOSE METER MISC    by Other route.    BUDESONIDE-FORMOTEROL 80-4.5 MCG (SYMBICORT) 80-4.5 MCG/ACTUATION HFAA    INHALE TWO PUFFS TWICE DAILY    CARTIA  MG 24 HR CAPSULE    TAKE ONE CAPSULE BY MOUTH ONCE DAILY    CARVEDILOL (COREG) 6.25 MG TABLET    Take 6.25 mg by mouth 2 (two) times daily with meals.     CETIRIZINE (ZYRTEC) 10 MG TABLET    Take 10 mg by mouth once daily.    CETIRIZINE (ZYRTEC) 5 MG TABLET    Take 5 mg by mouth daily as needed.    COMBIVENT RESPIMAT  MCG/ACTUATION INHALER    USE 1 PUFF EVERY SIX HOURS AS NEEDED FOR WHEEZING    IMIQUIMOD (ALDARA) 5 % CREAM    Apply 5 nights a week for 6 weeks then wash off in the morning    IRON-VITAMIN C 100-250 MG, ICAR-C, 100-250 MG TAB    TAKE ONE TABLET BY MOUTH ONCE DAILY    LANCETS MISC    use as directed to check glucose twice per day    LEVOTHYROXINE (SYNTHROID) 100 MCG TABLET    Take 1 tablet by mouth every morning.    LOSARTAN (COZAAR) 50 MG TABLET    TAKE ONE TABLET BY MOUTH ONCE DAILY    MELOXICAM (MOBIC) 7.5 MG TABLET    TAKE ONE TABLET BY MOUTH DAILY AS NEEDED FOR ARTHRITIS    METFORMIN (GLUCOPHAGE-XR) 750 MG 24 HR TABLET    Take 750 mg by mouth every evening.    PRAVASTATIN (PRAVACHOL) 20 MG TABLET    Take 1 tablet (20 mg total) by mouth nightly.    PREDNISONE (DELTASONE) 10 MG TABLET    Take 1 tablet (10 mg total) by mouth once daily.    SELENIUM 50 MCG TAB    Take 2 mcg by mouth nightly.    TRADJENTA 5 MG TAB TABLET    Take 1 tablet by mouth once daily.    VITAMIN D 1000 UNITS TAB    Take 1 tablet by mouth Daily.        Discussed case verbally with:       Portions of this note may  "have been created with voice recognition software. Occasional "wrong-word" or "sound-a-like" substitutions may have occurred due to the inherent limitations of voice recognition software. Please, read the note carefully and recognize, using context, where substitutions have occurred.          Clinical Impression       ICD-10-CM ICD-9-CM   1. Pneumonia  J18.9 486   2. Shortness of breath  R06.02 786.05   3. Swelling of lower leg, bilaterally  M79.89 729.81   4. Chronic kidney disease, unspecified CKD stage  N18.9 585.9        Disposition        Disposition: Admit to telemetry  Patient condition: Stable                 Felecia Grossman, DO  12/10/24 1445    "

## 2024-12-11 ENCOUNTER — DOCUMENTATION ONLY (OUTPATIENT)
Dept: CARDIOLOGY | Facility: CLINIC | Age: 89
End: 2024-12-11
Payer: MEDICARE

## 2024-12-11 PROBLEM — I48.92 ATRIAL FLUTTER: Status: ACTIVE | Noted: 2024-12-11

## 2024-12-11 PROBLEM — J18.9 PNEUMONIA: Status: ACTIVE | Noted: 2024-12-11

## 2024-12-11 LAB
ANION GAP SERPL CALC-SCNC: 13 MMOL/L (ref 8–16)
AORTIC ROOT ANNULUS: 3.72 CM
ASCENDING AORTA: 3.32 CM
AV INDEX (PROSTH): 0.73
AV MEAN GRADIENT: 5.1 MMHG
AV PEAK GRADIENT: 10.2 MMHG
AV VALVE AREA BY VELOCITY RATIO: 2.2 CM²
AV VALVE AREA: 2.3 CM²
AV VELOCITY RATIO: 0.69
BSA FOR ECHO PROCEDURE: 1.96 M2
BUN SERPL-MCNC: 29 MG/DL (ref 10–30)
CALCIUM SERPL-MCNC: 8.3 MG/DL (ref 8.7–10.5)
CHLORIDE SERPL-SCNC: 103 MMOL/L (ref 95–110)
CO2 SERPL-SCNC: 29 MMOL/L (ref 23–29)
CREAT SERPL-MCNC: 2.1 MG/DL (ref 0.5–1.4)
CV ECHO LV RWT: 0.46 CM
DOP CALC AO PEAK VEL: 1.6 M/S
DOP CALC AO VTI: 33.5 CM
DOP CALC LVOT AREA: 3.1 CM2
DOP CALC LVOT DIAMETER: 2 CM
DOP CALC LVOT PEAK VEL: 1.1 M/S
DOP CALC LVOT STROKE VOLUME: 76.6 CM3
DOP CALC RVOT PEAK VEL: 1.04 M/S
DOP CALC RVOT VTI: 20.1 CM
DOP CALCLVOT PEAK VEL VTI: 24.4 CM
ECHO LV POSTERIOR WALL: 1.1 CM (ref 0.6–1.1)
EST. GFR  (NO RACE VARIABLE): 29 ML/MIN/1.73 M^2
ESTIMATED AVG GLUCOSE: 151 MG/DL (ref 68–131)
FRACTIONAL SHORTENING: 33.3 % (ref 28–44)
GLUCOSE SERPL-MCNC: 104 MG/DL (ref 70–110)
HBA1C MFR BLD: 6.9 % (ref 4–5.6)
INTERVENTRICULAR SEPTUM: 1.1 CM (ref 0.6–1.1)
IVC DIAMETER: 2.59 CM
LA MAJOR: 5.9 CM
LA MINOR: 6.54 CM
LA WIDTH: 4.6 CM
LEFT ATRIUM AREA SYSTOLIC (APICAL 2 CHAMBER): 24.76 CM2
LEFT ATRIUM SIZE: 3.36 CM
LEFT ATRIUM VOLUME INDEX: 42.2 ML/M2
LEFT ATRIUM VOLUME: 81.5 CM3
LEFT INTERNAL DIMENSION IN SYSTOLE: 3.2 CM (ref 2.1–4)
LEFT VENTRICLE DIASTOLIC VOLUME INDEX: 54.98 ML/M2
LEFT VENTRICLE DIASTOLIC VOLUME: 106.12 ML
LEFT VENTRICLE END SYSTOLIC VOLUME APICAL 2 CHAMBER: 85.3 ML
LEFT VENTRICLE MASS INDEX: 100.5 G/M2
LEFT VENTRICLE SYSTOLIC VOLUME INDEX: 20.7 ML/M2
LEFT VENTRICLE SYSTOLIC VOLUME: 40.02 ML
LEFT VENTRICULAR INTERNAL DIMENSION IN DIASTOLE: 4.8 CM (ref 3.5–6)
LEFT VENTRICULAR MASS: 194 G
LVED V (TEICH): 106.12 ML
LVES V (TEICH): 40.02 ML
LVOT MG: 2.56 MMHG
LVOT MV: 0.75 CM/S
OHS QRS DURATION: 84 MS
OHS QTC CALCULATION: 429 MS
PISA TR MAX VEL: 3.39 M/S
POTASSIUM SERPL-SCNC: 3.1 MMOL/L (ref 3.5–5.1)
PV MEAN GRADIENT: 2 MMHG
PV PEAK GRADIENT: 4 MMHG
PV PEAK VELOCITY: 1.03 M/S
RA MAJOR: 5.8 CM
RA PRESSURE ESTIMATED: 15 MMHG
RA WIDTH: 4.2 CM
RIGHT VENTRICULAR END-DIASTOLIC DIMENSION: 3.92 CM
RV TB RVSP: 18 MMHG
SODIUM SERPL-SCNC: 145 MMOL/L (ref 136–145)
STJ: 3.33 CM
TR MAX PG: 46 MMHG
TRICUSPID ANNULAR PLANE SYSTOLIC EXCURSION: 1.48 CM
TV REST PULMONARY ARTERY PRESSURE: 61 MMHG
Z-SCORE OF LEFT VENTRICULAR DIMENSION IN END DIASTOLE: -1.3
Z-SCORE OF LEFT VENTRICULAR DIMENSION IN END SYSTOLE: -0.39

## 2024-12-11 PROCEDURE — 92523 SPEECH SOUND LANG COMPREHEN: CPT

## 2024-12-11 PROCEDURE — 63600175 PHARM REV CODE 636 W HCPCS: Performed by: FAMILY MEDICINE

## 2024-12-11 PROCEDURE — 80048 BASIC METABOLIC PNL TOTAL CA: CPT | Performed by: NURSE PRACTITIONER

## 2024-12-11 PROCEDURE — 27000221 HC OXYGEN, UP TO 24 HOURS

## 2024-12-11 PROCEDURE — 94799 UNLISTED PULMONARY SVC/PX: CPT

## 2024-12-11 PROCEDURE — 97110 THERAPEUTIC EXERCISES: CPT

## 2024-12-11 PROCEDURE — 99223 1ST HOSP IP/OBS HIGH 75: CPT | Mod: ,,, | Performed by: PHYSICIAN ASSISTANT

## 2024-12-11 PROCEDURE — 27100092 HC HIGH FLOW DELIVERY CANNULA

## 2024-12-11 PROCEDURE — 36415 COLL VENOUS BLD VENIPUNCTURE: CPT | Performed by: NURSE PRACTITIONER

## 2024-12-11 PROCEDURE — 63600175 PHARM REV CODE 636 W HCPCS: Performed by: NURSE PRACTITIONER

## 2024-12-11 PROCEDURE — 21400001 HC TELEMETRY ROOM

## 2024-12-11 PROCEDURE — 94664 DEMO&/EVAL PT USE INHALER: CPT

## 2024-12-11 PROCEDURE — 25000003 PHARM REV CODE 250: Performed by: NURSE PRACTITIONER

## 2024-12-11 PROCEDURE — 92610 EVALUATE SWALLOWING FUNCTION: CPT

## 2024-12-11 PROCEDURE — 94761 N-INVAS EAR/PLS OXIMETRY MLT: CPT

## 2024-12-11 PROCEDURE — 99900035 HC TECH TIME PER 15 MIN (STAT)

## 2024-12-11 PROCEDURE — 25000003 PHARM REV CODE 250: Performed by: FAMILY MEDICINE

## 2024-12-11 PROCEDURE — 97162 PT EVAL MOD COMPLEX 30 MIN: CPT

## 2024-12-11 PROCEDURE — 99223 1ST HOSP IP/OBS HIGH 75: CPT | Mod: 25,,, | Performed by: INTERNAL MEDICINE

## 2024-12-11 PROCEDURE — 93010 ELECTROCARDIOGRAM REPORT: CPT | Mod: ,,, | Performed by: INTERNAL MEDICINE

## 2024-12-11 PROCEDURE — 93005 ELECTROCARDIOGRAM TRACING: CPT | Mod: ER

## 2024-12-11 PROCEDURE — 27000646 HC AEROBIKA DEVICE

## 2024-12-11 PROCEDURE — 97165 OT EVAL LOW COMPLEX 30 MIN: CPT

## 2024-12-11 PROCEDURE — 94640 AIRWAY INHALATION TREATMENT: CPT

## 2024-12-11 PROCEDURE — 25000242 PHARM REV CODE 250 ALT 637 W/ HCPCS: Performed by: FAMILY MEDICINE

## 2024-12-11 RX ORDER — POTASSIUM CHLORIDE 7.45 MG/ML
10 INJECTION INTRAVENOUS
Status: COMPLETED | OUTPATIENT
Start: 2024-12-11 | End: 2024-12-11

## 2024-12-11 RX ORDER — MAGNESIUM SULFATE HEPTAHYDRATE 40 MG/ML
2 INJECTION, SOLUTION INTRAVENOUS ONCE
Status: COMPLETED | OUTPATIENT
Start: 2024-12-11 | End: 2024-12-11

## 2024-12-11 RX ORDER — IPRATROPIUM BROMIDE AND ALBUTEROL SULFATE 2.5; .5 MG/3ML; MG/3ML
3 SOLUTION RESPIRATORY (INHALATION)
Status: DISCONTINUED | OUTPATIENT
Start: 2024-12-11 | End: 2024-12-26 | Stop reason: HOSPADM

## 2024-12-11 RX ORDER — LANOLIN ALCOHOL/MO/W.PET/CERES
400 CREAM (GRAM) TOPICAL 2 TIMES DAILY
Status: DISCONTINUED | OUTPATIENT
Start: 2024-12-11 | End: 2024-12-26 | Stop reason: HOSPADM

## 2024-12-11 RX ADMIN — IPRATROPIUM BROMIDE AND ALBUTEROL SULFATE 3 ML: 2.5; .5 SOLUTION RESPIRATORY (INHALATION) at 02:12

## 2024-12-11 RX ADMIN — MAGNESIUM SULFATE HEPTAHYDRATE 2 G: 40 INJECTION, SOLUTION INTRAVENOUS at 08:12

## 2024-12-11 RX ADMIN — LEVOTHYROXINE SODIUM 50 MCG: 50 TABLET ORAL at 06:12

## 2024-12-11 RX ADMIN — POTASSIUM CHLORIDE 10 MEQ: 7.46 INJECTION, SOLUTION INTRAVENOUS at 09:12

## 2024-12-11 RX ADMIN — IPRATROPIUM BROMIDE AND ALBUTEROL SULFATE 3 ML: 2.5; .5 SOLUTION RESPIRATORY (INHALATION) at 08:12

## 2024-12-11 RX ADMIN — ENOXAPARIN SODIUM 30 MG: 30 INJECTION SUBCUTANEOUS at 05:12

## 2024-12-11 RX ADMIN — POTASSIUM CHLORIDE 10 MEQ: 7.46 INJECTION, SOLUTION INTRAVENOUS at 08:12

## 2024-12-11 RX ADMIN — DOXYCYCLINE HYCLATE 100 MG: 100 TABLET, COATED ORAL at 09:12

## 2024-12-11 RX ADMIN — POTASSIUM CHLORIDE 10 MEQ: 7.46 INJECTION, SOLUTION INTRAVENOUS at 01:12

## 2024-12-11 RX ADMIN — POTASSIUM CHLORIDE 10 MEQ: 7.46 INJECTION, SOLUTION INTRAVENOUS at 10:12

## 2024-12-11 RX ADMIN — HYDRALAZINE HYDROCHLORIDE 10 MG: 10 TABLET, FILM COATED ORAL at 08:12

## 2024-12-11 RX ADMIN — DILTIAZEM HYDROCHLORIDE 300 MG: 180 CAPSULE, COATED, EXTENDED RELEASE ORAL at 06:12

## 2024-12-11 RX ADMIN — CARVEDILOL 12.5 MG: 12.5 TABLET, FILM COATED ORAL at 08:12

## 2024-12-11 RX ADMIN — PANTOPRAZOLE SODIUM 40 MG: 40 TABLET, DELAYED RELEASE ORAL at 05:12

## 2024-12-11 RX ADMIN — PRAVASTATIN SODIUM 40 MG: 20 TABLET ORAL at 08:12

## 2024-12-11 RX ADMIN — MAGNESIUM OXIDE TAB 400 MG (241.3 MG ELEMENTAL MG) 400 MG: 400 (241.3 MG) TAB at 08:12

## 2024-12-11 RX ADMIN — DOXYCYCLINE HYCLATE 100 MG: 100 TABLET, COATED ORAL at 08:12

## 2024-12-11 RX ADMIN — MAGNESIUM OXIDE TAB 400 MG (241.3 MG ELEMENTAL MG) 400 MG: 400 (241.3 MG) TAB at 09:12

## 2024-12-11 RX ADMIN — CEFTRIAXONE 1 G: 1 INJECTION, POWDER, FOR SOLUTION INTRAMUSCULAR; INTRAVENOUS at 08:12

## 2024-12-11 NOTE — PLAN OF CARE
OT evaluation completed.  Bed mobility CGA  Sit to stand with RW CGA  Ambulated with RW SBA x240 feet  Stand pivot transfer to bedside chair SBA  Completed seated BUE AROM exercises    Recommend low intensity therapy at DC

## 2024-12-11 NOTE — HPI
Mr. Young is a 89 year old male patient whose current medical conditions include COPD, AAA s/p repair, arthritis, asthma, CHF (EF recovered), PAF (not on AC due to prior bleeding issues/AVM's), and HTN who was referred to [facility] ED yesterday by his PCP due to worsening LE edema associated with SOB and cough. Patient denied any associated fever, chills, palpitations, near syncope, or syncope. Initial workup in ED revealed hypokalemia (K 3.1), hypomagnesemia (Mg 1.2), creatinine of 2.1, and negative BNP. EKG showed rate-controlled aflutter. CXR showed findings concerning for JENIFFER PNA and patient was subsequently transferred to [facility] for admission and further treatment. Cardiology consulted to assist with management. Patient seen and examined today, sitting up in bedside chair. Feels ok. Still SOB but improved. No CP symptoms. Does admit to salty food intake, still has some BLE edema. He reports compliance with his medications. Previously seen in clinic by Dr. Fair. Labs reviewed. Troponin negative. TTE pending. Prior cath 12/15 at [facility] showed non-obs CAD.

## 2024-12-11 NOTE — ASSESSMENT & PLAN NOTE
Cardiology note reviewed and anticoagulation being considered.  Currently there are no signs of active bleeding.   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   If anticoagulation started and he bleeds, we would re-evaluate the need for scopes for acute management, but his perioperative risk would remain high given his CHF and Pulm status.   Nothing further planned at this time. Re-consult if needed.

## 2024-12-11 NOTE — CONSULTS
[facility] Tele[facility] ([facility])  Cardiology  Consult Note    Patient Name: Jimmy Young  MRN: [id number]  Admission Date: 12/10/2024  Hospital Length of Stay: 1 days  Code Status: Partial Code   Attending Provider: Juliana Goodwin MD   Consulting Provider: Miranda Lopez PA-C  Primary Care Physician: Nacho Richardson MD  Principal Problem:Pneumonia    Patient information was obtained from patient, relative(s), past medical records, and ER records.     Inpatient consult to Cardiology  Consult performed by: Miranda Lopez PA-C  Consult ordered by: Harvey Crum NP        Subjective:     Chief Complaint:  SOB    HPI:   Mr. Young is a 89 year old male patient whose current medical conditions include COPD, AAA s/p repair, arthritis, asthma, CHF (EF recovered), PAF (not on AC due to prior bleeding issues/AVM's), and HTN who was referred to [facility] ED yesterday by his PCP due to worsening LE edema associated with SOB and cough. Patient denied any associated fever, chills, palpitations, near syncope, or syncope. Initial workup in ED revealed hypokalemia (K 3.1), hypomagnesemia (Mg 1.2), creatinine of 2.1, and negative BNP. EKG showed rate-controlled aflutter. CXR showed findings concerning for JENIFFER PNA and patient was subsequently transferred to [facility] for admission and further treatment. Cardiology consulted to assist with management. Patient seen and examined today, sitting up in bedside chair. Feels ok. Still SOB but improved. No CP symptoms. Does admit to salty food intake, still has some BLE edema. He reports compliance with his medications. Previously seen in clinic by Dr. Fair. Labs reviewed. Troponin negative. TTE pending. Prior cath 12/15 at [facility] showed non-obs CAD.      Past Medical History:   Diagnosis Date    Aneurysm 2016    abdominal, stent placed    Arthritis     Asthma     Atrial fibrillation     Cancer     skin cancer on face    CKD (chronic kidney disease)     COPD (chronic  obstructive pulmonary disease) 10/05/2017    Diabetes mellitus     Emphysema lung     Encounter for blood transfusion     Hypertension        Past Surgical History:   Procedure Laterality Date    ABDOMINAL AORTIC ANEURYSM REPAIR      Stent placed    ABDOMINAL SURGERY      COLONOSCOPY Left 2017    Procedure: COLONOSCOPY;  Surgeon: Boaz Toussaint MD;  Location: [facility];  Service: Endoscopy;  Laterality: Left;    VASCULAR SURGERY      abdominal aneurysm repair       Review of patient's allergies indicates:  No Known Allergies    No current facility-administered medications on file prior to encounter.     Current Outpatient Medications on File Prior to Encounter   Medication Sig    carvediloL (COREG) 12.5 MG tablet Take 12.5 mg by mouth 2 (two) times daily with meals.    diltiaZEM (CARDIZEM CD) 300 MG 24 hr capsule Take 1 capsule by mouth every morning.    furosemide (LASIX) 20 MG tablet Take 1 tablet (20 mg total) by mouth once daily.    hydrALAZINE (APRESOLINE) 10 MG tablet Take 10 mg by mouth every 12 (twelve) hours.    pantoprazole (PROTONIX) 40 MG tablet Take 1 tablet by mouth Daily.    pravastatin (PRAVACHOL) 40 MG tablet Take 1 tablet by mouth once daily.    amLODIPine (NORVASC) 5 MG tablet Take 1 tablet (5 mg total) by mouth once daily.     Family History       Problem Relation (Age of Onset)    Cancer Mother    Heart attack Father    Hypertension Father          Tobacco Use    Smoking status: Former     Current packs/day: 0.00     Average packs/day: 2.0 packs/day for 20.0 years (40.0 ttl pk-yrs)     Types: Cigarettes     Start date: 1957     Quit date: 1977     Years since quittin.9    Smokeless tobacco: Former   Substance and Sexual Activity    Alcohol use: No    Drug use: No    Sexual activity: Not Currently     Review of Systems   Constitutional: Positive for malaise/fatigue.   HENT: Negative.     Eyes: Negative.    Cardiovascular:  Positive for dyspnea on exertion and leg swelling.    Respiratory:  Positive for cough and shortness of breath.    Endocrine: Negative.    Hematologic/Lymphatic: Negative.    Skin: Negative.    Musculoskeletal: Negative.    Gastrointestinal: Negative.    Genitourinary: Negative.    Neurological: Negative.    Psychiatric/Behavioral: Negative.     Allergic/Immunologic: Negative.      Objective:     Vital Signs (Most Recent):  Temp: 97.9 °F (36.6 °C) (12/11/24 0801)  Pulse: 80 (12/11/24 0839)  Resp: 19 (12/11/24 0839)  BP: (!) 144/64 (12/11/24 0801)  SpO2: 95 % (12/11/24 0839) Vital Signs (24h Range):  Temp:  [97.5 °F (36.4 °C)-98.6 °F (37 °C)] 97.9 °F (36.6 °C)  Pulse:  [61-91] 80  Resp:  [19-25] 19  SpO2:  [89 %-95 %] 95 %  BP: (120-173)/(55-82) 144/64     Weight: 80.9 kg (178 lb 5.6 oz)  Body mass index is 27.12 kg/m².    SpO2: 95 %         Intake/Output Summary (Last 24 hours) at 12/11/2024 0848  Last data filed at 12/11/2024 0524  Gross per 24 hour   Intake 250 ml   Output 201 ml   Net 49 ml       Lines/Drains/Airways       Peripheral Intravenous Line  Duration                  Peripheral IV - Single Lumen 12/10/24 1053 18 G Right Antecubital <1 day                     Physical Exam  Vitals and nursing note reviewed.   Constitutional:       General: He is not in acute distress.     Appearance: Normal appearance. He is well-developed. He is not diaphoretic.      Comments: On supplemental O2   HENT:      Head: Normocephalic and atraumatic.   Eyes:      General:         Right eye: No discharge.         Left eye: No discharge.      Pupils: Pupils are equal, round, and reactive to light.   Cardiovascular:      Rate and Rhythm: Normal rate. Rhythm regularly irregular.      Heart sounds: Normal heart sounds, S1 normal and S2 normal. No murmur heard.  Pulmonary:      Effort: Pulmonary effort is normal.      Breath sounds: Rhonchi present.      Comments: Crackles left side  Musculoskeletal:      Right lower leg: Edema present.      Left lower leg: Edema present.   Skin:      General: Skin is warm and dry.      Findings: No erythema.      Comments: CVI changes BLE     Neurological:      Mental Status: He is alert and oriented to person, place, and time.   Psychiatric:         Mood and Affect: Mood normal.         Behavior: Behavior normal.          Significant Labs: CMP   Recent Labs   Lab 12/10/24  1055 12/11/24  0411    145   K 3.5 3.1*    103   CO2 31* 29   * 104   BUN 29 29   CREATININE 2.2* 2.1*   CALCIUM 8.6* 8.3*   PROT 6.4  --    ALBUMIN 3.2*  --    BILITOT 0.5  --    ALKPHOS 112  --    AST 15  --    ALT 10  --    ANIONGAP 11 13   , CBC   Recent Labs   Lab 12/10/24  1055   WBC 8.48   HGB 11.4*   HCT 36.1*      , Troponin   Recent Labs   Lab 12/10/24  1055   TROPONINI 0.022   , and All pertinent lab results from the last 24 hours have been reviewed.    Significant Imaging: Echocardiogram: Transthoracic echo (TTE) complete (Cupid Only):   Results for orders placed or performed during the hospital encounter of 01/08/21   Echo Color Flow Doppler? Yes   Result Value Ref Range    Echo EF Estimated 56 %    AV LVOT peak gradient 5 mmHg    Triscuspid Valve Regurgitation Peak Gradient 29 mmHg    E/A ratio 0.57     Proximal aorta 3.75 cm    STJ 3.54 cm    AV mean gradient 5 mmHg    Ao peak regulo 1.52 m/s    Ao VTI 32.02 cm    IVRT 159.17 msec    IVS 1.45 (A) 0.6 - 1.1 cm    LA size 3.47 cm    Left Atrium Major Axis 4.18 cm    Left Atrium Minor Axis 4.85 cm    LVIDd 4.91 3.5 - 6.0 cm    LVIDs 3.46 2.1 - 4.0 cm    LVOT diameter 2.05 cm    LVOT peak VTI 26.62 cm    PW 1.37 (A) 0.6 - 1.1 cm    MV Peak A Regulo 1.05 m/s    E wave deceleration time 161.69 msec    MV Peak E Regulo 0.60 m/s    RA Major Axis 4.21 cm    RVDD 3.07 cm    Sinus 3.57 cm    TAPSE 1.56 cm    TR Max Regulo 2.68 m/s    TDI LATERAL 0.07 m/s    TDI SEPTAL 0.08 m/s    Ao root annulus 3.75 cm    PV PEAK VELOCITY 1.07 cm/s    LV Diastolic Volume 113.22 mL    LV Systolic Volume 49.64 mL    LVOT peak regulo 1.09 m/s     LA WIDTH 3.40 cm    RA Width 3.51 cm    RVOT peak VTI 21.93 cm    RVOT peak sophy 0.92 m/s    PV mean gradient 2 mmHg    LV LATERAL E/E' RATIO 8.57 m/s    LV SEPTAL E/E' RATIO 7.50 m/s    FS 30 %    LA Vol 45.03 cm3    LV mass 286.41 g    Left Ventricle Relative Wall Thickness 0.56 cm    AV valve area 2.74 cm2    AV Velocity Ratio 0.72     AV index (prosthetic) 0.83     Mean e' 0.08 m/s    LVOT area 3.3 cm2    LVOT stroke volume 87.82 cm3    AV peak gradient 9 mmHg    E/E' ratio 8.00 m/s    LV Systolic Volume Index 26.3 mL/m2    LV Diastolic Volume Index 59.95 mL/m2    RISHI 23.8 mL/m2    LV Mass Index 152 g/m2    BSA 1.9 m2    Right Atrial Pressure (from IVC) 3 mmHg    TV resting pulmonary artery pressure 32 mmHg    Narrative    · The left ventricle is normal in size with moderate concentric   hypertrophy and normal systolic function. The estimated ejection fraction   is 60%  · Indeterminate left ventricular diastolic function.  · Normal right ventricular size with normal right ventricular systolic   function.  · Mild tricuspid regurgitation.  · Normal central venous pressure (3 mmHg).  · The estimated PA systolic pressure is 32 mmHg.       and EKG: Reviewed  Assessment and Plan:   Patient who presents with aflutter/PNA. HR controlled. Continue abx. Diurese prn. Await GI input regarding resumption of AC.    * Pneumonia  -Per primary team, on abx    Atrial flutter  -Noted to be in rate-controlled aflutter  -Continue BB, CCB  -No AC given prior anemia issues/AVM's, high risk of recurrent bleeding mentioned in procedure note from 2017  -Discussed risks/benefits of AC with patient/family, await GI input    CHF exacerbation  -BNP normal  -Diurese prn  -Continue home CV meds  -TTE pending    Coronary artery disease of native artery of native heart with stable angina pectoris  -Stable, CP free  -Continue OMT    Hypercholesterolemia  -Statin    Atrial fibrillation  -See aflutter    Essential hypertension  -Continue home CV  meds        VTE Risk Mitigation (From admission, onward)           Ordered     Place SALUD hose  Until discontinued         12/11/24 0949     enoxaparin injection 30 mg  Daily         12/10/24 1922     IP VTE HIGH RISK PATIENT  Once         12/10/24 1922     Place sequential compression device  Until discontinued         12/10/24 1922                    Thank you for your consult. I will follow-up with patient. Please contact us if you have any additional questions.    Miranda Lopez PA-C  Cardiology   [facility] - Telemetry ([facility])

## 2024-12-11 NOTE — ASSESSMENT & PLAN NOTE
Patient is chronically on statin.will continue for now. Last Lipid Panel:   Lab Results   Component Value Date    CHOL 121 07/03/2023    HDL 37 (L) 07/03/2023    LDLCALC 57 07/03/2023    TRIG 160 (H) 07/03/2023    CHOLHDL 27.8 08/01/2019     Plan:  -Continue home medication  -low fat/low calorie diet

## 2024-12-11 NOTE — ASSESSMENT & PLAN NOTE
Chronic, controlled.  Latest blood pressure and vitals reviewed-   Temp:  [97.5 °F (36.4 °C)-98.6 °F (37 °C)]   Pulse:  [61-91]   Resp:  [19-25]   BP: (120-173)/(55-82)   SpO2:  [89 %-94 %] .   Home meds for hypertension were reviewed and noted below.   Hypertension Medications               amLODIPine (NORVASC) 5 MG tablet Take 1 tablet (5 mg total) by mouth once daily.    carvediloL (COREG) 12.5 MG tablet Take 12.5 mg by mouth 2 (two) times daily with meals.    diltiaZEM (CARDIZEM CD) 300 MG 24 hr capsule Take 1 capsule by mouth every morning.    furosemide (LASIX) 20 MG tablet Take 1 tablet (20 mg total) by mouth once daily.    hydrALAZINE (APRESOLINE) 10 MG tablet Take 10 mg by mouth every 12 (twelve) hours.            While in the hospital, will manage blood pressure as follows; Continue home antihypertensive regimen    Will utilize p.r.n. blood pressure medication only if patient's blood pressure greater than  180/110 and he develops symptoms such as worsening chest pain or shortness of breath.

## 2024-12-11 NOTE — ASSESSMENT & PLAN NOTE
Patient has chronic hypothyroidism. TFTs reviewed-   Lab Results   Component Value Date    TSH 1.780 07/03/2023   . Will continue chronic levothyroxine and adjust for and clinical changes.

## 2024-12-11 NOTE — HOSPITAL COURSE
12/11 admitted for pneumonia. Wears supplemental oxygen at baseline, 2L. On 4L. Speech consulted. Schedule breathing treatments. Discontinue lasix. Replete lytes. Echocardiogram pending. Cardiology consulted. Relaxing normotensive range in this patient demographic.  12/12 blood culture positive for staph. Infectious disease consulted. Continue monitoring repeat blood cultures. Recent medication(s) change with linagliptin with risk for congestive heart failure and skin reaction. Discussed risk vs benefit. Hba1c 6.9. recommend goal hba1c 8-9.   12/13 bun/creatinine increasing. Discontinued intravenous lasix. Remains on increased supplemental oxygen, 5L. Wean as able. Home hydralazine resumed for elevated blood pressure. Repeat blood cultures negative growth to date x 1 day. Infectious disease following  12/14/2024  Patient required increasing to 40 L 100% FiO2 Vapotherm.  Pulmonology consult on case.  Solu-Medrol added.  Continue empiric antibiotics.  Repeat chest x-ray.  12/15/2024  Patient weaned down to 20 L 80% Vapotherm.  Will continue current management.  12/16/2024  Still on 20L 80% vapotherm. Unable to be weaned at this time. Cont abx. Pulm and cardiology on case.   12/17/2024  Chest x-ray improved today compared to yesterday.  Still on 20 L 90% Vapotherm.  Continue antibiotics.  12/18/2024  Currently on 18 L 100% Vapotherm.  Continue antibiotics.  Wean O2 as tolerated.  Discussed with family at bedside the prognosis is guarded.  Patient will likely need placement should he improve.  Discussed SNF versus inpatient hospice.  Will see how patient clinically progresses over the next few days.  12/19/2024  Currently on 25 L 100% Vapotherm.  Continue antibiotics.  Chest x-ray reviewed which is improving however patient clinically declining.  He is requiring increased O2.  Snf versus inpatient hospice.   12/20/2024  Patient agitated today.  Haldol p.r.n. ordered.  Continue antibiotics.  Continue steroids.  Patient  is still requiring high-flow Vapotherm.  Discussed case with family at bedside.  Will continue max therapy over the weekend.  Should patient fail to improve family is agreeable to inpatient hospice.  12/21/2024  Agitation improved.  Continue Haldol p.r.n..  Continue antibiotics and steroids.  Patient weaned down to 18 L 100% FiO2.  Continue current therapy.  Pending clinical progress.  Possible inpatient hospice on Monday if patient failed to improve.  12/22/2024  Currently on 18 L 70% FiO2.  Continue antibiotic therapy.  Wean O2 as tolerated.  12/23/2024  NAEON, weaning vapotherm. Patient with waxing and waning mental status. Discussed condition with tariq's son at bedside. Continue present management. Obtain CXR in AM, continue antibiotics. Discussed with ID and Pulmonary, appreciate assistance.  12/24/2024  NAEON. Weaning vapotherm to HFNC today. Family at bedside. Patient awake, alert, difficulty hearing which is a new for patient. Discussed overall prognosis and GOC, family wishes to consider hospice, will consult SW to assist. Continue present management.  12/25/2024  NAEON. Patient awake, conversant. Family at bedside. Continue to wean HFNC, IV antibiotics. Plans pending for hospice placement.

## 2024-12-11 NOTE — SUBJECTIVE & OBJECTIVE
Interval History: See hospital course for today      Review of Systems   Constitutional:  Positive for activity change, appetite change (poor nutrition) and fatigue. Negative for fever.   HENT:  Positive for hearing loss.    Respiratory:  Positive for shortness of breath (chronic, not worsening).    Cardiovascular:  Positive for leg swelling. Negative for chest pain.   Gastrointestinal:  Negative for nausea and vomiting.   Musculoskeletal:  Positive for gait problem.        Uses walker at baseline   Skin:  Positive for rash and wound.   Neurological:  Positive for weakness.   Psychiatric/Behavioral:  Negative for agitation, behavioral problems, confusion, decreased concentration and dysphoric mood. The patient is not nervous/anxious.      Objective:     Vital Signs (Most Recent):  Temp: 98.4 °F (36.9 °C) (12/12/24 0803)  Pulse: 100 (12/12/24 0822)  Resp: 20 (12/12/24 0822)  BP: (!) 154/74 (12/12/24 0803)  SpO2: (!) 90 % (12/12/24 0822) Vital Signs (24h Range):  Temp:  [97 °F (36.1 °C)-98.7 °F (37.1 °C)] 98.4 °F (36.9 °C)  Pulse:  [] 100  Resp:  [16-20] 20  SpO2:  [83 %-95 %] 90 %  BP: (102-170)/(52-74) 154/74     Weight: 78.6 kg (173 lb 4.5 oz)  Body mass index is 27.14 kg/m².    Intake/Output Summary (Last 24 hours) at 12/12/2024 0944  Last data filed at 12/12/2024 0608  Gross per 24 hour   Intake 320 ml   Output 450 ml   Net -130 ml         Physical Exam  Vitals and nursing note reviewed. Exam conducted with a chaperone present (son).   Constitutional:       General: He is not in acute distress.     Appearance: He is ill-appearing. He is not toxic-appearing.      Interventions: Nasal cannula in place.   HENT:      Head: Normocephalic and atraumatic.   Cardiovascular:      Rate and Rhythm: Normal rate.   Pulmonary:      Effort: Pulmonary effort is normal. No tachypnea or respiratory distress.      Breath sounds: No wheezing, rhonchi or rales.   Abdominal:      Palpations: Abdomen is soft.      Tenderness:  There is no abdominal tenderness.   Genitourinary:     Comments: External johnson  Musculoskeletal:         General: Swelling present.      Right lower leg: Edema present.      Left lower leg: Edema present.   Skin:     General: Skin is warm and dry.      Findings: Erythema, lesion and rash present.   Neurological:      Mental Status: He is alert and oriented to person, place, and time. Mental status is at baseline.      Motor: Weakness present.   Psychiatric:         Mood and Affect: Mood normal.         Behavior: Behavior normal.             Significant Labs: All pertinent labs within the past 24 hours have been reviewed.  A1C:   Recent Labs   Lab 12/10/24  1940   HGBA1C 6.9*     Blood Culture:   Recent Labs   Lab 12/10/24  1053 12/10/24  1056   LABBLOO No Growth to date  No Growth to date Gram stain aer bottle: Gram positive cocci  Results called to and read back by: GEETA Holguin. 12/12/2024  05:15     BMP:   Recent Labs   Lab 12/10/24  1940 12/11/24  0411 12/12/24  0410   GLU  --    < > 103   NA  --    < > 143   K  --    < > 3.5   CL  --    < > 102   CO2  --    < > 29   BUN  --    < > 29   CREATININE  --    < > 1.9*   CALCIUM  --    < > 8.3*   MG 1.2*  --   --     < > = values in this interval not displayed.     CBC:   Recent Labs   Lab 12/10/24  1055   WBC 8.48   HGB 11.4*   HCT 36.1*        Cardiac Markers:   Recent Labs   Lab 12/10/24  1055   BNP 88     Lactic Acid:   Recent Labs   Lab 12/10/24  1055   LACTATE 1.6     Troponin:   Recent Labs   Lab 12/10/24  1055   TROPONINI 0.022       Significant Imaging: I have reviewed all pertinent imaging results/findings within the past 24 hours.  Echo: I have reviewed all pertinent results/findings within the past 24 hours and my personal findings are:  preserved ejection fraction, concentric remodeling

## 2024-12-11 NOTE — SUBJECTIVE & OBJECTIVE
Past Medical History:   Diagnosis Date    Aneurysm 2016    abdominal, stent placed    Arthritis     Asthma     Atrial fibrillation     Cancer     skin cancer on face    CKD (chronic kidney disease)     COPD (chronic obstructive pulmonary disease) 10/05/2017    Diabetes mellitus     Emphysema lung     Encounter for blood transfusion     Hypertension        Past Surgical History:   Procedure Laterality Date    ABDOMINAL AORTIC ANEURYSM REPAIR      Stent placed    ABDOMINAL SURGERY      COLONOSCOPY Left 2017    Procedure: COLONOSCOPY;  Surgeon: Boaz Toussaint MD;  Location: [facility];  Service: Endoscopy;  Laterality: Left;    VASCULAR SURGERY      abdominal aneurysm repair       Review of patient's allergies indicates:  No Known Allergies    No current facility-administered medications on file prior to encounter.     Current Outpatient Medications on File Prior to Encounter   Medication Sig    carvediloL (COREG) 12.5 MG tablet Take 12.5 mg by mouth 2 (two) times daily with meals.    diltiaZEM (CARDIZEM CD) 300 MG 24 hr capsule Take 1 capsule by mouth every morning.    furosemide (LASIX) 20 MG tablet Take 1 tablet (20 mg total) by mouth once daily.    hydrALAZINE (APRESOLINE) 10 MG tablet Take 10 mg by mouth every 12 (twelve) hours.    pantoprazole (PROTONIX) 40 MG tablet Take 1 tablet by mouth Daily.    pravastatin (PRAVACHOL) 40 MG tablet Take 1 tablet by mouth once daily.    amLODIPine (NORVASC) 5 MG tablet Take 1 tablet (5 mg total) by mouth once daily.     Family History       Problem Relation (Age of Onset)    Cancer Mother    Heart attack Father    Hypertension Father          Tobacco Use    Smoking status: Former     Current packs/day: 0.00     Average packs/day: 2.0 packs/day for 20.0 years (40.0 ttl pk-yrs)     Types: Cigarettes     Start date: 1957     Quit date: 1977     Years since quittin.9    Smokeless tobacco: Former   Substance and Sexual Activity    Alcohol use: No    Drug use: No     Sexual activity: Not Currently     Review of Systems   Constitutional: Positive for malaise/fatigue.   HENT: Negative.     Eyes: Negative.    Cardiovascular:  Positive for dyspnea on exertion and leg swelling.   Respiratory:  Positive for cough and shortness of breath.    Endocrine: Negative.    Hematologic/Lymphatic: Negative.    Skin: Negative.    Musculoskeletal: Negative.    Gastrointestinal: Negative.    Genitourinary: Negative.    Neurological: Negative.    Psychiatric/Behavioral: Negative.     Allergic/Immunologic: Negative.      Objective:     Vital Signs (Most Recent):  Temp: 97.9 °F (36.6 °C) (12/11/24 0801)  Pulse: 80 (12/11/24 0839)  Resp: 19 (12/11/24 0839)  BP: (!) 144/64 (12/11/24 0801)  SpO2: 95 % (12/11/24 0839) Vital Signs (24h Range):  Temp:  [97.5 °F (36.4 °C)-98.6 °F (37 °C)] 97.9 °F (36.6 °C)  Pulse:  [61-91] 80  Resp:  [19-25] 19  SpO2:  [89 %-95 %] 95 %  BP: (120-173)/(55-82) 144/64     Weight: 80.9 kg (178 lb 5.6 oz)  Body mass index is 27.12 kg/m².    SpO2: 95 %         Intake/Output Summary (Last 24 hours) at 12/11/2024 0848  Last data filed at 12/11/2024 0524  Gross per 24 hour   Intake 250 ml   Output 201 ml   Net 49 ml       Lines/Drains/Airways       Peripheral Intravenous Line  Duration                  Peripheral IV - Single Lumen 12/10/24 1053 18 G Right Antecubital <1 day                     Physical Exam  Vitals and nursing note reviewed.   Constitutional:       General: He is not in acute distress.     Appearance: Normal appearance. He is well-developed. He is not diaphoretic.      Comments: On supplemental O2   HENT:      Head: Normocephalic and atraumatic.   Eyes:      General:         Right eye: No discharge.         Left eye: No discharge.      Pupils: Pupils are equal, round, and reactive to light.   Cardiovascular:      Rate and Rhythm: Normal rate. Rhythm regularly irregular.      Heart sounds: Normal heart sounds, S1 normal and S2 normal. No murmur heard.  Pulmonary:       Effort: Pulmonary effort is normal.      Breath sounds: Rhonchi present.      Comments: Crackles left side  Musculoskeletal:      Right lower leg: Edema present.      Left lower leg: Edema present.   Skin:     General: Skin is warm and dry.      Findings: No erythema.      Comments: CVI changes BLE     Neurological:      Mental Status: He is alert and oriented to person, place, and time.   Psychiatric:         Mood and Affect: Mood normal.         Behavior: Behavior normal.          Significant Labs: CMP   Recent Labs   Lab 12/10/24  1055 12/11/24  0411    145   K 3.5 3.1*    103   CO2 31* 29   * 104   BUN 29 29   CREATININE 2.2* 2.1*   CALCIUM 8.6* 8.3*   PROT 6.4  --    ALBUMIN 3.2*  --    BILITOT 0.5  --    ALKPHOS 112  --    AST 15  --    ALT 10  --    ANIONGAP 11 13   , CBC   Recent Labs   Lab 12/10/24  1055   WBC 8.48   HGB 11.4*   HCT 36.1*      , Troponin   Recent Labs   Lab 12/10/24  1055   TROPONINI 0.022   , and All pertinent lab results from the last 24 hours have been reviewed.    Significant Imaging: Echocardiogram: Transthoracic echo (TTE) complete (Cupid Only):   Results for orders placed or performed during the hospital encounter of 01/08/21   Echo Color Flow Doppler? Yes   Result Value Ref Range    Echo EF Estimated 56 %    AV LVOT peak gradient 5 mmHg    Triscuspid Valve Regurgitation Peak Gradient 29 mmHg    E/A ratio 0.57     Proximal aorta 3.75 cm    STJ 3.54 cm    AV mean gradient 5 mmHg    Ao peak regulo 1.52 m/s    Ao VTI 32.02 cm    IVRT 159.17 msec    IVS 1.45 (A) 0.6 - 1.1 cm    LA size 3.47 cm    Left Atrium Major Axis 4.18 cm    Left Atrium Minor Axis 4.85 cm    LVIDd 4.91 3.5 - 6.0 cm    LVIDs 3.46 2.1 - 4.0 cm    LVOT diameter 2.05 cm    LVOT peak VTI 26.62 cm    PW 1.37 (A) 0.6 - 1.1 cm    MV Peak A Regulo 1.05 m/s    E wave deceleration time 161.69 msec    MV Peak E Regulo 0.60 m/s    RA Major Axis 4.21 cm    RVDD 3.07 cm    Sinus 3.57 cm    TAPSE 1.56 cm    TR Max  Regulo 2.68 m/s    TDI LATERAL 0.07 m/s    TDI SEPTAL 0.08 m/s    Ao root annulus 3.75 cm    PV PEAK VELOCITY 1.07 cm/s    LV Diastolic Volume 113.22 mL    LV Systolic Volume 49.64 mL    LVOT peak regulo 1.09 m/s    LA WIDTH 3.40 cm    RA Width 3.51 cm    RVOT peak VTI 21.93 cm    RVOT peak regulo 0.92 m/s    PV mean gradient 2 mmHg    LV LATERAL E/E' RATIO 8.57 m/s    LV SEPTAL E/E' RATIO 7.50 m/s    FS 30 %    LA Vol 45.03 cm3    LV mass 286.41 g    Left Ventricle Relative Wall Thickness 0.56 cm    AV valve area 2.74 cm2    AV Velocity Ratio 0.72     AV index (prosthetic) 0.83     Mean e' 0.08 m/s    LVOT area 3.3 cm2    LVOT stroke volume 87.82 cm3    AV peak gradient 9 mmHg    E/E' ratio 8.00 m/s    LV Systolic Volume Index 26.3 mL/m2    LV Diastolic Volume Index 59.95 mL/m2    RISHI 23.8 mL/m2    LV Mass Index 152 g/m2    BSA 1.9 m2    Right Atrial Pressure (from IVC) 3 mmHg    TV resting pulmonary artery pressure 32 mmHg    Narrative    · The left ventricle is normal in size with moderate concentric   hypertrophy and normal systolic function. The estimated ejection fraction   is 60%  · Indeterminate left ventricular diastolic function.  · Normal right ventricular size with normal right ventricular systolic   function.  · Mild tricuspid regurgitation.  · Normal central venous pressure (3 mmHg).  · The estimated PA systolic pressure is 32 mmHg.       and EKG: Reviewed

## 2024-12-11 NOTE — CONSULTS
"Food & Nutrition Education       Diet Education: Cardiac, Diabetic, Fluid restriction diet     Learners: Patient       Nutrition Education provided with handouts:   "Heart Healthy Consistent Carbohydrate Nutrition Therapy"  "Fluid Restricted Nutrition Therapy"   (nutritioncaremanual.org)     Nutrition Education attached to pt's discharge papers:  "Guide to Eating When You Have Diabetes"       Comments:   PMH: HTN, Acquired hypothyroidism, A Fib, Hypercholesterolemia, CKD 3 2/2 T2DM, CAD, CHF exacerbation, Atrial flutter    89 y.o. Male admitted for Pneumonia. Pt currently on a Low sodium 2 gm, Soft and bite-sized (IDDSI Level 6), 1500 mL fluid restriction diet. Visited pt at bedside, pt resting in bed, about to eat lunch, note pt has no teeth. Pt reported that he had a good appetite of 3 meals/day + 1 chocolate Ensure/ day and confirmed he has a good appetite currently, stated he consumed 100% of breakfast this am, pt requested 1 chocolate Ensure as snack in between breakfast and lunch, RD added to pt's orders and tray. Pt reported his UBW is 188 lbs, current weight charted 12/10/24 178 lbs.     RD educated patient on low sodium, general healthful diet r/t recent hospital diagnosis. Recommended a well balanced diet with a variety of fresh foods, fruits and vegetables (5 cups/day), whole grains (3 oz/day), and fat-free or low fat dairy. Discussed reading food packages, food labels, and nutrition facts labels to identify nutrient content of foods.       Discussed the importance of limiting sodium to less than 2,000 mg per day. Recommended salt free seasonings and other herbs and spices in meals to enhance flavor without additional sodium.       Discussed dietary sources of cholesterol, the importance of incorporating healthy fats into the diet, and avoiding saturated and trans fats for heart health. For a generally healthy diet, aim for total fat less than 25-35% of calories.       Discussed the importance of fiber " (especially soluble fiber), dietary sources, and a goal intake of >20-30g/day.     Discussed the importance of carbohydrates in the diet, but with diabetes, focusing on consistent carb intake throughout the day with emphasis on protein and fiber. Recommended for a 2,000 calorie diet to aim for 225-275g (45-55%) daily CHO (goal: 3-4 servings (45-60 g) of carbs per meal with snacks in between).     Discussed 1500 ml fluid restriction per MD and dietary sources of fluid. RD recommended using a cup with measurements for fluids and to try to consume small sips spread throughout the day rather than a lot at one time.      Pt reported that he does not add additional salt to food and does not eat frozen meals, expressed good prior nutritional knowledge and diet compliance. Pt expressed understanding and appreciation for nutrition education provided. Encouraged pt to read handouts and use RD contact information with any further questions and/or concerns. Pt is in action stage of change for low sodium diet and in contemplation stage of change for fluid restriction w/ desire and understanding of importance for diet compliance.     Nutrition Related Social Determinants of Health: SDOH: Adequate food in home environment and None Identified      Visual NFPE performed, pt appears well nourished.   All questions and concerns answered.   Provided handout with dietitian's contact information.   *Please re-consult as needed.   Thank you,   Lorene Elkins, BS, RDN, LDN

## 2024-12-11 NOTE — PROGRESS NOTES
[facility] ([facility] Team notified of pt referral via [facility] (automated inbasket notification) .    Patient screened today by provider and HF nurse for enrollment to program.      PT not enrolled at this time. He says he will F/U with Dr. Vega upon discharge.    Pt will require additional follow up planning per primary team.

## 2024-12-11 NOTE — PLAN OF CARE
POC reviewed with patient and family. Pt verbalizes understanding of POC. No questions at this time.   AAOx4.   In NAD.   Aflutter on TELE. On 4L NC (wears o2 at home).  Pt remains free of falls. Bed alarm set.  Informed patient to call for assistance before ambulating and patient verbalizes understanding.  Hourly rounding complete; see chart.  Meds given; see MAR.  All shift task complete.   Accurate I/Os.      Problem: Adult Inpatient Plan of Care  Goal: Plan of Care Review  Outcome: Progressing  Flowsheets (Taken 12/11/2024 0618)  Plan of Care Reviewed With:   patient   family  Goal: Patient-Specific Goal (Individualized)  Outcome: Progressing  Goal: Absence of Hospital-Acquired Illness or Injury  Outcome: Progressing  Goal: Optimal Comfort and Wellbeing  Outcome: Progressing  Goal: Readiness for Transition of Care  Outcome: Progressing     Problem: Diabetes Comorbidity  Goal: Blood Glucose Level Within Targeted Range  Outcome: Progressing     Problem: Pneumonia  Goal: Fluid Balance  Outcome: Progressing  Goal: Resolution of Infection Signs and Symptoms  Outcome: Progressing  Goal: Effective Oxygenation and Ventilation  Outcome: Progressing     Problem: Fall Injury Risk  Goal: Absence of Fall and Fall-Related Injury  Outcome: Progressing

## 2024-12-11 NOTE — ASSESSMENT & PLAN NOTE
Patient has a diagnosis of pneumonia. The cause of the pneumonia is unknown at this time. The pneumonia is stable. The patient has the following signs/symptoms of pneumonia: cough and shortness of breath. The patient does have a current oxygen requirement and the patient does have a home oxygen requirement. I have reviewed the pertinent imaging. The following cultures have been collected: Blood cultures The culture results are listed below.     Current antimicrobial regimen consists of the antibiotics listed below. Will monitor patient closely and continue current treatment plan unchanged.    Antibiotics (From admission, onward)      Start     Stop Route Frequency Ordered    12/11/24 0900  doxycycline tablet 100 mg         -- Oral Every 12 hours 12/10/24 2323    12/11/24 0900  cefTRIAXone injection 1 g         -- IV Every 24 hours (non-standard times) 12/10/24 2323            Microbiology Results (last 7 days)       Procedure Component Value Units Date/Time    Blood Culture #1 **CANNOT BE ORDERED STAT** [[id number]] Collected: 12/10/24 1053    Order Status: Sent Specimen: Blood from Peripheral, Antecubital, Right Updated: 12/10/24 2217    Blood Culture #2 **CANNOT BE ORDERED STAT** [[id number]] Collected: 12/10/24 1056    Order Status: Sent Specimen: Blood from Peripheral, Forearm, Right Updated: 12/10/24 2217

## 2024-12-11 NOTE — H&P
[facility] Medicine  History & Physical    Patient Name: Jimmy Young  MRN: [id number]  Patient Class: IP- Inpatient  Admission Date: 12/10/2024  Attending Physician: Hong Crum MD   Primary Care Provider: Nacho Richardson MD         Patient information was obtained from patient, relative(s), past medical records, and ER records.     Subjective:     Principal Problem:CHF exacerbation    Chief Complaint:   Chief Complaint   Patient presents with    Leg Swelling     Was seen at primary care this morning. Told to come to ER for worsening leg swelling. Pt always on home oxygen. History CHF        HPI: Jimmy Young is a 89 y.o. male with a PMH  has a past medical history of Aneurysm (2016), Arthritis, Asthma, Atrial fibrillation, Cancer, CKD (chronic kidney disease), COPD (chronic obstructive pulmonary disease) (10/05/2017), Diabetes mellitus, Emphysema lung, Encounter for blood transfusion, and Hypertension.presented to the ED for swelling of the legs. Patient was referred from his primary care physician today for worsening lower extremity edema, shortness of breath, and cough. Patient is on chronic home O2 at 3L/min via NC. Reports compliance with his lasix and denies any excessive fluid or salt intake. Denies any other complaints at his time.      ER workup revealed CBC to be unremarkable.  CMP with BUN/creatinine at baseline of 29/2.2.   mg/dL.  Magnesium 1.2.  BNP, troponin, lactic acid within normal limits.  Flu/COVID negative.  Blood cultures obtained x2.  Ultrasound lower extremities negative for DVT.  Chest x-ray revealed left upper lobe opacity resembling early pneumonia.  EKG revealed a flutter with variable AV block and left axis deviation with a ventricular rate of 64 beats per minute with a QT/QTC of 416/429.  Vital signs stable.  Patient is at baseline oxygen saturation of 92-94% on 3 liters/minute via nasal cannula.  Patient received 12.5 mg  carvedilol, 1 g Rocephin IV, 100 mg doxycycline p.o., 30 mg Lovenox, 40 mg Lasix IV, and 10 mg hydralazine p.o. at outside facility.  Hospital Medicine consulted to admit patient for CHF exacerbation and pneumonia. Patient and family in agreement with treatment plan. Patient admitted under inpatient status.    PCP: Nacho Richardson      Past Medical History:   Diagnosis Date    Aneurysm 2016    abdominal, stent placed    Arthritis     Asthma     Atrial fibrillation     Cancer     skin cancer on face    CKD (chronic kidney disease)     COPD (chronic obstructive pulmonary disease) 10/05/2017    Diabetes mellitus     Emphysema lung     Encounter for blood transfusion     Hypertension        Past Surgical History:   Procedure Laterality Date    ABDOMINAL AORTIC ANEURYSM REPAIR      Stent placed    ABDOMINAL SURGERY      COLONOSCOPY Left 6/12/2017    Procedure: COLONOSCOPY;  Surgeon: Boaz Toussaint MD;  Location: [facility];  Service: Endoscopy;  Laterality: Left;    VASCULAR SURGERY      abdominal aneurysm repair       Review of patient's allergies indicates:  No Known Allergies    No current facility-administered medications on file prior to encounter.     Current Outpatient Medications on File Prior to Encounter   Medication Sig    carvediloL (COREG) 12.5 MG tablet Take 12.5 mg by mouth 2 (two) times daily with meals.    diltiaZEM (CARDIZEM CD) 300 MG 24 hr capsule Take 1 capsule by mouth every morning.    furosemide (LASIX) 20 MG tablet Take 1 tablet (20 mg total) by mouth once daily.    hydrALAZINE (APRESOLINE) 10 MG tablet Take 10 mg by mouth every 12 (twelve) hours.    pantoprazole (PROTONIX) 40 MG tablet Take 1 tablet by mouth Daily.    pravastatin (PRAVACHOL) 40 MG tablet Take 1 tablet by mouth once daily.    amLODIPine (NORVASC) 5 MG tablet Take 1 tablet (5 mg total) by mouth once daily.     Family History       Problem Relation (Age of Onset)    Cancer Mother    Heart attack Father    Hypertension Father           Tobacco Use    Smoking status: Former     Current packs/day: 0.00     Average packs/day: 2.0 packs/day for 20.0 years (40.0 ttl pk-yrs)     Types: Cigarettes     Start date: 1957     Quit date: 1977     Years since quittin.9    Smokeless tobacco: Former   Substance and Sexual Activity    Alcohol use: No    Drug use: No    Sexual activity: Not Currently     Review of Systems   Constitutional:  Negative for chills, diaphoresis, fatigue and fever.   Respiratory:  Positive for cough and shortness of breath.    Cardiovascular:  Positive for leg swelling. Negative for chest pain and palpitations.   Gastrointestinal:  Negative for constipation, diarrhea, nausea and vomiting.   All other systems reviewed and are negative.    Objective:     Vital Signs (Most Recent):  Temp: 98.6 °F (37 °C) (24)  Pulse: 66 (24 002)  Resp: 20 (24 002)  BP: 129/60 (24)  SpO2: (!) 94 % (24 0036) Vital Signs (24h Range):  Temp:  [97.5 °F (36.4 °C)-98.6 °F (37 °C)] 98.6 °F (37 °C)  Pulse:  [61-91] 66  Resp:  [19-25] 20  SpO2:  [89 %-94 %] 94 %  BP: (120-173)/(55-82) 129/60     Weight: 80.9 kg (178 lb 5.6 oz)  Body mass index is 27.12 kg/m².     Physical Exam  Vitals and nursing note reviewed.   Constitutional:       General: He is awake. He is not in acute distress.     Appearance: Normal appearance. He is well-developed and well-groomed. He is not ill-appearing, toxic-appearing or diaphoretic.   HENT:      Head: Normocephalic and atraumatic.   Eyes:      Extraocular Movements: Extraocular movements intact.      Conjunctiva/sclera: Conjunctivae normal.      Pupils: Pupils are equal, round, and reactive to light.   Cardiovascular:      Rate and Rhythm: Normal rate and regular rhythm.      Pulses: Normal pulses.      Heart sounds: Normal heart sounds. No murmur heard.  Pulmonary:      Effort: Pulmonary effort is normal.      Breath sounds: Examination of the left-upper field reveals rhonchi.  Examination of the left-middle field reveals rhonchi. Rhonchi present.      Comments: Currently on 3L/min via NC.  Abdominal:      General: Bowel sounds are normal.      Palpations: Abdomen is soft.      Tenderness: There is no abdominal tenderness.   Musculoskeletal:      Cervical back: Normal range of motion and neck supple.      Right lower leg: Edema present.      Left lower leg: Edema present.      Comments: 5/5 strength throughout   Skin:     General: Skin is warm and dry.      Capillary Refill: Capillary refill takes less than 2 seconds.   Neurological:      Mental Status: He is alert and oriented to person, place, and time. Mental status is at baseline.      GCS: GCS eye subscore is 4. GCS verbal subscore is 5. GCS motor subscore is 6.      Cranial Nerves: Cranial nerves 2-12 are intact.      Sensory: Sensation is intact.      Motor: Motor function is intact.   Psychiatric:         Mood and Affect: Mood normal.         Behavior: Behavior normal. Behavior is cooperative.              CRANIAL NERVES     CN III, IV, VI   Pupils are equal, round, and reactive to light.     LABS:  Recent Results (from the past 24 hours)   Hepatitis C Antibody    Collection Time: 12/10/24 10:55 AM   Result Value Ref Range    Hepatitis C Ab Negative Negative   HCV Virus Hold Specimen    Collection Time: 12/10/24 10:55 AM   Result Value Ref Range    HEP C Virus Hold Specimen Hold for HCV sendout    HIV 1/2 Ag/Ab (4th Gen)    Collection Time: 12/10/24 10:55 AM   Result Value Ref Range    HIV 1/2 Ag/Ab Negative Negative   CBC auto differential    Collection Time: 12/10/24 10:55 AM   Result Value Ref Range    WBC 8.48 3.90 - 12.70 K/uL    RBC 3.84 (L) 4.60 - 6.20 M/uL    Hemoglobin 11.4 (L) 14.0 - 18.0 g/dL    Hematocrit 36.1 (L) 40.0 - 54.0 %    MCV 94 82 - 98 fL    MCH 29.7 27.0 - 31.0 pg    MCHC 31.6 (L) 32.0 - 36.0 g/dL    RDW 13.2 11.5 - 14.5 %    Platelets 233 150 - 450 K/uL    MPV 9.7 9.2 - 12.9 fL    Immature Granulocytes 0.5  0.0 - 0.5 %    Gran # (ANC) 5.9 1.8 - 7.7 K/uL    Immature Grans (Abs) 0.04 0.00 - 0.04 K/uL    Lymph # 1.4 1.0 - 4.8 K/uL    Mono # 0.7 0.3 - 1.0 K/uL    Eos # 0.3 0.0 - 0.5 K/uL    Baso # 0.04 0.00 - 0.20 K/uL    nRBC 0 0 /100 WBC    Gran % 69.9 38.0 - 73.0 %    Lymph % 16.6 (L) 18.0 - 48.0 %    Mono % 8.6 4.0 - 15.0 %    Eosinophil % 3.9 0.0 - 8.0 %    Basophil % 0.5 0.0 - 1.9 %    Differential Method Automated    Comprehensive metabolic panel    Collection Time: 12/10/24 10:55 AM   Result Value Ref Range    Sodium 145 136 - 145 mmol/L    Potassium 3.5 3.5 - 5.1 mmol/L    Chloride 103 95 - 110 mmol/L    CO2 31 (H) 23 - 29 mmol/L    Glucose 136 (H) 70 - 110 mg/dL    BUN 29 10 - 30 mg/dL    Creatinine 2.2 (H) 0.5 - 1.4 mg/dL    Calcium 8.6 (L) 8.7 - 10.5 mg/dL    Total Protein 6.4 6.0 - 8.4 g/dL    Albumin 3.2 (L) 3.5 - 5.2 g/dL    Total Bilirubin 0.5 0.1 - 1.0 mg/dL    Alkaline Phosphatase 112 40 - 150 U/L    AST 15 10 - 40 U/L    ALT 10 10 - 44 U/L    eGFR 27.1 (A) >60 mL/min/1.73 m^2    Anion Gap 11 8 - 16 mmol/L   Troponin I    Collection Time: 12/10/24 10:55 AM   Result Value Ref Range    Troponin I 0.022 0.000 - 0.026 ng/mL   Brain natriuretic peptide    Collection Time: 12/10/24 10:55 AM   Result Value Ref Range    BNP 88 0 - 99 pg/mL   Protime-INR    Collection Time: 12/10/24 10:55 AM   Result Value Ref Range    Prothrombin Time 12.3 9.0 - 12.5 sec    INR 1.1 0.8 - 1.2   Lactic acid, plasma    Collection Time: 12/10/24 10:55 AM   Result Value Ref Range    Lactate (Lactic Acid) 1.6 0.5 - 2.2 mmol/L   POCT COVID-19 Rapid Screening    Collection Time: 12/10/24 12:32 PM   Result Value Ref Range    POC Rapid COVID Negative Negative     Acceptable Yes    POCT Influenza A/B Molecular    Collection Time: 12/10/24 12:32 PM   Result Value Ref Range    POC Molecular Influenza A Ag Negative Negative    POC Molecular Influenza B Ag Negative Negative     Acceptable Yes    Hemoglobin A1c     Collection Time: 12/10/24  7:40 PM   Result Value Ref Range    Hemoglobin A1C 6.9 (H) 4.0 - 5.6 %    Estimated Avg Glucose 151 (H) 68 - 131 mg/dL   Magnesium    Collection Time: 12/10/24  7:40 PM   Result Value Ref Range    Magnesium 1.2 (L) 1.6 - 2.6 mg/dL       RADIOLOGY  US Lower Extremity Veins Bilateral    Result Date: 12/10/2024  EXAMINATION: US LOWER EXTREMITY VEINS BILATERAL CLINICAL HISTORY: Other specified soft tissue disorders TECHNIQUE: Duplex and color flow Doppler and dynamic compression was performed of the bilateral lower extremity veins was performed. COMPARISON: None FINDINGS: Right thigh veins: The common femoral, femoral, popliteal, upper greater saphenous, and deep femoral veins are patent and free of thrombus. The veins are normally compressible and have normal phasic flow and augmentation response. Right calf veins: The visualized calf veins are patent. Left thigh veins: The common femoral, femoral, popliteal, upper greater saphenous, and deep femoral veins are patent and free of thrombus. The veins are normally compressible and have normal phasic flow and augmentation response. Left calf veins: The visualized calf veins are patent.     No evidence of deep venous thrombosis in either lower extremity. Electronically signed by: Ever Crowley Date:    12/10/2024 Time:    13:35    X-Ray Chest AP Portable    Result Date: 12/10/2024  EXAMINATION: XR CHEST AP PORTABLE CLINICAL HISTORY: CHF; COMPARISON: June 1, 2020 FINDINGS: EKG leads overlie the chest.  Low lung volumes with vascular crowding or atelectasis in the lung bases.  Possible small effusions.  There appears to be a left upper lung infiltrate.  The lungs are otherwise clear.  The cardiac silhouette size is enlarged.  The trachea is midline and the mediastinal width is normal. Negative for pneumothorax.  Pulmonary vasculature is normal. Negative for osseous abnormalities. Tortuous aorta with calcifications of the aortic knob.  There are  degenerative changes of the spine and both shoulder girdles.     1.  Lateral left upper lobe opacity, most likely early pneumonia.  Treatment for presumed pneumonia and follow-up x-rays in 4-6 weeks recommended to ensure resolution after adequate treatment. 2.  Low lung volumes with vascular crowding or atelectasis in the lung bases. 3.  Stable findings as noted above. Electronically signed by: David Frost MD Date:    12/10/2024 Time:    11:02      EKG    MICROBIOLOGY    MDM     Amount and/or Complexity of Data Reviewed  Clinical lab tests: reviewed  Tests in the radiology section of CPT®: reviewed  Tests in the medicine section of CPT®: reviewed  Discussion of test results with the performing providers: yes  Decide to obtain previous medical records or to obtain history from someone other than the patient: yes  Obtain history from someone other than the patient: yes  Review and summarize past medical records: yes  Discuss the patient with other providers: yes  Independent visualization of images, tracings, or specimens: yes        Assessment/Plan:     * CHF exacerbation  Patient has  unspecified  heart failure that is Acute on chronic. On presentation their CHF was . Most recent BNP and echo results are listed below.decompensated. Evidence of decompensated CHF on presentation includes: edema, dyspnea on exertion ([place]), and shortness of breath. The etiology of their decompensation is likely dietary indiscretion and increased fluid intake  Recent Labs     12/10/24  1055   BNP 88     Latest ECHO  Results for orders placed during the hospital encounter of 01/08/21    Echo Color Flow Doppler? Yes    Interpretation Summary  · The left ventricle is normal in size with moderate concentric hypertrophy and normal systolic function. The estimated ejection fraction is 60%  · Indeterminate left ventricular diastolic function.  · Normal right ventricular size with normal right ventricular systolic function.  · Mild tricuspid  regurgitation.  · Normal central venous pressure (3 mmHg).  · The estimated PA systolic pressure is 32 mmHg.    Current Heart Failure Medications  , Every 12 hours, Oral  , 2 times daily with meals, Oral  furosemide injection 20 mg, Daily, Intravenous  carvediloL tablet 12.5 mg, 2 times daily with meals, Oral  hydrALAZINE tablet 10 mg, Every 12 hours, Oral    Plan  - Monitor strict I&Os and daily weights.    - Place on telemetry  - Low sodium diet  - Place on fluid restriction of 1.5 L.   - Cardiology has been consulted  - The patient's volume status is stable but not at their baseline as indicated by edema and shortness of breath      Pneumonia  Patient has a diagnosis of pneumonia. The cause of the pneumonia is unknown at this time. The pneumonia is stable. The patient has the following signs/symptoms of pneumonia: cough and shortness of breath. The patient does have a current oxygen requirement and the patient does have a home oxygen requirement. I have reviewed the pertinent imaging. The following cultures have been collected: Blood cultures The culture results are listed below.     Current antimicrobial regimen consists of the antibiotics listed below. Will monitor patient closely and continue current treatment plan unchanged.    Antibiotics (From admission, onward)      Start     Stop Route Frequency Ordered    12/11/24 0900  doxycycline tablet 100 mg         -- Oral Every 12 hours 12/10/24 2323    12/11/24 0900  cefTRIAXone injection 1 g         -- IV Every 24 hours (non-standard times) 12/10/24 2323     Microbiology Results (last 7 days)       Procedure Component Value Units Date/Time    Blood Culture #1 **CANNOT BE ORDERED STAT** [[id number]] Collected: 12/10/24 1053    Order Status: Sent Specimen: Blood from Peripheral, Antecubital, Right Updated: 12/10/24 2217    Blood Culture #2 **CANNOT BE ORDERED STAT** [[id number]] Collected: 12/10/24 1056    Order Status: Sent Specimen: Blood from Peripheral,  Forearm, Right Updated: 12/10/24 2217       Essential hypertension  Chronic, controlled.  Latest blood pressure and vitals reviewed-   Temp:  [97.5 °F (36.4 °C)-98.6 °F (37 °C)]   Pulse:  [61-91]   Resp:  [19-25]   BP: (120-173)/(55-82)   SpO2:  [89 %-94 %] .   Home meds for hypertension were reviewed and noted below.   Hypertension Medications               amLODIPine (NORVASC) 5 MG tablet Take 1 tablet (5 mg total) by mouth once daily.    carvediloL (COREG) 12.5 MG tablet Take 12.5 mg by mouth 2 (two) times daily with meals.    diltiaZEM (CARDIZEM CD) 300 MG 24 hr capsule Take 1 capsule by mouth every morning.    furosemide (LASIX) 20 MG tablet Take 1 tablet (20 mg total) by mouth once daily.    hydrALAZINE (APRESOLINE) 10 MG tablet Take 10 mg by mouth every 12 (twelve) hours.     While in the hospital, will manage blood pressure as follows; Continue home antihypertensive regimen    Will utilize p.r.n. blood pressure medication only if patient's blood pressure greater than  180/110 and he develops symptoms such as worsening chest pain or shortness of breath.      Coronary artery disease of native artery of native heart with stable angina pectoris  Patient with known CAD which is controlled Will continue Statin and monitor for S/Sx of angina/ACS. Continue to monitor on telemetry.       Atrial fibrillation  Patient with Paroxysmal (<7 days) atrial fibrillation which is controlled currently with Bbs and Calcium Channel Blocker. Patient is currently in sinus rhythm.[id number] Score: 4.       Hypercholesterolemia  Patient is chronically on statin.will continue for now. Last Lipid Panel:   Lab Results   Component Value Date    CHOL 121 07/03/2023    HDL 37 (L) 07/03/2023    LDLCALC 57 07/03/2023    TRIG 160 (H) 07/03/2023    CHOLHDL 27.8 08/01/2019     Plan:  -Continue home medication  -low fat/low calorie diet      Acquired hypothyroidism  Patient has chronic hypothyroidism. TFTs reviewed-   Lab Results   Component  Value Date    TSH 1.780 07/03/2023   . Will continue chronic levothyroxine and adjust for and clinical changes.      CKD stage 3 secondary to diabetes  Creatine stable for now. BMP reviewed- noted Estimated Creatinine Clearance: 19.9 mL/min (A) (based on SCr of 2.2 mg/dL (H)). according to latest data. Based on current GFR, CKD stage is stage 3 - GFR 30-59.  Monitor UOP and serial BMP and adjust therapy as needed. Renally dose meds. Avoid nephrotoxic medications and procedures.        VTE Risk Mitigation (From admission, onward)           Ordered     enoxaparin injection 30 mg  Daily         12/10/24 1922     IP VTE HIGH RISK PATIENT  Once         12/10/24 1922     Place sequential compression device  Until discontinued         12/10/24 1922                  //Core Measures   -DVT proph: SCDs, lovenox  -Code status Partial code    -Surrogate:family      Components of this note were documented using a voice recognition system and are subject to errors not corrected at the time the document was proof read. Please contact the author for any clarifications.       Harvey Crum NP  Department of Hospital Medicine  [facility] - Telemetry ([facility])

## 2024-12-11 NOTE — ASSESSMENT & PLAN NOTE
Patient with Paroxysmal (<7 days) atrial fibrillation which is controlled currently with Bbs and Calcium Channel Blocker. Patient is currently in sinus rhythm.[id number] Score: 4.

## 2024-12-11 NOTE — SUBJECTIVE & OBJECTIVE
Past Medical History:   Diagnosis Date    Aneurysm 2016    abdominal, stent placed    Arthritis     Asthma     Atrial fibrillation     Cancer     skin cancer on face    CKD (chronic kidney disease)     COPD (chronic obstructive pulmonary disease) 10/05/2017    Diabetes mellitus     Emphysema lung     Encounter for blood transfusion     Hypertension        Past Surgical History:   Procedure Laterality Date    ABDOMINAL AORTIC ANEURYSM REPAIR      Stent placed    ABDOMINAL SURGERY      COLONOSCOPY Left 2017    Procedure: COLONOSCOPY;  Surgeon: Boaz Toussaint MD;  Location: [facility];  Service: Endoscopy;  Laterality: Left;    VASCULAR SURGERY      abdominal aneurysm repair       Review of patient's allergies indicates:  No Known Allergies  Family History       Problem Relation (Age of Onset)    Cancer Mother    Heart attack Father    Hypertension Father          Tobacco Use    Smoking status: Former     Current packs/day: 0.00     Average packs/day: 2.0 packs/day for 20.0 years (40.0 ttl pk-yrs)     Types: Cigarettes     Start date: 1957     Quit date: 1977     Years since quittin.9    Smokeless tobacco: Former   Substance and Sexual Activity    Alcohol use: No    Drug use: No    Sexual activity: Not Currently     Review of Systems   Constitutional:  Negative for fever.   Eyes:  Negative for visual disturbance.   Respiratory:  Positive for shortness of breath.    Cardiovascular:  Positive for leg swelling. Negative for chest pain.   Gastrointestinal:         As per HPI.   Genitourinary:  Negative for frequency and hematuria.   Musculoskeletal:  Negative for arthralgias and back pain.   Skin:  Negative for color change and rash.   Neurological:  Negative for seizures, syncope, numbness and headaches.   Hematological:  Does not bruise/bleed easily.   Psychiatric/Behavioral:  The patient is not nervous/anxious.      Objective:     Vital Signs (Most Recent):  Temp: 97 °F (36.1 °C) (24 1147)  Pulse:  (!) 50 (12/11/24 1408)  Resp: 16 (12/11/24 1408)  BP: (!) 102/52 (12/11/24 1147)  SpO2: (!) 92 % (12/11/24 1408) Vital Signs (24h Range):  Temp:  [97 °F (36.1 °C)-98.6 °F (37 °C)] 97 °F (36.1 °C)  Pulse:  [46-91] 50  Resp:  [16-23] 16  SpO2:  [90 %-95 %] 92 %  BP: (102-173)/(52-82) 102/52     Weight: 81.6 kg (180 lb) (12/11/24 1147)  Body mass index is 28.19 kg/m².      Intake/Output Summary (Last 24 hours) at 12/11/2024 1554  Last data filed at 12/11/2024 0524  Gross per 24 hour   Intake 250 ml   Output 201 ml   Net 49 ml       Lines/Drains/Airways       Peripheral Intravenous Line  Duration                  Peripheral IV - Single Lumen 12/11/24 0845 20 G Anterior;Distal;Left Upper Arm <1 day         Peripheral IV - Single Lumen 12/11/24 1030 20 G Right Antecubital <1 day                     Physical Exam  Constitutional:       General: He is not in acute distress.     Appearance: Normal appearance. He is well-developed.   HENT:      Head: Normocephalic and atraumatic.   Eyes:      Extraocular Movements: Extraocular movements intact.   Cardiovascular:      Rate and Rhythm: Normal rate. Rhythm irregular.      Heart sounds: Normal heart sounds. No murmur heard.  Pulmonary:      Effort: Pulmonary effort is normal. No respiratory distress.      Breath sounds: Rhonchi present.      Comments: On supplemental O2.  Abdominal:      General: Bowel sounds are normal. There is no distension.      Palpations: Abdomen is soft. There is no mass.      Tenderness: There is no abdominal tenderness.   Musculoskeletal:      Cervical back: Normal range of motion and neck supple.      Right lower leg: Edema present.      Left lower leg: Edema present.   Skin:     General: Skin is warm and dry.   Neurological:      Mental Status: He is alert and oriented to person, place, and time.      Cranial Nerves: No cranial nerve deficit.   Psychiatric:         Behavior: Behavior normal.          Significant Labs:  CBC:   Recent Labs   Lab  12/10/24  1055   WBC 8.48   HGB 11.4*   HCT 36.1*        CMP:   Recent Labs   Lab 12/10/24  1055 12/11/24  0411   * 104   CALCIUM 8.6* 8.3*   ALBUMIN 3.2*  --    PROT 6.4  --     145   K 3.5 3.1*   CO2 31* 29    103   BUN 29 29   CREATININE 2.2* 2.1*   ALKPHOS 112  --    ALT 10  --    AST 15  --    BILITOT 0.5  --      Coagulation:   Recent Labs   Lab 12/10/24  1055   INR 1.1       Significant Imaging:  Imaging results within the past 24 hours have been reviewed.

## 2024-12-11 NOTE — PT/OT/SLP EVAL
Physical Therapy Evaluation    Patient Name:  Jimmy Young   MRN:  [id number]    Recommendations:     Discharge Recommendations: Low Intensity Therapy   Discharge Equipment Recommendations: none   Barriers to discharge: None    Assessment:     Jimmy Young is a 89 y.o. male admitted with a medical diagnosis of Pneumonia.  He presents with the following impairments/functional limitations: weakness, impaired endurance, impaired cardiopulmonary response to activity, impaired balance, gait instability, impaired functional mobility, decreased lower extremity function .    Rehab Prognosis: Good; patient would benefit from acute skilled PT services to address these deficits and reach maximum level of function.    Recent Surgery: * No surgery found *      Plan:     During this hospitalization, patient to be seen 3 x/week to address the identified rehab impairments via gait training, therapeutic activities, therapeutic exercises and progress toward the following goals:    Plan of Care Expires:  12/25/24    Subjective     Chief Complaint: NONE   Patient/Family Comments/goals: GO HOME   Pain/Comfort:  Pain Rating 1: 0/10  Pain Rating Post-Intervention 1: 0/10    Patients cultural, spiritual, Confucianism conflicts given the current situation:      Living Environment:  PT LIVES AT HOME ALONE WITH FAMILY LIVING RIGHT NEXT DOOR.   Prior to admission, patients level of function was MOD I WITH RW, DRIVES AND IS RETIRED .  Equipment used at home: walker, rolling, oxygen, grab bar, shower chair, cane, straight.  DME owned (not currently used): wheelchair and ROLLATOR .  Upon discharge, patient will have assistance from FAMILY .    Objective:     Communicated with NURSE AND EPIC CHART REVIEW  prior to session.  Patient found supine with telemetry, peripheral IV, oxygen  upon PT entry to room.    General Precautions: Standard, fall  Orthopedic Precautions:N/A   Braces: N/A  Respiratory Status: Nasal cannula, flow 4  "L/min    Exams:  Cognitive Exam:  Patient is oriented to Person, Place, Time, and Situation  RLE ROM: WFL  RLE Strength: WFL  LLE ROM: WFL  LLE Strength: WFL    Functional Mobility:  Bed Mobility:     Rolling Right: stand by assistance  Scooting: stand by assistance  Supine to Sit: contact guard assistance  Transfers:     Sit to Stand:  stand by assistance with rolling walker  Bed to Chair: stand by assistance with  rolling walker  using  Stand Pivot  Gait: PT GT TRAINED X 240' WITH RW AND SBA WITH O2 IN TOW.       AM-PAC 6 CLICK MOBILITY  Total Score:21       Treatment & Education:  GT. BELT AND  SOCKS DONNED PRIOR TO OOB MOBILITY.  PT GT TRAINED AND RETURNED TO RM T/F TO CHAIR FOR OOB TOLERANCE.   PT SEATED IN CHAIR FOR B LE TE X 10 REPS OF AP, TKE AND MIP FOR LE ROM AND STRENGTHENING.   PT EDUCATED ON "CALL DON'T FALL", ENCOURAGED TO CALL FOR ASSISTANCE WITH ALL NEEDS FOR OOB MOBILITY.      Patient left up in chair with call button in reach and chair alarm on.    GOALS:   Multidisciplinary Problems       Physical Therapy Goals          Problem: Physical Therapy    Goal Priority Disciplines Outcome Interventions   Physical Therapy Goal     PT, PT/OT     Description: LT24  1. PT WILL COMPLETE BED MOBILITY IND  2. PT WILL GT TRAIN X 500' WITH RW MOD I  3. PT WILL COMPLETE STANDING TE X 20 REPS TO INC STRENGTH / BALANCE  4. PT WILL INC AMPAC SCORE BY 2 POINTS TO PROGRESS GROSS FUNC MOBILITY.                          History:     Past Medical History:   Diagnosis Date    Aneurysm 2016    abdominal, stent placed    Arthritis     Asthma     Atrial fibrillation     Cancer     skin cancer on face    CKD (chronic kidney disease)     COPD (chronic obstructive pulmonary disease) 10/05/2017    Diabetes mellitus     Emphysema lung     Encounter for blood transfusion     Hypertension        Past Surgical History:   Procedure Laterality Date    ABDOMINAL AORTIC ANEURYSM REPAIR      Stent placed    ABDOMINAL SURGERY  "     COLONOSCOPY Left 6/12/2017    Procedure: COLONOSCOPY;  Surgeon: Boaz Toussaint MD;  Location: [facility];  Service: Endoscopy;  Laterality: Left;    VASCULAR SURGERY      abdominal aneurysm repair       Time Tracking:     PT Received On: 12/11/24  PT Start Time: 1030     PT Stop Time: 1055  PT Total Time (min): 25 min     Billable Minutes: Evaluation 15 and Therapeutic Exercise 10      12/11/2024

## 2024-12-11 NOTE — ASSESSMENT & PLAN NOTE
Creatine stable for now. BMP reviewed- noted Estimated Creatinine Clearance: 19.9 mL/min (A) (based on SCr of 2.2 mg/dL (H)). according to latest data. Based on current GFR, CKD stage is stage 3 - GFR 30-59.  Monitor UOP and serial BMP and adjust therapy as needed. Renally dose meds. Avoid nephrotoxic medications and procedures.

## 2024-12-11 NOTE — ASSESSMENT & PLAN NOTE
Patient has  unspecified  heart failure that is Acute on chronic. On presentation their CHF was . Most recent BNP and echo results are listed below.decompensated. Evidence of decompensated CHF on presentation includes: edema, dyspnea on exertion ([place]), and shortness of breath. The etiology of their decompensation is likely dietary indiscretion and increased fluid intake  Recent Labs     12/10/24  1055   BNP 88     Latest ECHO  Results for orders placed during the hospital encounter of 01/08/21    Echo Color Flow Doppler? Yes    Interpretation Summary  · The left ventricle is normal in size with moderate concentric hypertrophy and normal systolic function. The estimated ejection fraction is 60%  · Indeterminate left ventricular diastolic function.  · Normal right ventricular size with normal right ventricular systolic function.  · Mild tricuspid regurgitation.  · Normal central venous pressure (3 mmHg).  · The estimated PA systolic pressure is 32 mmHg.    Current Heart Failure Medications  , Every 12 hours, Oral  , 2 times daily with meals, Oral  furosemide injection 20 mg, Daily, Intravenous  carvediloL tablet 12.5 mg, 2 times daily with meals, Oral  hydrALAZINE tablet 10 mg, Every 12 hours, Oral    Plan  - Monitor strict I&Os and daily weights.    - Place on telemetry  - Low sodium diet  - Place on fluid restriction of 1.5 L.   - Cardiology has been consulted  - The patient's volume status is stable but not at their baseline as indicated by edema and shortness of breath

## 2024-12-11 NOTE — CONSULTS
[facility] - Telemetry ([facility])  Gastroenterology  Consult Note    Patient Name: Jimmy Young  MRN: [id number]  Admission Date: 12/10/2024  Hospital Length of Stay: 1 days  Code Status: Partial Code   Attending Provider: Juliana Goodwin MD   Consulting Provider: Saleem Cotton PA-C  Primary Care Physician: Nacho Richardson MD  Principal Problem:Pneumonia    Inpatient consult to Gastroenterology  Consult performed by: Saleem Cotton PA-C  Consult ordered by: Phillip Vega MD  Reason for consult: Bleeding risk assessment        Subjective:     HPI:  The patient was referred to the ER by his PCP for SOB and leg swelling. Patient is on chronic home O2 at 3L/min via NC. Chest x-ray revealed left upper lobe opacity resembling early pneumonia. EKG revealed Aflutter with variable AV block and left axis deviation with a ventricular rate of 64 beats per minute with a QT/QTC of 416/429. He was admitted for CHF exacerbation and started on Lasix. He was also started on antibiotics for PNA. Cardiology was consulted and recommended anticoagulation for Aflutter. We have been consulted for risk assessment given his history of GI bleeding from AVMs. This primarily occurred in 2017. His Eliquis was discontinued at that time. He currently denies hematemesis, hematochezia or melena. His Hgb is 11.4.    Past Medical History:   Diagnosis Date    Aneurysm 2016    abdominal, stent placed    Arthritis     Asthma     Atrial fibrillation     Cancer     skin cancer on face    CKD (chronic kidney disease)     COPD (chronic obstructive pulmonary disease) 10/05/2017    Diabetes mellitus     Emphysema lung     Encounter for blood transfusion     Hypertension        Past Surgical History:   Procedure Laterality Date    ABDOMINAL AORTIC ANEURYSM REPAIR      Stent placed    ABDOMINAL SURGERY      COLONOSCOPY Left 6/12/2017    Procedure: COLONOSCOPY;  Surgeon: Boaz Toussaint MD;  Location: [facility] ENDO;  Service: Endoscopy;  Laterality: Left;     VASCULAR SURGERY      abdominal aneurysm repair       Review of patient's allergies indicates:  No Known Allergies  Family History       Problem Relation (Age of Onset)    Cancer Mother    Heart attack Father    Hypertension Father          Tobacco Use    Smoking status: Former     Current packs/day: 0.00     Average packs/day: 2.0 packs/day for 20.0 years (40.0 ttl pk-yrs)     Types: Cigarettes     Start date: 1957     Quit date: 1977     Years since quittin.9    Smokeless tobacco: Former   Substance and Sexual Activity    Alcohol use: No    Drug use: No    Sexual activity: Not Currently     Review of Systems   Constitutional:  Negative for fever.   Eyes:  Negative for visual disturbance.   Respiratory:  Positive for shortness of breath.    Cardiovascular:  Positive for leg swelling. Negative for chest pain.   Gastrointestinal:         As per HPI.   Genitourinary:  Negative for frequency and hematuria.   Musculoskeletal:  Negative for arthralgias and back pain.   Skin:  Negative for color change and rash.   Neurological:  Negative for seizures, syncope, numbness and headaches.   Hematological:  Does not bruise/bleed easily.   Psychiatric/Behavioral:  The patient is not nervous/anxious.      Objective:     Vital Signs (Most Recent):  Temp: 97 °F (36.1 °C) (24 114)  Pulse: (!) 50 (24 1408)  Resp: 16 (24 1408)  BP: (!) 102/52 (24 1147)  SpO2: (!) 92 % (24 1408) Vital Signs (24h Range):  Temp:  [97 °F (36.1 °C)-98.6 °F (37 °C)] 97 °F (36.1 °C)  Pulse:  [46-91] 50  Resp:  [16-23] 16  SpO2:  [90 %-95 %] 92 %  BP: (102-173)/(52-82) 102/52     Weight: 81.6 kg (180 lb) (24 1147)  Body mass index is 28.19 kg/m².      Intake/Output Summary (Last 24 hours) at 2024 9761  Last data filed at 2024 0524  Gross per 24 hour   Intake 250 ml   Output 201 ml   Net 49 ml       Lines/Drains/Airways       Peripheral Intravenous Line  Duration                  Peripheral IV -  Single Lumen 12/11/24 0845 20 G Anterior;Distal;Left Upper Arm <1 day         Peripheral IV - Single Lumen 12/11/24 1030 20 G Right Antecubital <1 day                     Physical Exam  Constitutional:       General: He is not in acute distress.     Appearance: Normal appearance. He is well-developed.   HENT:      Head: Normocephalic and atraumatic.   Eyes:      Extraocular Movements: Extraocular movements intact.   Cardiovascular:      Rate and Rhythm: Normal rate. Rhythm irregular.      Heart sounds: Normal heart sounds. No murmur heard.  Pulmonary:      Effort: Pulmonary effort is normal. No respiratory distress.      Breath sounds: Rhonchi present.      Comments: On supplemental O2.  Abdominal:      General: Bowel sounds are normal. There is no distension.      Palpations: Abdomen is soft. There is no mass.      Tenderness: There is no abdominal tenderness.   Musculoskeletal:      Cervical back: Normal range of motion and neck supple.      Right lower leg: Edema present.      Left lower leg: Edema present.   Skin:     General: Skin is warm and dry.   Neurological:      Mental Status: He is alert and oriented to person, place, and time.      Cranial Nerves: No cranial nerve deficit.   Psychiatric:         Behavior: Behavior normal.          Significant Labs:  CBC:   Recent Labs   Lab 12/10/24  1055   WBC 8.48   HGB 11.4*   HCT 36.1*        CMP:   Recent Labs   Lab 12/10/24  1055 12/11/24  0411   * 104   CALCIUM 8.6* 8.3*   ALBUMIN 3.2*  --    PROT 6.4  --     145   K 3.5 3.1*   CO2 31* 29    103   BUN 29 29   CREATININE 2.2* 2.1*   ALKPHOS 112  --    ALT 10  --    AST 15  --    BILITOT 0.5  --      Coagulation:   Recent Labs   Lab 12/10/24  1055   INR 1.1       Significant Imaging:  Imaging results within the past 24 hours have been reviewed.  Assessment/Plan:     Pulmonary  * Pneumonia  On antibiotics. Management per  team.     Cardiac/Vascular  Atrial flutter  Cardiology note  reviewed and anticoagulation being considered.  Currently there are no signs of active bleeding.   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   If anticoagulation started and he bleeds, we would re-evaluate the need for scopes for acute management, but his perioperative risk would remain high given his CHF and Pulm status.   Nothing further planned at this time. Re-consult if needed.       Thank you for your consult. I will sign off. Please contact us if you have any additional questions.    Saleem Cotton PA-C  Gastroenterology  [facility] - Telemetry ([facility])

## 2024-12-11 NOTE — CARE UPDATE
Evaluated patient at bedside    Wears supplemental oxygen at baseline, 2L  Dyspnea improving  Denies fever, chest pain    No acute distress  No respiratory distress on 4L  Ill appearing   Weak  Lower extremity edema     Bnp within normal limits  Discontinue lasix  Echocardiogram pending  Cardiology consulted  Replete electrolytes    Schedule breathing treatments  Add chest physiotherapy   Consider ct chest to further appreciate pneumonia   Continue intravenous antibiotic(s)  Sputum culture pending  Respiratory infection panel pending   Speech consulted  Wean supplemental oxygen to baseline as able  Physical/occupational therapy     Lower extremity edema negative for dvt  Compression stockings  Fluid and na restriction

## 2024-12-11 NOTE — HPI
Jimmy Young is a 89 y.o. male with a PMH  has a past medical history of Aneurysm (2016), Arthritis, Asthma, Atrial fibrillation, Cancer, CKD (chronic kidney disease), COPD (chronic obstructive pulmonary disease) (10/05/2017), Diabetes mellitus, Emphysema lung, Encounter for blood transfusion, and Hypertension.presented to the ED for swelling of the legs. Patient was referred from his primary care physician today for worsening lower extremity edema, shortness of breath, and cough. Patient is on chronic home O2 at 3L/min via NC. Reports compliance with his lasix and denies any excessive fluid or salt intake. Denies any other complaints at his time.      ER workup revealed CBC to be unremarkable.  CMP with BUN/creatinine at baseline of 29/2.2.   mg/dL.  Magnesium 1.2.  BNP, troponin, lactic acid within normal limits.  Flu/COVID negative.  Blood cultures obtained x2.  Ultrasound lower extremities negative for DVT.  Chest x-ray revealed left upper lobe opacity resembling early pneumonia.  EKG revealed a flutter with variable AV block and left axis deviation with a ventricular rate of 64 beats per minute with a QT/QTC of 416/429.  Vital signs stable.  Patient is at baseline oxygen saturation of 92-94% on 3 liters/minute via nasal cannula.  Patient received 12.5 mg carvedilol, 1 g Rocephin IV, 100 mg doxycycline p.o., 30 mg Lovenox, 40 mg Lasix IV, and 10 mg hydralazine p.o. at outside facility.  Hospital Medicine consulted to admit patient for CHF exacerbation and pneumonia. Patient and family in agreement with treatment plan. Patient admitted under inpatient status.    PCP: Nacho Richardson

## 2024-12-11 NOTE — SUBJECTIVE & OBJECTIVE
Past Medical History:   Diagnosis Date    Aneurysm 2016    abdominal, stent placed    Arthritis     Asthma     Atrial fibrillation     Cancer     skin cancer on face    CKD (chronic kidney disease)     COPD (chronic obstructive pulmonary disease) 10/05/2017    Diabetes mellitus     Emphysema lung     Encounter for blood transfusion     Hypertension        Past Surgical History:   Procedure Laterality Date    ABDOMINAL AORTIC ANEURYSM REPAIR      Stent placed    ABDOMINAL SURGERY      COLONOSCOPY Left 2017    Procedure: COLONOSCOPY;  Surgeon: Boaz Toussaint MD;  Location: [facility];  Service: Endoscopy;  Laterality: Left;    VASCULAR SURGERY      abdominal aneurysm repair       Review of patient's allergies indicates:  No Known Allergies    No current facility-administered medications on file prior to encounter.     Current Outpatient Medications on File Prior to Encounter   Medication Sig    carvediloL (COREG) 12.5 MG tablet Take 12.5 mg by mouth 2 (two) times daily with meals.    diltiaZEM (CARDIZEM CD) 300 MG 24 hr capsule Take 1 capsule by mouth every morning.    furosemide (LASIX) 20 MG tablet Take 1 tablet (20 mg total) by mouth once daily.    hydrALAZINE (APRESOLINE) 10 MG tablet Take 10 mg by mouth every 12 (twelve) hours.    pantoprazole (PROTONIX) 40 MG tablet Take 1 tablet by mouth Daily.    pravastatin (PRAVACHOL) 40 MG tablet Take 1 tablet by mouth once daily.    amLODIPine (NORVASC) 5 MG tablet Take 1 tablet (5 mg total) by mouth once daily.     Family History       Problem Relation (Age of Onset)    Cancer Mother    Heart attack Father    Hypertension Father          Tobacco Use    Smoking status: Former     Current packs/day: 0.00     Average packs/day: 2.0 packs/day for 20.0 years (40.0 ttl pk-yrs)     Types: Cigarettes     Start date: 1957     Quit date: 1977     Years since quittin.9    Smokeless tobacco: Former   Substance and Sexual Activity    Alcohol use: No    Drug use: No     Sexual activity: Not Currently     Review of Systems   Constitutional:  Negative for chills, diaphoresis, fatigue and fever.   Respiratory:  Positive for cough and shortness of breath.    Cardiovascular:  Positive for leg swelling. Negative for chest pain and palpitations.   Gastrointestinal:  Negative for constipation, diarrhea, nausea and vomiting.   All other systems reviewed and are negative.    Objective:     Vital Signs (Most Recent):  Temp: 98.6 °F (37 °C) (12/11/24 0025)  Pulse: 66 (12/11/24 0025)  Resp: 20 (12/11/24 0025)  BP: 129/60 (12/11/24 0025)  SpO2: (!) 94 % (12/11/24 0036) Vital Signs (24h Range):  Temp:  [97.5 °F (36.4 °C)-98.6 °F (37 °C)] 98.6 °F (37 °C)  Pulse:  [61-91] 66  Resp:  [19-25] 20  SpO2:  [89 %-94 %] 94 %  BP: (120-173)/(55-82) 129/60     Weight: 80.9 kg (178 lb 5.6 oz)  Body mass index is 27.12 kg/m².     Physical Exam  Vitals and nursing note reviewed.   Constitutional:       General: He is awake. He is not in acute distress.     Appearance: Normal appearance. He is well-developed and well-groomed. He is not ill-appearing, toxic-appearing or diaphoretic.   HENT:      Head: Normocephalic and atraumatic.   Eyes:      Extraocular Movements: Extraocular movements intact.      Conjunctiva/sclera: Conjunctivae normal.      Pupils: Pupils are equal, round, and reactive to light.   Cardiovascular:      Rate and Rhythm: Normal rate and regular rhythm.      Pulses: Normal pulses.      Heart sounds: Normal heart sounds. No murmur heard.  Pulmonary:      Effort: Pulmonary effort is normal.      Breath sounds: Examination of the left-upper field reveals rhonchi. Examination of the left-middle field reveals rhonchi. Rhonchi present.      Comments: Currently on 3L/min via NC.  Abdominal:      General: Bowel sounds are normal.      Palpations: Abdomen is soft.      Tenderness: There is no abdominal tenderness.   Musculoskeletal:      Cervical back: Normal range of motion and neck supple.      Right  lower leg: Edema present.      Left lower leg: Edema present.      Comments: 5/5 strength throughout   Skin:     General: Skin is warm and dry.      Capillary Refill: Capillary refill takes less than 2 seconds.   Neurological:      Mental Status: He is alert and oriented to person, place, and time. Mental status is at baseline.      GCS: GCS eye subscore is 4. GCS verbal subscore is 5. GCS motor subscore is 6.      Cranial Nerves: Cranial nerves 2-12 are intact.      Sensory: Sensation is intact.      Motor: Motor function is intact.   Psychiatric:         Mood and Affect: Mood normal.         Behavior: Behavior normal. Behavior is cooperative.              CRANIAL NERVES     CN III, IV, VI   Pupils are equal, round, and reactive to light.     LABS:  Recent Results (from the past 24 hours)   Hepatitis C Antibody    Collection Time: 12/10/24 10:55 AM   Result Value Ref Range    Hepatitis C Ab Negative Negative   HCV Virus Hold Specimen    Collection Time: 12/10/24 10:55 AM   Result Value Ref Range    HEP C Virus Hold Specimen Hold for HCV sendout    HIV 1/2 Ag/Ab (4th Gen)    Collection Time: 12/10/24 10:55 AM   Result Value Ref Range    HIV 1/2 Ag/Ab Negative Negative   CBC auto differential    Collection Time: 12/10/24 10:55 AM   Result Value Ref Range    WBC 8.48 3.90 - 12.70 K/uL    RBC 3.84 (L) 4.60 - 6.20 M/uL    Hemoglobin 11.4 (L) 14.0 - 18.0 g/dL    Hematocrit 36.1 (L) 40.0 - 54.0 %    MCV 94 82 - 98 fL    MCH 29.7 27.0 - 31.0 pg    MCHC 31.6 (L) 32.0 - 36.0 g/dL    RDW 13.2 11.5 - 14.5 %    Platelets 233 150 - 450 K/uL    MPV 9.7 9.2 - 12.9 fL    Immature Granulocytes 0.5 0.0 - 0.5 %    Gran # (ANC) 5.9 1.8 - 7.7 K/uL    Immature Grans (Abs) 0.04 0.00 - 0.04 K/uL    Lymph # 1.4 1.0 - 4.8 K/uL    Mono # 0.7 0.3 - 1.0 K/uL    Eos # 0.3 0.0 - 0.5 K/uL    Baso # 0.04 0.00 - 0.20 K/uL    nRBC 0 0 /100 WBC    Gran % 69.9 38.0 - 73.0 %    Lymph % 16.6 (L) 18.0 - 48.0 %    Mono % 8.6 4.0 - 15.0 %    Eosinophil %  3.9 0.0 - 8.0 %    Basophil % 0.5 0.0 - 1.9 %    Differential Method Automated    Comprehensive metabolic panel    Collection Time: 12/10/24 10:55 AM   Result Value Ref Range    Sodium 145 136 - 145 mmol/L    Potassium 3.5 3.5 - 5.1 mmol/L    Chloride 103 95 - 110 mmol/L    CO2 31 (H) 23 - 29 mmol/L    Glucose 136 (H) 70 - 110 mg/dL    BUN 29 10 - 30 mg/dL    Creatinine 2.2 (H) 0.5 - 1.4 mg/dL    Calcium 8.6 (L) 8.7 - 10.5 mg/dL    Total Protein 6.4 6.0 - 8.4 g/dL    Albumin 3.2 (L) 3.5 - 5.2 g/dL    Total Bilirubin 0.5 0.1 - 1.0 mg/dL    Alkaline Phosphatase 112 40 - 150 U/L    AST 15 10 - 40 U/L    ALT 10 10 - 44 U/L    eGFR 27.1 (A) >60 mL/min/1.73 m^2    Anion Gap 11 8 - 16 mmol/L   Troponin I    Collection Time: 12/10/24 10:55 AM   Result Value Ref Range    Troponin I 0.022 0.000 - 0.026 ng/mL   Brain natriuretic peptide    Collection Time: 12/10/24 10:55 AM   Result Value Ref Range    BNP 88 0 - 99 pg/mL   Protime-INR    Collection Time: 12/10/24 10:55 AM   Result Value Ref Range    Prothrombin Time 12.3 9.0 - 12.5 sec    INR 1.1 0.8 - 1.2   Lactic acid, plasma    Collection Time: 12/10/24 10:55 AM   Result Value Ref Range    Lactate (Lactic Acid) 1.6 0.5 - 2.2 mmol/L   POCT COVID-19 Rapid Screening    Collection Time: 12/10/24 12:32 PM   Result Value Ref Range    POC Rapid COVID Negative Negative     Acceptable Yes    POCT Influenza A/B Molecular    Collection Time: 12/10/24 12:32 PM   Result Value Ref Range    POC Molecular Influenza A Ag Negative Negative    POC Molecular Influenza B Ag Negative Negative     Acceptable Yes    Hemoglobin A1c    Collection Time: 12/10/24  7:40 PM   Result Value Ref Range    Hemoglobin A1C 6.9 (H) 4.0 - 5.6 %    Estimated Avg Glucose 151 (H) 68 - 131 mg/dL   Magnesium    Collection Time: 12/10/24  7:40 PM   Result Value Ref Range    Magnesium 1.2 (L) 1.6 - 2.6 mg/dL       RADIOLOGY  US Lower Extremity Veins Bilateral    Result Date:  12/10/2024  EXAMINATION: US LOWER EXTREMITY VEINS BILATERAL CLINICAL HISTORY: Other specified soft tissue disorders TECHNIQUE: Duplex and color flow Doppler and dynamic compression was performed of the bilateral lower extremity veins was performed. COMPARISON: None FINDINGS: Right thigh veins: The common femoral, femoral, popliteal, upper greater saphenous, and deep femoral veins are patent and free of thrombus. The veins are normally compressible and have normal phasic flow and augmentation response. Right calf veins: The visualized calf veins are patent. Left thigh veins: The common femoral, femoral, popliteal, upper greater saphenous, and deep femoral veins are patent and free of thrombus. The veins are normally compressible and have normal phasic flow and augmentation response. Left calf veins: The visualized calf veins are patent.     No evidence of deep venous thrombosis in either lower extremity. Electronically signed by: Ever Crowley Date:    12/10/2024 Time:    13:35    X-Ray Chest AP Portable    Result Date: 12/10/2024  EXAMINATION: XR CHEST AP PORTABLE CLINICAL HISTORY: CHF; COMPARISON: June 1, 2020 FINDINGS: EKG leads overlie the chest.  Low lung volumes with vascular crowding or atelectasis in the lung bases.  Possible small effusions.  There appears to be a left upper lung infiltrate.  The lungs are otherwise clear.  The cardiac silhouette size is enlarged.  The trachea is midline and the mediastinal width is normal. Negative for pneumothorax.  Pulmonary vasculature is normal. Negative for osseous abnormalities. Tortuous aorta with calcifications of the aortic knob.  There are degenerative changes of the spine and both shoulder girdles.     1.  Lateral left upper lobe opacity, most likely early pneumonia.  Treatment for presumed pneumonia and follow-up x-rays in 4-6 weeks recommended to ensure resolution after adequate treatment. 2.  Low lung volumes with vascular crowding or atelectasis in the lung  bases. 3.  Stable findings as noted above. Electronically signed by: David Frost MD Date:    12/10/2024 Time:    11:02      EKG    MICROBIOLOGY    MDM     Amount and/or Complexity of Data Reviewed  Clinical lab tests: reviewed  Tests in the radiology section of CPT®: reviewed  Tests in the medicine section of CPT®: reviewed  Discussion of test results with the performing providers: yes  Decide to obtain previous medical records or to obtain history from someone other than the patient: yes  Obtain history from someone other than the patient: yes  Review and summarize past medical records: yes  Discuss the patient with other providers: yes  Independent visualization of images, tracings, or specimens: yes

## 2024-12-11 NOTE — HPI
The patient was referred to the ER by his PCP for SOB and leg swelling. Patient is on chronic home O2 at 3L/min via NC. Chest x-ray revealed left upper lobe opacity resembling early pneumonia. EKG revealed Aflutter with variable AV block and left axis deviation with a ventricular rate of 64 beats per minute with a QT/QTC of 416/429. He was admitted for CHF exacerbation and started on Lasix. He was also started on antibiotics for PNA. Cardiology was consulted and recommended anticoagulation for Aflutter. We have been consulted for risk assessment given his history of GI bleeding from AVMs. This primarily occurred in 2017. His Eliquis was discontinued at that time. He currently denies hematemesis, hematochezia or melena. His Hgb is 11.4.

## 2024-12-11 NOTE — PT/OT/SLP EVAL
Speech Language Pathology Evaluation  Cognitive/Bedside Swallow    Patient Name:  Jimmy Young   MRN:  [id number]  Admitting Diagnosis: Pneumonia    Recommendations:                  General Recommendations:  Follow-up not indicated  Diet recommendations:  Soft & Bite Sized Diet - IDDSI Level 6, Thin liquids - IDDSI Level 0   Aspiration Precautions: Frequent oral care, HOB to 90 degrees, and Standard aspiration precautions   General Precautions: Standard, aspiration  Communication strategies:  none    Assessment:     Jimmy Young is a 89 y.o. male with a PMHx significant for a-fib, CKD, and emphysema who presented to the ED s/t worsening swelling of the legs. He presents with functional OM and cognitive-linguistic ability to meet functional communicative needs.  No overt s/s of dysphagia present during bedside CSE, and pt recommended for IDDSI 6-soft and bite sized solids with IDDSI 0-thin liquids, following standard aspiration precautions.  No further acute SLP intervention indicated at this time. MD to re-consult if needed.    History:     Past Medical History:   Diagnosis Date    Aneurysm 2016    abdominal, stent placed    Arthritis     Asthma     Atrial fibrillation     Cancer     skin cancer on face    CKD (chronic kidney disease)     COPD (chronic obstructive pulmonary disease) 10/05/2017    Diabetes mellitus     Emphysema lung     Encounter for blood transfusion     Hypertension        Past Surgical History:   Procedure Laterality Date    ABDOMINAL AORTIC ANEURYSM REPAIR      Stent placed    ABDOMINAL SURGERY      COLONOSCOPY Left 6/12/2017    Procedure: COLONOSCOPY;  Surgeon: Boaz Toussaint MD;  Location: [facility];  Service: Endoscopy;  Laterality: Left;    VASCULAR SURGERY      abdominal aneurysm repair       Social History: Patient lives by himself in [place], [place].    Prior Intubation HX:  NA    Modified Barium Swallow: NA    XR CHEST AP PORTABLE on 12/10/2024:     FINDINGS:  EKG leads  overlie the chest.  Low lung volumes with vascular crowding or atelectasis in the lung bases.  Possible small effusions.  There appears to be a left upper lung infiltrate.  The lungs are otherwise clear.  The cardiac silhouette size is enlarged.  The trachea is midline and the mediastinal width is normal. Negative for pneumothorax.  Pulmonary vasculature is normal. Negative for osseous abnormalities. Tortuous aorta with calcifications of the aortic knob.  There are degenerative changes of the spine and both shoulder girdles.     Impression:  1.  Lateral left upper lobe opacity, most likely early pneumonia.  Treatment for presumed pneumonia and follow-up x-rays in 4-6 weeks recommended to ensure resolution after adequate treatment.     2.  Low lung volumes with vascular crowding or atelectasis in the lung bases.     3.  Stable findings as noted above.     Electronically signed by:David Frost MD  Date:                                            12/10/2024  Time:                                           11:02    Prior diet: regular.    Subjective     Pt seen sitting in bedside chair for ST eval. Daughter present at bedside throughout to aid in hx.  Patient goals: to improve fluid      Pain/Comfort:  Pain Rating 1: 0/10  Pain Rating Post-Intervention 1: 0/10  Pain Rating 2: 0/10  Pain Rating Post-Intervention 2: 0/10    Objective:     Cognitive Status:    Pt oriented with functional memory recall and reasoning.     Receptive Language:   Comprehension:   WFL during conversation    Pragmatics:    WFL    Expressive Language:  Verbal:    WFL during conversation    Motor Speech:  WFL    Voice:   WFL    Oral Musculature Evaluation  Oral Musculature: WFL  Dentition: edentulous, uses dentures to eat  Secretion Management: adequate  Mucosal Quality: good  Mandibular Strength and Mobility: WFL  Oral Labial Strength and Mobility: WFL  Lingual Strength and Mobility: WFL  Velar Elevation: WFL  Buccal Strength and Mobility:  WFL  Volitional Cough: appreciated  Volitional Swallow: present  Voice Prior to PO Intake: clear    Bedside Swallow Eval:   Clinical Swallow Examination:   Of note, patient self-fed throughout evaluation. Patient presented with:     CONSISTENCY  NOTES   THIN (IDDSI 0) Cup/straw sips   No overt s/s of aspiration appreciated   PUREE (IDDSI 4/Extremely Thick)   TSP/TBSP bites of pudding No overt s/s of aspiration appreciated   SOLID (IDDSI 7/Regular) Bite of moustapha cracker   No overt s/s of aspiration appreciated     Thickened liquids were not used in this assessment. Keith (2018) reported that thickened liquids have no sound evidence at reducing the risk of pneumonia in patients with dysphagia and can cause harm by increasing their risk of dehydration. It also presents an increased risk of UTI, electrolyte imbalance, constipation, fecal impaction, cognitive impairment, functional decline and even death (Kobe, 2002; Edgardo, 2016).  Thickened liquids are associated with risks including dehydration, increased pharyngeal residue, potential interference with medication absorption, and decreased quality of life (Eulalio, 2013). Thickened liquids are also more likely to be silently aspirated than thin liquids (Wilbert et al., 2018). This supports the assertion that we should confirm a patient requires thickened liquids with an instrumental swallow study prior to recommending them.    References:   Eulalio ALMAGUER (2013). Thickening agents used for dysphagia management: Effect on bioavailability of water, medication and feelings of satiety. Nutrition Journal, 12, 54. https://doi.org/10.1186/9741-8321-12-54    TRIPP Atkins, IVAN Langley, DAVID Hurtado, & TRIPP Gustafson (2018). Cough response to aspiration in thin and thick fluids during FEES in hospitalized inpatients. International journal of language & communication disorders, 53(5), 909-918. https://doi.org/10.1111/7804-7687.29235    INTERPRETATION AND RISK ASSESSMENT:  Clinical  swallow evaluation (CSE) revealed oral phase characterized by lingual, labial, and buccal strength and range of motion functional for lip closure, bolus preparation and propulsion. The patient had no anterior loss of the bolus with complete closure of the lips around the utensils. No residue remained in the oral cavity following the swallow. Patient without overt clinical signs/symptoms of aspiration on any PO trials given.  Contributing risk factors for dysphagia include deficits in respiratory-swallow coordination.    Compensatory Strategies  Standard aspiration precautions    Goals:   Multidisciplinary Problems       SLP Goals       Not on file                    Plan:     Patient to be seen:      Plan of Care expires:     Plan of Care reviewed with:  patient, daughter   SLP Follow-Up:  No       Discharge recommendations:  Therapy Intensity Recommendations at Discharge: No Therapy Indicated   Barriers to Discharge:  None    Time Tracking:     SLP Treatment Date:   12/11/24  Speech Start Time:  1038  Speech Stop Time:  1100     Speech Total Time (min):  22 min    Billable Minutes: Eval 10  and Eval Swallow and Oral Function 12    Andressa Tamez MA, PL-SLP, CF-SLP  Speech Language Pathologist  12/11/2024

## 2024-12-11 NOTE — PLAN OF CARE
[facility] - Telemetry (Hospital)  Initial Discharge Assessment       Primary Care Provider: Nacho Richardson MD    Admission Diagnosis: Shortness of breath [R06.02]  Pneumonia [J18.9]  Chronic kidney disease, unspecified CKD stage [N18.9]  Swelling of lower leg [M79.89]    Admission Date: 12/10/2024  Expected Discharge Date:     Transition of Care Barriers: None    Payor: HU[employer] MANAGED MEDICARE / Plan: [employer] MEDICARE HMO / Product Type: Capitation /     Extended Emergency Contact Information  Primary Emergency Contact: Ruben Young  Mobile Phone: [phone]  Relation: Son  Secondary Emergency Contact: Chrissy Glover   [facility]  Home Phone: [phone]  Relation: Daughter    Discharge Plan A: Home  Discharge Plan B: Home      [facility] - [place], [place] - [address]  [address]  [place] [address]  Phone: [phone] Fax: [phone]      Initial Assessment (most recent)       Adult Discharge Assessment - 12/11/24 1414          Discharge Assessment    Assessment Type Discharge Planning Assessment     Confirmed/corrected address, phone number and insurance Yes     Confirmed Demographics Correct on Facesheet     Source of Information patient     Communicated SHEA with patient/caregiver Date not available/Unable to determine     Reason For Admission Pneumonia     People in Home alone     Facility Arrived From: Home     Do you expect to return to your current living situation? Yes     Do you have help at home or someone to help you manage your care at home? Yes     Who are your caregiver(s) and their phone number(s)? Sandee RubenEdmund     Prior to hospitilization cognitive status: Alert/Oriented     Current cognitive status: Alert/Oriented     Walking or Climbing Stairs Difficulty no     Dressing/Bathing Difficulty no     Equipment Currently Used at Home walker, rolling;cane, straight;wheelchair;oxygen;grab bar;shower chair     Readmission within 30 days? No      Patient currently being followed by outpatient case management? No     Do you currently have service(s) that help you manage your care at home? No     Do you take prescription medications? Yes     Do you have prescription coverage? Yes     Do you have any problems affording any of your prescribed medications? No     Is the patient taking medications as prescribed? yes     Who is going to help you get home at discharge? Children     How do you get to doctors appointments? car, drives self     Are you on dialysis? No     Do you take coumadin? No     Discharge Plan A Home     Discharge Plan B Home     DME Needed Upon Discharge  none     Discharge Plan discussed with: Patient     Transition of Care Barriers None                   SW met with patient and daughter, Chrissy, at bedside to complete assessment. Pt reports living alone. Pt's children Chrissy and Ruben are help at home, if needed. Pt reports independence with care and has a cane for ambulation. Pt still drives and family will be transport home at [place]. Pt has continuous O2 via [employer]; family has portable O2 for discharge home. Pt does not currently receive HH services. CM needs pending hospital course.     Pt's whiteboard updated with CM contact and anticipated discharge disposition. SW to remain available as needed.

## 2024-12-11 NOTE — PT/OT/SLP EVAL
Occupational Therapy   Evaluation & Treatment    Name: Jimmy Young  MRN: [id number]  Admitting Diagnosis: Pneumonia  Recent Surgery: * No surgery found *      Recommendations:     Discharge Recommendations: Low Intensity Therapy  Discharge Equipment Recommendations:  none  Barriers to discharge:       Assessment:     Jimmy Young is a 89 y.o. male with a medical diagnosis of Pneumonia.  Performance deficits affecting function: weakness, gait instability, decreased upper extremity function, decreased lower extremity function, impaired balance, impaired endurance, decreased ROM, impaired cardiopulmonary response to activity, decreased safety awareness, impaired self care skills, impaired functional mobility.      Rehab Prognosis: Good; patient would benefit from acute skilled OT services to address these deficits and reach maximum level of function.       Plan:     Patient to be seen 2 x/week to address the above listed problems via self-care/home management, therapeutic activities, therapeutic exercises  Plan of Care Expires: 12/25/24  Plan of Care Reviewed with: patient, daughter    Subjective     Chief Complaint: None stated  Patient/Family Comments/goals: Increase independence    Occupational Profile:  Living Environment: Lives alone in single story home, no steps to enter  Previous level of function: Independent with self-care. Ambulates household distances with cane and/or rollator   Roles and Routines: Drives, retired  Equipment Used at Home: rollator, walker, rolling, cane, straight, wheelchair, shower chair, grab bar  Assistance upon Discharge: Family    Pain/Comfort:  Pain Rating 1: 0/10  Pain Rating Post-Intervention 1: 0/10    Patients cultural, spiritual, Restorationist conflicts given the current situation:      Objective:     Communicated with: Nurse prior to session.  Patient found HOB elevated with telemetry, peripheral IV, oxygen upon OT entry to room.    General Precautions: Standard, fall,  respiratory  Orthopedic Precautions: N/A  Braces: N/A  Respiratory Status: Nasal cannula, flow 4 L/min    Occupational Performance:    Bed Mobility:    Patient completed Rolling/Turning to Right with contact guard assistance  Patient completed Scooting/Bridging with contact guard assistance  Patient completed Supine to Sit with contact guard assistance    Functional Mobility/Transfers:  Patient completed Sit <> Stand Transfer with contact guard assistance  with  rolling walker   Patient completed Bed <> Chair Transfer using Stand Pivot technique with stand by assistance with rolling walker  Functional Mobility: ambulated with RW x240 feet SBA      Cognitive/Visual Perceptual:  Cognitive/Psychosocial Skills:     -       Oriented to: Person, Place, Time, and Situation   -       Follows Commands/attention:Follows multistep  commands  -       Memory: No Deficits noted  -       Safety awareness/insight to disability: impaired     Physical Exam:  Balance:    -       fair  Upper Extremity Range of Motion:     -       Right Upper Extremity: WFL  -       Left Upper Extremity: WFL  Upper Extremity Strength:    -       Right Upper Extremity: grossly 3+/5  -       Left Upper Extremity: grossly 3+/5    AMPAC 6 Click ADL:  AMPAC Total Score: 20    Treatment & Education:  OT evaluation completed to assess patient's current level of function. Pt presents with the above stated deficits, limiting his ability to perform safe and independence ADLs and functional mobility. Pt will benefit from skilled OT services to increase functional strength, endurance and safety. Pt transferred to bedside chair SBA, where he performed BUE AROM exercises x10 reps in all planes to increase functional strength and ROM needed for daily activities. Pt encouraged to increase time in upright position to increase functional endurance, as well as to expand diaphragm and increase lung capacity. Pt verbalized understanding.  Pt instructed to utilize call  button for nursing assistance when ready to return to bed. Pt verbalized understanding.    Patient left up in chair with all lines intact, call button in reach, chair alarm on, and daughter present    GOALS:   Multidisciplinary Problems       Occupational Therapy Goals          Problem: Occupational Therapy    Goal Priority Disciplines Outcome Interventions   Occupational Therapy Goal     OT, PT/OT     Description: Goals to be met by: 12/25/24     Patient will increase functional independence with ADLs by performing:    Toileting from toilet with Contact Guard Assistance for hygiene and clothing management.   Toilet transfer to bedside commode with Contact Guard Assistance.  Upper extremity exercise program x10 reps per handout, with supervision.                         History:     Past Medical History:   Diagnosis Date    Aneurysm 2016    abdominal, stent placed    Arthritis     Asthma     Atrial fibrillation     Cancer     skin cancer on face    CKD (chronic kidney disease)     COPD (chronic obstructive pulmonary disease) 10/05/2017    Diabetes mellitus     Emphysema lung     Encounter for blood transfusion     Hypertension          Past Surgical History:   Procedure Laterality Date    ABDOMINAL AORTIC ANEURYSM REPAIR      Stent placed    ABDOMINAL SURGERY      COLONOSCOPY Left 6/12/2017    Procedure: COLONOSCOPY;  Surgeon: Boaz Toussaint MD;  Location: [facility];  Service: Endoscopy;  Laterality: Left;    VASCULAR SURGERY      abdominal aneurysm repair       Time Tracking:     OT Date of Treatment: 12/11/24  OT Start Time: 1020  OT Stop Time: 1045  OT Total Time (min): 25 min    Billable Minutes:Evaluation 15  Therapeutic Exercise 10    JASON Couch  12/11/2024

## 2024-12-12 PROBLEM — R60.0 LOWER EXTREMITY EDEMA: Status: ACTIVE | Noted: 2024-12-12

## 2024-12-12 PROBLEM — I50.32 CHRONIC DIASTOLIC HEART FAILURE: Status: ACTIVE | Noted: 2024-12-10

## 2024-12-12 PROBLEM — R78.81 BACTEREMIA DUE TO STAPHYLOCOCCUS: Status: ACTIVE | Noted: 2024-12-12

## 2024-12-12 PROBLEM — B95.8 BACTEREMIA DUE TO STAPHYLOCOCCUS: Status: ACTIVE | Noted: 2024-12-12

## 2024-12-12 LAB
ACINETOBACTER CALCOACETICUS/BAUMANNII COMPLEX: NOT DETECTED
ANION GAP SERPL CALC-SCNC: 12 MMOL/L (ref 8–16)
BACTEROIDES FRAGILIS: NOT DETECTED
BASOPHILS # BLD AUTO: 0.05 K/UL (ref 0–0.2)
BASOPHILS NFR BLD: 0.5 % (ref 0–1.9)
BUN SERPL-MCNC: 29 MG/DL (ref 10–30)
CALCIUM SERPL-MCNC: 8.3 MG/DL (ref 8.7–10.5)
CANDIDA ALBICANS: NOT DETECTED
CANDIDA AURIS: NOT DETECTED
CANDIDA GLABRATA: NOT DETECTED
CANDIDA KRUSEI: NOT DETECTED
CANDIDA PARAPSILOSIS: NOT DETECTED
CANDIDA TROPICALIS: NOT DETECTED
CHLORIDE SERPL-SCNC: 102 MMOL/L (ref 95–110)
CO2 SERPL-SCNC: 29 MMOL/L (ref 23–29)
CREAT SERPL-MCNC: 1.9 MG/DL (ref 0.5–1.4)
CRYPTOCOCCUS NEOFORMANS/GATTII: NOT DETECTED
CTX-M GENE (ESBL PRODUCER): ABNORMAL
DIFFERENTIAL METHOD BLD: ABNORMAL
ENTEROBACTER CLOACAE COMPLEX: NOT DETECTED
ENTEROBACTERALES: NOT DETECTED
ENTEROCOCCUS FAECALIS: NOT DETECTED
ENTEROCOCCUS FAECIUM: NOT DETECTED
EOSINOPHIL # BLD AUTO: 0.3 K/UL (ref 0–0.5)
EOSINOPHIL NFR BLD: 2.9 % (ref 0–8)
ERYTHROCYTE [DISTWIDTH] IN BLOOD BY AUTOMATED COUNT: 13.3 % (ref 11.5–14.5)
ESCHERICHIA COLI: NOT DETECTED
EST. GFR  (NO RACE VARIABLE): 32 ML/MIN/1.73 M^2
GLUCOSE SERPL-MCNC: 103 MG/DL (ref 70–110)
HAEMOPHILUS INFLUENZAE: NOT DETECTED
HCT VFR BLD AUTO: 39.3 % (ref 40–54)
HGB BLD-MCNC: 12.5 G/DL (ref 14–18)
IMM GRANULOCYTES # BLD AUTO: 0.06 K/UL (ref 0–0.04)
IMM GRANULOCYTES NFR BLD AUTO: 0.6 % (ref 0–0.5)
IMP GENE (CARBAPENEM RESISTANT): ABNORMAL
KLEBSIELLA AEROGENES: NOT DETECTED
KLEBSIELLA OXYTOCA: NOT DETECTED
KLEBSIELLA PNEUMONIAE GROUP: NOT DETECTED
KPC RESISTANCE GENE (CARBAPENEM): ABNORMAL
LISTERIA MONOCYTOGENES: NOT DETECTED
LYMPHOCYTES # BLD AUTO: 1.7 K/UL (ref 1–4.8)
LYMPHOCYTES NFR BLD: 16.3 % (ref 18–48)
MCH RBC QN AUTO: 29.8 PG (ref 27–31)
MCHC RBC AUTO-ENTMCNC: 31.8 G/DL (ref 32–36)
MCR-1: ABNORMAL
MCV RBC AUTO: 94 FL (ref 82–98)
MEC A/C AND MREJ (MRSA): ABNORMAL
MEC A/C: ABNORMAL
MONOCYTES # BLD AUTO: 0.8 K/UL (ref 0.3–1)
MONOCYTES NFR BLD: 7.2 % (ref 4–15)
NDM GENE (CARBAPENEM RESISTANT): ABNORMAL
NEISSERIA MENINGITIDIS: NOT DETECTED
NEUTROPHILS # BLD AUTO: 7.6 K/UL (ref 1.8–7.7)
NEUTROPHILS NFR BLD: 72.5 % (ref 38–73)
NRBC BLD-RTO: 0 /100 WBC
OXA-48-LIKE (CARBAPENEM RESISTANT): ABNORMAL
PLATELET # BLD AUTO: 255 K/UL (ref 150–450)
PMV BLD AUTO: 9.7 FL (ref 9.2–12.9)
POTASSIUM SERPL-SCNC: 3.5 MMOL/L (ref 3.5–5.1)
PROTEUS SPECIES: NOT DETECTED
PSEUDOMONAS AERUGINOSA: NOT DETECTED
RBC # BLD AUTO: 4.2 M/UL (ref 4.6–6.2)
SALMONELLA SP: NOT DETECTED
SERRATIA MARCESCENS: NOT DETECTED
SODIUM SERPL-SCNC: 143 MMOL/L (ref 136–145)
STAPHYLOCOCCUS AUREUS: NOT DETECTED
STAPHYLOCOCCUS EPIDERMIDIS: NOT DETECTED
STAPHYLOCOCCUS LUGDUNESIS: NOT DETECTED
STAPHYLOCOCCUS SPECIES: DETECTED
STENOTROPHOMONAS MALTOPHILIA: NOT DETECTED
STREPTOCOCCUS AGALACTIAE: NOT DETECTED
STREPTOCOCCUS PNEUMONIAE: NOT DETECTED
STREPTOCOCCUS PYOGENES: NOT DETECTED
STREPTOCOCCUS SPECIES: NOT DETECTED
VAN A/B (VRE GENE): ABNORMAL
VIM GENE (CARBAPENEM RESISTANT): ABNORMAL
WBC # BLD AUTO: 10.51 K/UL (ref 3.9–12.7)

## 2024-12-12 PROCEDURE — 99900035 HC TECH TIME PER 15 MIN (STAT)

## 2024-12-12 PROCEDURE — 63600175 PHARM REV CODE 636 W HCPCS: Performed by: NURSE PRACTITIONER

## 2024-12-12 PROCEDURE — 87040 BLOOD CULTURE FOR BACTERIA: CPT | Performed by: STUDENT IN AN ORGANIZED HEALTH CARE EDUCATION/TRAINING PROGRAM

## 2024-12-12 PROCEDURE — 36415 COLL VENOUS BLD VENIPUNCTURE: CPT | Performed by: STUDENT IN AN ORGANIZED HEALTH CARE EDUCATION/TRAINING PROGRAM

## 2024-12-12 PROCEDURE — 25000003 PHARM REV CODE 250: Performed by: NURSE PRACTITIONER

## 2024-12-12 PROCEDURE — 63600175 PHARM REV CODE 636 W HCPCS: Performed by: HOSPITALIST

## 2024-12-12 PROCEDURE — 94761 N-INVAS EAR/PLS OXIMETRY MLT: CPT

## 2024-12-12 PROCEDURE — 94640 AIRWAY INHALATION TREATMENT: CPT

## 2024-12-12 PROCEDURE — 27100092 HC HIGH FLOW DELIVERY CANNULA

## 2024-12-12 PROCEDURE — 97116 GAIT TRAINING THERAPY: CPT

## 2024-12-12 PROCEDURE — 36415 COLL VENOUS BLD VENIPUNCTURE: CPT | Performed by: NURSE PRACTITIONER

## 2024-12-12 PROCEDURE — 94664 DEMO&/EVAL PT USE INHALER: CPT

## 2024-12-12 PROCEDURE — 63600175 PHARM REV CODE 636 W HCPCS: Performed by: INTERNAL MEDICINE

## 2024-12-12 PROCEDURE — 97530 THERAPEUTIC ACTIVITIES: CPT

## 2024-12-12 PROCEDURE — 97110 THERAPEUTIC EXERCISES: CPT

## 2024-12-12 PROCEDURE — 21400001 HC TELEMETRY ROOM

## 2024-12-12 PROCEDURE — 25000003 PHARM REV CODE 250: Performed by: FAMILY MEDICINE

## 2024-12-12 PROCEDURE — 99223 1ST HOSP IP/OBS HIGH 75: CPT | Mod: ,,, | Performed by: STUDENT IN AN ORGANIZED HEALTH CARE EDUCATION/TRAINING PROGRAM

## 2024-12-12 PROCEDURE — 99233 SBSQ HOSP IP/OBS HIGH 50: CPT | Mod: ,,, | Performed by: INTERNAL MEDICINE

## 2024-12-12 PROCEDURE — 25000242 PHARM REV CODE 250 ALT 637 W/ HCPCS: Performed by: FAMILY MEDICINE

## 2024-12-12 PROCEDURE — 36415 COLL VENOUS BLD VENIPUNCTURE: CPT | Mod: XB | Performed by: INTERNAL MEDICINE

## 2024-12-12 PROCEDURE — 27000173 HC ACAPELLA DEVICE DH OR DM

## 2024-12-12 PROCEDURE — 27000646 HC AEROBIKA DEVICE

## 2024-12-12 PROCEDURE — 93010 ELECTROCARDIOGRAM REPORT: CPT | Mod: ,,, | Performed by: INTERNAL MEDICINE

## 2024-12-12 PROCEDURE — 27000221 HC OXYGEN, UP TO 24 HOURS

## 2024-12-12 PROCEDURE — 94799 UNLISTED PULMONARY SVC/PX: CPT

## 2024-12-12 PROCEDURE — 94618 PULMONARY STRESS TESTING: CPT

## 2024-12-12 PROCEDURE — 93005 ELECTROCARDIOGRAM TRACING: CPT

## 2024-12-12 PROCEDURE — 80048 BASIC METABOLIC PNL TOTAL CA: CPT | Performed by: NURSE PRACTITIONER

## 2024-12-12 PROCEDURE — 85025 COMPLETE CBC W/AUTO DIFF WBC: CPT | Performed by: INTERNAL MEDICINE

## 2024-12-12 RX ORDER — METRONIDAZOLE 10 MG/G
GEL TOPICAL 2 TIMES DAILY
Status: DISCONTINUED | OUTPATIENT
Start: 2024-12-12 | End: 2024-12-26 | Stop reason: HOSPADM

## 2024-12-12 RX ORDER — POTASSIUM CHLORIDE 20 MEQ/1
20 TABLET, EXTENDED RELEASE ORAL DAILY
Status: DISCONTINUED | OUTPATIENT
Start: 2024-12-12 | End: 2024-12-12

## 2024-12-12 RX ORDER — FUROSEMIDE 20 MG/1
20 TABLET ORAL 2 TIMES DAILY
Status: DISCONTINUED | OUTPATIENT
Start: 2024-12-12 | End: 2024-12-12

## 2024-12-12 RX ORDER — POTASSIUM CHLORIDE 20 MEQ/1
40 TABLET, EXTENDED RELEASE ORAL 2 TIMES DAILY
Status: DISCONTINUED | OUTPATIENT
Start: 2024-12-12 | End: 2024-12-13

## 2024-12-12 RX ORDER — FUROSEMIDE 10 MG/ML
60 INJECTION INTRAMUSCULAR; INTRAVENOUS 2 TIMES DAILY
Status: DISCONTINUED | OUTPATIENT
Start: 2024-12-12 | End: 2024-12-13

## 2024-12-12 RX ORDER — HYDRALAZINE HYDROCHLORIDE 20 MG/ML
10 INJECTION INTRAMUSCULAR; INTRAVENOUS EVERY 6 HOURS PRN
Status: DISCONTINUED | OUTPATIENT
Start: 2024-12-12 | End: 2024-12-13

## 2024-12-12 RX ORDER — DOXYLAMINE SUCCINATE 25 MG
TABLET ORAL 2 TIMES DAILY
Status: DISCONTINUED | OUTPATIENT
Start: 2024-12-12 | End: 2024-12-26 | Stop reason: HOSPADM

## 2024-12-12 RX ORDER — POTASSIUM CHLORIDE 20 MEQ/1
40 TABLET, EXTENDED RELEASE ORAL DAILY
Status: DISCONTINUED | OUTPATIENT
Start: 2024-12-12 | End: 2024-12-12

## 2024-12-12 RX ADMIN — PRAVASTATIN SODIUM 40 MG: 20 TABLET ORAL at 09:12

## 2024-12-12 RX ADMIN — FUROSEMIDE 60 MG: 10 INJECTION, SOLUTION INTRAMUSCULAR; INTRAVENOUS at 01:12

## 2024-12-12 RX ADMIN — MICONAZOLE NITRATE: 20 CREAM TOPICAL at 07:12

## 2024-12-12 RX ADMIN — DILTIAZEM HYDROCHLORIDE 300 MG: 180 CAPSULE, COATED, EXTENDED RELEASE ORAL at 06:12

## 2024-12-12 RX ADMIN — PANTOPRAZOLE SODIUM 40 MG: 40 TABLET, DELAYED RELEASE ORAL at 05:12

## 2024-12-12 RX ADMIN — MAGNESIUM OXIDE TAB 400 MG (241.3 MG ELEMENTAL MG) 400 MG: 400 (241.3 MG) TAB at 09:12

## 2024-12-12 RX ADMIN — MAGNESIUM OXIDE TAB 400 MG (241.3 MG ELEMENTAL MG) 400 MG: 400 (241.3 MG) TAB at 08:12

## 2024-12-12 RX ADMIN — POTASSIUM CHLORIDE 20 MEQ: 1500 TABLET, EXTENDED RELEASE ORAL at 11:12

## 2024-12-12 RX ADMIN — METRONIDAZOLE: 10 GEL TOPICAL at 08:12

## 2024-12-12 RX ADMIN — ENOXAPARIN SODIUM 30 MG: 30 INJECTION SUBCUTANEOUS at 05:12

## 2024-12-12 RX ADMIN — IPRATROPIUM BROMIDE AND ALBUTEROL SULFATE 3 ML: 2.5; .5 SOLUTION RESPIRATORY (INHALATION) at 08:12

## 2024-12-12 RX ADMIN — METRONIDAZOLE: 10 GEL TOPICAL at 11:12

## 2024-12-12 RX ADMIN — IPRATROPIUM BROMIDE AND ALBUTEROL SULFATE 3 ML: 2.5; .5 SOLUTION RESPIRATORY (INHALATION) at 01:12

## 2024-12-12 RX ADMIN — DOXYCYCLINE HYCLATE 100 MG: 100 TABLET, COATED ORAL at 09:12

## 2024-12-12 RX ADMIN — LEVOTHYROXINE SODIUM 50 MCG: 50 TABLET ORAL at 06:12

## 2024-12-12 RX ADMIN — DOXYCYCLINE HYCLATE 100 MG: 100 TABLET, COATED ORAL at 08:12

## 2024-12-12 RX ADMIN — FUROSEMIDE 60 MG: 10 INJECTION, SOLUTION INTRAMUSCULAR; INTRAVENOUS at 08:12

## 2024-12-12 RX ADMIN — CEFTRIAXONE 1 G: 1 INJECTION, POWDER, FOR SOLUTION INTRAMUSCULAR; INTRAVENOUS at 09:12

## 2024-12-12 RX ADMIN — HYDRALAZINE HYDROCHLORIDE 10 MG: 20 INJECTION INTRAMUSCULAR; INTRAVENOUS at 08:12

## 2024-12-12 RX ADMIN — POTASSIUM CHLORIDE 40 MEQ: 1500 TABLET, EXTENDED RELEASE ORAL at 08:12

## 2024-12-12 RX ADMIN — MICONAZOLE NITRATE: 20 CREAM TOPICAL at 11:12

## 2024-12-12 NOTE — SUBJECTIVE & OBJECTIVE
Past Medical History:   Diagnosis Date    Aneurysm 2016    abdominal, stent placed    Arthritis     Asthma     Atrial fibrillation     Cancer     skin cancer on face    CKD (chronic kidney disease)     COPD (chronic obstructive pulmonary disease) 10/05/2017    Diabetes mellitus     Emphysema lung     Encounter for blood transfusion     Hypertension        Past Surgical History:   Procedure Laterality Date    ABDOMINAL AORTIC ANEURYSM REPAIR      Stent placed    ABDOMINAL SURGERY      COLONOSCOPY Left 2017    Procedure: COLONOSCOPY;  Surgeon: Boaz Toussaint MD;  Location: [facility];  Service: Endoscopy;  Laterality: Left;    VASCULAR SURGERY      abdominal aneurysm repair       Review of patient's allergies indicates:  No Known Allergies    No current facility-administered medications on file prior to encounter.     Current Outpatient Medications on File Prior to Encounter   Medication Sig    carvediloL (COREG) 12.5 MG tablet Take 12.5 mg by mouth 2 (two) times daily with meals.    diltiaZEM (CARDIZEM CD) 300 MG 24 hr capsule Take 1 capsule by mouth every morning.    furosemide (LASIX) 20 MG tablet Take 1 tablet (20 mg total) by mouth once daily.    hydrALAZINE (APRESOLINE) 10 MG tablet Take 10 mg by mouth every 12 (twelve) hours.    pantoprazole (PROTONIX) 40 MG tablet Take 1 tablet by mouth Daily.    pravastatin (PRAVACHOL) 40 MG tablet Take 1 tablet by mouth once daily.    amLODIPine (NORVASC) 5 MG tablet Take 1 tablet (5 mg total) by mouth once daily.     Family History       Problem Relation (Age of Onset)    Cancer Mother    Heart attack Father    Hypertension Father          Tobacco Use    Smoking status: Former     Current packs/day: 0.00     Average packs/day: 2.0 packs/day for 20.0 years (40.0 ttl pk-yrs)     Types: Cigarettes     Start date: 1957     Quit date: 1977     Years since quittin.9    Smokeless tobacco: Former   Substance and Sexual Activity    Alcohol use: No    Drug use: No     Sexual activity: Not Currently     Review of Systems   Constitutional: Positive for malaise/fatigue.   HENT: Negative.     Eyes: Negative.    Cardiovascular:  Positive for dyspnea on exertion and leg swelling.   Respiratory:  Positive for cough and shortness of breath.    Endocrine: Negative.    Hematologic/Lymphatic: Negative.    Skin: Negative.    Musculoskeletal: Negative.    Gastrointestinal: Negative.    Genitourinary: Negative.    Neurological: Negative.    Psychiatric/Behavioral: Negative.     Allergic/Immunologic: Negative.      Objective:     Vital Signs (Most Recent):  Temp: 98 °F (36.7 °C) (12/12/24 1552)  Pulse: 68 (12/12/24 1552)  Resp: 18 (12/12/24 1552)  BP: (!) 161/71 (12/12/24 1552)  SpO2: (!) 93 % (12/12/24 1552) Vital Signs (24h Range):  Temp:  [98 °F (36.7 °C)-98.7 °F (37.1 °C)] 98 °F (36.7 °C)  Pulse:  [] 68  Resp:  [18-21] 18  SpO2:  [84 %-96 %] 93 %  BP: (145-170)/(63-74) 161/71     Weight: 78.6 kg (173 lb 4.5 oz)  Body mass index is 27.14 kg/m².    SpO2: (!) 93 %         Intake/Output Summary (Last 24 hours) at 12/12/2024 1707  Last data filed at 12/12/2024 0608  Gross per 24 hour   Intake 320 ml   Output 450 ml   Net -130 ml       Lines/Drains/Airways       Peripheral Intravenous Line  Duration                  Peripheral IV - Single Lumen 12/11/24 0845 20 G Anterior;Distal;Left Upper Arm 1 day         Peripheral IV - Single Lumen 12/11/24 1030 20 G Right Antecubital 1 day                     Physical Exam  Vitals and nursing note reviewed.   Constitutional:       General: He is not in acute distress.     Appearance: Normal appearance. He is well-developed. He is not diaphoretic.      Comments: On supplemental O2   HENT:      Head: Normocephalic and atraumatic.   Eyes:      General:         Right eye: No discharge.         Left eye: No discharge.      Pupils: Pupils are equal, round, and reactive to light.   Cardiovascular:      Rate and Rhythm: Normal rate. Rhythm regularly irregular.  "     Heart sounds: Normal heart sounds, S1 normal and S2 normal. No murmur heard.  Pulmonary:      Effort: Pulmonary effort is normal.      Breath sounds: Rhonchi present.      Comments: Crackles left side  Musculoskeletal:      Right lower leg: Edema present.      Left lower leg: Edema present.   Skin:     General: Skin is warm and dry.      Findings: No erythema.      Comments: CVI changes BLE     Neurological:      Mental Status: He is alert and oriented to person, place, and time.   Psychiatric:         Mood and Affect: Mood normal.         Behavior: Behavior normal.          Significant Labs: CMP   Recent Labs   Lab 12/11/24  0411 12/12/24  0410    143   K 3.1* 3.5    102   CO2 29 29    103   BUN 29 29   CREATININE 2.1* 1.9*   CALCIUM 8.3* 8.3*   ANIONGAP 13 12   , CBC   Recent Labs   Lab 12/12/24  1221   WBC 10.51   HGB 12.5*   HCT 39.3*      , Troponin   No results for input(s): "TROPONINI" in the last 48 hours.  , and All pertinent lab results from the last 24 hours have been reviewed.    Significant Imaging: Echocardiogram: Transthoracic echo (TTE) complete (Cupid Only):   Results for orders placed or performed during the hospital encounter of 12/10/24   Echo   Result Value Ref Range    LV ESV A2C 85.30 mL    LA area A2C 24.76 cm2    Left Atrium Major Axis 5.90 cm    Left Atrium Minor Axis 6.54 cm    RA Major Axis 5.80 cm    LV Diastolic Volume 106.12 mL    LV Systolic Volume 40.02 mL    TR Max Regulo 3.39 m/s    PV mean gradient 2 mmHg    RVOT peak VTI 20.1 cm    RVOT peak regulo 1.04 m/s    Ao VTI 33.5 cm    Ao peak regulo 1.6 m/s    LVOT peak VTI 24.4 cm    LVOT peak regulo 1.1 m/s    LVOT diameter 2.0 cm    PV peak gradient 4 mmHg    AV mean gradient 5.1 mmHg    TAPSE 1.48 cm    RVDD 3.92 cm    LA size 3.36 cm    Ascending aorta 3.32 cm    STJ 3.33 cm    LVIDs 3.2 2.1 - 4.0 cm    Ao root annulus 3.72 cm    PW 1.1 0.6 - 1.1 cm    IVS 1.1 0.6 - 1.1 cm    LVIDd 4.8 3.5 - 6.0 cm    PV PEAK " VELOCITY 1.03 m/s    Left Ventricular Outflow Tract Mean Gradient 2.56 mmHg    Left Ventricular Outflow Tract Mean Velocity 0.75 cm/s    Left Ventricular End Systolic Volume by Teichholz Method 40.02 mL    Left Ventricular End Diastolic Volume by Teichholz Method 106.12 mL    IVC diameter 2.59 cm    FS 33.3 28 - 44 %    LV mass 194.0 g    ZLVIDD -1.30     ZLVIDS -0.39     Left Ventricle Relative Wall Thickness 0.46 cm    AV valve area 2.3 cm²    AV Velocity Ratio 0.69     AV index (prosthetic) 0.73     LVOT area 3.1 cm2    LVOT stroke volume 76.6 cm3    AV peak gradient 10.2 mmHg    LV Systolic Volume Index 20.7 mL/m2    LV Diastolic Volume Index 54.98 mL/m2    LV Mass Index 100.5 g/m2    Triscuspid Valve Regurgitation Peak Gradient 46 mmHg    NIURKA by Velocity Ratio 2.2 cm²    BSA 1.96 m2    RISHI 42.2 mL/m2    LA Vol 81.50 cm3    LA WIDTH 4.6 cm    RA Width 4.2 cm    TV resting pulmonary artery pressure 61 mmHg    RV TB RVSP 18 mmHg    Est. RA pres 15 mmHg    Narrative      Left Ventricle: The left ventricle is normal in size. Normal wall   thickness. There is concentric remodeling. There is normal systolic   function with a visually estimated ejection fraction of 55 - 60%. There is   indeterminate diastolic function.    Right Ventricle: Right ventricle was not well visualized due to poor   acoustic window. Systolic function is borderline low.    Left Atrium: Left atrium is moderately dilated.    Right Atrium: Right atrium is dilated.    Aortic Valve: There is mild aortic valve sclerosis. There is no   stenosis. Aortic valve peak velocity is 1.6 m/s. Mean gradient is 5.1   mmHg.    Mitral Valve: There is mild regurgitation.    Tricuspid Valve: There is moderate regurgitation.    Pulmonary Artery: The estimated pulmonary artery systolic pressure is   61 mmHg.    IVC/SVC: Elevated venous pressure at 15 mmHg.      and EKG: Reviewed

## 2024-12-12 NOTE — ASSESSMENT & PLAN NOTE
Chronic, controlled.  Latest blood pressure and vitals reviewed-   Temp:  [97 °F (36.1 °C)-98.7 °F (37.1 °C)]   Pulse:  []   Resp:  [16-20]   BP: (102-170)/(52-74)   SpO2:  [83 %-95 %] .   Home meds for hypertension were reviewed and noted below.   Hypertension Medications               amLODIPine (NORVASC) 5 MG tablet Take 1 tablet (5 mg total) by mouth once daily.    carvediloL (COREG) 12.5 MG tablet Take 12.5 mg by mouth 2 (two) times daily with meals.    diltiaZEM (CARDIZEM CD) 300 MG 24 hr capsule Take 1 capsule by mouth every morning.    furosemide (LASIX) 20 MG tablet Take 1 tablet (20 mg total) by mouth once daily.    hydrALAZINE (APRESOLINE) 10 MG tablet Take 10 mg by mouth every 12 (twelve) hours.            While in the hospital, will manage blood pressure as follows; Adjust home antihypertensive regimen as follows- relax normotensive blood pressure range in this patient demographic, discontinue hydralazine    Will utilize p.r.n. blood pressure medication only if patient's blood pressure greater than  180/110 and he develops symptoms such as worsening chest pain or shortness of breath.

## 2024-12-12 NOTE — HOSPITAL COURSE
12/12/24 Pt seen and examined today sitting up on bedside chair, feels ok still with LE edema on exam on 5L nc, on 2-3L at home. Denies any CP. Labs  reviewed chart reviewed    12/13/24-Patient seen and examined today, lying in bed. Feels ok. SOB at baseline. No other CV complaints. Still on 5L NC. Given IV Lasix yesterday, d/c'd today due to bumped creatinine. ID on board, remains on abx.

## 2024-12-12 NOTE — ASSESSMENT & PLAN NOTE
--Typically bacteremia poses risk of life threatening sepsis and metastatic infection  --However, in this case suspicion is high for contaminant   --1 bottle reporting Staph species per [facility] but not S aureus or lugdunensis   --Likely a nonpathogenic coagulase negative Staph  --Patient hemodynamically stable with no fever or leukocytosis  --Hold resistant Staph coverage for now (ie vancomycin, daptomycin)  --Will follow repeat blood cx to see if it grows again; if negative would not treat  --Continue pneumonia coverage with ceftriaxone and doxycycline   --Above d/w primary team.

## 2024-12-12 NOTE — CONSULTS
[facility] - Telemetry ([facility])  Infectious Disease  Consult Note    Patient Name: Jimmy oYung  MRN: [id number]  Admission Date: 12/10/2024  Hospital Length of Stay: 2 days  Attending Physician: Juliana Goodwin MD  Primary Care Provider: Nacho Richardson MD     Isolation Status: No active isolations    Patient information was obtained from patient, relative(s), ER records, and primary team.      Consults  Assessment/Plan:     Pulmonary  Pneumonia  --CXR reports lateral left upper lobe opacity, most likely early pneumonia.   --Suspect this is primary infection in this patient as blood cx reporting likely CONS   --Would continue empiric doxycycline and ceftriaxone   --Require drug toxicity monitoring   --See edema assessment for more details on antibiotics   --Above d/w primary team.      Cardiac/Vascular  Hypercholesterolemia  Continue current medications per primary      Atrial fibrillation  Continue current medications per primary      Essential hypertension  Continue current medications per primary      ID  * Bacteremia due to Staphylococcus  --Typically bacteremia poses risk of life threatening sepsis and metastatic infection  --However, in this case suspicion is high for contaminant   --1 bottle reporting Staph species per BCID but not S aureus or lugdunensis   --Likely a nonpathogenic coagulase negative Staph  --Patient hemodynamically stable with no fever or leukocytosis  --Hold resistant Staph coverage for now (ie vancomycin, daptomycin)  --Will follow repeat blood cx to see if it grows again; if negative would not treat  --Continue pneumonia coverage with ceftriaxone and doxycycline   --Above d/w primary team.      Endocrine  Acquired hypothyroidism  Continue current medications per primary      Other  Lower extremity edema  Also with superimposed cellulitis bilaterally. Photos put under media section today. Would benefit from lymphedema clinic referral outpatient. Will monitor on current antibiotics.  May broaden at discharge and plan for minimum 10 day total course. If still present in clinic may offer Dalvance.        Thank you for your consult. I will follow-up with patient. Please contact us if you have any additional questions.    Ashkan Lagunas, DO  Infectious Disease  [facility] - Telemetry (Hospital)    Subjective:     Principal Problem: Bacteremia due to Staphylococcus    HPI: This is a 89 yo M with hx of asthma, Afib, COPD, CKD, DM and HTN who presented to ER for evaluation of bilateral leg swelling. Patient was advised to go to ER in setting of lower extremity edema, shortness of breath, and a cough. Patient is on chronic home O2 at 3L. On diuretics. No evidence of DVT on LE US. CXR reports lateral left upper lobe opacity, most likely early pneumonia. Started empirically on IV ceftriaxone and PO doxycycline. Blood cultures were collected 12/10 are reporting Staph species on BCID. No S aureus detected. Low suspicion for virulent infection. No pacemaker/ICD noted on review of CXR. Likely going to be a CONS. Patient has been afebrile. ID consulted for bacteremia.     Past Medical History:   Diagnosis Date    Aneurysm 2016    abdominal, stent placed    Arthritis     Asthma     Atrial fibrillation     Cancer     skin cancer on face    CKD (chronic kidney disease)     COPD (chronic obstructive pulmonary disease) 10/05/2017    Diabetes mellitus     Emphysema lung     Encounter for blood transfusion     Hypertension        Past Surgical History:   Procedure Laterality Date    ABDOMINAL AORTIC ANEURYSM REPAIR      Stent placed    ABDOMINAL SURGERY      COLONOSCOPY Left 6/12/2017    Procedure: COLONOSCOPY;  Surgeon: Boaz Toussaint MD;  Location: [facility];  Service: Endoscopy;  Laterality: Left;    VASCULAR SURGERY      abdominal aneurysm repair       Review of patient's allergies indicates:  No Known Allergies    Medications:  Medications Prior to Admission   Medication Sig    carvediloL (COREG) 12.5 MG tablet Take  12.5 mg by mouth 2 (two) times daily with meals.    diltiaZEM (CARDIZEM CD) 300 MG 24 hr capsule Take 1 capsule by mouth every morning.    furosemide (LASIX) 20 MG tablet Take 1 tablet (20 mg total) by mouth once daily.    hydrALAZINE (APRESOLINE) 10 MG tablet Take 10 mg by mouth every 12 (twelve) hours.    pantoprazole (PROTONIX) 40 MG tablet Take 1 tablet by mouth Daily.    pravastatin (PRAVACHOL) 40 MG tablet Take 1 tablet by mouth once daily.    amLODIPine (NORVASC) 5 MG tablet Take 1 tablet (5 mg total) by mouth once daily.     Antibiotics (From admission, onward)      Start     Stop Route Frequency Ordered    12/11/24 0900  doxycycline tablet 100 mg         -- Oral Every 12 hours 12/10/24 2323    12/11/24 0900  cefTRIAXone injection 1 g         -- IV Every 24 hours (non-standard times) 12/10/24 2323          Antifungals (From admission, onward)      None          Antivirals (From admission, onward)      None             Immunization History   Administered Date(s) Administered    COVID-19 MRNA, LN-S PF (MODERNA HALF 0.25 ML DOSE) 12/15/2021    COVID-19 Vaccine 02/10/2021, 03/10/2021, 12/15/2021    COVID-19, MRNA, LN-S, PF (MODERNA FULL 0.5 ML DOSE) 02/10/2021, 03/10/2021    Influenza 11/16/2001    Influenza - Quadrivalent 10/30/2014    Influenza - Trivalent - Afluria, Fluzone MDV 12/18/2012    Influenza - Trivalent - Fluzone High Dose - PF (65 years and older) 10/30/2013, 10/04/2015, 10/27/2016, 11/14/2017, 11/21/2018    Pneumococcal Conjugate - 13 Valent 11/14/2017    Tdap 01/04/2020       Family History       Problem Relation (Age of Onset)    Cancer Mother    Heart attack Father    Hypertension Father          Social History     Socioeconomic History    Marital status: Single   Tobacco Use    Smoking status: Former     Current packs/day: 0.00     Average packs/day: 2.0 packs/day for 20.0 years (40.0 ttl pk-yrs)     Types: Cigarettes     Start date: 1/1/1957     Quit date: 1/1/1977     Years since quitting:  47.9    Smokeless tobacco: Former   Substance and Sexual Activity    Alcohol use: No    Drug use: No    Sexual activity: Not Currently     Social Drivers of Health     Financial Resource Strain: Patient Declined (12/11/2024)    Overall Financial Resource Strain (CARDIA)     Difficulty of Paying Living Expenses: Patient declined   Food Insecurity: Patient Declined (12/11/2024)    Hunger Vital Sign     Worried About Running Out of Food in the Last Year: Patient declined     Ran Out of Food in the Last Year: Patient declined   Transportation Needs: Patient Declined (12/11/2024)    TRANSPORTATION NEEDS     Transportation : Patient declined   Physical Activity: Inactive (7/3/2023)    Received from [facility]    Exercise Vital Sign     Days of Exercise per Week: 0 days     Minutes of Exercise per Session: 0 min   Stress: Patient Declined (12/11/2024)    [employer] - Occupational Stress Questionnaire     Feeling of Stress : Patient declined   Housing Stability: Patient Declined (12/11/2024)    Housing Stability Vital Sign     Unable to Pay for Housing in the Last Year: Patient declined     Homeless in the Last Year: Patient declined     Review of Systems   Cardiovascular:  Positive for leg swelling.   Skin:  Positive for color change, rash and wound.   All other systems reviewed and are negative.    Objective:     Vital Signs (Most Recent):  Temp: 98.4 °F (36.9 °C) (12/12/24 0803)  Pulse: 69 (12/12/24 0803)  Resp: 18 (12/12/24 0803)  BP: (!) 154/74 (12/12/24 0803)  SpO2: (!) 90 % (12/12/24 0803) Vital Signs (24h Range):  Temp:  [97 °F (36.1 °C)-98.7 °F (37.1 °C)] 98.4 °F (36.9 °C)  Pulse:  [46-80] 69  Resp:  [16-19] 18  SpO2:  [83 %-95 %] 90 %  BP: (102-170)/(52-74) 154/74     Weight: 78.6 kg (173 lb 4.5 oz)  Body mass index is 27.14 kg/m².    Estimated Creatinine Clearance: 22.2 mL/min (A) (based on SCr of 1.9 mg/dL  (H)).     Physical Exam  Constitutional:       General: He is not in acute distress.     Appearance: Normal appearance. He is not ill-appearing.   Cardiovascular:      Rate and Rhythm: Normal rate and regular rhythm.      Pulses: Normal pulses.      Heart sounds: Normal heart sounds. No murmur heard.     No friction rub. No gallop.   Pulmonary:      Effort: Pulmonary effort is normal. No respiratory distress.      Breath sounds: Normal breath sounds.   Abdominal:      General: Abdomen is flat. Bowel sounds are normal. There is no distension.      Palpations: Abdomen is soft.      Tenderness: There is no abdominal tenderness.   Musculoskeletal:         General: Swelling present.      Right lower leg: Edema present.      Left lower leg: Edema present.   Skin:     General: Skin is warm and dry.      Findings: Erythema present.   Neurological:      Mental Status: He is alert.                Significant Labs: Blood Culture:   Recent Labs   Lab 12/10/24  1053 12/10/24  1056   LABBLOO No Growth to date  No Growth to date Gram stain aer bottle: Gram positive cocci  Results called to and read back by: GEETA Holguin. 12/12/2024  05:15     CBC:   Recent Labs   Lab 12/10/24  1055   WBC 8.48   HGB 11.4*   HCT 36.1*        CMP:   Recent Labs   Lab 12/10/24  1055 12/11/24  0411 12/12/24  0410    145 143   K 3.5 3.1* 3.5    103 102   CO2 31* 29 29   * 104 103   BUN 29 29 29   CREATININE 2.2* 2.1* 1.9*   CALCIUM 8.6* 8.3* 8.3*   PROT 6.4  --   --    ALBUMIN 3.2*  --   --    BILITOT 0.5  --   --    ALKPHOS 112  --   --    AST 15  --   --    ALT 10  --   --    ANIONGAP 11 13 12     Microbiology Results (last 7 days)       Procedure Component Value Units Date/Time    Rapid Organism ID by PCR (from Blood culture) [[id number]]  (Abnormal) Collected: 12/10/24 1056    Order Status: Completed Updated: 12/12/24 0654     Enterococcus faecalis Not Detected     Enterococcus faecium Not Detected     Listeria monocytogenes  Not Detected     Staphylococcus spp. Detected     Staphylococcus aureus Not Detected     Staphylococcus epidermidis Not Detected     Staphylococcus lugdunensis Not Detected     Streptococcus species Not Detected     Streptococcus agalactiae Not Detected     Streptococcus pneumoniae Not Detected     Streptococcus pyogenes Not Detected     Acinetobacter calcoaceticus/baumannii complex Not Detected     Bacteroides fragilis Not Detected     Enterobacterales Not Detected     Enterobacter cloacae complex Not Detected     Escherichia coli Not Detected     Klebsiella aerogenes Not Detected     Klebsiella oxytoca Not Detected     Klebsiella pneumoniae group Not Detected     Proteus Not Detected     Salmonella sp Not Detected     Serratia marcescens Not Detected     Haemophilus influenzae Not Detected     Neisseria meningtidis Not Detected     Pseudomonas aeruginosa Not Detected     Stenotrophomonas maltophilia Not Detected     Candida albicans Not Detected     Candida auris Not Detected     Candida glabrata Not Detected     Candida krusei Not Detected     Candida parapsilosis Not Detected     Candida tropicalis Not Detected     Cryptococcus neoformans/gattii Not Detected     CTX-M (ESBL ) Test Not Applicable     IMP (Carbapenem resistant) Test Not Applicable     KPC resistance gene (Carbapenem resistant) Test Not Applicable     mcr-1  Test Not Applicable     mec A/C  Test Not Applicable     mec A/C and MREJ (MRSA) gene Test Not Applicable     NDM (Carbapenem resistant) Test Not Applicable     OXA-48-like (Carbapenem resistant) Test Not Applicable     van A/B (VRE gene) Test Not Applicable     VIM (Carbapenem resistant) Test Not Applicable    Blood Culture #1 **CANNOT BE ORDERED STAT** [[id number]] Collected: 12/10/24 1053    Order Status: Completed Specimen: Blood from Peripheral, Antecubital, Right Updated: 12/12/24 0612     Blood Culture, Routine No Growth to date      No Growth to date    Blood Culture #2 **CANNOT  BE ORDERED STAT** [[id number]] Collected: 12/10/24 1056    Order Status: Completed Specimen: Blood from Peripheral, Forearm, Right Updated: 12/12/24 0518     Blood Culture, Routine Gram stain aer bottle: Gram positive cocci      Results called to and read back by: GEETA Holguin. 12/12/2024  05:15    Respiratory Infection Panel (PCR), Nasopharyngeal [[id number]]     Order Status: No result Specimen: Nasopharyngeal Swab     Culture, Respiratory with Gram Stain [[id number]]     Order Status: No result Specimen: Sputum, Expectorated           All pertinent labs within the past 24 hours have been reviewed.    Significant Imaging: CXR: I have reviewed all pertinent results/findings within the past 24 hours:  pneumonia

## 2024-12-12 NOTE — RESPIRATORY THERAPY
Home Oxygen Evaluation - [facility] - Cardiopulmonary Department      Date Performed: 2024      1) Patient's Home O2 Sat on room air, while at rest: Room Air SpO2 At Rest: (!) 84 %        If O2 sats on room air at rest are 88% or below, patient qualifies.  Document O2 liter flow needed in Step 2.  If O2 sats are 89% or above, complete Step 3.        2)  If patient is not ambulated and O2 sats are <88%, what is the O2 liter flow required to meet ordered saturation?      If O2 sats on room air while exercising remain 89% or above patient does not qualify, no further testing needed Document N/A in step 3. If O2 sats on room air while exercising are 88% or below, continue to Step 4.    3) Patient's O2 Sat on room air while exercisin) Patient's O2 Sat while exercising on O2: SpO2 During Ambulation on O2: (!) 89 % at Ambulation O2 LPM: 6 LPM         (Must show improvement from #4 for patients to qualify)

## 2024-12-12 NOTE — PROGRESS NOTES
[facility] Tele[facility] ([facility])  Cardiology  Progress Note    Patient Name: Jimmy Young  MRN: [id number]  Admission Date: 12/10/2024  Hospital Length of Stay: 2 days  Code Status: Partial Code   Attending Physician: Juliana Goodwin MD   Primary Care Physician: Nacho Richardson MD  Expected Discharge Date:   Principal Problem:Bacteremia due to Staphylococcus    Subjective:   HPI:   Mr. Young is a 89 year old male patient whose current medical conditions include COPD, AAA s/p repair, arthritis, asthma, CHF (EF recovered), PAF (not on AC due to prior bleeding issues/AVM's), and HTN who was referred to [facility] ED yesterday by his PCP due to worsening LE edema associated with SOB and cough. Patient denied any associated fever, chills, palpitations, near syncope, or syncope. Initial workup in ED revealed hypokalemia (K 3.1), hypomagnesemia (Mg 1.2), creatinine of 2.1, and negative BNP. EKG showed rate-controlled aflutter. CXR showed findings concerning for JENIFFER PNA and patient was subsequently transferred to [facility] for admission and further treatment. Cardiology consulted to assist with management. Patient seen and examined today, sitting up in bedside chair. Feels ok. Still SOB but improved. No CP symptoms. Does admit to salty food intake, still has some BLE edema. He reports compliance with his medications. Previously seen in clinic by Dr. Fair. Labs reviewed. Troponin negative. TTE pending. Prior cath 12/15 at [facility] showed non-obs CAD.    Hospital Course:   12/12/24 Pt seen and examined today sitting up on bedside chair, feels ok still with LE edema on exam on 5L nc, on 2-3L at home. Denies any CP. Labs  reviewed chart reviewed    Past Medical History:   Diagnosis Date    Aneurysm 2016    abdominal, stent placed    Arthritis     Asthma     Atrial fibrillation     Cancer     skin cancer on face    CKD (chronic kidney disease)     COPD (chronic obstructive pulmonary disease) 10/05/2017    Diabetes mellitus      Emphysema lung     Encounter for blood transfusion     Hypertension        Past Surgical History:   Procedure Laterality Date    ABDOMINAL AORTIC ANEURYSM REPAIR      Stent placed    ABDOMINAL SURGERY      COLONOSCOPY Left 2017    Procedure: COLONOSCOPY;  Surgeon: Boaz Toussaint MD;  Location: [facility];  Service: Endoscopy;  Laterality: Left;    VASCULAR SURGERY      abdominal aneurysm repair       Review of patient's allergies indicates:  No Known Allergies    No current facility-administered medications on file prior to encounter.     Current Outpatient Medications on File Prior to Encounter   Medication Sig    carvediloL (COREG) 12.5 MG tablet Take 12.5 mg by mouth 2 (two) times daily with meals.    diltiaZEM (CARDIZEM CD) 300 MG 24 hr capsule Take 1 capsule by mouth every morning.    furosemide (LASIX) 20 MG tablet Take 1 tablet (20 mg total) by mouth once daily.    hydrALAZINE (APRESOLINE) 10 MG tablet Take 10 mg by mouth every 12 (twelve) hours.    pantoprazole (PROTONIX) 40 MG tablet Take 1 tablet by mouth Daily.    pravastatin (PRAVACHOL) 40 MG tablet Take 1 tablet by mouth once daily.    amLODIPine (NORVASC) 5 MG tablet Take 1 tablet (5 mg total) by mouth once daily.     Family History       Problem Relation (Age of Onset)    Cancer Mother    Heart attack Father    Hypertension Father          Tobacco Use    Smoking status: Former     Current packs/day: 0.00     Average packs/day: 2.0 packs/day for 20.0 years (40.0 ttl pk-yrs)     Types: Cigarettes     Start date: 1957     Quit date: 1977     Years since quittin.9    Smokeless tobacco: Former   Substance and Sexual Activity    Alcohol use: No    Drug use: No    Sexual activity: Not Currently     Review of Systems   Constitutional: Positive for malaise/fatigue.   HENT: Negative.     Eyes: Negative.    Cardiovascular:  Positive for dyspnea on exertion and leg swelling.   Respiratory:  Positive for cough and shortness of breath.     Endocrine: Negative.    Hematologic/Lymphatic: Negative.    Skin: Negative.    Musculoskeletal: Negative.    Gastrointestinal: Negative.    Genitourinary: Negative.    Neurological: Negative.    Psychiatric/Behavioral: Negative.     Allergic/Immunologic: Negative.      Objective:     Vital Signs (Most Recent):  Temp: 98 °F (36.7 °C) (12/12/24 1552)  Pulse: 68 (12/12/24 1552)  Resp: 18 (12/12/24 1552)  BP: (!) 161/71 (12/12/24 1552)  SpO2: (!) 93 % (12/12/24 1552) Vital Signs (24h Range):  Temp:  [98 °F (36.7 °C)-98.7 °F (37.1 °C)] 98 °F (36.7 °C)  Pulse:  [] 68  Resp:  [18-21] 18  SpO2:  [84 %-96 %] 93 %  BP: (145-170)/(63-74) 161/71     Weight: 78.6 kg (173 lb 4.5 oz)  Body mass index is 27.14 kg/m².    SpO2: (!) 93 %         Intake/Output Summary (Last 24 hours) at 12/12/2024 1707  Last data filed at 12/12/2024 0608  Gross per 24 hour   Intake 320 ml   Output 450 ml   Net -130 ml       Lines/Drains/Airways       Peripheral Intravenous Line  Duration                  Peripheral IV - Single Lumen 12/11/24 0845 20 G Anterior;Distal;Left Upper Arm 1 day         Peripheral IV - Single Lumen 12/11/24 1030 20 G Right Antecubital 1 day                     Physical Exam  Vitals and nursing note reviewed.   Constitutional:       General: He is not in acute distress.     Appearance: Normal appearance. He is well-developed. He is not diaphoretic.      Comments: On supplemental O2   HENT:      Head: Normocephalic and atraumatic.   Eyes:      General:         Right eye: No discharge.         Left eye: No discharge.      Pupils: Pupils are equal, round, and reactive to light.   Cardiovascular:      Rate and Rhythm: Normal rate. Rhythm regularly irregular.      Heart sounds: Normal heart sounds, S1 normal and S2 normal. No murmur heard.  Pulmonary:      Effort: Pulmonary effort is normal.      Breath sounds: Rhonchi present.      Comments: Crackles left side  Musculoskeletal:      Right lower leg: Edema present.      Left  "lower leg: Edema present.   Skin:     General: Skin is warm and dry.      Findings: No erythema.      Comments: CVI changes BLE     Neurological:      Mental Status: He is alert and oriented to person, place, and time.   Psychiatric:         Mood and Affect: Mood normal.         Behavior: Behavior normal.          Significant Labs: CMP   Recent Labs   Lab 12/11/24  0411 12/12/24  0410    143   K 3.1* 3.5    102   CO2 29 29    103   BUN 29 29   CREATININE 2.1* 1.9*   CALCIUM 8.3* 8.3*   ANIONGAP 13 12   , CBC   Recent Labs   Lab 12/12/24  1221   WBC 10.51   HGB 12.5*   HCT 39.3*      , Troponin   No results for input(s): "TROPONINI" in the last 48 hours.  , and All pertinent lab results from the last 24 hours have been reviewed.    Significant Imaging: Echocardiogram: Transthoracic echo (TTE) complete (Cupid Only):   Results for orders placed or performed during the hospital encounter of 12/10/24   Echo   Result Value Ref Range    LV ESV A2C 85.30 mL    LA area A2C 24.76 cm2    Left Atrium Major Axis 5.90 cm    Left Atrium Minor Axis 6.54 cm    RA Major Axis 5.80 cm    LV Diastolic Volume 106.12 mL    LV Systolic Volume 40.02 mL    TR Max Regulo 3.39 m/s    PV mean gradient 2 mmHg    RVOT peak VTI 20.1 cm    RVOT peak regulo 1.04 m/s    Ao VTI 33.5 cm    Ao peak regulo 1.6 m/s    LVOT peak VTI 24.4 cm    LVOT peak regulo 1.1 m/s    LVOT diameter 2.0 cm    PV peak gradient 4 mmHg    AV mean gradient 5.1 mmHg    TAPSE 1.48 cm    RVDD 3.92 cm    LA size 3.36 cm    Ascending aorta 3.32 cm    STJ 3.33 cm    LVIDs 3.2 2.1 - 4.0 cm    Ao root annulus 3.72 cm    PW 1.1 0.6 - 1.1 cm    IVS 1.1 0.6 - 1.1 cm    LVIDd 4.8 3.5 - 6.0 cm    PV PEAK VELOCITY 1.03 m/s    Left Ventricular Outflow Tract Mean Gradient 2.56 mmHg    Left Ventricular Outflow Tract Mean Velocity 0.75 cm/s    Left Ventricular End Systolic Volume by Teichholz Method 40.02 mL    Left Ventricular End Diastolic Volume by Teichholz Method 106.12 mL "    IVC diameter 2.59 cm    FS 33.3 28 - 44 %    LV mass 194.0 g    ZLVIDD -1.30     ZLVIDS -0.39     Left Ventricle Relative Wall Thickness 0.46 cm    AV valve area 2.3 cm²    AV Velocity Ratio 0.69     AV index (prosthetic) 0.73     LVOT area 3.1 cm2    LVOT stroke volume 76.6 cm3    AV peak gradient 10.2 mmHg    LV Systolic Volume Index 20.7 mL/m2    LV Diastolic Volume Index 54.98 mL/m2    LV Mass Index 100.5 g/m2    Triscuspid Valve Regurgitation Peak Gradient 46 mmHg    NIURKA by Velocity Ratio 2.2 cm²    BSA 1.96 m2    RISHI 42.2 mL/m2    LA Vol 81.50 cm3    LA WIDTH 4.6 cm    RA Width 4.2 cm    TV resting pulmonary artery pressure 61 mmHg    RV TB RVSP 18 mmHg    Est. RA pres 15 mmHg    Narrative      Left Ventricle: The left ventricle is normal in size. Normal wall   thickness. There is concentric remodeling. There is normal systolic   function with a visually estimated ejection fraction of 55 - 60%. There is   indeterminate diastolic function.    Right Ventricle: Right ventricle was not well visualized due to poor   acoustic window. Systolic function is borderline low.    Left Atrium: Left atrium is moderately dilated.    Right Atrium: Right atrium is dilated.    Aortic Valve: There is mild aortic valve sclerosis. There is no   stenosis. Aortic valve peak velocity is 1.6 m/s. Mean gradient is 5.1   mmHg.    Mitral Valve: There is mild regurgitation.    Tricuspid Valve: There is moderate regurgitation.    Pulmonary Artery: The estimated pulmonary artery systolic pressure is   61 mmHg.    IVC/SVC: Elevated venous pressure at 15 mmHg.      and EKG: Reviewed  Assessment and Plan:       Atrial flutter  -Noted to be in rate-controlled aflutter  -Continue BB, CCB  -No AC given prior anemia issues/AVM's, high risk of recurrent bleeding mentioned in procedure note from 2017  -Discussed risks/benefits of AC with patient/family, await GI input    Pneumonia  -Per primary team, on abx    Chronic diastolic heart  failure  -BNP normal  -Diurese prn  -Continue home CV meds  -TTE pending    Coronary artery disease of native artery of native heart with stable angina pectoris  -Stable, CP free  -Continue OMT    Hypercholesterolemia  -Statin    Atrial fibrillation  -See aflutter    Essential hypertension  -Continue home CV meds        VTE Risk Mitigation (From admission, onward)           Ordered     Place SALUD hose  Until discontinued         12/12/24 0921     Place SALUD hose  Until discontinued         12/11/24 0949     enoxaparin injection 30 mg  Daily         12/10/24 1922     IP VTE HIGH RISK PATIENT  Once         12/10/24 1922     Place sequential compression device  Until discontinued         12/10/24 1922                    Karley Rizo, NP  Cardiology  [facility] - Telemetry ([facility])

## 2024-12-12 NOTE — ASSESSMENT & PLAN NOTE
--CXR reports lateral left upper lobe opacity, most likely early pneumonia.   --Suspect this is primary infection in this patient as blood cx reporting likely CONS   --Would continue empiric doxycycline and ceftriaxone   --Require drug toxicity monitoring   --See edema assessment for more details on antibiotics   --Above d/w primary team.

## 2024-12-12 NOTE — ASSESSMENT & PLAN NOTE
Patient with Paroxysmal (<7 days) atrial fibrillation which is controlled currently with Bbs and Calcium Channel Blocker. Patient is currently in sinus rhythm.[id number] Score: 4.   Discussed cardiology vs gastroenterology recommendations   Patient voices understanding  Will continue holding ac

## 2024-12-12 NOTE — PT/OT/SLP PROGRESS
Occupational Therapy  Treatment    Jimmy Young   MRN: [id number]   Admitting Diagnosis: Bacteremia due to Staphylococcus    OT Date of Treatment: 12/12/24   OT Start Time: 1605  OT Stop Time: 1628  OT Total Time (min): 23 min    Billable Minutes:  Therapeutic Activity 13 minutes and Therapeutic Exercise 10 minutes    OT/MAGALY: OT          General Precautions: Standard, fall  Orthopedic Precautions: N/A  Braces: N/A  Respiratory Status: Nasal cannula, flow 5 L/min         Subjective:  Communicated with epic chart review prior to session.    Pain/Comfort  Pain Rating 1: 0/10    Objective:  Patient found with: telemetry     Functional Mobility:  Transfers:   SBA with sit<>stand  SBA with step<pivot transfers    Functional Ambulation: pt ambulated 50 feet with rolling walker    Activities of Daily Living:   UE dressing : min a with adjusting hospital gown  Balance:   Static Sit: GOOD: Takes MODERATE challenges from all directions  Dynamic Sit: GOOD: Maintains balance through MODERATE excursions of active trunk movement  Static Stand: FAIR+: Takes MINIMAL challenges from all directions  Dynamic stand: FAIR+: Needs CLOSE SUPERVISION during gait and is able to right self with minor LOB    Therapeutic Activities and Exercises:  Educated patient on importance of increased tolerance to upright position and direct impact on CV endurance and strength. Patient encouraged to sit up in chair/ EOB, for a minimum of 2 consecutive hours including for all meals.. Pt educated on HEP. Pt performed 1 set x 10 reps b ue rom exercise in all avaialble planes and ranges ( shoulder flexion, chest press, elbow flex/ext  and hand/digits flex/ext) seated in bed side chair. Encouraged patient to perform AROM TE to BUE throughout the day within all available planes of motion. Re enforced importance of utilizing call light to meet needs in room and not attempt to get up without staff assistance. Patient verbalized understanding and agreed to  "comply.           AM-PAC 6 CLICK ADL   How much help from another person does this patient currently need?   1 = Unable, Total/Dependent Assistance  2 = A lot, Maximum/Moderate Assistance  3 = A little, Minimum/Contact Guard/Supervision  4 = None, Modified [place]/Independent    Putting on and taking off regular lower body clothing? : 3  Bathing (including washing, rinsing, drying)?: 3  Toileting, which includes using toilet, bedpan, or urinal? : 3  Putting on and taking off regular upper body clothing?: 3  Taking care of personal grooming such as brushing teeth?: 4  Eating meals?: 4  Daily Activity Total Score: 20     AM-PAC Raw Score CMS "G-Code Modifier Level of Impairment Assistance   6 % Total / Unable   7 - 8 CM 80 - 100% Maximal Assist   9-13 CL 60 - 80% Moderate Assist   14 - 19 CK 40 - 60% Moderate Assist   20 - 22 CJ 20 - 40% Minimal Assist   23 CI 1-20% SBA / CGA   24 CH 0% Independent/ Mod I       Patient left up in chair with all lines intact and call button in reach    ASSESSMENT:  Jimmy Young is a 89 y.o. male with a medical diagnosis of Bacteremia due to Staphylococcus and presents with debility and generalized weakness.    Rehab identified problem list/impairments:  weakness, impaired balance, decreased safety awareness, impaired endurance, visual deficits, impaired self care skills, decreased coordination, impaired functional mobility, decreased upper extremity function, gait instability, decreased lower extremity function    Rehab potential is good.    Activity tolerance: Good    Discharge recommendations: Low Intensity Therapy   Barriers to discharge:      Equipment recommendations: none    GOALS:   Multidisciplinary Problems       Occupational Therapy Goals          Problem: Occupational Therapy    Goal Priority Disciplines Outcome Interventions   Occupational Therapy Goal     OT, PT/OT Progressing    Description: Goals to be met by: 12/25/24     Patient will increase " functional independence with ADLs by performing:    Toileting from toilet with Contact Guard Assistance for hygiene and clothing management.   Toilet transfer to bedside commode with Contact Guard Assistance.  Upper extremity exercise program x10 reps per handout, with supervision.                         Plan:  Patient to be seen 2 x/week to address the above listed problems via self-care/home management, therapeutic activities, therapeutic exercises  Plan of Care expires: 12/25/24  Plan of Care reviewed with: patient         12/12/2024

## 2024-12-12 NOTE — HPI
This is a 89 yo M with hx of asthma, Afib, COPD, CKD, DM and HTN who presented to ER for evaluation of bilateral leg swelling. Patient was advised to go to ER in setting of lower extremity edema, shortness of breath, and a cough. Patient is on chronic home O2 at 3L. On diuretics. No evidence of DVT on LE US. CXR reports lateral left upper lobe opacity, most likely early pneumonia. Started empirically on IV ceftriaxone and PO doxycycline. Blood cultures were collected 12/10 are reporting Staph species on BCID. No S aureus detected. Low suspicion for virulent infection. No pacemaker/ICD noted on review of CXR. Likely going to be a CONS. Patient has been afebrile. ID consulted for bacteremia.    78F with PMHx of HTN, DM, HLD, hypothyroidism admitted for management of surgical wound infection following robotically assisted hernia repair on 3/2, complicated by RUL PE, now found to have acute blood loss anemia and ATN on HD, with blood loss anemia

## 2024-12-12 NOTE — ASSESSMENT & PLAN NOTE
-Noted to be in rate-controlled aflutter  -Continue BB, CCB  -No AC given prior anemia issues/AVM's, high risk of recurrent bleeding mentioned in procedure note from 2017  -Discussed risks/benefits of AC with patient/family, await GI input

## 2024-12-12 NOTE — ASSESSMENT & PLAN NOTE
Improving  Multifactorial: malnutrition, fluid indiscretion, venous stasis, recent medication(s) change, dependent edema  Counseling provided  Elevate legs   Manish hose and topicals

## 2024-12-12 NOTE — ASSESSMENT & PLAN NOTE
Also with superimposed cellulitis bilaterally. Photos put under media section today. Would benefit from lymphedema clinic referral outpatient. Will monitor on current antibiotics. May broaden at discharge and plan for minimum 10 day total course. If still present in clinic may offer Dalvance.

## 2024-12-12 NOTE — ASSESSMENT & PLAN NOTE
Creatine stable for now. BMP reviewed- noted Estimated Creatinine Clearance: 22.2 mL/min (A) (based on SCr of 1.9 mg/dL (H)). according to latest data. Based on current GFR, CKD stage is stage 3 - GFR 30-59.  Monitor UOP and serial BMP and adjust therapy as needed. Renally dose meds. Avoid nephrotoxic medications and procedures.

## 2024-12-12 NOTE — ASSESSMENT & PLAN NOTE
Patient has a diagnosis of pneumonia. The cause of the pneumonia is unknown at this time. The pneumonia is stable. The patient has the following signs/symptoms of pneumonia: cough and shortness of breath. The patient does have a current oxygen requirement and the patient does have a home oxygen requirement. I have reviewed the pertinent imaging. The following cultures have been collected: Blood cultures The culture results are listed below.     Current antimicrobial regimen consists of the antibiotics listed below. Will monitor patient closely and continue current treatment plan unchanged.    Antibiotics (From admission, onward)      Start     Stop Route Frequency Ordered    12/11/24 0900  doxycycline tablet 100 mg         -- Oral Every 12 hours 12/10/24 2323    12/11/24 0900  cefTRIAXone injection 1 g         -- IV Every 24 hours (non-standard times) 12/10/24 2323            Microbiology Results (last 7 days)       Procedure Component Value Units Date/Time    Blood culture [[id number]] Collected: 12/12/24 0912    Order Status: Sent Specimen: Blood from Peripheral, Hand, Left     Blood culture [[id number]] Collected: 12/12/24 0907    Order Status: Sent Specimen: Blood     Rapid Organism ID by PCR (from Blood culture) [[id number]]  (Abnormal) Collected: 12/10/24 1056    Order Status: Completed Updated: 12/12/24 0654     Enterococcus faecalis Not Detected     Enterococcus faecium Not Detected     Listeria monocytogenes Not Detected     Staphylococcus spp. Detected     Staphylococcus aureus Not Detected     Staphylococcus epidermidis Not Detected     Staphylococcus lugdunensis Not Detected     Streptococcus species Not Detected     Streptococcus agalactiae Not Detected     Streptococcus pneumoniae Not Detected     Streptococcus pyogenes Not Detected     Acinetobacter calcoaceticus/baumannii complex Not Detected     Bacteroides fragilis Not Detected     Enterobacterales Not Detected     Enterobacter cloacae complex Not  Detected     Escherichia coli Not Detected     Klebsiella aerogenes Not Detected     Klebsiella oxytoca Not Detected     Klebsiella pneumoniae group Not Detected     Proteus Not Detected     Salmonella sp Not Detected     Serratia marcescens Not Detected     Haemophilus influenzae Not Detected     Neisseria meningtidis Not Detected     Pseudomonas aeruginosa Not Detected     Stenotrophomonas maltophilia Not Detected     Candida albicans Not Detected     Candida auris Not Detected     Candida glabrata Not Detected     Candida krusei Not Detected     Candida parapsilosis Not Detected     Candida tropicalis Not Detected     Cryptococcus neoformans/gattii Not Detected     CTX-M (ESBL ) Test Not Applicable     IMP (Carbapenem resistant) Test Not Applicable     KPC resistance gene (Carbapenem resistant) Test Not Applicable     mcr-1  Test Not Applicable     mec A/C  Test Not Applicable     mec A/C and MREJ (MRSA) gene Test Not Applicable     NDM (Carbapenem resistant) Test Not Applicable     OXA-48-like (Carbapenem resistant) Test Not Applicable     van A/B (VRE gene) Test Not Applicable     VIM (Carbapenem resistant) Test Not Applicable    Blood Culture #1 **CANNOT BE ORDERED STAT** [[id number]] Collected: 12/10/24 1053    Order Status: Completed Specimen: Blood from Peripheral, Antecubital, Right Updated: 12/12/24 0612     Blood Culture, Routine No Growth to date      No Growth to date    Blood Culture #2 **CANNOT BE ORDERED STAT** [[id number]] Collected: 12/10/24 1056    Order Status: Completed Specimen: Blood from Peripheral, Forearm, Right Updated: 12/12/24 0518     Blood Culture, Routine Gram stain aer bottle: Gram positive cocci      Results called to and read back by: GEETA Holguin. 12/12/2024  05:15    Respiratory Infection Panel (PCR), Nasopharyngeal [[id number]]     Order Status: No result Specimen: Nasopharyngeal Swab     Culture, Respiratory with Gram Stain [[id number]]     Order Status: No result  Specimen: Sputum, Expectorated           Blood culture positive  Infectious disease consulted  Continue treatment   Defer any changes to infectious disease

## 2024-12-12 NOTE — PT/OT/SLP PROGRESS
"Physical Therapy  Treatment    Jimmy Young   MRN: [id number]   Admitting Diagnosis: Bacteremia due to Staphylococcus    PT Received On: 12/12/24  PT Start Time: 0820     PT Stop Time: 0900    PT Total Time (min): 40 min       Billable Minutes:  Gait Training 15, Therapeutic Activity 15, and Therapeutic Exercise 10    Treatment Type: Treatment  PT/PTA: PT     Number of PTA visits since last PT visit: 0       General Precautions: Standard, fall, respiratory  Orthopedic Precautions: N/A  Braces: N/A  Respiratory Status: Nasal cannula, flow 5 L/min         Subjective:  Communicated with NURSE AND EPIC CHART REVIEW  prior to session.   PT AGREED TO TX     Pain/Comfort  Pain Rating 1: 0/10  Pain Rating Post-Intervention 1: 0/10    Objective:   Patient found with: telemetry, peripheral IV, oxygen    Functional Mobility:  PT MET IN RM LOG ROLLED TO RIGHT AND SEATED EOB WITH SBA. PT SCOOTED AND GT. BELT AND  SOCKS DONNED PRIOR TO OOB MOBILITY.  PT STOOD WITH RW AND GT TRAINED X 300' WITH RW AND SBA WITH O2 IN TOW. PT RETURNED TO  T/F TO CHAIR FOR REST. PT STOOD TO USE URINAL HOWEVER ACCIDENTALLY URINATED ON FLOOR. P.T. ASSISTED IN CLEANING FLOOR AND PT. CLEAN SOCKS WERE DONNED.    Treatment and Education:   PT COMPLETED B LE TE X 10 REPS OF AP, TKE AND MIP FOR LE STRENGTHENING. PT LEFT SEATED IN CHAIR FOR OOB TOLERANCE AND PT EDUCATED ON "CALL DON'T FALL", ENCOURAGED TO CALL FOR ASSISTANCE WITH ALL NEEDS FOR OOB MOBILITY.    AM-PAC 6 CLICK MOBILITY  How much help from another person does this patient currently need?   1 = Unable, Total/Dependent Assistance  2 = A lot, Maximum/Moderate Assistance  3 = A little, Minimum/Contact Guard/Supervision  4 = None, Modified [place]/Independent    Turning over in bed (including adjusting bedclothes, sheets and blankets)?: 4  Sitting down on and standing up from a chair with arms (e.g., wheelchair, bedside commode, etc.): 4  Moving from lying on back to sitting on the " side of the bed?: 4  Moving to and from a bed to a chair (including a wheelchair)?: 4  Need to walk in hospital room?: 4  Climbing 3-5 steps with a railing?: 1  Basic Mobility Total Score: 21    AM-PAC Raw Score CMS G-Code Modifier Level of Impairment Assistance   6 % Total / Unable   7 - 9 CM 80 - 100% Maximal Assist   10 - 14 CL 60 - 80% Moderate Assist   15 - 19 CK 40 - 60% Moderate Assist   20 - 22 CJ 20 - 40% Minimal Assist   23 CI 1-20% SBA / CGA   24 CH 0% Independent/ Mod I     Patient left up in chair with call button in reach.    Assessment:  PT PROGRESSING WITH GT.     Rehab identified problem list/impairments: weakness, impaired endurance, impaired self care skills, gait instability, impaired balance, decreased ROM, impaired cardiopulmonary response to activity, decreased lower extremity function    Rehab potential is good.    Activity tolerance: Good    Discharge recommendations: Low Intensity Therapy      Barriers to discharge:      Equipment recommendations: none     GOALS:   Multidisciplinary Problems       Physical Therapy Goals          Problem: Physical Therapy    Goal Priority Disciplines Outcome Interventions   Physical Therapy Goal     PT, PT/OT     Description: LT24  1. PT WILL COMPLETE BED MOBILITY IND  2. PT WILL GT TRAIN X 500' WITH RW MOD I  3. PT WILL COMPLETE STANDING TE X 20 REPS TO INC STRENGTH / BALANCE  4. PT WILL INC AMPAC SCORE BY 2 POINTS TO PROGRESS GROSS FUNC MOBILITY.                          PLAN:    Patient to be seen 3 x/week to address the above listed problems via gait training, therapeutic activities, therapeutic exercises  Plan of Care expires: 24  Plan of Care reviewed with: patient         2024

## 2024-12-12 NOTE — PROGRESS NOTES
[facility] Medicine  Progress Note    Patient Name: Jimmy Young  MRN: [id number]  Patient Class: IP- Inpatient   Admission Date: 12/10/2024  Length of Stay: 2 days  Attending Physician: Juliana Goodwin MD  Primary Care Provider: Nacho Richardson MD        Subjective     Principal Problem:Bacteremia due to Staphylococcus        HPI:  Jimmy Young is a 89 y.o. male with a PMH  has a past medical history of Aneurysm (2016), Arthritis, Asthma, Atrial fibrillation, Cancer, CKD (chronic kidney disease), COPD (chronic obstructive pulmonary disease) (10/05/2017), Diabetes mellitus, Emphysema lung, Encounter for blood transfusion, and Hypertension.presented to the ED for swelling of the legs. Patient was referred from his primary care physician today for worsening lower extremity edema, shortness of breath, and cough. Patient is on chronic home O2 at 3L/min via NC. Reports compliance with his lasix and denies any excessive fluid or salt intake. Denies any other complaints at his time.      ER workup revealed CBC to be unremarkable.  CMP with BUN/creatinine at baseline of 29/2.2.   mg/dL.  Magnesium 1.2.  BNP, troponin, lactic acid within normal limits.  Flu/COVID negative.  Blood cultures obtained x2.  Ultrasound lower extremities negative for DVT.  Chest x-ray revealed left upper lobe opacity resembling early pneumonia.  EKG revealed a flutter with variable AV block and left axis deviation with a ventricular rate of 64 beats per minute with a QT/QTC of 416/429.  Vital signs stable.  Patient is at baseline oxygen saturation of 92-94% on 3 liters/minute via nasal cannula.  Patient received 12.5 mg carvedilol, 1 g Rocephin IV, 100 mg doxycycline p.o., 30 mg Lovenox, 40 mg Lasix IV, and 10 mg hydralazine p.o. at outside facility.  Hospital Medicine consulted to admit patient for CHF exacerbation and pneumonia. Patient and family in agreement with treatment plan. Patient admitted  under inpatient status.    PCP: Nacho Richardson      Overview/Hospital Course:  12/11 admitted for pneumonia. Wears supplemental oxygen at baseline, 2L. On 4L. Speech consulted. Schedule breathing treatments. Discontinue lasix. Replete lytes. Echocardiogram pending. Cardiology consulted. Relaxing normotensive range in this patient demographic.  12/12 blood culture positive for staph. Infectious disease consulted. Continue monitoring repeat blood cultures. Recent medication(s) change with linagliptin with risk for congestive heart failure and skin reaction. Discussed risk vs benefit. Hba1c 6.9. recommend goal hba1c 8-9.       Overview/Hospital Course:  12/11 admitted for pneumonia. Wears supplemental oxygen at baseline, 2L. On 4L. Speech consulted. Schedule breathing treatments. Discontinue lasix. Replete lytes. Echocardiogram pending. Cardiology consulted. Relaxing normotensive range in this patient demographic.  12/12 blood culture positive for staph. Infectious disease consulted. Continue monitoring repeat blood cultures. Recent medication(s) change with linagliptin with risk for congestive heart failure and skin reaction. Discussed risk vs benefit. Hba1c 6.9. recommend goal hba1c 8-9.      Interval History: See hospital course for today        Review of Systems   Constitutional:  Positive for activity change, appetite change (poor nutrition) and fatigue. Negative for fever.   HENT:  Positive for hearing loss.    Respiratory:  Positive for shortness of breath (chronic, not worsening).    Cardiovascular:  Positive for leg swelling. Negative for chest pain.   Gastrointestinal:  Negative for nausea and vomiting.   Musculoskeletal:  Positive for gait problem.        Uses walker at baseline   Skin:  Positive for rash and wound.   Neurological:  Positive for weakness.   Psychiatric/Behavioral:  Negative for agitation, behavioral problems, confusion, decreased concentration and dysphoric mood. The patient is not  nervous/anxious.       Objective:      Vital Signs (Most Recent):  Temp: 98.4 °F (36.9 °C) (12/12/24 0803)  Pulse: 100 (12/12/24 0822)  Resp: 20 (12/12/24 0822)  BP: (!) 154/74 (12/12/24 0803)  SpO2: (!) 90 % (12/12/24 0822) Vital Signs (24h Range):  Temp:  [97 °F (36.1 °C)-98.7 °F (37.1 °C)] 98.4 °F (36.9 °C)  Pulse:  [] 100  Resp:  [16-20] 20  SpO2:  [83 %-95 %] 90 %  BP: (102-170)/(52-74) 154/74      Weight: 78.6 kg (173 lb 4.5 oz)  Body mass index is 27.14 kg/m².     Intake/Output Summary (Last 24 hours) at 12/12/2024 0944  Last data filed at 12/12/2024 0608      Gross per 24 hour   Intake 320 ml   Output 450 ml   Net -130 ml         Physical Exam  Vitals and nursing note reviewed. Exam conducted with a chaperone present (son).   Constitutional:       General: He is not in acute distress.     Appearance: He is ill-appearing. He is not toxic-appearing.      Interventions: Nasal cannula in place.   HENT:      Head: Normocephalic and atraumatic.   Cardiovascular:      Rate and Rhythm: Normal rate.   Pulmonary:      Effort: Pulmonary effort is normal. No tachypnea or respiratory distress.      Breath sounds: No wheezing, rhonchi or rales.   Abdominal:      Palpations: Abdomen is soft.      Tenderness: There is no abdominal tenderness.   Genitourinary:     Comments: External johnson  Musculoskeletal:         General: Swelling present.      Right lower leg: Edema present.      Left lower leg: Edema present.   Skin:     General: Skin is warm and dry.      Findings: Erythema, lesion and rash present.   Neurological:      Mental Status: He is alert and oriented to person, place, and time. Mental status is at baseline.      Motor: Weakness present.   Psychiatric:         Mood and Affect: Mood normal.         Behavior: Behavior normal.        Assessment and Plan     * Bacteremia due to Staphylococcus  Infectious disease consulted  Continue current management until final speciation       Lower extremity  edema  Improving  Multifactorial: malnutrition, fluid indiscretion, venous stasis, recent medication(s) change, dependent edema  Counseling provided  Continue lasix home lasix  Manish hose and topicals    Atrial flutter  Controlled  Follow up outpatient primary cardiologist  Holding a/c due to pmh GI bleed       Pneumonia  Patient has a diagnosis of pneumonia. The cause of the pneumonia is unknown at this time. The pneumonia is stable. The patient has the following signs/symptoms of pneumonia: cough and shortness of breath. The patient does have a current oxygen requirement and the patient does have a home oxygen requirement. I have reviewed the pertinent imaging. The following cultures have been collected: Blood cultures The culture results are listed below.     Current antimicrobial regimen consists of the antibiotics listed below. Will monitor patient closely and continue current treatment plan unchanged.    Antibiotics (From admission, onward)      Start     Stop Route Frequency Ordered    12/11/24 0900  doxycycline tablet 100 mg         -- Oral Every 12 hours 12/10/24 2323    12/11/24 0900  cefTRIAXone injection 1 g         -- IV Every 24 hours (non-standard times) 12/10/24 2323            Microbiology Results (last 7 days)       Procedure Component Value Units Date/Time    Blood culture [[id number]] Collected: 12/12/24 0912    Order Status: Sent Specimen: Blood from Peripheral, Hand, Left     Blood culture [[id number]] Collected: 12/12/24 0907    Order Status: Sent Specimen: Blood     Rapid Organism ID by PCR (from Blood culture) [[id number]]  (Abnormal) Collected: 12/10/24 1056    Order Status: Completed Updated: 12/12/24 0654     Enterococcus faecalis Not Detected     Enterococcus faecium Not Detected     Listeria monocytogenes Not Detected     Staphylococcus spp. Detected     Staphylococcus aureus Not Detected     Staphylococcus epidermidis Not Detected     Staphylococcus lugdunensis Not Detected      Streptococcus species Not Detected     Streptococcus agalactiae Not Detected     Streptococcus pneumoniae Not Detected     Streptococcus pyogenes Not Detected     Acinetobacter calcoaceticus/baumannii complex Not Detected     Bacteroides fragilis Not Detected     Enterobacterales Not Detected     Enterobacter cloacae complex Not Detected     Escherichia coli Not Detected     Klebsiella aerogenes Not Detected     Klebsiella oxytoca Not Detected     Klebsiella pneumoniae group Not Detected     Proteus Not Detected     Salmonella sp Not Detected     Serratia marcescens Not Detected     Haemophilus influenzae Not Detected     Neisseria meningtidis Not Detected     Pseudomonas aeruginosa Not Detected     Stenotrophomonas maltophilia Not Detected     Candida albicans Not Detected     Candida auris Not Detected     Candida glabrata Not Detected     Candida krusei Not Detected     Candida parapsilosis Not Detected     Candida tropicalis Not Detected     Cryptococcus neoformans/gattii Not Detected     CTX-M (ESBL ) Test Not Applicable     IMP (Carbapenem resistant) Test Not Applicable     KPC resistance gene (Carbapenem resistant) Test Not Applicable     mcr-1  Test Not Applicable     mec A/C  Test Not Applicable     mec A/C and MREJ (MRSA) gene Test Not Applicable     NDM (Carbapenem resistant) Test Not Applicable     OXA-48-like (Carbapenem resistant) Test Not Applicable     van A/B (VRE gene) Test Not Applicable     VIM (Carbapenem resistant) Test Not Applicable    Blood Culture #1 **CANNOT BE ORDERED STAT** [[id number]] Collected: 12/10/24 1053    Order Status: Completed Specimen: Blood from Peripheral, Antecubital, Right Updated: 12/12/24 0612     Blood Culture, Routine No Growth to date      No Growth to date    Blood Culture #2 **CANNOT BE ORDERED STAT** [[id number]] Collected: 12/10/24 1056    Order Status: Completed Specimen: Blood from Peripheral, Forearm, Right Updated: 12/12/24 0518     Blood Culture,  Routine Gram stain aer bottle: Gram positive cocci      Results called to and read back by: GEETA Holguin. 12/12/2024  05:15    Respiratory Infection Panel (PCR), Nasopharyngeal [[id number]]     Order Status: No result Specimen: Nasopharyngeal Swab     Culture, Respiratory with Gram Stain [[id number]]     Order Status: No result Specimen: Sputum, Expectorated           Blood culture positive  Infectious disease consulted  Continue treatment   Defer any changes to infectious disease     Chronic diastolic heart failure  Patient has  unspecified  heart failure that is Chronic. On presentation their CHF was . Most recent BNP and echo results are listed below.well compensated  Recent Labs     12/10/24  1055   BNP 88       Latest ECHO  Results for orders placed during the hospital encounter of 01/08/21    Echo Color Flow Doppler? Yes    Interpretation Summary  · The left ventricle is normal in size with moderate concentric hypertrophy and normal systolic function. The estimated ejection fraction is 60%  · Indeterminate left ventricular diastolic function.  · Normal right ventricular size with normal right ventricular systolic function.  · Mild tricuspid regurgitation.  · Normal central venous pressure (3 mmHg).  · The estimated PA systolic pressure is 32 mmHg.    Current Heart Failure Medications  , Every 12 hours, Oral  , 2 times daily with meals, Oral    Plan  - Monitor strict I&Os and daily weights.    - Place on telemetry  - Low sodium diet  - Place on fluid restriction of 1.5 L.   - Cardiology has been consulted  - The patient's volume status is at their baseline  Ambulatory oxygen evaluation   Nutrition consult     Coronary artery disease of native artery of native heart with stable angina pectoris  Patient with known CAD which is controlled Will continue Statin and monitor for S/Sx of angina/ACS. Continue to monitor on telemetry.     CKD stage 3 secondary to diabetes  Creatine stable for now. BMP reviewed- noted  Estimated Creatinine Clearance: 22.2 mL/min (A) (based on SCr of 1.9 mg/dL (H)). according to latest data. Based on current GFR, CKD stage is stage 3 - GFR 30-59.  Monitor UOP and serial BMP and adjust therapy as needed. Renally dose meds. Avoid nephrotoxic medications and procedures.    Hypercholesterolemia  Patient is chronically on statin.will continue for now. Last Lipid Panel:   Lab Results   Component Value Date    CHOL 121 07/03/2023    HDL 37 (L) 07/03/2023    LDLCALC 57 07/03/2023    TRIG 160 (H) 07/03/2023    CHOLHDL 27.8 08/01/2019     Plan:  -Continue home medication  -low fat/low calorie diet        Atrial fibrillation  Patient with Paroxysmal (<7 days) atrial fibrillation which is controlled currently with Bbs and Calcium Channel Blocker. Patient is currently in sinus rhythm.OEPDY2KSTd Score: 4.   Discussed cardiology vs gastroenterology recommendations   Patient voices understanding  Will continue holding ac    Acquired hypothyroidism  Patient has chronic hypothyroidism. TFTs reviewed-   Lab Results   Component Value Date    TSH 1.780 07/03/2023   . Will continue chronic levothyroxine and adjust for and clinical changes.        Essential hypertension  Chronic, controlled.  Latest blood pressure and vitals reviewed-   Temp:  [97 °F (36.1 °C)-98.7 °F (37.1 °C)]   Pulse:  []   Resp:  [16-20]   BP: (102-170)/(52-74)   SpO2:  [83 %-95 %] .   Home meds for hypertension were reviewed and noted below.   Hypertension Medications               amLODIPine (NORVASC) 5 MG tablet Take 1 tablet (5 mg total) by mouth once daily.    carvediloL (COREG) 12.5 MG tablet Take 12.5 mg by mouth 2 (two) times daily with meals.    diltiaZEM (CARDIZEM CD) 300 MG 24 hr capsule Take 1 capsule by mouth every morning.    furosemide (LASIX) 20 MG tablet Take 1 tablet (20 mg total) by mouth once daily.    hydrALAZINE (APRESOLINE) 10 MG tablet Take 10 mg by mouth every 12 (twelve) hours.            While in the hospital, will  manage blood pressure as follows; Adjust home antihypertensive regimen as follows- relax normotensive blood pressure range in this patient demographic, discontinue hydralazine    Will utilize p.r.n. blood pressure medication only if patient's blood pressure greater than  180/110 and he develops symptoms such as worsening chest pain or shortness of breath.

## 2024-12-12 NOTE — SUBJECTIVE & OBJECTIVE
Past Medical History:   Diagnosis Date    Aneurysm 2016    abdominal, stent placed    Arthritis     Asthma     Atrial fibrillation     Cancer     skin cancer on face    CKD (chronic kidney disease)     COPD (chronic obstructive pulmonary disease) 10/05/2017    Diabetes mellitus     Emphysema lung     Encounter for blood transfusion     Hypertension        Past Surgical History:   Procedure Laterality Date    ABDOMINAL AORTIC ANEURYSM REPAIR      Stent placed    ABDOMINAL SURGERY      COLONOSCOPY Left 6/12/2017    Procedure: COLONOSCOPY;  Surgeon: Boaz Toussaint MD;  Location: [facility];  Service: Endoscopy;  Laterality: Left;    VASCULAR SURGERY      abdominal aneurysm repair       Review of patient's allergies indicates:  No Known Allergies    Medications:  Medications Prior to Admission   Medication Sig    carvediloL (COREG) 12.5 MG tablet Take 12.5 mg by mouth 2 (two) times daily with meals.    diltiaZEM (CARDIZEM CD) 300 MG 24 hr capsule Take 1 capsule by mouth every morning.    furosemide (LASIX) 20 MG tablet Take 1 tablet (20 mg total) by mouth once daily.    hydrALAZINE (APRESOLINE) 10 MG tablet Take 10 mg by mouth every 12 (twelve) hours.    pantoprazole (PROTONIX) 40 MG tablet Take 1 tablet by mouth Daily.    pravastatin (PRAVACHOL) 40 MG tablet Take 1 tablet by mouth once daily.    amLODIPine (NORVASC) 5 MG tablet Take 1 tablet (5 mg total) by mouth once daily.     Antibiotics (From admission, onward)      Start     Stop Route Frequency Ordered    12/11/24 0900  doxycycline tablet 100 mg         -- Oral Every 12 hours 12/10/24 2323    12/11/24 0900  cefTRIAXone injection 1 g         -- IV Every 24 hours (non-standard times) 12/10/24 2323          Antifungals (From admission, onward)      None          Antivirals (From admission, onward)      None             Immunization History   Administered Date(s) Administered    COVID-19 MRNA, LN-S PF (MODERNA HALF 0.25 ML DOSE) 12/15/2021    COVID-19 Vaccine  02/10/2021, 03/10/2021, 12/15/2021    COVID-19, MRNA, LN-S, PF (MODERNA FULL 0.5 ML DOSE) 02/10/2021, 03/10/2021    Influenza 2001    Influenza - Quadrivalent 10/30/2014    Influenza - Trivalent - Afluria, Fluzone MDV 2012    Influenza - Trivalent - Fluzone High Dose - PF (65 years and older) 10/30/2013, 10/04/2015, 10/27/2016, 2017, 2018    Pneumococcal Conjugate - 13 Valent 2017    Tdap 2020       Family History       Problem Relation (Age of Onset)    Cancer Mother    Heart attack Father    Hypertension Father          Social History     Socioeconomic History    Marital status: Single   Tobacco Use    Smoking status: Former     Current packs/day: 0.00     Average packs/day: 2.0 packs/day for 20.0 years (40.0 ttl pk-yrs)     Types: Cigarettes     Start date: 1957     Quit date: 1977     Years since quittin.9    Smokeless tobacco: Former   Substance and Sexual Activity    Alcohol use: No    Drug use: No    Sexual activity: Not Currently     Social Drivers of Health     Financial Resource Strain: Patient Declined (2024)    Overall Financial Resource Strain (CARDIA)     Difficulty of Paying Living Expenses: Patient declined   Food Insecurity: Patient Declined (2024)    Hunger Vital Sign     Worried About Running Out of Food in the Last Year: Patient declined     Ran Out of Food in the Last Year: Patient declined   Transportation Needs: Patient Declined (2024)    TRANSPORTATION NEEDS     Transportation : Patient declined   Physical Activity: Inactive (7/3/2023)    Received from [facility]    Exercise Vital Sign     Days of Exercise per Week: 0 days     Minutes of Exercise per Session: 0 min   Stress: Patient Declined (2024)    [employer] - Occupational Stress Questionnaire     Feeling of Stress : Patient declined   Housing Stability: Patient  Declined (12/11/2024)    Housing Stability Vital Sign     Unable to Pay for Housing in the Last Year: Patient declined     Homeless in the Last Year: Patient declined     Review of Systems   Cardiovascular:  Positive for leg swelling.   Skin:  Positive for color change, rash and wound.   All other systems reviewed and are negative.    Objective:     Vital Signs (Most Recent):  Temp: 98.4 °F (36.9 °C) (12/12/24 0803)  Pulse: 69 (12/12/24 0803)  Resp: 18 (12/12/24 0803)  BP: (!) 154/74 (12/12/24 0803)  SpO2: (!) 90 % (12/12/24 0803) Vital Signs (24h Range):  Temp:  [97 °F (36.1 °C)-98.7 °F (37.1 °C)] 98.4 °F (36.9 °C)  Pulse:  [46-80] 69  Resp:  [16-19] 18  SpO2:  [83 %-95 %] 90 %  BP: (102-170)/(52-74) 154/74     Weight: 78.6 kg (173 lb 4.5 oz)  Body mass index is 27.14 kg/m².    Estimated Creatinine Clearance: 22.2 mL/min (A) (based on SCr of 1.9 mg/dL (H)).     Physical Exam  Constitutional:       General: He is not in acute distress.     Appearance: Normal appearance. He is not ill-appearing.   Cardiovascular:      Rate and Rhythm: Normal rate and regular rhythm.      Pulses: Normal pulses.      Heart sounds: Normal heart sounds. No murmur heard.     No friction rub. No gallop.   Pulmonary:      Effort: Pulmonary effort is normal. No respiratory distress.      Breath sounds: Normal breath sounds.   Abdominal:      General: Abdomen is flat. Bowel sounds are normal. There is no distension.      Palpations: Abdomen is soft.      Tenderness: There is no abdominal tenderness.   Musculoskeletal:         General: Swelling present.      Right lower leg: Edema present.      Left lower leg: Edema present.   Skin:     General: Skin is warm and dry.      Findings: Erythema present.   Neurological:      Mental Status: He is alert.                Significant Labs: Blood Culture:   Recent Labs   Lab 12/10/24  1053 12/10/24  1056   LABBLOO No Growth to date  No Growth to date Gram stain aer bottle: Gram positive cocci  Results  called to and read back by: GEETA Holguin. 12/12/2024  05:15     CBC:   Recent Labs   Lab 12/10/24  1055   WBC 8.48   HGB 11.4*   HCT 36.1*        CMP:   Recent Labs   Lab 12/10/24  1055 12/11/24  0411 12/12/24  0410    145 143   K 3.5 3.1* 3.5    103 102   CO2 31* 29 29   * 104 103   BUN 29 29 29   CREATININE 2.2* 2.1* 1.9*   CALCIUM 8.6* 8.3* 8.3*   PROT 6.4  --   --    ALBUMIN 3.2*  --   --    BILITOT 0.5  --   --    ALKPHOS 112  --   --    AST 15  --   --    ALT 10  --   --    ANIONGAP 11 13 12     Microbiology Results (last 7 days)       Procedure Component Value Units Date/Time    Rapid Organism ID by PCR (from Blood culture) [[id number]]  (Abnormal) Collected: 12/10/24 1056    Order Status: Completed Updated: 12/12/24 0654     Enterococcus faecalis Not Detected     Enterococcus faecium Not Detected     Listeria monocytogenes Not Detected     Staphylococcus spp. Detected     Staphylococcus aureus Not Detected     Staphylococcus epidermidis Not Detected     Staphylococcus lugdunensis Not Detected     Streptococcus species Not Detected     Streptococcus agalactiae Not Detected     Streptococcus pneumoniae Not Detected     Streptococcus pyogenes Not Detected     Acinetobacter calcoaceticus/baumannii complex Not Detected     Bacteroides fragilis Not Detected     Enterobacterales Not Detected     Enterobacter cloacae complex Not Detected     Escherichia coli Not Detected     Klebsiella aerogenes Not Detected     Klebsiella oxytoca Not Detected     Klebsiella pneumoniae group Not Detected     Proteus Not Detected     Salmonella sp Not Detected     Serratia marcescens Not Detected     Haemophilus influenzae Not Detected     Neisseria meningtidis Not Detected     Pseudomonas aeruginosa Not Detected     Stenotrophomonas maltophilia Not Detected     Candida albicans Not Detected     Candida auris Not Detected     Candida glabrata Not Detected     Candida krusei Not Detected     Candida parapsilosis  Not Detected     Candida tropicalis Not Detected     Cryptococcus neoformans/gattii Not Detected     CTX-M (ESBL ) Test Not Applicable     IMP (Carbapenem resistant) Test Not Applicable     KPC resistance gene (Carbapenem resistant) Test Not Applicable     mcr-1  Test Not Applicable     mec A/C  Test Not Applicable     mec A/C and MREJ (MRSA) gene Test Not Applicable     NDM (Carbapenem resistant) Test Not Applicable     OXA-48-like (Carbapenem resistant) Test Not Applicable     van A/B (VRE gene) Test Not Applicable     VIM (Carbapenem resistant) Test Not Applicable    Blood Culture #1 **CANNOT BE ORDERED STAT** [[id number]] Collected: 12/10/24 1053    Order Status: Completed Specimen: Blood from Peripheral, Antecubital, Right Updated: 12/12/24 0612     Blood Culture, Routine No Growth to date      No Growth to date    Blood Culture #2 **CANNOT BE ORDERED STAT** [[id number]] Collected: 12/10/24 1056    Order Status: Completed Specimen: Blood from Peripheral, Forearm, Right Updated: 12/12/24 0518     Blood Culture, Routine Gram stain aer bottle: Gram positive cocci      Results called to and read back by: GEETA Holguin. 12/12/2024  05:15    Respiratory Infection Panel (PCR), Nasopharyngeal [[id number]]     Order Status: No result Specimen: Nasopharyngeal Swab     Culture, Respiratory with Gram Stain [[id number]]     Order Status: No result Specimen: Sputum, Expectorated           All pertinent labs within the past 24 hours have been reviewed.    Significant Imaging: CXR: I have reviewed all pertinent results/findings within the past 24 hours:  pneumonia

## 2024-12-13 PROBLEM — E43 SEVERE PROTEIN-CALORIE MALNUTRITION: Status: ACTIVE | Noted: 2024-12-13

## 2024-12-13 LAB
ANION GAP SERPL CALC-SCNC: 11 MMOL/L (ref 8–16)
BASOPHILS # BLD AUTO: 0.04 K/UL (ref 0–0.2)
BASOPHILS NFR BLD: 0.5 % (ref 0–1.9)
BUN SERPL-MCNC: 35 MG/DL (ref 10–30)
CALCIUM SERPL-MCNC: 9.3 MG/DL (ref 8.7–10.5)
CHLORIDE SERPL-SCNC: 100 MMOL/L (ref 95–110)
CO2 SERPL-SCNC: 33 MMOL/L (ref 23–29)
CREAT SERPL-MCNC: 2.2 MG/DL (ref 0.5–1.4)
DIFFERENTIAL METHOD BLD: ABNORMAL
EOSINOPHIL # BLD AUTO: 0.4 K/UL (ref 0–0.5)
EOSINOPHIL NFR BLD: 4.6 % (ref 0–8)
ERYTHROCYTE [DISTWIDTH] IN BLOOD BY AUTOMATED COUNT: 13.2 % (ref 11.5–14.5)
EST. GFR  (NO RACE VARIABLE): 27 ML/MIN/1.73 M^2
GLUCOSE SERPL-MCNC: 109 MG/DL (ref 70–110)
HCT VFR BLD AUTO: 38.9 % (ref 40–54)
HGB BLD-MCNC: 12.3 G/DL (ref 14–18)
IMM GRANULOCYTES # BLD AUTO: 0.06 K/UL (ref 0–0.04)
IMM GRANULOCYTES NFR BLD AUTO: 0.7 % (ref 0–0.5)
LYMPHOCYTES # BLD AUTO: 1.5 K/UL (ref 1–4.8)
LYMPHOCYTES NFR BLD: 17.7 % (ref 18–48)
MCH RBC QN AUTO: 29.4 PG (ref 27–31)
MCHC RBC AUTO-ENTMCNC: 31.6 G/DL (ref 32–36)
MCV RBC AUTO: 93 FL (ref 82–98)
MONOCYTES # BLD AUTO: 0.8 K/UL (ref 0.3–1)
MONOCYTES NFR BLD: 9.5 % (ref 4–15)
NEUTROPHILS # BLD AUTO: 5.6 K/UL (ref 1.8–7.7)
NEUTROPHILS NFR BLD: 67 % (ref 38–73)
NRBC BLD-RTO: 0 /100 WBC
OHS QRS DURATION: 68 MS
OHS QRS DURATION: 76 MS
OHS QRS DURATION: 80 MS
OHS QTC CALCULATION: 414 MS
OHS QTC CALCULATION: 427 MS
OHS QTC CALCULATION: 429 MS
PLATELET # BLD AUTO: 243 K/UL (ref 150–450)
PMV BLD AUTO: 9.9 FL (ref 9.2–12.9)
POTASSIUM SERPL-SCNC: 4.2 MMOL/L (ref 3.5–5.1)
RBC # BLD AUTO: 4.18 M/UL (ref 4.6–6.2)
SODIUM SERPL-SCNC: 144 MMOL/L (ref 136–145)
WBC # BLD AUTO: 8.42 K/UL (ref 3.9–12.7)

## 2024-12-13 PROCEDURE — 27100092 HC HIGH FLOW DELIVERY CANNULA

## 2024-12-13 PROCEDURE — 27100171 HC OXYGEN HIGH FLOW UP TO 24 HOURS

## 2024-12-13 PROCEDURE — 97530 THERAPEUTIC ACTIVITIES: CPT

## 2024-12-13 PROCEDURE — 99233 SBSQ HOSP IP/OBS HIGH 50: CPT | Mod: ,,, | Performed by: STUDENT IN AN ORGANIZED HEALTH CARE EDUCATION/TRAINING PROGRAM

## 2024-12-13 PROCEDURE — 94664 DEMO&/EVAL PT USE INHALER: CPT

## 2024-12-13 PROCEDURE — 99900035 HC TECH TIME PER 15 MIN (STAT)

## 2024-12-13 PROCEDURE — 27000221 HC OXYGEN, UP TO 24 HOURS

## 2024-12-13 PROCEDURE — 21400001 HC TELEMETRY ROOM

## 2024-12-13 PROCEDURE — 94640 AIRWAY INHALATION TREATMENT: CPT

## 2024-12-13 PROCEDURE — 93010 ELECTROCARDIOGRAM REPORT: CPT | Mod: ,,, | Performed by: INTERNAL MEDICINE

## 2024-12-13 PROCEDURE — 5A0955A ASSISTANCE WITH RESPIRATORY VENTILATION, GREATER THAN 96 CONSECUTIVE HOURS, HIGH NASAL FLOW/VELOCITY: ICD-10-PCS | Performed by: HOSPITALIST

## 2024-12-13 PROCEDURE — 97116 GAIT TRAINING THERAPY: CPT

## 2024-12-13 PROCEDURE — 25000242 PHARM REV CODE 250 ALT 637 W/ HCPCS: Performed by: FAMILY MEDICINE

## 2024-12-13 PROCEDURE — 63600175 PHARM REV CODE 636 W HCPCS: Performed by: NURSE PRACTITIONER

## 2024-12-13 PROCEDURE — 97110 THERAPEUTIC EXERCISES: CPT

## 2024-12-13 PROCEDURE — 94761 N-INVAS EAR/PLS OXIMETRY MLT: CPT

## 2024-12-13 PROCEDURE — 63600175 PHARM REV CODE 636 W HCPCS: Performed by: HOSPITALIST

## 2024-12-13 PROCEDURE — 80048 BASIC METABOLIC PNL TOTAL CA: CPT | Performed by: NURSE PRACTITIONER

## 2024-12-13 PROCEDURE — 93005 ELECTROCARDIOGRAM TRACING: CPT | Mod: ER

## 2024-12-13 PROCEDURE — 85025 COMPLETE CBC W/AUTO DIFF WBC: CPT | Performed by: INTERNAL MEDICINE

## 2024-12-13 PROCEDURE — 36415 COLL VENOUS BLD VENIPUNCTURE: CPT | Performed by: NURSE PRACTITIONER

## 2024-12-13 PROCEDURE — 25000003 PHARM REV CODE 250: Performed by: NURSE PRACTITIONER

## 2024-12-13 PROCEDURE — 25000003 PHARM REV CODE 250: Performed by: FAMILY MEDICINE

## 2024-12-13 PROCEDURE — 27000249 HC VAPOTHERM CIRCUIT

## 2024-12-13 PROCEDURE — 99233 SBSQ HOSP IP/OBS HIGH 50: CPT | Mod: ,,, | Performed by: INTERNAL MEDICINE

## 2024-12-13 RX ORDER — HYDRALAZINE HYDROCHLORIDE 20 MG/ML
10 INJECTION INTRAMUSCULAR; INTRAVENOUS EVERY 6 HOURS PRN
Status: DISCONTINUED | OUTPATIENT
Start: 2024-12-13 | End: 2024-12-26 | Stop reason: HOSPADM

## 2024-12-13 RX ORDER — HYDRALAZINE HYDROCHLORIDE 10 MG/1
10 TABLET, FILM COATED ORAL EVERY 12 HOURS
Status: DISCONTINUED | OUTPATIENT
Start: 2024-12-13 | End: 2024-12-26 | Stop reason: HOSPADM

## 2024-12-13 RX ADMIN — ENOXAPARIN SODIUM 30 MG: 30 INJECTION SUBCUTANEOUS at 05:12

## 2024-12-13 RX ADMIN — IPRATROPIUM BROMIDE AND ALBUTEROL SULFATE 3 ML: 2.5; .5 SOLUTION RESPIRATORY (INHALATION) at 08:12

## 2024-12-13 RX ADMIN — MAGNESIUM OXIDE TAB 400 MG (241.3 MG ELEMENTAL MG) 400 MG: 400 (241.3 MG) TAB at 08:12

## 2024-12-13 RX ADMIN — PRAVASTATIN SODIUM 40 MG: 20 TABLET ORAL at 08:12

## 2024-12-13 RX ADMIN — DILTIAZEM HYDROCHLORIDE 300 MG: 180 CAPSULE, COATED, EXTENDED RELEASE ORAL at 06:12

## 2024-12-13 RX ADMIN — PANTOPRAZOLE SODIUM 40 MG: 40 TABLET, DELAYED RELEASE ORAL at 05:12

## 2024-12-13 RX ADMIN — IPRATROPIUM BROMIDE AND ALBUTEROL SULFATE 3 ML: 2.5; .5 SOLUTION RESPIRATORY (INHALATION) at 01:12

## 2024-12-13 RX ADMIN — MICONAZOLE NITRATE: 20 CREAM TOPICAL at 10:12

## 2024-12-13 RX ADMIN — IPRATROPIUM BROMIDE AND ALBUTEROL SULFATE 3 ML: 2.5; .5 SOLUTION RESPIRATORY (INHALATION) at 07:12

## 2024-12-13 RX ADMIN — HYDRALAZINE HYDROCHLORIDE 10 MG: 20 INJECTION INTRAMUSCULAR; INTRAVENOUS at 05:12

## 2024-12-13 RX ADMIN — DOXYCYCLINE HYCLATE 100 MG: 100 TABLET, COATED ORAL at 08:12

## 2024-12-13 RX ADMIN — METRONIDAZOLE: 10 GEL TOPICAL at 08:12

## 2024-12-13 RX ADMIN — HYDRALAZINE HYDROCHLORIDE 10 MG: 10 TABLET, FILM COATED ORAL at 08:12

## 2024-12-13 RX ADMIN — CEFTRIAXONE 1 G: 1 INJECTION, POWDER, FOR SOLUTION INTRAMUSCULAR; INTRAVENOUS at 08:12

## 2024-12-13 RX ADMIN — LEVOTHYROXINE SODIUM 50 MCG: 50 TABLET ORAL at 06:12

## 2024-12-13 RX ADMIN — METRONIDAZOLE: 10 GEL TOPICAL at 11:12

## 2024-12-13 RX ADMIN — MICONAZOLE NITRATE: 20 CREAM TOPICAL at 05:12

## 2024-12-13 NOTE — PROGRESS NOTES
[facility] ([facility]  Progress Note    Patient Name: Jimmy Young  MRN: [id number]  Patient Class: IP- Inpatient   Admission Date: 12/10/2024  Length of Stay: 3 days  Attending Physician: Juliana Goodwin MD  Primary Care Provider: Nacho Richardson MD        Subjective     Principal Problem:Bacteremia due to Staphylococcus        HPI:  Jimmy Young is a 89 y.o. male with a PMH  has a past medical history of Aneurysm (2016), Arthritis, Asthma, Atrial fibrillation, Cancer, CKD (chronic kidney disease), COPD (chronic obstructive pulmonary disease) (10/05/2017), Diabetes mellitus, Emphysema lung, Encounter for blood transfusion, and Hypertension.presented to the ED for swelling of the legs. Patient was referred from his primary care physician today for worsening lower extremity edema, shortness of breath, and cough. Patient is on chronic home O2 at 3L/min via NC. Reports compliance with his lasix and denies any excessive fluid or salt intake. Denies any other complaints at his time.      ER workup revealed CBC to be unremarkable.  CMP with BUN/creatinine at baseline of 29/2.2.   mg/dL.  Magnesium 1.2.  BNP, troponin, lactic acid within normal limits.  Flu/COVID negative.  Blood cultures obtained x2.  Ultrasound lower extremities negative for DVT.  Chest x-ray revealed left upper lobe opacity resembling early pneumonia.  EKG revealed a flutter with variable AV block and left axis deviation with a ventricular rate of 64 beats per minute with a QT/QTC of 416/429.  Vital signs stable.  Patient is at baseline oxygen saturation of 92-94% on 3 liters/minute via nasal cannula.  Patient received 12.5 mg carvedilol, 1 g Rocephin IV, 100 mg doxycycline p.o., 30 mg Lovenox, 40 mg Lasix IV, and 10 mg hydralazine p.o. at outside facility.  Hospital Medicine consulted to admit patient for CHF exacerbation and pneumonia. Patient and family in agreement with treatment plan. Patient admitted  under inpatient status.    PCP: Nacho Richardson      Overview/Hospital Course:  12/11 admitted for pneumonia. Wears supplemental oxygen at baseline, 2L. On 4L. Speech consulted. Schedule breathing treatments. Discontinue lasix. Replete lytes. Echocardiogram pending. Cardiology consulted. Relaxing normotensive range in this patient demographic.  12/12 blood culture positive for staph. Infectious disease consulted. Continue monitoring repeat blood cultures. Recent medication(s) change with linagliptin with risk for congestive heart failure and skin reaction. Discussed risk vs benefit. Hba1c 6.9. recommend goal hba1c 8-9.   12/13 bun/creatinine increasing. Discontinued intravenous lasix. Remains on increased supplemental oxygen, 5L. Wean as able. Home hydralazine resumed for elevated blood pressure. Repeat blood cultures negative growth to date x 1 day. Infectious disease following    Interval History: See hospital course for today      Review of Systems   Constitutional:  Positive for activity change and appetite change.   HENT:  Positive for hearing loss.    Respiratory:  Positive for shortness of breath (chronically on 2-3L).    Cardiovascular:  Positive for leg swelling.   Gastrointestinal:  Negative for nausea and vomiting.   Skin:  Positive for wound.   Neurological:  Positive for weakness.     Objective:     Vital Signs (Most Recent):  Temp: 97.6 °F (36.4 °C) (12/13/24 0720)  Pulse: 77 (12/13/24 0830)  Resp: 18 (12/13/24 0830)  BP: (!) 163/83 (12/13/24 0720)  SpO2: (!) 90 % (12/13/24 0830) Vital Signs (24h Range):  Temp:  [97.4 °F (36.3 °C)-98.2 °F (36.8 °C)] 97.6 °F (36.4 °C)  Pulse:  [63-89] 77  Resp:  [15-21] 18  SpO2:  [87 %-96 %] 90 %  BP: (151-203)/(63-89) 163/83     Weight: 78.5 kg (173 lb 1 oz)  Body mass index is 27.11 kg/m².    Intake/Output Summary (Last 24 hours) at 12/13/2024 0830  Last data filed at 12/13/2024 0629  Gross per 24 hour   Intake --   Output 2780 ml   Net -2780 ml         Physical  Exam  Vitals and nursing note reviewed. Exam conducted with a chaperone present (son, nursing, resp).   Constitutional:       General: He is not in acute distress.     Appearance: He is ill-appearing. He is not toxic-appearing.      Interventions: Nasal cannula in place.   HENT:      Head: Normocephalic and atraumatic.      Right Ear: Decreased hearing noted.      Left Ear: Decreased hearing noted.   Cardiovascular:      Rate and Rhythm: Normal rate.   Pulmonary:      Effort: Pulmonary effort is normal. No respiratory distress.   Abdominal:      Palpations: Abdomen is soft.   Musculoskeletal:      Right lower leg: Edema present.      Left lower leg: Edema present.   Skin:     General: Skin is warm.      Findings: Erythema and lesion present.   Neurological:      Mental Status: He is alert and oriented to person, place, and time.      Motor: Weakness present.   Psychiatric:         Mood and Affect: Mood normal.         Behavior: Behavior normal.             Significant Labs: All pertinent labs within the past 24 hours have been reviewed.  Blood Culture:   Recent Labs   Lab 12/12/24  0907 12/12/24  0912   LABBLOO No Growth to date No Growth to date     CBC:   Recent Labs   Lab 12/12/24  1221 12/13/24  0413   WBC 10.51 8.42   HGB 12.5* 12.3*   HCT 39.3* 38.9*    243     CMP:   Recent Labs   Lab 12/12/24  0410 12/13/24  0413    144   K 3.5 4.2    100   CO2 29 33*    109   BUN 29 35*   CREATININE 1.9* 2.2*   CALCIUM 8.3* 9.3   ANIONGAP 12 11       Significant Imaging: I have reviewed all pertinent imaging results/findings within the past 24 hours.      Assessment and Plan     * Bacteremia due to Staphylococcus  Infectious disease consulted  Continue current management until final speciation   Repeat blood culture negative growth to date   Possible contaminant   Continue treatment for pneumonia       Lower extremity edema  Improving  Multifactorial: malnutrition, fluid indiscretion, venous  stasis, recent medication(s) change, dependent edema  Counseling provided  Elevate legs   Manish hose and topicals    Atrial flutter  Controlled  Follow up outpatient primary cardiologist  Holding a/c due to pmh GI bleed       Pneumonia  Patient has a diagnosis of pneumonia. The cause of the pneumonia is unknown at this time. The pneumonia is stable. The patient has the following signs/symptoms of pneumonia: cough and shortness of breath. The patient does have a current oxygen requirement and the patient does have a home oxygen requirement. I have reviewed the pertinent imaging. The following cultures have been collected: Blood cultures The culture results are listed below.     Current antimicrobial regimen consists of the antibiotics listed below. Will monitor patient closely and continue current treatment plan unchanged.    Antibiotics (From admission, onward)      Start     Stop Route Frequency Ordered    12/11/24 0900  doxycycline tablet 100 mg         -- Oral Every 12 hours 12/10/24 2323    12/11/24 0900  cefTRIAXone injection 1 g         -- IV Every 24 hours (non-standard times) 12/10/24 2323            Microbiology Results (last 7 days)       Procedure Component Value Units Date/Time    Blood culture [[id number]] Collected: 12/12/24 0907    Order Status: Completed Specimen: Blood from Peripheral, Hand, Right Updated: 12/13/24 0315     Blood Culture, Routine No Growth to date    Blood culture [[id number]] Collected: 12/12/24 0912    Order Status: Completed Specimen: Blood from Peripheral, Hand, Left Updated: 12/13/24 0315     Blood Culture, Routine No Growth to date    Blood Culture #1 **CANNOT BE ORDERED STAT** [[id number]] Collected: 12/10/24 1053    Order Status: Completed Specimen: Blood from Peripheral, Antecubital, Right Updated: 12/12/24 1348     Blood Culture, Routine Gram stain aer bottle: Gram positive cocci      Positive results previously called 12/12/2024  13:46    Rapid Organism ID by PCR (from  Blood culture) [[id number]]  (Abnormal) Collected: 12/10/24 1056    Order Status: Completed Updated: 12/12/24 0654     Enterococcus faecalis Not Detected     Enterococcus faecium Not Detected     Listeria monocytogenes Not Detected     Staphylococcus spp. Detected     Staphylococcus aureus Not Detected     Staphylococcus epidermidis Not Detected     Staphylococcus lugdunensis Not Detected     Streptococcus species Not Detected     Streptococcus agalactiae Not Detected     Streptococcus pneumoniae Not Detected     Streptococcus pyogenes Not Detected     Acinetobacter calcoaceticus/baumannii complex Not Detected     Bacteroides fragilis Not Detected     Enterobacterales Not Detected     Enterobacter cloacae complex Not Detected     Escherichia coli Not Detected     Klebsiella aerogenes Not Detected     Klebsiella oxytoca Not Detected     Klebsiella pneumoniae group Not Detected     Proteus Not Detected     Salmonella sp Not Detected     Serratia marcescens Not Detected     Haemophilus influenzae Not Detected     Neisseria meningtidis Not Detected     Pseudomonas aeruginosa Not Detected     Stenotrophomonas maltophilia Not Detected     Candida albicans Not Detected     Candida auris Not Detected     Candida glabrata Not Detected     Candida krusei Not Detected     Candida parapsilosis Not Detected     Candida tropicalis Not Detected     Cryptococcus neoformans/gattii Not Detected     CTX-M (ESBL ) Test Not Applicable     IMP (Carbapenem resistant) Test Not Applicable     KPC resistance gene (Carbapenem resistant) Test Not Applicable     mcr-1  Test Not Applicable     mec A/C  Test Not Applicable     mec A/C and MREJ (MRSA) gene Test Not Applicable     NDM (Carbapenem resistant) Test Not Applicable     OXA-48-like (Carbapenem resistant) Test Not Applicable     van A/B (VRE gene) Test Not Applicable     VIM (Carbapenem resistant) Test Not Applicable    Blood Culture #2 **CANNOT BE ORDERED STAT** [[id number]]  Collected: 12/10/24 1056    Order Status: Completed Specimen: Blood from Peripheral, Forearm, Right Updated: 12/12/24 0518     Blood Culture, Routine Gram stain aer bottle: Gram positive cocci      Results called to and read back by: GEETA Holguin. 12/12/2024  05:15    Respiratory Infection Panel (PCR), Nasopharyngeal [[id number]]     Order Status: No result Specimen: Nasopharyngeal Swab     Culture, Respiratory with Gram Stain [[id number]]     Order Status: No result Specimen: Sputum, Expectorated           Blood culture positive  Repeat blood culture negative growth to date  Infectious disease consulted  Continue treatment with intravenous antibiotic(s), breathing treatments, and aerobika  Difficulty weaning    Chronic diastolic heart failure  Patient has  unspecified  heart failure that is Chronic. On presentation their CHF was . Most recent BNP and echo results are listed below.well compensated  Recent Labs     12/10/24  1055   BNP 88       Latest ECHO  Results for orders placed during the hospital encounter of 01/08/21    Echo Color Flow Doppler? Yes    Interpretation Summary  · The left ventricle is normal in size with moderate concentric hypertrophy and normal systolic function. The estimated ejection fraction is 60%  · Indeterminate left ventricular diastolic function.  · Normal right ventricular size with normal right ventricular systolic function.  · Mild tricuspid regurgitation.  · Normal central venous pressure (3 mmHg).  · The estimated PA systolic pressure is 32 mmHg.    Current Heart Failure Medications  , Every 12 hours, Oral  , 2 times daily with meals, Oral  hydrALAZINE injection 10 mg, Every 6 hours PRN, Intravenous  hydrALAZINE tablet 10 mg, Every 12 hours, Oral    Plan  - Monitor strict I&Os and daily weights.    - Place on telemetry  - Low sodium diet  - Place on fluid restriction of 1.5 L.   - Cardiology has been consulted  - The patient's volume status is at their baseline  Ambulatory oxygen  evaluation   Nutrition consult   Discontinue intravenous lasix    Coronary artery disease of native artery of native heart with stable angina pectoris  Patient with known CAD which is controlled Will continue Statin and monitor for S/Sx of angina/ACS. Continue to monitor on telemetry.     CKD stage 3 secondary to diabetes  Creatine stable for now. BMP reviewed- noted Estimated Creatinine Clearance: 19.2 mL/min (A) (based on SCr of 2.2 mg/dL (H)). according to latest data. Based on current GFR, CKD stage is stage 3 - GFR 30-59.  Monitor UOP and serial BMP and adjust therapy as needed. Renally dose meds. Avoid nephrotoxic medications and procedures.  Discontinue intravenous lasix  Monitor trend    Hypercholesterolemia  Patient is chronically on statin.will continue for now. Last Lipid Panel:   Lab Results   Component Value Date    CHOL 121 07/03/2023    HDL 37 (L) 07/03/2023    LDLCALC 57 07/03/2023    TRIG 160 (H) 07/03/2023    CHOLHDL 27.8 08/01/2019     Plan:  -Continue home medication  -low fat/low calorie diet        Atrial fibrillation  Patient with Paroxysmal (<7 days) atrial fibrillation which is controlled currently with Bbs and Calcium Channel Blocker. Patient is currently in sinus rhythm.[id number] Score: 4.   Discussed cardiology vs gastroenterology recommendations   Patient voices understanding  Will continue holding ac    Acquired hypothyroidism  Patient has chronic hypothyroidism. TFTs reviewed-   Lab Results   Component Value Date    TSH 1.780 07/03/2023   . Will continue chronic levothyroxine and adjust for and clinical changes.        Essential hypertension  Chronic, controlled.  Latest blood pressure and vitals reviewed-   Temp:  [97.4 °F (36.3 °C)-98.2 °F (36.8 °C)]   Pulse:  [63-89]   Resp:  [15-21]   BP: (151-203)/(63-89)   SpO2:  [87 %-96 %] .   Home meds for hypertension were reviewed and noted below.   Hypertension Medications               amLODIPine (NORVASC) 5 MG tablet Take 1 tablet (5 mg  total) by mouth once daily.    carvediloL (COREG) 12.5 MG tablet Take 12.5 mg by mouth 2 (two) times daily with meals.    diltiaZEM (CARDIZEM CD) 300 MG 24 hr capsule Take 1 capsule by mouth every morning.    furosemide (LASIX) 20 MG tablet Take 1 tablet (20 mg total) by mouth once daily.    hydrALAZINE (APRESOLINE) 10 MG tablet Take 10 mg by mouth every 12 (twelve) hours.            While in the hospital, will manage blood pressure as follows; Adjust home antihypertensive regimen as follows- elevated blood pressure, resume home hydralazine    Will utilize p.r.n. blood pressure medication only if patient's blood pressure greater than  180/110 and he develops symptoms such as worsening chest pain or shortness of breath.        VTE Risk Mitigation (From admission, onward)           Ordered     Place SALUD hose  Until discontinued         12/12/24 0921     Place SALUD hose  Until discontinued         12/11/24 0949     enoxaparin injection 30 mg  Daily         12/10/24 1922     IP VTE HIGH RISK PATIENT  Once         12/10/24 1922     Place sequential compression device  Until discontinued         12/10/24 1922                    Discharge Planning   SHEA:      Code Status: Partial Code   Medical Readiness for Discharge Date:   Discharge Plan A: Home                Please place Justification for DME        Juliana Goodwin MD  Department of Hospital Medicine   [facility] ([facility])

## 2024-12-13 NOTE — SUBJECTIVE & OBJECTIVE
Interval History: Patient remains HDS with no fever or leukocytosis, further supporting Staph species is contaminant. Awaiting speciation. Following repeat blood cx. Monitoring LE edema/erythema. Family concerned as they have never seen those skin changes before.     Review of Systems   Cardiovascular:  Positive for leg swelling.   Skin:  Positive for color change, rash and wound.   All other systems reviewed and are negative.    Objective:     Vital Signs (Most Recent):  Temp: 97.6 °F (36.4 °C) (12/13/24 0720)  Pulse: 73 (12/13/24 0720)  Resp: 20 (12/13/24 0720)  BP: (!) 163/83 (12/13/24 0720)  SpO2: (!) 90 % (12/13/24 0720) Vital Signs (24h Range):  Temp:  [97.4 °F (36.3 °C)-98.4 °F (36.9 °C)] 97.6 °F (36.4 °C)  Pulse:  [] 73  Resp:  [15-21] 20  SpO2:  [87 %-96 %] 90 %  BP: (151-203)/(63-89) 163/83     Weight: 78.5 kg (173 lb 1 oz)  Body mass index is 27.11 kg/m².    Estimated Creatinine Clearance: 19.2 mL/min (A) (based on SCr of 2.2 mg/dL (H)).     Physical Exam  Constitutional:       General: He is not in acute distress.     Appearance: Normal appearance. He is not ill-appearing.   Cardiovascular:      Rate and Rhythm: Normal rate and regular rhythm.      Pulses: Normal pulses.      Heart sounds: Normal heart sounds. No murmur heard.     No friction rub. No gallop.   Pulmonary:      Effort: Pulmonary effort is normal. No respiratory distress.      Breath sounds: Normal breath sounds.   Abdominal:      General: Abdomen is flat. Bowel sounds are normal. There is no distension.      Palpations: Abdomen is soft.      Tenderness: There is no abdominal tenderness.   Musculoskeletal:         General: Swelling present.      Right lower leg: Edema present.      Left lower leg: Edema present.   Skin:     General: Skin is warm and dry.      Findings: Erythema present.   Neurological:      Mental Status: He is alert.          Significant Labs: Blood Culture:   Recent Labs   Lab 12/10/24  1053 12/10/24  1056  12/12/24  0907 12/12/24  0912   LABBLOO Gram stain aer bottle: Gram positive cocci  Positive results previously called 12/12/2024  13:46 Gram stain aer bottle: Gram positive cocci  Results called to and read back by: GEETA Holguin. 12/12/2024  05:15 No Growth to date No Growth to date     CBC:   Recent Labs   Lab 12/12/24  1221 12/13/24  0413   WBC 10.51 8.42   HGB 12.5* 12.3*   HCT 39.3* 38.9*    243     CMP:   Recent Labs   Lab 12/12/24  0410 12/13/24  0413    144   K 3.5 4.2    100   CO2 29 33*    109   BUN 29 35*   CREATININE 1.9* 2.2*   CALCIUM 8.3* 9.3   ANIONGAP 12 11     Microbiology Results (last 7 days)       Procedure Component Value Units Date/Time    Blood culture [[id number]] Collected: 12/12/24 0907    Order Status: Completed Specimen: Blood from Peripheral, Hand, Right Updated: 12/13/24 0315     Blood Culture, Routine No Growth to date    Blood culture [[id number]] Collected: 12/12/24 0912    Order Status: Completed Specimen: Blood from Peripheral, Hand, Left Updated: 12/13/24 0315     Blood Culture, Routine No Growth to date    Blood Culture #1 **CANNOT BE ORDERED STAT** [[id number]] Collected: 12/10/24 1053    Order Status: Completed Specimen: Blood from Peripheral, Antecubital, Right Updated: 12/12/24 1348     Blood Culture, Routine Gram stain aer bottle: Gram positive cocci      Positive results previously called 12/12/2024  13:46    Rapid Organism ID by PCR (from Blood culture) [[id number]]  (Abnormal) Collected: 12/10/24 1056    Order Status: Completed Updated: 12/12/24 0654     Enterococcus faecalis Not Detected     Enterococcus faecium Not Detected     Listeria monocytogenes Not Detected     Staphylococcus spp. Detected     Staphylococcus aureus Not Detected     Staphylococcus epidermidis Not Detected     Staphylococcus lugdunensis Not Detected     Streptococcus species Not Detected     Streptococcus agalactiae Not Detected     Streptococcus pneumoniae Not Detected      Streptococcus pyogenes Not Detected     Acinetobacter calcoaceticus/baumannii complex Not Detected     Bacteroides fragilis Not Detected     Enterobacterales Not Detected     Enterobacter cloacae complex Not Detected     Escherichia coli Not Detected     Klebsiella aerogenes Not Detected     Klebsiella oxytoca Not Detected     Klebsiella pneumoniae group Not Detected     Proteus Not Detected     Salmonella sp Not Detected     Serratia marcescens Not Detected     Haemophilus influenzae Not Detected     Neisseria meningtidis Not Detected     Pseudomonas aeruginosa Not Detected     Stenotrophomonas maltophilia Not Detected     Candida albicans Not Detected     Candida auris Not Detected     Candida glabrata Not Detected     Candida krusei Not Detected     Candida parapsilosis Not Detected     Candida tropicalis Not Detected     Cryptococcus neoformans/gattii Not Detected     CTX-M (ESBL ) Test Not Applicable     IMP (Carbapenem resistant) Test Not Applicable     KPC resistance gene (Carbapenem resistant) Test Not Applicable     mcr-1  Test Not Applicable     mec A/C  Test Not Applicable     mec A/C and MREJ (MRSA) gene Test Not Applicable     NDM (Carbapenem resistant) Test Not Applicable     OXA-48-like (Carbapenem resistant) Test Not Applicable     van A/B (VRE gene) Test Not Applicable     VIM (Carbapenem resistant) Test Not Applicable    Blood Culture #2 **CANNOT BE ORDERED STAT** [[id number]] Collected: 12/10/24 1056    Order Status: Completed Specimen: Blood from Peripheral, Forearm, Right Updated: 12/12/24 0518     Blood Culture, Routine Gram stain aer bottle: Gram positive cocci      Results called to and read back by: GEETA Holguin. 12/12/2024  05:15    Respiratory Infection Panel (PCR), Nasopharyngeal [[id number]]     Order Status: No result Specimen: Nasopharyngeal Swab     Culture, Respiratory with Gram Stain [[id number]]     Order Status: No result Specimen: Sputum, Expectorated           All pertinent  labs within the past 24 hours have been reviewed.    Significant Imaging: None

## 2024-12-13 NOTE — ASSESSMENT & PLAN NOTE
Chronic, controlled.  Latest blood pressure and vitals reviewed-   Temp:  [97.4 °F (36.3 °C)-98.2 °F (36.8 °C)]   Pulse:  [63-89]   Resp:  [15-21]   BP: (151-203)/(63-89)   SpO2:  [87 %-96 %] .   Home meds for hypertension were reviewed and noted below.   Hypertension Medications               amLODIPine (NORVASC) 5 MG tablet Take 1 tablet (5 mg total) by mouth once daily.    carvediloL (COREG) 12.5 MG tablet Take 12.5 mg by mouth 2 (two) times daily with meals.    diltiaZEM (CARDIZEM CD) 300 MG 24 hr capsule Take 1 capsule by mouth every morning.    furosemide (LASIX) 20 MG tablet Take 1 tablet (20 mg total) by mouth once daily.    hydrALAZINE (APRESOLINE) 10 MG tablet Take 10 mg by mouth every 12 (twelve) hours.            While in the hospital, will manage blood pressure as follows; Adjust home antihypertensive regimen as follows- elevated blood pressure, resume home hydralazine    Will utilize p.r.n. blood pressure medication only if patient's blood pressure greater than  180/110 and he develops symptoms such as worsening chest pain or shortness of breath.

## 2024-12-13 NOTE — ASSESSMENT & PLAN NOTE
Creatine stable for now. BMP reviewed- noted Estimated Creatinine Clearance: 19.2 mL/min (A) (based on SCr of 2.2 mg/dL (H)). according to latest data. Based on current GFR, CKD stage is stage 3 - GFR 30-59.  Monitor UOP and serial BMP and adjust therapy as needed. Renally dose meds. Avoid nephrotoxic medications and procedures.  Discontinue intravenous lasix  Monitor trend

## 2024-12-13 NOTE — ASSESSMENT & PLAN NOTE
Patient has  unspecified  heart failure that is Chronic. On presentation their CHF was . Most recent BNP and echo results are listed below.well compensated  Recent Labs     12/10/24  1055   BNP 88       Latest ECHO  Results for orders placed during the hospital encounter of 01/08/21    Echo Color Flow Doppler? Yes    Interpretation Summary  · The left ventricle is normal in size with moderate concentric hypertrophy and normal systolic function. The estimated ejection fraction is 60%  · Indeterminate left ventricular diastolic function.  · Normal right ventricular size with normal right ventricular systolic function.  · Mild tricuspid regurgitation.  · Normal central venous pressure (3 mmHg).  · The estimated PA systolic pressure is 32 mmHg.    Current Heart Failure Medications  , Every 12 hours, Oral  , 2 times daily with meals, Oral  hydrALAZINE injection 10 mg, Every 6 hours PRN, Intravenous  hydrALAZINE tablet 10 mg, Every 12 hours, Oral    Plan  - Monitor strict I&Os and daily weights.    - Place on telemetry  - Low sodium diet  - Place on fluid restriction of 1.5 L.   - Cardiology has been consulted  - The patient's volume status is at their baseline  Ambulatory oxygen evaluation   Nutrition consult   Discontinue intravenous lasix

## 2024-12-13 NOTE — SUBJECTIVE & OBJECTIVE
Review of Systems   Constitutional: Positive for malaise/fatigue.   HENT: Negative.     Eyes: Negative.    Cardiovascular:  Positive for dyspnea on exertion and leg swelling.   Respiratory:  Positive for shortness of breath.    Endocrine: Negative.    Hematologic/Lymphatic: Negative.    Skin: Negative.    Musculoskeletal: Negative.    Gastrointestinal: Negative.    Genitourinary: Negative.    Neurological: Negative.    Psychiatric/Behavioral: Negative.     Allergic/Immunologic: Negative.      Objective:     Vital Signs (Most Recent):  Temp: 98 °F (36.7 °C) (12/13/24 1225)  Pulse: 71 (12/13/24 1326)  Resp: 19 (12/13/24 1326)  BP: 135/62 (12/13/24 1225)  SpO2: (!) 90 % (12/13/24 1326) Vital Signs (24h Range):  Temp:  [97.4 °F (36.3 °C)-98 °F (36.7 °C)] 98 °F (36.7 °C)  Pulse:  [63-89] 71  Resp:  [15-20] 19  SpO2:  [87 %-93 %] 90 %  BP: (135-203)/(62-89) 135/62     Weight: 78.5 kg (173 lb 1 oz)  Body mass index is 27.11 kg/m².     SpO2: (!) 90 %         Intake/Output Summary (Last 24 hours) at 12/13/2024 1413  Last data filed at 12/13/2024 0629  Gross per 24 hour   Intake --   Output 2580 ml   Net -2580 ml       Lines/Drains/Airways       Peripheral Intravenous Line  Duration                  Peripheral IV - Single Lumen 12/11/24 0845 20 G Anterior;Distal;Left Upper Arm 2 days         Peripheral IV - Single Lumen 12/11/24 1030 20 G Right Antecubital 2 days                       Physical Exam  Vitals and nursing note reviewed.   Constitutional:       General: He is not in acute distress.     Appearance: He is well-developed. He is not diaphoretic.      Comments: On supplemental O2   HENT:      Head: Normocephalic and atraumatic.   Eyes:      General:         Right eye: No discharge.         Left eye: No discharge.      Pupils: Pupils are equal, round, and reactive to light.   Cardiovascular:      Rate and Rhythm: Normal rate. Rhythm irregularly irregular.      Heart sounds: Normal heart sounds, S1 normal and S2  "normal. No murmur heard.  Pulmonary:      Effort: Pulmonary effort is normal.      Breath sounds: Rhonchi present.   Musculoskeletal:      Right lower leg: Edema present.      Left lower leg: Edema present.   Skin:     General: Skin is warm and dry.      Findings: No erythema.   Neurological:      Mental Status: He is alert and oriented to person, place, and time.   Psychiatric:         Mood and Affect: Mood normal.         Behavior: Behavior normal.            Significant Labs: CMP   Recent Labs   Lab 12/12/24  0410 12/13/24  0413    144   K 3.5 4.2    100   CO2 29 33*    109   BUN 29 35*   CREATININE 1.9* 2.2*   CALCIUM 8.3* 9.3   ANIONGAP 12 11   , CBC   Recent Labs   Lab 12/12/24  1221 12/13/24  0413   WBC 10.51 8.42   HGB 12.5* 12.3*   HCT 39.3* 38.9*    243   , Troponin No results for input(s): "TROPONINI" in the last 48 hours., and All pertinent lab results from the last 24 hours have been reviewed.    Significant Imaging: Echocardiogram: Transthoracic echo (TTE) complete ([facility]upid Only):   Results for orders placed or performed during the hospital encounter of 12/10/24   Echo   Result Value Ref Range    LV ESV A2C 85.30 mL    LA area A2C 24.76 cm2    Left Atrium Major Axis 5.90 cm    Left Atrium Minor Axis 6.54 cm    RA Major Axis 5.80 cm    LV Diastolic Volume 106.12 mL    LV Systolic Volume 40.02 mL    TR Max Regulo 3.39 m/s    PV mean gradient 2 mmHg    RVOT peak VTI 20.1 cm    RVOT peak regulo 1.04 m/s    Ao VTI 33.5 cm    Ao peak regulo 1.6 m/s    LVOT peak VTI 24.4 cm    LVOT peak regulo 1.1 m/s    LVOT diameter 2.0 cm    PV peak gradient 4 mmHg    AV mean gradient 5.1 mmHg    TAPSE 1.48 cm    RVDD 3.92 cm    LA size 3.36 cm    Ascending aorta 3.32 cm    STJ 3.33 cm    LVIDs 3.2 2.1 - 4.0 cm    Ao root annulus 3.72 cm    PW 1.1 0.6 - 1.1 cm    IVS 1.1 0.6 - 1.1 cm    LVIDd 4.8 3.5 - 6.0 cm    PV PEAK VELOCITY 1.03 m/s    Left Ventricular Outflow Tract Mean Gradient 2.56 mmHg    Left " Ventricular Outflow Tract Mean Velocity 0.75 cm/s    Left Ventricular End Systolic Volume by Teichholz Method 40.02 mL    Left Ventricular End Diastolic Volume by Teichholz Method 106.12 mL    IVC diameter 2.59 cm    FS 33.3 28 - 44 %    LV mass 194.0 g    ZLVIDD -1.30     ZLVIDS -0.39     Left Ventricle Relative Wall Thickness 0.46 cm    AV valve area 2.3 cm²    AV Velocity Ratio 0.69     AV index (prosthetic) 0.73     LVOT area 3.1 cm2    LVOT stroke volume 76.6 cm3    AV peak gradient 10.2 mmHg    LV Systolic Volume Index 20.7 mL/m2    LV Diastolic Volume Index 54.98 mL/m2    LV Mass Index 100.5 g/m2    Triscuspid Valve Regurgitation Peak Gradient 46 mmHg    NIURKA by Velocity Ratio 2.2 cm²    BSA 1.96 m2    RISHI 42.2 mL/m2    LA Vol 81.50 cm3    LA WIDTH 4.6 cm    RA Width 4.2 cm    TV resting pulmonary artery pressure 61 mmHg    RV TB RVSP 18 mmHg    Est. RA pres 15 mmHg    Narrative      Left Ventricle: The left ventricle is normal in size. Normal wall   thickness. There is concentric remodeling. There is normal systolic   function with a visually estimated ejection fraction of 55 - 60%. There is   indeterminate diastolic function.    Right Ventricle: Right ventricle was not well visualized due to poor   acoustic window. Systolic function is borderline low.    Left Atrium: Left atrium is moderately dilated.    Right Atrium: Right atrium is dilated.    Aortic Valve: There is mild aortic valve sclerosis. There is no   stenosis. Aortic valve peak velocity is 1.6 m/s. Mean gradient is 5.1   mmHg.    Mitral Valve: There is mild regurgitation.    Tricuspid Valve: There is moderate regurgitation.    Pulmonary Artery: The estimated pulmonary artery systolic pressure is   61 mmHg.    IVC/SVC: Elevated venous pressure at 15 mmHg.      and EKG: Reviewed

## 2024-12-13 NOTE — PROGRESS NOTES
[facility] Tele[facility] ([facility])  Infectious Disease  Progress Note    Patient Name: Jimmy Young  MRN: [id number]  Admission Date: 12/10/2024  Length of Stay: 3 days  Attending Physician: Juliana Goodwin MD  Primary Care Provider: Nacho Richardson MD    Isolation Status: No active isolations  Assessment/Plan:      Pulmonary  Pneumonia  --CXR reports lateral left upper lobe opacity, most likely early pneumonia.   --Suspect this is primary infection in this patient as blood cx reporting likely CONS   --On 5 L NC (home baseline is 2 L)  --Would continue empiric doxycycline and ceftriaxone   --Require drug toxicity monitoring   --See edema assessment for more details on antibiotics   --Above d/w primary team.      Cardiac/Vascular  Hypercholesterolemia  Continue current medications per primary      Atrial fibrillation  Continue current medications per primary      Essential hypertension  Continue current medications per primary      ID  * Bacteremia due to Staphylococcus  --Typically bacteremia poses risk of life threatening sepsis and metastatic infection  --However, in this case suspicion is high for contaminant   --1 bottle reporting Staph species per BCID but not S aureus or lugdunensis   --Likely a nonpathogenic coagulase negative Staph  --Patient hemodynamically stable with no fever or leukocytosis  --Hold resistant Staph coverage for now (ie vancomycin, daptomycin)  --Will follow repeat blood cx to see if it grows again; if negative would not treat  --Continue pneumonia coverage with ceftriaxone and doxycycline   --Above d/w primary team.      Endocrine  Acquired hypothyroidism  Continue current medications per primary      Other  Lower extremity edema  Also with superimposed cellulitis bilaterally. Photos put under media section. Cellulitis much improved today. Would benefit from lymphedema clinic referral outpatient. Will monitor on current antibiotics. May broaden at discharge and plan for minimum 10 day  total course. If still present in clinic may offer Dalvance.        Thank you for your consult. I will follow-up with patient. Please contact us if you have any additional questions.    Ashkan Lagunas, DO  Infectious Disease  [facility] - Tele[facility] ([facility])    Subjective:     Principal Problem:Bacteremia due to Staphylococcus    HPI: This is a 89 yo M with hx of asthma, Afib, COPD, CKD, DM and HTN who presented to ER for evaluation of bilateral leg swelling. Patient was advised to go to ER in setting of lower extremity edema, shortness of breath, and a cough. Patient is on chronic home O2 at 3L. On diuretics. No evidence of DVT on LE US. CXR reports lateral left upper lobe opacity, most likely early pneumonia. Started empirically on IV ceftriaxone and PO doxycycline. Blood cultures were collected 12/10 are reporting Staph species on BCID. No S aureus detected. Low suspicion for virulent infection. No pacemaker/ICD noted on review of CXR. Likely going to be a CONS. Patient has been afebrile. ID consulted for bacteremia.   Interval History: Patient remains HDS with no fever or leukocytosis, further supporting Staph species is contaminant. Awaiting speciation. Following repeat blood cx. Monitoring LE edema/erythema. Family concerned as they have never seen those skin changes before.     Review of Systems   Cardiovascular:  Positive for leg swelling.   Skin:  Positive for color change, rash and wound.   All other systems reviewed and are negative.    Objective:     Vital Signs (Most Recent):  Temp: 97.6 °F (36.4 °C) (12/13/24 0720)  Pulse: 73 (12/13/24 0720)  Resp: 20 (12/13/24 0720)  BP: (!) 163/83 (12/13/24 0720)  SpO2: (!) 90 % (12/13/24 0720) Vital Signs (24h Range):  Temp:  [97.4 °F (36.3 °C)-98.4 °F (36.9 °C)] 97.6 °F (36.4 °C)  Pulse:  [] 73  Resp:  [15-21] 20  SpO2:  [87 %-96 %] 90 %  BP: (151-203)/(63-89) 163/83     Weight: 78.5 kg (173 lb 1 oz)  Body mass index is 27.11 kg/m².    Estimated Creatinine  Clearance: 19.2 mL/min (A) (based on SCr of 2.2 mg/dL (H)).     Physical Exam  Constitutional:       General: He is not in acute distress.     Appearance: Normal appearance. He is not ill-appearing.   Cardiovascular:      Rate and Rhythm: Normal rate and regular rhythm.      Pulses: Normal pulses.      Heart sounds: Normal heart sounds. No murmur heard.     No friction rub. No gallop.   Pulmonary:      Effort: Pulmonary effort is normal. No respiratory distress.      Breath sounds: Normal breath sounds.   Abdominal:      General: Abdomen is flat. Bowel sounds are normal. There is no distension.      Palpations: Abdomen is soft.      Tenderness: There is no abdominal tenderness.   Musculoskeletal:         General: Swelling present.      Right lower leg: Edema present.      Left lower leg: Edema present.   Skin:     General: Skin is warm and dry.      Findings: Erythema present.   Neurological:      Mental Status: He is alert.          Significant Labs: Blood Culture:   Recent Labs   Lab 12/10/24  1053 12/10/24  1056 12/12/24  0907 12/12/24  0912   LABBLOO Gram stain aer bottle: Gram positive cocci  Positive results previously called 12/12/2024  13:46 Gram stain aer bottle: Gram positive cocci  Results called to and read back by: GEETA Holguin. 12/12/2024  05:15 No Growth to date No Growth to date     CBC:   Recent Labs   Lab 12/12/24  1221 12/13/24  0413   WBC 10.51 8.42   HGB 12.5* 12.3*   HCT 39.3* 38.9*    243     CMP:   Recent Labs   Lab 12/12/24  0410 12/13/24  0413    144   K 3.5 4.2    100   CO2 29 33*    109   BUN 29 35*   CREATININE 1.9* 2.2*   CALCIUM 8.3* 9.3   ANIONGAP 12 11     Microbiology Results (last 7 days)       Procedure Component Value Units Date/Time    Blood culture [[id number]] Collected: 12/12/24 0907    Order Status: Completed Specimen: Blood from Peripheral, Hand, Right Updated: 12/13/24 0315     Blood Culture, Routine No Growth to date    Blood culture [[id number]]  Collected: 12/12/24 0912    Order Status: Completed Specimen: Blood from Peripheral, Hand, Left Updated: 12/13/24 0315     Blood Culture, Routine No Growth to date    Blood Culture #1 **CANNOT BE ORDERED STAT** [[id number]] Collected: 12/10/24 1053    Order Status: Completed Specimen: Blood from Peripheral, Antecubital, Right Updated: 12/12/24 1348     Blood Culture, Routine Gram stain aer bottle: Gram positive cocci      Positive results previously called 12/12/2024  13:46    Rapid Organism ID by PCR (from Blood culture) [[id number]]  (Abnormal) Collected: 12/10/24 1056    Order Status: Completed Updated: 12/12/24 0654     Enterococcus faecalis Not Detected     Enterococcus faecium Not Detected     Listeria monocytogenes Not Detected     Staphylococcus spp. Detected     Staphylococcus aureus Not Detected     Staphylococcus epidermidis Not Detected     Staphylococcus lugdunensis Not Detected     Streptococcus species Not Detected     Streptococcus agalactiae Not Detected     Streptococcus pneumoniae Not Detected     Streptococcus pyogenes Not Detected     Acinetobacter calcoaceticus/baumannii complex Not Detected     Bacteroides fragilis Not Detected     Enterobacterales Not Detected     Enterobacter cloacae complex Not Detected     Escherichia coli Not Detected     Klebsiella aerogenes Not Detected     Klebsiella oxytoca Not Detected     Klebsiella pneumoniae group Not Detected     Proteus Not Detected     Salmonella sp Not Detected     Serratia marcescens Not Detected     Haemophilus influenzae Not Detected     Neisseria meningtidis Not Detected     Pseudomonas aeruginosa Not Detected     Stenotrophomonas maltophilia Not Detected     Candida albicans Not Detected     Candida auris Not Detected     Candida glabrata Not Detected     Candida krusei Not Detected     Candida parapsilosis Not Detected     Candida tropicalis Not Detected     Cryptococcus neoformans/gattii Not Detected     CTX-M (ESBL ) Test Not  Applicable     IMP (Carbapenem resistant) Test Not Applicable     KPC resistance gene (Carbapenem resistant) Test Not Applicable     mcr-1  Test Not Applicable     mec A/C  Test Not Applicable     mec A/C and MREJ (MRSA) gene Test Not Applicable     NDM (Carbapenem resistant) Test Not Applicable     OXA-48-like (Carbapenem resistant) Test Not Applicable     van A/B (VRE gene) Test Not Applicable     VIM (Carbapenem resistant) Test Not Applicable    Blood Culture #2 **CANNOT BE ORDERED STAT** [[id number]] Collected: 12/10/24 1056    Order Status: Completed Specimen: Blood from Peripheral, Forearm, Right Updated: 12/12/24 0518     Blood Culture, Routine Gram stain aer bottle: Gram positive cocci      Results called to and read back by: GEETA Holguin. 12/12/2024  05:15    Respiratory Infection Panel (PCR), Nasopharyngeal [[id number]]     Order Status: No result Specimen: Nasopharyngeal Swab     Culture, Respiratory with Gram Stain [[id number]]     Order Status: No result Specimen: Sputum, Expectorated           All pertinent labs within the past 24 hours have been reviewed.    Significant Imaging: None

## 2024-12-13 NOTE — ASSESSMENT & PLAN NOTE
--CXR reports lateral left upper lobe opacity, most likely early pneumonia.   --Suspect this is primary infection in this patient as blood cx reporting likely CONS   --On 5 L NC (home baseline is 2 L)  --Would continue empiric doxycycline and ceftriaxone   --Require drug toxicity monitoring   --See edema assessment for more details on antibiotics   --Above d/w primary team.

## 2024-12-13 NOTE — PLAN OF CARE
Plan of care reviewed with patient with verbal understanding. Chart and orders reviewed.  Pt remains free from falls this shift, Safety precautions in place.   Pt currently resting comfortably in bed.   Purposeful rounding with bed in lowest position, side rails up, call light in reach.  Will continue to monitor until end of shift.       Problem: Adult Inpatient Plan of Care  Goal: Plan of Care Review  Outcome: Progressing  Flowsheets (Taken 12/13/2024 0455)  Plan of Care Reviewed With: patient  Goal: Patient-Specific Goal (Individualized)  Outcome: Progressing  Flowsheets (Taken 12/13/2024 0455)  Individualized Care Needs: Diuresis and IV abx.  Anxieties, Fears or Concerns: None voiced  Patient/Family-Specific Goals (Include Timeframe): The pt will show reduced signs of infection by end of admission.     Problem: Pneumonia  Goal: Fluid Balance  Outcome: Progressing  Goal: Effective Oxygenation and Ventilation  Outcome: Progressing

## 2024-12-13 NOTE — SUBJECTIVE & OBJECTIVE
Interval History: See hospital course for today      Review of Systems   Constitutional:  Positive for activity change and appetite change.   HENT:  Positive for hearing loss.    Respiratory:  Positive for shortness of breath (chronically on 2-3L).    Cardiovascular:  Positive for leg swelling.   Gastrointestinal:  Negative for nausea and vomiting.   Skin:  Positive for wound.   Neurological:  Positive for weakness.     Objective:     Vital Signs (Most Recent):  Temp: 97.6 °F (36.4 °C) (12/13/24 0720)  Pulse: 77 (12/13/24 0830)  Resp: 18 (12/13/24 0830)  BP: (!) 163/83 (12/13/24 0720)  SpO2: (!) 90 % (12/13/24 0830) Vital Signs (24h Range):  Temp:  [97.4 °F (36.3 °C)-98.2 °F (36.8 °C)] 97.6 °F (36.4 °C)  Pulse:  [63-89] 77  Resp:  [15-21] 18  SpO2:  [87 %-96 %] 90 %  BP: (151-203)/(63-89) 163/83     Weight: 78.5 kg (173 lb 1 oz)  Body mass index is 27.11 kg/m².    Intake/Output Summary (Last 24 hours) at 12/13/2024 0855  Last data filed at 12/13/2024 0629  Gross per 24 hour   Intake --   Output 2780 ml   Net -2780 ml         Physical Exam  Vitals and nursing note reviewed. Exam conducted with a chaperone present (son, nursing, resp).   Constitutional:       General: He is not in acute distress.     Appearance: He is ill-appearing. He is not toxic-appearing.      Interventions: Nasal cannula in place.   HENT:      Head: Normocephalic and atraumatic.      Right Ear: Decreased hearing noted.      Left Ear: Decreased hearing noted.   Cardiovascular:      Rate and Rhythm: Normal rate.   Pulmonary:      Effort: Pulmonary effort is normal. No respiratory distress.   Abdominal:      Palpations: Abdomen is soft.   Musculoskeletal:      Right lower leg: Edema present.      Left lower leg: Edema present.   Skin:     General: Skin is warm.      Findings: Erythema and lesion present.   Neurological:      Mental Status: He is alert and oriented to person, place, and time.      Motor: Weakness present.   Psychiatric:         Mood  and Affect: Mood normal.         Behavior: Behavior normal.             Significant Labs: All pertinent labs within the past 24 hours have been reviewed.  Blood Culture:   Recent Labs   Lab 12/12/24  0907 12/12/24  0912   LABBLOO No Growth to date No Growth to date     CBC:   Recent Labs   Lab 12/12/24  1221 12/13/24  0413   WBC 10.51 8.42   HGB 12.5* 12.3*   HCT 39.3* 38.9*    243     CMP:   Recent Labs   Lab 12/12/24  0410 12/13/24  0413    144   K 3.5 4.2    100   CO2 29 33*    109   BUN 29 35*   CREATININE 1.9* 2.2*   CALCIUM 8.3* 9.3   ANIONGAP 12 11       Significant Imaging: I have reviewed all pertinent imaging results/findings within the past 24 hours.

## 2024-12-13 NOTE — CONSULTS
[facility] - Telemetry ([facility])  Adult Nutrition  Consult Note    SUMMARY     Recommendations    Recommendation/Intervention:   1. Recommend modify pt's diet to Cardiac, Soft and bite-sized (IDDSI Level 6), 1500 mL fluid restriction diet   2. Recommend pt continues Suplena once/day per pt preference   3. Recommend feeding assistance as warrented   4. Weigh twice weekly    Goals:   1. Pt will continue to tolerate and consume >75% EEN and EPN prior to RD follow up  Nutrition Goal Status: new  Communication of RD Recs: other (comment) (POC, sticky note)    Assessment and Plan    Endocrine  Severe protein-calorie malnutrition  Malnutrition Type:  Context: acute illness or injury  Level: severe    Related to (etiology):   Physiological causes increasing nutrient needs d/t illness    Signs and Symptoms (as evidenced by):   Unintentional weight loss, adult, of >2% in 1 week  Inc loss of subcutaneous fat  Inc muscle loss  Pneumonia    Malnutrition Characteristic Summary:  Weight Loss (Malnutrition): greater than 2% in 1 week  Subcutaneous Fat (Malnutrition): moderate depletion  Muscle Mass (Malnutrition): moderate depletion  Fluid Accumulation (Malnutrition): moderate    Interventions/Recommendations (treatment strategy):  1. Decreased sodium, fat and fluid, IDDSI Soft and bite sized Level 6 (blue) texture modified diet  2. Commercial beverage medical food supplement therapy  3. Feeding assistance management  4. Collaboration by nutrition professional with other providers    Nutrition Diagnosis Status:   New         Malnutrition Assessment (12/13/24):  Malnutrition Context: acute illness or injury  Malnutrition Level: severe  Skin (Micronutrient): bruised, edema (Abhishek score = 20 (no risk)       Weight Loss (Malnutrition): greater than 2% in 1 week  Subcutaneous Fat (Malnutrition): moderate depletion  Muscle Mass (Malnutrition): moderate depletion  Fluid Accumulation (Malnutrition): moderate   Orbital Region (Subcutaneous  "Fat Loss): moderate depletion  Upper Arm Region (Subcutaneous Fat Loss): moderate depletion   Latter-day Region (Muscle Loss): moderate depletion  Clavicle Bone Region (Muscle Loss): moderate depletion  Clavicle and Acromion Bone Region (Muscle Loss): moderate depletion  Dorsal Hand (Muscle Loss): moderate depletion  Patellar Region (Muscle Loss): moderate depletion  Anterior Thigh Region (Muscle Loss): moderate depletion  Posterior Calf Region (Muscle Loss): moderate depletion                 Reason for Assessment    Reason For Assessment: consult, RD follow-up (protein sources)  Diagnosis:  (Bacteremia due to Staphylococcus)  General Information Comments:   12/13/24: 89 y.o. Male admitted for Bacteremia due to Staphylococcus. PMH: HTN, Acquired hypothyroidism, A Fib and flutter, Hypercholesterolemia, CKD 3 2/2 T2DM, CAD, Chronic diastolic HF, Pneumonia, LE edema; Hx: COPD. Pt is currently on a Low sodium 2 gm, Soft and bite-sized (IDDSI Level 6), 1500 mL fluid restriction diet. RD consulted to discuss protein sources, note RD provided Cardiac, Diabetic, Fluid restriction nutrition education to pt on 12/11/24. Visited pt at bedside, pt resting in bed, pt's son present. Pt reported that he had a good appetite of 3 meals and 1 Ensure as a snack between breakfast and lunch/day, confirmed he currently has a good appetite as well, 100% PO intake of meals and chocolate Ensure. Discussed Suplena ONS for CKD vs Ensure, pt receptive to try, modified pt's orders and trays. Provided and discussed nutrition education handout "Chronic Kidney Disease stage 3-5 Nutrition Therapy" per the NCM, emphasized portion sizes for protein and handout provides recommended protein choices, pt expressed understanding and appreciaition for nutrition education provided, encouraged pt and pt's son to read handout and use RD contact information with any further questions/concerns they may have. Pt denied N/V/D, abd pain or chewing/swallowing " "difficulties. Pt stated at previous encounter that his UBW is 188 lbs. NFPE performed, moderate malnutrition noted. LBM 12/9 and 3+ moderate dependent edema charted. Labs, meds, weights reviewed. Weight charted 12/10/24 178 lbs, 12/13/24 173 lbs (BMI 27.11, Normal for age), -5 lb wt loss (2% wt change) x 2 days, no previous current weights charted. RD will continue to follow and monitor pt's nutritional status during admit.    Nutrition Discharge Planning: Cardiac, Diabetic diet, texture per SLP recommendations + 1500 mL fluid restriction, per MD/NP + Suplena as warranted and within fluid restriction    Nutrition Risk Screen    Nutrition Risk Screen: difficulty chewing/swallowing    Nutrition Related Social Determinants of Health: SDOH: Adequate food in home environment and None Identified    Nutrition/Diet History    Patient Reported Diet/Restrictions/Preferences: low salt  Spiritual, Cultural Beliefs, Denominational Practices, Values that Affect Care: no  Supplemental Drinks or Food Habits: Ensure Original  Food Allergies: NKFA  Factors Affecting Nutritional Intake: None identified at this time    Anthropometrics    Temp: 97.8 °F (36.6 °C)  Height Method: Stated  Height: 5' 7" (170.2 cm)  Height (inches): 67 in  Weight Method: Bed Scale  Weight: 78.5 kg (173 lb 1 oz)  Weight (lb): 173.06 lb  Ideal Body Weight (IBW), Male: 148 lb  % Ideal Body Weight, Male (lb): 116.93 %  BMI (Calculated): 27.1  BMI Grade: 25 - 29.9 - overweight (Normal for age)       Wt Readings from Last 15 Encounters:   12/13/24 78.5 kg (173 lb 1 oz)   01/08/21 74.4 kg (164 lb)   01/08/21 74.4 kg (164 lb)   12/09/20 74.5 kg (164 lb 3.9 oz)   06/19/20 74 kg (163 lb 2.3 oz)   06/01/20 74 kg (163 lb 2.3 oz)   01/07/20 78.4 kg (172 lb 13.5 oz)   05/03/19 78.5 kg (173 lb 1 oz)   04/13/19 79 kg (174 lb 2.6 oz)   04/20/18 78.9 kg (174 lb)   04/09/18 79 kg (174 lb 2 oz)   10/05/17 84 kg (185 lb 1.6 oz)   09/21/17 83.9 kg (184 lb 15.5 oz)   09/14/17 82.3 kg " "(181 lb 6.4 oz)   07/03/17 81.2 kg (179 lb)     Lab/Procedures/Meds    Pertinent Labs Reviewed: reviewed  Pertinent Labs Comments: BUN (H), Cr (H), eGFR 27  Pertinent Medications Reviewed: reviewed    BMP  Lab Results   Component Value Date     12/13/2024    K 4.2 12/13/2024     12/13/2024    CO2 33 (H) 12/13/2024    BUN 35 (H) 12/13/2024    CREATININE 2.2 (H) 12/13/2024    CALCIUM 9.3 12/13/2024    ANIONGAP 11 12/13/2024    EGFRNORACEVR 27 (A) 12/13/2024     Lab Results   Component Value Date    CALCIUM 9.3 12/13/2024    PHOS 2.5 (L) 01/03/2023     Lab Results   Component Value Date    ALBUMIN 3.2 (L) 12/10/2024     Lab Results   Component Value Date    ALT 10 12/10/2024    AST 15 12/10/2024    ALKPHOS 112 12/10/2024    BILITOT 0.5 12/10/2024     No results for input(s): "POCTGLUCOSE" in the last 24 hours.  Lab Results   Component Value Date    HGBA1C 6.9 (H) 12/10/2024     Lab Results   Component Value Date    WBC 8.42 12/13/2024    HGB 12.3 (L) 12/13/2024    HCT 38.9 (L) 12/13/2024    MCV 93 12/13/2024     12/13/2024       Scheduled Meds:   albuterol-ipratropium  3 mL Nebulization Q6H WAKE    cefTRIAXone (Rocephin) IV (PEDS and ADULTS)  1 g Intravenous Q24H    diltiaZEM  300 mg Oral QAM    doxycycline  100 mg Oral Q12H    enoxparin  30 mg Subcutaneous Daily    hydrALAZINE  10 mg Oral Q12H    levothyroxine  50 mcg Oral Before breakfast    magnesium oxide  400 mg Oral BID    metronidazole 1%   Topical (Top) BID    miconazole   Topical (Top) BID    pantoprazole  40 mg Oral Daily    pravastatin  40 mg Oral Daily     Continuous Infusions:  PRN Meds:.  Current Facility-Administered Medications:     hydrALAZINE, 10 mg, Intravenous, Q6H PRN    sodium chloride 0.9%, 10 mL, Intravenous, PRN      Physical Findings/Assessment         Estimated/Assessed Needs    Weight Used For Calorie Calculations: 78.5 kg (173 lb 1 oz)  Energy Calorie Requirements (kcal): 5190-9863 kcals (MSJ x 1.2-1.4 AF (Diabetes vs " CHF)  Energy Need Method: [place]- Shanatnuor  Protein Requirements: 47-63 g (0.6-0.8 g/kg ABW (CKD, no dialysis, diabetic vs CHF)  Weight Used For Protein Calculations: 78.5 kg (173 lb 1 oz)  Fluid Requirements (mL): 1500 mL (CHF, edema, per MD/NP)  Estimated Fluid Requirement Method: other (see comments)  RDA Method (mL): 1660  CHO Requirement: 207-242 g (7734-8549 kcals/8)      Nutrition Prescription Ordered    Current Diet Order: Low sodium 2 gm, Soft and bite-sized (IDDSI Level 6), 1500 mL fluid restriction diet  Oral Nutrition Supplement: Suplena once a day    Evaluation of Received Nutrient/Fluid Intake  I/O: (Net since admit):  12/13/24: -2861 mL    Energy Calories Required: meeting needs  Protein Required: meeting needs  Fluid Required: not meeting needs  Total Fluid Intake (mL): 320  Tolerance: tolerating  % Intake of Estimated Energy Needs: 100%  % Meal Intake: 100%    Nutrition Risk    Level of Risk/Frequency of Follow-up: low (F/u x 1 weekly)       Monitor and Evaluation    Food and Nutrient Intake: energy intake, food and beverage intake  Food and Nutrient Adminstration: diet order  Knowledge/Beliefs/Attitudes: beliefs and attitudes, food and nutrition knowledge/skill  Anthropometric Measurements: weight, weight change, body mass index  Biochemical Data, Medical Tests and Procedures: electrolyte and renal panel, glucose/endocrine profile, inflammatory profile  Nutrition-Focused Physical Findings: overall appearance       Nutrition Follow-Up    RD Follow-up?: Yes  Lorene Elkins, BS, RDN, LDN

## 2024-12-13 NOTE — PROGRESS NOTES
[facility] - Telemetry ([facility])  Cardiology  Progress Note    Patient Name: Jimmy Young  MRN: [id number]  Admission Date: 12/10/2024  Hospital Length of Stay: 3 days  Code Status: Partial Code   Attending Physician: Juliana Goodwin MD   Primary Care Physician: Nacho Richardson MD  Expected Discharge Date:   Principal Problem:Bacteremia due to Staphylococcus    Subjective:   HPI:  Mr. Young is a 89 year old male patient whose current medical conditions include COPD, AAA s/p repair, arthritis, asthma, CHF (EF recovered), PAF (not on AC due to prior bleeding issues/AVM's), and HTN who was referred to [facility] ED yesterday by his PCP due to worsening LE edema associated with SOB and cough. Patient denied any associated fever, chills, palpitations, near syncope, or syncope. Initial workup in ED revealed hypokalemia (K 3.1), hypomagnesemia (Mg 1.2), creatinine of 2.1, and negative BNP. EKG showed rate-controlled aflutter. CXR showed findings concerning for JENIFFER PNA and patient was subsequently transferred to [facility] for admission and further treatment. Cardiology consulted to assist with management. Patient seen and examined today, sitting up in bedside chair. Feels ok. Still SOB but improved. No CP symptoms. Does admit to salty food intake, still has some BLE edema. He reports compliance with his medications. Previously seen in clinic by Dr. Fair. Labs reviewed. Troponin negative. TTE pending. Prior cath 12/15 at [facility] showed non-obs CAD.      Hospital Course:   12/12/24 Pt seen and examined today sitting up on bedside chair, feels ok still with LE edema on exam on 5L nc, on 2-3L at home. Denies any CP. Labs  reviewed chart reviewed    12/13/24-Patient seen and examined today, lying in bed. Feels ok. SOB at baseline. No other CV complaints. Still on 5L NC. Given IV Lasix yesterday, d/c'd today due to bumped creatinine. ID on board, remains on abx.         Review of Systems   Constitutional: Positive for  malaise/fatigue.   HENT: Negative.     Eyes: Negative.    Cardiovascular:  Positive for dyspnea on exertion and leg swelling.   Respiratory:  Positive for shortness of breath.    Endocrine: Negative.    Hematologic/Lymphatic: Negative.    Skin: Negative.    Musculoskeletal: Negative.    Gastrointestinal: Negative.    Genitourinary: Negative.    Neurological: Negative.    Psychiatric/Behavioral: Negative.     Allergic/Immunologic: Negative.      Objective:     Vital Signs (Most Recent):  Temp: 98 °F (36.7 °C) (12/13/24 1225)  Pulse: 71 (12/13/24 1326)  Resp: 19 (12/13/24 1326)  BP: 135/62 (12/13/24 1225)  SpO2: (!) 90 % (12/13/24 1326) Vital Signs (24h Range):  Temp:  [97.4 °F (36.3 °C)-98 °F (36.7 °C)] 98 °F (36.7 °C)  Pulse:  [63-89] 71  Resp:  [15-20] 19  SpO2:  [87 %-93 %] 90 %  BP: (135-203)/(62-89) 135/62     Weight: 78.5 kg (173 lb 1 oz)  Body mass index is 27.11 kg/m².     SpO2: (!) 90 %         Intake/Output Summary (Last 24 hours) at 12/13/2024 1413  Last data filed at 12/13/2024 0629  Gross per 24 hour   Intake --   Output 2580 ml   Net -2580 ml       Lines/Drains/Airways       Peripheral Intravenous Line  Duration                  Peripheral IV - Single Lumen 12/11/24 0845 20 G Anterior;Distal;Left Upper Arm 2 days         Peripheral IV - Single Lumen 12/11/24 1030 20 G Right Antecubital 2 days                       Physical Exam  Vitals and nursing note reviewed.   Constitutional:       General: He is not in acute distress.     Appearance: He is well-developed. He is not diaphoretic.      Comments: On supplemental O2   HENT:      Head: Normocephalic and atraumatic.   Eyes:      General:         Right eye: No discharge.         Left eye: No discharge.      Pupils: Pupils are equal, round, and reactive to light.   Cardiovascular:      Rate and Rhythm: Normal rate. Rhythm irregularly irregular.      Heart sounds: Normal heart sounds, S1 normal and S2 normal. No murmur heard.  Pulmonary:      Effort:  "Pulmonary effort is normal.      Breath sounds: Rhonchi present.   Musculoskeletal:      Right lower leg: Edema present.      Left lower leg: Edema present.   Skin:     General: Skin is warm and dry.      Findings: No erythema.   Neurological:      Mental Status: He is alert and oriented to person, place, and time.   Psychiatric:         Mood and Affect: Mood normal.         Behavior: Behavior normal.            Significant Labs: CMP   Recent Labs   Lab 12/12/24  0410 12/13/24  0413    144   K 3.5 4.2    100   CO2 29 33*    109   BUN 29 35*   CREATININE 1.9* 2.2*   CALCIUM 8.3* 9.3   ANIONGAP 12 11   , CBC   Recent Labs   Lab 12/12/24  1221 12/13/24  0413   WBC 10.51 8.42   HGB 12.5* 12.3*   HCT 39.3* 38.9*    243   , Troponin No results for input(s): "TROPONINI" in the last 48 hours., and All pertinent lab results from the last 24 hours have been reviewed.    Significant Imaging: Echocardiogram: Transthoracic echo (TTE) complete (Cupid Only):   Results for orders placed or performed during the hospital encounter of 12/10/24   Echo   Result Value Ref Range    LV ESV A2C 85.30 mL    LA area A2C 24.76 cm2    Left Atrium Major Axis 5.90 cm    Left Atrium Minor Axis 6.54 cm    RA Major Axis 5.80 cm    LV Diastolic Volume 106.12 mL    LV Systolic Volume 40.02 mL    TR Max Regulo 3.39 m/s    PV mean gradient 2 mmHg    RVOT peak VTI 20.1 cm    RVOT peak regulo 1.04 m/s    Ao VTI 33.5 cm    Ao peak regulo 1.6 m/s    LVOT peak VTI 24.4 cm    LVOT peak regulo 1.1 m/s    LVOT diameter 2.0 cm    PV peak gradient 4 mmHg    AV mean gradient 5.1 mmHg    TAPSE 1.48 cm    RVDD 3.92 cm    LA size 3.36 cm    Ascending aorta 3.32 cm    STJ 3.33 cm    LVIDs 3.2 2.1 - 4.0 cm    Ao root annulus 3.72 cm    PW 1.1 0.6 - 1.1 cm    IVS 1.1 0.6 - 1.1 cm    LVIDd 4.8 3.5 - 6.0 cm    PV PEAK VELOCITY 1.03 m/s    Left Ventricular Outflow Tract Mean Gradient 2.56 mmHg    Left Ventricular Outflow Tract Mean Velocity 0.75 cm/s    Left " Ventricular End Systolic Volume by Teichholz Method 40.02 mL    Left Ventricular End Diastolic Volume by Teichholz Method 106.12 mL    IVC diameter 2.59 cm    FS 33.3 28 - 44 %    LV mass 194.0 g    ZLVIDD -1.30     ZLVIDS -0.39     Left Ventricle Relative Wall Thickness 0.46 cm    AV valve area 2.3 cm²    AV Velocity Ratio 0.69     AV index (prosthetic) 0.73     LVOT area 3.1 cm2    LVOT stroke volume 76.6 cm3    AV peak gradient 10.2 mmHg    LV Systolic Volume Index 20.7 mL/m2    LV Diastolic Volume Index 54.98 mL/m2    LV Mass Index 100.5 g/m2    Triscuspid Valve Regurgitation Peak Gradient 46 mmHg    NIURKA by Velocity Ratio 2.2 cm²    BSA 1.96 m2    RISHI 42.2 mL/m2    LA Vol 81.50 cm3    LA WIDTH 4.6 cm    RA Width 4.2 cm    TV resting pulmonary artery pressure 61 mmHg    RV TB RVSP 18 mmHg    Est. RA pres 15 mmHg    Narrative      Left Ventricle: The left ventricle is normal in size. Normal wall   thickness. There is concentric remodeling. There is normal systolic   function with a visually estimated ejection fraction of 55 - 60%. There is   indeterminate diastolic function.    Right Ventricle: Right ventricle was not well visualized due to poor   acoustic window. Systolic function is borderline low.    Left Atrium: Left atrium is moderately dilated.    Right Atrium: Right atrium is dilated.    Aortic Valve: There is mild aortic valve sclerosis. There is no   stenosis. Aortic valve peak velocity is 1.6 m/s. Mean gradient is 5.1   mmHg.    Mitral Valve: There is mild regurgitation.    Tricuspid Valve: There is moderate regurgitation.    Pulmonary Artery: The estimated pulmonary artery systolic pressure is   61 mmHg.    IVC/SVC: Elevated venous pressure at 15 mmHg.      and EKG: Reviewed  Assessment and Plan:   Patient who presents with SOB/PNA/bacteremia/aflutter. Stable CV wise. Diurese prn. On abx. Not candidate for AC given prior AVM's/high risk of re-bleeding.    * Bacteremia due to Staphylococcus  -On abx, ID  following    Atrial flutter  -Noted to be in rate-controlled aflutter  -Continue BB, CCB  -No AC given prior anemia issues/AVM's, high risk of recurrent bleeding mentioned in procedure note from 2017  -Discussed risks/benefits of AC with patient/family, await GI input    Pneumonia  -Per primary team, on abx    Chronic diastolic heart failure  -BNP normal  -Diurese prn  -Continue home CV meds  -TTE pending    12/13/24  -TTE with normal EF  -Given IV Lasix yesterday with good UOP  -Diurese prn    Coronary artery disease of native artery of native heart with stable angina pectoris  -Stable, CP free  -Continue OMT    CKD stage 3 secondary to diabetes  -Diuretics held given bumped creatinine    Hypercholesterolemia  -Statin    Atrial fibrillation  -See aflutter    Essential hypertension  -Continue home CV meds        VTE Risk Mitigation (From admission, onward)           Ordered     Place SALUD hose  Until discontinued         12/12/24 0921     Place SALUD hose  Until discontinued         12/11/24 0949     enoxaparin injection 30 mg  Daily         12/10/24 1922     IP VTE HIGH RISK PATIENT  Once         12/10/24 1922     Place sequential compression device  Until discontinued         12/10/24 1922                    Miranda Lopez PA-C  Cardiology  [facility] - Telemetry ([facility])

## 2024-12-13 NOTE — PHYSICIAN QUERY
Please clarify the conflicting documentation in regards to the ACUITY of the heart failure.    Chronic diastolic heart failure (HFpEF)

## 2024-12-13 NOTE — ASSESSMENT & PLAN NOTE
Patient with Paroxysmal (<7 days) atrial fibrillation which is controlled currently with Bbs and Calcium Channel Blocker. Patient is currently in sinus rhythm.UXOHC3LTAw Score: 4.   Discussed cardiology vs gastroenterology recommendations   Patient voices understanding  Will continue holding ac

## 2024-12-13 NOTE — ASSESSMENT & PLAN NOTE
Patient has a diagnosis of pneumonia. The cause of the pneumonia is unknown at this time. The pneumonia is stable. The patient has the following signs/symptoms of pneumonia: cough and shortness of breath. The patient does have a current oxygen requirement and the patient does have a home oxygen requirement. I have reviewed the pertinent imaging. The following cultures have been collected: Blood cultures The culture results are listed below.     Current antimicrobial regimen consists of the antibiotics listed below. Will monitor patient closely and continue current treatment plan unchanged.    Antibiotics (From admission, onward)      Start     Stop Route Frequency Ordered    12/11/24 0900  doxycycline tablet 100 mg         -- Oral Every 12 hours 12/10/24 2323    12/11/24 0900  cefTRIAXone injection 1 g         -- IV Every 24 hours (non-standard times) 12/10/24 2323            Microbiology Results (last 7 days)       Procedure Component Value Units Date/Time    Blood culture [[id number]] Collected: 12/12/24 0907    Order Status: Completed Specimen: Blood from Peripheral, Hand, Right Updated: 12/13/24 0315     Blood Culture, Routine No Growth to date    Blood culture [[id number]] Collected: 12/12/24 0912    Order Status: Completed Specimen: Blood from Peripheral, Hand, Left Updated: 12/13/24 0315     Blood Culture, Routine No Growth to date    Blood Culture #1 **CANNOT BE ORDERED STAT** [[id number]] Collected: 12/10/24 1053    Order Status: Completed Specimen: Blood from Peripheral, Antecubital, Right Updated: 12/12/24 1348     Blood Culture, Routine Gram stain aer bottle: Gram positive cocci      Positive results previously called 12/12/2024  13:46    Rapid Organism ID by PCR (from Blood culture) [[id number]]  (Abnormal) Collected: 12/10/24 1056    Order Status: Completed Updated: 12/12/24 0654     Enterococcus faecalis Not Detected     Enterococcus faecium Not Detected     Listeria monocytogenes Not Detected      Staphylococcus spp. Detected     Staphylococcus aureus Not Detected     Staphylococcus epidermidis Not Detected     Staphylococcus lugdunensis Not Detected     Streptococcus species Not Detected     Streptococcus agalactiae Not Detected     Streptococcus pneumoniae Not Detected     Streptococcus pyogenes Not Detected     Acinetobacter calcoaceticus/baumannii complex Not Detected     Bacteroides fragilis Not Detected     Enterobacterales Not Detected     Enterobacter cloacae complex Not Detected     Escherichia coli Not Detected     Klebsiella aerogenes Not Detected     Klebsiella oxytoca Not Detected     Klebsiella pneumoniae group Not Detected     Proteus Not Detected     Salmonella sp Not Detected     Serratia marcescens Not Detected     Haemophilus influenzae Not Detected     Neisseria meningtidis Not Detected     Pseudomonas aeruginosa Not Detected     Stenotrophomonas maltophilia Not Detected     Candida albicans Not Detected     Candida auris Not Detected     Candida glabrata Not Detected     Candida krusei Not Detected     Candida parapsilosis Not Detected     Candida tropicalis Not Detected     Cryptococcus neoformans/gattii Not Detected     CTX-M (ESBL ) Test Not Applicable     IMP (Carbapenem resistant) Test Not Applicable     KPC resistance gene (Carbapenem resistant) Test Not Applicable     mcr-1  Test Not Applicable     mec A/C  Test Not Applicable     mec A/C and MREJ (MRSA) gene Test Not Applicable     NDM (Carbapenem resistant) Test Not Applicable     OXA-48-like (Carbapenem resistant) Test Not Applicable     van A/B (VRE gene) Test Not Applicable     VIM (Carbapenem resistant) Test Not Applicable    Blood Culture #2 **CANNOT BE ORDERED STAT** [[id number]] Collected: 12/10/24 1056    Order Status: Completed Specimen: Blood from Peripheral, Forearm, Right Updated: 12/12/24 0518     Blood Culture, Routine Gram stain aer bottle: Gram positive cocci      Results called to and read back by: Chelsie  RN. 12/12/2024  05:15    Respiratory Infection Panel (PCR), Nasopharyngeal [[id number]]     Order Status: No result Specimen: Nasopharyngeal Swab     Culture, Respiratory with Gram Stain [[id number]]     Order Status: No result Specimen: Sputum, Expectorated           Blood culture positive  Repeat blood culture negative growth to date  Infectious disease consulted  Continue treatment with intravenous antibiotic(s), breathing treatments, and aerobika  Difficulty weaning

## 2024-12-13 NOTE — PT/OT/SLP PROGRESS
"Physical Therapy  Treatment    Jimmy Young   MRN: [id number]   Admitting Diagnosis: Bacteremia due to Staphylococcus    PT Received On: 12/13/24  PT Start Time: 0755     PT Stop Time: 0820    PT Total Time (min): 25 min       Billable Minutes:  Gait Training 15 and Therapeutic Exercise 10    Treatment Type: Treatment  PT/PTA: PT     Number of PTA visits since last PT visit: 0       General Precautions: Standard, fall  Orthopedic Precautions: N/A  Braces: N/A  Respiratory Status: Nasal cannula, flow 5 L/min         Subjective:  Communicated with EPIC CHART REVIEW  prior to session.   PT MET IN  AGREED TO TX    Pain/Comfort  Pain Rating 1: 0/10  Pain Rating Post-Intervention 1: 0/10    Objective:   Patient found with: telemetry, peripheral IV, oxygen    Functional Mobility:  PT MET IN  SUP IN BED. PT SUP>SIT EOB WITH SBA. PT SCOOTED IN BED AND GT. BELT AND  SOCKS DONNED PRIOR TO OOB MOBILITY.  PT STOOD WITH RW AND GT TRAINED X 300' WITH STEP THROUGH GT WITH O2 IN TOW AND SBA. PT RETURNED TO  T/F TO CHAIR WITH SBA FOR REST BREAK. PT EDUCATED ON PURSED LIP BREATHING.     Treatment and Education:  PT COMPLETED 15 REPS OF AP, TKE AND MIP FOR LE ROM, STRENGTHENING AND ENDURANCE TRAINING. PT LEFT SEATED IN CHAIR FOR OOB TOLERANCE. PT EDUCATED ON "CALL DON'T FALL", ENCOURAGED TO CALL FOR ASSISTANCE WITH ALL NEEDS FOR OOB MOBILITY.  P.T. CONTACTED PCT FOR BATH PER FAMILY REQUEST.      AM-PAC 6 CLICK MOBILITY  How much help from another person does this patient currently need?   1 = Unable, Total/Dependent Assistance  2 = A lot, Maximum/Moderate Assistance  3 = A little, Minimum/Contact Guard/Supervision  4 = None, Modified [place]/Independent    Turning over in bed (including adjusting bedclothes, sheets and blankets)?: 4  Sitting down on and standing up from a chair with arms (e.g., wheelchair, bedside commode, etc.): 4  Moving from lying on back to sitting on the side of the bed?: 4  Moving to and from a " bed to a chair (including a wheelchair)?: 4  Need to walk in hospital room?: 4  Climbing 3-5 steps with a railing?: 1  Basic Mobility Total Score: 21    AM-PAC Raw Score CMS G-Code Modifier Level of Impairment Assistance   6 % Total / Unable   7 - 9 CM 80 - 100% Maximal Assist   10 - 14 CL 60 - 80% Moderate Assist   15 - 19 CK 40 - 60% Moderate Assist   20 - 22 CJ 20 - 40% Minimal Assist   23 CI 1-20% SBA / CGA   24 CH 0% Independent/ Mod I     Patient left up in chair with call button in reach and chair alarm on.    Assessment:  PT PROGRESSING WITH GT AND GROSS FUNC MOBILITY     Rehab identified problem list/impairments: weakness, impaired cardiopulmonary response to activity, decreased lower extremity function, impaired balance, gait instability, impaired functional mobility, impaired endurance    Rehab potential is good.    Activity tolerance: Good    Discharge recommendations: Low Intensity Therapy      Barriers to discharge:      Equipment recommendations: none     GOALS:   Multidisciplinary Problems       Physical Therapy Goals          Problem: Physical Therapy    Goal Priority Disciplines Outcome Interventions   Physical Therapy Goal     PT, PT/OT Progressing    Description: LT24  1. PT WILL COMPLETE BED MOBILITY IND  2. PT WILL GT TRAIN X 500' WITH RW MOD I  3. PT WILL COMPLETE STANDING TE X 20 REPS TO INC STRENGTH / BALANCE  4. PT WILL INC AMPAC SCORE BY 2 POINTS TO PROGRESS GROSS FUNC MOBILITY.                          PLAN:    Patient to be seen 3 x/week to address the above listed problems via gait training, therapeutic exercises, therapeutic activities  Plan of Care expires: 24  Plan of Care reviewed with: patient, son         2024

## 2024-12-13 NOTE — ASSESSMENT & PLAN NOTE
Infectious disease consulted  Continue current management until final speciation   Repeat blood culture negative growth to date   Possible contaminant   Continue treatment for pneumonia

## 2024-12-13 NOTE — ASSESSMENT & PLAN NOTE
Malnutrition Type:  Context: acute illness or injury  Level: severe    Related to (etiology):   Physiological causes increasing nutrient needs d/t illness    Signs and Symptoms (as evidenced by):   Unintentional weight loss, adult, of >2% in 1 week  Inc loss of subcutaneous fat  Inc muscle loss  Pneumonia    Malnutrition Characteristic Summary:  Weight Loss (Malnutrition): greater than 2% in 1 week  Subcutaneous Fat (Malnutrition): moderate depletion  Muscle Mass (Malnutrition): moderate depletion  Fluid Accumulation (Malnutrition): moderate    Interventions/Recommendations (treatment strategy):  1. Decreased sodium, fat and fluid, IDDSI Soft and bite sized Level 6 (blue) texture modified diet  2. Commercial beverage medical food supplement therapy  3. Feeding assistance management  4. Collaboration by nutrition professional with other providers    Nutrition Diagnosis Status:   New

## 2024-12-13 NOTE — ASSESSMENT & PLAN NOTE
Also with superimposed cellulitis bilaterally. Photos put under media section. Cellulitis much improved today. Would benefit from lymphedema clinic referral outpatient. Will monitor on current antibiotics. May broaden at discharge and plan for minimum 10 day total course. If still present in clinic may offer Dalvance.

## 2024-12-13 NOTE — PLAN OF CARE
Nutrition Recommendations/Interventions for malnutrition 12/13/24:   1. Recommend modify pt's diet to Cardiac, Soft and bite-sized (IDDSI Level 6), 1500 mL fluid restriction diet   2. Recommend pt continues Suplena once/day per pt preference   3. Recommend feeding assistance as warrented   4. Weigh twice weekly  5. Collaboration by nutrition professional with other providers     Goals:   1. Pt will continue to tolerate and consume >75% EEN and EPN prior to RD follow up    REBEKAH Zavala, RDN, LDN

## 2024-12-13 NOTE — ASSESSMENT & PLAN NOTE
-BNP normal  -Diurese prn  -Continue home CV meds  -TTE pending    12/13/24  -TTE with normal EF  -Given IV Lasix yesterday with good UOP  -Diurese prn

## 2024-12-13 NOTE — PT/OT/SLP PROGRESS
Occupational Therapy  Treatment    Jimmy Young   MRN: [id number]   Admitting Diagnosis: Bacteremia due to Staphylococcus    OT Date of Treatment: 12/13/24   OT Start Time: 1545  OT Stop Time: 1610  OT Total Time (min): 25 min    Billable Minutes:  Therapeutic Activity 15 minutes and Therapeutic Exercise 10 minutes    OT/MAGALY: OT     Number of MAGALY visits since last OT visit: 0    General Precautions: Standard, fall  Orthopedic Precautions: N/A  Braces: N/A  Respiratory Status: Nasal cannula, flow 5 L/min         Subjective:  Communicated with epic chart review prior to session. Pt reported not needing o2 to ambulate    Pain/Comfort  Pain Rating 1: 0/10    Objective:  Patient found with: telemetry, peripheral IV, oxygen     Functional Mobility:  Bed Mobility:  Sba with rolling l<r   Sba with seat forward scoot      Transfers:   Sba with sit<>stand transfer with rolling walker    Functional Ambulation: pt ambulated 25 feet with rolling walker and cga    Activities of Daily Living:     LE dressing max a with sav[place] socks plof          Balance:   Static Sit: GOOD-: Takes MODERATE challenges from all directions but inconsistently supported  Dynamic Sit: GOOD-: Incosistently Maintains balance through MODERATE excursions of active trunk movement,    supported in bedside chair  Static Stand: FAIR+: Takes MINIMAL challenges from all directions  Dynamic stand: FAIR: Needs CONTACT GUARD during gait    Therapeutic Activities and Exercises:  Educated patient on importance of increased tolerance to upright position and direct impact on CV endurance and strength. Patient encouraged to sit up in chair with staff assistance, including for all meals..pt educated on hep. Pt performed 1 set x 15 reps b ue rom exercise supine in bed with hob elevated (shoulder flexion, chest press, elbow flex/ext and hand/ digits flex/ext) Encouraged patient to perform AROM TE to BUE throughout the day within all available planes of motion. Re enforced  "importance of utilizing call light to meet needs in room and not attempt to get up without staff assistance. Patient verbalized understanding and agreed to comply.           AM-PAC 6 CLICK ADL   How much help from another person does this patient currently need?   1 = Unable, Total/Dependent Assistance  2 = A lot, Maximum/Moderate Assistance  3 = A little, Minimum/Contact Guard/Supervision  4 = None, Modified [place]/Independent    Putting on and taking off regular lower body clothing? : 3  Bathing (including washing, rinsing, drying)?: 3  Toileting, which includes using toilet, bedpan, or urinal? : 3  Putting on and taking off regular upper body clothing?: 3  Taking care of personal grooming such as brushing teeth?: 4  Eating meals?: 4  Daily Activity Total Score: 20     AM-PAC Raw Score CMS "G-Code Modifier Level of Impairment Assistance   6 % Total / Unable   7 - 8 CM 80 - 100% Maximal Assist   9-13 CL 60 - 80% Moderate Assist   14 - 19 CK 40 - 60% Moderate Assist   20 - 22 CJ 20 - 40% Minimal Assist   23 CI 1-20% SBA / CGA   24 CH 0% Independent/ Mod I       Patient left up in chair with all lines intact, call button in reach, chair alarm on, and nurse notified    ASSESSMENT:  Jimmy Young is a 89 y.o. male with a medical diagnosis of Bacteremia due to Staphylococcus and presents with debility and generalized weakness.    Rehab identified problem list/impairments:  weakness, impaired balance, decreased safety awareness, impaired endurance, impaired self care skills, impaired functional mobility, decreased upper extremity function, gait instability, decreased lower extremity function    Rehab potential is good.    Activity tolerance: Good    Discharge recommendations: Low Intensity Therapy   Barriers to discharge:      Equipment recommendations: none    GOALS:   Multidisciplinary Problems       Occupational Therapy Goals          Problem: Occupational Therapy    Goal Priority Disciplines " Outcome Interventions   Occupational Therapy Goal     OT, PT/OT Progressing    Description: Goals to be met by: 12/25/24     Patient will increase functional independence with ADLs by performing:    Toileting from toilet with Contact Guard Assistance for hygiene and clothing management.   Toilet transfer to bedside commode with Contact Guard Assistance.  Upper extremity exercise program x10 reps per handout, with supervision.                         Plan:  Patient to be seen 2 x/week to address the above listed problems via self-care/home management, wheelchair management/training, therapeutic activities, therapeutic exercises  Plan of Care expires: 12/13/24  Plan of Care reviewed with: patient         12/13/2024

## 2024-12-14 PROBLEM — J96.01 ACUTE HYPOXIC RESPIRATORY FAILURE: Status: ACTIVE | Noted: 2024-12-14

## 2024-12-14 PROBLEM — J96.21 ACUTE ON CHRONIC RESPIRATORY FAILURE WITH HYPOXIA: Status: ACTIVE | Noted: 2024-12-14

## 2024-12-14 LAB
ADENOVIRUS: NOT DETECTED
ANION GAP SERPL CALC-SCNC: 10 MMOL/L (ref 8–16)
BASOPHILS # BLD AUTO: 0.03 K/UL (ref 0–0.2)
BASOPHILS NFR BLD: 0.4 % (ref 0–1.9)
BORDETELLA PARAPERTUSSIS (IS1001): NOT DETECTED
BORDETELLA PERTUSSIS (PTXP): NOT DETECTED
BUN SERPL-MCNC: 38 MG/DL (ref 10–30)
CALCIUM SERPL-MCNC: 8.8 MG/DL (ref 8.7–10.5)
CHLAMYDIA PNEUMONIAE: NOT DETECTED
CHLORIDE SERPL-SCNC: 100 MMOL/L (ref 95–110)
CO2 SERPL-SCNC: 33 MMOL/L (ref 23–29)
CORONAVIRUS 229E, COMMON COLD VIRUS: NOT DETECTED
CORONAVIRUS HKU1, COMMON COLD VIRUS: NOT DETECTED
CORONAVIRUS NL63, COMMON COLD VIRUS: NOT DETECTED
CORONAVIRUS OC43, COMMON COLD VIRUS: NOT DETECTED
CREAT SERPL-MCNC: 2.1 MG/DL (ref 0.5–1.4)
DIFFERENTIAL METHOD BLD: ABNORMAL
EOSINOPHIL # BLD AUTO: 0.4 K/UL (ref 0–0.5)
EOSINOPHIL NFR BLD: 5.8 % (ref 0–8)
ERYTHROCYTE [DISTWIDTH] IN BLOOD BY AUTOMATED COUNT: 13.2 % (ref 11.5–14.5)
EST. GFR  (NO RACE VARIABLE): 29 ML/MIN/1.73 M^2
FLUBV RNA NPH QL NAA+NON-PROBE: NOT DETECTED
GLUCOSE SERPL-MCNC: 109 MG/DL (ref 70–110)
HCT VFR BLD AUTO: 36.2 % (ref 40–54)
HGB BLD-MCNC: 11.7 G/DL (ref 14–18)
HPIV1 RNA NPH QL NAA+NON-PROBE: NOT DETECTED
HPIV2 RNA NPH QL NAA+NON-PROBE: NOT DETECTED
HPIV3 RNA NPH QL NAA+NON-PROBE: NOT DETECTED
HPIV4 RNA NPH QL NAA+NON-PROBE: NOT DETECTED
HUMAN METAPNEUMOVIRUS: NOT DETECTED
IMM GRANULOCYTES # BLD AUTO: 0.04 K/UL (ref 0–0.04)
IMM GRANULOCYTES NFR BLD AUTO: 0.6 % (ref 0–0.5)
INFLUENZA A (SUBTYPES H1,H1-2009,H3): NOT DETECTED
LYMPHOCYTES # BLD AUTO: 1.4 K/UL (ref 1–4.8)
LYMPHOCYTES NFR BLD: 20.5 % (ref 18–48)
MCH RBC QN AUTO: 30.1 PG (ref 27–31)
MCHC RBC AUTO-ENTMCNC: 32.3 G/DL (ref 32–36)
MCV RBC AUTO: 93 FL (ref 82–98)
MONOCYTES # BLD AUTO: 0.6 K/UL (ref 0.3–1)
MONOCYTES NFR BLD: 9.4 % (ref 4–15)
MYCOPLASMA PNEUMONIAE: NOT DETECTED
NEUTROPHILS # BLD AUTO: 4.2 K/UL (ref 1.8–7.7)
NEUTROPHILS NFR BLD: 63.3 % (ref 38–73)
NRBC BLD-RTO: 0 /100 WBC
PLATELET # BLD AUTO: 229 K/UL (ref 150–450)
PMV BLD AUTO: 10 FL (ref 9.2–12.9)
POTASSIUM SERPL-SCNC: 4.1 MMOL/L (ref 3.5–5.1)
RBC # BLD AUTO: 3.89 M/UL (ref 4.6–6.2)
RESPIRATORY INFECTION PANEL SOURCE: NORMAL
RSV RNA NPH QL NAA+NON-PROBE: NOT DETECTED
RV+EV RNA NPH QL NAA+NON-PROBE: NOT DETECTED
SARS-COV-2 RNA RESP QL NAA+PROBE: NOT DETECTED
SODIUM SERPL-SCNC: 143 MMOL/L (ref 136–145)
WBC # BLD AUTO: 6.69 K/UL (ref 3.9–12.7)

## 2024-12-14 PROCEDURE — 27000249 HC VAPOTHERM CIRCUIT

## 2024-12-14 PROCEDURE — 99900035 HC TECH TIME PER 15 MIN (STAT)

## 2024-12-14 PROCEDURE — 25000003 PHARM REV CODE 250: Performed by: NURSE PRACTITIONER

## 2024-12-14 PROCEDURE — 36415 COLL VENOUS BLD VENIPUNCTURE: CPT | Performed by: NURSE PRACTITIONER

## 2024-12-14 PROCEDURE — 94799 UNLISTED PULMONARY SVC/PX: CPT

## 2024-12-14 PROCEDURE — 94761 N-INVAS EAR/PLS OXIMETRY MLT: CPT

## 2024-12-14 PROCEDURE — 25000242 PHARM REV CODE 250 ALT 637 W/ HCPCS: Performed by: FAMILY MEDICINE

## 2024-12-14 PROCEDURE — 87070 CULTURE OTHR SPECIMN AEROBIC: CPT | Performed by: FAMILY MEDICINE

## 2024-12-14 PROCEDURE — 87205 SMEAR GRAM STAIN: CPT | Performed by: FAMILY MEDICINE

## 2024-12-14 PROCEDURE — 63600175 PHARM REV CODE 636 W HCPCS: Performed by: NURSE PRACTITIONER

## 2024-12-14 PROCEDURE — 99233 SBSQ HOSP IP/OBS HIGH 50: CPT | Mod: 95,,, | Performed by: STUDENT IN AN ORGANIZED HEALTH CARE EDUCATION/TRAINING PROGRAM

## 2024-12-14 PROCEDURE — 94664 DEMO&/EVAL PT USE INHALER: CPT

## 2024-12-14 PROCEDURE — 27100171 HC OXYGEN HIGH FLOW UP TO 24 HOURS

## 2024-12-14 PROCEDURE — 85025 COMPLETE CBC W/AUTO DIFF WBC: CPT | Performed by: INTERNAL MEDICINE

## 2024-12-14 PROCEDURE — 63600175 PHARM REV CODE 636 W HCPCS: Performed by: INTERNAL MEDICINE

## 2024-12-14 PROCEDURE — 63600175 PHARM REV CODE 636 W HCPCS: Performed by: FAMILY MEDICINE

## 2024-12-14 PROCEDURE — 99233 SBSQ HOSP IP/OBS HIGH 50: CPT | Mod: ,,, | Performed by: INTERNAL MEDICINE

## 2024-12-14 PROCEDURE — 87633 RESP VIRUS 12-25 TARGETS: CPT | Performed by: FAMILY MEDICINE

## 2024-12-14 PROCEDURE — 94640 AIRWAY INHALATION TREATMENT: CPT

## 2024-12-14 PROCEDURE — 80048 BASIC METABOLIC PNL TOTAL CA: CPT | Performed by: NURSE PRACTITIONER

## 2024-12-14 PROCEDURE — 87106 FUNGI IDENTIFICATION YEAST: CPT | Performed by: FAMILY MEDICINE

## 2024-12-14 PROCEDURE — 87186 SC STD MICRODIL/AGAR DIL: CPT | Performed by: FAMILY MEDICINE

## 2024-12-14 PROCEDURE — 25000003 PHARM REV CODE 250: Performed by: INTERNAL MEDICINE

## 2024-12-14 PROCEDURE — 87077 CULTURE AEROBIC IDENTIFY: CPT | Performed by: FAMILY MEDICINE

## 2024-12-14 PROCEDURE — 25000003 PHARM REV CODE 250: Performed by: FAMILY MEDICINE

## 2024-12-14 PROCEDURE — 21400001 HC TELEMETRY ROOM

## 2024-12-14 RX ORDER — BUMETANIDE 0.25 MG/ML
3 INJECTION, SOLUTION INTRAMUSCULAR; INTRAVENOUS 2 TIMES DAILY
Status: DISCONTINUED | OUTPATIENT
Start: 2024-12-14 | End: 2024-12-18

## 2024-12-14 RX ORDER — CEFTRIAXONE 1 G/1
1 INJECTION, POWDER, FOR SOLUTION INTRAMUSCULAR; INTRAVENOUS ONCE
Status: COMPLETED | OUTPATIENT
Start: 2024-12-14 | End: 2024-12-14

## 2024-12-14 RX ORDER — CEFTRIAXONE 2 G/1
2 INJECTION, POWDER, FOR SOLUTION INTRAMUSCULAR; INTRAVENOUS
Status: DISCONTINUED | OUTPATIENT
Start: 2024-12-15 | End: 2024-12-26 | Stop reason: HOSPADM

## 2024-12-14 RX ORDER — LINEZOLID 2 MG/ML
600 INJECTION, SOLUTION INTRAVENOUS
Status: DISCONTINUED | OUTPATIENT
Start: 2024-12-14 | End: 2024-12-26 | Stop reason: HOSPADM

## 2024-12-14 RX ADMIN — LEVOTHYROXINE SODIUM 50 MCG: 50 TABLET ORAL at 05:12

## 2024-12-14 RX ADMIN — ENOXAPARIN SODIUM 30 MG: 30 INJECTION SUBCUTANEOUS at 05:12

## 2024-12-14 RX ADMIN — DEXTROSE MONOHYDRATE 250 MG: 50 INJECTION, SOLUTION INTRAVENOUS at 02:12

## 2024-12-14 RX ADMIN — METRONIDAZOLE: 10 GEL TOPICAL at 11:12

## 2024-12-14 RX ADMIN — IPRATROPIUM BROMIDE AND ALBUTEROL SULFATE 3 ML: 2.5; .5 SOLUTION RESPIRATORY (INHALATION) at 08:12

## 2024-12-14 RX ADMIN — METHYLPREDNISOLONE SODIUM SUCCINATE 40 MG: 40 INJECTION, POWDER, FOR SOLUTION INTRAMUSCULAR; INTRAVENOUS at 05:12

## 2024-12-14 RX ADMIN — MAGNESIUM OXIDE TAB 400 MG (241.3 MG ELEMENTAL MG) 400 MG: 400 (241.3 MG) TAB at 08:12

## 2024-12-14 RX ADMIN — DILTIAZEM HYDROCHLORIDE 300 MG: 180 CAPSULE, COATED, EXTENDED RELEASE ORAL at 06:12

## 2024-12-14 RX ADMIN — METRONIDAZOLE: 10 GEL TOPICAL at 08:12

## 2024-12-14 RX ADMIN — PRAVASTATIN SODIUM 40 MG: 20 TABLET ORAL at 08:12

## 2024-12-14 RX ADMIN — DOXYCYCLINE HYCLATE 100 MG: 100 TABLET, COATED ORAL at 08:12

## 2024-12-14 RX ADMIN — CEFTRIAXONE 1 G: 1 INJECTION, POWDER, FOR SOLUTION INTRAMUSCULAR; INTRAVENOUS at 09:12

## 2024-12-14 RX ADMIN — METHYLPREDNISOLONE SODIUM SUCCINATE 40 MG: 40 INJECTION, POWDER, FOR SOLUTION INTRAMUSCULAR; INTRAVENOUS at 11:12

## 2024-12-14 RX ADMIN — PANTOPRAZOLE SODIUM 40 MG: 40 TABLET, DELAYED RELEASE ORAL at 05:12

## 2024-12-14 RX ADMIN — LINEZOLID 600 MG: 600 INJECTION, SOLUTION INTRAVENOUS at 11:12

## 2024-12-14 RX ADMIN — MICONAZOLE NITRATE: 20 CREAM TOPICAL at 05:12

## 2024-12-14 RX ADMIN — HYDRALAZINE HYDROCHLORIDE 10 MG: 10 TABLET, FILM COATED ORAL at 08:12

## 2024-12-14 RX ADMIN — IPRATROPIUM BROMIDE AND ALBUTEROL SULFATE 3 ML: 2.5; .5 SOLUTION RESPIRATORY (INHALATION) at 01:12

## 2024-12-14 RX ADMIN — BUMETANIDE 3 MG: 0.25 INJECTION INTRAMUSCULAR; INTRAVENOUS at 05:12

## 2024-12-14 RX ADMIN — MICONAZOLE NITRATE: 20 CREAM TOPICAL at 11:12

## 2024-12-14 RX ADMIN — CEFTRIAXONE 1 G: 1 INJECTION, POWDER, FOR SOLUTION INTRAMUSCULAR; INTRAVENOUS at 11:12

## 2024-12-14 NOTE — ASSESSMENT & PLAN NOTE
Patient currently on 40 L 100% FiO2 O2 Vapotherm   Will consult pulmonology on case   Ted-Medrol added   Continue Shannon

## 2024-12-14 NOTE — ASSESSMENT & PLAN NOTE
--Initial CXR reports lateral left upper lobe opacity, most likely early pneumonia.   --CXR 12/14 reports small pleural effusions but no new consolidation   --On Vapotherm (home baseline is 2 L)  --Will broaden to PO linezolid 600 mg BID and continue ceftriaxone at 2 g daily   --Require drug toxicity monitoring   --See edema assessment for more details on antibiotics   --Follow respiratory culture to see if MDR organism isolated   --Fluid and COPD management per primary   --Agree with pulmonology consultation and CT chest   --Above d/w primary team.

## 2024-12-14 NOTE — NURSING
Pt unable to maintain O2 above 88%. RT notified, scheduled neb admin, O2 titrated to 12L High Flow. Continuing to monitor.

## 2024-12-14 NOTE — ASSESSMENT & PLAN NOTE
CXR compared with previous CXR not impressive for pneumonia.  Patient bacteremic- Abx per ID    Antibiotics (From admission, onward)      Start     Stop Route Frequency Ordered    12/15/24 0900  cefTRIAXone injection 2 g         -- IV Every 24 hours (non-standard times) 12/14/24 1118    12/14/24 1230  linezolid 600 mg/300 mL IVPB 600 mg         -- IV Every 12 hours (non-standard times) 12/14/24 1118            Microbiology Results (last 7 days)       Procedure Component Value Units Date/Time    Respiratory Infection Panel (PCR), Nasopharyngeal [[id number]] Collected: 12/14/24 0802    Order Status: Completed Specimen: Nasopharyngeal Swab Updated: 12/14/24 1208     Respiratory Infection Panel Source NP swab     Adenovirus Not Detected     Coronavirus 229E, Common Cold Virus Not Detected     Coronavirus HKU1, Common Cold Virus Not Detected     Coronavirus NL63, Common Cold Virus Not Detected     Coronavirus OC43, Common Cold Virus Not Detected     Comment: The Coronavirus strains detected in this test cause the common cold.  These strains are not the COVID-19 (novel Coronavirus)strain   associated with the respiratory disease outbreak.          SARS-CoV2 (COVID-19) Qualitative PCR Not Detected     Human Metapneumovirus Not Detected     Human Rhinovirus/Enterovirus Not Detected     Influenza A (subtypes H1, H1-2009,H3) Not Detected     Influenza B Not Detected     Parainfluenza Virus 1 Not Detected     Parainfluenza Virus 2 Not Detected     Parainfluenza Virus 3 Not Detected     Parainfluenza Virus 4 Not Detected     Respiratory Syncytial Virus Not Detected     Bordetella Parapertussis (GB1656) Not Detected     Bordetella pertussis (ptxP) Not Detected     Chlamydia pneumoniae Not Detected     Mycoplasma pneumoniae Not Detected     Comment: Respiratory Infection Panel testing performed by Multiplex PCR.       Narrative:      Assay not valid for lower respiratory specimens, alternate  testing required.    Blood Culture #1  **CANNOT BE ORDERED STAT** [[id number]]  (Abnormal) Collected: 12/10/24 1053    Order Status: Completed Specimen: Blood from Peripheral, Antecubital, Right Updated: 12/14/24 1101     Blood Culture, Routine Gram stain aer bottle: Gram positive cocci      Positive results previously called 12/12/2024  13:46      STAPHYLOCOCCUS PETTENKOFERI  Susceptibility pending      Blood Culture #2 **CANNOT BE ORDERED STAT** [[id number]]  (Abnormal) Collected: 12/10/24 1056    Order Status: Completed Specimen: Blood from Peripheral, Forearm, Right Updated: 12/14/24 1101     Blood Culture, Routine Gram stain aer bottle: Gram positive cocci      Results called to and read back by: GEETA Holguin. 12/12/2024  05:15      STAPHYLOCOCCUS PETTENKOFERI  Susceptibility pending      Blood culture [[id number]] Collected: 12/12/24 0912    Order Status: Completed Specimen: Blood from Peripheral, Hand, Left Updated: 12/13/24 2012     Blood Culture, Routine No Growth to date      No Growth to date    Blood culture [[id number]] Collected: 12/12/24 0907    Order Status: Completed Specimen: Blood from Peripheral, Hand, Right Updated: 12/13/24 2012     Blood Culture, Routine No Growth to date      No Growth to date    Rapid Organism ID by PCR (from Blood culture) [[id number]]  (Abnormal) Collected: 12/10/24 1056    Order Status: Completed Updated: 12/12/24 0654     Enterococcus faecalis Not Detected     Enterococcus faecium Not Detected     Listeria monocytogenes Not Detected     Staphylococcus spp. Detected     Staphylococcus aureus Not Detected     Staphylococcus epidermidis Not Detected     Staphylococcus lugdunensis Not Detected     Streptococcus species Not Detected     Streptococcus agalactiae Not Detected     Streptococcus pneumoniae Not Detected     Streptococcus pyogenes Not Detected     Acinetobacter calcoaceticus/baumannii complex Not Detected     Bacteroides fragilis Not Detected     Enterobacterales Not Detected     Enterobacter cloacae  complex Not Detected     Escherichia coli Not Detected     Klebsiella aerogenes Not Detected     Klebsiella oxytoca Not Detected     Klebsiella pneumoniae group Not Detected     Proteus Not Detected     Salmonella sp Not Detected     Serratia marcescens Not Detected     Haemophilus influenzae Not Detected     Neisseria meningtidis Not Detected     Pseudomonas aeruginosa Not Detected     Stenotrophomonas maltophilia Not Detected     Candida albicans Not Detected     Candida auris Not Detected     Candida glabrata Not Detected     Candida krusei Not Detected     Candida parapsilosis Not Detected     Candida tropicalis Not Detected     Cryptococcus neoformans/gattii Not Detected     CTX-M (ESBL ) Test Not Applicable     IMP (Carbapenem resistant) Test Not Applicable     KPC resistance gene (Carbapenem resistant) Test Not Applicable     mcr-1  Test Not Applicable     mec A/C  Test Not Applicable     mec A/C and MREJ (MRSA) gene Test Not Applicable     NDM (Carbapenem resistant) Test Not Applicable     OXA-48-like (Carbapenem resistant) Test Not Applicable     van A/B (VRE gene) Test Not Applicable     VIM (Carbapenem resistant) Test Not Applicable    Culture, Respiratory with Gram Stain [[id number]]     Order Status: No result Specimen: Sputum, Expectorated

## 2024-12-14 NOTE — PROGRESS NOTES
[facility] - Telemetry ([facility])  Cardiology  Progress Note     Patient Name: Jimmy Young  MRN: [id number]  Admission Date: 12/10/2024  Hospital Length of Stay: 3 days  Code Status: Partial Code   Attending Physician: Juliana Goodwin MD   Primary Care Physician: Nacho Richardson MD  Expected Discharge Date:   Principal Problem:Bacteremia due to Staphylococcus     Subjective:   HPI:  Mr. Young is a 89 year old male patient whose current medical conditions include COPD, AAA s/p repair, arthritis, asthma, CHF (EF recovered), PAF (not on AC due to prior bleeding issues/AVM's), and HTN who was referred to [facility] ED yesterday by his PCP due to worsening LE edema associated with SOB and cough. Patient denied any associated fever, chills, palpitations, near syncope, or syncope. Initial workup in ED revealed hypokalemia (K 3.1), hypomagnesemia (Mg 1.2), creatinine of 2.1, and negative BNP. EKG showed rate-controlled aflutter. CXR showed findings concerning for JENIFFER PNA and patient was subsequently transferred to [facility] for admission and further treatment. Cardiology consulted to assist with management. Patient seen and examined today, sitting up in bedside chair. Feels ok. Still SOB but improved. No CP symptoms. Does admit to salty food intake, still has some BLE edema. He reports compliance with his medications. Previously seen in clinic by Dr. Fair. Labs reviewed. Troponin negative. TTE pending. Prior cath 12/15 at [facility] showed non-obs CAD.       Hospital Course:   12/12/24 Pt seen and examined today sitting up on bedside chair, feels ok still with LE edema on exam on 5L nc, on 2-3L at home. Denies any CP. Labs  reviewed chart reviewed     12/13/24-Patient seen and examined today, lying in bed. Feels ok. SOB at baseline. No other CV complaints. Still on 5L NC. Given IV Lasix yesterday, d/c'd today due to bumped creatinine. ID on board, remains on abx.     12/14/24-Pt seen and examined today, resting in bed.  Feels okay. Remains in atrial flutter. Will give diamox iv 1x - adding bumex 3 mg IV BID     Review of Systems   Constitutional: Positive for malaise/fatigue.   HENT: Negative.     Eyes: Negative.    Cardiovascular:  Positive for dyspnea on exertion and leg swelling.   Respiratory:  Positive for shortness of breath.    Endocrine: Negative.    Hematologic/Lymphatic: Negative.    Skin: Negative.    Musculoskeletal: Negative.    Gastrointestinal: Negative.    Genitourinary: Negative.    Neurological: Negative.    Psychiatric/Behavioral: Negative.     Allergic/Immunologic: Negative.       Objective:      Vital Signs (Most Recent):  Temp: 98 °F (36.7 °C) (12/13/24 1225)  Pulse: 71 (12/13/24 1326)  Resp: 19 (12/13/24 1326)  BP: 135/62 (12/13/24 1225)  SpO2: (!) 90 % (12/13/24 1326) Vital Signs (24h Range):  Temp:  [97.4 °F (36.3 °C)-98 °F (36.7 °C)] 98 °F (36.7 °C)  Pulse:  [63-89] 71  Resp:  [15-20] 19  SpO2:  [87 %-93 %] 90 %  BP: (135-203)/(62-89) 135/62      Weight: 78.5 kg (173 lb 1 oz)  Body mass index is 27.11 kg/m².     SpO2: (!) 90 %           Intake/Output Summary (Last 24 hours) at 12/13/2024 1413  Last data filed at 12/13/2024 0629      Gross per 24 hour   Intake --   Output 2580 ml   Net -2580 ml         Lines/Drains/Airways         Peripheral Intravenous Line  Duration                     Peripheral IV - Single Lumen 12/11/24 0845 20 G Anterior;Distal;Left Upper Arm 2 days          Peripheral IV - Single Lumen 12/11/24 1030 20 G Right Antecubital 2 days                             Physical Exam  Vitals and nursing note reviewed.   Constitutional:       General: He is not in acute distress.     Appearance: He is well-developed. He is not diaphoretic.      Comments: On supplemental O2   HENT:      Head: Normocephalic and atraumatic.   Eyes:      General:         Right eye: No discharge.         Left eye: No discharge.      Pupils: Pupils are equal, round, and reactive to light.   Cardiovascular:      Rate and  "Rhythm: Normal rate. Rhythm irregularly irregular.      Heart sounds: Normal heart sounds, S1 normal and S2 normal. No murmur heard.  Pulmonary:      Effort: Pulmonary effort is normal.      Breath sounds: Rhonchi present.   Musculoskeletal:      Right lower leg: Edema present.      Left lower leg: Edema present.   Skin:     General: Skin is warm and dry.      Findings: No erythema.   Neurological:      Mental Status: He is alert and oriented to person, place, and time.   Psychiatric:         Mood and Affect: Mood normal.         Behavior: Behavior normal.               Significant Labs: CMP        Recent Labs   Lab 12/12/24  0410 12/13/24  0413    144   K 3.5 4.2    100   CO2 29 33*    109   BUN 29 35*   CREATININE 1.9* 2.2*   CALCIUM 8.3* 9.3   ANIONGAP 12 11   , CBC        Recent Labs   Lab 12/12/24  1221 12/13/24  0413   WBC 10.51 8.42   HGB 12.5* 12.3*   HCT 39.3* 38.9*    243   , Troponin No results for input(s): "TROPONINI" in the last 48 hours., and All pertinent lab results from the last 24 hours have been reviewed.     Significant Imaging: Echocardiogram: Transthoracic echo (TTE) complete (Cupid Only):         Results for orders placed or performed during the hospital encounter of 12/10/24   Echo   Result Value Ref Range     LV ESV A2C 85.30 mL     LA area A2C 24.76 cm2     Left Atrium Major Axis 5.90 cm     Left Atrium Minor Axis 6.54 cm     RA Major Axis 5.80 cm     LV Diastolic Volume 106.12 mL     LV Systolic Volume 40.02 mL     TR Max Regulo 3.39 m/s     PV mean gradient 2 mmHg     RVOT peak VTI 20.1 cm     RVOT peak regulo 1.04 m/s     Ao VTI 33.5 cm     Ao peak regulo 1.6 m/s     LVOT peak VTI 24.4 cm     LVOT peak regulo 1.1 m/s     LVOT diameter 2.0 cm     PV peak gradient 4 mmHg     AV mean gradient 5.1 mmHg     TAPSE 1.48 cm     RVDD 3.92 cm     LA size 3.36 cm     Ascending aorta 3.32 cm     STJ 3.33 cm     LVIDs 3.2 2.1 - 4.0 cm     Ao root annulus 3.72 cm     PW 1.1 0.6 - 1.1 cm "     IVS 1.1 0.6 - 1.1 cm     LVIDd 4.8 3.5 - 6.0 cm     PV PEAK VELOCITY 1.03 m/s     Left Ventricular Outflow Tract Mean Gradient 2.56 mmHg     Left Ventricular Outflow Tract Mean Velocity 0.75 cm/s     Left Ventricular End Systolic Volume by Teichholz Method 40.02 mL     Left Ventricular End Diastolic Volume by Teichholz Method 106.12 mL     IVC diameter 2.59 cm     FS 33.3 28 - 44 %     LV mass 194.0 g     ZLVIDD -1.30       ZLVIDS -0.39       Left Ventricle Relative Wall Thickness 0.46 cm     AV valve area 2.3 cm²     AV Velocity Ratio 0.69       AV index (prosthetic) 0.73       LVOT area 3.1 cm2     LVOT stroke volume 76.6 cm3     AV peak gradient 10.2 mmHg     LV Systolic Volume Index 20.7 mL/m2     LV Diastolic Volume Index 54.98 mL/m2     LV Mass Index 100.5 g/m2     Triscuspid Valve Regurgitation Peak Gradient 46 mmHg     NIURKA by Velocity Ratio 2.2 cm²     BSA 1.96 m2     RISHI 42.2 mL/m2     LA Vol 81.50 cm3     LA WIDTH 4.6 cm     RA Width 4.2 cm     TV resting pulmonary artery pressure 61 mmHg     RV TB RVSP 18 mmHg     Est. RA pres 15 mmHg     Narrative       Left Ventricle: The left ventricle is normal in size. Normal wall   thickness. There is concentric remodeling. There is normal systolic   function with a visually estimated ejection fraction of 55 - 60%. There is   indeterminate diastolic function.    Right Ventricle: Right ventricle was not well visualized due to poor   acoustic window. Systolic function is borderline low.    Left Atrium: Left atrium is moderately dilated.    Right Atrium: Right atrium is dilated.    Aortic Valve: There is mild aortic valve sclerosis. There is no   stenosis. Aortic valve peak velocity is 1.6 m/s. Mean gradient is 5.1   mmHg.    Mitral Valve: There is mild regurgitation.    Tricuspid Valve: There is moderate regurgitation.    Pulmonary Artery: The estimated pulmonary artery systolic pressure is   61 mmHg.    IVC/SVC: Elevated venous pressure at 15 mmHg.       and  EKG: Reviewed  Assessment and Plan:      * Bacteremia due to Staphylococcus  -On abx, ID following     Atrial flutter  -Noted to be in rate-controlled aflutter  -Continue BB, CCB  -No AC given prior anemia issues/AVM's, high risk of recurrent bleeding mentioned in procedure note from 2017  -Discussed risks/benefits of AC with patient/family, await GI input     12/14/24  -Remains in atrial flutter    Pneumonia  -Per primary team, on abx     12/14/24  -Remains on high flow overnight    Chronic diastolic heart failure  -BNP normal  -Diurese prn  -Continue home CV meds  -TTE pending     12/13/24  -TTE with normal EF  -Given IV Lasix yesterday with good UOP  -Diurese prn       Coronary artery disease of native artery of native heart with stable angina pectoris  -Stable, CP free  -Continue OMT     CKD stage 3 secondary to diabetes  -Diuretics held given bumped creatinine     12/14/24  -Will restart diuretics if renal function improves  -Will give diamox iv x 1 and start Bumex    Hypercholesterolemia  -Statin     Atrial fibrillation  -See aflutter     Essential hypertension  -Continue home CV meds           VTE Risk Mitigation (From admission, onward)              Ordered       Place SALUD hose  Until discontinued         12/12/24 0921       Place SALUD hose  Until discontinued         12/11/24 0949       enoxaparin injection 30 mg  Daily         12/10/24 1922       IP VTE HIGH RISK PATIENT  Once         12/10/24 1922       Place sequential compression device  Until discontinued         12/10/24 1922                          Cardiology  [facility] - Telemetry (Hospital)    I have seen the patient, personally interviewed, and examined the patient at bedside.  I have performed the entire portion of the visit and formulated the assessment and plan as documented by the scribe.       Phillip Vega MD, [facility]  Cardiovascular Disease  [facility], [place]  [phone] (P)

## 2024-12-14 NOTE — SUBJECTIVE & OBJECTIVE
Past Medical History:   Diagnosis Date    Aneurysm 2016    abdominal, stent placed    Arthritis     Asthma     Atrial fibrillation     Cancer     skin cancer on face    CKD (chronic kidney disease)     COPD (chronic obstructive pulmonary disease) 10/05/2017    Diabetes mellitus     Emphysema lung     Encounter for blood transfusion     Hypertension        Past Surgical History:   Procedure Laterality Date    ABDOMINAL AORTIC ANEURYSM REPAIR      Stent placed    ABDOMINAL SURGERY      COLONOSCOPY Left 2017    Procedure: COLONOSCOPY;  Surgeon: Boaz Toussaint MD;  Location: [facility];  Service: Endoscopy;  Laterality: Left;    VASCULAR SURGERY      abdominal aneurysm repair       Review of patient's allergies indicates:  No Known Allergies    Family History       Problem Relation (Age of Onset)    Cancer Mother    Heart attack Father    Hypertension Father          Tobacco Use    Smoking status: Former     Current packs/day: 0.00     Average packs/day: 2.0 packs/day for 20.0 years (40.0 ttl pk-yrs)     Types: Cigarettes     Start date: 1957     Quit date: 1977     Years since quittin.9    Smokeless tobacco: Former   Substance and Sexual Activity    Alcohol use: No    Drug use: No    Sexual activity: Not Currently         Review of Systems   Constitutional:  Positive for fatigue. Negative for activity change and chills.   Respiratory:  Negative for cough, choking, chest tightness, shortness of breath and wheezing.    Cardiovascular:  Positive for leg swelling.     Objective:     Vital Signs (Most Recent):  Temp: 98.2 °F (36.8 °C) (24 1207)  Pulse: 63 (24 1207)  Resp: 20 (24 1207)  BP: 137/63 (24 1207)  SpO2: (!) 85 % (24 1207) Vital Signs (24h Range):  Temp:  [97.7 °F (36.5 °C)-98.6 °F (37 °C)] 98.2 °F (36.8 °C)  Pulse:  [62-83] 63  Resp:  [18-22] 20  SpO2:  [85 %-100 %] 85 %  BP: (137-168)/(63-89) 137/63     Weight: 78.1 kg (172 lb 2.9 oz)  Body mass index is 26.97  kg/m².      Intake/Output Summary (Last 24 hours) at 12/14/2024 1348  Last data filed at 12/14/2024 0452  Gross per 24 hour   Intake --   Output 300 ml   Net -300 ml        Physical Exam  Constitutional:       General: He is not in acute distress.     Appearance: He is ill-appearing.   HENT:      Head: Normocephalic.      Mouth/Throat:      Mouth: Mucous membranes are moist.   Eyes:      Pupils: Pupils are equal, round, and reactive to light.   Cardiovascular:      Rate and Rhythm: Normal rate and regular rhythm.   Pulmonary:      Effort: No respiratory distress.      Breath sounds: Decreased air movement present. Rhonchi present.   Abdominal:      General: There is no distension.      Palpations: Abdomen is soft.      Tenderness: There is no abdominal tenderness.   Musculoskeletal:         General: Swelling present.      Comments: Pitting ankle edema, erythema to bilateral legs with open abrasions/blisters   Skin:     General: Skin is warm.   Neurological:      General: No focal deficit present.      Mental Status: He is oriented to person, place, and time. Mental status is at baseline.      Motor: Weakness present.          Vents:  Oxygen Concentration (%): (S) 90 (WEANED FROM 100%) (12/14/24 0805)    Lines/Drains/Airways       Peripheral Intravenous Line  Duration                  Peripheral IV - Single Lumen 12/11/24 0845 20 G Anterior;Distal;Left Upper Arm 3 days                    Significant Labs:    CBC/Anemia Profile:  Recent Labs   Lab 12/13/24  0413 12/14/24  0520   WBC 8.42 6.69   HGB 12.3* 11.7*   HCT 38.9* 36.2*    229   MCV 93 93   RDW 13.2 13.2        Chemistries:  Recent Labs   Lab 12/13/24  0413 12/14/24  0520    143   K 4.2 4.1    100   CO2 33* 33*   BUN 35* 38*   CREATININE 2.2* 2.1*   CALCIUM 9.3 8.8       All pertinent labs within the past 24 hours have been reviewed.    Significant Imaging:   I have reviewed all pertinent imaging results/findings within the past 24 hours.

## 2024-12-14 NOTE — ASSESSMENT & PLAN NOTE
--Typically bacteremia poses risk of life threatening sepsis and metastatic infection  --However, in this case suspicion is high for contaminant   --1 bottle reporting Staph PETMALIKFERI   --Patient hemodynamically stable with no fever or leukocytosis  --Will follow repeat blood cx to see if it grows again; if negative would not treat; remains no growth at this time   --Continue pneumonia coverage   --Above d/w primary team.

## 2024-12-14 NOTE — ASSESSMENT & PLAN NOTE
"Patient has  unspecified  heart failure that is Chronic. On presentation their CHF was . Most recent BNP and echo results are listed below.well compensated  No results for input(s): "BNP" in the last 72 hours.    Latest ECHO  Results for orders placed during the hospital encounter of 01/08/21    Echo Color Flow Doppler? Yes    Interpretation Summary  · The left ventricle is normal in size with moderate concentric hypertrophy and normal systolic function. The estimated ejection fraction is 60%  · Indeterminate left ventricular diastolic function.  · Normal right ventricular size with normal right ventricular systolic function.  · Mild tricuspid regurgitation.  · Normal central venous pressure (3 mmHg).  · The estimated PA systolic pressure is 32 mmHg.    Current Heart Failure Medications  , Every 12 hours, Oral  , 2 times daily with meals, Oral  hydrALAZINE injection 10 mg, Every 6 hours PRN, Intravenous  hydrALAZINE tablet 10 mg, Every 12 hours, Oral  acetaZOLAMIDE (DIAMOX) 250 mg in D5W 50 mL, Once, Intravenous    Plan  - Monitor strict I&Os and daily weights.    - Place on telemetry  - Low sodium diet  - Place on fluid restriction of 1.5 L.   - Cardiology has been consulted  - The patient's volume status is at their baseline  Ambulatory oxygen evaluation   Nutrition consult   Discontinue intravenous lasix    Cardiology on case     "

## 2024-12-14 NOTE — SUBJECTIVE & OBJECTIVE
Interval History: Pulse ox worsened on NC. Now on Vapotherm. CXR today reports hazy left greater than right lung base opacities suggestive of small bilateral pleural effusions. Will collect sputum for culture and broaden antibiotics.     Review of Systems   Cardiovascular:  Positive for leg swelling.   Skin:  Positive for color change, rash and wound.   All other systems reviewed and are negative.    Objective:     Vital Signs (Most Recent):  Temp: 97.7 °F (36.5 °C) (12/14/24 0802)  Pulse: 82 (12/14/24 0805)  Resp: (!) 22 (12/14/24 0805)  BP: (!) 163/79 (12/14/24 0802)  SpO2: 99 % (12/14/24 0805) Vital Signs (24h Range):  Temp:  [97.7 °F (36.5 °C)-98.6 °F (37 °C)] 97.7 °F (36.5 °C)  Pulse:  [62-83] 82  Resp:  [18-22] 22  SpO2:  [88 %-100 %] 99 %  BP: (135-168)/(62-89) 163/79     Weight: 78.1 kg (172 lb 2.9 oz)  Body mass index is 26.97 kg/m².    Estimated Creatinine Clearance: 20.1 mL/min (A) (based on SCr of 2.1 mg/dL (H)).     Physical Exam  Constitutional:       General: He is not in acute distress.     Appearance: Normal appearance. He is not ill-appearing.   Cardiovascular:      Rate and Rhythm: Normal rate and regular rhythm.      Pulses: Normal pulses.      Heart sounds: Normal heart sounds. No murmur heard.     No friction rub. No gallop.   Pulmonary:      Effort: Pulmonary effort is normal. No respiratory distress.      Breath sounds: Normal breath sounds.      Comments: Vapotherm   Abdominal:      General: Abdomen is flat. Bowel sounds are normal. There is no distension.      Palpations: Abdomen is soft.      Tenderness: There is no abdominal tenderness.   Musculoskeletal:         General: Swelling present.      Right lower leg: Edema present.      Left lower leg: Edema present.   Skin:     General: Skin is warm and dry.      Findings: Erythema present.   Neurological:      Mental Status: He is alert.          Significant Labs: Blood Culture:   Recent Labs   Lab 12/10/24  1053 12/10/24  1056 12/12/24  0907  12/12/24  0912   LABBLOO Gram stain aer bottle: Gram positive cocci  Positive results previously called 12/12/2024  13:46  STAPHYLOCOCCUS PETTENKOFERI  Susceptibility pending  * Gram stain aer bottle: Gram positive cocci  Results called to and read back by: GEETA Holguin. 12/12/2024  05:15  STAPHYLOCOCCUS PETTENKOFERI  Susceptibility pending  * No Growth to date  No Growth to date No Growth to date  No Growth to date     CBC:   Recent Labs   Lab 12/12/24  1221 12/13/24  0413 12/14/24  0520   WBC 10.51 8.42 6.69   HGB 12.5* 12.3* 11.7*   HCT 39.3* 38.9* 36.2*    243 229     CMP:   Recent Labs   Lab 12/13/24  0413 12/14/24  0520    143   K 4.2 4.1    100   CO2 33* 33*    109   BUN 35* 38*   CREATININE 2.2* 2.1*   CALCIUM 9.3 8.8   ANIONGAP 11 10     Microbiology Results (last 7 days)       Procedure Component Value Units Date/Time    Blood Culture #1 **CANNOT BE ORDERED STAT** [[id number]]  (Abnormal) Collected: 12/10/24 1053    Order Status: Completed Specimen: Blood from Peripheral, Antecubital, Right Updated: 12/14/24 1101     Blood Culture, Routine Gram stain aer bottle: Gram positive cocci      Positive results previously called 12/12/2024  13:46      STAPHYLOCOCCUS PETTENKOFERI  Susceptibility pending      Blood Culture #2 **CANNOT BE ORDERED STAT** [[id number]]  (Abnormal) Collected: 12/10/24 1056    Order Status: Completed Specimen: Blood from Peripheral, Forearm, Right Updated: 12/14/24 1101     Blood Culture, Routine Gram stain aer bottle: Gram positive cocci      Results called to and read back by: GEETA Holguin. 12/12/2024  05:15      STAPHYLOCOCCUS PETTENKOFERI  Susceptibility pending      Respiratory Infection Panel (PCR), Nasopharyngeal [[id number]] Collected: 12/14/24 0802    Order Status: Completed Specimen: Nasopharyngeal Swab Updated: 12/14/24 0941     Respiratory Infection Panel Source NP swab    Narrative:      Assay not valid for lower respiratory specimens,  alternate  testing required.    Blood culture [[id number]] Collected: 12/12/24 0912    Order Status: Completed Specimen: Blood from Peripheral, Hand, Left Updated: 12/13/24 2012     Blood Culture, Routine No Growth to date      No Growth to date    Blood culture [[id number]] Collected: 12/12/24 0907    Order Status: Completed Specimen: Blood from Peripheral, Hand, Right Updated: 12/13/24 2012     Blood Culture, Routine No Growth to date      No Growth to date    Rapid Organism ID by PCR (from Blood culture) [[id number]]  (Abnormal) Collected: 12/10/24 1056    Order Status: Completed Updated: 12/12/24 0654     Enterococcus faecalis Not Detected     Enterococcus faecium Not Detected     Listeria monocytogenes Not Detected     Staphylococcus spp. Detected     Staphylococcus aureus Not Detected     Staphylococcus epidermidis Not Detected     Staphylococcus lugdunensis Not Detected     Streptococcus species Not Detected     Streptococcus agalactiae Not Detected     Streptococcus pneumoniae Not Detected     Streptococcus pyogenes Not Detected     Acinetobacter calcoaceticus/baumannii complex Not Detected     Bacteroides fragilis Not Detected     Enterobacterales Not Detected     Enterobacter cloacae complex Not Detected     Escherichia coli Not Detected     Klebsiella aerogenes Not Detected     Klebsiella oxytoca Not Detected     Klebsiella pneumoniae group Not Detected     Proteus Not Detected     Salmonella sp Not Detected     Serratia marcescens Not Detected     Haemophilus influenzae Not Detected     Neisseria meningtidis Not Detected     Pseudomonas aeruginosa Not Detected     Stenotrophomonas maltophilia Not Detected     Candida albicans Not Detected     Candida auris Not Detected     Candida glabrata Not Detected     Candida krusei Not Detected     Candida parapsilosis Not Detected     Candida tropicalis Not Detected     Cryptococcus neoformans/gattii Not Detected     CTX-M (ESBL ) Test Not Applicable      IMP (Carbapenem resistant) Test Not Applicable     KPC resistance gene (Carbapenem resistant) Test Not Applicable     mcr-1  Test Not Applicable     mec A/C  Test Not Applicable     mec A/C and MREJ (MRSA) gene Test Not Applicable     NDM (Carbapenem resistant) Test Not Applicable     OXA-48-like (Carbapenem resistant) Test Not Applicable     van A/B (VRE gene) Test Not Applicable     VIM (Carbapenem resistant) Test Not Applicable    Culture, Respiratory with Gram Stain [[id number]]     Order Status: No result Specimen: Sputum, Expectorated           All pertinent labs within the past 24 hours have been reviewed.    Significant Imaging: CXR: I have reviewed all pertinent results/findings within the past 24 hours:  small pleural effusions; no new consolidation

## 2024-12-14 NOTE — PROGRESS NOTES
[facility] ([facility] Medicine  Progress Note    Patient Name: Jimmy Young  MRN: [id number]  Patient Class: IP- Inpatient   Admission Date: 12/10/2024  Length of Stay: 4 days  Attending Physician: Manuel Fermin MD  Primary Care Provider: Nacho Richardson MD        Subjective     Principal Problem:Bacteremia due to Staphylococcus        HPI:  Jimmy Young is a 89 y.o. male with a PMH  has a past medical history of Aneurysm (2016), Arthritis, Asthma, Atrial fibrillation, Cancer, CKD (chronic kidney disease), COPD (chronic obstructive pulmonary disease) (10/05/2017), Diabetes mellitus, Emphysema lung, Encounter for blood transfusion, and Hypertension.presented to the ED for swelling of the legs. Patient was referred from his primary care physician today for worsening lower extremity edema, shortness of breath, and cough. Patient is on chronic home O2 at 3L/min via NC. Reports compliance with his lasix and denies any excessive fluid or salt intake. Denies any other complaints at his time.      ER workup revealed CBC to be unremarkable.  CMP with BUN/creatinine at baseline of 29/2.2.   mg/dL.  Magnesium 1.2.  BNP, troponin, lactic acid within normal limits.  Flu/COVID negative.  Blood cultures obtained x2.  Ultrasound lower extremities negative for DVT.  Chest x-ray revealed left upper lobe opacity resembling early pneumonia.  EKG revealed a flutter with variable AV block and left axis deviation with a ventricular rate of 64 beats per minute with a QT/QTC of 416/429.  Vital signs stable.  Patient is at baseline oxygen saturation of 92-94% on 3 liters/minute via nasal cannula.  Patient received 12.5 mg carvedilol, 1 g Rocephin IV, 100 mg doxycycline p.o., 30 mg Lovenox, 40 mg Lasix IV, and 10 mg hydralazine p.o. at outside facility.  Hospital Medicine consulted to admit patient for CHF exacerbation and pneumonia. Patient and family in agreement with treatment plan. Patient admitted under  inpatient status.    PCP: Nacho Richardson      Overview/Hospital Course:  12/11 admitted for pneumonia. Wears supplemental oxygen at baseline, 2L. On 4L. Speech consulted. Schedule breathing treatments. Discontinue lasix. Replete lytes. Echocardiogram pending. Cardiology consulted. Relaxing normotensive range in this patient demographic.  12/12 blood culture positive for staph. Infectious disease consulted. Continue monitoring repeat blood cultures. Recent medication(s) change with linagliptin with risk for congestive heart failure and skin reaction. Discussed risk vs benefit. Hba1c 6.9. recommend goal hba1c 8-9.   12/13 bun/creatinine increasing. Discontinued intravenous lasix. Remains on increased supplemental oxygen, 5L. Wean as able. Home hydralazine resumed for elevated blood pressure. Repeat blood cultures negative growth to date x 1 day. Infectious disease following  12/14/2024  Patient required increasing to 40 L 100% FiO2 Vapotherm.  Pulmonology consult on case.  Solu-Medrol added.  Continue empiric antibiotics.  Repeat chest x-ray.    Interval History:  See above    Review of Systems   Constitutional:  Negative for fatigue and fever.   HENT:  Negative for sinus pressure.    Eyes:  Negative for visual disturbance.   Respiratory:  Positive for cough and shortness of breath.    Cardiovascular:  Positive for leg swelling. Negative for chest pain.   Gastrointestinal:  Negative for nausea and vomiting.   Genitourinary:  Negative for difficulty urinating.   Musculoskeletal:  Negative for back pain.   Skin:  Negative for rash.   Neurological:  Negative for headaches.   Psychiatric/Behavioral:  Negative for confusion.      Objective:     Vital Signs (Most Recent):  Temp: 97.7 °F (36.5 °C) (12/14/24 0802)  Pulse: 82 (12/14/24 0805)  Resp: (!) 22 (12/14/24 0805)  BP: (!) 163/79 (12/14/24 0802)  SpO2: 99 % (12/14/24 0805) Vital Signs (24h Range):  Temp:  [97.7 °F (36.5 °C)-98.6 °F (37 °C)] 97.7 °F (36.5 °C)  Pulse:   [62-83] 82  Resp:  [18-22] 22  SpO2:  [88 %-100 %] 99 %  BP: (135-168)/(62-89) 163/79     Weight: 78.1 kg (172 lb 2.9 oz)  Body mass index is 26.97 kg/m².    Intake/Output Summary (Last 24 hours) at 12/14/2024 1042  Last data filed at 12/14/2024 0452  Gross per 24 hour   Intake --   Output 300 ml   Net -300 ml         Physical Exam  Vitals reviewed.   Constitutional:       General: He is not in acute distress.     Appearance: He is well-developed. He is ill-appearing. He is not toxic-appearing or diaphoretic.      Interventions: Nasal cannula in place.   HENT:      Head: Normocephalic and atraumatic.      Right Ear: Decreased hearing noted.      Left Ear: Decreased hearing noted.   Eyes:      Pupils: Pupils are equal, round, and reactive to light.   Cardiovascular:      Rate and Rhythm: Normal rate and regular rhythm.      Heart sounds: Normal heart sounds. No murmur heard.     No friction rub. No gallop.   Pulmonary:      Effort: Pulmonary effort is normal. No respiratory distress.      Breath sounds: Normal breath sounds. No stridor. No wheezing or rales.   Abdominal:      General: Bowel sounds are normal. There is no distension.      Palpations: Abdomen is soft. There is no mass.      Tenderness: There is no abdominal tenderness. There is no guarding.   Musculoskeletal:      Right lower leg: Edema present.      Left lower leg: Edema present.   Skin:     General: Skin is warm.      Findings: Erythema and lesion present.   Neurological:      Mental Status: He is alert and oriented to person, place, and time.      Motor: Weakness present.   Psychiatric:         Mood and Affect: Mood normal.         Behavior: Behavior normal.             Significant Labs: All pertinent labs within the past 24 hours have been reviewed.    Significant Imaging: I have reviewed all pertinent imaging results/findings within the past 24 hours.    Assessment and Plan     * Bacteremia due to Staphylococcus  Continue treatment for pneumonia  "  Staph bacteremia likely contaminant    Acute hypoxic respiratory failure  Patient currently on 40 L 100% FiO2 O2 Vapotherm   Will consult pulmonology on case   Solu-Medrol added   Continue DuoNebs       Lower extremity edema  Improving  Multifactorial: malnutrition, fluid indiscretion, venous stasis, recent medication(s) change, dependent edema  Counseling provided  Elevate legs   Manish hose and topicals    Atrial flutter  Controlled  Follow up outpatient primary cardiologist  Holding a/c due to pmh GI bleed       Pneumonia  Continue Rocephin and doxy       Chronic diastolic heart failure  Patient has  unspecified  heart failure that is Chronic. On presentation their CHF was . Most recent BNP and echo results are listed below.well compensated  No results for input(s): "BNP" in the last 72 hours.    Latest ECHO  Results for orders placed during the hospital encounter of 01/08/21    Echo Color Flow Doppler? Yes    Interpretation Summary  · The left ventricle is normal in size with moderate concentric hypertrophy and normal systolic function. The estimated ejection fraction is 60%  · Indeterminate left ventricular diastolic function.  · Normal right ventricular size with normal right ventricular systolic function.  · Mild tricuspid regurgitation.  · Normal central venous pressure (3 mmHg).  · The estimated PA systolic pressure is 32 mmHg.    Current Heart Failure Medications  , Every 12 hours, Oral  , 2 times daily with meals, Oral  hydrALAZINE injection 10 mg, Every 6 hours PRN, Intravenous  hydrALAZINE tablet 10 mg, Every 12 hours, Oral  acetaZOLAMIDE (DIAMOX) 250 mg in D5W 50 mL, Once, Intravenous    Plan  - Monitor strict I&Os and daily weights.    - Place on telemetry  - Low sodium diet  - Place on fluid restriction of 1.5 L.   - Cardiology has been consulted  - The patient's volume status is at their baseline  Ambulatory oxygen evaluation   Nutrition consult   Discontinue intravenous lasix    Cardiology on case "       Coronary artery disease of native artery of native heart with stable angina pectoris  Patient with known CAD which is controlled Will continue Statin and monitor for S/Sx of angina/ACS. Continue to monitor on telemetry.     CKD stage 3 secondary to diabetes  Creatine stable for now. BMP reviewed- noted Estimated Creatinine Clearance: 19.2 mL/min (A) (based on SCr of 2.2 mg/dL (H)). according to latest data. Based on current GFR, CKD stage is stage 3 - GFR 30-59.  Monitor UOP and serial BMP and adjust therapy as needed. Renally dose meds. Avoid nephrotoxic medications and procedures.  Discontinue intravenous lasix  Monitor trend    Hypercholesterolemia  Patient is chronically on statin.will continue for now. Last Lipid Panel:   Lab Results   Component Value Date    CHOL 121 07/03/2023    HDL 37 (L) 07/03/2023    LDLCALC 57 07/03/2023    TRIG 160 (H) 07/03/2023    CHOLHDL 27.8 08/01/2019     Plan:  -Continue home medication  -low fat/low calorie diet        Atrial fibrillation  Patient with Paroxysmal (<7 days) atrial fibrillation which is controlled currently with Bbs and Calcium Channel Blocker. Patient is currently in sinus rhythm.[id number] Score: 4.   Discussed cardiology vs gastroenterology recommendations   Patient voices understanding  Will continue holding ac    Acquired hypothyroidism  Patient has chronic hypothyroidism. TFTs reviewed-   Lab Results   Component Value Date    TSH 1.780 07/03/2023   . Will continue chronic levothyroxine and adjust for and clinical changes.        Essential hypertension  Chronic, controlled.  Latest blood pressure and vitals reviewed-   Temp:  [97.4 °F (36.3 °C)-98.2 °F (36.8 °C)]   Pulse:  [63-89]   Resp:  [15-21]   BP: (151-203)/(63-89)   SpO2:  [87 %-96 %] .   Home meds for hypertension were reviewed and noted below.   Hypertension Medications               amLODIPine (NORVASC) 5 MG tablet Take 1 tablet (5 mg total) by mouth once daily.    carvediloL (COREG) 12.5 MG  tablet Take 12.5 mg by mouth 2 (two) times daily with meals.    diltiaZEM (CARDIZEM CD) 300 MG 24 hr capsule Take 1 capsule by mouth every morning.    furosemide (LASIX) 20 MG tablet Take 1 tablet (20 mg total) by mouth once daily.    hydrALAZINE (APRESOLINE) 10 MG tablet Take 10 mg by mouth every 12 (twelve) hours.            While in the hospital, will manage blood pressure as follows; Adjust home antihypertensive regimen as follows- elevated blood pressure, resume home hydralazine    Will utilize p.r.n. blood pressure medication only if patient's blood pressure greater than  180/110 and he develops symptoms such as worsening chest pain or shortness of breath.        VTE Risk Mitigation (From admission, onward)           Ordered     Place SALUD hose  Until discontinued         12/12/24 0921     Place SALUD hose  Until discontinued         12/11/24 0949     enoxaparin injection 30 mg  Daily         12/10/24 1922     IP VTE HIGH RISK PATIENT  Once         12/10/24 1922     Place sequential compression device  Until discontinued         12/10/24 1922                    Discharge Planning   SHEA:      Code Status: DNR   Medical Readiness for Discharge Date:   Discharge Plan A: Home                  Manuel Fermin MD  Department of Hospital Medicine   [facility] Tele[facility] ([facility])

## 2024-12-14 NOTE — NURSING
Repositioned in bed, O2 81% 12L high flow. Pt mouth breathing. Encouraged breathing exercises, O2 up to 86-88% 12L high flow. [place] notified, ordered Vapotherm. RT notified, Vapotherm initiated 40/100. Continuing to monitor.

## 2024-12-14 NOTE — HPI
Jimmy Young is a 89 y.o. male with a PMH  has a past medical history of Aneurysm (2016), Arthritis, Asthma, Atrial fibrillation, Cancer, CKD (chronic kidney disease), COPD (chronic obstructive pulmonary disease) (10/05/2017), Diabetes mellitus, Emphysema lung, Encounter for blood transfusion, and Hypertension.presented to the ED for swelling of the legs. Patient was referred from his primary care physician today for worsening lower extremity edema, shortness of breath, and cough. Patient is on chronic home O2 at 3L/min via NC. Reports compliance with his lasix and denies any excessive fluid or salt intake. Denies any other complaints at his time.      ER workup revealed CBC to be unremarkable.  CMP with BUN/creatinine at baseline of 29/2.2.   mg/dL.  Magnesium 1.2.  BNP, troponin, lactic acid within normal limits.  Flu/COVID negative.  Blood cultures obtained x2.  Ultrasound lower extremities negative for DVT.  Chest x-ray revealed left upper lobe opacity resembling early pneumonia.  EKG revealed a flutter with variable AV block and left axis deviation with a ventricular rate of 64 beats per minute with a QT/QTC of 416/429.  Vital signs stable.  Patient is at baseline oxygen saturation of 92-94% on 3 liters/minute via nasal cannula.  Patient received 12.5 mg carvedilol, 1 g Rocephin IV, 100 mg doxycycline p.o., 30 mg Lovenox, 40 mg Lasix IV, and 10 mg hydralazine p.o. at outside facility.  Hospital Medicine consulted to admit patient for CHF exacerbation and pneumonia. Patient and family in agreement with treatment plan. Patient admitted under inpatient status.    Pulmonology consulted for pneumonia, COPD.

## 2024-12-14 NOTE — CONSULTS
[facility] Tele[facility] ([facility])  Pulmonology  Consult Note    Patient Name: Jimmy Young  MRN: [id number]  Admission Date: 12/10/2024  Hospital Length of Stay: 4 days  Code Status: DNR  Attending Physician: Manuel Fermin MD  Primary Care Provider: Nacho Richardson MD   Principal Problem: Bacteremia due to Staphylococcus    Inpatient consult to Pulmonology  Consult performed by: Katina Zamudio NP  Consult ordered by: Manuel Fermin MD        Subjective:     HPI:  Jimmy Young is a 89 y.o. male with a PMH  has a past medical history of Aneurysm (2016), Arthritis, Asthma, Atrial fibrillation, Cancer, CKD (chronic kidney disease), COPD (chronic obstructive pulmonary disease) (10/05/2017), Diabetes mellitus, Emphysema lung, Encounter for blood transfusion, and Hypertension.presented to the ED for swelling of the legs. Patient was referred from his primary care physician today for worsening lower extremity edema, shortness of breath, and cough. Patient is on chronic home O2 at 3L/min via NC. Reports compliance with his lasix and denies any excessive fluid or salt intake. Denies any other complaints at his time.      ER workup revealed CBC to be unremarkable.  CMP with BUN/creatinine at baseline of 29/2.2.   mg/dL.  Magnesium 1.2.  BNP, troponin, lactic acid within normal limits.  Flu/COVID negative.  Blood cultures obtained x2.  Ultrasound lower extremities negative for DVT.  Chest x-ray revealed left upper lobe opacity resembling early pneumonia.  EKG revealed a flutter with variable AV block and left axis deviation with a ventricular rate of 64 beats per minute with a QT/QTC of 416/429.  Vital signs stable.  Patient is at baseline oxygen saturation of 92-94% on 3 liters/minute via nasal cannula.  Patient received 12.5 mg carvedilol, 1 g Rocephin IV, 100 mg doxycycline p.o., 30 mg Lovenox, 40 mg Lasix IV, and 10 mg hydralazine p.o. at outside facility.  Hospital Medicine consulted to admit patient for CHF  exacerbation and pneumonia. Patient and family in agreement with treatment plan. Patient admitted under inpatient status.    Pulmonology consulted for pneumonia, COPD.     Past Medical History:   Diagnosis Date    Aneurysm 2016    abdominal, stent placed    Arthritis     Asthma     Atrial fibrillation     Cancer     skin cancer on face    CKD (chronic kidney disease)     COPD (chronic obstructive pulmonary disease) 10/05/2017    Diabetes mellitus     Emphysema lung     Encounter for blood transfusion     Hypertension        Past Surgical History:   Procedure Laterality Date    ABDOMINAL AORTIC ANEURYSM REPAIR      Stent placed    ABDOMINAL SURGERY      COLONOSCOPY Left 2017    Procedure: COLONOSCOPY;  Surgeon: Boaz Toussaint MD;  Location: [facility];  Service: Endoscopy;  Laterality: Left;    VASCULAR SURGERY      abdominal aneurysm repair       Review of patient's allergies indicates:  No Known Allergies    Family History       Problem Relation (Age of Onset)    Cancer Mother    Heart attack Father    Hypertension Father          Tobacco Use    Smoking status: Former     Current packs/day: 0.00     Average packs/day: 2.0 packs/day for 20.0 years (40.0 ttl pk-yrs)     Types: Cigarettes     Start date: 1957     Quit date: 1977     Years since quittin.9    Smokeless tobacco: Former   Substance and Sexual Activity    Alcohol use: No    Drug use: No    Sexual activity: Not Currently         Review of Systems   Constitutional:  Positive for fatigue. Negative for activity change and chills.   Respiratory:  Negative for cough, choking, chest tightness, shortness of breath and wheezing.    Cardiovascular:  Positive for leg swelling.     Objective:     Vital Signs (Most Recent):  Temp: 98.2 °F (36.8 °C) (24 1207)  Pulse: 63 (24 1207)  Resp: 20 (24 1207)  BP: 137/63 (24 1207)  SpO2: (!) 85 % (24 1207) Vital Signs (24h Range):  Temp:  [97.7 °F (36.5 °C)-98.6 °F (37 °C)] 98.2 °F  (36.8 °C)  Pulse:  [62-83] 63  Resp:  [18-22] 20  SpO2:  [85 %-100 %] 85 %  BP: (137-168)/(63-89) 137/63     Weight: 78.1 kg (172 lb 2.9 oz)  Body mass index is 26.97 kg/m².      Intake/Output Summary (Last 24 hours) at 12/14/2024 1348  Last data filed at 12/14/2024 0452  Gross per 24 hour   Intake --   Output 300 ml   Net -300 ml        Physical Exam  Constitutional:       General: He is not in acute distress.     Appearance: He is ill-appearing.   HENT:      Head: Normocephalic.      Mouth/Throat:      Mouth: Mucous membranes are moist.   Eyes:      Pupils: Pupils are equal, round, and reactive to light.   Cardiovascular:      Rate and Rhythm: Normal rate and regular rhythm.   Pulmonary:      Effort: No respiratory distress.      Breath sounds: Decreased air movement present. Rhonchi present.   Abdominal:      General: There is no distension.      Palpations: Abdomen is soft.      Tenderness: There is no abdominal tenderness.   Musculoskeletal:         General: Swelling present.      Comments: Pitting ankle edema, erythema to bilateral legs with open abrasions/blisters   Skin:     General: Skin is warm.   Neurological:      General: No focal deficit present.      Mental Status: He is oriented to person, place, and time. Mental status is at baseline.      Motor: Weakness present.          Vents:  Oxygen Concentration (%): (S) 90 (WEANED FROM 100%) (12/14/24 0805)    Lines/Drains/Airways       Peripheral Intravenous Line  Duration                  Peripheral IV - Single Lumen 12/11/24 0845 20 G Anterior;Distal;Left Upper Arm 3 days                    Significant Labs:    CBC/Anemia Profile:  Recent Labs   Lab 12/13/24  0413 12/14/24  0520   WBC 8.42 6.69   HGB 12.3* 11.7*   HCT 38.9* 36.2*    229   MCV 93 93   RDW 13.2 13.2        Chemistries:  Recent Labs   Lab 12/13/24  0413 12/14/24  0520    143   K 4.2 4.1    100   CO2 33* 33*   BUN 35* 38*   CREATININE 2.2* 2.1*   CALCIUM 9.3 8.8       All  "pertinent labs within the past 24 hours have been reviewed.    Significant Imaging:   I have reviewed all pertinent imaging results/findings within the past 24 hours.    ABG  No results for input(s): "PH", "PO2", "PCO2", "HCO3", "BE" in the last 168 hours.  Assessment/Plan:     Pulmonary  Acute on chronic respiratory failure with hypoxia  Patient with Hypoxic Respiratory failure which is Acute on chronic.  he is on home oxygen at 3 LPM. Supplemental oxygen was provided and noted- Oxygen Concentration (%):  [] 90    Etiology likely heart failure, pulmonary HTN, not pneumonia/COPD  Per family only symptom that was new on admit was leg edema, never had worsening dyspnea    -Patient without SOB, weaned vapotherm to 20L 60% during my exam, wean for goal spO2 88% or greater, discussed with nursing staff  -Wean steroids, cont duonebs- can change to prn if no wheezing  -Abx per ID  -Discussed DNR code status, family is agreeable to home with hospice if resp status not improving- discussed with Dr. Fermin  -If going home with hospice, wean O2 for comfort    Pneumonia  CXR compared with previous CXR not impressive for pneumonia.  Patient bacteremic- Abx per ID    Antibiotics (From admission, onward)      Start     Stop Route Frequency Ordered    12/15/24 0900  cefTRIAXone injection 2 g         -- IV Every 24 hours (non-standard times) 12/14/24 1118    12/14/24 1230  linezolid 600 mg/300 mL IVPB 600 mg         -- IV Every 12 hours (non-standard times) 12/14/24 1118            Microbiology Results (last 7 days)       Procedure Component Value Units Date/Time    Respiratory Infection Panel (PCR), Nasopharyngeal [[id number]] Collected: 12/14/24 0802    Order Status: Completed Specimen: Nasopharyngeal Swab Updated: 12/14/24 1208     Respiratory Infection Panel Source NP swab     Adenovirus Not Detected     Coronavirus 229E, Common Cold Virus Not Detected     Coronavirus HKU1, Common Cold Virus Not Detected     Coronavirus NL63, " Common Cold Virus Not Detected     Coronavirus OC43, Common Cold Virus Not Detected     Comment: The Coronavirus strains detected in this test cause the common cold.  These strains are not the COVID-19 (novel Coronavirus)strain   associated with the respiratory disease outbreak.          SARS-CoV2 (COVID-19) Qualitative PCR Not Detected     Human Metapneumovirus Not Detected     Human Rhinovirus/Enterovirus Not Detected     Influenza A (subtypes H1, H1-2009,H3) Not Detected     Influenza B Not Detected     Parainfluenza Virus 1 Not Detected     Parainfluenza Virus 2 Not Detected     Parainfluenza Virus 3 Not Detected     Parainfluenza Virus 4 Not Detected     Respiratory Syncytial Virus Not Detected     Bordetella Parapertussis (VD1414) Not Detected     Bordetella pertussis (ptxP) Not Detected     Chlamydia pneumoniae Not Detected     Mycoplasma pneumoniae Not Detected     Comment: Respiratory Infection Panel testing performed by Multiplex PCR.       Narrative:      Assay not valid for lower respiratory specimens, alternate  testing required.    Blood Culture #1 **CANNOT BE ORDERED STAT** [[id number]]  (Abnormal) Collected: 12/10/24 1053    Order Status: Completed Specimen: Blood from Peripheral, Antecubital, Right Updated: 12/14/24 1101     Blood Culture, Routine Gram stain aer bottle: Gram positive cocci      Positive results previously called 12/12/2024  13:46      STAPHYLOCOCCUS PETTENKOFERI  Susceptibility pending      Blood Culture #2 **CANNOT BE ORDERED STAT** [[id number]]  (Abnormal) Collected: 12/10/24 1056    Order Status: Completed Specimen: Blood from Peripheral, Forearm, Right Updated: 12/14/24 1101     Blood Culture, Routine Gram stain aer bottle: Gram positive cocci      Results called to and read back by: GEETA Holguin. 12/12/2024  05:15      STAPHYLOCOCCUS PETTENKOFERI  Susceptibility pending      Blood culture [[id number]] Collected: 12/12/24 0912    Order Status: Completed Specimen: Blood from  Peripheral, Hand, Left Updated: 12/13/24 2012     Blood Culture, Routine No Growth to date      No Growth to date    Blood culture [[id number]] Collected: 12/12/24 0907    Order Status: Completed Specimen: Blood from Peripheral, Hand, Right Updated: 12/13/24 2012     Blood Culture, Routine No Growth to date      No Growth to date    Rapid Organism ID by PCR (from Blood culture) [[id number]]  (Abnormal) Collected: 12/10/24 1056    Order Status: Completed Updated: 12/12/24 0654     Enterococcus faecalis Not Detected     Enterococcus faecium Not Detected     Listeria monocytogenes Not Detected     Staphylococcus spp. Detected     Staphylococcus aureus Not Detected     Staphylococcus epidermidis Not Detected     Staphylococcus lugdunensis Not Detected     Streptococcus species Not Detected     Streptococcus agalactiae Not Detected     Streptococcus pneumoniae Not Detected     Streptococcus pyogenes Not Detected     Acinetobacter calcoaceticus/baumannii complex Not Detected     Bacteroides fragilis Not Detected     Enterobacterales Not Detected     Enterobacter cloacae complex Not Detected     Escherichia coli Not Detected     Klebsiella aerogenes Not Detected     Klebsiella oxytoca Not Detected     Klebsiella pneumoniae group Not Detected     Proteus Not Detected     Salmonella sp Not Detected     Serratia marcescens Not Detected     Haemophilus influenzae Not Detected     Neisseria meningtidis Not Detected     Pseudomonas aeruginosa Not Detected     Stenotrophomonas maltophilia Not Detected     Candida albicans Not Detected     Candida auris Not Detected     Candida glabrata Not Detected     Candida krusei Not Detected     Candida parapsilosis Not Detected     Candida tropicalis Not Detected     Cryptococcus neoformans/gattii Not Detected     CTX-M (ESBL ) Test Not Applicable     IMP (Carbapenem resistant) Test Not Applicable     KPC resistance gene (Carbapenem resistant) Test Not Applicable     mcr-1  Test Not  Applicable     mec A/C  Test Not Applicable     mec A/C and MREJ (MRSA) gene Test Not Applicable     NDM (Carbapenem resistant) Test Not Applicable     OXA-48-like (Carbapenem resistant) Test Not Applicable     van A/B (VRE gene) Test Not Applicable     VIM (Carbapenem resistant) Test Not Applicable    Culture, Respiratory with Gram Stain [[id number]]     Order Status: No result Specimen: Sputum, Expectorated                   Thank you for your consult. I will follow-up with patient. Please contact us if you have any additional questions.     Katina Zamudio, NP  Pulmonology  [facility] - Telemetry ([facility])

## 2024-12-14 NOTE — ASSESSMENT & PLAN NOTE
Patient with Hypoxic Respiratory failure which is Acute on chronic.  he is on home oxygen at 3 LPM. Supplemental oxygen was provided and noted- Oxygen Concentration (%):  [] 90    Etiology likely heart failure, pulmonary HTN, not pneumonia/COPD  Per family only symptom that was new on admit was leg edema, never had worsening dyspnea    -Patient without SOB, weaned vapotherm to 20L 60% during my exam, wean for goal spO2 88% or greater, discussed with nursing staff  -Wean steroids, cont duonebs- can change to prn if no wheezing  -Abx per ID  -Discussed DNR code status, family is agreeable to home with hospice if resp status not improving- discussed with Dr. Fermin  -If going home with hospice, wean O2 for comfort

## 2024-12-14 NOTE — PROGRESS NOTES
[facility] - Telemetry ([facility])  Infectious Disease  Progress Note    Patient Name: Jimmy Young  MRN: [id number]  Admission Date: 12/10/2024  Length of Stay: 4 days  Attending Physician: Manuel Fermin MD  Primary Care Provider: Nacho Richardson MD    Isolation Status: No active isolations  Assessment/Plan:      Pulmonary  Pneumonia  --Initial CXR reports lateral left upper lobe opacity, most likely early pneumonia.   --CXR 12/14 reports small pleural effusions but no new consolidation   --On Vapotherm (home baseline is 2 L)  --Will broaden to PO linezolid 600 mg BID and continue ceftriaxone at 2 g daily   --Require drug toxicity monitoring   --See edema assessment for more details on antibiotics   --Follow respiratory culture to see if MDR organism isolated   --Fluid and COPD management per primary   --Agree with pulmonology consultation and CT chest   --Above d/w primary team.      Cardiac/Vascular  Hypercholesterolemia  Continue current medications per primary      Atrial fibrillation  Continue current medications per primary      Essential hypertension  Continue current medications per primary      ID  * Bacteremia due to Staphylococcus  --Typically bacteremia poses risk of life threatening sepsis and metastatic infection  --However, in this case suspicion is high for contaminant   --1 bottle reporting Staph PETTENKOFERI   --Patient hemodynamically stable with no fever or leukocytosis  --Will follow repeat blood cx to see if it grows again; if negative would not treat; remains no growth at this time   --Continue pneumonia coverage   --Above d/w primary team.      Endocrine  Acquired hypothyroidism  Continue current medications per primary      Other  Lower extremity edema  Also with superimposed cellulitis bilaterally. Photos put under media section. Cellulitis much improved today. Would benefit from lymphedema clinic referral outpatient. Will monitor on current antibiotics. May broaden at discharge and plan for  minimum 10 day total course. If still present in clinic may offer Dalvance.        Thank you for your consult. I will follow-up with patient. Please contact us if you have any additional questions.    Ashkan Lagunas, DO  Infectious Disease  [facility] - Telemetry ([facility])    Subjective:     Principal Problem:Bacteremia due to Staphylococcus    HPI: This is a 89 yo M with hx of asthma, Afib, COPD, CKD, DM and HTN who presented to ER for evaluation of bilateral leg swelling. Patient was advised to go to ER in setting of lower extremity edema, shortness of breath, and a cough. Patient is on chronic home O2 at 3L. On diuretics. No evidence of DVT on LE US. CXR reports lateral left upper lobe opacity, most likely early pneumonia. Started empirically on IV ceftriaxone and PO doxycycline. Blood cultures were collected 12/10 are reporting Staph species on BCID. No S aureus detected. Low suspicion for virulent infection. No pacemaker/ICD noted on review of CXR. Likely going to be a CONS. Patient has been afebrile. ID consulted for bacteremia.   Interval History: Pulse ox worsened on NC. Now on Vapotherm. CXR today reports hazy left greater than right lung base opacities suggestive of small bilateral pleural effusions. Will collect sputum for culture and broaden antibiotics.     Review of Systems   Cardiovascular:  Positive for leg swelling.   Skin:  Positive for color change, rash and wound.   All other systems reviewed and are negative.    Objective:     Vital Signs (Most Recent):  Temp: 97.7 °F (36.5 °C) (12/14/24 0802)  Pulse: 82 (12/14/24 0805)  Resp: (!) 22 (12/14/24 0805)  BP: (!) 163/79 (12/14/24 0802)  SpO2: 99 % (12/14/24 0805) Vital Signs (24h Range):  Temp:  [97.7 °F (36.5 °C)-98.6 °F (37 °C)] 97.7 °F (36.5 °C)  Pulse:  [62-83] 82  Resp:  [18-22] 22  SpO2:  [88 %-100 %] 99 %  BP: (135-168)/(62-89) 163/79     Weight: 78.1 kg (172 lb 2.9 oz)  Body mass index is 26.97 kg/m².    Estimated Creatinine Clearance: 20.1  mL/min (A) (based on SCr of 2.1 mg/dL (H)).     Physical Exam  Constitutional:       General: He is not in acute distress.     Appearance: Normal appearance. He is not ill-appearing.   Cardiovascular:      Rate and Rhythm: Normal rate and regular rhythm.      Pulses: Normal pulses.      Heart sounds: Normal heart sounds. No murmur heard.     No friction rub. No gallop.   Pulmonary:      Effort: Pulmonary effort is normal. No respiratory distress.      Breath sounds: Normal breath sounds.      Comments: Vapotherm   Abdominal:      General: Abdomen is flat. Bowel sounds are normal. There is no distension.      Palpations: Abdomen is soft.      Tenderness: There is no abdominal tenderness.   Musculoskeletal:         General: Swelling present.      Right lower leg: Edema present.      Left lower leg: Edema present.   Skin:     General: Skin is warm and dry.      Findings: Erythema present.   Neurological:      Mental Status: He is alert.          Significant Labs: Blood Culture:   Recent Labs   Lab 12/10/24  1053 12/10/24  1056 12/12/24  0907 12/12/24  0912   LABBLOO Gram stain aer bottle: Gram positive cocci  Positive results previously called 12/12/2024  13:46  STAPHYLOCOCCUS PETTENKOFERI  Susceptibility pending  * Gram stain aer bottle: Gram positive cocci  Results called to and read back by: GEETA Holguin. 12/12/2024  05:15  STAPHYLOCOCCUS PETTENKOFERI  Susceptibility pending  * No Growth to date  No Growth to date No Growth to date  No Growth to date     CBC:   Recent Labs   Lab 12/12/24  1221 12/13/24  0413 12/14/24  0520   WBC 10.51 8.42 6.69   HGB 12.5* 12.3* 11.7*   HCT 39.3* 38.9* 36.2*    243 229     CMP:   Recent Labs   Lab 12/13/24  0413 12/14/24  0520    143   K 4.2 4.1    100   CO2 33* 33*    109   BUN 35* 38*   CREATININE 2.2* 2.1*   CALCIUM 9.3 8.8   ANIONGAP 11 10     Microbiology Results (last 7 days)       Procedure Component Value Units Date/Time    Blood Culture #1  **CANNOT BE ORDERED STAT** [[id number]]  (Abnormal) Collected: 12/10/24 1053    Order Status: Completed Specimen: Blood from Peripheral, Antecubital, Right Updated: 12/14/24 1101     Blood Culture, Routine Gram stain aer bottle: Gram positive cocci      Positive results previously called 12/12/2024  13:46      STAPHYLOCOCCUS PETTENKOFERI  Susceptibility pending      Blood Culture #2 **CANNOT BE ORDERED STAT** [[id number]]  (Abnormal) Collected: 12/10/24 1056    Order Status: Completed Specimen: Blood from Peripheral, Forearm, Right Updated: 12/14/24 1101     Blood Culture, Routine Gram stain aer bottle: Gram positive cocci      Results called to and read back by: GEETA Holguin. 12/12/2024  05:15      STAPHYLOCOCCUS PETTENKOFERI  Susceptibility pending      Respiratory Infection Panel (PCR), Nasopharyngeal [[id number]] Collected: 12/14/24 0802    Order Status: Completed Specimen: Nasopharyngeal Swab Updated: 12/14/24 0941     Respiratory Infection Panel Source NP swab    Narrative:      Assay not valid for lower respiratory specimens, alternate  testing required.    Blood culture [[id number]] Collected: 12/12/24 0912    Order Status: Completed Specimen: Blood from Peripheral, Hand, Left Updated: 12/13/24 2012     Blood Culture, Routine No Growth to date      No Growth to date    Blood culture [[id number]] Collected: 12/12/24 0907    Order Status: Completed Specimen: Blood from Peripheral, Hand, Right Updated: 12/13/24 2012     Blood Culture, Routine No Growth to date      No Growth to date    Rapid Organism ID by PCR (from Blood culture) [[id number]]  (Abnormal) Collected: 12/10/24 1056    Order Status: Completed Updated: 12/12/24 0654     Enterococcus faecalis Not Detected     Enterococcus faecium Not Detected     Listeria monocytogenes Not Detected     Staphylococcus spp. Detected     Staphylococcus aureus Not Detected     Staphylococcus epidermidis Not Detected     Staphylococcus lugdunensis Not Detected      Streptococcus species Not Detected     Streptococcus agalactiae Not Detected     Streptococcus pneumoniae Not Detected     Streptococcus pyogenes Not Detected     Acinetobacter calcoaceticus/baumannii complex Not Detected     Bacteroides fragilis Not Detected     Enterobacterales Not Detected     Enterobacter cloacae complex Not Detected     Escherichia coli Not Detected     Klebsiella aerogenes Not Detected     Klebsiella oxytoca Not Detected     Klebsiella pneumoniae group Not Detected     Proteus Not Detected     Salmonella sp Not Detected     Serratia marcescens Not Detected     Haemophilus influenzae Not Detected     Neisseria meningtidis Not Detected     Pseudomonas aeruginosa Not Detected     Stenotrophomonas maltophilia Not Detected     Candida albicans Not Detected     Candida auris Not Detected     Candida glabrata Not Detected     Candida krusei Not Detected     Candida parapsilosis Not Detected     Candida tropicalis Not Detected     Cryptococcus neoformans/gattii Not Detected     CTX-M (ESBL ) Test Not Applicable     IMP (Carbapenem resistant) Test Not Applicable     KPC resistance gene (Carbapenem resistant) Test Not Applicable     mcr-1  Test Not Applicable     mec A/C  Test Not Applicable     mec A/C and MREJ (MRSA) gene Test Not Applicable     NDM (Carbapenem resistant) Test Not Applicable     OXA-48-like (Carbapenem resistant) Test Not Applicable     van A/B (VRE gene) Test Not Applicable     VIM (Carbapenem resistant) Test Not Applicable    Culture, Respiratory with Gram Stain [[id number]]     Order Status: No result Specimen: Sputum, Expectorated           All pertinent labs within the past 24 hours have been reviewed.    Significant Imaging: CXR: I have reviewed all pertinent results/findings within the past 24 hours:  small pleural effusions; no new consolidation

## 2024-12-14 NOTE — SUBJECTIVE & OBJECTIVE
Interval History:  See above    Review of Systems   Constitutional:  Negative for fatigue and fever.   HENT:  Negative for sinus pressure.    Eyes:  Negative for visual disturbance.   Respiratory:  Positive for cough and shortness of breath.    Cardiovascular:  Positive for leg swelling. Negative for chest pain.   Gastrointestinal:  Negative for nausea and vomiting.   Genitourinary:  Negative for difficulty urinating.   Musculoskeletal:  Negative for back pain.   Skin:  Negative for rash.   Neurological:  Negative for headaches.   Psychiatric/Behavioral:  Negative for confusion.      Objective:     Vital Signs (Most Recent):  Temp: 97.7 °F (36.5 °C) (12/14/24 0802)  Pulse: 82 (12/14/24 0805)  Resp: (!) 22 (12/14/24 0805)  BP: (!) 163/79 (12/14/24 0802)  SpO2: 99 % (12/14/24 0805) Vital Signs (24h Range):  Temp:  [97.7 °F (36.5 °C)-98.6 °F (37 °C)] 97.7 °F (36.5 °C)  Pulse:  [62-83] 82  Resp:  [18-22] 22  SpO2:  [88 %-100 %] 99 %  BP: (135-168)/(62-89) 163/79     Weight: 78.1 kg (172 lb 2.9 oz)  Body mass index is 26.97 kg/m².    Intake/Output Summary (Last 24 hours) at 12/14/2024 1042  Last data filed at 12/14/2024 0452  Gross per 24 hour   Intake --   Output 300 ml   Net -300 ml         Physical Exam  Vitals reviewed.   Constitutional:       General: He is not in acute distress.     Appearance: He is well-developed. He is ill-appearing. He is not toxic-appearing or diaphoretic.      Interventions: Nasal cannula in place.   HENT:      Head: Normocephalic and atraumatic.      Right Ear: Decreased hearing noted.      Left Ear: Decreased hearing noted.   Eyes:      Pupils: Pupils are equal, round, and reactive to light.   Cardiovascular:      Rate and Rhythm: Normal rate and regular rhythm.      Heart sounds: Normal heart sounds. No murmur heard.     No friction rub. No gallop.   Pulmonary:      Effort: Pulmonary effort is normal. No respiratory distress.      Breath sounds: Normal breath sounds. No stridor. No  wheezing or rales.   Abdominal:      General: Bowel sounds are normal. There is no distension.      Palpations: Abdomen is soft. There is no mass.      Tenderness: There is no abdominal tenderness. There is no guarding.   Musculoskeletal:      Right lower leg: Edema present.      Left lower leg: Edema present.   Skin:     General: Skin is warm.      Findings: Erythema and lesion present.   Neurological:      Mental Status: He is alert and oriented to person, place, and time.      Motor: Weakness present.   Psychiatric:         Mood and Affect: Mood normal.         Behavior: Behavior normal.             Significant Labs: All pertinent labs within the past 24 hours have been reviewed.    Significant Imaging: I have reviewed all pertinent imaging results/findings within the past 24 hours.

## 2024-12-14 NOTE — PLAN OF CARE
"AAOx4   NAD  VSS    Sitting up in chair, chair alarm on, call light in reach. Upon transfer to chair, desat to 84% Vapotherm 40/100. Asymptomatic. Continuing to monitor. 24h cc complete.    BP (!) 168/74 (BP Location: Left arm, Patient Position: Lying)   Pulse 62   Temp 98.6 °F (37 °C) (Oral)   Resp 19   Ht 5' 7" (1.702 m)   Wt 78.1 kg (172 lb 2.9 oz)   SpO2 96%   BMI 26.97 kg/m²     "

## 2024-12-15 PROBLEM — B95.8 BACTEREMIA DUE TO STAPHYLOCOCCUS: Status: RESOLVED | Noted: 2024-12-12 | Resolved: 2024-12-15

## 2024-12-15 PROBLEM — R78.81 BACTEREMIA DUE TO STAPHYLOCOCCUS: Status: RESOLVED | Noted: 2024-12-12 | Resolved: 2024-12-15

## 2024-12-15 LAB
ANION GAP SERPL CALC-SCNC: 12 MMOL/L (ref 8–16)
BACTERIA BLD CULT: ABNORMAL
BASOPHILS # BLD AUTO: 0.01 K/UL (ref 0–0.2)
BASOPHILS NFR BLD: 0.1 % (ref 0–1.9)
BUN SERPL-MCNC: 41 MG/DL (ref 10–30)
CALCIUM SERPL-MCNC: 8.9 MG/DL (ref 8.7–10.5)
CHLORIDE SERPL-SCNC: 96 MMOL/L (ref 95–110)
CO2 SERPL-SCNC: 30 MMOL/L (ref 23–29)
CREAT SERPL-MCNC: 2.2 MG/DL (ref 0.5–1.4)
DIFFERENTIAL METHOD BLD: ABNORMAL
EOSINOPHIL # BLD AUTO: 0 K/UL (ref 0–0.5)
EOSINOPHIL NFR BLD: 0 % (ref 0–8)
ERYTHROCYTE [DISTWIDTH] IN BLOOD BY AUTOMATED COUNT: 12.6 % (ref 11.5–14.5)
EST. GFR  (NO RACE VARIABLE): 27 ML/MIN/1.73 M^2
GLUCOSE SERPL-MCNC: 206 MG/DL (ref 70–110)
HCT VFR BLD AUTO: 37.7 % (ref 40–54)
HGB BLD-MCNC: 12.1 G/DL (ref 14–18)
IMM GRANULOCYTES # BLD AUTO: 0.08 K/UL (ref 0–0.04)
IMM GRANULOCYTES NFR BLD AUTO: 1.1 % (ref 0–0.5)
LACTATE SERPL-SCNC: 2.9 MMOL/L (ref 0.5–2.2)
LYMPHOCYTES # BLD AUTO: 0.9 K/UL (ref 1–4.8)
LYMPHOCYTES NFR BLD: 12.5 % (ref 18–48)
MAGNESIUM SERPL-MCNC: 1.8 MG/DL (ref 1.6–2.6)
MCH RBC QN AUTO: 29.8 PG (ref 27–31)
MCHC RBC AUTO-ENTMCNC: 32.1 G/DL (ref 32–36)
MCV RBC AUTO: 93 FL (ref 82–98)
MONOCYTES # BLD AUTO: 0.1 K/UL (ref 0.3–1)
MONOCYTES NFR BLD: 1.2 % (ref 4–15)
NEUTROPHILS # BLD AUTO: 6.4 K/UL (ref 1.8–7.7)
NEUTROPHILS NFR BLD: 85.1 % (ref 38–73)
NRBC BLD-RTO: 0 /100 WBC
OHS QRS DURATION: 76 MS
OHS QTC CALCULATION: 425 MS
PLATELET # BLD AUTO: 244 K/UL (ref 150–450)
PMV BLD AUTO: 10.3 FL (ref 9.2–12.9)
POTASSIUM SERPL-SCNC: 4.7 MMOL/L (ref 3.5–5.1)
RBC # BLD AUTO: 4.06 M/UL (ref 4.6–6.2)
SODIUM SERPL-SCNC: 138 MMOL/L (ref 136–145)
WBC # BLD AUTO: 7.53 K/UL (ref 3.9–12.7)

## 2024-12-15 PROCEDURE — 25000003 PHARM REV CODE 250: Performed by: FAMILY MEDICINE

## 2024-12-15 PROCEDURE — 83605 ASSAY OF LACTIC ACID: CPT | Performed by: INTERNAL MEDICINE

## 2024-12-15 PROCEDURE — 63600175 PHARM REV CODE 636 W HCPCS: Performed by: NURSE PRACTITIONER

## 2024-12-15 PROCEDURE — 99233 SBSQ HOSP IP/OBS HIGH 50: CPT | Mod: ,,, | Performed by: INTERNAL MEDICINE

## 2024-12-15 PROCEDURE — 94640 AIRWAY INHALATION TREATMENT: CPT

## 2024-12-15 PROCEDURE — 94799 UNLISTED PULMONARY SVC/PX: CPT

## 2024-12-15 PROCEDURE — 36415 COLL VENOUS BLD VENIPUNCTURE: CPT | Performed by: NURSE PRACTITIONER

## 2024-12-15 PROCEDURE — 94761 N-INVAS EAR/PLS OXIMETRY MLT: CPT

## 2024-12-15 PROCEDURE — 97110 THERAPEUTIC EXERCISES: CPT | Mod: CQ

## 2024-12-15 PROCEDURE — 27100171 HC OXYGEN HIGH FLOW UP TO 24 HOURS

## 2024-12-15 PROCEDURE — 25000003 PHARM REV CODE 250: Performed by: NURSE PRACTITIONER

## 2024-12-15 PROCEDURE — 93005 ELECTROCARDIOGRAM TRACING: CPT

## 2024-12-15 PROCEDURE — 27100092 HC HIGH FLOW DELIVERY CANNULA

## 2024-12-15 PROCEDURE — 80048 BASIC METABOLIC PNL TOTAL CA: CPT | Performed by: NURSE PRACTITIONER

## 2024-12-15 PROCEDURE — 83735 ASSAY OF MAGNESIUM: CPT | Performed by: INTERNAL MEDICINE

## 2024-12-15 PROCEDURE — 21400001 HC TELEMETRY ROOM

## 2024-12-15 PROCEDURE — 63600175 PHARM REV CODE 636 W HCPCS: Performed by: INTERNAL MEDICINE

## 2024-12-15 PROCEDURE — 63600175 PHARM REV CODE 636 W HCPCS: Performed by: FAMILY MEDICINE

## 2024-12-15 PROCEDURE — 36415 COLL VENOUS BLD VENIPUNCTURE: CPT | Performed by: INTERNAL MEDICINE

## 2024-12-15 PROCEDURE — 93010 ELECTROCARDIOGRAM REPORT: CPT | Mod: ,,, | Performed by: INTERNAL MEDICINE

## 2024-12-15 PROCEDURE — 99900035 HC TECH TIME PER 15 MIN (STAT)

## 2024-12-15 PROCEDURE — 27000249 HC VAPOTHERM CIRCUIT

## 2024-12-15 PROCEDURE — 25000242 PHARM REV CODE 250 ALT 637 W/ HCPCS: Performed by: FAMILY MEDICINE

## 2024-12-15 PROCEDURE — 27000646 HC AEROBIKA DEVICE

## 2024-12-15 PROCEDURE — 85025 COMPLETE CBC W/AUTO DIFF WBC: CPT | Performed by: INTERNAL MEDICINE

## 2024-12-15 PROCEDURE — 94664 DEMO&/EVAL PT USE INHALER: CPT

## 2024-12-15 PROCEDURE — 97116 GAIT TRAINING THERAPY: CPT | Mod: CQ

## 2024-12-15 RX ADMIN — DILTIAZEM HYDROCHLORIDE 300 MG: 180 CAPSULE, COATED, EXTENDED RELEASE ORAL at 06:12

## 2024-12-15 RX ADMIN — PANTOPRAZOLE SODIUM 40 MG: 40 TABLET, DELAYED RELEASE ORAL at 06:12

## 2024-12-15 RX ADMIN — BUMETANIDE 3 MG: 0.25 INJECTION INTRAMUSCULAR; INTRAVENOUS at 10:12

## 2024-12-15 RX ADMIN — LINEZOLID 600 MG: 600 INJECTION, SOLUTION INTRAVENOUS at 12:12

## 2024-12-15 RX ADMIN — ENOXAPARIN SODIUM 30 MG: 30 INJECTION SUBCUTANEOUS at 06:12

## 2024-12-15 RX ADMIN — HYDRALAZINE HYDROCHLORIDE 10 MG: 10 TABLET, FILM COATED ORAL at 08:12

## 2024-12-15 RX ADMIN — MAGNESIUM OXIDE TAB 400 MG (241.3 MG ELEMENTAL MG) 400 MG: 400 (241.3 MG) TAB at 10:12

## 2024-12-15 RX ADMIN — BUMETANIDE 3 MG: 0.25 INJECTION INTRAMUSCULAR; INTRAVENOUS at 08:12

## 2024-12-15 RX ADMIN — IPRATROPIUM BROMIDE AND ALBUTEROL SULFATE 3 ML: 2.5; .5 SOLUTION RESPIRATORY (INHALATION) at 02:12

## 2024-12-15 RX ADMIN — LINEZOLID 600 MG: 600 INJECTION, SOLUTION INTRAVENOUS at 11:12

## 2024-12-15 RX ADMIN — MAGNESIUM OXIDE TAB 400 MG (241.3 MG ELEMENTAL MG) 400 MG: 400 (241.3 MG) TAB at 08:12

## 2024-12-15 RX ADMIN — LEVOTHYROXINE SODIUM 50 MCG: 50 TABLET ORAL at 06:12

## 2024-12-15 RX ADMIN — METRONIDAZOLE: 10 GEL TOPICAL at 03:12

## 2024-12-15 RX ADMIN — METHYLPREDNISOLONE SODIUM SUCCINATE 40 MG: 40 INJECTION, POWDER, FOR SOLUTION INTRAMUSCULAR; INTRAVENOUS at 03:12

## 2024-12-15 RX ADMIN — METRONIDAZOLE: 10 GEL TOPICAL at 08:12

## 2024-12-15 RX ADMIN — CEFTRIAXONE 2 G: 2 INJECTION, POWDER, FOR SOLUTION INTRAMUSCULAR; INTRAVENOUS at 10:12

## 2024-12-15 RX ADMIN — MICONAZOLE NITRATE: 20 CREAM TOPICAL at 06:12

## 2024-12-15 RX ADMIN — IPRATROPIUM BROMIDE AND ALBUTEROL SULFATE 3 ML: 2.5; .5 SOLUTION RESPIRATORY (INHALATION) at 08:12

## 2024-12-15 RX ADMIN — PRAVASTATIN SODIUM 40 MG: 20 TABLET ORAL at 10:12

## 2024-12-15 RX ADMIN — METHYLPREDNISOLONE SODIUM SUCCINATE 40 MG: 40 INJECTION, POWDER, FOR SOLUTION INTRAMUSCULAR; INTRAVENOUS at 01:12

## 2024-12-15 RX ADMIN — MICONAZOLE NITRATE: 20 CREAM TOPICAL at 10:12

## 2024-12-15 NOTE — SUBJECTIVE & OBJECTIVE
Interval History:  Shortness of breath improving.    Review of Systems   Constitutional:  Negative for fatigue and fever.   HENT:  Negative for sinus pressure.    Eyes:  Negative for visual disturbance.   Respiratory:  Positive for cough and shortness of breath.    Cardiovascular:  Positive for leg swelling. Negative for chest pain.   Gastrointestinal:  Negative for nausea and vomiting.   Genitourinary:  Negative for difficulty urinating.   Musculoskeletal:  Negative for back pain.   Skin:  Negative for rash.   Neurological:  Negative for headaches.   Psychiatric/Behavioral:  Negative for confusion.      Objective:     Vital Signs (Most Recent):  Temp: 97.5 °F (36.4 °C) (12/15/24 0959)  Pulse: 63 (12/15/24 0959)  Resp: 18 (12/15/24 0959)  BP: (!) 127/59 (12/15/24 0959)  SpO2: (!) 93 % (12/15/24 0959) Vital Signs (24h Range):  Temp:  [97.3 °F (36.3 °C)-98.3 °F (36.8 °C)] 97.5 °F (36.4 °C)  Pulse:  [60-88] 63  Resp:  [18-22] 18  SpO2:  [83 %-93 %] 93 %  BP: (111-164)/(57-84) 127/59     Weight: 78.1 kg (172 lb 2.9 oz)  Body mass index is 26.97 kg/m².    Intake/Output Summary (Last 24 hours) at 12/15/2024 1052  Last data filed at 12/15/2024 0017  Gross per 24 hour   Intake 347.19 ml   Output 225 ml   Net 122.19 ml         Physical Exam  Vitals reviewed.   Constitutional:       General: He is not in acute distress.     Appearance: He is well-developed. He is ill-appearing. He is not toxic-appearing or diaphoretic.      Interventions: Nasal cannula in place.   HENT:      Head: Normocephalic and atraumatic.      Right Ear: Decreased hearing noted.      Left Ear: Decreased hearing noted.   Eyes:      Pupils: Pupils are equal, round, and reactive to light.   Cardiovascular:      Rate and Rhythm: Normal rate and regular rhythm.      Heart sounds: Normal heart sounds. No murmur heard.     No friction rub. No gallop.   Pulmonary:      Effort: Pulmonary effort is normal. No respiratory distress.      Breath sounds: Normal breath  sounds. No stridor. No wheezing or rales.   Abdominal:      General: Bowel sounds are normal. There is no distension.      Palpations: Abdomen is soft. There is no mass.      Tenderness: There is no abdominal tenderness. There is no guarding.   Musculoskeletal:      Right lower leg: Edema present.      Left lower leg: Edema present.   Skin:     General: Skin is warm.      Findings: Erythema and lesion present.   Neurological:      Mental Status: He is alert and oriented to person, place, and time.      Motor: Weakness present.   Psychiatric:         Mood and Affect: Mood normal.         Behavior: Behavior normal.             Significant Labs: All pertinent labs within the past 24 hours have been reviewed.    Significant Imaging: I have reviewed all pertinent imaging results/findings within the past 24 hours.

## 2024-12-15 NOTE — PROGRESS NOTES
[facility] Telemetry ([facility])  [facility] Medicine  Progress Note    Patient Name: Jimmy Young  MRN: [id number]  Patient Class: IP- Inpatient   Admission Date: 12/10/2024  Length of Stay: 5 days  Attending Physician: Manuel Fermin MD  Primary Care Provider: Nacho Richardson MD        Subjective     Principal Problem:Acute on chronic respiratory failure with hypoxia        HPI:  Jimmy Young is a 89 y.o. male with a PMH  has a past medical history of Aneurysm (2016), Arthritis, Asthma, Atrial fibrillation, Cancer, CKD (chronic kidney disease), COPD (chronic obstructive pulmonary disease) (10/05/2017), Diabetes mellitus, Emphysema lung, Encounter for blood transfusion, and Hypertension.presented to the ED for swelling of the legs. Patient was referred from his primary care physician today for worsening lower extremity edema, shortness of breath, and cough. Patient is on chronic home O2 at 3L/min via NC. Reports compliance with his lasix and denies any excessive fluid or salt intake. Denies any other complaints at his time.      ER workup revealed CBC to be unremarkable.  CMP with BUN/creatinine at baseline of 29/2.2.   mg/dL.  Magnesium 1.2.  BNP, troponin, lactic acid within normal limits.  Flu/COVID negative.  Blood cultures obtained x2.  Ultrasound lower extremities negative for DVT.  Chest x-ray revealed left upper lobe opacity resembling early pneumonia.  EKG revealed a flutter with variable AV block and left axis deviation with a ventricular rate of 64 beats per minute with a QT/QTC of 416/429.  Vital signs stable.  Patient is at baseline oxygen saturation of 92-94% on 3 liters/minute via nasal cannula.  Patient received 12.5 mg carvedilol, 1 g Rocephin IV, 100 mg doxycycline p.o., 30 mg Lovenox, 40 mg Lasix IV, and 10 mg hydralazine p.o. at outside facility.  Hospital Medicine consulted to admit patient for CHF exacerbation and pneumonia. Patient and family in agreement with treatment plan.  Patient admitted under inpatient status.    PCP: Nacho Richardson      Overview/Hospital Course:  12/11 admitted for pneumonia. Wears supplemental oxygen at baseline, 2L. On 4L. Speech consulted. Schedule breathing treatments. Discontinue lasix. Replete lytes. Echocardiogram pending. Cardiology consulted. Relaxing normotensive range in this patient demographic.  12/12 blood culture positive for staph. Infectious disease consulted. Continue monitoring repeat blood cultures. Recent medication(s) change with linagliptin with risk for congestive heart failure and skin reaction. Discussed risk vs benefit. Hba1c 6.9. recommend goal hba1c 8-9.   12/13 bun/creatinine increasing. Discontinued intravenous lasix. Remains on increased supplemental oxygen, 5L. Wean as able. Home hydralazine resumed for elevated blood pressure. Repeat blood cultures negative growth to date x 1 day. Infectious disease following  12/14/2024  Patient required increasing to 40 L 100% FiO2 Vapotherm.  Pulmonology consult on case.  Solu-Medrol added.  Continue empiric antibiotics.  Repeat chest x-ray.  12/15/2024  Patient weaned down to 20 L 80% Vapotherm.  Will continue current management.    Interval History:  Shortness of breath improving.    Review of Systems   Constitutional:  Negative for fatigue and fever.   HENT:  Negative for sinus pressure.    Eyes:  Negative for visual disturbance.   Respiratory:  Positive for cough and shortness of breath.    Cardiovascular:  Positive for leg swelling. Negative for chest pain.   Gastrointestinal:  Negative for nausea and vomiting.   Genitourinary:  Negative for difficulty urinating.   Musculoskeletal:  Negative for back pain.   Skin:  Negative for rash.   Neurological:  Negative for headaches.   Psychiatric/Behavioral:  Negative for confusion.      Objective:     Vital Signs (Most Recent):  Temp: 97.5 °F (36.4 °C) (12/15/24 0959)  Pulse: 63 (12/15/24 0959)  Resp: 18 (12/15/24 0959)  BP: (!) 127/59  (12/15/24 0959)  SpO2: (!) 93 % (12/15/24 0959) Vital Signs (24h Range):  Temp:  [97.3 °F (36.3 °C)-98.3 °F (36.8 °C)] 97.5 °F (36.4 °C)  Pulse:  [60-88] 63  Resp:  [18-22] 18  SpO2:  [83 %-93 %] 93 %  BP: (111-164)/(57-84) 127/59     Weight: 78.1 kg (172 lb 2.9 oz)  Body mass index is 26.97 kg/m².    Intake/Output Summary (Last 24 hours) at 12/15/2024 1052  Last data filed at 12/15/2024 0017  Gross per 24 hour   Intake 347.19 ml   Output 225 ml   Net 122.19 ml         Physical Exam  Vitals reviewed.   Constitutional:       General: He is not in acute distress.     Appearance: He is well-developed. He is ill-appearing. He is not toxic-appearing or diaphoretic.      Interventions: Nasal cannula in place.   HENT:      Head: Normocephalic and atraumatic.      Right Ear: Decreased hearing noted.      Left Ear: Decreased hearing noted.   Eyes:      Pupils: Pupils are equal, round, and reactive to light.   Cardiovascular:      Rate and Rhythm: Normal rate and regular rhythm.      Heart sounds: Normal heart sounds. No murmur heard.     No friction rub. No gallop.   Pulmonary:      Effort: Pulmonary effort is normal. No respiratory distress.      Breath sounds: Normal breath sounds. No stridor. No wheezing or rales.   Abdominal:      General: Bowel sounds are normal. There is no distension.      Palpations: Abdomen is soft. There is no mass.      Tenderness: There is no abdominal tenderness. There is no guarding.   Musculoskeletal:      Right lower leg: Edema present.      Left lower leg: Edema present.   Skin:     General: Skin is warm.      Findings: Erythema and lesion present.   Neurological:      Mental Status: He is alert and oriented to person, place, and time.      Motor: Weakness present.   Psychiatric:         Mood and Affect: Mood normal.         Behavior: Behavior normal.             Significant Labs: All pertinent labs within the past 24 hours have been reviewed.    Significant Imaging: I have reviewed all  "pertinent imaging results/findings within the past 24 hours.    Assessment and Plan     * Acute on chronic respiratory failure with hypoxia  12/15/2024  Patient weaned down to 20 L 80% FiO2 Vapotherm  Continue Solu-Medrol   Continue ConnoroNeclaudia   Pulmonology on case  Chest x-ray reviewed      Lower extremity edema  Improving  Multifactorial: malnutrition, fluid indiscretion, venous stasis, recent medication(s) change, dependent edema  Counseling provided  Elevate legs   Manish hose and topicals    Atrial flutter  Controlled  Follow up outpatient primary cardiologist  Holding a/c due to pmh GI bleed       Pneumonia  Continue Rocephin and doxy       Chronic diastolic heart failure  Patient has  unspecified  heart failure that is Chronic. On presentation their CHF was . Most recent BNP and echo results are listed below.well compensated  No results for input(s): "BNP" in the last 72 hours.    Latest ECHO  Results for orders placed during the hospital encounter of 01/08/21    Echo Color Flow Doppler? Yes    Interpretation Summary  · The left ventricle is normal in size with moderate concentric hypertrophy and normal systolic function. The estimated ejection fraction is 60%  · Indeterminate left ventricular diastolic function.  · Normal right ventricular size with normal right ventricular systolic function.  · Mild tricuspid regurgitation.  · Normal central venous pressure (3 mmHg).  · The estimated PA systolic pressure is 32 mmHg.    Current Heart Failure Medications  , Every 12 hours, Oral  , 2 times daily with meals, Oral  hydrALAZINE injection 10 mg, Every 6 hours PRN, Intravenous  hydrALAZINE tablet 10 mg, Every 12 hours, Oral  acetaZOLAMIDE (DIAMOX) 250 mg in D5W 50 mL, Once, Intravenous    Plan  - Monitor strict I&Os and daily weights.    - Place on telemetry  - Low sodium diet  - Place on fluid restriction of 1.5 L.   - Cardiology has been consulted  - The patient's volume status is at their baseline  Ambulatory oxygen " evaluation   Nutrition consult   Discontinue intravenous lasix    Cardiology on case       Coronary artery disease of native artery of native heart with stable angina pectoris  Patient with known CAD which is controlled Will continue Statin and monitor for S/Sx of angina/ACS. Continue to monitor on telemetry.     CKD stage 3 secondary to diabetes  Creatine stable for now. BMP reviewed- noted Estimated Creatinine Clearance: 19.2 mL/min (A) (based on SCr of 2.2 mg/dL (H)). according to latest data. Based on current GFR, CKD stage is stage 3 - GFR 30-59.  Monitor UOP and serial BMP and adjust therapy as needed. Renally dose meds. Avoid nephrotoxic medications and procedures.  Discontinue intravenous lasix  Monitor trend    Hypercholesterolemia  Patient is chronically on statin.will continue for now. Last Lipid Panel:   Lab Results   Component Value Date    CHOL 121 07/03/2023    HDL 37 (L) 07/03/2023    LDLCALC 57 07/03/2023    TRIG 160 (H) 07/03/2023    CHOLHDL 27.8 08/01/2019     Plan:  -Continue home medication  -low fat/low calorie diet        Atrial fibrillation  Patient with Paroxysmal (<7 days) atrial fibrillation which is controlled currently with Bbs and Calcium Channel Blocker. Patient is currently in sinus rhythm.[id number] Score: 4.   Discussed cardiology vs gastroenterology recommendations   Patient voices understanding  Will continue holding ac    Acquired hypothyroidism  Patient has chronic hypothyroidism. TFTs reviewed-   Lab Results   Component Value Date    TSH 1.780 07/03/2023   . Will continue chronic levothyroxine and adjust for and clinical changes.        Essential hypertension  Chronic, controlled.  Latest blood pressure and vitals reviewed-   Temp:  [97.4 °F (36.3 °C)-98.2 °F (36.8 °C)]   Pulse:  [63-89]   Resp:  [15-21]   BP: (151-203)/(63-89)   SpO2:  [87 %-96 %] .   Home meds for hypertension were reviewed and noted below.   Hypertension Medications               amLODIPine (NORVASC) 5 MG  tablet Take 1 tablet (5 mg total) by mouth once daily.    carvediloL (COREG) 12.5 MG tablet Take 12.5 mg by mouth 2 (two) times daily with meals.    diltiaZEM (CARDIZEM CD) 300 MG 24 hr capsule Take 1 capsule by mouth every morning.    furosemide (LASIX) 20 MG tablet Take 1 tablet (20 mg total) by mouth once daily.    hydrALAZINE (APRESOLINE) 10 MG tablet Take 10 mg by mouth every 12 (twelve) hours.            While in the hospital, will manage blood pressure as follows; Adjust home antihypertensive regimen as follows- elevated blood pressure, resume home hydralazine    Will utilize p.r.n. blood pressure medication only if patient's blood pressure greater than  180/110 and he develops symptoms such as worsening chest pain or shortness of breath.        VTE Risk Mitigation (From admission, onward)           Ordered     Place SALUD hose  Until discontinued         12/12/24 0921     Place SALUD hose  Until discontinued         12/11/24 0949     enoxaparin injection 30 mg  Daily         12/10/24 1922     IP VTE HIGH RISK PATIENT  Once         12/10/24 1922     Place sequential compression device  Until discontinued         12/10/24 1922                    Discharge Planning   SHEA:      Code Status: DNR   Medical Readiness for Discharge Date:   Discharge Plan A: Home                    Manuel Fermin MD  Department of Hospital Medicine   [facility] - Telemetry ([facility])

## 2024-12-15 NOTE — PLAN OF CARE
BED MOB SBA    SIT<-->STAND SBA    AMBULATED WITH RW AND 02 AT 20 L/minutes 2 X 8' AND SIDE STEPS TO R WITH RW FOR POSITIONING IN SUPINE SBA    STANDING WITH RW X 15 EACH : MIP, HEEL RAISES,  SITTING LONG ARCH QUAD, AND HIP ADDUCTION WITH PILLOW BETWEEN KNEES.     PATIENT MOVED QUICKLY T/O ALL ACTIVITY POSSIBLY PLACING HIM AT GREATER RISK OF FALLING. HIS O2 SATE REMAINED ABOVE 85% T/O THIS SESSION

## 2024-12-15 NOTE — PT/OT/SLP PROGRESS
Physical Therapy  Treatment    Jimmy Young   MRN: [id number]   Admitting Diagnosis: Acute on chronic respiratory failure with hypoxia       PT Start Time: 1125     PT Stop Time: 1150    PT Total Time (min): 25 min       Billable Minutes:  Gait Training 10 and Therapeutic Exercise 15    Treatment Type: Treatment  PT/PTA: PTA     Number of PTA visits since last PT visit: 1       General Precautions: Standard, fall  Orthopedic Precautions: N/A  Braces: N/A  Respiratory Status: High flow, flow 20 L/min, concentration 80%         Subjective:  Communicated with GIANFRANCO prior to session.      Pain/Comfort  Pain Rating 1: 0/10  Pain Rating Post-Intervention 1: 0/10    Treatment and Education:    BED MOB SBA    SIT<-->STAND SBA    AMBULATED WITH RW AND 02 AT 20 L/minutes 2 X 8' AND SIDE STEPS TO R WITH RW FOR POSITIONING IN SUPINE SBA    STANDING WITH RW X 15 EACH : MIP, HEEL RAISES,  SITTING LONG ARCH QUAD, AND HIP ADDUCTION WITH PILLOW BETWEEN KNEES.     PATIENT MOVED QUICKLY T/O ALL ACTIVITY POSSIBLY PLACING HIM AT GREATER RISK OF FALLING. HIS O2 SATE REMAINED ABOVE 85% T/O THIS SESSION      AM-PAC 6 CLICK MOBILITY  How much help from another person does this patient currently need?   1 = Unable, Total/Dependent Assistance  2 = A lot, Maximum/Moderate Assistance  3 = A little, Minimum/Contact Guard/Supervision  4 = None, Modified [place]/Independent    Turning over in bed (including adjusting bedclothes, sheets and blankets)?: 4  Sitting down on and standing up from a chair with arms (e.g., wheelchair, bedside commode, etc.):  (NA)  Moving from lying on back to sitting on the side of the bed?: 4  Moving to and from a bed to a chair (including a wheelchair)?:  (NA)  Need to walk in hospital room?: 3  Climbing 3-5 steps with a railing?: 1    AM-PAC Raw Score CMS G-Code Modifier Level of Impairment Assistance   6 % Total / Unable   7 - 9 CM 80 - 100% Maximal Assist   10 - 14 CL 60 - 80% Moderate Assist   15  - 19 CK 40 - 60% Moderate Assist   20 - 22 CJ 20 - 40% Minimal Assist   23 CI 1-20% SBA / CGA   24 CH 0% Independent/ Mod I     Patient left supine with all lines intact, call button in reach, and bed alarm on.    Assessment:  Jimmy Young is a 89 y.o. male with a medical diagnosis of Acute on chronic respiratory failure with hypoxia and presents with .    Rehab identified problem list/impairments: weakness, impaired balance, impaired endurance, decreased lower extremity function, impaired self care skills, impaired functional mobility, decreased safety awareness    Rehab potential is good.    Activity tolerance: Fair    Discharge recommendations: Low Intensity Therapy      Barriers to discharge:      Equipment recommendations: none     GOALS:   Multidisciplinary Problems       Physical Therapy Goals          Problem: Physical Therapy    Goal Priority Disciplines Outcome Interventions   Physical Therapy Goal     PT, PT/OT Progressing    Description: LT24  1. PT WILL COMPLETE BED MOBILITY IND  2. PT WILL GT TRAIN X 500' WITH RW MOD I  3. PT WILL COMPLETE STANDING TE X 20 REPS TO INC STRENGTH / BALANCE  4. PT WILL INC AMPAC SCORE BY 2 POINTS TO PROGRESS GROSS FUNC MOBILITY.                          PLAN:    Patient to be seen 3 x/week to address the above listed problems via gait training, therapeutic activities, therapeutic exercises  Plan of Care expires: 24  Plan of Care reviewed with: patient, son         12/15/2024

## 2024-12-15 NOTE — PLAN OF CARE
Problem: Adult Inpatient Plan of Care  Goal: Plan of Care Review  Outcome: Progressing  Goal: Patient-Specific Goal (Individualized)  Outcome: Progressing  Goal: Absence of Hospital-Acquired Illness or Injury  Outcome: Progressing  Goal: Optimal Comfort and Wellbeing  Outcome: Progressing  Goal: Readiness for Transition of Care  Outcome: Progressing     Problem: Diabetes Comorbidity  Goal: Blood Glucose Level Within Targeted Range  Outcome: Progressing     Problem: Pneumonia  Goal: Fluid Balance  Outcome: Progressing  Goal: Resolution of Infection Signs and Symptoms  Outcome: Progressing  Goal: Effective Oxygenation and Ventilation  Outcome: Progressing     Problem: Fall Injury Risk  Goal: Absence of Fall and Fall-Related Injury  Outcome: Progressing     Problem: Skin Injury Risk Increased  Goal: Skin Health and Integrity  Outcome: Progressing

## 2024-12-15 NOTE — ASSESSMENT & PLAN NOTE
12/15/2024  Patient weaned down to 20 L 80% FiO2 Vapotherm  Continue Solu-Medrol   Continue Shannon   Pulmonology on case  Chest x-ray reviewed

## 2024-12-15 NOTE — PROGRESS NOTES
[facility] - Telemetry ([facility])  Cardiology  Progress Note     Patient Name: Jimmy Young  MRN: [id number]  Admission Date: 12/10/2024  Hospital Length of Stay: 3 days  Code Status: Partial Code   Attending Physician: Juliana Goodwin MD   Primary Care Physician: Nacho Richardson MD  Expected Discharge Date:   Principal Problem:Bacteremia due to Staphylococcus     Subjective:   HPI:  Mr. Young is a 89 year old male patient whose current medical conditions include COPD, AAA s/p repair, arthritis, asthma, CHF (EF recovered), PAF (not on AC due to prior bleeding issues/AVM's), and HTN who was referred to [facility] ED yesterday by his PCP due to worsening LE edema associated with SOB and cough. Patient denied any associated fever, chills, palpitations, near syncope, or syncope. Initial workup in ED revealed hypokalemia (K 3.1), hypomagnesemia (Mg 1.2), creatinine of 2.1, and negative BNP. EKG showed rate-controlled aflutter. CXR showed findings concerning for JENIFFER PNA and patient was subsequently transferred to [facility] for admission and further treatment. Cardiology consulted to assist with management. Patient seen and examined today, sitting up in bedside chair. Feels ok. Still SOB but improved. No CP symptoms. Does admit to salty food intake, still has some BLE edema. He reports compliance with his medications. Previously seen in clinic by Dr. Fair. Labs reviewed. Troponin negative. TTE pending. Prior cath 12/15 at [facility] showed non-obs CAD.       Hospital Course:   12/12/24 Pt seen and examined today sitting up on bedside chair, feels ok still with LE edema on exam on 5L nc, on 2-3L at home. Denies any CP. Labs  reviewed chart reviewed     12/13/24-Patient seen and examined today, lying in bed. Feels ok. SOB at baseline. No other CV complaints. Still on 5L NC. Given IV Lasix yesterday, d/c'd today due to bumped creatinine. ID on board, remains on abx.      12/14/24-Pt seen and examined today, resting in  bed. Feels okay. Remains in atrial flutter. Will give diamox iv 1x - adding bumex 3 mg IV BID     12/15/24- Patient seen and examined, no acute events overnight. Patient on high flow nasal cannula. Renal function slightly worse. Patient dose nort have much urine output. Will order lactic acid this morning as well as magnesium.     Review of Systems   Constitutional: Positive for malaise/fatigue.   HENT: Negative.     Eyes: Negative.    Cardiovascular:  Positive for dyspnea on exertion and leg swelling.   Respiratory:  Positive for shortness of breath.    Endocrine: Negative.    Hematologic/Lymphatic: Negative.    Skin: Negative.    Musculoskeletal: Negative.    Gastrointestinal: Negative.    Genitourinary: Negative.    Neurological: Negative.    Psychiatric/Behavioral: Negative.     Allergic/Immunologic: Negative.       Objective:      Vital Signs (Most Recent):  Temp: 98 °F (36.7 °C) (12/13/24 1225)  Pulse: 71 (12/13/24 1326)  Resp: 19 (12/13/24 1326)  BP: 135/62 (12/13/24 1225)  SpO2: (!) 90 % (12/13/24 1326) Vital Signs (24h Range):  Temp:  [97.4 °F (36.3 °C)-98 °F (36.7 °C)] 98 °F (36.7 °C)  Pulse:  [63-89] 71  Resp:  [15-20] 19  SpO2:  [87 %-93 %] 90 %  BP: (135-203)/(62-89) 135/62      Weight: 78.5 kg (173 lb 1 oz)  Body mass index is 27.11 kg/m².     SpO2: (!) 90 %           Intake/Output Summary (Last 24 hours) at 12/13/2024 1413  Last data filed at 12/13/2024 0629        Gross per 24 hour   Intake --   Output 2580 ml   Net -2580 ml         Lines/Drains/Airways         Peripheral Intravenous Line  Duration                     Peripheral IV - Single Lumen 12/11/24 0845 20 G Anterior;Distal;Left Upper Arm 2 days          Peripheral IV - Single Lumen 12/11/24 1030 20 G Right Antecubital 2 days                             Physical Exam  Vitals and nursing note reviewed.   Constitutional:       General: He is not in acute distress.     Appearance: He is well-developed. He is not diaphoretic.      Comments: On  "supplemental O2   HENT:      Head: Normocephalic and atraumatic.   Eyes:      General:         Right eye: No discharge.         Left eye: No discharge.      Pupils: Pupils are equal, round, and reactive to light.   Cardiovascular:      Rate and Rhythm: Normal rate. Rhythm irregularly irregular.      Heart sounds: Normal heart sounds, S1 normal and S2 normal. No murmur heard.  Pulmonary:      Effort: Pulmonary effort is normal.      Breath sounds: Rhonchi present.   Musculoskeletal:      Right lower leg: Edema present.      Left lower leg: Edema present.   Skin:     General: Skin is warm and dry.      Findings: No erythema.   Neurological:      Mental Status: He is alert and oriented to person, place, and time.   Psychiatric:         Mood and Affect: Mood normal.         Behavior: Behavior normal.               Significant Labs: CMP           Recent Labs   Lab 12/12/24  0410 12/13/24  0413    144   K 3.5 4.2    100   CO2 29 33*    109   BUN 29 35*   CREATININE 1.9* 2.2*   CALCIUM 8.3* 9.3   ANIONGAP 12 11   , CBC           Recent Labs   Lab 12/12/24  1221 12/13/24  0413   WBC 10.51 8.42   HGB 12.5* 12.3*   HCT 39.3* 38.9*    243   , Troponin No results for input(s): "TROPONINI" in the last 48 hours., and All pertinent lab results from the last 24 hours have been reviewed.     Significant Imaging: Echocardiogram: Transthoracic echo (TTE) complete ([facility] Only):             Results for orders placed or performed during the hospital encounter of 12/10/24   Echo   Result Value Ref Range     LV ESV A2C 85.30 mL     LA area A2C 24.76 cm2     Left Atrium Major Axis 5.90 cm     Left Atrium Minor Axis 6.54 cm     RA Major Axis 5.80 cm     LV Diastolic Volume 106.12 mL     LV Systolic Volume 40.02 mL     TR Max Regulo 3.39 m/s     PV mean gradient 2 mmHg     RVOT peak VTI 20.1 cm     RVOT peak regulo 1.04 m/s     Ao VTI 33.5 cm     Ao peak regulo 1.6 m/s     LVOT peak VTI 24.4 cm     LVOT peak regulo 1.1 m/s     " LVOT diameter 2.0 cm     PV peak gradient 4 mmHg     AV mean gradient 5.1 mmHg     TAPSE 1.48 cm     RVDD 3.92 cm     LA size 3.36 cm     Ascending aorta 3.32 cm     STJ 3.33 cm     LVIDs 3.2 2.1 - 4.0 cm     Ao root annulus 3.72 cm     PW 1.1 0.6 - 1.1 cm     IVS 1.1 0.6 - 1.1 cm     LVIDd 4.8 3.5 - 6.0 cm     PV PEAK VELOCITY 1.03 m/s     Left Ventricular Outflow Tract Mean Gradient 2.56 mmHg     Left Ventricular Outflow Tract Mean Velocity 0.75 cm/s     Left Ventricular End Systolic Volume by Teichholz Method 40.02 mL     Left Ventricular End Diastolic Volume by Teichholz Method 106.12 mL     IVC diameter 2.59 cm     FS 33.3 28 - 44 %     LV mass 194.0 g     ZLVIDD -1.30       ZLVIDS -0.39       Left Ventricle Relative Wall Thickness 0.46 cm     AV valve area 2.3 cm²     AV Velocity Ratio 0.69       AV index (prosthetic) 0.73       LVOT area 3.1 cm2     LVOT stroke volume 76.6 cm3     AV peak gradient 10.2 mmHg     LV Systolic Volume Index 20.7 mL/m2     LV Diastolic Volume Index 54.98 mL/m2     LV Mass Index 100.5 g/m2     Triscuspid Valve Regurgitation Peak Gradient 46 mmHg     NIURKA by Velocity Ratio 2.2 cm²     BSA 1.96 m2     RISHI 42.2 mL/m2     LA Vol 81.50 cm3     LA WIDTH 4.6 cm     RA Width 4.2 cm     TV resting pulmonary artery pressure 61 mmHg     RV TB RVSP 18 mmHg     Est. RA pres 15 mmHg     Narrative       Left Ventricle: The left ventricle is normal in size. Normal wall   thickness. There is concentric remodeling. There is normal systolic   function with a visually estimated ejection fraction of 55 - 60%. There is   indeterminate diastolic function.    Right Ventricle: Right ventricle was not well visualized due to poor   acoustic window. Systolic function is borderline low.    Left Atrium: Left atrium is moderately dilated.    Right Atrium: Right atrium is dilated.    Aortic Valve: There is mild aortic valve sclerosis. There is no   stenosis. Aortic valve peak velocity is 1.6 m/s. Mean gradient is  5.1   mmHg.    Mitral Valve: There is mild regurgitation.    Tricuspid Valve: There is moderate regurgitation.    Pulmonary Artery: The estimated pulmonary artery systolic pressure is   61 mmHg.    IVC/SVC: Elevated venous pressure at 15 mmHg.       and EKG: Reviewed  Assessment and Plan:      * Bacteremia due to Staphylococcus  -On abx, ID following     Atrial flutter  -Noted to be in rate-controlled aflutter  -Continue BB, CCB  -No AC given prior anemia issues/AVM's, high risk of recurrent bleeding mentioned in procedure note from 2017  -Discussed risks/benefits of AC with patient/family, await GI input     12/14/24  -Remains in atrial flutter     Pneumonia  -Per primary team, on abx     12/14/24  -Remains on high flow overnight     Chronic diastolic heart failure  -BNP normal  -Diurese prn  -Continue home CV meds  -TTE pending     12/13/24  -TTE with normal EF  -Given IV Lasix yesterday with good UOP  -Diurese prn     12/15 - continue diuretics      Coronary artery disease of native artery of native heart with stable angina pectoris  -Stable, CP free  -Continue OMT     CKD stage 3 secondary to diabetes  -Diuretics held given bumped creatinine     12/14/24  -Will restart diuretics if renal function improves  -Will give diamox iv x 1 and start Bumex    12/15/24  - cont to monitor         Hypercholesterolemia  -Statin     Atrial fibrillation  -See aflutter     Essential hypertension  -Continue home CV meds         Cardiology  [facility] - Telemetry (Hospital)     I have seen the patient, personally interviewed, and examined the patient at bedside.  I have performed the entire portion of the visit and formulated the assessment and plan as documented by the scribe.         Phillip Vega MD, [facility]  Cardiovascular Disease  [facility], [place]  [phone] (P)

## 2024-12-16 LAB
ANION GAP SERPL CALC-SCNC: 14 MMOL/L (ref 8–16)
BASOPHILS # BLD AUTO: 0.01 K/UL (ref 0–0.2)
BASOPHILS NFR BLD: 0.1 % (ref 0–1.9)
BUN SERPL-MCNC: 51 MG/DL (ref 10–30)
CALCIUM SERPL-MCNC: 8.8 MG/DL (ref 8.7–10.5)
CHLORIDE SERPL-SCNC: 93 MMOL/L (ref 95–110)
CO2 SERPL-SCNC: 29 MMOL/L (ref 23–29)
CREAT SERPL-MCNC: 2.5 MG/DL (ref 0.5–1.4)
DIFFERENTIAL METHOD BLD: ABNORMAL
EOSINOPHIL # BLD AUTO: 0 K/UL (ref 0–0.5)
EOSINOPHIL NFR BLD: 0 % (ref 0–8)
ERYTHROCYTE [DISTWIDTH] IN BLOOD BY AUTOMATED COUNT: 13 % (ref 11.5–14.5)
EST. GFR  (NO RACE VARIABLE): 23 ML/MIN/1.73 M^2
GLUCOSE SERPL-MCNC: 215 MG/DL (ref 70–110)
HCT VFR BLD AUTO: 38.8 % (ref 40–54)
HGB BLD-MCNC: 12.4 G/DL (ref 14–18)
IMM GRANULOCYTES # BLD AUTO: 0.17 K/UL (ref 0–0.04)
IMM GRANULOCYTES NFR BLD AUTO: 1.5 % (ref 0–0.5)
LYMPHOCYTES # BLD AUTO: 1 K/UL (ref 1–4.8)
LYMPHOCYTES NFR BLD: 8.9 % (ref 18–48)
MCH RBC QN AUTO: 29.6 PG (ref 27–31)
MCHC RBC AUTO-ENTMCNC: 32 G/DL (ref 32–36)
MCV RBC AUTO: 93 FL (ref 82–98)
MONOCYTES # BLD AUTO: 0.3 K/UL (ref 0.3–1)
MONOCYTES NFR BLD: 2.3 % (ref 4–15)
NEUTROPHILS # BLD AUTO: 9.9 K/UL (ref 1.8–7.7)
NEUTROPHILS NFR BLD: 87.2 % (ref 38–73)
NRBC BLD-RTO: 0 /100 WBC
PLATELET # BLD AUTO: 272 K/UL (ref 150–450)
PMV BLD AUTO: 10.7 FL (ref 9.2–12.9)
POTASSIUM SERPL-SCNC: 4.7 MMOL/L (ref 3.5–5.1)
RBC # BLD AUTO: 4.19 M/UL (ref 4.6–6.2)
SODIUM SERPL-SCNC: 136 MMOL/L (ref 136–145)
WBC # BLD AUTO: 11.39 K/UL (ref 3.9–12.7)

## 2024-12-16 PROCEDURE — 97110 THERAPEUTIC EXERCISES: CPT

## 2024-12-16 PROCEDURE — 36415 COLL VENOUS BLD VENIPUNCTURE: CPT | Performed by: NURSE PRACTITIONER

## 2024-12-16 PROCEDURE — 80048 BASIC METABOLIC PNL TOTAL CA: CPT | Performed by: NURSE PRACTITIONER

## 2024-12-16 PROCEDURE — 94799 UNLISTED PULMONARY SVC/PX: CPT

## 2024-12-16 PROCEDURE — 63600175 PHARM REV CODE 636 W HCPCS: Performed by: NURSE PRACTITIONER

## 2024-12-16 PROCEDURE — 99233 SBSQ HOSP IP/OBS HIGH 50: CPT | Mod: NSCH,,, | Performed by: INTERNAL MEDICINE

## 2024-12-16 PROCEDURE — 25000003 PHARM REV CODE 250: Performed by: NURSE PRACTITIONER

## 2024-12-16 PROCEDURE — 25000242 PHARM REV CODE 250 ALT 637 W/ HCPCS: Performed by: INTERNAL MEDICINE

## 2024-12-16 PROCEDURE — 25000242 PHARM REV CODE 250 ALT 637 W/ HCPCS: Performed by: FAMILY MEDICINE

## 2024-12-16 PROCEDURE — 27000249 HC VAPOTHERM CIRCUIT

## 2024-12-16 PROCEDURE — 97530 THERAPEUTIC ACTIVITIES: CPT | Mod: CQ

## 2024-12-16 PROCEDURE — 21400001 HC TELEMETRY ROOM

## 2024-12-16 PROCEDURE — 25000003 PHARM REV CODE 250: Performed by: FAMILY MEDICINE

## 2024-12-16 PROCEDURE — 94640 AIRWAY INHALATION TREATMENT: CPT

## 2024-12-16 PROCEDURE — 99900035 HC TECH TIME PER 15 MIN (STAT)

## 2024-12-16 PROCEDURE — 27100171 HC OXYGEN HIGH FLOW UP TO 24 HOURS

## 2024-12-16 PROCEDURE — 63600175 PHARM REV CODE 636 W HCPCS: Performed by: INTERNAL MEDICINE

## 2024-12-16 PROCEDURE — 94664 DEMO&/EVAL PT USE INHALER: CPT

## 2024-12-16 PROCEDURE — 85025 COMPLETE CBC W/AUTO DIFF WBC: CPT | Performed by: INTERNAL MEDICINE

## 2024-12-16 PROCEDURE — 94761 N-INVAS EAR/PLS OXIMETRY MLT: CPT

## 2024-12-16 PROCEDURE — 93010 ELECTROCARDIOGRAM REPORT: CPT | Mod: ,,, | Performed by: INTERNAL MEDICINE

## 2024-12-16 PROCEDURE — 93005 ELECTROCARDIOGRAM TRACING: CPT | Mod: ER

## 2024-12-16 PROCEDURE — 63600175 PHARM REV CODE 636 W HCPCS: Performed by: FAMILY MEDICINE

## 2024-12-16 RX ORDER — ONDANSETRON 4 MG/1
4 TABLET, ORALLY DISINTEGRATING ORAL EVERY 6 HOURS PRN
Status: DISCONTINUED | OUTPATIENT
Start: 2024-12-16 | End: 2024-12-26 | Stop reason: HOSPADM

## 2024-12-16 RX ORDER — ACETAMINOPHEN 325 MG/1
650 TABLET ORAL EVERY 6 HOURS PRN
Status: DISCONTINUED | OUTPATIENT
Start: 2024-12-16 | End: 2024-12-26 | Stop reason: HOSPADM

## 2024-12-16 RX ORDER — BUDESONIDE 0.5 MG/2ML
0.5 INHALANT ORAL EVERY 12 HOURS
Status: DISCONTINUED | OUTPATIENT
Start: 2024-12-16 | End: 2024-12-26 | Stop reason: HOSPADM

## 2024-12-16 RX ORDER — ARFORMOTEROL TARTRATE 15 UG/2ML
15 SOLUTION RESPIRATORY (INHALATION) 2 TIMES DAILY
Status: DISCONTINUED | OUTPATIENT
Start: 2024-12-16 | End: 2024-12-26 | Stop reason: HOSPADM

## 2024-12-16 RX ORDER — MORPHINE SULFATE ORAL SOLUTION 10 MG/5ML
10 SOLUTION ORAL EVERY 4 HOURS PRN
Status: DISCONTINUED | OUTPATIENT
Start: 2024-12-16 | End: 2024-12-26 | Stop reason: HOSPADM

## 2024-12-16 RX ORDER — SODIUM CHLORIDE FOR INHALATION 3 %
4 VIAL, NEBULIZER (ML) INHALATION EVERY 12 HOURS
Status: DISCONTINUED | OUTPATIENT
Start: 2024-12-16 | End: 2024-12-25

## 2024-12-16 RX ADMIN — MICONAZOLE NITRATE: 20 CREAM TOPICAL at 10:12

## 2024-12-16 RX ADMIN — IPRATROPIUM BROMIDE AND ALBUTEROL SULFATE 3 ML: 2.5; .5 SOLUTION RESPIRATORY (INHALATION) at 02:12

## 2024-12-16 RX ADMIN — IPRATROPIUM BROMIDE AND ALBUTEROL SULFATE 3 ML: 2.5; .5 SOLUTION RESPIRATORY (INHALATION) at 07:12

## 2024-12-16 RX ADMIN — SODIUM CHLORIDE 30 MG/ML INHALATION SOLUTION 4 ML: 30 SOLUTION INHALANT at 08:12

## 2024-12-16 RX ADMIN — MICONAZOLE NITRATE: 20 CREAM TOPICAL at 05:12

## 2024-12-16 RX ADMIN — METHYLPREDNISOLONE SODIUM SUCCINATE 40 MG: 40 INJECTION, POWDER, FOR SOLUTION INTRAMUSCULAR; INTRAVENOUS at 02:12

## 2024-12-16 RX ADMIN — BUDESONIDE INHALATION 0.5 MG: 0.5 SUSPENSION RESPIRATORY (INHALATION) at 08:12

## 2024-12-16 RX ADMIN — METRONIDAZOLE: 10 GEL TOPICAL at 08:12

## 2024-12-16 RX ADMIN — LEVOTHYROXINE SODIUM 50 MCG: 50 TABLET ORAL at 06:12

## 2024-12-16 RX ADMIN — DILTIAZEM HYDROCHLORIDE 300 MG: 180 CAPSULE, COATED, EXTENDED RELEASE ORAL at 06:12

## 2024-12-16 RX ADMIN — BUMETANIDE 3 MG: 0.25 INJECTION INTRAMUSCULAR; INTRAVENOUS at 08:12

## 2024-12-16 RX ADMIN — SODIUM CHLORIDE 30 MG/ML INHALATION SOLUTION 4 ML: 30 SOLUTION INHALANT at 02:12

## 2024-12-16 RX ADMIN — BUMETANIDE 3 MG: 0.25 INJECTION INTRAMUSCULAR; INTRAVENOUS at 09:12

## 2024-12-16 RX ADMIN — HYDRALAZINE HYDROCHLORIDE 10 MG: 10 TABLET, FILM COATED ORAL at 08:12

## 2024-12-16 RX ADMIN — MAGNESIUM OXIDE TAB 400 MG (241.3 MG ELEMENTAL MG) 400 MG: 400 (241.3 MG) TAB at 08:12

## 2024-12-16 RX ADMIN — CEFTRIAXONE 2 G: 2 INJECTION, POWDER, FOR SOLUTION INTRAMUSCULAR; INTRAVENOUS at 09:12

## 2024-12-16 RX ADMIN — ARFORMOTEROL TARTRATE 15 MCG: 15 SOLUTION RESPIRATORY (INHALATION) at 08:12

## 2024-12-16 RX ADMIN — LINEZOLID 600 MG: 600 INJECTION, SOLUTION INTRAVENOUS at 11:12

## 2024-12-16 RX ADMIN — PRAVASTATIN SODIUM 40 MG: 20 TABLET ORAL at 08:12

## 2024-12-16 RX ADMIN — IPRATROPIUM BROMIDE AND ALBUTEROL SULFATE 3 ML: 2.5; .5 SOLUTION RESPIRATORY (INHALATION) at 08:12

## 2024-12-16 RX ADMIN — PANTOPRAZOLE SODIUM 40 MG: 40 TABLET, DELAYED RELEASE ORAL at 05:12

## 2024-12-16 RX ADMIN — ENOXAPARIN SODIUM 30 MG: 30 INJECTION SUBCUTANEOUS at 05:12

## 2024-12-16 RX ADMIN — HYDRALAZINE HYDROCHLORIDE 10 MG: 20 INJECTION, SOLUTION INTRAMUSCULAR; INTRAVENOUS at 12:12

## 2024-12-16 RX ADMIN — LINEZOLID 600 MG: 600 INJECTION, SOLUTION INTRAVENOUS at 12:12

## 2024-12-16 RX ADMIN — METRONIDAZOLE: 10 GEL TOPICAL at 12:12

## 2024-12-16 RX ADMIN — METHYLPREDNISOLONE SODIUM SUCCINATE 40 MG: 40 INJECTION, POWDER, FOR SOLUTION INTRAMUSCULAR; INTRAVENOUS at 01:12

## 2024-12-16 NOTE — SUBJECTIVE & OBJECTIVE
Interval History: No acute events.  Persistent oxygen requirement @ 20L and 80% FiO2 via Vapotherm.  CXR today with drastic worsening on the left compared to prior.  No complaints of cough or sputum.  Denies dyspnea or pain.      Objective:     Vital Signs (Most Recent):  Temp: 98.7 °F (37.1 °C) (12/16/24 1157)  Pulse: 64 (12/16/24 1408)  Resp: 20 (12/16/24 1408)  BP: (!) 117/57 (12/16/24 1157)  SpO2: (!) 86 % (12/16/24 1408) Vital Signs (24h Range):  Temp:  [97.6 °F (36.4 °C)-98.7 °F (37.1 °C)] 98.7 °F (37.1 °C)  Pulse:  [] 64  Resp:  [16-22] 20  SpO2:  [84 %-94 %] 86 %  BP: (117-165)/() 117/57     Weight: 78.1 kg (172 lb 2.9 oz)  Body mass index is 26.97 kg/m².      Intake/Output Summary (Last 24 hours) at 12/16/2024 1417  Last data filed at 12/16/2024 1052  Gross per 24 hour   Intake 1127.6 ml   Output 1010 ml   Net 117.6 ml        Physical Exam  Vitals and nursing note reviewed.   Constitutional:       General: He is not in acute distress.     Comments: elderly   Cardiovascular:      Rate and Rhythm: Normal rate and regular rhythm.      Pulses: Normal pulses.      Heart sounds: No murmur heard.  Pulmonary:      Effort: Pulmonary effort is normal. No respiratory distress.      Breath sounds: No wheezing or rales.      Comments: Diminished on the left  Musculoskeletal:      Right lower leg: Edema present.      Left lower leg: Edema present.   Neurological:      General: No focal deficit present.      Mental Status: He is alert. Mental status is at baseline.           Review of Systems    Vents:  Oxygen Concentration (%): 80 (12/16/24 1408)    Lines/Drains/Airways       Peripheral Intravenous Line  Duration                  Peripheral IV - Single Lumen 12/11/24 0845 20 G Anterior;Distal;Left Upper Arm 5 days                    Significant Labs:    CBC/Anemia Profile:  Recent Labs   Lab 12/15/24  0413 12/16/24  0410   WBC 7.53 11.39   HGB 12.1* 12.4*   HCT 37.7* 38.8*    272   MCV 93 93   RDW 12.6  13.0        Chemistries:  Recent Labs   Lab 12/15/24  0413 12/15/24  1111 12/16/24  0410     --  136   K 4.7  --  4.7   CL 96  --  93*   CO2 30*  --  29   BUN 41*  --  51*   CREATININE 2.2*  --  2.5*   CALCIUM 8.9  --  8.8   MG  --  1.8  --        All pertinent labs within the past 24 hours have been reviewed.    Significant Imaging:  I have reviewed all pertinent imaging results/findings within the past 24 hours.

## 2024-12-16 NOTE — PLAN OF CARE
A255/A255 TRIPP  Jimmy Young is a 89 y.o.male admitted on 12/10/2024 for Acute on chronic respiratory failure with hypoxia   Code Status: DNR MRN: [id number]   Review of patient's allergies indicates:  No Known Allergies  Past Medical History:   Diagnosis Date    Aneurysm 2016    abdominal, stent placed    Arthritis     Asthma     Atrial fibrillation     Cancer     skin cancer on face    CKD (chronic kidney disease)     COPD (chronic obstructive pulmonary disease) 10/05/2017    Diabetes mellitus     Emphysema lung     Encounter for blood transfusion     Hypertension       PRN meds    acetaminophen, 650 mg, Q6H PRN  hydrALAZINE, 10 mg, Q6H PRN  morphine, 10 mg, Q4H PRN  ondansetron, 4 mg, Q6H PRN  sodium chloride 0.9%, 10 mL, PRN      Chart check completed. Will continue plan of care.     On vapotherem 20L @80%  Prn in place for dyspnea   Iv steroids  Nebs scheduled   VTE lovenox   Pulmonology following       Orientation: oriented x 4  Helen Coma Scale Score: 15     Lead Monitored: Lead II Rhythm: atrial rhythm Frequency/Ectopy: PVCs  Cardiac/Telemetry Box Number: [id number]  VTE Core Measure: (SCDs) Sequential compression device initiated/maintained Last Bowel Movement: 12/16/24  Diet Soft & Bite Sized (IDDSI Level 6) Low Sodium,2gm; Fluid - 1500mL; Standard Tray  Voiding Characteristics: voids spontaneously without difficulty  Abhishek Score: 17  Fall Risk Score: 15  Accucheck []   Freq?      Lines/Drains/Airways       Peripheral Intravenous Line  Duration                  Peripheral IV - Single Lumen 12/11/24 0845 20 G Anterior;Distal;Left Upper Arm 5 days

## 2024-12-16 NOTE — ASSESSMENT & PLAN NOTE
Patient with Hypoxic Respiratory failure which is Acute on chronic.  he is on home oxygen at 3 LPM. Supplemental oxygen was provided and noted- Oxygen Concentration (%):  [28-80] 80    Etiology likely a combination of heart failure, pulmonary HTN, pneumonia/COPD  Per family only symptom that was new on admit was leg edema, never had worsening dyspnea    -Wean steroids, cont duonebs  -Abx per ID  -Working on hospice transition  -see Pneumonia for further discussion

## 2024-12-16 NOTE — PROGRESS NOTES
[facility] - Telemetry ([facility])  Pulmonology  Progress Note    Patient Name: Jimmy Young  MRN: [id number]  Admission Date: 12/10/2024  Hospital Length of Stay: 6 days  Code Status: DNR  Attending Provider: Manuel Fermin MD  Primary Care Provider: Nacho Richardson MD   Principal Problem: Acute on chronic respiratory failure with hypoxia    Subjective:     Interval History: No acute events.  Persistent oxygen requirement @ 20L and 80% FiO2 via Vapotherm.  CXR today with drastic worsening on the left compared to prior.  No complaints of cough or sputum.  Denies dyspnea or pain.      Objective:     Vital Signs (Most Recent):  Temp: 98.7 °F (37.1 °C) (12/16/24 1157)  Pulse: 64 (12/16/24 1408)  Resp: 20 (12/16/24 1408)  BP: (!) 117/57 (12/16/24 1157)  SpO2: (!) 86 % (12/16/24 1408) Vital Signs (24h Range):  Temp:  [97.6 °F (36.4 °C)-98.7 °F (37.1 °C)] 98.7 °F (37.1 °C)  Pulse:  [] 64  Resp:  [16-22] 20  SpO2:  [84 %-94 %] 86 %  BP: (117-165)/() 117/57     Weight: 78.1 kg (172 lb 2.9 oz)  Body mass index is 26.97 kg/m².      Intake/Output Summary (Last 24 hours) at 12/16/2024 1417  Last data filed at 12/16/2024 1052  Gross per 24 hour   Intake 1127.6 ml   Output 1010 ml   Net 117.6 ml        Physical Exam  Vitals and nursing note reviewed.   Constitutional:       General: He is not in acute distress.     Comments: elderly   Cardiovascular:      Rate and Rhythm: Normal rate and regular rhythm.      Pulses: Normal pulses.      Heart sounds: No murmur heard.  Pulmonary:      Effort: Pulmonary effort is normal. No respiratory distress.      Breath sounds: No wheezing or rales.      Comments: Diminished on the left  Musculoskeletal:      Right lower leg: Edema present.      Left lower leg: Edema present.   Neurological:      General: No focal deficit present.      Mental Status: He is alert. Mental status is at baseline.           Review of Systems    Vents:  Oxygen Concentration (%): 80 (12/16/24  "1408)    Lines/Drains/Airways       Peripheral Intravenous Line  Duration                  Peripheral IV - Single Lumen 12/11/24 0845 20 G Anterior;Distal;Left Upper Arm 5 days                    Significant Labs:    CBC/Anemia Profile:  Recent Labs   Lab 12/15/24  0413 12/16/24  0410   WBC 7.53 11.39   HGB 12.1* 12.4*   HCT 37.7* 38.8*    272   MCV 93 93   RDW 12.6 13.0        Chemistries:  Recent Labs   Lab 12/15/24  0413 12/15/24  1111 12/16/24  0410     --  136   K 4.7  --  4.7   CL 96  --  93*   CO2 30*  --  29   BUN 41*  --  51*   CREATININE 2.2*  --  2.5*   CALCIUM 8.9  --  8.8   MG  --  1.8  --        All pertinent labs within the past 24 hours have been reviewed.    Significant Imaging:  I have reviewed all pertinent imaging results/findings within the past 24 hours.    ABG  No results for input(s): "PH", "PO2", "PCO2", "HCO3", "BE" in the last 168 hours.  Assessment/Plan:     Pulmonary  * Acute on chronic respiratory failure with hypoxia  Patient with Hypoxic Respiratory failure which is Acute on chronic.  he is on home oxygen at 3 LPM. Supplemental oxygen was provided and noted- Oxygen Concentration (%):  [28-80] 80    Etiology likely a combination of heart failure, pulmonary HTN, pneumonia/COPD  Per family only symptom that was new on admit was leg edema, never had worsening dyspnea    -Wean steroids, cont duonebs  -Abx per ID  -Working on hospice transition  -see Pneumonia for further discussion    Pneumonia    Antibiotics (From admission, onward)      Start     Stop Route Frequency Ordered    12/15/24 0900  cefTRIAXone injection 2 g         -- IV Every 24 hours (non-standard times) 12/14/24 1118    12/14/24 1230  linezolid 600 mg/300 mL IVPB 600 mg         -- IV Every 12 hours (non-standard times) 12/14/24 1118            Microbiology Results (last 7 days)       Procedure Component Value Units Date/Time    Culture, Respiratory with Gram Stain [[id number]]  (Abnormal) Collected: 12/14/24 " 1815    Order Status: Completed Specimen: Sputum, Expectorated Updated: 12/16/24 1258     Respiratory Culture CANDIDA TROPICALIS  Moderate       Gram Stain (Respiratory) <10 epithelial cells per low power field.     Gram Stain (Respiratory) Rare WBC's     Gram Stain (Respiratory) Rare Gram positive cocci     Gram Stain (Respiratory) Rare yeast     Gram Stain (Respiratory) Rare Gram negative rods    Blood culture [[id number]] Collected: 12/12/24 0907    Order Status: Completed Specimen: Blood from Peripheral, Hand, Right Updated: 12/15/24 2012     Blood Culture, Routine No Growth to date      No Growth to date      No Growth to date      No Growth to date    Blood culture [[id number]] Collected: 12/12/24 0912    Order Status: Completed Specimen: Blood from Peripheral, Hand, Left Updated: 12/15/24 2012     Blood Culture, Routine No Growth to date      No Growth to date      No Growth to date      No Growth to date    Blood Culture #2 **CANNOT BE ORDERED STAT** [[id number]]  (Abnormal)  (Susceptibility) Collected: 12/10/24 1056    Order Status: Completed Specimen: Blood from Peripheral, Forearm, Right Updated: 12/15/24 1314     Blood Culture, Routine Gram stain aer bottle: Gram positive cocci      Results called to and read back by: GEETA Holguin. 12/12/2024  05:15      STAPHYLOCOCCUS PETTENKOFERI    Blood Culture #1 **CANNOT BE ORDERED STAT** [[id number]]  (Abnormal)  (Susceptibility) Collected: 12/10/24 1053    Order Status: Completed Specimen: Blood from Peripheral, Antecubital, Right Updated: 12/15/24 1309     Blood Culture, Routine Gram stain aer bottle: Gram positive cocci      Positive results previously called 12/12/2024  13:46      STAPHYLOCOCCUS PETTENKOFERI    Respiratory Infection Panel (PCR), Nasopharyngeal [[id number]] Collected: 12/14/24 0802    Order Status: Completed Specimen: Nasopharyngeal Swab Updated: 12/14/24 1208     Respiratory Infection Panel Source NP swab     Adenovirus Not Detected      Coronavirus 229E, Common Cold Virus Not Detected     Coronavirus HKU1, Common Cold Virus Not Detected     Coronavirus NL63, Common Cold Virus Not Detected     Coronavirus OC43, Common Cold Virus Not Detected     Comment: The Coronavirus strains detected in this test cause the common cold.  These strains are not the COVID-19 (novel Coronavirus)strain   associated with the respiratory disease outbreak.          SARS-CoV2 (COVID-19) Qualitative PCR Not Detected     Human Metapneumovirus Not Detected     Human Rhinovirus/Enterovirus Not Detected     Influenza A (subtypes H1, H1-2009,H3) Not Detected     Influenza B Not Detected     Parainfluenza Virus 1 Not Detected     Parainfluenza Virus 2 Not Detected     Parainfluenza Virus 3 Not Detected     Parainfluenza Virus 4 Not Detected     Respiratory Syncytial Virus Not Detected     Bordetella Parapertussis (BK1417) Not Detected     Bordetella pertussis (ptxP) Not Detected     Chlamydia pneumoniae Not Detected     Mycoplasma pneumoniae Not Detected     Comment: Respiratory Infection Panel testing performed by Multiplex PCR.       Narrative:      Assay not valid for lower respiratory specimens, alternate  testing required.    Rapid Organism ID by PCR (from Blood culture) [[id number]]  (Abnormal) Collected: 12/10/24 1056    Order Status: Completed Updated: 12/12/24 0654     Enterococcus faecalis Not Detected     Enterococcus faecium Not Detected     Listeria monocytogenes Not Detected     Staphylococcus spp. Detected     Staphylococcus aureus Not Detected     Staphylococcus epidermidis Not Detected     Staphylococcus lugdunensis Not Detected     Streptococcus species Not Detected     Streptococcus agalactiae Not Detected     Streptococcus pneumoniae Not Detected     Streptococcus pyogenes Not Detected     Acinetobacter calcoaceticus/baumannii complex Not Detected     Bacteroides fragilis Not Detected     Enterobacterales Not Detected     Enterobacter cloacae complex Not  Detected     Escherichia coli Not Detected     Klebsiella aerogenes Not Detected     Klebsiella oxytoca Not Detected     Klebsiella pneumoniae group Not Detected     Proteus Not Detected     Salmonella sp Not Detected     Serratia marcescens Not Detected     Haemophilus influenzae Not Detected     Neisseria meningtidis Not Detected     Pseudomonas aeruginosa Not Detected     Stenotrophomonas maltophilia Not Detected     Candida albicans Not Detected     Candida auris Not Detected     Candida glabrata Not Detected     Candida krusei Not Detected     Candida parapsilosis Not Detected     Candida tropicalis Not Detected     Cryptococcus neoformans/gattii Not Detected     CTX-M (ESBL ) Test Not Applicable     IMP (Carbapenem resistant) Test Not Applicable     KPC resistance gene (Carbapenem resistant) Test Not Applicable     mcr-1  Test Not Applicable     mec A/C  Test Not Applicable     mec A/C and MREJ (MRSA) gene Test Not Applicable     NDM (Carbapenem resistant) Test Not Applicable     OXA-48-like (Carbapenem resistant) Test Not Applicable     van A/B (VRE gene) Test Not Applicable     VIM (Carbapenem resistant) Test Not Applicable          - significant worsening in CXR compared to 12/14 without really any worsening in symptoms.  - I feel the primary player in the opacification is parenchymal given the traction and feel the effusion probably isn't much worse  - already on broad spectrum Abx  - will add some additional nebs for airway clearance, but he is not complaining of sputum  - I understand that transition to home hospice, but I feel like this worsening makes this step unlikely.  Would consider inpatient hospice, though he may be requiring too much for them, as well.  - I discussed this with the patient's daughter at bedside, as well as Dr Fermin  - wean supplemental oxygen for goal SpO2 > 88%             Heber Faria MD  Pulmonology  [facility] - Telemetry ([facility])

## 2024-12-16 NOTE — PHYSICIAN QUERY
Please further specify the nutritional diagnosis associated with the below clinical findings. (Include all that apply).  Severe protein calorie malnutrition

## 2024-12-16 NOTE — PT/OT/SLP PROGRESS
Physical Therapy  Treatment    Jimmy Young   MRN: [id number]   Admitting Diagnosis: Acute on chronic respiratory failure with hypoxia    PT Received On: 12/16/24  PT Start Time: 0905     PT Stop Time: 0920    PT Total Time (min): 15 min       Billable Minutes:  Therapeutic Activity 15    Treatment Type: Treatment  PT/PTA: PTA     Number of PTA visits since last PT visit: 2       General Precautions: Standard, fall  Orthopedic Precautions: N/A  Braces: N/A  Respiratory Status:  VAPOTHERM 20L 80%         Subjective:  Communicated with patient's nurse, Renita, and completed Epic chart review prior to session.  Patient agreed to PT session. Reports feeling well this date.  Patient's family member reports RT attempted to wean Vapotherm earlier but was unsuccessful. Patient's max SpO2 on current level has been 88% this morning per family member report as well.     Pain/Comfort  Pain Rating 1: 0/10    Objective:   Patient found with: peripheral IV, telemetry, Other (comments), pulse ox (continuous) (VAPOTHERM)    SPO2, at rest with conversation, fluctuating between 80-84%    Supine AROM TE to BLE x10 reps with frequent and extended rest breaks:   Quad Sets   AP   Glute Sets    EOB and OOB mobility not attempted today due to fluctuating O2 sat and consistently below 88%. At one point, patient's SPO2 decreased to 79-81% and remained there for several minutes despite cued pursed lip breathing technique. Patient denies complaints throughout.     Educated patient and family member on importance of increased tolerance to upright position and direct impact on CV endurance and strength. Patient encouraged to utilize elevated HOB to simulate chair position until able to safely complete chair T/F. Patient given a minimum goal of majority of the day to be spent in upright, especially with all meals. Encouraged patient to perform light and low rep AROM TE to BLE throughout the day within all available planes of motion. Re  enforced importance of utilizing call light to meet needs in room and not attempt to get up without staff assistance. Patient and family member verbalized understanding and agreed to comply.      AM-PAC 6 CLICK MOBILITY  How much help from another person does this patient currently need?   1 = Unable, Total/Dependent Assistance  2 = A lot, Maximum/Moderate Assistance  3 = A little, Minimum/Contact Guard/Supervision  4 = None, Modified [place]/Independent    Turning over in bed (including adjusting bedclothes, sheets and blankets)?: 1 (NT)  Sitting down on and standing up from a chair with arms (e.g., wheelchair, bedside commode, etc.): 1 (NT)  Moving from lying on back to sitting on the side of the bed?: 1 (NT)  Moving to and from a bed to a chair (including a wheelchair)?: 1 (NT)  Need to walk in hospital room?: 1 (NT)  Climbing 3-5 steps with a railing?: 1 (NT)  Basic Mobility Total Score: 6    AM-PAC Raw Score CMS G-Code Modifier Level of Impairment Assistance   6 % Total / Unable   7 - 9 CM 80 - 100% Maximal Assist   10 - 14 CL 60 - 80% Moderate Assist   15 - 19 CK 40 - 60% Moderate Assist   20 - 22 CJ 20 - 40% Minimal Assist   23 CI 1-20% SBA / CGA   24 CH 0% Independent/ Mod I     Patient left HOB elevated with call button in reach and family member present. Discussed patient's performance and presentation with nurse post tx.     Assessment:  Jimmy Young is a 89 y.o. male with a medical diagnosis of Acute on chronic respiratory failure with hypoxia and presents with overall decline in functional mobility. Patient would continue to benefit from skilled PT to address functional limitations listed below in order to return to PLOF/decrease caregiver burden. Patient's ability to participate was limited this session due to respiratory status and low SpO2. Conservative treatment warranted. Will progress as able.     Rehab identified problem list/impairments: weakness, impaired endurance, impaired  self care skills, impaired functional mobility, gait instability, impaired balance, decreased safety awareness, decreased lower extremity function, decreased upper extremity function, decreased coordination, decreased ROM, impaired cardiopulmonary response to activity    Rehab potential is fair.    Activity tolerance: Fair    Discharge recommendations: Low Intensity Therapy      Barriers to discharge:      Equipment recommendations: none     GOALS:   Multidisciplinary Problems       Physical Therapy Goals          Problem: Physical Therapy    Goal Priority Disciplines Outcome Interventions   Physical Therapy Goal     PT, PT/OT Progressing    Description: LT24  1. PT WILL COMPLETE BED MOBILITY IND  2. PT WILL GT TRAIN X 500' WITH RW MOD I  3. PT WILL COMPLETE STANDING TE X 20 REPS TO INC STRENGTH / BALANCE  4. PT WILL INC AMPAC SCORE BY 2 POINTS TO PROGRESS GROSS FUNC MOBILITY.                          PLAN:    Patient to be seen 3 x/week to address the above listed problems via gait training, therapeutic activities, therapeutic exercises  Plan of Care expires: 24  Plan of Care reviewed with: patient         2024

## 2024-12-16 NOTE — PLAN OF CARE
12/16/24 1541   Rounds   Attendance Provider;;Charge nurse   Discharge Plan A Home;Home Health   Why the patient remains in the hospital Requires continued medical care   Transition of Care Barriers None     Pt remains hospitalized d/t continued medical care. Pt being followed by pulmonology and cardiology. Pt currently on 20L vapotherm. Therapy recs for LIT/HH.   Discharge disposition pending hospital course.   SW to remain available

## 2024-12-16 NOTE — SUBJECTIVE & OBJECTIVE
Interval History: Patient with O2 saturations overnight. Unable to be weaned at this time. Patient desat with minimal movement.     Review of Systems   Constitutional:  Negative for fatigue and fever.   HENT:  Negative for sinus pressure.    Eyes:  Negative for visual disturbance.   Respiratory:  Positive for cough and shortness of breath.    Cardiovascular:  Positive for leg swelling. Negative for chest pain.   Gastrointestinal:  Negative for nausea and vomiting.   Genitourinary:  Negative for difficulty urinating.   Musculoskeletal:  Negative for back pain.   Skin:  Negative for rash.   Neurological:  Negative for headaches.   Psychiatric/Behavioral:  Negative for confusion.      Objective:     Vital Signs (Most Recent):  Temp: 97.6 °F (36.4 °C) (12/16/24 0759)  Pulse: 71 (12/16/24 0920)  Resp: 16 (12/16/24 0759)  BP: 134/63 (12/16/24 0759)  SpO2: (!) 88 % (12/16/24 1043) Vital Signs (24h Range):  Temp:  [97.4 °F (36.3 °C)-98.5 °F (36.9 °C)] 97.6 °F (36.4 °C)  Pulse:  [] 71  Resp:  [16-22] 16  SpO2:  [84 %-94 %] 88 %  BP: (134-165)/() 134/63     Weight: 78.1 kg (172 lb 2.9 oz)  Body mass index is 26.97 kg/m².    Intake/Output Summary (Last 24 hours) at 12/16/2024 1051  Last data filed at 12/16/2024 0617  Gross per 24 hour   Intake 1007.6 ml   Output 1010 ml   Net -2.4 ml         Physical Exam  Vitals reviewed.   Constitutional:       General: He is not in acute distress.     Appearance: He is well-developed. He is ill-appearing. He is not toxic-appearing or diaphoretic.      Interventions: Nasal cannula in place.   HENT:      Head: Normocephalic and atraumatic.      Right Ear: Decreased hearing noted.      Left Ear: Decreased hearing noted.   Eyes:      Pupils: Pupils are equal, round, and reactive to light.   Cardiovascular:      Rate and Rhythm: Normal rate and regular rhythm.      Heart sounds: Normal heart sounds. No murmur heard.     No friction rub. No gallop.   Pulmonary:      Effort: Pulmonary  effort is normal. No respiratory distress.      Breath sounds: Normal breath sounds. No stridor. No wheezing or rales.   Abdominal:      General: Bowel sounds are normal. There is no distension.      Palpations: Abdomen is soft. There is no mass.      Tenderness: There is no abdominal tenderness. There is no guarding.   Musculoskeletal:      Right lower leg: Edema present.      Left lower leg: Edema present.   Skin:     General: Skin is warm.      Findings: Erythema and lesion present.   Neurological:      Mental Status: He is alert and oriented to person, place, and time.      Motor: Weakness present.   Psychiatric:         Mood and Affect: Mood normal.         Behavior: Behavior normal.             Significant Labs: All pertinent labs within the past 24 hours have been reviewed.    Significant Imaging: I have reviewed all pertinent imaging results/findings within the past 24 hours.

## 2024-12-16 NOTE — PROGRESS NOTES
Formerly [facility], [facility] Telemetry ([facility])  [facility] Medicine  Progress Note    Patient Name: Jimmy Young  MRN: [id number]  Patient Class: IP- Inpatient   Admission Date: 12/10/2024  Length of Stay: 6 days  Attending Physician: Manuel Fermin MD  Primary Care Provider: Nacho Richardson MD        Subjective     Principal Problem:Acute on chronic respiratory failure with hypoxia        HPI:  Jimmy Young is a 89 y.o. male with a PMH  has a past medical history of Aneurysm (2016), Arthritis, Asthma, Atrial fibrillation, Cancer, CKD (chronic kidney disease), COPD (chronic obstructive pulmonary disease) (10/05/2017), Diabetes mellitus, Emphysema lung, Encounter for blood transfusion, and Hypertension.presented to the ED for swelling of the legs. Patient was referred from his primary care physician today for worsening lower extremity edema, shortness of breath, and cough. Patient is on chronic home O2 at 3L/min via NC. Reports compliance with his lasix and denies any excessive fluid or salt intake. Denies any other complaints at his time.      ER workup revealed CBC to be unremarkable.  CMP with BUN/creatinine at baseline of 29/2.2.   mg/dL.  Magnesium 1.2.  BNP, troponin, lactic acid within normal limits.  Flu/COVID negative.  Blood cultures obtained x2.  Ultrasound lower extremities negative for DVT.  Chest x-ray revealed left upper lobe opacity resembling early pneumonia.  EKG revealed a flutter with variable AV block and left axis deviation with a ventricular rate of 64 beats per minute with a QT/QTC of 416/429.  Vital signs stable.  Patient is at baseline oxygen saturation of 92-94% on 3 liters/minute via nasal cannula.  Patient received 12.5 mg carvedilol, 1 g Rocephin IV, 100 mg doxycycline p.o., 30 mg Lovenox, 40 mg Lasix IV, and 10 mg hydralazine p.o. at outside facility.  Hospital Medicine consulted to admit patient for CHF exacerbation and pneumonia. Patient and family in agreement with treatment plan.  Patient admitted under inpatient status.    PCP: Nacho Richardson      Overview/Hospital Course:  12/11 admitted for pneumonia. Wears supplemental oxygen at baseline, 2L. On 4L. Speech consulted. Schedule breathing treatments. Discontinue lasix. Replete lytes. Echocardiogram pending. Cardiology consulted. Relaxing normotensive range in this patient demographic.  12/12 blood culture positive for staph. Infectious disease consulted. Continue monitoring repeat blood cultures. Recent medication(s) change with linagliptin with risk for congestive heart failure and skin reaction. Discussed risk vs benefit. Hba1c 6.9. recommend goal hba1c 8-9.   12/13 bun/creatinine increasing. Discontinued intravenous lasix. Remains on increased supplemental oxygen, 5L. Wean as able. Home hydralazine resumed for elevated blood pressure. Repeat blood cultures negative growth to date x 1 day. Infectious disease following  12/14/2024  Patient required increasing to 40 L 100% FiO2 Vapotherm.  Pulmonology consult on case.  Solu-Medrol added.  Continue empiric antibiotics.  Repeat chest x-ray.  12/15/2024  Patient weaned down to 20 L 80% Vapotherm.  Will continue current management.  12/16/2024  Still on 20L 80% vapotherm. Unable to be weaned at this time. Cont abx. Pulm and cardiology on case.     Interval History: Patient with O2 saturations overnight. Unable to be weaned at this time. Patient desat with minimal movement.     Review of Systems   Constitutional:  Negative for fatigue and fever.   HENT:  Negative for sinus pressure.    Eyes:  Negative for visual disturbance.   Respiratory:  Positive for cough and shortness of breath.    Cardiovascular:  Positive for leg swelling. Negative for chest pain.   Gastrointestinal:  Negative for nausea and vomiting.   Genitourinary:  Negative for difficulty urinating.   Musculoskeletal:  Negative for back pain.   Skin:  Negative for rash.   Neurological:  Negative for headaches.    Psychiatric/Behavioral:  Negative for confusion.      Objective:     Vital Signs (Most Recent):  Temp: 97.6 °F (36.4 °C) (12/16/24 0759)  Pulse: 71 (12/16/24 0920)  Resp: 16 (12/16/24 0759)  BP: 134/63 (12/16/24 0759)  SpO2: (!) 88 % (12/16/24 1043) Vital Signs (24h Range):  Temp:  [97.4 °F (36.3 °C)-98.5 °F (36.9 °C)] 97.6 °F (36.4 °C)  Pulse:  [] 71  Resp:  [16-22] 16  SpO2:  [84 %-94 %] 88 %  BP: (134-165)/() 134/63     Weight: 78.1 kg (172 lb 2.9 oz)  Body mass index is 26.97 kg/m².    Intake/Output Summary (Last 24 hours) at 12/16/2024 1051  Last data filed at 12/16/2024 0617  Gross per 24 hour   Intake 1007.6 ml   Output 1010 ml   Net -2.4 ml         Physical Exam  Vitals reviewed.   Constitutional:       General: He is not in acute distress.     Appearance: He is well-developed. He is ill-appearing. He is not toxic-appearing or diaphoretic.      Interventions: Nasal cannula in place.   HENT:      Head: Normocephalic and atraumatic.      Right Ear: Decreased hearing noted.      Left Ear: Decreased hearing noted.   Eyes:      Pupils: Pupils are equal, round, and reactive to light.   Cardiovascular:      Rate and Rhythm: Normal rate and regular rhythm.      Heart sounds: Normal heart sounds. No murmur heard.     No friction rub. No gallop.   Pulmonary:      Effort: Pulmonary effort is normal. No respiratory distress.      Breath sounds: Normal breath sounds. No stridor. No wheezing or rales.   Abdominal:      General: Bowel sounds are normal. There is no distension.      Palpations: Abdomen is soft. There is no mass.      Tenderness: There is no abdominal tenderness. There is no guarding.   Musculoskeletal:      Right lower leg: Edema present.      Left lower leg: Edema present.   Skin:     General: Skin is warm.      Findings: Erythema and lesion present.   Neurological:      Mental Status: He is alert and oriented to person, place, and time.      Motor: Weakness present.   Psychiatric:          "Mood and Affect: Mood normal.         Behavior: Behavior normal.             Significant Labs: All pertinent labs within the past 24 hours have been reviewed.    Significant Imaging: I have reviewed all pertinent imaging results/findings within the past 24 hours.      Assessment and Plan     * Acute on chronic respiratory failure with hypoxia  12/16/2024  Patient weaned down to 20 L 80% FiO2 Vapotherm  Continue Solu-Medrol   Continue DuoNebs   Pulmonology on case  Patient desatting with minimal movement  Unable to be weaned at this time  Prognosis guarded        Lower extremity edema  Improving  Multifactorial: malnutrition, fluid indiscretion, venous stasis, recent medication(s) change, dependent edema  Counseling provided  Elevate legs   Manish hose and topicals    Atrial flutter  Controlled  Follow up outpatient primary cardiologist  Holding a/c due to pmh GI bleed       Pneumonia  Will cont rocephin  Discussed case with ID yesterday   Doxy changed to zyvox       Chronic diastolic heart failure  Patient has  unspecified  heart failure that is Chronic. On presentation their CHF was . Most recent BNP and echo results are listed below.well compensated  No results for input(s): "BNP" in the last 72 hours.    Latest ECHO  Results for orders placed during the hospital encounter of 01/08/21    Echo Color Flow Doppler? Yes    Interpretation Summary  · The left ventricle is normal in size with moderate concentric hypertrophy and normal systolic function. The estimated ejection fraction is 60%  · Indeterminate left ventricular diastolic function.  · Normal right ventricular size with normal right ventricular systolic function.  · Mild tricuspid regurgitation.  · Normal central venous pressure (3 mmHg).  · The estimated PA systolic pressure is 32 mmHg.    Current Heart Failure Medications  , Every 12 hours, Oral  , 2 times daily with meals, Oral  hydrALAZINE injection 10 mg, Every 6 hours PRN, Intravenous  hydrALAZINE tablet " 10 mg, Every 12 hours, Oral  acetaZOLAMIDE (DIAMOX) 250 mg in D5W 50 mL, Once, Intravenous    Plan  - Monitor strict I&Os and daily weights.    - Place on telemetry  - Low sodium diet  - Place on fluid restriction of 1.5 L.   - Cardiology has been consulted  - The patient's volume status is at their baseline  Ambulatory oxygen evaluation   Nutrition consult   Discontinue intravenous lasix    Cardiology on case       Coronary artery disease of native artery of native heart with stable angina pectoris  Patient with known CAD which is controlled Will continue Statin and monitor for S/Sx of angina/ACS. Continue to monitor on telemetry.     CKD stage 3 secondary to diabetes  Creatine stable for now. BMP reviewed- noted Estimated Creatinine Clearance: 19.2 mL/min (A) (based on SCr of 2.2 mg/dL (H)). according to latest data. Based on current GFR, CKD stage is stage 3 - GFR 30-59.  Monitor UOP and serial BMP and adjust therapy as needed. Renally dose meds. Avoid nephrotoxic medications and procedures.  Discontinue intravenous lasix  Monitor trend    Hypercholesterolemia  Patient is chronically on statin.will continue for now. Last Lipid Panel:   Lab Results   Component Value Date    CHOL 121 07/03/2023    HDL 37 (L) 07/03/2023    LDLCALC 57 07/03/2023    TRIG 160 (H) 07/03/2023    CHOLHDL 27.8 08/01/2019     Plan:  -Continue home medication  -low fat/low calorie diet        Atrial fibrillation  Patient with Paroxysmal (<7 days) atrial fibrillation which is controlled currently with Bbs and Calcium Channel Blocker. Patient is currently in sinus rhythm.[id number] Score: 4.   Discussed cardiology vs gastroenterology recommendations   Patient voices understanding  Will continue holding ac    Acquired hypothyroidism  Patient has chronic hypothyroidism. TFTs reviewed-   Lab Results   Component Value Date    TSH 1.780 07/03/2023   . Will continue chronic levothyroxine and adjust for and clinical changes.        Essential  hypertension  Chronic, controlled.  Latest blood pressure and vitals reviewed-   Temp:  [97.4 °F (36.3 °C)-98.2 °F (36.8 °C)]   Pulse:  [63-89]   Resp:  [15-21]   BP: (151-203)/(63-89)   SpO2:  [87 %-96 %] .   Home meds for hypertension were reviewed and noted below.   Hypertension Medications               amLODIPine (NORVASC) 5 MG tablet Take 1 tablet (5 mg total) by mouth once daily.    carvediloL (COREG) 12.5 MG tablet Take 12.5 mg by mouth 2 (two) times daily with meals.    diltiaZEM (CARDIZEM CD) 300 MG 24 hr capsule Take 1 capsule by mouth every morning.    furosemide (LASIX) 20 MG tablet Take 1 tablet (20 mg total) by mouth once daily.    hydrALAZINE (APRESOLINE) 10 MG tablet Take 10 mg by mouth every 12 (twelve) hours.            While in the hospital, will manage blood pressure as follows; Adjust home antihypertensive regimen as follows- elevated blood pressure, resume home hydralazine    Will utilize p.r.n. blood pressure medication only if patient's blood pressure greater than  180/110 and he develops symptoms such as worsening chest pain or shortness of breath.        VTE Risk Mitigation (From admission, onward)           Ordered     Place SALUD hose  Until discontinued         12/12/24 0921     Place SALUD hose  Until discontinued         12/11/24 0949     enoxaparin injection 30 mg  Daily         12/10/24 1922     IP VTE HIGH RISK PATIENT  Once         12/10/24 1922     Place sequential compression device  Until discontinued         12/10/24 1922                    Discharge Planning   SHEA:      Code Status: DNR   Medical Readiness for Discharge Date:   Discharge Plan A: Home                    Manuel Fermin MD  Department of Hospital Medicine   '[facility] - Telemetry ([facility])

## 2024-12-16 NOTE — PT/OT/SLP PROGRESS
Occupational Therapy   Treatment    Name: Jimmy Young  MRN: [id number]  Admitting Diagnosis:  Acute on chronic respiratory failure with hypoxia       Recommendations:     Discharge Recommendations: Low Intensity Therapy  Discharge Equipment Recommendations:  none  Barriers to discharge:  None    Assessment:     Jimmy Young is a 89 y.o. male with a medical diagnosis of Acute on chronic respiratory failure with hypoxia.  He presents with the following performance deficits affecting function are weakness, impaired endurance, impaired self care skills, decreased lower extremity function, decreased upper extremity function, impaired balance, decreased coordination, decreased safety awareness, impaired cardiopulmonary response to activity, decreased ROM.     Rehab Prognosis:  Fair; patient would benefit from acute skilled OT services to address these deficits and reach maximum level of function.       Plan:     Patient to be seen 2 x/week to address the above listed problems via self-care/home management, therapeutic activities, therapeutic exercises, wheelchair management/training  Plan of Care Expires: 12/25/24  Plan of Care Reviewed with: patient    Subjective     Chief Complaint: no complaints this date, reports wanting to move if stats hold  Patient/Family Comments/goals: to get stronger and go home  Pain/Comfort:  Pain Rating 1: 0/10    Objective:     Communicated with: nurseRenita, prior to session.  Patient found supine with peripheral IV, telemetry, pulse ox (continuous) (vapotherm) upon OT entry to room.    General Precautions: Standard, fall    Orthopedic Precautions:N/A  Braces: N/A  Respiratory Status:  Vapotherm  20L, 30%     Occupational Performance:      [facility] 6 Click ADL: 20    Treatment & Education:  Pt engaged in supine therex to monitor oxygen saturation prior to attempting sitting EOB or any OOB transition. Pt's son reports oxygen has stayed between 84-85 recently and would drop with  simple conversation or energy exertion. Pt's oxygen closely monitored with simple AROM this date and dropped between 79-84%. Pt at one point stayed at 81% for extended time with increased rest for recovery and pursed lip deep breathing. OT role, plan of care, progression of goals, importance of continued OOB activity, ADL/functional transfer and mobility retraining, call don't fall, safety precautions, fall prevention.      Patient left supine with all lines intact, call button in reach, nurse notified, and son present    GOALS:   Multidisciplinary Problems       Occupational Therapy Goals          Problem: Occupational Therapy    Goal Priority Disciplines Outcome Interventions   Occupational Therapy Goal     OT, PT/OT Progressing    Description: Goals to be met by: 12/25/24     Patient will increase functional independence with ADLs by performing:    Toileting from toilet with Contact Guard Assistance for hygiene and clothing management.   Toilet transfer to bedside commode with Contact Guard Assistance.  Upper extremity exercise program x10 reps per handout, with supervision.                         Time Tracking:     OT Date of Treatment: 12/16/24  OT Start Time: 0905  OT Stop Time: 0920  OT Total Time (min): 15 min    Billable Minutes:Therapeutic Exercise 15  ALFONSO Moon  A client care conference was performed between the OTR/L and HAMILTON, prior to treatment by HAMILTON, to discuss the patient's status, treatment plan and established goals.      OT/MAGALY: MAGALY     Number of MAGALY visits since last OT visit: 1    12/16/2024

## 2024-12-16 NOTE — ASSESSMENT & PLAN NOTE
Antibiotics (From admission, onward)      Start     Stop Route Frequency Ordered    12/15/24 0900  cefTRIAXone injection 2 g         -- IV Every 24 hours (non-standard times) 12/14/24 1118    12/14/24 1230  linezolid 600 mg/300 mL IVPB 600 mg         -- IV Every 12 hours (non-standard times) 12/14/24 1118            Microbiology Results (last 7 days)       Procedure Component Value Units Date/Time    Culture, Respiratory with Gram Stain [[id number]]  (Abnormal) Collected: 12/14/24 1815    Order Status: Completed Specimen: Sputum, Expectorated Updated: 12/16/24 1258     Respiratory Culture CANDIDA TROPICALIS  Moderate       Gram Stain (Respiratory) <10 epithelial cells per low power field.     Gram Stain (Respiratory) Rare WBC's     Gram Stain (Respiratory) Rare Gram positive cocci     Gram Stain (Respiratory) Rare yeast     Gram Stain (Respiratory) Rare Gram negative rods    Blood culture [[id number]] Collected: 12/12/24 0907    Order Status: Completed Specimen: Blood from Peripheral, Hand, Right Updated: 12/15/24 2012     Blood Culture, Routine No Growth to date      No Growth to date      No Growth to date      No Growth to date    Blood culture [[id number]] Collected: 12/12/24 0912    Order Status: Completed Specimen: Blood from Peripheral, Hand, Left Updated: 12/15/24 2012     Blood Culture, Routine No Growth to date      No Growth to date      No Growth to date      No Growth to date    Blood Culture #2 **CANNOT BE ORDERED STAT** [[id number]]  (Abnormal)  (Susceptibility) Collected: 12/10/24 1056    Order Status: Completed Specimen: Blood from Peripheral, Forearm, Right Updated: 12/15/24 1314     Blood Culture, Routine Gram stain aer bottle: Gram positive cocci      Results called to and read back by: GEETA Holguin. 12/12/2024  05:15      STAPHYLOCOCCUS PETTENKOFERI    Blood Culture #1 **CANNOT BE ORDERED STAT** [[id number]]  (Abnormal)  (Susceptibility) Collected: 12/10/24 1053    Order Status: Completed  Specimen: Blood from Peripheral, Antecubital, Right Updated: 12/15/24 1309     Blood Culture, Routine Gram stain aer bottle: Gram positive cocci      Positive results previously called 12/12/2024  13:46      STAPHYLOCOCCUS PETTENKOFERI    Respiratory Infection Panel (PCR), Nasopharyngeal [[id number]] Collected: 12/14/24 0802    Order Status: Completed Specimen: Nasopharyngeal Swab Updated: 12/14/24 1208     Respiratory Infection Panel Source NP swab     Adenovirus Not Detected     Coronavirus 229E, Common Cold Virus Not Detected     Coronavirus HKU1, Common Cold Virus Not Detected     Coronavirus NL63, Common Cold Virus Not Detected     Coronavirus OC43, Common Cold Virus Not Detected     Comment: The Coronavirus strains detected in this test cause the common cold.  These strains are not the COVID-19 (novel Coronavirus)strain   associated with the respiratory disease outbreak.          SARS-CoV2 (COVID-19) Qualitative PCR Not Detected     Human Metapneumovirus Not Detected     Human Rhinovirus/Enterovirus Not Detected     Influenza A (subtypes H1, H1-2009,H3) Not Detected     Influenza B Not Detected     Parainfluenza Virus 1 Not Detected     Parainfluenza Virus 2 Not Detected     Parainfluenza Virus 3 Not Detected     Parainfluenza Virus 4 Not Detected     Respiratory Syncytial Virus Not Detected     Bordetella Parapertussis (YR0005) Not Detected     Bordetella pertussis (ptxP) Not Detected     Chlamydia pneumoniae Not Detected     Mycoplasma pneumoniae Not Detected     Comment: Respiratory Infection Panel testing performed by Multiplex PCR.       Narrative:      Assay not valid for lower respiratory specimens, alternate  testing required.    Rapid Organism ID by PCR (from Blood culture) [[id number]]  (Abnormal) Collected: 12/10/24 1056    Order Status: Completed Updated: 12/12/24 0654     Enterococcus faecalis Not Detected     Enterococcus faecium Not Detected     Listeria monocytogenes Not Detected      Staphylococcus spp. Detected     Staphylococcus aureus Not Detected     Staphylococcus epidermidis Not Detected     Staphylococcus lugdunensis Not Detected     Streptococcus species Not Detected     Streptococcus agalactiae Not Detected     Streptococcus pneumoniae Not Detected     Streptococcus pyogenes Not Detected     Acinetobacter calcoaceticus/baumannii complex Not Detected     Bacteroides fragilis Not Detected     Enterobacterales Not Detected     Enterobacter cloacae complex Not Detected     Escherichia coli Not Detected     Klebsiella aerogenes Not Detected     Klebsiella oxytoca Not Detected     Klebsiella pneumoniae group Not Detected     Proteus Not Detected     Salmonella sp Not Detected     Serratia marcescens Not Detected     Haemophilus influenzae Not Detected     Neisseria meningtidis Not Detected     Pseudomonas aeruginosa Not Detected     Stenotrophomonas maltophilia Not Detected     Candida albicans Not Detected     Candida auris Not Detected     Candida glabrata Not Detected     Candida krusei Not Detected     Candida parapsilosis Not Detected     Candida tropicalis Not Detected     Cryptococcus neoformans/gattii Not Detected     CTX-M (ESBL ) Test Not Applicable     IMP (Carbapenem resistant) Test Not Applicable     KPC resistance gene (Carbapenem resistant) Test Not Applicable     mcr-1  Test Not Applicable     mec A/C  Test Not Applicable     mec A/C and MREJ (MRSA) gene Test Not Applicable     NDM (Carbapenem resistant) Test Not Applicable     OXA-48-like (Carbapenem resistant) Test Not Applicable     van A/B (VRE gene) Test Not Applicable     VIM (Carbapenem resistant) Test Not Applicable          - significant worsening in CXR compared to 12/14 without really any worsening in symptoms.  - I feel the primary player in the opacification is parenchymal given the traction and feel the effusion probably isn't much worse  - already on broad spectrum Abx  - will add some additional nebs  for airway clearance, but he is not complaining of sputum  - I understand that transition to home hospice, but I feel like this worsening makes this step unlikely.  Would consider inpatient hospice, though he may be requiring too much for them, as well.  - I discussed this with the patient's daughter at bedside, as well as Dr Fermin  - wean supplemental oxygen for goal SpO2 > 88%

## 2024-12-16 NOTE — ASSESSMENT & PLAN NOTE
12/16/2024  Patient weaned down to 20 L 80% FiO2 Vapotherm  Continue Solu-Medrol   Continue Shannon   Pulmonology on case  Patient desatting with minimal movement  Unable to be weaned at this time  Prognosis guarded

## 2024-12-17 LAB
ANION GAP SERPL CALC-SCNC: 13 MMOL/L (ref 8–16)
BACTERIA BLD CULT: NORMAL
BACTERIA BLD CULT: NORMAL
BASOPHILS # BLD AUTO: 0.01 K/UL (ref 0–0.2)
BASOPHILS NFR BLD: 0.1 % (ref 0–1.9)
BUN SERPL-MCNC: 66 MG/DL (ref 10–30)
CALCIUM SERPL-MCNC: 8.4 MG/DL (ref 8.7–10.5)
CHLORIDE SERPL-SCNC: 93 MMOL/L (ref 95–110)
CO2 SERPL-SCNC: 29 MMOL/L (ref 23–29)
CREAT SERPL-MCNC: 2.8 MG/DL (ref 0.5–1.4)
DIFFERENTIAL METHOD BLD: ABNORMAL
EOSINOPHIL # BLD AUTO: 0 K/UL (ref 0–0.5)
EOSINOPHIL NFR BLD: 0 % (ref 0–8)
ERYTHROCYTE [DISTWIDTH] IN BLOOD BY AUTOMATED COUNT: 13 % (ref 11.5–14.5)
EST. GFR  (NO RACE VARIABLE): 20 ML/MIN/1.73 M^2
GLUCOSE SERPL-MCNC: 254 MG/DL (ref 70–110)
HCT VFR BLD AUTO: 39.6 % (ref 40–54)
HGB BLD-MCNC: 12.6 G/DL (ref 14–18)
IMM GRANULOCYTES # BLD AUTO: 0.18 K/UL (ref 0–0.04)
IMM GRANULOCYTES NFR BLD AUTO: 2.1 % (ref 0–0.5)
LYMPHOCYTES # BLD AUTO: 0.7 K/UL (ref 1–4.8)
LYMPHOCYTES NFR BLD: 8.4 % (ref 18–48)
MCH RBC QN AUTO: 29.4 PG (ref 27–31)
MCHC RBC AUTO-ENTMCNC: 31.8 G/DL (ref 32–36)
MCV RBC AUTO: 92 FL (ref 82–98)
MONOCYTES # BLD AUTO: 0.2 K/UL (ref 0.3–1)
MONOCYTES NFR BLD: 2.4 % (ref 4–15)
NEUTROPHILS # BLD AUTO: 7.4 K/UL (ref 1.8–7.7)
NEUTROPHILS NFR BLD: 87 % (ref 38–73)
NRBC BLD-RTO: 0 /100 WBC
PLATELET # BLD AUTO: 256 K/UL (ref 150–450)
PMV BLD AUTO: 10.7 FL (ref 9.2–12.9)
POTASSIUM SERPL-SCNC: 4.5 MMOL/L (ref 3.5–5.1)
RBC # BLD AUTO: 4.29 M/UL (ref 4.6–6.2)
SODIUM SERPL-SCNC: 135 MMOL/L (ref 136–145)
WBC # BLD AUTO: 8.47 K/UL (ref 3.9–12.7)

## 2024-12-17 PROCEDURE — 25000003 PHARM REV CODE 250: Performed by: FAMILY MEDICINE

## 2024-12-17 PROCEDURE — 63600175 PHARM REV CODE 636 W HCPCS: Performed by: INTERNAL MEDICINE

## 2024-12-17 PROCEDURE — 63600175 PHARM REV CODE 636 W HCPCS: Performed by: NURSE PRACTITIONER

## 2024-12-17 PROCEDURE — 85025 COMPLETE CBC W/AUTO DIFF WBC: CPT | Performed by: INTERNAL MEDICINE

## 2024-12-17 PROCEDURE — 97530 THERAPEUTIC ACTIVITIES: CPT

## 2024-12-17 PROCEDURE — 94761 N-INVAS EAR/PLS OXIMETRY MLT: CPT

## 2024-12-17 PROCEDURE — 99900035 HC TECH TIME PER 15 MIN (STAT)

## 2024-12-17 PROCEDURE — 21400001 HC TELEMETRY ROOM

## 2024-12-17 PROCEDURE — 25000242 PHARM REV CODE 250 ALT 637 W/ HCPCS: Performed by: FAMILY MEDICINE

## 2024-12-17 PROCEDURE — 94799 UNLISTED PULMONARY SVC/PX: CPT

## 2024-12-17 PROCEDURE — 93005 ELECTROCARDIOGRAM TRACING: CPT | Mod: ER

## 2024-12-17 PROCEDURE — 93010 ELECTROCARDIOGRAM REPORT: CPT | Mod: ,,, | Performed by: INTERNAL MEDICINE

## 2024-12-17 PROCEDURE — 25000242 PHARM REV CODE 250 ALT 637 W/ HCPCS: Performed by: INTERNAL MEDICINE

## 2024-12-17 PROCEDURE — 99233 SBSQ HOSP IP/OBS HIGH 50: CPT | Mod: NSCH,,, | Performed by: INTERNAL MEDICINE

## 2024-12-17 PROCEDURE — 27000249 HC VAPOTHERM CIRCUIT

## 2024-12-17 PROCEDURE — 94640 AIRWAY INHALATION TREATMENT: CPT

## 2024-12-17 PROCEDURE — 80048 BASIC METABOLIC PNL TOTAL CA: CPT | Performed by: NURSE PRACTITIONER

## 2024-12-17 PROCEDURE — 97535 SELF CARE MNGMENT TRAINING: CPT

## 2024-12-17 PROCEDURE — 25000003 PHARM REV CODE 250: Performed by: NURSE PRACTITIONER

## 2024-12-17 PROCEDURE — 27100171 HC OXYGEN HIGH FLOW UP TO 24 HOURS

## 2024-12-17 PROCEDURE — 94664 DEMO&/EVAL PT USE INHALER: CPT

## 2024-12-17 PROCEDURE — 63600175 PHARM REV CODE 636 W HCPCS: Performed by: FAMILY MEDICINE

## 2024-12-17 PROCEDURE — 27000646 HC AEROBIKA DEVICE

## 2024-12-17 PROCEDURE — 36415 COLL VENOUS BLD VENIPUNCTURE: CPT | Performed by: NURSE PRACTITIONER

## 2024-12-17 PROCEDURE — 97530 THERAPEUTIC ACTIVITIES: CPT | Mod: CQ

## 2024-12-17 RX ADMIN — IPRATROPIUM BROMIDE AND ALBUTEROL SULFATE 3 ML: 2.5; .5 SOLUTION RESPIRATORY (INHALATION) at 07:12

## 2024-12-17 RX ADMIN — MAGNESIUM OXIDE TAB 400 MG (241.3 MG ELEMENTAL MG) 400 MG: 400 (241.3 MG) TAB at 10:12

## 2024-12-17 RX ADMIN — HYDRALAZINE HYDROCHLORIDE 10 MG: 10 TABLET, FILM COATED ORAL at 10:12

## 2024-12-17 RX ADMIN — ARFORMOTEROL TARTRATE 15 MCG: 15 SOLUTION RESPIRATORY (INHALATION) at 07:12

## 2024-12-17 RX ADMIN — METRONIDAZOLE: 10 GEL TOPICAL at 10:12

## 2024-12-17 RX ADMIN — IPRATROPIUM BROMIDE AND ALBUTEROL SULFATE 3 ML: 2.5; .5 SOLUTION RESPIRATORY (INHALATION) at 12:12

## 2024-12-17 RX ADMIN — METRONIDAZOLE: 10 GEL TOPICAL at 01:12

## 2024-12-17 RX ADMIN — CEFTRIAXONE 2 G: 2 INJECTION, POWDER, FOR SOLUTION INTRAMUSCULAR; INTRAVENOUS at 09:12

## 2024-12-17 RX ADMIN — PANTOPRAZOLE SODIUM 40 MG: 40 TABLET, DELAYED RELEASE ORAL at 06:12

## 2024-12-17 RX ADMIN — BUDESONIDE INHALATION 0.5 MG: 0.5 SUSPENSION RESPIRATORY (INHALATION) at 07:12

## 2024-12-17 RX ADMIN — PRAVASTATIN SODIUM 40 MG: 20 TABLET ORAL at 09:12

## 2024-12-17 RX ADMIN — METHYLPREDNISOLONE SODIUM SUCCINATE 40 MG: 40 INJECTION, POWDER, FOR SOLUTION INTRAMUSCULAR; INTRAVENOUS at 02:12

## 2024-12-17 RX ADMIN — LEVOTHYROXINE SODIUM 50 MCG: 50 TABLET ORAL at 05:12

## 2024-12-17 RX ADMIN — MICONAZOLE NITRATE: 20 CREAM TOPICAL at 06:12

## 2024-12-17 RX ADMIN — HYDRALAZINE HYDROCHLORIDE 10 MG: 10 TABLET, FILM COATED ORAL at 09:12

## 2024-12-17 RX ADMIN — ENOXAPARIN SODIUM 30 MG: 30 INJECTION SUBCUTANEOUS at 06:12

## 2024-12-17 RX ADMIN — METHYLPREDNISOLONE SODIUM SUCCINATE 40 MG: 40 INJECTION, POWDER, FOR SOLUTION INTRAMUSCULAR; INTRAVENOUS at 01:12

## 2024-12-17 RX ADMIN — BUMETANIDE 3 MG: 0.25 INJECTION INTRAMUSCULAR; INTRAVENOUS at 09:12

## 2024-12-17 RX ADMIN — SODIUM CHLORIDE 30 MG/ML INHALATION SOLUTION 4 ML: 30 SOLUTION INHALANT at 07:12

## 2024-12-17 RX ADMIN — MAGNESIUM OXIDE TAB 400 MG (241.3 MG ELEMENTAL MG) 400 MG: 400 (241.3 MG) TAB at 09:12

## 2024-12-17 RX ADMIN — BUMETANIDE 3 MG: 0.25 INJECTION INTRAMUSCULAR; INTRAVENOUS at 08:12

## 2024-12-17 RX ADMIN — DILTIAZEM HYDROCHLORIDE 300 MG: 180 CAPSULE, COATED, EXTENDED RELEASE ORAL at 07:12

## 2024-12-17 RX ADMIN — LINEZOLID 600 MG: 600 INJECTION, SOLUTION INTRAVENOUS at 01:12

## 2024-12-17 RX ADMIN — MICONAZOLE NITRATE: 20 CREAM TOPICAL at 09:12

## 2024-12-17 NOTE — PROGRESS NOTES
[facility] Telemetry ([facility])  [facility] Medicine  Progress Note    Patient Name: Jimmy Young  MRN: [id number]  Patient Class: IP- Inpatient   Admission Date: 12/10/2024  Length of Stay: 7 days  Attending Physician: Manuel Fermin MD  Primary Care Provider: Nacho Richardson MD        Subjective     Principal Problem:Acute on chronic respiratory failure with hypoxia        HPI:  Jimmy Young is a 89 y.o. male with a PMH  has a past medical history of Aneurysm (2016), Arthritis, Asthma, Atrial fibrillation, Cancer, CKD (chronic kidney disease), COPD (chronic obstructive pulmonary disease) (10/05/2017), Diabetes mellitus, Emphysema lung, Encounter for blood transfusion, and Hypertension.presented to the ED for swelling of the legs. Patient was referred from his primary care physician today for worsening lower extremity edema, shortness of breath, and cough. Patient is on chronic home O2 at 3L/min via NC. Reports compliance with his lasix and denies any excessive fluid or salt intake. Denies any other complaints at his time.      ER workup revealed CBC to be unremarkable.  CMP with BUN/creatinine at baseline of 29/2.2.   mg/dL.  Magnesium 1.2.  BNP, troponin, lactic acid within normal limits.  Flu/COVID negative.  Blood cultures obtained x2.  Ultrasound lower extremities negative for DVT.  Chest x-ray revealed left upper lobe opacity resembling early pneumonia.  EKG revealed a flutter with variable AV block and left axis deviation with a ventricular rate of 64 beats per minute with a QT/QTC of 416/429.  Vital signs stable.  Patient is at baseline oxygen saturation of 92-94% on 3 liters/minute via nasal cannula.  Patient received 12.5 mg carvedilol, 1 g Rocephin IV, 100 mg doxycycline p.o., 30 mg Lovenox, 40 mg Lasix IV, and 10 mg hydralazine p.o. at outside facility.  Hospital Medicine consulted to admit patient for CHF exacerbation and pneumonia. Patient and family in agreement with treatment plan.  Patient admitted under inpatient status.    PCP: Nacho Richardson      Overview/Hospital Course:  12/11 admitted for pneumonia. Wears supplemental oxygen at baseline, 2L. On 4L. Speech consulted. Schedule breathing treatments. Discontinue lasix. Replete lytes. Echocardiogram pending. Cardiology consulted. Relaxing normotensive range in this patient demographic.  12/12 blood culture positive for staph. Infectious disease consulted. Continue monitoring repeat blood cultures. Recent medication(s) change with linagliptin with risk for congestive heart failure and skin reaction. Discussed risk vs benefit. Hba1c 6.9. recommend goal hba1c 8-9.   12/13 bun/creatinine increasing. Discontinued intravenous lasix. Remains on increased supplemental oxygen, 5L. Wean as able. Home hydralazine resumed for elevated blood pressure. Repeat blood cultures negative growth to date x 1 day. Infectious disease following  12/14/2024  Patient required increasing to 40 L 100% FiO2 Vapotherm.  Pulmonology consult on case.  Solu-Medrol added.  Continue empiric antibiotics.  Repeat chest x-ray.  12/15/2024  Patient weaned down to 20 L 80% Vapotherm.  Will continue current management.  12/16/2024  Still on 20L 80% vapotherm. Unable to be weaned at this time. Cont abx. Pulm and cardiology on case.   12/17/2024  Chest x-ray improved today compared to yesterday.  Still on 20 L 90% Vapotherm.  Continue antibiotics.    Interval History:  No acute issues reported overnight.    Review of Systems   Constitutional:  Negative for fatigue and fever.   HENT:  Negative for sinus pressure.    Eyes:  Negative for visual disturbance.   Respiratory:  Positive for cough and shortness of breath.    Cardiovascular:  Positive for leg swelling. Negative for chest pain.   Gastrointestinal:  Negative for nausea and vomiting.   Genitourinary:  Negative for difficulty urinating.   Musculoskeletal:  Negative for back pain.   Skin:  Negative for rash.   Neurological:   Negative for headaches.   Psychiatric/Behavioral:  Negative for confusion.      Objective:     Vital Signs (Most Recent):  Temp: 97.6 °F (36.4 °C) (12/17/24 1151)  Pulse: 81 (12/17/24 1151)  Resp: 15 (12/17/24 1151)  BP: 138/70 (12/17/24 1151)  SpO2: (!) 90 % (12/17/24 1151) Vital Signs (24h Range):  Temp:  [97.5 °F (36.4 °C)-98.2 °F (36.8 °C)] 97.6 °F (36.4 °C)  Pulse:  [58-94] 81  Resp:  [15-22] 15  SpO2:  [85 %-90 %] 90 %  BP: (125-142)/(66-75) 138/70     Weight: 82.9 kg (182 lb 12.2 oz)  Body mass index is 28.62 kg/m².    Intake/Output Summary (Last 24 hours) at 12/17/2024 1246  Last data filed at 12/17/2024 0800  Gross per 24 hour   Intake 766.39 ml   Output --   Net 766.39 ml         Physical Exam  Vitals reviewed.   Constitutional:       General: He is not in acute distress.     Appearance: He is well-developed. He is ill-appearing. He is not toxic-appearing or diaphoretic.      Interventions: Nasal cannula in place.   HENT:      Head: Normocephalic and atraumatic.      Right Ear: Decreased hearing noted.      Left Ear: Decreased hearing noted.   Eyes:      Pupils: Pupils are equal, round, and reactive to light.   Cardiovascular:      Rate and Rhythm: Normal rate and regular rhythm.      Heart sounds: Normal heart sounds. No murmur heard.     No friction rub. No gallop.   Pulmonary:      Effort: Pulmonary effort is normal. No respiratory distress.      Breath sounds: Normal breath sounds. No stridor. No wheezing or rales.   Abdominal:      General: Bowel sounds are normal. There is no distension.      Palpations: Abdomen is soft. There is no mass.      Tenderness: There is no abdominal tenderness. There is no guarding.   Musculoskeletal:      Right lower leg: Edema present.      Left lower leg: Edema present.   Skin:     General: Skin is warm.      Findings: Erythema and lesion present.   Neurological:      Mental Status: He is alert and oriented to person, place, and time.      Motor: Weakness present.  "  Psychiatric:         Mood and Affect: Mood normal.         Behavior: Behavior normal.             Significant Labs: All pertinent labs within the past 24 hours have been reviewed.    Significant Imaging: I have reviewed all pertinent imaging results/findings within the past 24 hours.    Assessment and Plan     * Acute on chronic respiratory failure with hypoxia  12/17/2024  Patient on 20 L 90% FiO2 Vapotherm  Continue Solu-Medrol   Continue DuoNebs   Pulmonology on case  Patient desatting with minimal movement  Unable to be weaned at this time  Prognosis guarded  Chest xray reviewed  Improved today in comparison to yesterday       Lower extremity edema  Improving  Multifactorial: malnutrition, fluid indiscretion, venous stasis, recent medication(s) change, dependent edema  Counseling provided  Elevate legs   Manish hose and topicals    Atrial flutter  Controlled  Follow up outpatient primary cardiologist  Holding a/c due to pmh GI bleed       Pneumonia  Will cont rocephin  Discussed case with ID yesterday   Doxy changed to zyvox       Chronic diastolic heart failure  Patient has  unspecified  heart failure that is Chronic. On presentation their CHF was . Most recent BNP and echo results are listed below.well compensated  No results for input(s): "BNP" in the last 72 hours.    Latest ECHO  Results for orders placed during the hospital encounter of 01/08/21    Echo Color Flow Doppler? Yes    Interpretation Summary  · The left ventricle is normal in size with moderate concentric hypertrophy and normal systolic function. The estimated ejection fraction is 60%  · Indeterminate left ventricular diastolic function.  · Normal right ventricular size with normal right ventricular systolic function.  · Mild tricuspid regurgitation.  · Normal central venous pressure (3 mmHg).  · The estimated PA systolic pressure is 32 mmHg.    Current Heart Failure Medications  , Every 12 hours, Oral  , 2 times daily with meals, " Oral  hydrALAZINE injection 10 mg, Every 6 hours PRN, Intravenous  hydrALAZINE tablet 10 mg, Every 12 hours, Oral  acetaZOLAMIDE (DIAMOX) 250 mg in D5W 50 mL, Once, Intravenous    Plan  - Monitor strict I&Os and daily weights.    - Place on telemetry  - Low sodium diet  - Place on fluid restriction of 1.5 L.   - Cardiology has been consulted  - The patient's volume status is at their baseline  Ambulatory oxygen evaluation   Nutrition consult   Discontinue intravenous lasix    Cardiology on case       Coronary artery disease of native artery of native heart with stable angina pectoris  Patient with known CAD which is controlled Will continue Statin and monitor for S/Sx of angina/ACS. Continue to monitor on telemetry.     CKD stage 3 secondary to diabetes  Creatine stable for now. BMP reviewed- noted Estimated Creatinine Clearance: 19.2 mL/min (A) (based on SCr of 2.2 mg/dL (H)). according to latest data. Based on current GFR, CKD stage is stage 3 - GFR 30-59.  Monitor UOP and serial BMP and adjust therapy as needed. Renally dose meds. Avoid nephrotoxic medications and procedures.  Discontinue intravenous lasix  Monitor trend    Hypercholesterolemia  Patient is chronically on statin.will continue for now. Last Lipid Panel:   Lab Results   Component Value Date    CHOL 121 07/03/2023    HDL 37 (L) 07/03/2023    LDLCALC 57 07/03/2023    TRIG 160 (H) 07/03/2023    CHOLHDL 27.8 08/01/2019     Plan:  -Continue home medication  -low fat/low calorie diet        Atrial fibrillation  Patient with Paroxysmal (<7 days) atrial fibrillation which is controlled currently with Bbs and Calcium Channel Blocker. Patient is currently in sinus rhythm.[id number] Score: 4.   Discussed cardiology vs gastroenterology recommendations   Patient voices understanding  Will continue holding ac    Acquired hypothyroidism  Patient has chronic hypothyroidism. TFTs reviewed-   Lab Results   Component Value Date    TSH 1.780 07/03/2023   . Will  continue chronic levothyroxine and adjust for and clinical changes.        Essential hypertension  Chronic, controlled.  Latest blood pressure and vitals reviewed-   Temp:  [97.4 °F (36.3 °C)-98.2 °F (36.8 °C)]   Pulse:  [63-89]   Resp:  [15-21]   BP: (151-203)/(63-89)   SpO2:  [87 %-96 %] .   Home meds for hypertension were reviewed and noted below.   Hypertension Medications               amLODIPine (NORVASC) 5 MG tablet Take 1 tablet (5 mg total) by mouth once daily.    carvediloL (COREG) 12.5 MG tablet Take 12.5 mg by mouth 2 (two) times daily with meals.    diltiaZEM (CARDIZEM CD) 300 MG 24 hr capsule Take 1 capsule by mouth every morning.    furosemide (LASIX) 20 MG tablet Take 1 tablet (20 mg total) by mouth once daily.    hydrALAZINE (APRESOLINE) 10 MG tablet Take 10 mg by mouth every 12 (twelve) hours.            While in the hospital, will manage blood pressure as follows; Adjust home antihypertensive regimen as follows- elevated blood pressure, resume home hydralazine    Will utilize p.r.n. blood pressure medication only if patient's blood pressure greater than  180/110 and he develops symptoms such as worsening chest pain or shortness of breath.        VTE Risk Mitigation (From admission, onward)           Ordered     Place SALUD hose  Until discontinued         12/12/24 0921     Place SALUD hose  Until discontinued         12/11/24 0949     enoxaparin injection 30 mg  Daily         12/10/24 1922     IP VTE HIGH RISK PATIENT  Once         12/10/24 1922     Place sequential compression device  Until discontinued         12/10/24 1922                    Discharge Planning   SHEA:      Code Status: DNR   Medical Readiness for Discharge Date:   Discharge Plan A: Home, Home Health              aMnuel Fermin MD  Department of Hospital Medicine   [facility] - Telemetry ([facility])

## 2024-12-17 NOTE — ASSESSMENT & PLAN NOTE
12/17/2024  Patient on 20 L 90% FiO2 Vapotherm  Continue Solu-Medrol   Continue Shannon   Pulmonology on case  Patient desatting with minimal movement  Unable to be weaned at this time  Prognosis guarded  Chest xray reviewed  Improved today in comparison to yesterday

## 2024-12-17 NOTE — ASSESSMENT & PLAN NOTE
Also with superimposed cellulitis bilaterally. Photos put under media section. Cellulitis much improved today. Would benefit from lymphedema clinic referral outpatient. Will monitor on current antibiotics. May broaden at discharge and plan for minimum 10 day total course. If still present in clinic may offer Dalvance.    12/6/24- will monitor , continue Zyvox/Rocephin

## 2024-12-17 NOTE — SUBJECTIVE & OBJECTIVE
Interval History:   89 year old man with CONS bacteremia .  No acute events noted.    Review of Systems   Constitutional:  Negative for activity change, appetite change, chills, diaphoresis and fatigue.   Neurological:  Negative for dizziness, facial asymmetry, light-headedness and headaches.     Objective:     Vital Signs (Most Recent):  Temp: 98 °F (36.7 °C) (12/16/24 2330)  Pulse: 82 (12/16/24 2330)  Resp: 18 (12/16/24 2330)  BP: 135/71 (12/16/24 2330)  SpO2: (!) 88 % (12/17/24 0018) Vital Signs (24h Range):  Temp:  [97.6 °F (36.4 °C)-98.7 °F (37.1 °C)] 98 °F (36.7 °C)  Pulse:  [] 82  Resp:  [16-22] 18  SpO2:  [84 %-94 %] 88 %  BP: (117-144)/(57-74) 135/71     Weight: 83.7 kg (184 lb 8.4 oz)  Body mass index is 28.9 kg/m².    Estimated Creatinine Clearance: 18.7 mL/min (A) (based on SCr of 2.5 mg/dL (H)).     Physical Exam  Vitals and nursing note reviewed.   HENT:      Head: Normocephalic.   Eyes:      Pupils: Pupils are equal, round, and reactive to light.   Cardiovascular:      Rate and Rhythm: Normal rate.   Pulmonary:      Effort: Pulmonary effort is normal.   Musculoskeletal:      Cervical back: Normal range of motion.   Neurological:      General: No focal deficit present.      Mental Status: He is alert.          Significant Labs: Blood Culture:   Recent Labs   Lab 12/10/24  1053 12/10/24  1056 12/12/24  0907 12/12/24  0912   LABBLOO Gram stain aer bottle: Gram positive cocci  Positive results previously called 12/12/2024  13:46  STAPHYLOCOCCUS PETTENKOFERI* Gram stain aer bottle: Gram positive cocci  Results called to and read back by: GEETA Holguin. 12/12/2024  05:15  STAPHYLOCOCCUS PETTENKOFERI* No Growth to date  No Growth to date  No Growth to date  No Growth to date  No Growth to date No Growth to date  No Growth to date  No Growth to date  No Growth to date  No Growth to date     CBC:   Recent Labs   Lab 12/15/24  0413 12/16/24  0410   WBC 7.53 11.39   HGB 12.1* 12.4*   HCT 37.7* 38.8*     272     CMP:   Recent Labs   Lab 12/15/24  0413 12/16/24  0410    136   K 4.7 4.7   CL 96 93*   CO2 30* 29   * 215*   BUN 41* 51*   CREATININE 2.2* 2.5*   CALCIUM 8.9 8.8   ANIONGAP 12 14     All pertinent labs within the past 24 hours have been reviewed.    Significant Imaging: I have reviewed all pertinent imaging results/findings within the past 24 hours.

## 2024-12-17 NOTE — SUBJECTIVE & OBJECTIVE
Interval History:  No acute issues reported overnight.    Review of Systems   Constitutional:  Negative for fatigue and fever.   HENT:  Negative for sinus pressure.    Eyes:  Negative for visual disturbance.   Respiratory:  Positive for cough and shortness of breath.    Cardiovascular:  Positive for leg swelling. Negative for chest pain.   Gastrointestinal:  Negative for nausea and vomiting.   Genitourinary:  Negative for difficulty urinating.   Musculoskeletal:  Negative for back pain.   Skin:  Negative for rash.   Neurological:  Negative for headaches.   Psychiatric/Behavioral:  Negative for confusion.      Objective:     Vital Signs (Most Recent):  Temp: 97.6 °F (36.4 °C) (12/17/24 1151)  Pulse: 81 (12/17/24 1151)  Resp: 15 (12/17/24 1151)  BP: 138/70 (12/17/24 1151)  SpO2: (!) 90 % (12/17/24 1151) Vital Signs (24h Range):  Temp:  [97.5 °F (36.4 °C)-98.2 °F (36.8 °C)] 97.6 °F (36.4 °C)  Pulse:  [58-94] 81  Resp:  [15-22] 15  SpO2:  [85 %-90 %] 90 %  BP: (125-142)/(66-75) 138/70     Weight: 82.9 kg (182 lb 12.2 oz)  Body mass index is 28.62 kg/m².    Intake/Output Summary (Last 24 hours) at 12/17/2024 1246  Last data filed at 12/17/2024 0800  Gross per 24 hour   Intake 766.39 ml   Output --   Net 766.39 ml         Physical Exam  Vitals reviewed.   Constitutional:       General: He is not in acute distress.     Appearance: He is well-developed. He is ill-appearing. He is not toxic-appearing or diaphoretic.      Interventions: Nasal cannula in place.   HENT:      Head: Normocephalic and atraumatic.      Right Ear: Decreased hearing noted.      Left Ear: Decreased hearing noted.   Eyes:      Pupils: Pupils are equal, round, and reactive to light.   Cardiovascular:      Rate and Rhythm: Normal rate and regular rhythm.      Heart sounds: Normal heart sounds. No murmur heard.     No friction rub. No gallop.   Pulmonary:      Effort: Pulmonary effort is normal. No respiratory distress.      Breath sounds: Normal breath  sounds. No stridor. No wheezing or rales.   Abdominal:      General: Bowel sounds are normal. There is no distension.      Palpations: Abdomen is soft. There is no mass.      Tenderness: There is no abdominal tenderness. There is no guarding.   Musculoskeletal:      Right lower leg: Edema present.      Left lower leg: Edema present.   Skin:     General: Skin is warm.      Findings: Erythema and lesion present.   Neurological:      Mental Status: He is alert and oriented to person, place, and time.      Motor: Weakness present.   Psychiatric:         Mood and Affect: Mood normal.         Behavior: Behavior normal.             Significant Labs: All pertinent labs within the past 24 hours have been reviewed.    Significant Imaging: I have reviewed all pertinent imaging results/findings within the past 24 hours.

## 2024-12-17 NOTE — ASSESSMENT & PLAN NOTE
--Initial CXR reports lateral left upper lobe opacity, most likely early pneumonia.   --CXR 12/14 reports small pleural effusions but no new consolidation   --On Vapotherm (home baseline is 2 L)  --Will broaden to PO linezolid 600 mg BID and continue ceftriaxone at 2 g daily   --Require drug toxicity monitoring   --See edema assessment for more details on antibiotics   --Follow respiratory culture to see if MDR organism isolated   --Fluid and COPD management per primary   --Agree with pulmonology consultation and CT chest   --Above d/w primary team.      12/16/24- chest x-ray - interval worsening with near complete whiteout of the left hemithorax which appears to be a combination of near complete collapse of the left lung and left pleural effusion.     Will continue Rocephin/Zyvox- follow pulmonology

## 2024-12-17 NOTE — PT/OT/SLP PROGRESS
Physical Therapy  Treatment    Jimmy Young   MRN: [id number]   Admitting Diagnosis: Acute on chronic respiratory failure with hypoxia    PT Received On: 12/17/24  PT Start Time: 1110     PT Stop Time: 1135    PT Total Time (min): 25 min       Billable Minutes:  Therapeutic Activity 25    Treatment Type: Treatment  PT/PTA: PTA     Number of PTA visits since last PT visit: 3       General Precautions: Standard, fall  Orthopedic Precautions: N/A  Braces: N/A  Respiratory Status:  VAPOTHERM 20L 90%         Subjective:  Communicated with patient's nurse and completed Epic chart review prior to session.  Patient agreed to PT session.     Pain/Comfort  Pain Rating 1: 0/10    Objective:   Patient found with: peripheral IV, telemetry, pulse ox (continuous), Other (comments) (VAPOTHERM)    Patient found up in chair. Requesting assistance to get to BSC.     STS from chair w/ HHAx2: CGA    Stand pivot T/F chair <> BSC w/ HHAx2: Min A     STS from BSC w/ HHAx2: Min A     Stand x2 min with BUE support for cleaning post toileting    Reviewed AROM TE to BLE including: hip flex/ext, knee flex/ext, ankle PF/DF  To be completed a minimum of 10 reps for each LE in order to promote return of function, strength and ROM.    Planned to have patient complete reps of TE but patient requested to discontinue session at this time due to fatigue.     Educated patient on importance of increased tolerance to upright position and direct impact on CV endurance and strength. Patient encouraged to sit up in chair/ EOB, for a minimum of 2 consecutive hours, 3x per day. Encouraged patient to perform AROM TE to BLE throughout the day within all available planes of motion. Re enforced importance of utilizing call light to meet needs in room and not attempt to get up without staff assistance. Patient verbalized understanding and agreed to comply.       AM-PAC 6 CLICK MOBILITY  How much help from another person does this patient currently need?   1 =  Unable, Total/Dependent Assistance  2 = A lot, Maximum/Moderate Assistance  3 = A little, Minimum/Contact Guard/Supervision  4 = None, Modified [place]/Independent    Turning over in bed (including adjusting bedclothes, sheets and blankets)?: 1 (NT)  Sitting down on and standing up from a chair with arms (e.g., wheelchair, bedside commode, etc.): 3  Moving from lying on back to sitting on the side of the bed?: 1 (NT)  Moving to and from a bed to a chair (including a wheelchair)?: 3  Need to walk in hospital room?: 1 (NT)  Climbing 3-5 steps with a railing?: 1 (NT)  Basic Mobility Total Score: 10    AM-PAC Raw Score CMS G-Code Modifier Level of Impairment Assistance   6 % Total / Unable   7 - 9 CM 80 - 100% Maximal Assist   10 - 14 CL 60 - 80% Moderate Assist   15 - 19 CK 40 - 60% Moderate Assist   20 - 22 CJ 20 - 40% Minimal Assist   23 CI 1-20% SBA / CGA   24 CH 0% Independent/ Mod I     Patient left up in chair with call button in reach and visitors present.    Assessment:  Jimmy Young is a 89 y.o. male with a medical diagnosis of Acute on chronic respiratory failure with hypoxia and presents with overall decline in functional mobility. Patient would continue to benefit from skilled PT to address functional limitations listed below in order to return to PLOF/decrease caregiver burden. SPO2 continues to fluctuate throughout session between 79-85% with activity and 85-88% at rest.     Rehab identified problem list/impairments: weakness, impaired endurance, impaired self care skills, impaired functional mobility, impaired balance, gait instability, decreased safety awareness, decreased lower extremity function, decreased upper extremity function, decreased coordination, decreased ROM, impaired cardiopulmonary response to activity    Rehab potential is fair.    Activity tolerance: Fair    Discharge recommendations: Low Intensity Therapy      Barriers to discharge:      Equipment recommendations:  none     GOALS:   Multidisciplinary Problems       Physical Therapy Goals          Problem: Physical Therapy    Goal Priority Disciplines Outcome Interventions   Physical Therapy Goal     PT, PT/OT Progressing    Description: LT24  1. PT WILL COMPLETE BED MOBILITY IND  2. PT WILL GT TRAIN X 500' WITH RW MOD I  3. PT WILL COMPLETE STANDING TE X 20 REPS TO INC STRENGTH / BALANCE  4. PT WILL INC AMPAC SCORE BY 2 POINTS TO PROGRESS GROSS FUNC MOBILITY.                          PLAN:    Patient to be seen 3 x/week to address the above listed problems via gait training, therapeutic activities, therapeutic exercises  Plan of Care expires: 24  Plan of Care reviewed with: patient         2024

## 2024-12-17 NOTE — PT/OT/SLP PROGRESS
Occupational Therapy   Treatment    Name: Jimmy Young  MRN: [id number]  Admitting Diagnosis:  Acute on chronic respiratory failure with hypoxia       Recommendations:     Discharge Recommendations: Low Intensity Therapy  Discharge Equipment Recommendations:  none  Barriers to discharge:  None    Assessment:     Jimmy Young is a 89 y.o. male with a medical diagnosis of Acute on chronic respiratory failure with hypoxia.  He presents with the following performance deficits affecting function are weakness, impaired endurance, impaired self care skills, impaired functional mobility, gait instability, impaired balance, decreased coordination, decreased upper extremity function, decreased lower extremity function, decreased safety awareness, decreased ROM, impaired cardiopulmonary response to activity.     Rehab Prognosis:  Fair; patient would benefit from acute skilled OT services to address these deficits and reach maximum level of function.       Plan:     Patient to be seen 2 x/week to address the above listed problems via self-care/home management, therapeutic activities, therapeutic exercises  Plan of Care Expires: 12/25/24  Plan of Care Reviewed with: patient    Subjective     Chief Complaint: fatigue  Patient/Family Comments/goals: get better; pt requesting assist to t/f to BS  Pain/Comfort:  Pain Rating 1: 0/10    Objective:     Communicated with: Nurse and epic chart review prior to session.  Patient found up in chair with peripheral IV, telemetry, pulse ox (continuous), oxygen, Other (comments) (vapotherm) upon OT entry to room.    General Precautions: Standard, fall, respiratory    Orthopedic Precautions:N/A  Braces: N/A  Respiratory Status:  VAPOTHERM 20L 90%    Functional Mobility/Transfers:  Patient completed Sit <> Stand Transfer with contact guard assistance  with  hand-held assist, x2 trials  X1 from chair and x1 from BSC  Patient completed Toilet Transfer Stand Pivot technique chair<>C  with minimum assistance with  hand-held assist and bedside commode    Activities of Daily Living:  Toileting: maximal assistance pt with BM while on BSC. Required increased time to complete.   Pt standing ~2 minutes, requiring CGA with BUE support, for hygiene post toileting.    [facility] 6 Click ADL: 17    Treatment & Education:  Pt with increased fatigue at end of session, declining further treatment. Educated pt on pursed lip breathing technique and importance of activity pacing for O2 desat. Reviewed role of OT in acute setting and benefits of participation. Educated on techniques to use to increase independence and decrease fall risk with functional transfers. Educated on importance of OOB activity and calling for A to transfer back to bed. Encouraged completion of B UE AROM therex throughout the day to tolerance to increase functional strength and activity tolerance; pt demonstrated understanding by performing x5 reps BUE shoulder flexion, elbow flexion/ext, chest press, and digit flexion/ext. Patient stated understanding and in agreement with POC.     Patient left up in chair with all lines intact, call button in reach, and family present    GOALS:   Multidisciplinary Problems       Occupational Therapy Goals          Problem: Occupational Therapy    Goal Priority Disciplines Outcome Interventions   Occupational Therapy Goal     OT, PT/OT Progressing    Description: Goals to be met by: 12/25/24     Patient will increase functional independence with ADLs by performing:    Toileting from toilet with Contact Guard Assistance for hygiene and clothing management.   Toilet transfer to bedside commode with Contact Guard Assistance.  Upper extremity exercise program x10 reps per handout, with supervision.                         Time Tracking:     OT Date of Treatment: 12/17/24  OT Start Time: 1110  OT Stop Time: 1135  OT Total Time (min): 25 min    Billable Minutes:Self Care/Home Management 10  Therapeutic Activity  15    OT/MAGALY: OT     Number of MAGALY visits since last OT visit: 0  Thu Ramos, OT     12/17/2024

## 2024-12-17 NOTE — PROGRESS NOTES
[facility] - Tele[facility] ([facility])  Infectious Disease  Progress Note    Patient Name: Jimmy Young  MRN: [id number]  Admission Date: 12/10/2024  Length of Stay: 7 days  Attending Physician: Manuel Fermin MD  Primary Care Provider: Nacho Richardson MD    Isolation Status: No active isolations  Assessment/Plan:      Pulmonary  * Acute on chronic respiratory failure with hypoxia  Will follow primary team/Pulmonology    Pneumonia  --Initial CXR reports lateral left upper lobe opacity, most likely early pneumonia.   --CXR 12/14 reports small pleural effusions but no new consolidation   --On Vapotherm (home baseline is 2 L)  --Will broaden to PO linezolid 600 mg BID and continue ceftriaxone at 2 g daily   --Require drug toxicity monitoring   --See edema assessment for more details on antibiotics   --Follow respiratory culture to see if MDR organism isolated   --Fluid and COPD management per primary   --Agree with pulmonology consultation and CT chest   --Above d/w primary team.      12/16/24- chest x-ray - interval worsening with near complete whiteout of the left hemithorax which appears to be a combination of near complete collapse of the left lung and left pleural effusion.     Will continue Rocephin/Zyvox- follow pulmonology     Cardiac/Vascular  Atrial fibrillation  Continue current medications per primary      Renal/  CKD stage 3 secondary to diabetes  Follow primary team. Avoid nephrotoxic meds    Endocrine  Acquired hypothyroidism  Continue current medications per primary      Other  Lower extremity edema  Also with superimposed cellulitis bilaterally. Photos put under media section. Cellulitis much improved today. Would benefit from lymphedema clinic referral outpatient. Will monitor on current antibiotics. May broaden at discharge and plan for minimum 10 day total course. If still present in clinic may offer Dalvance.    12/6/24- will monitor , continue Zyvox/Rocephin        Anticipated Disposition:     Thank  you for your consult. I will follow-up with patient. Please contact us if you have any additional questions.    Jose Salcedo MD, [facility]  Infectious Disease  [facility] - Telemetry ([facility])    Subjective:     Principal Problem:Acute on chronic respiratory failure with hypoxia    HPI: This is a 89 yo M with hx of asthma, Afib, COPD, CKD, DM and HTN who presented to ER for evaluation of bilateral leg swelling. Patient was advised to go to ER in setting of lower extremity edema, shortness of breath, and a cough. Patient is on chronic home O2 at 3L. On diuretics. No evidence of DVT on LE US. CXR reports lateral left upper lobe opacity, most likely early pneumonia. Started empirically on IV ceftriaxone and PO doxycycline. Blood cultures were collected 12/10 are reporting Staph species on BCID. No S aureus detected. Low suspicion for virulent infection. No pacemaker/ICD noted on review of CXR. Likely going to be a CONS. Patient has been afebrile. ID consulted for bacteremia.   Interval History:   89 year old man with CONS bacteremia .  No acute events noted.    Review of Systems   Constitutional:  Negative for activity change, appetite change, chills, diaphoresis and fatigue.   Neurological:  Negative for dizziness, facial asymmetry, light-headedness and headaches.     Objective:     Vital Signs (Most Recent):  Temp: 98 °F (36.7 °C) (12/16/24 2330)  Pulse: 82 (12/16/24 2330)  Resp: 18 (12/16/24 2330)  BP: 135/71 (12/16/24 2330)  SpO2: (!) 88 % (12/17/24 0018) Vital Signs (24h Range):  Temp:  [97.6 °F (36.4 °C)-98.7 °F (37.1 °C)] 98 °F (36.7 °C)  Pulse:  [] 82  Resp:  [16-22] 18  SpO2:  [84 %-94 %] 88 %  BP: (117-144)/(57-74) 135/71     Weight: 83.7 kg (184 lb 8.4 oz)  Body mass index is 28.9 kg/m².    Estimated Creatinine Clearance: 18.7 mL/min (A) (based on SCr of 2.5 mg/dL (H)).     Physical Exam  Vitals and nursing note reviewed.   HENT:      Head: Normocephalic.   Eyes:      Pupils: Pupils are equal, round, and  reactive to light.   Cardiovascular:      Rate and Rhythm: Normal rate.   Pulmonary:      Effort: Pulmonary effort is normal.   Musculoskeletal:      Cervical back: Normal range of motion.   Neurological:      General: No focal deficit present.      Mental Status: He is alert.          Significant Labs: Blood Culture:   Recent Labs   Lab 12/10/24  1053 12/10/24  1056 12/12/24  0907 12/12/24  0912   LABBLOO Gram stain aer bottle: Gram positive cocci  Positive results previously called 12/12/2024  13:46  STAPHYLOCOCCUS PETTENKOFERI* Gram stain aer bottle: Gram positive cocci  Results called to and read back by: GEETA Holguin. 12/12/2024  05:15  STAPHYLOCOCCUS PETTENKOFERI* No Growth to date  No Growth to date  No Growth to date  No Growth to date  No Growth to date No Growth to date  No Growth to date  No Growth to date  No Growth to date  No Growth to date     CBC:   Recent Labs   Lab 12/15/24  0413 12/16/24  0410   WBC 7.53 11.39   HGB 12.1* 12.4*   HCT 37.7* 38.8*    272     CMP:   Recent Labs   Lab 12/15/24  0413 12/16/24  0410    136   K 4.7 4.7   CL 96 93*   CO2 30* 29   * 215*   BUN 41* 51*   CREATININE 2.2* 2.5*   CALCIUM 8.9 8.8   ANIONGAP 12 14     All pertinent labs within the past 24 hours have been reviewed.    Significant Imaging: I have reviewed all pertinent imaging results/findings within the past 24 hours.

## 2024-12-18 LAB
ANION GAP SERPL CALC-SCNC: 17 MMOL/L (ref 8–16)
BASOPHILS # BLD AUTO: 0.01 K/UL (ref 0–0.2)
BASOPHILS NFR BLD: 0.2 % (ref 0–1.9)
BUN SERPL-MCNC: 75 MG/DL (ref 10–30)
CALCIUM SERPL-MCNC: 8.4 MG/DL (ref 8.7–10.5)
CHLORIDE SERPL-SCNC: 93 MMOL/L (ref 95–110)
CO2 SERPL-SCNC: 26 MMOL/L (ref 23–29)
CREAT SERPL-MCNC: 3 MG/DL (ref 0.5–1.4)
DIFFERENTIAL METHOD BLD: ABNORMAL
EOSINOPHIL # BLD AUTO: 0 K/UL (ref 0–0.5)
EOSINOPHIL NFR BLD: 0 % (ref 0–8)
ERYTHROCYTE [DISTWIDTH] IN BLOOD BY AUTOMATED COUNT: 12.9 % (ref 11.5–14.5)
EST. GFR  (NO RACE VARIABLE): 19 ML/MIN/1.73 M^2
GLUCOSE SERPL-MCNC: 314 MG/DL (ref 70–110)
HCT VFR BLD AUTO: 40.8 % (ref 40–54)
HGB BLD-MCNC: 13.1 G/DL (ref 14–18)
IMM GRANULOCYTES # BLD AUTO: 0.15 K/UL (ref 0–0.04)
IMM GRANULOCYTES NFR BLD AUTO: 2.3 % (ref 0–0.5)
LYMPHOCYTES # BLD AUTO: 0.6 K/UL (ref 1–4.8)
LYMPHOCYTES NFR BLD: 8.9 % (ref 18–48)
MCH RBC QN AUTO: 29.3 PG (ref 27–31)
MCHC RBC AUTO-ENTMCNC: 32.1 G/DL (ref 32–36)
MCV RBC AUTO: 91 FL (ref 82–98)
MONOCYTES # BLD AUTO: 0.2 K/UL (ref 0.3–1)
MONOCYTES NFR BLD: 2.3 % (ref 4–15)
NEUTROPHILS # BLD AUTO: 5.6 K/UL (ref 1.8–7.7)
NEUTROPHILS NFR BLD: 86.3 % (ref 38–73)
NRBC BLD-RTO: 0 /100 WBC
OHS QRS DURATION: 82 MS
OHS QTC CALCULATION: 443 MS
PLATELET # BLD AUTO: 235 K/UL (ref 150–450)
PMV BLD AUTO: 11 FL (ref 9.2–12.9)
POTASSIUM SERPL-SCNC: 4.4 MMOL/L (ref 3.5–5.1)
RBC # BLD AUTO: 4.47 M/UL (ref 4.6–6.2)
SODIUM SERPL-SCNC: 136 MMOL/L (ref 136–145)
WBC # BLD AUTO: 6.53 K/UL (ref 3.9–12.7)

## 2024-12-18 PROCEDURE — 27100171 HC OXYGEN HIGH FLOW UP TO 24 HOURS

## 2024-12-18 PROCEDURE — 25000003 PHARM REV CODE 250: Performed by: NURSE PRACTITIONER

## 2024-12-18 PROCEDURE — 63600175 PHARM REV CODE 636 W HCPCS: Performed by: NURSE PRACTITIONER

## 2024-12-18 PROCEDURE — 93005 ELECTROCARDIOGRAM TRACING: CPT | Mod: ER

## 2024-12-18 PROCEDURE — 25000003 PHARM REV CODE 250: Performed by: FAMILY MEDICINE

## 2024-12-18 PROCEDURE — 94799 UNLISTED PULMONARY SVC/PX: CPT

## 2024-12-18 PROCEDURE — 36415 COLL VENOUS BLD VENIPUNCTURE: CPT | Performed by: NURSE PRACTITIONER

## 2024-12-18 PROCEDURE — 21400001 HC TELEMETRY ROOM

## 2024-12-18 PROCEDURE — 80048 BASIC METABOLIC PNL TOTAL CA: CPT | Performed by: NURSE PRACTITIONER

## 2024-12-18 PROCEDURE — 25000242 PHARM REV CODE 250 ALT 637 W/ HCPCS: Performed by: FAMILY MEDICINE

## 2024-12-18 PROCEDURE — 27100092 HC HIGH FLOW DELIVERY CANNULA

## 2024-12-18 PROCEDURE — 94664 DEMO&/EVAL PT USE INHALER: CPT

## 2024-12-18 PROCEDURE — 85025 COMPLETE CBC W/AUTO DIFF WBC: CPT | Performed by: INTERNAL MEDICINE

## 2024-12-18 PROCEDURE — 94761 N-INVAS EAR/PLS OXIMETRY MLT: CPT

## 2024-12-18 PROCEDURE — 25000242 PHARM REV CODE 250 ALT 637 W/ HCPCS: Performed by: INTERNAL MEDICINE

## 2024-12-18 PROCEDURE — 97530 THERAPEUTIC ACTIVITIES: CPT

## 2024-12-18 PROCEDURE — 99233 SBSQ HOSP IP/OBS HIGH 50: CPT | Mod: NSCH,,, | Performed by: INTERNAL MEDICINE

## 2024-12-18 PROCEDURE — 94640 AIRWAY INHALATION TREATMENT: CPT

## 2024-12-18 PROCEDURE — 99900035 HC TECH TIME PER 15 MIN (STAT)

## 2024-12-18 PROCEDURE — 27000646 HC AEROBIKA DEVICE

## 2024-12-18 PROCEDURE — 27000249 HC VAPOTHERM CIRCUIT

## 2024-12-18 PROCEDURE — 63600175 PHARM REV CODE 636 W HCPCS: Performed by: FAMILY MEDICINE

## 2024-12-18 PROCEDURE — 93010 ELECTROCARDIOGRAM REPORT: CPT | Mod: ,,, | Performed by: INTERNAL MEDICINE

## 2024-12-18 RX ADMIN — PANTOPRAZOLE SODIUM 40 MG: 40 TABLET, DELAYED RELEASE ORAL at 05:12

## 2024-12-18 RX ADMIN — METRONIDAZOLE: 10 GEL TOPICAL at 12:12

## 2024-12-18 RX ADMIN — SODIUM CHLORIDE 30 MG/ML INHALATION SOLUTION 4 ML: 30 SOLUTION INHALANT at 07:12

## 2024-12-18 RX ADMIN — CEFTRIAXONE 2 G: 2 INJECTION, POWDER, FOR SOLUTION INTRAMUSCULAR; INTRAVENOUS at 09:12

## 2024-12-18 RX ADMIN — METHYLPREDNISOLONE SODIUM SUCCINATE 40 MG: 40 INJECTION, POWDER, FOR SOLUTION INTRAMUSCULAR; INTRAVENOUS at 01:12

## 2024-12-18 RX ADMIN — IPRATROPIUM BROMIDE AND ALBUTEROL SULFATE 3 ML: 2.5; .5 SOLUTION RESPIRATORY (INHALATION) at 08:12

## 2024-12-18 RX ADMIN — ARFORMOTEROL TARTRATE 15 MCG: 15 SOLUTION RESPIRATORY (INHALATION) at 07:12

## 2024-12-18 RX ADMIN — HYDRALAZINE HYDROCHLORIDE 10 MG: 10 TABLET, FILM COATED ORAL at 09:12

## 2024-12-18 RX ADMIN — PRAVASTATIN SODIUM 40 MG: 20 TABLET ORAL at 09:12

## 2024-12-18 RX ADMIN — METRONIDAZOLE: 10 GEL TOPICAL at 09:12

## 2024-12-18 RX ADMIN — ENOXAPARIN SODIUM 30 MG: 30 INJECTION SUBCUTANEOUS at 05:12

## 2024-12-18 RX ADMIN — SODIUM CHLORIDE 30 MG/ML INHALATION SOLUTION 4 ML: 30 SOLUTION INHALANT at 08:12

## 2024-12-18 RX ADMIN — MICONAZOLE NITRATE: 20 CREAM TOPICAL at 05:12

## 2024-12-18 RX ADMIN — MAGNESIUM OXIDE TAB 400 MG (241.3 MG ELEMENTAL MG) 400 MG: 400 (241.3 MG) TAB at 09:12

## 2024-12-18 RX ADMIN — LINEZOLID 600 MG: 600 INJECTION, SOLUTION INTRAVENOUS at 11:12

## 2024-12-18 RX ADMIN — IPRATROPIUM BROMIDE AND ALBUTEROL SULFATE 3 ML: 2.5; .5 SOLUTION RESPIRATORY (INHALATION) at 01:12

## 2024-12-18 RX ADMIN — MICONAZOLE NITRATE: 20 CREAM TOPICAL at 09:12

## 2024-12-18 RX ADMIN — BUDESONIDE INHALATION 0.5 MG: 0.5 SUSPENSION RESPIRATORY (INHALATION) at 08:12

## 2024-12-18 RX ADMIN — ARFORMOTEROL TARTRATE 15 MCG: 15 SOLUTION RESPIRATORY (INHALATION) at 08:12

## 2024-12-18 RX ADMIN — IPRATROPIUM BROMIDE AND ALBUTEROL SULFATE 3 ML: 2.5; .5 SOLUTION RESPIRATORY (INHALATION) at 07:12

## 2024-12-18 RX ADMIN — LINEZOLID 600 MG: 600 INJECTION, SOLUTION INTRAVENOUS at 12:12

## 2024-12-18 RX ADMIN — BUDESONIDE INHALATION 0.5 MG: 0.5 SUSPENSION RESPIRATORY (INHALATION) at 07:12

## 2024-12-18 RX ADMIN — LEVOTHYROXINE SODIUM 50 MCG: 50 TABLET ORAL at 06:12

## 2024-12-18 RX ADMIN — DILTIAZEM HYDROCHLORIDE 300 MG: 180 CAPSULE, COATED, EXTENDED RELEASE ORAL at 06:12

## 2024-12-18 NOTE — PROGRESS NOTES
[facility]  Infectious Disease  Progress Note    Patient Name: Jimmy Young  MRN: [id number]  Admission Date: 12/10/2024  Length of Stay: 8 days  Attending Physician: Manuel Fermin MD  Primary Care Provider: Nacho Richardson MD    Isolation Status: No active isolations  Assessment/Plan:      Pulmonary  Pneumonia  --Initial CXR reports lateral left upper lobe opacity, most likely early pneumonia.   --CXR 12/14 reports small pleural effusions but no new consolidation   --On Vapotherm (home baseline is 2 L)  --Will broaden to PO linezolid 600 mg BID and continue ceftriaxone at 2 g daily   --Require drug toxicity monitoring   --See edema assessment for more details on antibiotics   --Follow respiratory culture to see if MDR organism isolated   --Fluid and COPD management per primary   --Agree with pulmonology consultation and CT chest   --Above d/w primary team.      12/16/24- chest x-ray - interval worsening with near complete whiteout of the left hemithorax which appears to be a combination of near complete collapse of the left lung and left pleural effusion.     Will continue Rocephin/Zyvox- follow pulmonology     12/17- resp culture- staph aureus/candida - follow final drug susceptibility of staph aureus -on Zyvox/Rocephin    Cardiac/Vascular  Atrial fibrillation  Continue current medications per primary      Essential hypertension  Continue current medications per primary      Renal/  CKD stage 3 secondary to diabetes  Follow primary team. Avoid nephrotoxic meds    Endocrine  Acquired hypothyroidism  Continue current medications per primary          Anticipated Disposition:     Thank you for your consult. I will follow-up with patient. Please contact us if you have any additional questions.    Jose Salcedo MD, [facility]  Infectious Disease  [facility] - Tele[facility] ([facility])    Subjective:     Principal Problem:Acute on chronic respiratory failure with hypoxia    HPI: This is a 89 yo M with hx of  asthma, Afib, COPD, CKD, DM and HTN who presented to ER for evaluation of bilateral leg swelling. Patient was advised to go to ER in setting of lower extremity edema, shortness of breath, and a cough. Patient is on chronic home O2 at 3L. On diuretics. No evidence of DVT on LE US. CXR reports lateral left upper lobe opacity, most likely early pneumonia. Started empirically on IV ceftriaxone and PO doxycycline. Blood cultures were collected 12/10 are reporting Staph species on BCID. No S aureus detected. Low suspicion for virulent infection. No pacemaker/ICD noted on review of CXR. Likely going to be a CONS. Patient has been afebrile. ID consulted for bacteremia.   Interval History:   89 year old man with CONS bacteremia .  No acute events noted.    12/17/24- He is tolerating meds    Cultures -   12/14/24-  Resp cultures-   Candida Tropicalis  Staph Aureus -rare  Blood culture- 12/10- staph Pettenkoferi  Review of Systems   Constitutional:  Negative for activity change, appetite change, chills, diaphoresis and fatigue.   Neurological:  Negative for dizziness, facial asymmetry, light-headedness and headaches.     Objective:     Vital Signs (Most Recent):  Temp: 98 °F (36.7 °C) (12/18/24 0350)  Pulse: 79 (12/18/24 0350)  Resp: 20 (12/18/24 0350)  BP: (!) 156/75 (12/18/24 0350)  SpO2: (!) 91 % (12/18/24 0350) Vital Signs (24h Range):  Temp:  [97.5 °F (36.4 °C)-98 °F (36.7 °C)] 98 °F (36.7 °C)  Pulse:  [66-88] 79  Resp:  [15-22] 20  SpO2:  [86 %-91 %] 91 %  BP: (138-156)/(68-81) 156/75     Weight: 81.7 kg (180 lb 1.9 oz)  Body mass index is 28.21 kg/m².    Estimated Creatinine Clearance: 16.5 mL/min (A) (based on SCr of 2.8 mg/dL (H)).     Physical Exam  Vitals and nursing note reviewed.   HENT:      Head: Normocephalic.   Eyes:      Pupils: Pupils are equal, round, and reactive to light.   Cardiovascular:      Rate and Rhythm: Normal rate.   Pulmonary:      Effort: Pulmonary effort is normal.   Musculoskeletal:       Cervical back: Normal range of motion.   Neurological:      General: No focal deficit present.      Mental Status: He is alert.          Significant Labs: Blood Culture:   Recent Labs   Lab 12/10/24  1053 12/10/24  1056 12/12/24  0907 12/12/24  0912   LABBLOO Gram stain aer bottle: Gram positive cocci  Positive results previously called 12/12/2024  13:46  STAPHYLOCOCCUS PETTENKOFERI* Gram stain aer bottle: Gram positive cocci  Results called to and read back by: GEETA Holguin. 12/12/2024  05:15  STAPHYLOCOCCUS PETTENKOFERI* No growth after 5 days. No growth after 5 days.     CBC:   Recent Labs   Lab 12/17/24  0408 12/18/24  0408   WBC 8.47 6.53   HGB 12.6* 13.1*   HCT 39.6* 40.8    235     CMP:   Recent Labs   Lab 12/17/24  0408   *   K 4.5   CL 93*   CO2 29   *   BUN 66*   CREATININE 2.8*   CALCIUM 8.4*   ANIONGAP 13     All pertinent labs within the past 24 hours have been reviewed.    Significant Imaging: I have reviewed all pertinent imaging results/findings within the past 24 hours.

## 2024-12-18 NOTE — PLAN OF CARE
Sit to stand transfer required minimum assistance. Required minimal assistance to maintain standing balance due to significant posterior lean.

## 2024-12-18 NOTE — SUBJECTIVE & OBJECTIVE
Interval History:  No acute issues reported overnight    Review of Systems   Constitutional:  Negative for fatigue and fever.   HENT:  Negative for sinus pressure.    Eyes:  Negative for visual disturbance.   Respiratory:  Positive for cough and shortness of breath.    Cardiovascular:  Positive for leg swelling. Negative for chest pain.   Gastrointestinal:  Negative for nausea and vomiting.   Genitourinary:  Negative for difficulty urinating.   Musculoskeletal:  Negative for back pain.   Skin:  Negative for rash.   Neurological:  Negative for headaches.   Psychiatric/Behavioral:  Negative for confusion.      Objective:     Vital Signs (Most Recent):  Temp: 97.7 °F (36.5 °C) (12/18/24 1110)  Pulse: 66 (12/18/24 1110)  Resp: 20 (12/18/24 1110)  BP: 138/63 (12/18/24 1110)  SpO2: (!) 87 % (12/18/24 1110) Vital Signs (24h Range):  Temp:  [97.5 °F (36.4 °C)-98 °F (36.7 °C)] 97.7 °F (36.5 °C)  Pulse:  [66-84] 66  Resp:  [16-22] 20  SpO2:  [9 %-92 %] 87 %  BP: (137-156)/(63-81) 138/63     Weight: 81.7 kg (180 lb 1.9 oz)  Body mass index is 28.21 kg/m².    Intake/Output Summary (Last 24 hours) at 12/18/2024 1211  Last data filed at 12/18/2024 0617  Gross per 24 hour   Intake 767.02 ml   Output --   Net 767.02 ml         Physical Exam  Vitals reviewed.   Constitutional:       General: He is not in acute distress.     Appearance: He is well-developed. He is ill-appearing. He is not toxic-appearing or diaphoretic.      Interventions: Nasal cannula in place.   HENT:      Head: Normocephalic and atraumatic.      Right Ear: Decreased hearing noted.      Left Ear: Decreased hearing noted.   Eyes:      Pupils: Pupils are equal, round, and reactive to light.   Cardiovascular:      Rate and Rhythm: Normal rate and regular rhythm.      Heart sounds: Normal heart sounds. No murmur heard.     No friction rub. No gallop.   Pulmonary:      Effort: Pulmonary effort is normal. No respiratory distress.      Breath sounds: Normal breath  sounds. No stridor. No wheezing or rales.   Abdominal:      General: Bowel sounds are normal. There is no distension.      Palpations: Abdomen is soft. There is no mass.      Tenderness: There is no abdominal tenderness. There is no guarding.   Musculoskeletal:      Right lower leg: Edema present.      Left lower leg: Edema present.   Skin:     General: Skin is warm.      Findings: Erythema and lesion present.   Neurological:      Mental Status: He is alert and oriented to person, place, and time.      Motor: Weakness present.   Psychiatric:         Mood and Affect: Mood normal.         Behavior: Behavior normal.             Significant Labs: All pertinent labs within the past 24 hours have been reviewed.    Significant Imaging: I have reviewed all pertinent imaging results/findings within the past 24 hours.

## 2024-12-18 NOTE — PT/OT/SLP PROGRESS
Occupational Therapy   Treatment    Name: Jimmy Young  MRN: [id number]  Admitting Diagnosis:  Acute on chronic respiratory failure with hypoxia       Recommendations:     Discharge Recommendations: Low Intensity Therapy  Discharge Equipment Recommendations:  none  Barriers to discharge:  None    Assessment:     Jimmy Young is a 89 y.o. male with a medical diagnosis of Acute on chronic respiratory failure with hypoxia.  He presents with the following performance deficits affecting function are weakness, impaired endurance, impaired self care skills, impaired functional mobility, gait instability, impaired balance, decreased coordination, decreased upper extremity function, decreased lower extremity function, decreased safety awareness, decreased ROM, impaired cardiopulmonary response to activity.     Rehab Prognosis:  Fair; patient would benefit from acute skilled OT services to address these deficits and reach maximum level of function.       Plan:     Patient to be seen 2 x/week to address the above listed problems via self-care/home management, therapeutic activities, therapeutic exercises  Plan of Care Expires: 12/25/24  Plan of Care Reviewed with: patient, son, daughter    Subjective     Chief Complaint: Eager to participate with therapy.   Patient/Family Comments/goals: Resolution of respiratory difficulties.   Pain/Comfort:  Pain Rating 1: 0/10    Objective:     Communicated with: nurse prior to session.  Patient found HOB elevated with peripheral IV, oxygen, pulse ox (continuous), telemetry upon OT entry to room.    General Precautions: Standard, fall, respiratory    Orthopedic Precautions:N/A  Braces: N/A  Respiratory Status:  Vapotherm 18L 100%     Occupational Performance:     Bed Mobility:    Patient completed Scooting/Bridging with minimum assistance  Patient completed Supine to Sit with minimum assistance     Functional Mobility/Transfers:  Patient completed Sit <> Stand Transfer with  minimum assistance  with  rolling walker   Patient completed Bed <> Chair Transfer using Step Transfer technique with minimum assistance with rolling walker    [facility] 6 Click ADL: 19    Treatment & Education:  Patient's SPO2 86% at rest in supine upon therapist entry. Pt performed bed mobility and saturation declined to 79%. Promoted active rest via cervical extension and postural correction for maximal opening of airways. After several minutes, SPO2 returned to 85%. Pt stood with Min A to clear buttocks and required Min A to maintain balance due to significant posterior lean. Patient unable to correct independently. Performed standing activity x 2 minutes to increase activity tolerance. Returned to bedside chair to promote endurance and increased muscle activation.     Patient left up in chair with all lines intact, call button in reach, chair alarm on, and children present    GOALS:   Multidisciplinary Problems       Occupational Therapy Goals          Problem: Occupational Therapy    Goal Priority Disciplines Outcome Interventions   Occupational Therapy Goal     OT, PT/OT Progressing    Description: Goals to be met by: 12/25/24     Patient will increase functional independence with ADLs by performing:    Toileting from toilet with Contact Guard Assistance for hygiene and clothing management.   Toilet transfer to bedside commode with Contact Guard Assistance.  Upper extremity exercise program x10 reps per handout, with supervision.                         Time Tracking:     OT Date of Treatment: 12/18/24  OT Start Time: 1004  OT Stop Time: 1027  OT Total Time (min): 23 min    Billable Minutes:Therapeutic Activity 23    OT/MAGALY: OT     Number of MAGALY visits since last OT visit: 0    12/18/2024

## 2024-12-18 NOTE — PROGRESS NOTES
[facility] ([facility])  [facility] Medicine  Progress Note    Patient Name: Jimmy Young  MRN: [id number]  Patient Class: IP- Inpatient   Admission Date: 12/10/2024  Length of Stay: 8 days  Attending Physician: Manuel Fermin MD  Primary Care Provider: Nacho Richardson MD        Subjective     Principal Problem:Acute on chronic respiratory failure with hypoxia        HPI:  Jimmy Young is a 89 y.o. male with a PMH  has a past medical history of Aneurysm (2016), Arthritis, Asthma, Atrial fibrillation, Cancer, CKD (chronic kidney disease), COPD (chronic obstructive pulmonary disease) (10/05/2017), Diabetes mellitus, Emphysema lung, Encounter for blood transfusion, and Hypertension.presented to the ED for swelling of the legs. Patient was referred from his primary care physician today for worsening lower extremity edema, shortness of breath, and cough. Patient is on chronic home O2 at 3L/min via NC. Reports compliance with his lasix and denies any excessive fluid or salt intake. Denies any other complaints at his time.      ER workup revealed CBC to be unremarkable.  CMP with BUN/creatinine at baseline of 29/2.2.   mg/dL.  Magnesium 1.2.  BNP, troponin, lactic acid within normal limits.  Flu/COVID negative.  Blood cultures obtained x2.  Ultrasound lower extremities negative for DVT.  Chest x-ray revealed left upper lobe opacity resembling early pneumonia.  EKG revealed a flutter with variable AV block and left axis deviation with a ventricular rate of 64 beats per minute with a QT/QTC of 416/429.  Vital signs stable.  Patient is at baseline oxygen saturation of 92-94% on 3 liters/minute via nasal cannula.  Patient received 12.5 mg carvedilol, 1 g Rocephin IV, 100 mg doxycycline p.o., 30 mg Lovenox, 40 mg Lasix IV, and 10 mg hydralazine p.o. at outside facility.  Hospital Medicine consulted to admit patient for CHF exacerbation and pneumonia. Patient and family in agreement with treatment plan.  Patient admitted under inpatient status.    PCP: Nacho Richardson      Overview/Hospital Course:  12/11 admitted for pneumonia. Wears supplemental oxygen at baseline, 2L. On 4L. Speech consulted. Schedule breathing treatments. Discontinue lasix. Replete lytes. Echocardiogram pending. Cardiology consulted. Relaxing normotensive range in this patient demographic.  12/12 blood culture positive for staph. Infectious disease consulted. Continue monitoring repeat blood cultures. Recent medication(s) change with linagliptin with risk for congestive heart failure and skin reaction. Discussed risk vs benefit. Hba1c 6.9. recommend goal hba1c 8-9.   12/13 bun/creatinine increasing. Discontinued intravenous lasix. Remains on increased supplemental oxygen, 5L. Wean as able. Home hydralazine resumed for elevated blood pressure. Repeat blood cultures negative growth to date x 1 day. Infectious disease following  12/14/2024  Patient required increasing to 40 L 100% FiO2 Vapotherm.  Pulmonology consult on case.  Solu-Medrol added.  Continue empiric antibiotics.  Repeat chest x-ray.  12/15/2024  Patient weaned down to 20 L 80% Vapotherm.  Will continue current management.  12/16/2024  Still on 20L 80% vapotherm. Unable to be weaned at this time. Cont abx. Pulm and cardiology on case.   12/17/2024  Chest x-ray improved today compared to yesterday.  Still on 20 L 90% Vapotherm.  Continue antibiotics.  12/18/2024  Currently on 18 L 100% Vapotherm.  Continue antibiotics.  Wean O2 as tolerated.  Discussed with family at bedside the prognosis is guarded.  Patient will likely need placement should he improve.  Discussed SNF versus inpatient hospice.  Will see how patient clinically progresses over the next few days.    Interval History:  No acute issues reported overnight    Review of Systems   Constitutional:  Negative for fatigue and fever.   HENT:  Negative for sinus pressure.    Eyes:  Negative for visual disturbance.   Respiratory:   Positive for cough and shortness of breath.    Cardiovascular:  Positive for leg swelling. Negative for chest pain.   Gastrointestinal:  Negative for nausea and vomiting.   Genitourinary:  Negative for difficulty urinating.   Musculoskeletal:  Negative for back pain.   Skin:  Negative for rash.   Neurological:  Negative for headaches.   Psychiatric/Behavioral:  Negative for confusion.      Objective:     Vital Signs (Most Recent):  Temp: 97.7 °F (36.5 °C) (12/18/24 1110)  Pulse: 66 (12/18/24 1110)  Resp: 20 (12/18/24 1110)  BP: 138/63 (12/18/24 1110)  SpO2: (!) 87 % (12/18/24 1110) Vital Signs (24h Range):  Temp:  [97.5 °F (36.4 °C)-98 °F (36.7 °C)] 97.7 °F (36.5 °C)  Pulse:  [66-84] 66  Resp:  [16-22] 20  SpO2:  [9 %-92 %] 87 %  BP: (137-156)/(63-81) 138/63     Weight: 81.7 kg (180 lb 1.9 oz)  Body mass index is 28.21 kg/m².    Intake/Output Summary (Last 24 hours) at 12/18/2024 1211  Last data filed at 12/18/2024 0617  Gross per 24 hour   Intake 767.02 ml   Output --   Net 767.02 ml         Physical Exam  Vitals reviewed.   Constitutional:       General: He is not in acute distress.     Appearance: He is well-developed. He is ill-appearing. He is not toxic-appearing or diaphoretic.      Interventions: Nasal cannula in place.   HENT:      Head: Normocephalic and atraumatic.      Right Ear: Decreased hearing noted.      Left Ear: Decreased hearing noted.   Eyes:      Pupils: Pupils are equal, round, and reactive to light.   Cardiovascular:      Rate and Rhythm: Normal rate and regular rhythm.      Heart sounds: Normal heart sounds. No murmur heard.     No friction rub. No gallop.   Pulmonary:      Effort: Pulmonary effort is normal. No respiratory distress.      Breath sounds: Normal breath sounds. No stridor. No wheezing or rales.   Abdominal:      General: Bowel sounds are normal. There is no distension.      Palpations: Abdomen is soft. There is no mass.      Tenderness: There is no abdominal tenderness. There is  "no guarding.   Musculoskeletal:      Right lower leg: Edema present.      Left lower leg: Edema present.   Skin:     General: Skin is warm.      Findings: Erythema and lesion present.   Neurological:      Mental Status: He is alert and oriented to person, place, and time.      Motor: Weakness present.   Psychiatric:         Mood and Affect: Mood normal.         Behavior: Behavior normal.             Significant Labs: All pertinent labs within the past 24 hours have been reviewed.    Significant Imaging: I have reviewed all pertinent imaging results/findings within the past 24 hours.    Assessment and Plan     * Acute on chronic respiratory failure with hypoxia  12/18/2024  Patient on 18 L 100% FiO2 Vapotherm  Continue Solu-Medrol   Continue DuoNebs   Pulmonology on case  Patient desatting with minimal movement  Unable to be weaned at this time  Prognosis guarded  Chest x-ray improving   Discussed with family   Prognosis guarded   SNF versus inpatient hospice placement        Lower extremity edema  Improving  Multifactorial: malnutrition, fluid indiscretion, venous stasis, recent medication(s) change, dependent edema  Counseling provided  Elevate legs   Manish hose and topicals    Atrial flutter  Controlled  Follow up outpatient primary cardiologist  Holding a/c due to pmh GI bleed       Pneumonia  Continue Zyvox and Rocephin    Chronic diastolic heart failure  Patient has  unspecified  heart failure that is Chronic. On presentation their CHF was . Most recent BNP and echo results are listed below.well compensated  No results for input(s): "BNP" in the last 72 hours.    Latest ECHO  Results for orders placed during the hospital encounter of 01/08/21    Echo Color Flow Doppler? Yes    Interpretation Summary  · The left ventricle is normal in size with moderate concentric hypertrophy and normal systolic function. The estimated ejection fraction is 60%  · Indeterminate left ventricular diastolic function.  · Normal right " ventricular size with normal right ventricular systolic function.  · Mild tricuspid regurgitation.  · Normal central venous pressure (3 mmHg).  · The estimated PA systolic pressure is 32 mmHg.    Current Heart Failure Medications  , Every 12 hours, Oral  , 2 times daily with meals, Oral  hydrALAZINE injection 10 mg, Every 6 hours PRN, Intravenous  hydrALAZINE tablet 10 mg, Every 12 hours, Oral    Plan  - Monitor strict I&Os and daily weights.    - Place on telemetry  - Low sodium diet  - Place on fluid restriction of 1.5 L.   - Cardiology has been consulted  - The patient's volume status is at their baseline  Ambulatory oxygen evaluation   Nutrition consult   Discontinue intravenous lasix    12/18/2024  Will hold Bumex for now      Coronary artery disease of native artery of native heart with stable angina pectoris  Patient with known CAD which is controlled Will continue Statin and monitor for S/Sx of angina/ACS. Continue to monitor on telemetry.     CKD stage 3 secondary to diabetes  Creatine stable for now. BMP reviewed- noted Estimated Creatinine Clearance: 19.2 mL/min (A) (based on SCr of 2.2 mg/dL (H)). according to latest data. Based on current GFR, CKD stage is stage 3 - GFR 30-59.  Monitor UOP and serial BMP and adjust therapy as needed. Renally dose meds. Avoid nephrotoxic medications and procedures.  Discontinue intravenous lasix  Monitor trend    Hypercholesterolemia  Patient is chronically on statin.will continue for now. Last Lipid Panel:   Lab Results   Component Value Date    CHOL 121 07/03/2023    HDL 37 (L) 07/03/2023    LDLCALC 57 07/03/2023    TRIG 160 (H) 07/03/2023    CHOLHDL 27.8 08/01/2019     Plan:  -Continue home medication  -low fat/low calorie diet        Atrial fibrillation  Patient with Paroxysmal (<7 days) atrial fibrillation which is controlled currently with Bbs and Calcium Channel Blocker. Patient is currently in sinus rhythm.[id number] Score: 4.   Discussed cardiology vs  gastroenterology recommendations   Patient voices understanding  Will continue holding ac    Acquired hypothyroidism  Patient has chronic hypothyroidism. TFTs reviewed-   Lab Results   Component Value Date    TSH 1.780 07/03/2023   . Will continue chronic levothyroxine and adjust for and clinical changes.        Essential hypertension  Chronic, controlled.  Latest blood pressure and vitals reviewed-   Temp:  [97.4 °F (36.3 °C)-98.2 °F (36.8 °C)]   Pulse:  [63-89]   Resp:  [15-21]   BP: (151-203)/(63-89)   SpO2:  [87 %-96 %] .   Home meds for hypertension were reviewed and noted below.   Hypertension Medications               amLODIPine (NORVASC) 5 MG tablet Take 1 tablet (5 mg total) by mouth once daily.    carvediloL (COREG) 12.5 MG tablet Take 12.5 mg by mouth 2 (two) times daily with meals.    diltiaZEM (CARDIZEM CD) 300 MG 24 hr capsule Take 1 capsule by mouth every morning.    furosemide (LASIX) 20 MG tablet Take 1 tablet (20 mg total) by mouth once daily.    hydrALAZINE (APRESOLINE) 10 MG tablet Take 10 mg by mouth every 12 (twelve) hours.            While in the hospital, will manage blood pressure as follows; Adjust home antihypertensive regimen as follows- elevated blood pressure, resume home hydralazine    Will utilize p.r.n. blood pressure medication only if patient's blood pressure greater than  180/110 and he develops symptoms such as worsening chest pain or shortness of breath.        VTE Risk Mitigation (From admission, onward)           Ordered     Place SALUD hose  Until discontinued         12/12/24 0921     Place SALUD hose  Until discontinued         12/11/24 0949     enoxaparin injection 30 mg  Daily         12/10/24 1922     IP VTE HIGH RISK PATIENT  Once         12/10/24 1922     Place sequential compression device  Until discontinued         12/10/24 1922                    Discharge Planning   SHEA:      Code Status: DNR   Medical Readiness for Discharge Date:   Discharge Plan A: Home, Home  [facility]         Manuel Fermin MD  Department of Hospital Medicine   '[place] - Telemetry ([facility])

## 2024-12-18 NOTE — SUBJECTIVE & OBJECTIVE
Interval History:   89 year old man with CONS bacteremia .  No acute events noted.    12/17/24- He is tolerating meds    Cultures -   12/14/24-  Resp cultures-   Candida Tropicalis  Staph Aureus -rare  Blood culture- 12/10- staph Pettenkoferi  Review of Systems   Constitutional:  Negative for activity change, appetite change, chills, diaphoresis and fatigue.   Neurological:  Negative for dizziness, facial asymmetry, light-headedness and headaches.     Objective:     Vital Signs (Most Recent):  Temp: 98 °F (36.7 °C) (12/18/24 0350)  Pulse: 79 (12/18/24 0350)  Resp: 20 (12/18/24 0350)  BP: (!) 156/75 (12/18/24 0350)  SpO2: (!) 91 % (12/18/24 0350) Vital Signs (24h Range):  Temp:  [97.5 °F (36.4 °C)-98 °F (36.7 °C)] 98 °F (36.7 °C)  Pulse:  [66-88] 79  Resp:  [15-22] 20  SpO2:  [86 %-91 %] 91 %  BP: (138-156)/(68-81) 156/75     Weight: 81.7 kg (180 lb 1.9 oz)  Body mass index is 28.21 kg/m².    Estimated Creatinine Clearance: 16.5 mL/min (A) (based on SCr of 2.8 mg/dL (H)).     Physical Exam  Vitals and nursing note reviewed.   HENT:      Head: Normocephalic.   Eyes:      Pupils: Pupils are equal, round, and reactive to light.   Cardiovascular:      Rate and Rhythm: Normal rate.   Pulmonary:      Effort: Pulmonary effort is normal.   Musculoskeletal:      Cervical back: Normal range of motion.   Neurological:      General: No focal deficit present.      Mental Status: He is alert.          Significant Labs: Blood Culture:   Recent Labs   Lab 12/10/24  1053 12/10/24  1056 12/12/24  0907 12/12/24  0912   LABBLOO Gram stain aer bottle: Gram positive cocci  Positive results previously called 12/12/2024  13:46  STAPHYLOCOCCUS PETTENKOFERI* Gram stain aer bottle: Gram positive cocci  Results called to and read back by: GEETA Holguin. 12/12/2024  05:15  STAPHYLOCOCCUS PETTENKOFERI* No growth after 5 days. No growth after 5 days.     CBC:   Recent Labs   Lab 12/17/24  0408 12/18/24  0408   WBC 8.47 6.53   HGB 12.6* 13.1*   HCT  39.6* 40.8    235     CMP:   Recent Labs   Lab 12/17/24  0408   *   K 4.5   CL 93*   CO2 29   *   BUN 66*   CREATININE 2.8*   CALCIUM 8.4*   ANIONGAP 13     All pertinent labs within the past 24 hours have been reviewed.    Significant Imaging: I have reviewed all pertinent imaging results/findings within the past 24 hours.

## 2024-12-18 NOTE — PLAN OF CARE
A255/A255 TRIPP  Jimmy Young is a 89 y.o.male admitted on 12/10/2024 for Acute on chronic respiratory failure with hypoxia   Code Status: DNR MRN: [id number]   Review of patient's allergies indicates:  No Known Allergies  Past Medical History:   Diagnosis Date    Aneurysm 2016    abdominal, stent placed    Arthritis     Asthma     Atrial fibrillation     Cancer     skin cancer on face    CKD (chronic kidney disease)     COPD (chronic obstructive pulmonary disease) 10/05/2017    Diabetes mellitus     Emphysema lung     Encounter for blood transfusion     Hypertension       PRN meds    acetaminophen, 650 mg, Q6H PRN  hydrALAZINE, 10 mg, Q6H PRN  morphine, 10 mg, Q4H PRN  ondansetron, 4 mg, Q6H PRN  sodium chloride 0.9%, 10 mL, PRN      Chart check completed. Will continue plan of care.      Orientation: oriented x 4  Vince Coma Scale Score: 15     Lead Monitored: Lead II Rhythm: atrial rhythm Frequency/Ectopy: PVCs  Cardiac/Telemetry Box Number: [id number]  VTE Core Measure: (SCDs) Sequential compression device initiated/maintained Last Bowel Movement: 12/17/24  Diet Soft & Bite Sized (IDDSI Level 6) Low Sodium,2gm; Fluid - 1500mL; Standard Tray  Voiding Characteristics: voids spontaneously without difficulty  Abhishek Score: 18  Fall Risk Score: 11  Accucheck []   Freq?      Lines/Drains/Airways       Peripheral Intravenous Line  Duration                  Peripheral IV - Single Lumen 12/17/24 0251 20 G Anterior;Left Forearm <1 day                       Problem: Adult Inpatient Plan of Care  Goal: Plan of Care Review  Outcome: Progressing  Goal: Patient-Specific Goal (Individualized)  Outcome: Progressing  Goal: Absence of Hospital-Acquired Illness or Injury  Outcome: Progressing  Goal: Optimal Comfort and Wellbeing  Outcome: Progressing  Goal: Readiness for Transition of Care  Outcome: Progressing     Problem: Diabetes Comorbidity  Goal: Blood Glucose Level Within Targeted Range  Outcome: Progressing     Problem:  Pneumonia  Goal: Fluid Balance  Outcome: Progressing  Goal: Resolution of Infection Signs and Symptoms  Outcome: Progressing  Goal: Effective Oxygenation and Ventilation  Outcome: Progressing     Problem: Fall Injury Risk  Goal: Absence of Fall and Fall-Related Injury  Outcome: Progressing     Problem: Skin Injury Risk Increased  Goal: Skin Health and Integrity  Outcome: Progressing

## 2024-12-18 NOTE — PLAN OF CARE
Pt tolerated interventions well. Required MIN A for bed mobility, ambulated 4 steps to transfer MIN A with RW. Recommending low intensity therapy with 24/7 care.

## 2024-12-18 NOTE — ASSESSMENT & PLAN NOTE
12/18/2024  Patient on 18 L 100% FiO2 Vapotherm  Continue Solu-Medrol   Continue Shannon   Pulmonology on case  Patient desatting with minimal movement  Unable to be weaned at this time  Prognosis guarded  Chest x-ray improving   Discussed with family   Prognosis guarded   SNF versus inpatient hospice placement

## 2024-12-18 NOTE — ASSESSMENT & PLAN NOTE
--Initial CXR reports lateral left upper lobe opacity, most likely early pneumonia.   --CXR 12/14 reports small pleural effusions but no new consolidation   --On Vapotherm (home baseline is 2 L)  --Will broaden to PO linezolid 600 mg BID and continue ceftriaxone at 2 g daily   --Require drug toxicity monitoring   --See edema assessment for more details on antibiotics   --Follow respiratory culture to see if MDR organism isolated   --Fluid and COPD management per primary   --Agree with pulmonology consultation and CT chest   --Above d/w primary team.      12/16/24- chest x-ray - interval worsening with near complete whiteout of the left hemithorax which appears to be a combination of near complete collapse of the left lung and left pleural effusion.     Will continue Rocephin/Zyvox- follow pulmonology     12/17- resp culture- staph aureus/candida - follow final drug susceptibility of staph aureus -on Zyvox/Rocephin

## 2024-12-18 NOTE — ASSESSMENT & PLAN NOTE
"Patient has  unspecified  heart failure that is Chronic. On presentation their CHF was . Most recent BNP and echo results are listed below.well compensated  No results for input(s): "BNP" in the last 72 hours.    Latest ECHO  Results for orders placed during the hospital encounter of 01/08/21    Echo Color Flow Doppler? Yes    Interpretation Summary  · The left ventricle is normal in size with moderate concentric hypertrophy and normal systolic function. The estimated ejection fraction is 60%  · Indeterminate left ventricular diastolic function.  · Normal right ventricular size with normal right ventricular systolic function.  · Mild tricuspid regurgitation.  · Normal central venous pressure (3 mmHg).  · The estimated PA systolic pressure is 32 mmHg.    Current Heart Failure Medications  , Every 12 hours, Oral  , 2 times daily with meals, Oral  hydrALAZINE injection 10 mg, Every 6 hours PRN, Intravenous  hydrALAZINE tablet 10 mg, Every 12 hours, Oral    Plan  - Monitor strict I&Os and daily weights.    - Place on telemetry  - Low sodium diet  - Place on fluid restriction of 1.5 L.   - Cardiology has been consulted  - The patient's volume status is at their baseline  Ambulatory oxygen evaluation   Nutrition consult   Discontinue intravenous lasix    12/18/2024  Will hold Bumex for now    "

## 2024-12-18 NOTE — PT/OT/SLP PROGRESS
Physical Therapy Treatment    Patient Name:  Jimmy Young   MRN:  [id number]    Recommendations:     Discharge Recommendations: Low Intensity Therapy (with 24/7 care)  Discharge Equipment Recommendations: none  Barriers to discharge: None    Assessment:     Jimmy Young is a 89 y.o. male admitted with a medical diagnosis of Acute on chronic respiratory failure with hypoxia.  He presents with the following impairments/functional limitations: weakness, impaired endurance, impaired functional mobility, gait instability, impaired balance, pain, decreased safety awareness, decreased lower extremity function, impaired cardiopulmonary response to activity.    Rehab Prognosis: Good; patient would benefit from acute skilled PT services to address these deficits and reach maximum level of function.    Recent Surgery: * No surgery found *      Plan:     During this hospitalization, patient to be seen 3 x/week to address the identified rehab impairments via gait training, therapeutic activities, therapeutic exercises and progress toward the following goals:    Plan of Care Expires:  12/25/24    Subjective     Chief Complaint: Pt is motivated to participate  Patient/Family Comments/goals: none stated  Pain/Comfort:  Pain Rating 1: 0/10      Objective:     Communicated with nurse and epic chart review prior to session.  Patient found HOB elevated with peripheral IV, pulse ox (continuous), telemetry, oxygen (vapotherm) upon PT entry to room.     General Precautions: Standard, fall, respiratory  Orthopedic Precautions: N/A  Braces: N/A  Respiratory Status:  Vapotherm 18L 100%     Functional Mobility:  Gait belt applied - Yes  Increased time and verbal cuing needed, intermittent rest as needed.   Bed Mobility  Rolling Right: contact guard assistance  Scooting: contact guard assistance  Supine to Sit: minimum assistance for trunk management  Transfers  Sit to Stand: minimum assistance with rolling walker, x2 reps  With 1st  "rep, pt stood x3min focusing on upright posture and breathing.   Bed to Chair: minimum assistance with rolling walker using Step Transfer  Gait  Patient ambulated 4 steps to transfer with rolling walker and minimum assistance. Patient demonstrates occasional unsteady gait. No c/o dizziness, mild SOB. All lines remained intact throughout ambulation trial, vapotherm remained intact.  Balance  Sitting: contact guard assistance to MIN A with posterior instability  Standing: minimum assistance with posterior instability  Frequent verbal cuing for anterior weight shift in sitting ans standing  SpO2 prior to mobility 86%, SpO2 87% sitting in chair. Pt desat with exertion but recovered quickly with cuing, educated about pursed lip breathing technique and cued for use with mobility. Educated on activity pacing.     AM-PAC 6 CLICK MOBILITY  Turning over in bed (including adjusting bedclothes, sheets and blankets)?: 3  Sitting down on and standing up from a chair with arms (e.g., wheelchair, bedside commode, etc.): 3  Moving from lying on back to sitting on the side of the bed?: 3  Moving to and from a bed to a chair (including a wheelchair)?: 3  Need to walk in hospital room?: 3  Climbing 3-5 steps with a railing?: 1 (NT)  Basic Mobility Total Score: 16       Treatment & Education:  Reviewed role of PT in acute care and POC. Pt tolerated interventions well. Reviewed importance of OOB activities, activity pacing, and HEP (marching/hip flex, hip abd, heel slides/LAQ, quad sets, ankle pumps) in order to maintain/regain strength. Encouraged to sit up in chair for all meals. Reviewed proper use of RW for safety and to reduce risk of falling. Reviewed "call don't fall" policy and increased risk of falling due to weakness, instructed to utilize call bell for assistance with all transfers. Pt agreeable to all requests.    Patient left up in chair with all lines intact, call button in reach, chair alarm on, and family " present..    GOALS:   Multidisciplinary Problems       Physical Therapy Goals          Problem: Physical Therapy    Goal Priority Disciplines Outcome Interventions   Physical Therapy Goal     PT, PT/OT Progressing    Description: LT24  1. PT WILL COMPLETE BED MOBILITY IND  2. PT WILL GT TRAIN X 500' WITH RW MOD I  3. PT WILL COMPLETE STANDING TE X 20 REPS TO INC STRENGTH / BALANCE  4. PT WILL INC AMPAC SCORE BY 2 POINTS TO PROGRESS GROSS FUNC MOBILITY.                          Time Tracking:     PT Received On: 24  PT Start Time: 1000     PT Stop Time: 1025  PT Total Time (min): 25 min     Billable Minutes: Therapeutic Activity 25min    Treatment Type: Treatment  PT/PTA: PT     Number of PTA visits since last PT visit: 0     2024

## 2024-12-19 LAB
ANION GAP SERPL CALC-SCNC: 15 MMOL/L (ref 8–16)
BACTERIA SPEC AEROBE CULT: ABNORMAL
BACTERIA SPEC AEROBE CULT: ABNORMAL
BUN SERPL-MCNC: 85 MG/DL (ref 10–30)
CALCIUM SERPL-MCNC: 8.6 MG/DL (ref 8.7–10.5)
CHLORIDE SERPL-SCNC: 93 MMOL/L (ref 95–110)
CO2 SERPL-SCNC: 24 MMOL/L (ref 23–29)
CREAT SERPL-MCNC: 2.8 MG/DL (ref 0.5–1.4)
EST. GFR  (NO RACE VARIABLE): 20 ML/MIN/1.73 M^2
GLUCOSE SERPL-MCNC: 320 MG/DL (ref 70–110)
GRAM STN SPEC: ABNORMAL
POTASSIUM SERPL-SCNC: 4.8 MMOL/L (ref 3.5–5.1)
SODIUM SERPL-SCNC: 132 MMOL/L (ref 136–145)

## 2024-12-19 PROCEDURE — 36415 COLL VENOUS BLD VENIPUNCTURE: CPT | Performed by: NURSE PRACTITIONER

## 2024-12-19 PROCEDURE — 27100171 HC OXYGEN HIGH FLOW UP TO 24 HOURS

## 2024-12-19 PROCEDURE — 63600175 PHARM REV CODE 636 W HCPCS: Performed by: FAMILY MEDICINE

## 2024-12-19 PROCEDURE — 93005 ELECTROCARDIOGRAM TRACING: CPT | Mod: ER

## 2024-12-19 PROCEDURE — 63600175 PHARM REV CODE 636 W HCPCS: Performed by: NURSE PRACTITIONER

## 2024-12-19 PROCEDURE — 80048 BASIC METABOLIC PNL TOTAL CA: CPT | Performed by: NURSE PRACTITIONER

## 2024-12-19 PROCEDURE — 93010 ELECTROCARDIOGRAM REPORT: CPT | Mod: ,,, | Performed by: INTERNAL MEDICINE

## 2024-12-19 PROCEDURE — 25000242 PHARM REV CODE 250 ALT 637 W/ HCPCS: Performed by: INTERNAL MEDICINE

## 2024-12-19 PROCEDURE — 25000003 PHARM REV CODE 250: Performed by: NURSE PRACTITIONER

## 2024-12-19 PROCEDURE — 94640 AIRWAY INHALATION TREATMENT: CPT

## 2024-12-19 PROCEDURE — 97530 THERAPEUTIC ACTIVITIES: CPT

## 2024-12-19 PROCEDURE — 25000003 PHARM REV CODE 250: Performed by: FAMILY MEDICINE

## 2024-12-19 PROCEDURE — 99900035 HC TECH TIME PER 15 MIN (STAT)

## 2024-12-19 PROCEDURE — 94761 N-INVAS EAR/PLS OXIMETRY MLT: CPT

## 2024-12-19 PROCEDURE — 25000242 PHARM REV CODE 250 ALT 637 W/ HCPCS: Performed by: FAMILY MEDICINE

## 2024-12-19 PROCEDURE — 94664 DEMO&/EVAL PT USE INHALER: CPT

## 2024-12-19 PROCEDURE — 21400001 HC TELEMETRY ROOM

## 2024-12-19 RX ADMIN — BUDESONIDE INHALATION 0.5 MG: 0.5 SUSPENSION RESPIRATORY (INHALATION) at 08:12

## 2024-12-19 RX ADMIN — MAGNESIUM OXIDE TAB 400 MG (241.3 MG ELEMENTAL MG) 400 MG: 400 (241.3 MG) TAB at 08:12

## 2024-12-19 RX ADMIN — LEVOTHYROXINE SODIUM 50 MCG: 50 TABLET ORAL at 06:12

## 2024-12-19 RX ADMIN — SODIUM CHLORIDE 30 MG/ML INHALATION SOLUTION 4 ML: 30 SOLUTION INHALANT at 07:12

## 2024-12-19 RX ADMIN — CEFTRIAXONE 2 G: 2 INJECTION, POWDER, FOR SOLUTION INTRAMUSCULAR; INTRAVENOUS at 09:12

## 2024-12-19 RX ADMIN — HYDRALAZINE HYDROCHLORIDE 10 MG: 10 TABLET, FILM COATED ORAL at 08:12

## 2024-12-19 RX ADMIN — LINEZOLID 600 MG: 600 INJECTION, SOLUTION INTRAVENOUS at 01:12

## 2024-12-19 RX ADMIN — LINEZOLID 600 MG: 600 INJECTION, SOLUTION INTRAVENOUS at 11:12

## 2024-12-19 RX ADMIN — PRAVASTATIN SODIUM 40 MG: 20 TABLET ORAL at 09:12

## 2024-12-19 RX ADMIN — ARFORMOTEROL TARTRATE 15 MCG: 15 SOLUTION RESPIRATORY (INHALATION) at 07:12

## 2024-12-19 RX ADMIN — SODIUM CHLORIDE 30 MG/ML INHALATION SOLUTION 4 ML: 30 SOLUTION INHALANT at 08:12

## 2024-12-19 RX ADMIN — MICONAZOLE NITRATE: 20 CREAM TOPICAL at 01:12

## 2024-12-19 RX ADMIN — METRONIDAZOLE: 10 GEL TOPICAL at 09:12

## 2024-12-19 RX ADMIN — ENOXAPARIN SODIUM 30 MG: 30 INJECTION SUBCUTANEOUS at 05:12

## 2024-12-19 RX ADMIN — METRONIDAZOLE: 10 GEL TOPICAL at 01:12

## 2024-12-19 RX ADMIN — ARFORMOTEROL TARTRATE 15 MCG: 15 SOLUTION RESPIRATORY (INHALATION) at 08:12

## 2024-12-19 RX ADMIN — METHYLPREDNISOLONE SODIUM SUCCINATE 40 MG: 40 INJECTION, POWDER, FOR SOLUTION INTRAMUSCULAR; INTRAVENOUS at 01:12

## 2024-12-19 RX ADMIN — DILTIAZEM HYDROCHLORIDE 300 MG: 180 CAPSULE, COATED, EXTENDED RELEASE ORAL at 06:12

## 2024-12-19 RX ADMIN — BUDESONIDE INHALATION 0.5 MG: 0.5 SUSPENSION RESPIRATORY (INHALATION) at 07:12

## 2024-12-19 RX ADMIN — MICONAZOLE NITRATE: 20 CREAM TOPICAL at 05:12

## 2024-12-19 RX ADMIN — MAGNESIUM OXIDE TAB 400 MG (241.3 MG ELEMENTAL MG) 400 MG: 400 (241.3 MG) TAB at 09:12

## 2024-12-19 RX ADMIN — IPRATROPIUM BROMIDE AND ALBUTEROL SULFATE 3 ML: 2.5; .5 SOLUTION RESPIRATORY (INHALATION) at 02:12

## 2024-12-19 RX ADMIN — IPRATROPIUM BROMIDE AND ALBUTEROL SULFATE 3 ML: 2.5; .5 SOLUTION RESPIRATORY (INHALATION) at 08:12

## 2024-12-19 RX ADMIN — PANTOPRAZOLE SODIUM 40 MG: 40 TABLET, DELAYED RELEASE ORAL at 05:12

## 2024-12-19 RX ADMIN — IPRATROPIUM BROMIDE AND ALBUTEROL SULFATE 3 ML: 2.5; .5 SOLUTION RESPIRATORY (INHALATION) at 07:12

## 2024-12-19 RX ADMIN — HYDRALAZINE HYDROCHLORIDE 10 MG: 10 TABLET, FILM COATED ORAL at 09:12

## 2024-12-19 NOTE — ASSESSMENT & PLAN NOTE
Patient with Hypoxic Respiratory failure which is Acute on chronic.  he is on home oxygen at 3 LPM. Supplemental oxygen was provided and noted- Oxygen Concentration (%):  [100] 100    Etiology likely a combination of heart failure, pulmonary HTN, pneumonia/COPD  Per family only symptom that was new on admit was leg edema, never had worsening dyspnea    -continue steroids, cont duonebs  -Abx per ID  -Working on hospice transition  -see Pneumonia for further discussion

## 2024-12-19 NOTE — SUBJECTIVE & OBJECTIVE
Interval History: No acute events.  CXR improved from prior, but not really any progress with weaning.  Patient says that he feels fairly well.  Family discussing ultimate disposition, most likely seem to be leaning toward inpatient hospice.      Objective:     Vital Signs (Most Recent):  Temp: 97.8 °F (36.6 °C) (12/19/24 0902)  Pulse: 86 (12/19/24 0902)  Resp: (!) 22 (12/19/24 0902)  BP: (!) 150/72 (12/19/24 0902)  SpO2: (!) 92 % (12/19/24 0839) Vital Signs (24h Range):  Temp:  [97.5 °F (36.4 °C)-97.9 °F (36.6 °C)] 97.8 °F (36.6 °C)  Pulse:  [63-88] 86  Resp:  [18-22] 22  SpO2:  [88 %-92 %] 92 %  BP: (124-181)/(60-79) 150/72     Weight: 81.7 kg (180 lb 1.9 oz)  Body mass index is 28.21 kg/m².    No intake or output data in the 24 hours ending 12/19/24 1259     Physical Exam  Vitals and nursing note reviewed.   Constitutional:       General: He is not in acute distress.     Comments: Bedside chair   Cardiovascular:      Rate and Rhythm: Normal rate and regular rhythm.      Pulses: Normal pulses.      Heart sounds: No murmur heard.  Pulmonary:      Effort: Pulmonary effort is normal. No respiratory distress.      Breath sounds: Rhonchi (faint) present. No wheezing or rales.      Comments: Moderate air movement  Abdominal:      General: Abdomen is flat. There is no distension.      Palpations: Abdomen is soft.      Tenderness: There is no abdominal tenderness.   Musculoskeletal:      Right lower leg: No edema.      Left lower leg: No edema.   Neurological:      Mental Status: He is alert.      Comments: Alert and conversant           Review of Systems    Vents:  Oxygen Concentration (%): 100 (12/19/24 0839)    Lines/Drains/Airways       Peripheral Intravenous Line  Duration                  Peripheral IV - Single Lumen 12/17/24 1000 22 G Anterior;Right Upper Arm 2 days                    Significant Labs:    CBC/Anemia Profile:  Recent Labs   Lab 12/18/24  0408   WBC 6.53   HGB 13.1*   HCT 40.8      MCV 91    RDW 12.9        Chemistries:  Recent Labs   Lab 12/18/24  0408 12/19/24  0421    132*   K 4.4 4.8   CL 93* 93*   CO2 26 24   BUN 75* 85*   CREATININE 3.0* 2.8*   CALCIUM 8.4* 8.6*       All pertinent labs within the past 24 hours have been reviewed.    Significant Imaging:  I have reviewed all pertinent imaging results/findings within the past 24 hours.

## 2024-12-19 NOTE — SUBJECTIVE & OBJECTIVE
Interval History:  See above    Review of Systems   Constitutional:  Negative for fatigue and fever.   HENT:  Negative for sinus pressure.    Eyes:  Negative for visual disturbance.   Respiratory:  Positive for cough and shortness of breath.    Cardiovascular:  Positive for leg swelling. Negative for chest pain.   Gastrointestinal:  Negative for nausea and vomiting.   Genitourinary:  Negative for difficulty urinating.   Musculoskeletal:  Negative for back pain.   Skin:  Negative for rash.   Neurological:  Negative for headaches.   Psychiatric/Behavioral:  Negative for confusion.      Objective:     Vital Signs (Most Recent):  Temp: 97.8 °F (36.6 °C) (12/19/24 0902)  Pulse: 86 (12/19/24 0902)  Resp: (!) 22 (12/19/24 0902)  BP: (!) 150/72 (12/19/24 0902)  SpO2: (!) 92 % (12/19/24 0839) Vital Signs (24h Range):  Temp:  [97.5 °F (36.4 °C)-97.9 °F (36.6 °C)] 97.8 °F (36.6 °C)  Pulse:  [63-88] 86  Resp:  [18-22] 22  SpO2:  [87 %-92 %] 92 %  BP: (124-181)/(60-79) 150/72     Weight: 81.7 kg (180 lb 1.9 oz)  Body mass index is 28.21 kg/m².  No intake or output data in the 24 hours ending 12/19/24 1035      Physical Exam  Vitals reviewed.   Constitutional:       General: He is not in acute distress.     Appearance: He is well-developed. He is ill-appearing. He is not toxic-appearing or diaphoretic.      Interventions: Nasal cannula in place.   HENT:      Head: Normocephalic and atraumatic.      Right Ear: Decreased hearing noted.      Left Ear: Decreased hearing noted.   Eyes:      Pupils: Pupils are equal, round, and reactive to light.   Cardiovascular:      Rate and Rhythm: Normal rate and regular rhythm.      Heart sounds: Normal heart sounds. No murmur heard.     No friction rub. No gallop.   Pulmonary:      Effort: Pulmonary effort is normal. No respiratory distress.      Breath sounds: Normal breath sounds. No stridor. No wheezing or rales.   Abdominal:      General: Bowel sounds are normal. There is no distension.       Palpations: Abdomen is soft. There is no mass.      Tenderness: There is no abdominal tenderness. There is no guarding.   Musculoskeletal:      Right lower leg: Edema present.      Left lower leg: Edema present.   Skin:     General: Skin is warm.      Findings: Erythema and lesion present.   Neurological:      Mental Status: He is alert and oriented to person, place, and time.      Motor: Weakness present.   Psychiatric:         Mood and Affect: Mood normal.         Behavior: Behavior normal.             Significant Labs: All pertinent labs within the past 24 hours have been reviewed.    Significant Imaging: I have reviewed all pertinent imaging results/findings within the past 24 hours.

## 2024-12-19 NOTE — PT/OT/SLP PROGRESS
"Occupational Therapy   Treatment    Name: Jimmy Young  MRN: [id number]  Admitting Diagnosis:  Acute on chronic respiratory failure with hypoxia       Recommendations:     Discharge Recommendations: Low Intensity Therapy (24/7 SPV and A)  Discharge Equipment Recommendations:  hospital bed  Barriers to discharge:  None    Assessment:     Jimmy Young is a 89 y.o. male with a medical diagnosis of Acute on chronic respiratory failure with hypoxia.  He presents with the following performance deficits affecting function are weakness, impaired endurance, impaired self care skills, impaired functional mobility, impaired balance, impaired cardiopulmonary response to activity, decreased safety awareness, impaired cognition, decreased lower extremity function, decreased upper extremity function, impaired fine motor.     Rehab Prognosis:  Poor; patient would benefit from acute skilled OT services to address these deficits and reach maximum level of function.       Patient's rehabilitation potential extremely guarded due to advanced age, increasing O2 requires, medical complexity, and mild to moderate cognitive deficits.    Plan:     Patient to be seen 2 x/week to address the above listed problems via self-care/home management, therapeutic activities, therapeutic exercises  Plan of Care Expires: 12/25/24  Plan of Care Reviewed with: patient, family    Subjective     Chief Complaint: Reported "I made a mess in this bed."  Patient/Family Comments/goals: increase independence  Pain/Comfort:  Pain Rating 1: 0/10    Objective:     Communicated with: NurseBetito, prior to session.  Patient found supine with peripheral IV, telemetry, pulse ox (continuous) (vapotherm) upon OT entry to room.    General Precautions: Standard, fall, respiratory    Orthopedic Precautions:N/A  Braces: N/A  Respiratory Status:  vapotherm     Occupational Performance:     Bed Mobility:    Patient completed Supine to Sit with minimum assistance "     Functional Mobility/Transfers:  Patient completed Sit <> Stand Transfer with minimum assistance  with  rolling walker   Static standing patient with severe posterior instability requiring mod A for correction  Patient completed Bed <> Chair Transfer using Step Transfer technique with minimum assistance with rolling walker  Functional Mobility: Patient completed x8ft functional mobility with RW and min A to increase dynamic standing balance and activity tolerance needed for ADL completion.  Provided with v/c for technique with transfers to increase safety and independence with completion.  Desats with all exertion- prolonged recovery with max cueing for breathing technique.    Activities of Daily Living:  Toileting: dependence soiled at entry. Completed dependent toileting hygiene in standing.    [facility] 6 Click ADL: 12    Treatment & Education:  Encouraged completion of B UE AROM therex throughout the day to increase functional strength and activity tolerance needed for ADL completion. Educated on benefits of OOB activity and importance of calling for A to transfer back to bed. Patient and family stated understanding and in agreement with POC.    Patient left up in chair with all lines intact, call button in reach, chair alarm on, and familty present    GOALS:   Multidisciplinary Problems       Occupational Therapy Goals          Problem: Occupational Therapy    Goal Priority Disciplines Outcome Interventions   Occupational Therapy Goal     OT, PT/OT Progressing    Description: Goals to be met by: 12/25/24     Patient will increase functional independence with ADLs by performing:    Toileting from toilet with Contact Guard Assistance for hygiene and clothing management.   Toilet transfer to bedside commode with Contact Guard Assistance.  Upper extremity exercise program x10 reps per handout, with supervision.                         Time Tracking:     OT Date of Treatment: 12/19/24  OT Start Time: 1025  OT Stop  Time: 1050  OT Total Time (min): 25 min    Billable Minutes:Therapeutic Activity 25    OT/MAGALY: OT     Number of MAGALY visits since last OT visit: 0    Tiffanie Larry OT  12/19/2024

## 2024-12-19 NOTE — PT/OT/SLP PROGRESS
Physical Therapy Treatment    Patient Name:  Jimmy Young   MRN:  [id number]    Recommendations:     Discharge Recommendations: Low Intensity Therapy (with 24/7 care)  Discharge Equipment Recommendations: none  Barriers to discharge: None    Assessment:     Jimmy Young is a 89 y.o. male admitted with a medical diagnosis of Acute on chronic respiratory failure with hypoxia.  He presents with the following impairments/functional limitations: weakness, impaired endurance, impaired functional mobility, gait instability, impaired balance, decreased safety awareness, decreased lower extremity function, impaired cardiopulmonary response to activity, decreased ROM, decreased coordination.    Rehab Prognosis: Fair; patient would benefit from acute skilled PT services to address these deficits and reach maximum level of function.    Recent Surgery: * No surgery found *      Plan:     During this hospitalization, patient to be seen 3 x/week to address the identified rehab impairments via gait training, therapeutic activities, therapeutic exercises and progress toward the following goals:    Plan of Care Expires:  12/25/24    Subjective     Chief Complaint: Pt agreed to participate  Patient/Family Comments/goals: none stated  Pain/Comfort:  Pain Rating 1: 0/10      Objective:     Communicated with nurse and epic chart review prior to session.  Patient found HOB elevated with peripheral IV, pulse ox (continuous), telemetry, oxygen (vapotherm) upon PT entry to room.     General Precautions: Standard, fall, respiratory  Orthopedic Precautions: N/A  Braces: N/A  Respiratory Status:  Vaptherm 25L 100%     Functional Mobility:  Gait belt applied - Yes  Bed Mobility  Rolling Right: minimum assistance  Scooting: minimum assistance  Supine to Sit: minimum assistance  Transfers  Sit to Stand: minimum assistance with rolling walker, x3 reps  Bed to Chair: minimum assistance with rolling walker using Step  "Transfer  Gait  Patient ambulated 8ft with rolling walker and minimum assistance. Patient demonstrates unsteady gait. No c/o dizziness, SOB with exertion improved with rest, educated about pursed lip breathing technique and cued for use with mobility. Desat to 84% with activity, recovered to 90%. Gait limited due to vapotherm line. All lines remained intact throughout ambulation trial. Vapotherm intact throughout ambulation.  Balance  Sitting: contact guard assistance  Static Standing: moderate assistance with posterior instability, frequent verbal cuing to correct  Pt stood to change soiled brief      AM-PAC 6 CLICK MOBILITY  Turning over in bed (including adjusting bedclothes, sheets and blankets)?: 3  Sitting down on and standing up from a chair with arms (e.g., wheelchair, bedside commode, etc.): 3  Moving from lying on back to sitting on the side of the bed?: 3  Moving to and from a bed to a chair (including a wheelchair)?: 3  Need to walk in hospital room?: 3  Climbing 3-5 steps with a railing?: 1 (NT)  Basic Mobility Total Score: 16       Treatment & Education:  Reviewed role of PT in acute care and POC. Pt tolerated interventions well. Reviewed importance of OOB activities, activity pacing, and HEP (marching/hip flex, hip abd, heel slides/LAQ, quad sets, ankle pumps) in order to maintain/regain strength. Encouraged to sit up in chair for all meals. Reviewed proper use of RW for safety and to reduce risk of falling. Reviewed "call don't fall" policy and increased risk of falling due to weakness, instructed to utilize call bell for assistance with all transfers. Pt agreeable to all requests.    Patient left up in chair with all lines intact, call button in reach, chair alarm on, and family present..    GOALS:   Multidisciplinary Problems       Physical Therapy Goals          Problem: Physical Therapy    Goal Priority Disciplines Outcome Interventions   Physical Therapy Goal     PT, PT/OT Progressing  "   Description: LT24  1. PT WILL COMPLETE BED MOBILITY IND  2. PT WILL GT TRAIN X 500' WITH RW MOD I  3. PT WILL COMPLETE STANDING TE X 20 REPS TO INC STRENGTH / BALANCE  4. PT WILL INC AMPAC SCORE BY 2 POINTS TO PROGRESS GROSS FUNC MOBILITY.                          Time Tracking:     PT Received On: 24  PT Start Time: 1045     PT Stop Time: 1110  PT Total Time (min): 25 min     Billable Minutes: Therapeutic Activity 25min    Treatment Type: Treatment  PT/PTA: PT     Number of PTA visits since last PT visit: 0     2024

## 2024-12-19 NOTE — ASSESSMENT & PLAN NOTE
12/19/2024  Requiring increased O2  Currently on 25L 100% vapotherm  Continue Solu-Medrol   Continue Shannon   Pulmonology on case  Patient desatting with minimal movement  Unable to be weaned at this time  Prognosis guarded  Chest x-ray improving   Discussed with family   SNF versus inpatient hospice placement

## 2024-12-19 NOTE — ASSESSMENT & PLAN NOTE
Antibiotics (From admission, onward)      Start     Stop Route Frequency Ordered    12/15/24 0900  cefTRIAXone injection 2 g         -- IV Every 24 hours (non-standard times) 12/14/24 1118    12/14/24 1230  linezolid 600 mg/300 mL IVPB 600 mg         -- IV Every 12 hours (non-standard times) 12/14/24 1118            Microbiology Results (last 7 days)       Procedure Component Value Units Date/Time    Culture, Respiratory with Gram Stain [[id number]]  (Abnormal)  (Susceptibility) Collected: 12/14/24 1815    Order Status: Completed Specimen: Sputum, Expectorated Updated: 12/19/24 1248     Respiratory Culture CANDIDA TROPICALIS  Moderate        METHICILLIN RESISTANT STAPHYLOCOCCUS AUREUS  Rare       Gram Stain (Respiratory) <10 epithelial cells per low power field.     Gram Stain (Respiratory) Rare WBC's     Gram Stain (Respiratory) Rare Gram positive cocci     Gram Stain (Respiratory) Rare yeast     Gram Stain (Respiratory) Rare Gram negative rods    Blood culture [[id number]] Collected: 12/12/24 0907    Order Status: Completed Specimen: Blood from Peripheral, Hand, Right Updated: 12/17/24 2012     Blood Culture, Routine No growth after 5 days.    Blood culture [[id number]] Collected: 12/12/24 0912    Order Status: Completed Specimen: Blood from Peripheral, Hand, Left Updated: 12/17/24 2012     Blood Culture, Routine No growth after 5 days.    Blood Culture #2 **CANNOT BE ORDERED STAT** [[id number]]  (Abnormal)  (Susceptibility) Collected: 12/10/24 1056    Order Status: Completed Specimen: Blood from Peripheral, Forearm, Right Updated: 12/15/24 1314     Blood Culture, Routine Gram stain aer bottle: Gram positive cocci      Results called to and read back by: GEETA Holguin. 12/12/2024  05:15      STAPHYLOCOCCUS PETTENKOFERI    Blood Culture #1 **CANNOT BE ORDERED STAT** [[id number]]  (Abnormal)  (Susceptibility) Collected: 12/10/24 1053    Order Status: Completed Specimen: Blood from Peripheral, Antecubital, Right  Updated: 12/15/24 1309     Blood Culture, Routine Gram stain aer bottle: Gram positive cocci      Positive results previously called 12/12/2024  13:46      STAPHYLOCOCCUS PETTENKOFERI    Respiratory Infection Panel (PCR), Nasopharyngeal [[id number]] Collected: 12/14/24 0802    Order Status: Completed Specimen: Nasopharyngeal Swab Updated: 12/14/24 1208     Respiratory Infection Panel Source NP swab     Adenovirus Not Detected     Coronavirus 229E, Common Cold Virus Not Detected     Coronavirus HKU1, Common Cold Virus Not Detected     Coronavirus NL63, Common Cold Virus Not Detected     Coronavirus OC43, Common Cold Virus Not Detected     Comment: The Coronavirus strains detected in this test cause the common cold.  These strains are not the COVID-19 (novel Coronavirus)strain   associated with the respiratory disease outbreak.          SARS-CoV2 (COVID-19) Qualitative PCR Not Detected     Human Metapneumovirus Not Detected     Human Rhinovirus/Enterovirus Not Detected     Influenza A (subtypes H1, H1-2009,H3) Not Detected     Influenza B Not Detected     Parainfluenza Virus 1 Not Detected     Parainfluenza Virus 2 Not Detected     Parainfluenza Virus 3 Not Detected     Parainfluenza Virus 4 Not Detected     Respiratory Syncytial Virus Not Detected     Bordetella Parapertussis (HD2167) Not Detected     Bordetella pertussis (ptxP) Not Detected     Chlamydia pneumoniae Not Detected     Mycoplasma pneumoniae Not Detected     Comment: Respiratory Infection Panel testing performed by Multiplex PCR.       Narrative:      Assay not valid for lower respiratory specimens, alternate  testing required.          - significant worsening in CXR on 12/16 compared to 12/14 without really any worsening in symptoms.  - I feel the primary player in the opacification is parenchymal given the traction and feel the effusion probably isn't much worse  - already on broad spectrum Abx  - will add some additional nebs for airway clearance, but  he is not complaining of sputum  - I understand that transition to home hospice, but I feel like this worsening makes this step unlikely.  Would consider inpatient hospice, though he may be requiring too much for them, as well.  - I discussed this with the patient's daughter at bedside, as well as Dr Fermin  - wean supplemental oxygen for goal SpO2 > 88%    12/19 - CXR improved, but no weaning progress despite fairly maximal medical therapy.  Family discussing possible transition to hospice, which I think is appropriate at this time.  Continue medical management until a decision is made.

## 2024-12-19 NOTE — PROGRESS NOTES
[facility])  Infectious Disease  Progress Note    Patient Name: Jimmy Young  MRN: [id number]  Admission Date: 12/10/2024  Length of Stay: 9 days  Attending Physician: Manuel Fermin MD  Primary Care Provider: Nacho Richardson MD    Isolation Status: No active isolations  Assessment/Plan:      Pulmonary  Pneumonia  --Initial CXR reports lateral left upper lobe opacity, most likely early pneumonia.   --CXR 12/14 reports small pleural effusions but no new consolidation   --On Vapotherm (home baseline is 2 L)  --Will broaden to PO linezolid 600 mg BID and continue ceftriaxone at 2 g daily   --Require drug toxicity monitoring   --See edema assessment for more details on antibiotics   --Follow respiratory culture to see if MDR organism isolated   --Fluid and COPD management per primary   --Agree with pulmonology consultation and CT chest   --Above d/w primary team.      12/16/24- chest x-ray - interval worsening with near complete whiteout of the left hemithorax which appears to be a combination of near complete collapse of the left lung and left pleural effusion.     Will continue Rocephin/Zyvox- follow pulmonology     12/17- resp culture- staph aureus/candida - follow final drug susceptibility of staph aureus -on Zyvox/Rocephin    1218- tolerating meds- on Zyvox/Rocehin- follow  final cultures    Cardiac/Vascular  Atrial fibrillation  Continue current medications per primary      Essential hypertension  Continue current medications per primary      Endocrine  Acquired hypothyroidism  Continue current medications per primary          Anticipated Disposition:     Thank you for your consult. I will follow-up with patient. Please contact us if you have any additional questions.    Jose Salcedo MD, [facility]  Infectious Disease  [facility])    Subjective:     Principal Problem:Acute on chronic respiratory failure with hypoxia    HPI: This is a 89 yo M with hx of asthma, Afib, COPD, CKD, DM  and HTN who presented to ER for evaluation of bilateral leg swelling. Patient was advised to go to ER in setting of lower extremity edema, shortness of breath, and a cough. Patient is on chronic home O2 at 3L. On diuretics. No evidence of DVT on LE US. CXR reports lateral left upper lobe opacity, most likely early pneumonia. Started empirically on IV ceftriaxone and PO doxycycline. Blood cultures were collected 12/10 are reporting Staph species on BCID. No S aureus detected. Low suspicion for virulent infection. No pacemaker/ICD noted on review of CXR. Likely going to be a CONS. Patient has been afebrile. ID consulted for bacteremia.   Interval History:   89 year old man with CONS bacteremia .  No acute events noted.    12/17/24- He is tolerating meds    Cultures -   12/14/24-  Resp cultures-   Candida Tropicalis  Staph Aureus -rare  Blood culture- 12/10- staph Pettenkoferi  12/18/24-  Remains weak  Spouse in the room  Review of Systems   Constitutional:  Negative for activity change, appetite change, chills, diaphoresis and fatigue.   Neurological:  Negative for dizziness, facial asymmetry, light-headedness and headaches.     Objective:     Vital Signs (Most Recent):  Temp: 97.5 °F (36.4 °C) (12/19/24 0410)  Pulse: 66 (12/19/24 0410)  Resp: 18 (12/19/24 0410)  BP: (!) 181/79 (12/19/24 0410)  SpO2: (!) 90 % (12/19/24 0410) Vital Signs (24h Range):  Temp:  [97.5 °F (36.4 °C)-97.9 °F (36.6 °C)] 97.5 °F (36.4 °C)  Pulse:  [63-83] 66  Resp:  [16-20] 18  SpO2:  [87 %-92 %] 90 %  BP: (124-181)/(60-79) 181/79     Weight: 81.7 kg (180 lb 1.9 oz)  Body mass index is 28.21 kg/m².    Estimated Creatinine Clearance: 16.5 mL/min (A) (based on SCr of 2.8 mg/dL (H)).     Physical Exam  Vitals and nursing note reviewed.   HENT:      Head: Normocephalic.   Eyes:      Pupils: Pupils are equal, round, and reactive to light.   Cardiovascular:      Rate and Rhythm: Normal rate.   Pulmonary:      Effort: Pulmonary effort is normal.    Musculoskeletal:      Cervical back: Normal range of motion.   Neurological:      General: No focal deficit present.      Mental Status: He is alert.          Significant Labs: Blood Culture:   Recent Labs   Lab 12/10/24  1053 12/10/24  1056 12/12/24  0907 12/12/24  0912   LABBLOO Gram stain aer bottle: Gram positive cocci  Positive results previously called 12/12/2024  13:46  STAPHYLOCOCCUS PETTENKOFERI* Gram stain aer bottle: Gram positive cocci  Results called to and read back by: GEETA Holguin. 12/12/2024  05:15  STAPHYLOCOCCUS PETTENKOFERI* No growth after 5 days. No growth after 5 days.     CBC:   Recent Labs   Lab 12/18/24  0408   WBC 6.53   HGB 13.1*   HCT 40.8        CMP:   Recent Labs   Lab 12/18/24  0408 12/19/24  0421    132*   K 4.4 4.8   CL 93* 93*   CO2 26 24   * 320*   BUN 75* 85*   CREATININE 3.0* 2.8*   CALCIUM 8.4* 8.6*   ANIONGAP 17* 15     All pertinent labs within the past 24 hours have been reviewed.    Significant Imaging: I have reviewed all pertinent imaging results/findings within the past 24 hours.

## 2024-12-19 NOTE — PROGRESS NOTES
[facility] ([facility])  [facility]  Progress Note    Patient Name: Jimmy Young  MRN: [id number]  Patient Class: IP- Inpatient   Admission Date: 12/10/2024  Length of Stay: 9 days  Attending Physician: Manuel Fermin MD  Primary Care Provider: Nacho Richardson MD        Subjective     Principal Problem:Acute on chronic respiratory failure with hypoxia        HPI:  Jimmy Young is a 89 y.o. male with a PMH  has a past medical history of Aneurysm (2016), Arthritis, Asthma, Atrial fibrillation, Cancer, CKD (chronic kidney disease), COPD (chronic obstructive pulmonary disease) (10/05/2017), Diabetes mellitus, Emphysema lung, Encounter for blood transfusion, and Hypertension.presented to the ED for swelling of the legs. Patient was referred from his primary care physician today for worsening lower extremity edema, shortness of breath, and cough. Patient is on chronic home O2 at 3L/min via NC. Reports compliance with his lasix and denies any excessive fluid or salt intake. Denies any other complaints at his time.      ER workup revealed CBC to be unremarkable.  CMP with BUN/creatinine at baseline of 29/2.2.   mg/dL.  Magnesium 1.2.  BNP, troponin, lactic acid within normal limits.  Flu/COVID negative.  Blood cultures obtained x2.  Ultrasound lower extremities negative for DVT.  Chest x-ray revealed left upper lobe opacity resembling early pneumonia.  EKG revealed a flutter with variable AV block and left axis deviation with a ventricular rate of 64 beats per minute with a QT/QTC of 416/429.  Vital signs stable.  Patient is at baseline oxygen saturation of 92-94% on 3 liters/minute via nasal cannula.  Patient received 12.5 mg carvedilol, 1 g Rocephin IV, 100 mg doxycycline p.o., 30 mg Lovenox, 40 mg Lasix IV, and 10 mg hydralazine p.o. at outside facility.  Hospital Medicine consulted to admit patient for CHF exacerbation and pneumonia. Patient and family in agreement with treatment plan.  Patient admitted under inpatient status.    PCP: Nacho Richardson      Overview/Hospital Course:  12/11 admitted for pneumonia. Wears supplemental oxygen at baseline, 2L. On 4L. Speech consulted. Schedule breathing treatments. Discontinue lasix. Replete lytes. Echocardiogram pending. Cardiology consulted. Relaxing normotensive range in this patient demographic.  12/12 blood culture positive for staph. Infectious disease consulted. Continue monitoring repeat blood cultures. Recent medication(s) change with linagliptin with risk for congestive heart failure and skin reaction. Discussed risk vs benefit. Hba1c 6.9. recommend goal hba1c 8-9.   12/13 bun/creatinine increasing. Discontinued intravenous lasix. Remains on increased supplemental oxygen, 5L. Wean as able. Home hydralazine resumed for elevated blood pressure. Repeat blood cultures negative growth to date x 1 day. Infectious disease following  12/14/2024  Patient required increasing to 40 L 100% FiO2 Vapotherm.  Pulmonology consult on case.  Solu-Medrol added.  Continue empiric antibiotics.  Repeat chest x-ray.  12/15/2024  Patient weaned down to 20 L 80% Vapotherm.  Will continue current management.  12/16/2024  Still on 20L 80% vapotherm. Unable to be weaned at this time. Cont abx. Pulm and cardiology on case.   12/17/2024  Chest x-ray improved today compared to yesterday.  Still on 20 L 90% Vapotherm.  Continue antibiotics.  12/18/2024  Currently on 18 L 100% Vapotherm.  Continue antibiotics.  Wean O2 as tolerated.  Discussed with family at bedside the prognosis is guarded.  Patient will likely need placement should he improve.  Discussed SNF versus inpatient hospice.  Will see how patient clinically progresses over the next few days.  12/19/2024  Currently on 25 L 100% Vapotherm.  Continue antibiotics.  Chest x-ray reviewed which is improving however patient clinically declining.  He is requiring increased O2.  Snf versus inpatient hospice.       Interval  History:  See above    Review of Systems   Constitutional:  Negative for fatigue and fever.   HENT:  Negative for sinus pressure.    Eyes:  Negative for visual disturbance.   Respiratory:  Positive for cough and shortness of breath.    Cardiovascular:  Positive for leg swelling. Negative for chest pain.   Gastrointestinal:  Negative for nausea and vomiting.   Genitourinary:  Negative for difficulty urinating.   Musculoskeletal:  Negative for back pain.   Skin:  Negative for rash.   Neurological:  Negative for headaches.   Psychiatric/Behavioral:  Negative for confusion.      Objective:     Vital Signs (Most Recent):  Temp: 97.8 °F (36.6 °C) (12/19/24 0902)  Pulse: 86 (12/19/24 0902)  Resp: (!) 22 (12/19/24 0902)  BP: (!) 150/72 (12/19/24 0902)  SpO2: (!) 92 % (12/19/24 0839) Vital Signs (24h Range):  Temp:  [97.5 °F (36.4 °C)-97.9 °F (36.6 °C)] 97.8 °F (36.6 °C)  Pulse:  [63-88] 86  Resp:  [18-22] 22  SpO2:  [87 %-92 %] 92 %  BP: (124-181)/(60-79) 150/72     Weight: 81.7 kg (180 lb 1.9 oz)  Body mass index is 28.21 kg/m².  No intake or output data in the 24 hours ending 12/19/24 1035      Physical Exam  Vitals reviewed.   Constitutional:       General: He is not in acute distress.     Appearance: He is well-developed. He is ill-appearing. He is not toxic-appearing or diaphoretic.      Interventions: Nasal cannula in place.   HENT:      Head: Normocephalic and atraumatic.      Right Ear: Decreased hearing noted.      Left Ear: Decreased hearing noted.   Eyes:      Pupils: Pupils are equal, round, and reactive to light.   Cardiovascular:      Rate and Rhythm: Normal rate and regular rhythm.      Heart sounds: Normal heart sounds. No murmur heard.     No friction rub. No gallop.   Pulmonary:      Effort: Pulmonary effort is normal. No respiratory distress.      Breath sounds: Normal breath sounds. No stridor. No wheezing or rales.   Abdominal:      General: Bowel sounds are normal. There is no distension.       "Palpations: Abdomen is soft. There is no mass.      Tenderness: There is no abdominal tenderness. There is no guarding.   Musculoskeletal:      Right lower leg: Edema present.      Left lower leg: Edema present.   Skin:     General: Skin is warm.      Findings: Erythema and lesion present.   Neurological:      Mental Status: He is alert and oriented to person, place, and time.      Motor: Weakness present.   Psychiatric:         Mood and Affect: Mood normal.         Behavior: Behavior normal.             Significant Labs: All pertinent labs within the past 24 hours have been reviewed.    Significant Imaging: I have reviewed all pertinent imaging results/findings within the past 24 hours.    Assessment and Plan     * Acute on chronic respiratory failure with hypoxia  12/19/2024  Requiring increased O2  Currently on 25L 100% vapotherm  Continue Solu-Medrol   Continue Connorbs   Pulmonology on case  Patient desatting with minimal movement  Unable to be weaned at this time  Prognosis guarded  Chest x-ray improving   Discussed with family   SNF versus inpatient hospice placement        Lower extremity edema  Improving  Multifactorial: malnutrition, fluid indiscretion, venous stasis, recent medication(s) change, dependent edema  Counseling provided  Elevate legs   Manish hose and topicals    Atrial flutter  Controlled  Follow up outpatient primary cardiologist  Holding a/c due to pmh GI bleed       Pneumonia  Continue Zyvox and Rocephin    Chronic diastolic heart failure  Patient has  unspecified  heart failure that is Chronic. On presentation their CHF was . Most recent BNP and echo results are listed below.well compensated  No results for input(s): "BNP" in the last 72 hours.    Latest ECHO  Results for orders placed during the hospital encounter of 01/08/21    Echo Color Flow Doppler? Yes    Interpretation Summary  · The left ventricle is normal in size with moderate concentric hypertrophy and normal systolic function. The " estimated ejection fraction is 60%  · Indeterminate left ventricular diastolic function.  · Normal right ventricular size with normal right ventricular systolic function.  · Mild tricuspid regurgitation.  · Normal central venous pressure (3 mmHg).  · The estimated PA systolic pressure is 32 mmHg.    Current Heart Failure Medications  , Every 12 hours, Oral  , 2 times daily with meals, Oral  hydrALAZINE injection 10 mg, Every 6 hours PRN, Intravenous  hydrALAZINE tablet 10 mg, Every 12 hours, Oral    Plan  - Monitor strict I&Os and daily weights.    - Place on telemetry  - Low sodium diet  - Place on fluid restriction of 1.5 L.   - Cardiology has been consulted  - The patient's volume status is at their baseline  Ambulatory oxygen evaluation   Nutrition consult   Discontinue intravenous lasix    12/18/2024  Will hold Bumex for now      Coronary artery disease of native artery of native heart with stable angina pectoris  Patient with known CAD which is controlled Will continue Statin and monitor for S/Sx of angina/ACS. Continue to monitor on telemetry.     CKD stage 3 secondary to diabetes  Creatine stable for now. BMP reviewed- noted Estimated Creatinine Clearance: 19.2 mL/min (A) (based on SCr of 2.2 mg/dL (H)). according to latest data. Based on current GFR, CKD stage is stage 3 - GFR 30-59.  Monitor UOP and serial BMP and adjust therapy as needed. Renally dose meds. Avoid nephrotoxic medications and procedures.  Discontinue intravenous lasix  Monitor trend    Hypercholesterolemia  Patient is chronically on statin.will continue for now. Last Lipid Panel:   Lab Results   Component Value Date    CHOL 121 07/03/2023    HDL 37 (L) 07/03/2023    LDLCALC 57 07/03/2023    TRIG 160 (H) 07/03/2023    CHOLHDL 27.8 08/01/2019     Plan:  -Continue home medication  -low fat/low calorie diet        Atrial fibrillation  Patient with Paroxysmal (<7 days) atrial fibrillation which is controlled currently with Bbs and Calcium  Channel Blocker. Patient is currently in sinus rhythm.[id number] Score: 4.   Discussed cardiology vs gastroenterology recommendations   Patient voices understanding  Will continue holding ac    Acquired hypothyroidism  Patient has chronic hypothyroidism. TFTs reviewed-   Lab Results   Component Value Date    TSH 1.780 07/03/2023   . Will continue chronic levothyroxine and adjust for and clinical changes.        Essential hypertension  Chronic, controlled.  Latest blood pressure and vitals reviewed-   Temp:  [97.4 °F (36.3 °C)-98.2 °F (36.8 °C)]   Pulse:  [63-89]   Resp:  [15-21]   BP: (151-203)/(63-89)   SpO2:  [87 %-96 %] .   Home meds for hypertension were reviewed and noted below.   Hypertension Medications               amLODIPine (NORVASC) 5 MG tablet Take 1 tablet (5 mg total) by mouth once daily.    carvediloL (COREG) 12.5 MG tablet Take 12.5 mg by mouth 2 (two) times daily with meals.    diltiaZEM (CARDIZEM CD) 300 MG 24 hr capsule Take 1 capsule by mouth every morning.    furosemide (LASIX) 20 MG tablet Take 1 tablet (20 mg total) by mouth once daily.    hydrALAZINE (APRESOLINE) 10 MG tablet Take 10 mg by mouth every 12 (twelve) hours.            While in the hospital, will manage blood pressure as follows; Adjust home antihypertensive regimen as follows- elevated blood pressure, resume home hydralazine    Will utilize p.r.n. blood pressure medication only if patient's blood pressure greater than  180/110 and he develops symptoms such as worsening chest pain or shortness of breath.        VTE Risk Mitigation (From admission, onward)           Ordered     Place SALUD hose  Until discontinued         12/12/24 0921     Place SALUD hose  Until discontinued         12/11/24 0949     enoxaparin injection 30 mg  Daily         12/10/24 1922     IP VTE HIGH RISK PATIENT  Once         12/10/24 1922     Place sequential compression device  Until discontinued         12/10/24 1922                    Discharge Planning    SHEA:      Code Status: DNR   Medical Readiness for Discharge Date:   Discharge Plan A: Home, Home Health                    Manuel Fermin MD  Department of Hospital Medicine   [facility] ([facility])

## 2024-12-19 NOTE — SUBJECTIVE & OBJECTIVE
Interval History:   89 year old man with CONS bacteremia .  No acute events noted.    12/17/24- He is tolerating meds    Cultures -   12/14/24-  Resp cultures-   Candida Tropicalis  Staph Aureus -rare  Blood culture- 12/10- staph Pettenkoferi  12/18/24-  Remains weak  Spouse in the room  Review of Systems   Constitutional:  Negative for activity change, appetite change, chills, diaphoresis and fatigue.   Neurological:  Negative for dizziness, facial asymmetry, light-headedness and headaches.     Objective:     Vital Signs (Most Recent):  Temp: 97.5 °F (36.4 °C) (12/19/24 0410)  Pulse: 66 (12/19/24 0410)  Resp: 18 (12/19/24 0410)  BP: (!) 181/79 (12/19/24 0410)  SpO2: (!) 90 % (12/19/24 0410) Vital Signs (24h Range):  Temp:  [97.5 °F (36.4 °C)-97.9 °F (36.6 °C)] 97.5 °F (36.4 °C)  Pulse:  [63-83] 66  Resp:  [16-20] 18  SpO2:  [87 %-92 %] 90 %  BP: (124-181)/(60-79) 181/79     Weight: 81.7 kg (180 lb 1.9 oz)  Body mass index is 28.21 kg/m².    Estimated Creatinine Clearance: 16.5 mL/min (A) (based on SCr of 2.8 mg/dL (H)).     Physical Exam  Vitals and nursing note reviewed.   HENT:      Head: Normocephalic.   Eyes:      Pupils: Pupils are equal, round, and reactive to light.   Cardiovascular:      Rate and Rhythm: Normal rate.   Pulmonary:      Effort: Pulmonary effort is normal.   Musculoskeletal:      Cervical back: Normal range of motion.   Neurological:      General: No focal deficit present.      Mental Status: He is alert.          Significant Labs: Blood Culture:   Recent Labs   Lab 12/10/24  1053 12/10/24  1056 12/12/24  0907 12/12/24  0912   LABBLOO Gram stain aer bottle: Gram positive cocci  Positive results previously called 12/12/2024  13:46  STAPHYLOCOCCUS PETTENKOFERI* Gram stain aer bottle: Gram positive cocci  Results called to and read back by: GEETA Holguin. 12/12/2024  05:15  STAPHYLOCOCCUS PETTENKOFERI* No growth after 5 days. No growth after 5 days.     CBC:   Recent Labs   Lab 12/18/24  8533    WBC 6.53   HGB 13.1*   HCT 40.8        CMP:   Recent Labs   Lab 12/18/24  0408 12/19/24  0421    132*   K 4.4 4.8   CL 93* 93*   CO2 26 24   * 320*   BUN 75* 85*   CREATININE 3.0* 2.8*   CALCIUM 8.4* 8.6*   ANIONGAP 17* 15     All pertinent labs within the past 24 hours have been reviewed.    Significant Imaging: I have reviewed all pertinent imaging results/findings within the past 24 hours.

## 2024-12-19 NOTE — PROGRESS NOTES
[facility] - Telemetry ([facility])  Pulmonology  Progress Note    Patient Name: Jimmy Young  MRN: [id number]  Admission Date: 12/10/2024  Hospital Length of Stay: 9 days  Code Status: DNR  Attending Provider: Manuel Fermin MD  Primary Care Provider: Nacho Richardson MD   Principal Problem: Acute on chronic respiratory failure with hypoxia    Subjective:     Interval History: No acute events.  CXR improved from prior, but not really any progress with weaning.  Patient says that he feels fairly well.  Family discussing ultimate disposition, most likely seem to be leaning toward inpatient hospice.      Objective:     Vital Signs (Most Recent):  Temp: 97.8 °F (36.6 °C) (12/19/24 0902)  Pulse: 86 (12/19/24 0902)  Resp: (!) 22 (12/19/24 0902)  BP: (!) 150/72 (12/19/24 0902)  SpO2: (!) 92 % (12/19/24 0839) Vital Signs (24h Range):  Temp:  [97.5 °F (36.4 °C)-97.9 °F (36.6 °C)] 97.8 °F (36.6 °C)  Pulse:  [63-88] 86  Resp:  [18-22] 22  SpO2:  [88 %-92 %] 92 %  BP: (124-181)/(60-79) 150/72     Weight: 81.7 kg (180 lb 1.9 oz)  Body mass index is 28.21 kg/m².    No intake or output data in the 24 hours ending 12/19/24 1259     Physical Exam  Vitals and nursing note reviewed.   Constitutional:       General: He is not in acute distress.     Comments: Bedside chair   Cardiovascular:      Rate and Rhythm: Normal rate and regular rhythm.      Pulses: Normal pulses.      Heart sounds: No murmur heard.  Pulmonary:      Effort: Pulmonary effort is normal. No respiratory distress.      Breath sounds: Rhonchi (faint) present. No wheezing or rales.      Comments: Moderate air movement  Abdominal:      General: Abdomen is flat. There is no distension.      Palpations: Abdomen is soft.      Tenderness: There is no abdominal tenderness.   Musculoskeletal:      Right lower leg: No edema.      Left lower leg: No edema.   Neurological:      Mental Status: He is alert.      Comments: Alert and conversant           Review of  "Systems    Vents:  Oxygen Concentration (%): 100 (12/19/24 0839)    Lines/Drains/Airways       Peripheral Intravenous Line  Duration                  Peripheral IV - Single Lumen 12/17/24 1000 22 G Anterior;Right Upper Arm 2 days                    Significant Labs:    CBC/Anemia Profile:  Recent Labs   Lab 12/18/24  0408   WBC 6.53   HGB 13.1*   HCT 40.8      MCV 91   RDW 12.9        Chemistries:  Recent Labs   Lab 12/18/24  0408 12/19/24  0421    132*   K 4.4 4.8   CL 93* 93*   CO2 26 24   BUN 75* 85*   CREATININE 3.0* 2.8*   CALCIUM 8.4* 8.6*       All pertinent labs within the past 24 hours have been reviewed.    Significant Imaging:  I have reviewed all pertinent imaging results/findings within the past 24 hours.    ABG  No results for input(s): "PH", "PO2", "PCO2", "HCO3", "BE" in the last 168 hours.  Assessment/Plan:     Pulmonary  * Acute on chronic respiratory failure with hypoxia  Patient with Hypoxic Respiratory failure which is Acute on chronic.  he is on home oxygen at 3 LPM. Supplemental oxygen was provided and noted- Oxygen Concentration (%):  [100] 100    Etiology likely a combination of heart failure, pulmonary HTN, pneumonia/COPD  Per family only symptom that was new on admit was leg edema, never had worsening dyspnea    -continue steroids, cont duonebs  -Abx per ID  -Working on hospice transition  -see Pneumonia for further discussion    Pneumonia    Antibiotics (From admission, onward)      Start     Stop Route Frequency Ordered    12/15/24 0900  cefTRIAXone injection 2 g         -- IV Every 24 hours (non-standard times) 12/14/24 1118    12/14/24 1230  linezolid 600 mg/300 mL IVPB 600 mg         -- IV Every 12 hours (non-standard times) 12/14/24 1118            Microbiology Results (last 7 days)       Procedure Component Value Units Date/Time    Culture, Respiratory with Gram Stain [[id number]]  (Abnormal)  (Susceptibility) Collected: 12/14/24 1815    Order Status: Completed " Specimen: Sputum, Expectorated Updated: 12/19/24 1248     Respiratory Culture CANDIDA TROPICALIS  Moderate        METHICILLIN RESISTANT STAPHYLOCOCCUS AUREUS  Rare       Gram Stain (Respiratory) <10 epithelial cells per low power field.     Gram Stain (Respiratory) Rare WBC's     Gram Stain (Respiratory) Rare Gram positive cocci     Gram Stain (Respiratory) Rare yeast     Gram Stain (Respiratory) Rare Gram negative rods    Blood culture [[id number]] Collected: 12/12/24 0907    Order Status: Completed Specimen: Blood from Peripheral, Hand, Right Updated: 12/17/24 2012     Blood Culture, Routine No growth after 5 days.    Blood culture [[id number]] Collected: 12/12/24 0912    Order Status: Completed Specimen: Blood from Peripheral, Hand, Left Updated: 12/17/24 2012     Blood Culture, Routine No growth after 5 days.    Blood Culture #2 **CANNOT BE ORDERED STAT** [[id number]]  (Abnormal)  (Susceptibility) Collected: 12/10/24 1056    Order Status: Completed Specimen: Blood from Peripheral, Forearm, Right Updated: 12/15/24 1314     Blood Culture, Routine Gram stain aer bottle: Gram positive cocci      Results called to and read back by: GEETA Holguin. 12/12/2024  05:15      STAPHYLOCOCCUS PETTENKOFERI    Blood Culture #1 **CANNOT BE ORDERED STAT** [[id number]]  (Abnormal)  (Susceptibility) Collected: 12/10/24 1053    Order Status: Completed Specimen: Blood from Peripheral, Antecubital, Right Updated: 12/15/24 1309     Blood Culture, Routine Gram stain aer bottle: Gram positive cocci      Positive results previously called 12/12/2024  13:46      STAPHYLOCOCCUS PETTENKOFERI    Respiratory Infection Panel (PCR), Nasopharyngeal [[id number]] Collected: 12/14/24 0802    Order Status: Completed Specimen: Nasopharyngeal Swab Updated: 12/14/24 1208     Respiratory Infection Panel Source NP swab     Adenovirus Not Detected     Coronavirus 229E, Common Cold Virus Not Detected     Coronavirus HKU1, Common Cold Virus Not Detected      Coronavirus NL63, Common Cold Virus Not Detected     Coronavirus OC43, Common Cold Virus Not Detected     Comment: The Coronavirus strains detected in this test cause the common cold.  These strains are not the COVID-19 (novel Coronavirus)strain   associated with the respiratory disease outbreak.          SARS-CoV2 (COVID-19) Qualitative PCR Not Detected     Human Metapneumovirus Not Detected     Human Rhinovirus/Enterovirus Not Detected     Influenza A (subtypes H1, H1-2009,H3) Not Detected     Influenza B Not Detected     Parainfluenza Virus 1 Not Detected     Parainfluenza Virus 2 Not Detected     Parainfluenza Virus 3 Not Detected     Parainfluenza Virus 4 Not Detected     Respiratory Syncytial Virus Not Detected     Bordetella Parapertussis (RI5644) Not Detected     Bordetella pertussis (ptxP) Not Detected     Chlamydia pneumoniae Not Detected     Mycoplasma pneumoniae Not Detected     Comment: Respiratory Infection Panel testing performed by Multiplex PCR.       Narrative:      Assay not valid for lower respiratory specimens, alternate  testing required.          - significant worsening in CXR on 12/16 compared to 12/14 without really any worsening in symptoms.  - I feel the primary player in the opacification is parenchymal given the traction and feel the effusion probably isn't much worse  - already on broad spectrum Abx  - will add some additional nebs for airway clearance, but he is not complaining of sputum  - I understand that transition to home hospice, but I feel like this worsening makes this step unlikely.  Would consider inpatient hospice, though he may be requiring too much for them, as well.  - I discussed this with the patient's daughter at bedside, as well as Dr Fermin  - wean supplemental oxygen for goal SpO2 > 88%    12/19 - CXR improved, but no weaning progress despite fairly maximal medical therapy.  Family discussing possible transition to hospice, which I think is appropriate at this time.   Continue medical management until a decision is made.           Heber Faria MD  Pulmonology  [facility] - Telemetry ([facility])

## 2024-12-19 NOTE — PLAN OF CARE
Pt tolerated interventions well. Required MIN A for bed mobility and transfer with RW. Recommending low intensity therapy with 24/7 care.

## 2024-12-19 NOTE — ASSESSMENT & PLAN NOTE
--Initial CXR reports lateral left upper lobe opacity, most likely early pneumonia.   --CXR 12/14 reports small pleural effusions but no new consolidation   --On Vapotherm (home baseline is 2 L)  --Will broaden to PO linezolid 600 mg BID and continue ceftriaxone at 2 g daily   --Require drug toxicity monitoring   --See edema assessment for more details on antibiotics   --Follow respiratory culture to see if MDR organism isolated   --Fluid and COPD management per primary   --Agree with pulmonology consultation and CT chest   --Above d/w primary team.      12/16/24- chest x-ray - interval worsening with near complete whiteout of the left hemithorax which appears to be a combination of near complete collapse of the left lung and left pleural effusion.     Will continue Rocephin/Zyvox- follow pulmonology     12/17- resp culture- staph aureus/candida - follow final drug susceptibility of staph aureus -on Zyvox/Rocephin    1218- tolerating meds- on Zyvox/Rocehin- follow  final cultures

## 2024-12-20 PROBLEM — R41.0 DELIRIUM: Status: ACTIVE | Noted: 2024-12-20

## 2024-12-20 LAB
ANION GAP SERPL CALC-SCNC: 16 MMOL/L (ref 8–16)
BUN SERPL-MCNC: 84 MG/DL (ref 10–30)
CALCIUM SERPL-MCNC: 8.6 MG/DL (ref 8.7–10.5)
CHLORIDE SERPL-SCNC: 94 MMOL/L (ref 95–110)
CO2 SERPL-SCNC: 25 MMOL/L (ref 23–29)
CREAT SERPL-MCNC: 2.6 MG/DL (ref 0.5–1.4)
EST. GFR  (NO RACE VARIABLE): 22 ML/MIN/1.73 M^2
GLUCOSE SERPL-MCNC: 243 MG/DL (ref 70–110)
POTASSIUM SERPL-SCNC: 4.9 MMOL/L (ref 3.5–5.1)
SODIUM SERPL-SCNC: 135 MMOL/L (ref 136–145)

## 2024-12-20 PROCEDURE — 94640 AIRWAY INHALATION TREATMENT: CPT

## 2024-12-20 PROCEDURE — 63600175 PHARM REV CODE 636 W HCPCS: Performed by: NURSE PRACTITIONER

## 2024-12-20 PROCEDURE — 63600175 PHARM REV CODE 636 W HCPCS: Performed by: FAMILY MEDICINE

## 2024-12-20 PROCEDURE — 80048 BASIC METABOLIC PNL TOTAL CA: CPT | Performed by: NURSE PRACTITIONER

## 2024-12-20 PROCEDURE — 25000003 PHARM REV CODE 250: Performed by: FAMILY MEDICINE

## 2024-12-20 PROCEDURE — 25000242 PHARM REV CODE 250 ALT 637 W/ HCPCS: Performed by: FAMILY MEDICINE

## 2024-12-20 PROCEDURE — 25000003 PHARM REV CODE 250: Performed by: NURSE PRACTITIONER

## 2024-12-20 PROCEDURE — 97535 SELF CARE MNGMENT TRAINING: CPT

## 2024-12-20 PROCEDURE — 94761 N-INVAS EAR/PLS OXIMETRY MLT: CPT

## 2024-12-20 PROCEDURE — 94664 DEMO&/EVAL PT USE INHALER: CPT

## 2024-12-20 PROCEDURE — 36415 COLL VENOUS BLD VENIPUNCTURE: CPT | Performed by: NURSE PRACTITIONER

## 2024-12-20 PROCEDURE — 25000242 PHARM REV CODE 250 ALT 637 W/ HCPCS: Performed by: INTERNAL MEDICINE

## 2024-12-20 PROCEDURE — 21400001 HC TELEMETRY ROOM

## 2024-12-20 PROCEDURE — 99900035 HC TECH TIME PER 15 MIN (STAT)

## 2024-12-20 PROCEDURE — 97530 THERAPEUTIC ACTIVITIES: CPT | Mod: CQ

## 2024-12-20 PROCEDURE — 27000249 HC VAPOTHERM CIRCUIT

## 2024-12-20 PROCEDURE — 97530 THERAPEUTIC ACTIVITIES: CPT

## 2024-12-20 PROCEDURE — 27100171 HC OXYGEN HIGH FLOW UP TO 24 HOURS

## 2024-12-20 RX ORDER — HALOPERIDOL 5 MG/ML
2.5 INJECTION INTRAMUSCULAR EVERY 6 HOURS PRN
Status: DISCONTINUED | OUTPATIENT
Start: 2024-12-20 | End: 2024-12-20

## 2024-12-20 RX ORDER — HALOPERIDOL 5 MG/ML
5 INJECTION INTRAMUSCULAR EVERY 6 HOURS PRN
Status: DISCONTINUED | OUTPATIENT
Start: 2024-12-20 | End: 2024-12-26 | Stop reason: HOSPADM

## 2024-12-20 RX ADMIN — MICONAZOLE NITRATE: 20 CREAM TOPICAL at 05:12

## 2024-12-20 RX ADMIN — IPRATROPIUM BROMIDE AND ALBUTEROL SULFATE 3 ML: 2.5; .5 SOLUTION RESPIRATORY (INHALATION) at 01:12

## 2024-12-20 RX ADMIN — HYDRALAZINE HYDROCHLORIDE 10 MG: 10 TABLET, FILM COATED ORAL at 08:12

## 2024-12-20 RX ADMIN — PRAVASTATIN SODIUM 40 MG: 20 TABLET ORAL at 09:12

## 2024-12-20 RX ADMIN — LINEZOLID 600 MG: 600 INJECTION, SOLUTION INTRAVENOUS at 12:12

## 2024-12-20 RX ADMIN — IPRATROPIUM BROMIDE AND ALBUTEROL SULFATE 3 ML: 2.5; .5 SOLUTION RESPIRATORY (INHALATION) at 08:12

## 2024-12-20 RX ADMIN — HALOPERIDOL LACTATE 2.5 MG: 5 INJECTION, SOLUTION INTRAMUSCULAR at 09:12

## 2024-12-20 RX ADMIN — ARFORMOTEROL TARTRATE 15 MCG: 15 SOLUTION RESPIRATORY (INHALATION) at 09:12

## 2024-12-20 RX ADMIN — CEFTRIAXONE 2 G: 2 INJECTION, POWDER, FOR SOLUTION INTRAMUSCULAR; INTRAVENOUS at 09:12

## 2024-12-20 RX ADMIN — BUDESONIDE INHALATION 0.5 MG: 0.5 SUSPENSION RESPIRATORY (INHALATION) at 09:12

## 2024-12-20 RX ADMIN — IPRATROPIUM BROMIDE AND ALBUTEROL SULFATE 3 ML: 2.5; .5 SOLUTION RESPIRATORY (INHALATION) at 09:12

## 2024-12-20 RX ADMIN — MAGNESIUM OXIDE TAB 400 MG (241.3 MG ELEMENTAL MG) 400 MG: 400 (241.3 MG) TAB at 09:12

## 2024-12-20 RX ADMIN — MORPHINE SULFATE 10 MG: 10 SOLUTION ORAL at 12:12

## 2024-12-20 RX ADMIN — ENOXAPARIN SODIUM 30 MG: 30 INJECTION SUBCUTANEOUS at 04:12

## 2024-12-20 RX ADMIN — HALOPERIDOL LACTATE 5 MG: 5 INJECTION, SOLUTION INTRAMUSCULAR at 04:12

## 2024-12-20 RX ADMIN — METRONIDAZOLE: 10 GEL TOPICAL at 01:12

## 2024-12-20 RX ADMIN — METRONIDAZOLE: 10 GEL TOPICAL at 08:12

## 2024-12-20 RX ADMIN — MICONAZOLE NITRATE: 20 CREAM TOPICAL at 10:12

## 2024-12-20 RX ADMIN — PANTOPRAZOLE SODIUM 40 MG: 40 TABLET, DELAYED RELEASE ORAL at 04:12

## 2024-12-20 RX ADMIN — SODIUM CHLORIDE 30 MG/ML INHALATION SOLUTION 4 ML: 30 SOLUTION INHALANT at 08:12

## 2024-12-20 RX ADMIN — ARFORMOTEROL TARTRATE 15 MCG: 15 SOLUTION RESPIRATORY (INHALATION) at 08:12

## 2024-12-20 RX ADMIN — DILTIAZEM HYDROCHLORIDE 300 MG: 180 CAPSULE, COATED, EXTENDED RELEASE ORAL at 06:12

## 2024-12-20 RX ADMIN — SODIUM CHLORIDE 30 MG/ML INHALATION SOLUTION 4 ML: 30 SOLUTION INHALANT at 09:12

## 2024-12-20 RX ADMIN — MAGNESIUM OXIDE TAB 400 MG (241.3 MG ELEMENTAL MG) 400 MG: 400 (241.3 MG) TAB at 08:12

## 2024-12-20 RX ADMIN — BUDESONIDE INHALATION 0.5 MG: 0.5 SUSPENSION RESPIRATORY (INHALATION) at 08:12

## 2024-12-20 RX ADMIN — LEVOTHYROXINE SODIUM 50 MCG: 50 TABLET ORAL at 06:12

## 2024-12-20 RX ADMIN — METHYLPREDNISOLONE SODIUM SUCCINATE 40 MG: 40 INJECTION, POWDER, FOR SOLUTION INTRAMUSCULAR; INTRAVENOUS at 02:12

## 2024-12-20 RX ADMIN — HYDRALAZINE HYDROCHLORIDE 10 MG: 10 TABLET, FILM COATED ORAL at 09:12

## 2024-12-20 NOTE — PT/OT/SLP PROGRESS
"Occupational Therapy   Treatment    Name: Jimmy Young  MRN: [id number]  Admitting Diagnosis:  Acute on chronic respiratory failure with hypoxia       Recommendations:     Discharge Recommendations: Low Intensity Therapy  Discharge Equipment Recommendations:  hospital bed  Barriers to discharge:       Assessment:     Jimmy Young is a 89 y.o. male with a medical diagnosis of Acute on chronic respiratory failure with hypoxia.  He presents with the following performance deficits affecting function are weakness, impaired endurance, impaired self care skills, impaired functional mobility, gait instability, impaired balance, impaired cognition, decreased coordination, decreased upper extremity function, decreased lower extremity function, decreased safety awareness, abnormal tone, decreased ROM, impaired coordination, impaired cardiopulmonary response to activity.     Rehab Prognosis:  Poor; patient would benefit from acute skilled OT services to address these deficits and reach maximum level of function.       Plan:     Patient to be seen 2 x/week to address the above listed problems via self-care/home management, therapeutic activities  Plan of Care Expires: 12/25/24  Plan of Care Reviewed with: patient, family    Subjective     Chief Complaint: "My back is hurting"  Patient/Family Comments/goals: FM increase  Pain/Comfort:  Pain Rating 1: 0/10    Objective:     Communicated with: Tiffanie prior to session.  Patient found HOB elevated with peripheral IV, telemetry, pulse ox (continuous), bed alarm (Vapo, AVASYS) upon OT entry to room.    General Precautions: Standard, fall, respiratory    Orthopedic Precautions:N/A  Braces: N/A  Respiratory Status: High flow, flow 18 L/min, concentration 100%     Occupational Performance:     Bed Mobility:    Patient completed Rolling/Turning to Right with total assistance x2  Patient completed Scooting/Bridging with total assistance x2  Patient completed Supine to Sit with " total assistance x2  Patient completed Sit to Supine with total assistance  x2    Functional Mobility/Transfers:  Pt completed EOB sitting for 10 min with min A for balance      AMPAC 6 Click ADL: 9    Treatment & Education:  Pt completed AROM exercise of LB and UB for 10 min to increase endurance.  Pt initiated movement but required therapist to complete   Encouraged completion of B UE AROM therex throughout the day to increase functional strength and activity tolerance needed for ADL completion.   OT role, plan of care, progression of goals, importance of continued OOB activity, ADL/functional transfer and mobility retraining, call don't fall, safety precautions, fall prevention.  POC and therex educated to family    Patient left HOB elevated with all lines intact, call button in reach, bed alarm on, and family present    GOALS:   Multidisciplinary Problems       Occupational Therapy Goals          Problem: Occupational Therapy    Goal Priority Disciplines Outcome Interventions   Occupational Therapy Goal     OT, PT/OT Not Progressing    Description: Goals to be met by: 12/25/24     Patient will increase functional independence with ADLs by performing:    Toileting from toilet with Contact Guard Assistance for hygiene and clothing management.   Toilet transfer to bedside commode with Contact Guard Assistance.  Upper extremity exercise program x10 reps per handout, with supervision.                         Time Tracking:     OT Date of Treatment: 12/20/24  OT Start Time: 1050  OT Stop Time: 1115  OT Total Time (min): 25 min    Billable Minutes:Therapeutic Activity 15  Therapeutic Exercise 10    OT/MAGALY: MAGALY     Number of MAGALY visits since last OT visit: 1  A client care conference was performed between the JASON and ALFONSO, prior to treatment by ALFONSO, to discuss the patient's status, treatment plan and established goals.  ALFONSO Shirley   12/20/2024

## 2024-12-20 NOTE — PROGRESS NOTES
[facility] Telemetry ([facility])  [facility] Medicine  Progress Note    Patient Name: Jimmy Young  MRN: [id number]  Patient Class: IP- Inpatient   Admission Date: 12/10/2024  Length of Stay: 10 days  Attending Physician: Manuel Fermin MD  Primary Care Provider: Nacho Richardson MD        Subjective     Principal Problem:Acute on chronic respiratory failure with hypoxia        HPI:  Jimmy Young is a 89 y.o. male with a PMH  has a past medical history of Aneurysm (2016), Arthritis, Asthma, Atrial fibrillation, Cancer, CKD (chronic kidney disease), COPD (chronic obstructive pulmonary disease) (10/05/2017), Diabetes mellitus, Emphysema lung, Encounter for blood transfusion, and Hypertension.presented to the ED for swelling of the legs. Patient was referred from his primary care physician today for worsening lower extremity edema, shortness of breath, and cough. Patient is on chronic home O2 at 3L/min via NC. Reports compliance with his lasix and denies any excessive fluid or salt intake. Denies any other complaints at his time.      ER workup revealed CBC to be unremarkable.  CMP with BUN/creatinine at baseline of 29/2.2.   mg/dL.  Magnesium 1.2.  BNP, troponin, lactic acid within normal limits.  Flu/COVID negative.  Blood cultures obtained x2.  Ultrasound lower extremities negative for DVT.  Chest x-ray revealed left upper lobe opacity resembling early pneumonia.  EKG revealed a flutter with variable AV block and left axis deviation with a ventricular rate of 64 beats per minute with a QT/QTC of 416/429.  Vital signs stable.  Patient is at baseline oxygen saturation of 92-94% on 3 liters/minute via nasal cannula.  Patient received 12.5 mg carvedilol, 1 g Rocephin IV, 100 mg doxycycline p.o., 30 mg Lovenox, 40 mg Lasix IV, and 10 mg hydralazine p.o. at outside facility.  Hospital Medicine consulted to admit patient for CHF exacerbation and pneumonia. Patient and family in agreement with treatment plan.  Patient admitted under inpatient status.    PCP: Nacho Richardson      Overview/Hospital Course:  12/11 admitted for pneumonia. Wears supplemental oxygen at baseline, 2L. On 4L. Speech consulted. Schedule breathing treatments. Discontinue lasix. Replete lytes. Echocardiogram pending. Cardiology consulted. Relaxing normotensive range in this patient demographic.  12/12 blood culture positive for staph. Infectious disease consulted. Continue monitoring repeat blood cultures. Recent medication(s) change with linagliptin with risk for congestive heart failure and skin reaction. Discussed risk vs benefit. Hba1c 6.9. recommend goal hba1c 8-9.   12/13 bun/creatinine increasing. Discontinued intravenous lasix. Remains on increased supplemental oxygen, 5L. Wean as able. Home hydralazine resumed for elevated blood pressure. Repeat blood cultures negative growth to date x 1 day. Infectious disease following  12/14/2024  Patient required increasing to 40 L 100% FiO2 Vapotherm.  Pulmonology consult on case.  Solu-Medrol added.  Continue empiric antibiotics.  Repeat chest x-ray.  12/15/2024  Patient weaned down to 20 L 80% Vapotherm.  Will continue current management.  12/16/2024  Still on 20L 80% vapotherm. Unable to be weaned at this time. Cont abx. Pulm and cardiology on case.   12/17/2024  Chest x-ray improved today compared to yesterday.  Still on 20 L 90% Vapotherm.  Continue antibiotics.  12/18/2024  Currently on 18 L 100% Vapotherm.  Continue antibiotics.  Wean O2 as tolerated.  Discussed with family at bedside the prognosis is guarded.  Patient will likely need placement should he improve.  Discussed SNF versus inpatient hospice.  Will see how patient clinically progresses over the next few days.  12/19/2024  Currently on 25 L 100% Vapotherm.  Continue antibiotics.  Chest x-ray reviewed which is improving however patient clinically declining.  He is requiring increased O2.  Snf versus inpatient hospice.    12/20/2024  Patient agitated today.  Haldol p.r.n. ordered.  Continue antibiotics.  Continue steroids.  Patient is still requiring high-flow Vapotherm.  Discussed case with family at bedside.  Will continue max therapy over the weekend.  Should patient fail to improve family is agreeable to inpatient hospice.    Interval History:  See above    Review of Systems   Constitutional:  Negative for fatigue and fever.   HENT:  Negative for sinus pressure.    Eyes:  Negative for visual disturbance.   Respiratory:  Positive for cough and shortness of breath.    Cardiovascular:  Positive for leg swelling. Negative for chest pain.   Gastrointestinal:  Negative for nausea and vomiting.   Genitourinary:  Negative for difficulty urinating.   Musculoskeletal:  Negative for back pain.   Skin:  Negative for rash.   Neurological:  Negative for headaches.   Psychiatric/Behavioral:  Negative for confusion.      Objective:     Vital Signs (Most Recent):  Temp: 97.7 °F (36.5 °C) (12/20/24 0743)  Pulse: 76 (12/20/24 1343)  Resp: 20 (12/20/24 1343)  BP: (!) 171/88 (12/20/24 0743)  SpO2: 97 % (12/20/24 1343) Vital Signs (24h Range):  Temp:  [97.4 °F (36.3 °C)-97.8 °F (36.6 °C)] 97.7 °F (36.5 °C)  Pulse:  [64-97] 76  Resp:  [16-24] 20  SpO2:  [91 %-98 %] 97 %  BP: (138-186)/() 171/88     Weight: 81.7 kg (180 lb 1.9 oz)  Body mass index is 28.21 kg/m².  No intake or output data in the 24 hours ending 12/20/24 1349      Physical Exam  Vitals reviewed.   Constitutional:       General: He is not in acute distress.     Appearance: He is well-developed. He is ill-appearing. He is not toxic-appearing or diaphoretic.      Interventions: Nasal cannula in place.   HENT:      Head: Normocephalic and atraumatic.      Right Ear: Decreased hearing noted.      Left Ear: Decreased hearing noted.   Eyes:      Pupils: Pupils are equal, round, and reactive to light.   Cardiovascular:      Rate and Rhythm: Normal rate and regular rhythm.      Heart  sounds: Normal heart sounds. No murmur heard.     No friction rub. No gallop.   Pulmonary:      Effort: Pulmonary effort is normal. No respiratory distress.      Breath sounds: Normal breath sounds. No stridor. No wheezing or rales.   Abdominal:      General: Bowel sounds are normal. There is no distension.      Palpations: Abdomen is soft. There is no mass.      Tenderness: There is no abdominal tenderness. There is no guarding.   Musculoskeletal:      Right lower leg: Edema present.      Left lower leg: Edema present.   Skin:     General: Skin is warm.      Findings: Erythema and lesion present.   Neurological:      Mental Status: He is alert and oriented to person, place, and time.      Motor: Weakness present.   Psychiatric:         Mood and Affect: Mood normal.         Behavior: Behavior normal.             Significant Labs: All pertinent labs within the past 24 hours have been reviewed.    Significant Imaging: I have reviewed all pertinent imaging results/findings within the past 24 hours.    Assessment and Plan     * Acute on chronic respiratory failure with hypoxia  12/20/2024  Patient is still requiring 20 L 100% Vapotherm   Continue Solu-Medrol   Continue antibiotics   Pulmonology on case   Patient desats with minimal movement  Discussed case with family at bedside   Will continue aggressive therapy over the weekend   Should patient fail to improve   Planned for inpatient hospice on Monday   Family agreeable to plan        Delirium  Likely metabolic secondary to illness   Will order Haldol p.r.n.      Lower extremity edema  Improving  Multifactorial: malnutrition, fluid indiscretion, venous stasis, recent medication(s) change, dependent edema  Counseling provided  Elevate legs   Manish hose and topicals    Atrial flutter  Controlled  Follow up outpatient primary cardiologist  Holding a/c due to pmh GI bleed       Pneumonia  Continue Zyvox and Rocephin    Chronic diastolic heart failure  Patient has   "unspecified  heart failure that is Chronic. On presentation their CHF was . Most recent BNP and echo results are listed below.well compensated  No results for input(s): "BNP" in the last 72 hours.    Latest ECHO  Results for orders placed during the hospital encounter of 01/08/21    Echo Color Flow Doppler? Yes    Interpretation Summary  · The left ventricle is normal in size with moderate concentric hypertrophy and normal systolic function. The estimated ejection fraction is 60%  · Indeterminate left ventricular diastolic function.  · Normal right ventricular size with normal right ventricular systolic function.  · Mild tricuspid regurgitation.  · Normal central venous pressure (3 mmHg).  · The estimated PA systolic pressure is 32 mmHg.    Current Heart Failure Medications  , Every 12 hours, Oral  , 2 times daily with meals, Oral  hydrALAZINE injection 10 mg, Every 6 hours PRN, Intravenous  hydrALAZINE tablet 10 mg, Every 12 hours, Oral    Plan  - Monitor strict I&Os and daily weights.    - Place on telemetry  - Low sodium diet  - Place on fluid restriction of 1.5 L.   - Cardiology has been consulted  - The patient's volume status is at their baseline  Ambulatory oxygen evaluation   Nutrition consult   Discontinue intravenous lasix    12/18/2024  Will hold Bumex for now      Coronary artery disease of native artery of native heart with stable angina pectoris  Patient with known CAD which is controlled Will continue Statin and monitor for S/Sx of angina/ACS. Continue to monitor on telemetry.     CKD stage 3 secondary to diabetes  Creatine stable for now. BMP reviewed- noted Estimated Creatinine Clearance: 19.2 mL/min (A) (based on SCr of 2.2 mg/dL (H)). according to latest data. Based on current GFR, CKD stage is stage 3 - GFR 30-59.  Monitor UOP and serial BMP and adjust therapy as needed. Renally dose meds. Avoid nephrotoxic medications and procedures.  Discontinue intravenous lasix  Monitor " trend    Hypercholesterolemia  Patient is chronically on statin.will continue for now. Last Lipid Panel:   Lab Results   Component Value Date    CHOL 121 07/03/2023    HDL 37 (L) 07/03/2023    LDLCALC 57 07/03/2023    TRIG 160 (H) 07/03/2023    CHOLHDL 27.8 08/01/2019     Plan:  -Continue home medication  -low fat/low calorie diet        Atrial fibrillation  Patient with Paroxysmal (<7 days) atrial fibrillation which is controlled currently with Bbs and Calcium Channel Blocker. Patient is currently in sinus rhythm.[id number] Score: 4.   Discussed cardiology vs gastroenterology recommendations   Patient voices understanding  Will continue holding ac    Acquired hypothyroidism  Patient has chronic hypothyroidism. TFTs reviewed-   Lab Results   Component Value Date    TSH 1.780 07/03/2023   . Will continue chronic levothyroxine and adjust for and clinical changes.        Essential hypertension  Chronic, controlled.  Latest blood pressure and vitals reviewed-   Temp:  [97.4 °F (36.3 °C)-98.2 °F (36.8 °C)]   Pulse:  [63-89]   Resp:  [15-21]   BP: (151-203)/(63-89)   SpO2:  [87 %-96 %] .   Home meds for hypertension were reviewed and noted below.   Hypertension Medications               amLODIPine (NORVASC) 5 MG tablet Take 1 tablet (5 mg total) by mouth once daily.    carvediloL (COREG) 12.5 MG tablet Take 12.5 mg by mouth 2 (two) times daily with meals.    diltiaZEM (CARDIZEM CD) 300 MG 24 hr capsule Take 1 capsule by mouth every morning.    furosemide (LASIX) 20 MG tablet Take 1 tablet (20 mg total) by mouth once daily.    hydrALAZINE (APRESOLINE) 10 MG tablet Take 10 mg by mouth every 12 (twelve) hours.            While in the hospital, will manage blood pressure as follows; Adjust home antihypertensive regimen as follows- elevated blood pressure, resume home hydralazine    Will utilize p.r.n. blood pressure medication only if patient's blood pressure greater than  180/110 and he develops symptoms such as worsening  chest pain or shortness of breath.        VTE Risk Mitigation (From admission, onward)           Ordered     Place SALUD hose  Until discontinued         12/12/24 0921     Place SALUD hose  Until discontinued         12/11/24 0949     enoxaparin injection 30 mg  Daily         12/10/24 1922     IP VTE HIGH RISK PATIENT  Once         12/10/24 1922     Place sequential compression device  Until discontinued         12/10/24 1922                    Discharge Planning   SHEA:      Code Status: DNR   Medical Readiness for Discharge Date:   Discharge Plan A: Home, Home Health                Manuel Fermin MD  Department of Hospital Medicine   [facility] - Telemetry ([facility])

## 2024-12-20 NOTE — PLAN OF CARE
Nutrition Recommendations/Interventions for malnutrition 12/13/24:   1. Continue current diet (Diet Soft & Bite Sized (IDDSI Level 6) Low Sodium,2gm; Fluid - 1500mL; Standard Tray) as tolerated.   2. Recommend pt continues Suplena once/day per pt preference   3. Recommend feeding assistance as warrented   4. Weigh twice weekly  5. Collaboration by nutrition professional with other providers     Goals:   1. Pt will continue to tolerate and consume >75% EEN and EPN prior to RD follow up (progressing)    Carmita Brooke, MS, RD, LDN

## 2024-12-20 NOTE — ASSESSMENT & PLAN NOTE
12/20/2024  Patient is still requiring 20 L 100% Vapotherm   Continue Solu-Medrol   Continue antibiotics   Pulmonology on case   Patient desats with minimal movement  Discussed case with family at bedside   Will continue aggressive therapy over the weekend   Should patient fail to improve   Planned for inpatient hospice on Monday   Family agreeable to plan

## 2024-12-20 NOTE — CONSULTS
[facility] - Telemetry ([facility])  Wound Care    Patient Name:  Jimmy Young   MRN:  [id number]  Date: 2024  Diagnosis: Acute on chronic respiratory failure with hypoxia    History:     Past Medical History:   Diagnosis Date    Aneurysm 2016    abdominal, stent placed    Arthritis     Asthma     Atrial fibrillation     Cancer     skin cancer on face    CKD (chronic kidney disease)     COPD (chronic obstructive pulmonary disease) 10/05/2017    Diabetes mellitus     Emphysema lung     Encounter for blood transfusion     Hypertension        Social History     Socioeconomic History    Marital status: Single   Tobacco Use    Smoking status: Former     Current packs/day: 0.00     Average packs/day: 2.0 packs/day for 20.0 years (40.0 ttl pk-yrs)     Types: Cigarettes     Start date: 1957     Quit date: 1977     Years since quittin.0    Smokeless tobacco: Former   Substance and Sexual Activity    Alcohol use: No    Drug use: No    Sexual activity: Not Currently     Social Drivers of Health     Financial Resource Strain: Patient Declined (2024)    Overall Financial Resource Strain (CARDIA)     Difficulty of Paying Living Expenses: Patient declined   Food Insecurity: Patient Declined (2024)    Hunger Vital Sign     Worried About Running Out of Food in the Last Year: Patient declined     Ran Out of Food in the Last Year: Patient declined   Transportation Needs: Patient Declined (2024)    TRANSPORTATION NEEDS     Transportation : Patient declined   Physical Activity: Inactive (7/3/2023)    Received from [facility]    Exercise Vital Sign     Days of Exercise per Week: 0 days     Minutes of Exercise per Session: 0 min   Stress: Patient Declined (2024)    [employer] - Occupational Stress Questionnaire     Feeling of Stress : Patient declined   Housing Stability: Patient Declined  (12/11/2024)    Housing Stability Vital Sign     Unable to Pay for Housing in the Last Year: Patient declined     Homeless in the Last Year: Patient declined       Precautions:     Allergies as of 12/10/2024    (No Known Allergies)       [facility] Assessment Details/Treatment     High Risk Wound Care Screen completed due to documented low Abhsihek Score.    Chart reviewed.   Skin Assessed  Noted scattered dry marroon deep red crusted scabbing to the right shin left lower leg. Able to remove most with gentle cleansing revealing intact skin underneath. Cleansed areas with saline, patted dry and painted with cavilon.   Intact blanchable redness noted to the left heel.   Right heel noted to have area of intact purple discoloration cannot rule out DTI will continue to monitor for progression. Painted bilateral heels with cavilon and applied preventative heel dressings.   Patient turned on left side, sacral bordered foam in place. Patient noted to be soiled with urine, cleansed with bath wipes and changed pad. Sacrum is intact no redness noted. Sacral foam replaced.   Heel offloading boots applied to bilateral lower extremities.   Foam wedge utilized to reposition patient. Recommend repositioning every 2 hours.     Plan to F/U Monday.     Patient on isoflex bed - recommend Apply Low Air Loss pump to hospital bed  Pressure Injury Prevention Orderset initiated.                    12/20/24 0930   WOCN Assessment   WOCN Total Time (mins) 45   Visit Date 12/20/24   Visit Time 0930   Consult Type New   WOCN Speciality Wound   Wound At risk for pressure Injury   Intervention assessed;changed;applied;chart review;orders   Teaching on-going   Skin Interventions   Pressure Reduction Devices heel offloading device utilized;foam padding utilized   Pressure Reduction Techniques frequent weight shift encouraged   Positioning   Body Position sitting up in bed   Head of Bed (HOB) Positioning HOB at 20 degrees   Positioning/Transfer Devices  pillows;in use   Pressure Injury Prevention    Check Moisture Management Pad Done   Sacral Foam Dressing Apply   Heel protection technique Heel boot   Heel preventative measures Peel back dressing/boot, assess skin and reapply   Check Medical Devices Done       12/20/2024

## 2024-12-20 NOTE — NURSING
Patient has been much more confused throughout the night patient hallucinating stating there are boys in the room antagonizing him. Patient would become agitated at hallucinations but not combative and bettina to redirect. Patient did not sleep at all throughout the night.

## 2024-12-20 NOTE — PT/OT/SLP PROGRESS
Physical Therapy  Treatment    Jimmy Young   MRN: [id number]   Admitting Diagnosis: Acute on chronic respiratory failure with hypoxia    PT Received On: 12/20/24  PT Start Time: 1050     PT Stop Time: 1115    PT Total Time (min): 25 min       Billable Minutes:  Therapeutic Activity 25    Treatment Type: Treatment  PT/PTA: PTA     Number of PTA visits since last PT visit: 1       General Precautions: Standard, fall, respiratory  Orthopedic Precautions: N/A  Braces: N/A  Respiratory Status:  VAPOTHERM    Spiritual, Cultural Beliefs, Mandaen Practices, Values that Affect Care: no    Subjective:  Communicated with patient's nurse, Tiffanie, and completed Epic chart review prior to session.  Patient appears to be heavily confused and hallucinating throughout tx.     Pain/Comfort  Pain Rating 1:  (REPORTS BACK PAIN BUT UNABLE TO RATE)  Pain Addressed 1: Reposition    Objective:   Patient found with: peripheral IV, telemetry, pulse ox (continuous), Other (comments), bed alarm (VAPOTHERM; AVASYS)    Supine > sit EOB: Total A of 2     Forward scoot towards EOB: Total A of 2     Seated EOB x 10 min total focusing on increased tolerance to upright position, core stability, trunk control and CV endurance.   Maintained self supported sitting balance with Max A with consistent cues for anterior weight shift and to maintain BUE self support    Completed x10 reps AAROM TE to BLE: LAQ, Hip Flex, AP   Intermittent cues given as needed to maintain correct form during repetitions    Sit > supine: Total A of 2    Education of patient not attempted at this time due to significant confusion.     AM-PAC 6 CLICK MOBILITY  How much help from another person does this patient currently need?   1 = Unable, Total/Dependent Assistance  2 = A lot, Maximum/Moderate Assistance  3 = A little, Minimum/Contact Guard/Supervision  4 = None, Modified [place]/Independent    Turning over in bed (including adjusting bedclothes, sheets and  blankets)?: 2  Sitting down on and standing up from a chair with arms (e.g., wheelchair, bedside commode, etc.): 1  Moving from lying on back to sitting on the side of the bed?: 2  Moving to and from a bed to a chair (including a wheelchair)?: 1  Need to walk in hospital room?: 1  Climbing 3-5 steps with a railing?: 1  Basic Mobility Total Score: 8    AM-PAC Raw Score CMS G-Code Modifier Level of Impairment Assistance   6 % Total / Unable   7 - 9 CM 80 - 100% Maximal Assist   10 - 14 CL 60 - 80% Moderate Assist   15 - 19 CK 40 - 60% Moderate Assist   20 - 22 CJ 20 - 40% Minimal Assist   23 CI 1-20% SBA / CGA   24 CH 0% Independent/ Mod I     Patient left with bed in chair position with call button in reach, bed alarm on, and AVASYS present.    Assessment:  Jimmy Young is a 89 y.o. male with a medical diagnosis of Acute on chronic respiratory failure with hypoxia and presents with overall decline in functional mobility. Patient would continue to benefit from skilled PT to address functional limitations listed below in order to return to PLOF/decrease caregiver burden.     Rehab identified problem list/impairments: weakness, impaired endurance, impaired self care skills, impaired functional mobility, gait instability, impaired balance, decreased safety awareness, decreased lower extremity function, decreased upper extremity function, decreased coordination, impaired cognition, decreased ROM, impaired cardiopulmonary response to activity    Rehab potential is fair.    Activity tolerance: Poor    Discharge recommendations: Low Intensity Therapy ( SPV & A)      Barriers to discharge:      Equipment recommendations: hospital bed     GOALS:   Multidisciplinary Problems       Physical Therapy Goals          Problem: Physical Therapy    Goal Priority Disciplines Outcome Interventions   Physical Therapy Goal     PT, PT/OT Progressing    Description: LT24  1. PT WILL COMPLETE BED MOBILITY IND  2.  PT WILL GT TRAIN X 500' WITH RW MOD I  3. PT WILL COMPLETE STANDING TE X 20 REPS TO INC STRENGTH / BALANCE  4. PT WILL INC AMPAC SCORE BY 2 POINTS TO PROGRESS GROSS FUNC MOBILITY.                          PLAN:    Patient to be seen 3 x/week to address the above listed problems via gait training, therapeutic activities, therapeutic exercises  Plan of Care expires: 12/25/24  Plan of Care reviewed with: patient         12/20/2024

## 2024-12-20 NOTE — PLAN OF CARE
A255/A255 TRIPP  Jimmy Young is a 89 y.o.male admitted on 12/10/2024 for Acute on chronic respiratory failure with hypoxia   Code Status: DNR MRN: [id number]   Review of patient's allergies indicates:  No Known Allergies  Past Medical History:   Diagnosis Date    Aneurysm 2016    abdominal, stent placed    Arthritis     Asthma     Atrial fibrillation     Cancer     skin cancer on face    CKD (chronic kidney disease)     COPD (chronic obstructive pulmonary disease) 10/05/2017    Diabetes mellitus     Emphysema lung     Encounter for blood transfusion     Hypertension       PRN meds    acetaminophen, 650 mg, Q6H PRN  hydrALAZINE, 10 mg, Q6H PRN  morphine, 10 mg, Q4H PRN  ondansetron, 4 mg, Q6H PRN  sodium chloride 0.9%, 10 mL, PRN      Chart check completed. Will continue plan of care.      Orientation: disoriented to, situation, place, time (dementia symptoms, orientation fluid)  Vince Coma Scale Score: 14     Lead Monitored: Lead II Rhythm: atrial rhythm Frequency/Ectopy: PVCs  Cardiac/Telemetry Box Number: [id number]  VTE Core Measure: Pharmacological prophylaxis initiated/maintained Last Bowel Movement: 12/18/24  Diet Soft & Bite Sized (IDDSI Level 6) Low Sodium,2gm; Fluid - 1500mL; Standard Tray  Voiding Characteristics: incontinence (occasional)  Abhishek Score: 14  Fall Risk Score: 17  Accucheck []   Freq?      Lines/Drains/Airways       Peripheral Intravenous Line  Duration                  Peripheral IV - Single Lumen 12/17/24 1000 22 G Anterior;Right Upper Arm 2 days

## 2024-12-20 NOTE — PT/OT/SLP PROGRESS
Physical Therapy      Patient Name:  Jimmy Young   MRN:  [id number]    08:40 a.m.  Unable to see patient at this time due to getting a bath. Will follow up at next available opportunity.

## 2024-12-20 NOTE — SUBJECTIVE & OBJECTIVE
Interval History:  See above    Review of Systems   Constitutional:  Negative for fatigue and fever.   HENT:  Negative for sinus pressure.    Eyes:  Negative for visual disturbance.   Respiratory:  Positive for cough and shortness of breath.    Cardiovascular:  Positive for leg swelling. Negative for chest pain.   Gastrointestinal:  Negative for nausea and vomiting.   Genitourinary:  Negative for difficulty urinating.   Musculoskeletal:  Negative for back pain.   Skin:  Negative for rash.   Neurological:  Negative for headaches.   Psychiatric/Behavioral:  Negative for confusion.      Objective:     Vital Signs (Most Recent):  Temp: 97.7 °F (36.5 °C) (12/20/24 0743)  Pulse: 76 (12/20/24 1343)  Resp: 20 (12/20/24 1343)  BP: (!) 171/88 (12/20/24 0743)  SpO2: 97 % (12/20/24 1343) Vital Signs (24h Range):  Temp:  [97.4 °F (36.3 °C)-97.8 °F (36.6 °C)] 97.7 °F (36.5 °C)  Pulse:  [64-97] 76  Resp:  [16-24] 20  SpO2:  [91 %-98 %] 97 %  BP: (138-186)/() 171/88     Weight: 81.7 kg (180 lb 1.9 oz)  Body mass index is 28.21 kg/m².  No intake or output data in the 24 hours ending 12/20/24 1349      Physical Exam  Vitals reviewed.   Constitutional:       General: He is not in acute distress.     Appearance: He is well-developed. He is ill-appearing. He is not toxic-appearing or diaphoretic.      Interventions: Nasal cannula in place.   HENT:      Head: Normocephalic and atraumatic.      Right Ear: Decreased hearing noted.      Left Ear: Decreased hearing noted.   Eyes:      Pupils: Pupils are equal, round, and reactive to light.   Cardiovascular:      Rate and Rhythm: Normal rate and regular rhythm.      Heart sounds: Normal heart sounds. No murmur heard.     No friction rub. No gallop.   Pulmonary:      Effort: Pulmonary effort is normal. No respiratory distress.      Breath sounds: Normal breath sounds. No stridor. No wheezing or rales.   Abdominal:      General: Bowel sounds are normal. There is no distension.       Palpations: Abdomen is soft. There is no mass.      Tenderness: There is no abdominal tenderness. There is no guarding.   Musculoskeletal:      Right lower leg: Edema present.      Left lower leg: Edema present.   Skin:     General: Skin is warm.      Findings: Erythema and lesion present.   Neurological:      Mental Status: He is alert and oriented to person, place, and time.      Motor: Weakness present.   Psychiatric:         Mood and Affect: Mood normal.         Behavior: Behavior normal.             Significant Labs: All pertinent labs within the past 24 hours have been reviewed.    Significant Imaging: I have reviewed all pertinent imaging results/findings within the past 24 hours.

## 2024-12-20 NOTE — CONSULTS
[facility] - Telemetry ([facility])  Adult Nutrition  Consult Note    SUMMARY     Recommendations    Recommendation/Intervention:   1. Continue current diet (Diet Soft & Bite Sized (IDDSI Level 6) Low Sodium,2gm; Fluid - 1500mL; Standard Tray) as tolerated.    2. Recommend pt continues Suplena once/day per pt preference   3. Recommend feeding assistance as warrented   4. Weigh twice weekly    Goals:   1. Pt will continue to tolerate and consume >75% EEN and EPN prior to RD follow up  Nutrition Goal Status: progressing  Communication of RD Recs: other (comment) (POC, sticky note)    Assessment and Plan    Endocrine  Severe protein-calorie malnutrition  Malnutrition Type:  Context: acute illness or injury  Level: severe    Related to (etiology):   Physiological causes increasing nutrient needs d/t illness    Signs and Symptoms (as evidenced by):   Unintentional weight loss, adult, of >2% in 1 week  Inc loss of subcutaneous fat  Inc muscle loss  Pneumonia    Malnutrition Characteristic Summary:  Weight Loss (Malnutrition): greater than 2% in 1 week  Subcutaneous Fat (Malnutrition): moderate depletion  Muscle Mass (Malnutrition): moderate depletion  Fluid Accumulation (Malnutrition): moderate    Interventions/Recommendations (treatment strategy):  1. Decreased sodium, fat and fluid, IDDSI Soft and bite sized Level 6 (blue) texture modified diet  2. Commercial beverage medical food supplement therapy  3. Feeding assistance management  4. Collaboration by nutrition professional with other providers    Nutrition Diagnosis Status:   New         Malnutrition Assessment (12/13/24):  Malnutrition Context: acute illness or injury  Malnutrition Level: severe  Skin (Micronutrient): bruised, edema (Abhishek score = 20 (no risk)       Weight Loss (Malnutrition): greater than 2% in 1 week  Subcutaneous Fat (Malnutrition): moderate depletion  Muscle Mass (Malnutrition): moderate depletion  Fluid Accumulation (Malnutrition): moderate  "  Orbital Region (Subcutaneous Fat Loss): moderate depletion  Upper Arm Region (Subcutaneous Fat Loss): moderate depletion   [place] Region (Muscle Loss): moderate depletion  Clavicle Bone Region (Muscle Loss): moderate depletion  Clavicle and Acromion Bone Region (Muscle Loss): moderate depletion  Dorsal Hand (Muscle Loss): moderate depletion  Patellar Region (Muscle Loss): moderate depletion  Anterior Thigh Region (Muscle Loss): moderate depletion  Posterior Calf Region (Muscle Loss): moderate depletion                 Reason for Assessment    Reason For Assessment: consult, RD follow-up (protein sources)  Diagnosis:  (Bacteremia due to Staphylococcus)  General Information Comments:   12/13/24: 89 y.o. Male admitted for Bacteremia due to Staphylococcus. PMH: HTN, Acquired hypothyroidism, A Fib and flutter, Hypercholesterolemia, CKD 3 2/2 T2DM, CAD, Chronic diastolic HF, Pneumonia, LE edema; Hx: COPD. Pt is currently on a Low sodium 2 gm, Soft and bite-sized (IDDSI Level 6), 1500 mL fluid restriction diet. RD consulted to discuss protein sources, note RD provided Cardiac, Diabetic, Fluid restriction nutrition education to pt on 12/11/24. Visited pt at bedside, pt resting in bed, pt's son present. Pt reported that he had a good appetite of 3 meals and 1 Ensure as a snack between breakfast and lunch/day, confirmed he currently has a good appetite as well, 100% PO intake of meals and chocolate Ensure. Discussed Suplena ONS for CKD vs Ensure, pt receptive to try, modified pt's orders and trays. Provided and discussed nutrition education handout "Chronic Kidney Disease stage 3-5 Nutrition Therapy" per the NCM, emphasized portion sizes for protein and handout provides recommended protein choices, pt expressed understanding and appreciaition for nutrition education provided, encouraged pt and pt's son to read handout and use RD contact information with any further questions/concerns they may have. Pt denied N/V/D, abd pain " "or chewing/swallowing difficulties. Pt stated at previous encounter that his UBW is 188 lbs. NFPE performed, moderate malnutrition noted. LBM 12/9 and 3+ moderate dependent edema charted. Labs, meds, weights reviewed. Weight charted 12/10/24 178 lbs, 12/13/24 173 lbs (BMI 27.11, Normal for age), -5 lb wt loss (2% wt change) x 2 days, no previous current weights charted. RD will continue to follow and monitor pt's nutritional status during admit.    Follow-up:  12/20: Dietitian working remotely. Spoke with RN via secure chat who reports pt ate 75% of his breakfast this morning. EMR flowsheets show documented intakes of % consumption of meals.  Pt continues to require increased supplemental oxygen. Family discussing possible transition to hospice care.     Nutrition Discharge Planning: Cardiac, Diabetic diet, texture per SLP recommendations + 1500 mL fluid restriction, per MD/NP + Suplena as warranted and within fluid restriction    Nutrition Risk Screen    Nutrition Risk Screen: no indicators present    Nutrition Related Social Determinants of Health: SDOH: Adequate food in home environment and None Identified    Nutrition/Diet History    Patient Reported Diet/Restrictions/Preferences: low salt  Spiritual, Cultural Beliefs, [employer]al Practices, Values that Affect Care: no  Supplemental Drinks or Food Habits: Ensure Original  Food Allergies: NKFA  Factors Affecting Nutritional Intake: None identified at this time    Anthropometrics    Temp: 97.7 °F (36.5 °C)  Height Method: Stated  Height: 5' 7" (170.2 cm)  Height (inches): 67 in  Weight Method: Bed Scale  Weight: 81.7 kg (180 lb 1.9 oz)  Weight (lb): 180.12 lb  Ideal Body Weight (IBW), Male: 148 lb  % Ideal Body Weight, Male (lb): 116.93 %  BMI (Calculated): 28.2  BMI Grade: 25 - 29.9 - overweight (Normal for age)       Wt Readings from Last 15 Encounters:   12/18/24 81.7 kg (180 lb 1.9 oz)   01/08/21 74.4 kg (164 lb)   01/08/21 74.4 kg (164 lb)   12/09/20 74.5 " "kg (164 lb 3.9 oz)   06/19/20 74 kg (163 lb 2.3 oz)   06/01/20 74 kg (163 lb 2.3 oz)   01/07/20 78.4 kg (172 lb 13.5 oz)   05/03/19 78.5 kg (173 lb 1 oz)   04/13/19 79 kg (174 lb 2.6 oz)   04/20/18 78.9 kg (174 lb)   04/09/18 79 kg (174 lb 2 oz)   10/05/17 84 kg (185 lb 1.6 oz)   09/21/17 83.9 kg (184 lb 15.5 oz)   09/14/17 82.3 kg (181 lb 6.4 oz)   07/03/17 81.2 kg (179 lb)     Lab/Procedures/Meds    Pertinent Labs Reviewed: reviewed  Pertinent Labs Comments: BUN (H), Cr (H), eGFR 27  Pertinent Medications Reviewed: reviewed    BMP  Lab Results   Component Value Date     (L) 12/20/2024    K 4.9 12/20/2024    CL 94 (L) 12/20/2024    CO2 25 12/20/2024    BUN 84 (H) 12/20/2024    CREATININE 2.6 (H) 12/20/2024    CALCIUM 8.6 (L) 12/20/2024    ANIONGAP 16 12/20/2024    EGFRNORACEVR 22 (A) 12/20/2024     Lab Results   Component Value Date    CALCIUM 8.6 (L) 12/20/2024    PHOS 2.5 (L) 01/03/2023     Lab Results   Component Value Date    ALBUMIN 3.2 (L) 12/10/2024     Lab Results   Component Value Date    ALT 10 12/10/2024    AST 15 12/10/2024    ALKPHOS 112 12/10/2024    BILITOT 0.5 12/10/2024     No results for input(s): "POCTGLUCOSE" in the last 24 hours.  Lab Results   Component Value Date    HGBA1C 6.9 (H) 12/10/2024     Lab Results   Component Value Date    WBC 6.53 12/18/2024    HGB 13.1 (L) 12/18/2024    HCT 40.8 12/18/2024    MCV 91 12/18/2024     12/18/2024       Scheduled Meds:   albuterol-ipratropium  3 mL Nebulization Q6H WAKE    arformoteroL  15 mcg Nebulization BID    budesonide  0.5 mg Nebulization Q12H    cefTRIAXone (Rocephin) IV (PEDS and ADULTS)  2 g Intravenous Q24H    diltiaZEM  300 mg Oral QAM    enoxparin  30 mg Subcutaneous Daily    hydrALAZINE  10 mg Oral Q12H    levothyroxine  50 mcg Oral Before breakfast    linezolid  600 mg Intravenous Q12H    magnesium oxide  400 mg Oral BID    methylPREDNISolone injection (PEDS and ADULTS)  40 mg Intravenous Q12H    metronidazole 1%   Topical " (Top) BID    miconazole   Topical (Top) BID    pantoprazole  40 mg Oral Daily    pravastatin  40 mg Oral Daily    sodium chloride 3%  4 mL Nebulization Q12H     Continuous Infusions:  PRN Meds:.  Current Facility-Administered Medications:     acetaminophen, 650 mg, Oral, Q6H PRN    haloperidol lactate, 2.5 mg, Intravenous, Q6H PRN    hydrALAZINE, 10 mg, Intravenous, Q6H PRN    morphine, 10 mg, Oral, Q4H PRN    ondansetron, 4 mg, Oral, Q6H PRN    sodium chloride 0.9%, 10 mL, Intravenous, PRN      Physical Findings/Assessment         Estimated/Assessed Needs    Weight Used For Calorie Calculations: 78.5 kg (173 lb 1 oz)  Energy Calorie Requirements (kcal): 5360-7557 kcals (MSJ x 1.2-1.4 AF (Diabetes vs CHF)  Energy Need Method: Sayre-St Jeor  Protein Requirements: 47-63 g (0.6-0.8 g/kg ABW (CKD, no dialysis, diabetic vs CHF)  Weight Used For Protein Calculations: 78.5 kg (173 lb 1 oz)  Fluid Requirements (mL): 1500 mL (CHF, edema, per MD/NP)  Estimated Fluid Requirement Method: other (see comments)  RDA Method (mL): 1660  CHO Requirement: 207-242 g (4267-2927 kcals/8)      Nutrition Prescription Ordered    Current Diet Order: Low sodium 2 gm, Soft and bite-sized (IDDSI Level 6), 1500 mL fluid restriction diet  Oral Nutrition Supplement: Suplena once a day    Evaluation of Received Nutrient/Fluid Intake  I/O: (Net since admit):  12/13/24: -2861 mL  12/19/24: none documented    Energy Calories Required: meeting needs  Protein Required: meeting needs  Fluid Required: not meeting needs  Total Fluid Intake (mL): 320  Tolerance: tolerating  % Intake of Estimated Energy Needs: 100%  % Meal Intake: 100%    Nutrition Risk    Level of Risk/Frequency of Follow-up: low (F/u x 1 weekly)       Monitor and Evaluation    Food and Nutrient Intake: energy intake, food and beverage intake  Food and Nutrient Adminstration: diet order  Knowledge/Beliefs/Attitudes: beliefs and attitudes, food and nutrition knowledge/skill  Anthropometric  Measurements: weight, weight change, body mass index  Biochemical Data, Medical Tests and Procedures: electrolyte and renal panel, glucose/endocrine profile, inflammatory profile  Nutrition-Focused Physical Findings: overall appearance       Nutrition Follow-Up    RD Follow-up?: Yes  Carmita Brooke MS, RD, LDN

## 2024-12-21 LAB
ANION GAP SERPL CALC-SCNC: 12 MMOL/L (ref 8–16)
BASOPHILS # BLD AUTO: 0.01 K/UL (ref 0–0.2)
BASOPHILS NFR BLD: 0.1 % (ref 0–1.9)
BUN SERPL-MCNC: 82 MG/DL (ref 10–30)
CALCIUM SERPL-MCNC: 8.5 MG/DL (ref 8.7–10.5)
CHLORIDE SERPL-SCNC: 95 MMOL/L (ref 95–110)
CO2 SERPL-SCNC: 28 MMOL/L (ref 23–29)
CREAT SERPL-MCNC: 2.4 MG/DL (ref 0.5–1.4)
DIFFERENTIAL METHOD BLD: ABNORMAL
EOSINOPHIL # BLD AUTO: 0 K/UL (ref 0–0.5)
EOSINOPHIL NFR BLD: 0 % (ref 0–8)
ERYTHROCYTE [DISTWIDTH] IN BLOOD BY AUTOMATED COUNT: 12.7 % (ref 11.5–14.5)
EST. GFR  (NO RACE VARIABLE): 24 ML/MIN/1.73 M^2
GLUCOSE SERPL-MCNC: 263 MG/DL (ref 70–110)
HCT VFR BLD AUTO: 41.4 % (ref 40–54)
HGB BLD-MCNC: 13.3 G/DL (ref 14–18)
IMM GRANULOCYTES # BLD AUTO: 0.16 K/UL (ref 0–0.04)
IMM GRANULOCYTES NFR BLD AUTO: 2.1 % (ref 0–0.5)
LYMPHOCYTES # BLD AUTO: 0.5 K/UL (ref 1–4.8)
LYMPHOCYTES NFR BLD: 7.2 % (ref 18–48)
MCH RBC QN AUTO: 30.2 PG (ref 27–31)
MCHC RBC AUTO-ENTMCNC: 32.1 G/DL (ref 32–36)
MCV RBC AUTO: 94 FL (ref 82–98)
MONOCYTES # BLD AUTO: 0.2 K/UL (ref 0.3–1)
MONOCYTES NFR BLD: 2.3 % (ref 4–15)
NEUTROPHILS # BLD AUTO: 6.6 K/UL (ref 1.8–7.7)
NEUTROPHILS NFR BLD: 88.3 % (ref 38–73)
NRBC BLD-RTO: 0 /100 WBC
OHS QRS DURATION: 74 MS
OHS QRS DURATION: 74 MS
OHS QTC CALCULATION: 431 MS
OHS QTC CALCULATION: 454 MS
PLATELET # BLD AUTO: 162 K/UL (ref 150–450)
PMV BLD AUTO: 10.8 FL (ref 9.2–12.9)
POTASSIUM SERPL-SCNC: 4.7 MMOL/L (ref 3.5–5.1)
RBC # BLD AUTO: 4.41 M/UL (ref 4.6–6.2)
SODIUM SERPL-SCNC: 135 MMOL/L (ref 136–145)
WBC # BLD AUTO: 7.49 K/UL (ref 3.9–12.7)

## 2024-12-21 PROCEDURE — 27100171 HC OXYGEN HIGH FLOW UP TO 24 HOURS

## 2024-12-21 PROCEDURE — 63600175 PHARM REV CODE 636 W HCPCS: Performed by: NURSE PRACTITIONER

## 2024-12-21 PROCEDURE — 99233 SBSQ HOSP IP/OBS HIGH 50: CPT | Mod: NSCH,,, | Performed by: INTERNAL MEDICINE

## 2024-12-21 PROCEDURE — 25000242 PHARM REV CODE 250 ALT 637 W/ HCPCS: Performed by: INTERNAL MEDICINE

## 2024-12-21 PROCEDURE — 85025 COMPLETE CBC W/AUTO DIFF WBC: CPT | Performed by: FAMILY MEDICINE

## 2024-12-21 PROCEDURE — 25000242 PHARM REV CODE 250 ALT 637 W/ HCPCS: Performed by: FAMILY MEDICINE

## 2024-12-21 PROCEDURE — 94761 N-INVAS EAR/PLS OXIMETRY MLT: CPT

## 2024-12-21 PROCEDURE — 97110 THERAPEUTIC EXERCISES: CPT | Mod: CQ

## 2024-12-21 PROCEDURE — 27000646 HC AEROBIKA DEVICE

## 2024-12-21 PROCEDURE — 80048 BASIC METABOLIC PNL TOTAL CA: CPT | Performed by: NURSE PRACTITIONER

## 2024-12-21 PROCEDURE — 93005 ELECTROCARDIOGRAM TRACING: CPT

## 2024-12-21 PROCEDURE — 36415 COLL VENOUS BLD VENIPUNCTURE: CPT | Performed by: FAMILY MEDICINE

## 2024-12-21 PROCEDURE — 36415 COLL VENOUS BLD VENIPUNCTURE: CPT | Performed by: NURSE PRACTITIONER

## 2024-12-21 PROCEDURE — 25000003 PHARM REV CODE 250: Performed by: NURSE PRACTITIONER

## 2024-12-21 PROCEDURE — 36410 VNPNXR 3YR/> PHY/QHP DX/THER: CPT

## 2024-12-21 PROCEDURE — 93010 ELECTROCARDIOGRAM REPORT: CPT | Mod: ,,, | Performed by: INTERNAL MEDICINE

## 2024-12-21 PROCEDURE — C1751 CATH, INF, PER/CENT/MIDLINE: HCPCS

## 2024-12-21 PROCEDURE — 21400001 HC TELEMETRY ROOM

## 2024-12-21 PROCEDURE — 99900035 HC TECH TIME PER 15 MIN (STAT)

## 2024-12-21 PROCEDURE — 94664 DEMO&/EVAL PT USE INHALER: CPT

## 2024-12-21 PROCEDURE — 94799 UNLISTED PULMONARY SVC/PX: CPT

## 2024-12-21 PROCEDURE — 97530 THERAPEUTIC ACTIVITIES: CPT | Mod: CQ

## 2024-12-21 PROCEDURE — 27000249 HC VAPOTHERM CIRCUIT

## 2024-12-21 PROCEDURE — 25000003 PHARM REV CODE 250: Performed by: FAMILY MEDICINE

## 2024-12-21 PROCEDURE — 63600175 PHARM REV CODE 636 W HCPCS: Performed by: FAMILY MEDICINE

## 2024-12-21 PROCEDURE — 94640 AIRWAY INHALATION TREATMENT: CPT

## 2024-12-21 RX ADMIN — CEFTRIAXONE 2 G: 2 INJECTION, POWDER, FOR SOLUTION INTRAMUSCULAR; INTRAVENOUS at 09:12

## 2024-12-21 RX ADMIN — IPRATROPIUM BROMIDE AND ALBUTEROL SULFATE 3 ML: 2.5; .5 SOLUTION RESPIRATORY (INHALATION) at 02:12

## 2024-12-21 RX ADMIN — MAGNESIUM OXIDE TAB 400 MG (241.3 MG ELEMENTAL MG) 400 MG: 400 (241.3 MG) TAB at 09:12

## 2024-12-21 RX ADMIN — DILTIAZEM HYDROCHLORIDE 300 MG: 180 CAPSULE, COATED, EXTENDED RELEASE ORAL at 06:12

## 2024-12-21 RX ADMIN — HYDRALAZINE HYDROCHLORIDE 10 MG: 10 TABLET, FILM COATED ORAL at 09:12

## 2024-12-21 RX ADMIN — METHYLPREDNISOLONE SODIUM SUCCINATE 40 MG: 40 INJECTION, POWDER, FOR SOLUTION INTRAMUSCULAR; INTRAVENOUS at 01:12

## 2024-12-21 RX ADMIN — ENOXAPARIN SODIUM 30 MG: 30 INJECTION SUBCUTANEOUS at 04:12

## 2024-12-21 RX ADMIN — IPRATROPIUM BROMIDE AND ALBUTEROL SULFATE 3 ML: 2.5; .5 SOLUTION RESPIRATORY (INHALATION) at 08:12

## 2024-12-21 RX ADMIN — SODIUM CHLORIDE 30 MG/ML INHALATION SOLUTION 4 ML: 30 SOLUTION INHALANT at 09:12

## 2024-12-21 RX ADMIN — MICONAZOLE NITRATE: 20 CREAM TOPICAL at 09:12

## 2024-12-21 RX ADMIN — ARFORMOTEROL TARTRATE 15 MCG: 15 SOLUTION RESPIRATORY (INHALATION) at 08:12

## 2024-12-21 RX ADMIN — PANTOPRAZOLE SODIUM 40 MG: 40 TABLET, DELAYED RELEASE ORAL at 04:12

## 2024-12-21 RX ADMIN — LINEZOLID 600 MG: 600 INJECTION, SOLUTION INTRAVENOUS at 12:12

## 2024-12-21 RX ADMIN — BUDESONIDE INHALATION 0.5 MG: 0.5 SUSPENSION RESPIRATORY (INHALATION) at 09:12

## 2024-12-21 RX ADMIN — LEVOTHYROXINE SODIUM 50 MCG: 50 TABLET ORAL at 06:12

## 2024-12-21 RX ADMIN — METRONIDAZOLE: 10 GEL TOPICAL at 12:12

## 2024-12-21 RX ADMIN — METRONIDAZOLE: 10 GEL TOPICAL at 09:12

## 2024-12-21 RX ADMIN — IPRATROPIUM BROMIDE AND ALBUTEROL SULFATE 3 ML: 2.5; .5 SOLUTION RESPIRATORY (INHALATION) at 09:12

## 2024-12-21 RX ADMIN — SODIUM CHLORIDE 30 MG/ML INHALATION SOLUTION 4 ML: 30 SOLUTION INHALANT at 08:12

## 2024-12-21 RX ADMIN — BUDESONIDE INHALATION 0.5 MG: 0.5 SUSPENSION RESPIRATORY (INHALATION) at 08:12

## 2024-12-21 RX ADMIN — PRAVASTATIN SODIUM 40 MG: 20 TABLET ORAL at 09:12

## 2024-12-21 RX ADMIN — METHYLPREDNISOLONE SODIUM SUCCINATE 40 MG: 40 INJECTION, POWDER, FOR SOLUTION INTRAMUSCULAR; INTRAVENOUS at 02:12

## 2024-12-21 NOTE — PT/OT/SLP PROGRESS
Physical Therapy  Treatment    Jimmy Young   MRN: [id number]   Admitting Diagnosis: Acute on chronic respiratory failure with hypoxia    PT Received On: 12/21/24  PT Start Time: 0727     PT Stop Time: 0752    PT Total Time (min): 25 min       Billable Minutes:  Therapeutic Activity 15 and Therapeutic Exercise 10    Treatment Type: Treatment        Number of PTA visits since last PT visit: 2       General Precautions: Standard, fall, respiratory  Orthopedic Precautions: N/A  Braces: N/A  Respiratory Status:  Vapotherm    Spiritual, Cultural Beliefs, Uatsdin Practices, Values that Affect Care: no    Subjective:  Communicated with nurse Linn and completed Epic chart review prior to session. Pt alert x3 agreed to PT session. Decreased agitation and confusion.    Pain/Comfort  Pain Rating 1: 0/10    Objective:   Patient found with: bed alarm, peripheral IV, pulse ox (continuous), telemetry (vapp, AVASYS)      Rolling Right: Min A  Supine > Sit: Mod A  Scoot towards EOB: Mod A  STS from EOB: Mod A with RW. Cues for proper hand placement  Stand Pivot T/F to BSC: Mod A with RW. Cues needed for RW management     Performed BLE AROM TE x10 ea: LAQ, AP    Treatment and Education:  Educated pt on proper RW use and management for safety. Educated pt on benefits of increasing time spent OOB. Encouraged pt to sit in BSC for all meals. Encouraged pt to sit up for a min 2hr 3x/day.      AM-PAC 6 CLICK MOBILITY  How much help from another person does this patient currently need?   1 = Unable, Total/Dependent Assistance  2 = A lot, Maximum/Moderate Assistance  3 = A little, Minimum/Contact Guard/Supervision  4 = None, Modified [place]/Independent    Turning over in bed (including adjusting bedclothes, sheets and blankets)?: 3  Sitting down on and standing up from a chair with arms (e.g., wheelchair, bedside commode, etc.): 2  Moving from lying on back to sitting on the side of the bed?: 2  Moving to and from a bed to a  chair (including a wheelchair)?: 2  Need to walk in hospital room?: 2  Climbing 3-5 steps with a railing?: 1  Basic Mobility Total Score: 12    AM-PAC Raw Score CMS G-Code Modifier Level of Impairment Assistance   6 % Total / Unable   7 - 9 CM 80 - 100% Maximal Assist   10 - 14 CL 60 - 80% Moderate Assist   15 - 19 CK 40 - 60% Moderate Assist   20 - 22 CJ 20 - 40% Minimal Assist   23 CI 1-20% SBA / CGA   24 CH 0% Independent/ Mod I     Patient left up in chair with all lines intact, call button in reach, chair alarm on, nurse notified, and respiratory present.    Assessment:  Jimmy Young is a 89 y.o. male with a medical diagnosis of Acute on chronic respiratory failure with hypoxia and presents with the following functional limitations. Pt will continue to benefit from skilled PT in order to address the below deficits to reach maximum level of function.    Rehab identified problem list/impairments: weakness, impaired endurance, impaired self care skills, impaired functional mobility, gait instability, impaired balance, impaired cognition, decreased coordination, decreased upper extremity function, decreased lower extremity function, decreased safety awareness, abnormal tone, decreased ROM, impaired coordination, impaired cardiopulmonary response to activity    Rehab potential is good.    Activity tolerance: Fair    Discharge recommendations: Low Intensity Therapy      Barriers to discharge:      Equipment recommendations: hospital bed     GOALS:   Multidisciplinary Problems       Physical Therapy Goals          Problem: Physical Therapy    Goal Priority Disciplines Outcome Interventions   Physical Therapy Goal     PT, PT/OT Progressing    Description: LT24  1. PT WILL COMPLETE BED MOBILITY IND  2. PT WILL GT TRAIN X 500' WITH RW MOD I  3. PT WILL COMPLETE STANDING TE X 20 REPS TO INC STRENGTH / BALANCE  4. PT WILL INC AMPAC SCORE BY 2 POINTS TO PROGRESS GROSS FUNC MOBILITY.                           PLAN:    Patient to be seen 3 x/week to address the above listed problems via gait training, therapeutic activities, therapeutic exercises  Plan of Care expires: 12/25/24  Plan of Care reviewed with: patient, family         12/21/2024

## 2024-12-21 NOTE — ASSESSMENT & PLAN NOTE
12/21/2024  Patient is still requiring 18 L 100% Vapotherm   Continue Solu-Medrol   Continue antibiotics   Pulmonology on case   Patient desats with minimal movement  Discussed case with family at bedside   Will continue aggressive therapy over the weekend   Should patient fail to improve   Planned for inpatient hospice on Monday   Family agreeable to plan

## 2024-12-21 NOTE — PLAN OF CARE
A255/A255 TRIPP  Jimmy Young is a 89 y.o.male admitted on 12/10/2024 for Acute on chronic respiratory failure with hypoxia   Code Status: DNR MRN: [id number]   Review of patient's allergies indicates:  No Known Allergies  Past Medical History:   Diagnosis Date    Aneurysm 2016    abdominal, stent placed    Arthritis     Asthma     Atrial fibrillation     Cancer     skin cancer on face    CKD (chronic kidney disease)     COPD (chronic obstructive pulmonary disease) 10/05/2017    Diabetes mellitus     Emphysema lung     Encounter for blood transfusion     Hypertension       PRN meds    acetaminophen, 650 mg, Q6H PRN  haloperidol lactate, 5 mg, Q6H PRN  hydrALAZINE, 10 mg, Q6H PRN  morphine, 10 mg, Q4H PRN  ondansetron, 4 mg, Q6H PRN  sodium chloride 0.9%, 10 mL, PRN           Pt oriented x4.  VSS.  Pt remained afebrile throughout this shift.   All meds administered per order.   Pt remained free of falls this shift.   Plan of care reviewed. Patient verbalizes understanding.   Pt moving/turning independently.   Bed low, side rails up x 2, wheels locked, call light in reach.   Patient instructed to call for assistance.  Patient education provided    Midline placed today. Pt sat up in chair for breakfast. Remains on vapotherm.         Problem: Adult Inpatient Plan of Care  Goal: Optimal Comfort and Wellbeing  Outcome: Progressing                Orientation: disoriented to, situation  [place] Coma Scale Score: 15     Lead Monitored: Lead II Rhythm: atrial rhythm Frequency/Ectopy: PVCs  Cardiac/Telemetry Box Number: [id number]  VTE Core Measure: Pharmacological prophylaxis initiated/maintained Last Bowel Movement: 12/20/24  Diet Soft & Bite Sized (IDDSI Level 6) Low Sodium,2gm; Fluid - 1500mL; Standard Tray  Voiding Characteristics: incontinence  Abhishek Score: 16  Fall Risk Score: 24  Accucheck []   Freq?      Lines/Drains/Airways       Peripheral Intravenous Line  Duration                  Midline Catheter - Single Lumen  12/21/24 0845 Right basilic vein (medial side of arm) other (see comments) <1 day

## 2024-12-21 NOTE — PROGRESS NOTES
[facility] Telemetry ([facility])  [facility] Medicine  Progress Note    Patient Name: Jimmy Young  MRN: [id number]  Patient Class: IP- Inpatient   Admission Date: 12/10/2024  Length of Stay: 11 days  Attending Physician: Manuel Fermin MD  Primary Care Provider: Nacho Richardson MD        Subjective     Principal Problem:Acute on chronic respiratory failure with hypoxia        HPI:  Jimmy Young is a 89 y.o. male with a PMH  has a past medical history of Aneurysm (2016), Arthritis, Asthma, Atrial fibrillation, Cancer, CKD (chronic kidney disease), COPD (chronic obstructive pulmonary disease) (10/05/2017), Diabetes mellitus, Emphysema lung, Encounter for blood transfusion, and Hypertension.presented to the ED for swelling of the legs. Patient was referred from his primary care physician today for worsening lower extremity edema, shortness of breath, and cough. Patient is on chronic home O2 at 3L/min via NC. Reports compliance with his lasix and denies any excessive fluid or salt intake. Denies any other complaints at his time.      ER workup revealed CBC to be unremarkable.  CMP with BUN/creatinine at baseline of 29/2.2.   mg/dL.  Magnesium 1.2.  BNP, troponin, lactic acid within normal limits.  Flu/COVID negative.  Blood cultures obtained x2.  Ultrasound lower extremities negative for DVT.  Chest x-ray revealed left upper lobe opacity resembling early pneumonia.  EKG revealed a flutter with variable AV block and left axis deviation with a ventricular rate of 64 beats per minute with a QT/QTC of 416/429.  Vital signs stable.  Patient is at baseline oxygen saturation of 92-94% on 3 liters/minute via nasal cannula.  Patient received 12.5 mg carvedilol, 1 g Rocephin IV, 100 mg doxycycline p.o., 30 mg Lovenox, 40 mg Lasix IV, and 10 mg hydralazine p.o. at outside facility.  Hospital Medicine consulted to admit patient for CHF exacerbation and pneumonia. Patient and family in agreement with treatment plan.  Patient admitted under inpatient status.    PCP: Nacho Richardson      Overview/Hospital Course:  12/11 admitted for pneumonia. Wears supplemental oxygen at baseline, 2L. On 4L. Speech consulted. Schedule breathing treatments. Discontinue lasix. Replete lytes. Echocardiogram pending. Cardiology consulted. Relaxing normotensive range in this patient demographic.  12/12 blood culture positive for staph. Infectious disease consulted. Continue monitoring repeat blood cultures. Recent medication(s) change with linagliptin with risk for congestive heart failure and skin reaction. Discussed risk vs benefit. Hba1c 6.9. recommend goal hba1c 8-9.   12/13 bun/creatinine increasing. Discontinued intravenous lasix. Remains on increased supplemental oxygen, 5L. Wean as able. Home hydralazine resumed for elevated blood pressure. Repeat blood cultures negative growth to date x 1 day. Infectious disease following  12/14/2024  Patient required increasing to 40 L 100% FiO2 Vapotherm.  Pulmonology consult on case.  Solu-Medrol added.  Continue empiric antibiotics.  Repeat chest x-ray.  12/15/2024  Patient weaned down to 20 L 80% Vapotherm.  Will continue current management.  12/16/2024  Still on 20L 80% vapotherm. Unable to be weaned at this time. Cont abx. Pulm and cardiology on case.   12/17/2024  Chest x-ray improved today compared to yesterday.  Still on 20 L 90% Vapotherm.  Continue antibiotics.  12/18/2024  Currently on 18 L 100% Vapotherm.  Continue antibiotics.  Wean O2 as tolerated.  Discussed with family at bedside the prognosis is guarded.  Patient will likely need placement should he improve.  Discussed SNF versus inpatient hospice.  Will see how patient clinically progresses over the next few days.  12/19/2024  Currently on 25 L 100% Vapotherm.  Continue antibiotics.  Chest x-ray reviewed which is improving however patient clinically declining.  He is requiring increased O2.  Snf versus inpatient hospice.    12/20/2024  Patient agitated today.  Haldol p.r.n. ordered.  Continue antibiotics.  Continue steroids.  Patient is still requiring high-flow Vapotherm.  Discussed case with family at bedside.  Will continue max therapy over the weekend.  Should patient fail to improve family is agreeable to inpatient hospice.  12/21/2024  Agitation improved.  Continue Haldol p.r.n..  Continue antibiotics and steroids.  Patient weaned down to 18 L 100% FiO2.  Continue current therapy.  Pending clinical progress.  Possible inpatient hospice on Monday if patient failed to improve.    Interval History:  No acute issues reported overnight    Review of Systems   Constitutional:  Negative for fatigue and fever.   HENT:  Negative for sinus pressure.    Eyes:  Negative for visual disturbance.   Respiratory:  Positive for cough and shortness of breath.    Cardiovascular:  Positive for leg swelling. Negative for chest pain.   Gastrointestinal:  Negative for nausea and vomiting.   Genitourinary:  Negative for difficulty urinating.   Musculoskeletal:  Negative for back pain.   Skin:  Negative for rash.   Neurological:  Negative for headaches.   Psychiatric/Behavioral:  Negative for confusion.      Objective:     Vital Signs (Most Recent):  Temp: 97.9 °F (36.6 °C) (12/21/24 0533)  Pulse: 70 (12/21/24 0907)  Resp: 20 (12/21/24 0907)  BP: (!) 145/65 (12/21/24 0858)  SpO2: (!) 90 % (12/21/24 0907) Vital Signs (24h Range):  Temp:  [97 °F (36.1 °C)-97.9 °F (36.6 °C)] 97.9 °F (36.6 °C)  Pulse:  [64-87] 70  Resp:  [16-20] 20  SpO2:  [90 %-99 %] 90 %  BP: (145-175)/(65-86) 145/65     Weight: 81.7 kg (180 lb 1.9 oz)  Body mass index is 28.21 kg/m².  No intake or output data in the 24 hours ending 12/21/24 1002      Physical Exam  Vitals reviewed.   Constitutional:       General: He is not in acute distress.     Appearance: He is well-developed. He is ill-appearing. He is not toxic-appearing or diaphoretic.      Interventions: Nasal cannula in place.    HENT:      Head: Normocephalic and atraumatic.      Right Ear: Decreased hearing noted.      Left Ear: Decreased hearing noted.   Eyes:      Pupils: Pupils are equal, round, and reactive to light.   Cardiovascular:      Rate and Rhythm: Normal rate and regular rhythm.      Heart sounds: Normal heart sounds. No murmur heard.     No friction rub. No gallop.   Pulmonary:      Effort: Pulmonary effort is normal. No respiratory distress.      Breath sounds: Normal breath sounds. No stridor. No wheezing or rales.   Abdominal:      General: Bowel sounds are normal. There is no distension.      Palpations: Abdomen is soft. There is no mass.      Tenderness: There is no abdominal tenderness. There is no guarding.   Musculoskeletal:      Right lower leg: Edema present.      Left lower leg: Edema present.   Skin:     General: Skin is warm.      Findings: Erythema and lesion present.   Neurological:      Mental Status: He is alert and oriented to person, place, and time.      Motor: Weakness present.   Psychiatric:         Mood and Affect: Mood normal.         Behavior: Behavior normal.             Significant Labs: All pertinent labs within the past 24 hours have been reviewed.    Significant Imaging: I have reviewed all pertinent imaging results/findings within the past 24 hours.    Assessment and Plan     * Acute on chronic respiratory failure with hypoxia  12/21/2024  Patient is still requiring 18 L 100% Vapotherm   Continue Solu-Medrol   Continue antibiotics   Pulmonology on case   Patient desats with minimal movement  Discussed case with family at bedside   Will continue aggressive therapy over the weekend   Should patient fail to improve   Planned for inpatient hospice on Monday   Family agreeable to plan        Delirium  Likely metabolic secondary to illness   Will order Haldol p.r.n.      Lower extremity edema  Improving  Multifactorial: malnutrition, fluid indiscretion, venous stasis, recent medication(s) change,  "dependent edema  Counseling provided  Elevate legs   Manish hose and topicals    Atrial flutter  Controlled  Follow up outpatient primary cardiologist  Holding a/c due to pmh GI bleed       Pneumonia  Continue Zyvox and Rocephin  Mrsa in sputum     Chronic diastolic heart failure  Patient has  unspecified  heart failure that is Chronic. On presentation their CHF was . Most recent BNP and echo results are listed below.well compensated  No results for input(s): "BNP" in the last 72 hours.    Latest ECHO  Results for orders placed during the hospital encounter of 01/08/21    Echo Color Flow Doppler? Yes    Interpretation Summary  · The left ventricle is normal in size with moderate concentric hypertrophy and normal systolic function. The estimated ejection fraction is 60%  · Indeterminate left ventricular diastolic function.  · Normal right ventricular size with normal right ventricular systolic function.  · Mild tricuspid regurgitation.  · Normal central venous pressure (3 mmHg).  · The estimated PA systolic pressure is 32 mmHg.    Current Heart Failure Medications  , Every 12 hours, Oral  , 2 times daily with meals, Oral  hydrALAZINE injection 10 mg, Every 6 hours PRN, Intravenous  hydrALAZINE tablet 10 mg, Every 12 hours, Oral    Plan  - Monitor strict I&Os and daily weights.    - Place on telemetry  - Low sodium diet  - Place on fluid restriction of 1.5 L.   - Cardiology has been consulted  - The patient's volume status is at their baseline  Ambulatory oxygen evaluation   Nutrition consult   Discontinue intravenous lasix    12/21/2024  Will hold Bumex for now      Coronary artery disease of native artery of native heart with stable angina pectoris  Patient with known CAD which is controlled Will continue Statin and monitor for S/Sx of angina/ACS. Continue to monitor on telemetry.     CKD stage 3 secondary to diabetes  Creatine stable for now. BMP reviewed- noted Estimated Creatinine Clearance: 19.2 mL/min (A) (based " on SCr of 2.2 mg/dL (H)). according to latest data. Based on current GFR, CKD stage is stage 3 - GFR 30-59.  Monitor UOP and serial BMP and adjust therapy as needed. Renally dose meds. Avoid nephrotoxic medications and procedures.  Discontinue intravenous lasix  Monitor trend    Hypercholesterolemia  Patient is chronically on statin.will continue for now. Last Lipid Panel:   Lab Results   Component Value Date    CHOL 121 07/03/2023    HDL 37 (L) 07/03/2023    LDLCALC 57 07/03/2023    TRIG 160 (H) 07/03/2023    CHOLHDL 27.8 08/01/2019     Plan:  -Continue home medication  -low fat/low calorie diet        Atrial fibrillation  Patient with Paroxysmal (<7 days) atrial fibrillation which is controlled currently with Bbs and Calcium Channel Blocker. Patient is currently in sinus rhythm.[id number] Score: 4.   Discussed cardiology vs gastroenterology recommendations   Patient voices understanding  Will continue holding ac    Acquired hypothyroidism  Patient has chronic hypothyroidism. TFTs reviewed-   Lab Results   Component Value Date    TSH 1.780 07/03/2023   . Will continue chronic levothyroxine and adjust for and clinical changes.        Essential hypertension  Chronic, controlled.  Latest blood pressure and vitals reviewed-   Temp:  [97.4 °F (36.3 °C)-98.2 °F (36.8 °C)]   Pulse:  [63-89]   Resp:  [15-21]   BP: (151-203)/(63-89)   SpO2:  [87 %-96 %] .   Home meds for hypertension were reviewed and noted below.   Hypertension Medications               amLODIPine (NORVASC) 5 MG tablet Take 1 tablet (5 mg total) by mouth once daily.    carvediloL (COREG) 12.5 MG tablet Take 12.5 mg by mouth 2 (two) times daily with meals.    diltiaZEM (CARDIZEM CD) 300 MG 24 hr capsule Take 1 capsule by mouth every morning.    furosemide (LASIX) 20 MG tablet Take 1 tablet (20 mg total) by mouth once daily.    hydrALAZINE (APRESOLINE) 10 MG tablet Take 10 mg by mouth every 12 (twelve) hours.            While in the hospital, will manage  blood pressure as follows; Adjust home antihypertensive regimen as follows- elevated blood pressure, resume home hydralazine    Will utilize p.r.n. blood pressure medication only if patient's blood pressure greater than  180/110 and he develops symptoms such as worsening chest pain or shortness of breath.        VTE Risk Mitigation (From admission, onward)           Ordered     Place SALUD hose  Until discontinued         12/12/24 0921     Place SALUD hose  Until discontinued         12/11/24 0949     enoxaparin injection 30 mg  Daily         12/10/24 1922     IP VTE HIGH RISK PATIENT  Once         12/10/24 1922     Place sequential compression device  Until discontinued         12/10/24 1922                    Discharge Planning   SHEA:      Code Status: DNR   Medical Readiness for Discharge Date:   Discharge Plan A: Home, Home Health            Manuel Fermin MD  Department of Hospital Medicine   [facility] - Telemetry ([facility])

## 2024-12-21 NOTE — ASSESSMENT & PLAN NOTE
"Patient has  unspecified  heart failure that is Chronic. On presentation their CHF was . Most recent BNP and echo results are listed below.well compensated  No results for input(s): "BNP" in the last 72 hours.    Latest ECHO  Results for orders placed during the hospital encounter of 01/08/21    Echo Color Flow Doppler? Yes    Interpretation Summary  · The left ventricle is normal in size with moderate concentric hypertrophy and normal systolic function. The estimated ejection fraction is 60%  · Indeterminate left ventricular diastolic function.  · Normal right ventricular size with normal right ventricular systolic function.  · Mild tricuspid regurgitation.  · Normal central venous pressure (3 mmHg).  · The estimated PA systolic pressure is 32 mmHg.    Current Heart Failure Medications  , Every 12 hours, Oral  , 2 times daily with meals, Oral  hydrALAZINE injection 10 mg, Every 6 hours PRN, Intravenous  hydrALAZINE tablet 10 mg, Every 12 hours, Oral    Plan  - Monitor strict I&Os and daily weights.    - Place on telemetry  - Low sodium diet  - Place on fluid restriction of 1.5 L.   - Cardiology has been consulted  - The patient's volume status is at their baseline  Ambulatory oxygen evaluation   Nutrition consult   Discontinue intravenous lasix    12/21/2024  Will hold Bumex for now    "

## 2024-12-21 NOTE — SUBJECTIVE & OBJECTIVE
Interval History:  No acute issues reported overnight    Review of Systems   Constitutional:  Negative for fatigue and fever.   HENT:  Negative for sinus pressure.    Eyes:  Negative for visual disturbance.   Respiratory:  Positive for cough and shortness of breath.    Cardiovascular:  Positive for leg swelling. Negative for chest pain.   Gastrointestinal:  Negative for nausea and vomiting.   Genitourinary:  Negative for difficulty urinating.   Musculoskeletal:  Negative for back pain.   Skin:  Negative for rash.   Neurological:  Negative for headaches.   Psychiatric/Behavioral:  Negative for confusion.      Objective:     Vital Signs (Most Recent):  Temp: 97.9 °F (36.6 °C) (12/21/24 0533)  Pulse: 70 (12/21/24 0907)  Resp: 20 (12/21/24 0907)  BP: (!) 145/65 (12/21/24 0858)  SpO2: (!) 90 % (12/21/24 0907) Vital Signs (24h Range):  Temp:  [97 °F (36.1 °C)-97.9 °F (36.6 °C)] 97.9 °F (36.6 °C)  Pulse:  [64-87] 70  Resp:  [16-20] 20  SpO2:  [90 %-99 %] 90 %  BP: (145-175)/(65-86) 145/65     Weight: 81.7 kg (180 lb 1.9 oz)  Body mass index is 28.21 kg/m².  No intake or output data in the 24 hours ending 12/21/24 1002      Physical Exam  Vitals reviewed.   Constitutional:       General: He is not in acute distress.     Appearance: He is well-developed. He is ill-appearing. He is not toxic-appearing or diaphoretic.      Interventions: Nasal cannula in place.   HENT:      Head: Normocephalic and atraumatic.      Right Ear: Decreased hearing noted.      Left Ear: Decreased hearing noted.   Eyes:      Pupils: Pupils are equal, round, and reactive to light.   Cardiovascular:      Rate and Rhythm: Normal rate and regular rhythm.      Heart sounds: Normal heart sounds. No murmur heard.     No friction rub. No gallop.   Pulmonary:      Effort: Pulmonary effort is normal. No respiratory distress.      Breath sounds: Normal breath sounds. No stridor. No wheezing or rales.   Abdominal:      General: Bowel sounds are normal. There is  no distension.      Palpations: Abdomen is soft. There is no mass.      Tenderness: There is no abdominal tenderness. There is no guarding.   Musculoskeletal:      Right lower leg: Edema present.      Left lower leg: Edema present.   Skin:     General: Skin is warm.      Findings: Erythema and lesion present.   Neurological:      Mental Status: He is alert and oriented to person, place, and time.      Motor: Weakness present.   Psychiatric:         Mood and Affect: Mood normal.         Behavior: Behavior normal.             Significant Labs: All pertinent labs within the past 24 hours have been reviewed.    Significant Imaging: I have reviewed all pertinent imaging results/findings within the past 24 hours.

## 2024-12-22 LAB
ANION GAP SERPL CALC-SCNC: 13 MMOL/L (ref 8–16)
BASOPHILS # BLD AUTO: 0.01 K/UL (ref 0–0.2)
BASOPHILS NFR BLD: 0.1 % (ref 0–1.9)
BUN SERPL-MCNC: 83 MG/DL (ref 10–30)
CALCIUM SERPL-MCNC: 8.3 MG/DL (ref 8.7–10.5)
CHLORIDE SERPL-SCNC: 96 MMOL/L (ref 95–110)
CO2 SERPL-SCNC: 25 MMOL/L (ref 23–29)
CREAT SERPL-MCNC: 2.3 MG/DL (ref 0.5–1.4)
DIFFERENTIAL METHOD BLD: ABNORMAL
EOSINOPHIL # BLD AUTO: 0 K/UL (ref 0–0.5)
EOSINOPHIL NFR BLD: 0 % (ref 0–8)
ERYTHROCYTE [DISTWIDTH] IN BLOOD BY AUTOMATED COUNT: 12.4 % (ref 11.5–14.5)
EST. GFR  (NO RACE VARIABLE): 26 ML/MIN/1.73 M^2
GLUCOSE SERPL-MCNC: 306 MG/DL (ref 70–110)
HCT VFR BLD AUTO: 38.3 % (ref 40–54)
HGB BLD-MCNC: 12.6 G/DL (ref 14–18)
IMM GRANULOCYTES # BLD AUTO: 0.1 K/UL (ref 0–0.04)
IMM GRANULOCYTES NFR BLD AUTO: 1.4 % (ref 0–0.5)
LYMPHOCYTES # BLD AUTO: 0.4 K/UL (ref 1–4.8)
LYMPHOCYTES NFR BLD: 6.2 % (ref 18–48)
MCH RBC QN AUTO: 30.4 PG (ref 27–31)
MCHC RBC AUTO-ENTMCNC: 32.9 G/DL (ref 32–36)
MCV RBC AUTO: 92 FL (ref 82–98)
MONOCYTES # BLD AUTO: 0.1 K/UL (ref 0.3–1)
MONOCYTES NFR BLD: 1.8 % (ref 4–15)
NEUTROPHILS # BLD AUTO: 6.4 K/UL (ref 1.8–7.7)
NEUTROPHILS NFR BLD: 90.5 % (ref 38–73)
NRBC BLD-RTO: 0 /100 WBC
OHS QRS DURATION: 70 MS
OHS QRS DURATION: 74 MS
OHS QRS DURATION: 82 MS
OHS QTC CALCULATION: 413 MS
OHS QTC CALCULATION: 416 MS
OHS QTC CALCULATION: 454 MS
PLATELET # BLD AUTO: 139 K/UL (ref 150–450)
PMV BLD AUTO: 10.9 FL (ref 9.2–12.9)
POTASSIUM SERPL-SCNC: 5 MMOL/L (ref 3.5–5.1)
RBC # BLD AUTO: 4.15 M/UL (ref 4.6–6.2)
SODIUM SERPL-SCNC: 134 MMOL/L (ref 136–145)
WBC # BLD AUTO: 7.06 K/UL (ref 3.9–12.7)

## 2024-12-22 PROCEDURE — 63600175 PHARM REV CODE 636 W HCPCS: Performed by: FAMILY MEDICINE

## 2024-12-22 PROCEDURE — 80048 BASIC METABOLIC PNL TOTAL CA: CPT | Performed by: NURSE PRACTITIONER

## 2024-12-22 PROCEDURE — 63600175 PHARM REV CODE 636 W HCPCS: Performed by: NURSE PRACTITIONER

## 2024-12-22 PROCEDURE — 93005 ELECTROCARDIOGRAM TRACING: CPT | Mod: ER

## 2024-12-22 PROCEDURE — 25000242 PHARM REV CODE 250 ALT 637 W/ HCPCS: Performed by: INTERNAL MEDICINE

## 2024-12-22 PROCEDURE — 27000207 HC ISOLATION

## 2024-12-22 PROCEDURE — 27000249 HC VAPOTHERM CIRCUIT

## 2024-12-22 PROCEDURE — 99900035 HC TECH TIME PER 15 MIN (STAT)

## 2024-12-22 PROCEDURE — 85025 COMPLETE CBC W/AUTO DIFF WBC: CPT | Performed by: FAMILY MEDICINE

## 2024-12-22 PROCEDURE — 94799 UNLISTED PULMONARY SVC/PX: CPT

## 2024-12-22 PROCEDURE — 36415 COLL VENOUS BLD VENIPUNCTURE: CPT | Performed by: FAMILY MEDICINE

## 2024-12-22 PROCEDURE — 94761 N-INVAS EAR/PLS OXIMETRY MLT: CPT

## 2024-12-22 PROCEDURE — 27100171 HC OXYGEN HIGH FLOW UP TO 24 HOURS

## 2024-12-22 PROCEDURE — 25000242 PHARM REV CODE 250 ALT 637 W/ HCPCS: Performed by: FAMILY MEDICINE

## 2024-12-22 PROCEDURE — 27000646 HC AEROBIKA DEVICE

## 2024-12-22 PROCEDURE — 25000003 PHARM REV CODE 250: Performed by: FAMILY MEDICINE

## 2024-12-22 PROCEDURE — 94664 DEMO&/EVAL PT USE INHALER: CPT

## 2024-12-22 PROCEDURE — 21400001 HC TELEMETRY ROOM

## 2024-12-22 PROCEDURE — 93010 ELECTROCARDIOGRAM REPORT: CPT | Mod: ,,, | Performed by: INTERNAL MEDICINE

## 2024-12-22 PROCEDURE — 94640 AIRWAY INHALATION TREATMENT: CPT

## 2024-12-22 PROCEDURE — 25000003 PHARM REV CODE 250: Performed by: NURSE PRACTITIONER

## 2024-12-22 RX ADMIN — ENOXAPARIN SODIUM 30 MG: 30 INJECTION SUBCUTANEOUS at 06:12

## 2024-12-22 RX ADMIN — BUDESONIDE INHALATION 0.5 MG: 0.5 SUSPENSION RESPIRATORY (INHALATION) at 08:12

## 2024-12-22 RX ADMIN — LEVOTHYROXINE SODIUM 50 MCG: 50 TABLET ORAL at 05:12

## 2024-12-22 RX ADMIN — SODIUM CHLORIDE 30 MG/ML INHALATION SOLUTION 4 ML: 30 SOLUTION INHALANT at 08:12

## 2024-12-22 RX ADMIN — IPRATROPIUM BROMIDE AND ALBUTEROL SULFATE 3 ML: 2.5; .5 SOLUTION RESPIRATORY (INHALATION) at 08:12

## 2024-12-22 RX ADMIN — ARFORMOTEROL TARTRATE 15 MCG: 15 SOLUTION RESPIRATORY (INHALATION) at 08:12

## 2024-12-22 RX ADMIN — MAGNESIUM OXIDE TAB 400 MG (241.3 MG ELEMENTAL MG) 400 MG: 400 (241.3 MG) TAB at 10:12

## 2024-12-22 RX ADMIN — SODIUM CHLORIDE 30 MG/ML INHALATION SOLUTION 4 ML: 30 SOLUTION INHALANT at 07:12

## 2024-12-22 RX ADMIN — PANTOPRAZOLE SODIUM 40 MG: 40 TABLET, DELAYED RELEASE ORAL at 06:12

## 2024-12-22 RX ADMIN — CEFTRIAXONE 2 G: 2 INJECTION, POWDER, FOR SOLUTION INTRAMUSCULAR; INTRAVENOUS at 10:12

## 2024-12-22 RX ADMIN — LINEZOLID 600 MG: 600 INJECTION, SOLUTION INTRAVENOUS at 12:12

## 2024-12-22 RX ADMIN — DILTIAZEM HYDROCHLORIDE 300 MG: 180 CAPSULE, COATED, EXTENDED RELEASE ORAL at 05:12

## 2024-12-22 RX ADMIN — HYDRALAZINE HYDROCHLORIDE 10 MG: 10 TABLET, FILM COATED ORAL at 10:12

## 2024-12-22 RX ADMIN — IPRATROPIUM BROMIDE AND ALBUTEROL SULFATE 3 ML: 2.5; .5 SOLUTION RESPIRATORY (INHALATION) at 07:12

## 2024-12-22 RX ADMIN — METRONIDAZOLE: 10 GEL TOPICAL at 10:12

## 2024-12-22 RX ADMIN — LINEZOLID 600 MG: 600 INJECTION, SOLUTION INTRAVENOUS at 01:12

## 2024-12-22 RX ADMIN — ARFORMOTEROL TARTRATE 15 MCG: 15 SOLUTION RESPIRATORY (INHALATION) at 07:12

## 2024-12-22 RX ADMIN — PRAVASTATIN SODIUM 40 MG: 20 TABLET ORAL at 10:12

## 2024-12-22 RX ADMIN — IPRATROPIUM BROMIDE AND ALBUTEROL SULFATE 3 ML: 2.5; .5 SOLUTION RESPIRATORY (INHALATION) at 01:12

## 2024-12-22 RX ADMIN — METRONIDAZOLE: 10 GEL TOPICAL at 01:12

## 2024-12-22 RX ADMIN — MICONAZOLE NITRATE: 20 CREAM TOPICAL at 10:12

## 2024-12-22 RX ADMIN — METHYLPREDNISOLONE SODIUM SUCCINATE 40 MG: 40 INJECTION, POWDER, FOR SOLUTION INTRAMUSCULAR; INTRAVENOUS at 01:12

## 2024-12-22 RX ADMIN — BUDESONIDE INHALATION 0.5 MG: 0.5 SUSPENSION RESPIRATORY (INHALATION) at 07:12

## 2024-12-22 RX ADMIN — METHYLPREDNISOLONE SODIUM SUCCINATE 40 MG: 40 INJECTION, POWDER, FOR SOLUTION INTRAMUSCULAR; INTRAVENOUS at 12:12

## 2024-12-22 RX ADMIN — MICONAZOLE NITRATE: 20 CREAM TOPICAL at 06:12

## 2024-12-22 NOTE — PLAN OF CARE
Problem: Adult Inpatient Plan of Care  Goal: Plan of Care Review  Outcome: Progressing  Goal: Patient-Specific Goal (Individualized)  Outcome: Progressing  Goal: Absence of Hospital-Acquired Illness or Injury  Outcome: Progressing  Goal: Optimal Comfort and Wellbeing  Outcome: Progressing  Goal: Readiness for Transition of Care  Outcome: Progressing     Problem: Diabetes Comorbidity  Goal: Blood Glucose Level Within Targeted Range  Outcome: Progressing     Problem: Pneumonia  Goal: Fluid Balance  Outcome: Progressing  Goal: Resolution of Infection Signs and Symptoms  Outcome: Progressing  Goal: Effective Oxygenation and Ventilation  Outcome: Progressing     Problem: Fall Injury Risk  Goal: Absence of Fall and Fall-Related Injury  Outcome: Progressing     Problem: Skin Injury Risk Increased  Goal: Skin Health and Integrity  Outcome: Progressing     Problem: Infection  Goal: Absence of Infection Signs and Symptoms  Outcome: Progressing

## 2024-12-22 NOTE — SUBJECTIVE & OBJECTIVE
Interval History:   89 year old man with CONS bacteremia .  No acute events noted.    12/17/24- He is tolerating meds    Cultures -   12/14/24-  Resp cultures-   Candida Tropicalis  Staph Aureus -rare  Blood culture- 12/10- staph Pettenkoferi  12/18/24-  Remains weak  Spouse in the room  12/21-  No acute events noted  Sputum culture- 12/14/24  MRSA  Review of Systems   Constitutional:  Negative for activity change, appetite change, chills, diaphoresis and fatigue.   Neurological:  Negative for dizziness, facial asymmetry, light-headedness and headaches.     Objective:     Vital Signs (Most Recent):  Temp: 97.9 °F (36.6 °C) (12/22/24 0355)  Pulse: 74 (12/22/24 0609)  Resp: 18 (12/22/24 0609)  BP: 133/77 (12/22/24 0355)  SpO2: (!) 94 % (12/22/24 0609) Vital Signs (24h Range):  Temp:  [97.2 °F (36.2 °C)-98 °F (36.7 °C)] 97.9 °F (36.6 °C)  Pulse:  [63-95] 74  Resp:  [16-20] 18  SpO2:  [90 %-99 %] 94 %  BP: (133-161)/(65-93) 133/77     Weight: 81.7 kg (180 lb 1.9 oz)  Body mass index is 28.21 kg/m².    Estimated Creatinine Clearance: 20.1 mL/min (A) (based on SCr of 2.3 mg/dL (H)).     Physical Exam  Vitals and nursing note reviewed.   HENT:      Head: Normocephalic.   Eyes:      Pupils: Pupils are equal, round, and reactive to light.   Cardiovascular:      Rate and Rhythm: Normal rate.   Pulmonary:      Effort: Pulmonary effort is normal.   Musculoskeletal:      Cervical back: Normal range of motion.   Neurological:      General: No focal deficit present.      Mental Status: He is alert.          Significant Labs: Blood Culture:   Recent Labs   Lab 12/10/24  1053 12/10/24  1056 12/12/24  0907 12/12/24  0912   LABBLOO Gram stain aer bottle: Gram positive cocci  Positive results previously called 12/12/2024  13:46  STAPHYLOCOCCUS PETTENKOFERI* Gram stain aer bottle: Gram positive cocci  Results called to and read back by: GEETA Holguin. 12/12/2024  05:15  STAPHYLOCOCCUS SONYAI* No growth after 5 days. No growth after  5 days.     CBC:   Recent Labs   Lab 12/21/24  0929 12/22/24  0415   WBC 7.49 7.06   HGB 13.3* 12.6*   HCT 41.4 38.3*    139*     CMP:   Recent Labs   Lab 12/21/24  0525 12/22/24  0414   * 134*   K 4.7 5.0   CL 95 96   CO2 28 25   * 306*   BUN 82* 83*   CREATININE 2.4* 2.3*   CALCIUM 8.5* 8.3*   ANIONGAP 12 13     All pertinent labs within the past 24 hours have been reviewed.    Significant Imaging: I have reviewed all pertinent imaging results/findings within the past 24 hours.

## 2024-12-22 NOTE — PROGRESS NOTES
[facility] ([facility])  Infectious Disease  Progress Note    Patient Name: Jimmy Young  MRN: [id number]  Admission Date: 12/10/2024  Length of Stay: 12 days  Attending Physician: Manuel Fermin MD  Primary Care Provider: Nacho Richardson MD    Isolation Status: Contact  Assessment/Plan:      Pulmonary  Pneumonia  --Initial CXR reports lateral left upper lobe opacity, most likely early pneumonia.   --CXR 12/14 reports small pleural effusions but no new consolidation   --On Vapotherm (home baseline is 2 L)  --Will broaden to PO linezolid 600 mg BID and continue ceftriaxone at 2 g daily   --Require drug toxicity monitoring   --See edema assessment for more details on antibiotics   --Follow respiratory culture to see if MDR organism isolated   --Fluid and COPD management per primary   --Agree with pulmonology consultation and CT chest   --Above d/w primary team.      12/16/24- chest x-ray - interval worsening with near complete whiteout of the left hemithorax which appears to be a combination of near complete collapse of the left lung and left pleural effusion.     Will continue Rocephin/Zyvox- follow pulmonology     12/17- resp culture- staph aureus/candida - follow final drug susceptibility of staph aureus -on Zyvox/Rocephin    1218- tolerating meds- on Zyvox/Rocehin- follow  final cultures    12/21- will complete 12 days of regime - on Rocephin/Zyvox- monitor cBC closely    Cardiac/Vascular  Essential hypertension  Continue current medications per primary      Endocrine  Acquired hypothyroidism  Continue current medications per primary          Anticipated Disposition:     Thank you for your consult. I will follow-up with patient. Please contact us if you have any additional questions.    Jose Salcedo MD, [facility] [place]  Infectious Disease  [facility] - Telemetry ([facility])    Subjective:     Principal Problem:Acute on chronic respiratory failure with hypoxia    HPI: This is a 89 yo M with hx of asthma, Afib, COPD, CKD,  DM and HTN who presented to ER for evaluation of bilateral leg swelling. Patient was advised to go to ER in setting of lower extremity edema, shortness of breath, and a cough. Patient is on chronic home O2 at 3L. On diuretics. No evidence of DVT on LE US. CXR reports lateral left upper lobe opacity, most likely early pneumonia. Started empirically on IV ceftriaxone and PO doxycycline. Blood cultures were collected 12/10 are reporting Staph species on BCID. No S aureus detected. Low suspicion for virulent infection. No pacemaker/ICD noted on review of CXR. Likely going to be a CONS. Patient has been afebrile. ID consulted for bacteremia.   Interval History:   89 year old man with CONS bacteremia .  No acute events noted.    12/17/24- He is tolerating meds    Cultures -   12/14/24-  Resp cultures-   Candida Tropicalis  Staph Aureus -rare  Blood culture- 12/10- staph Pettenkoferi  12/18/24-  Remains weak  Spouse in the room  12/21-  No acute events noted  Sputum culture- 12/14/24  MRSA  Review of Systems   Constitutional:  Negative for activity change, appetite change, chills, diaphoresis and fatigue.   Neurological:  Negative for dizziness, facial asymmetry, light-headedness and headaches.     Objective:     Vital Signs (Most Recent):  Temp: 97.9 °F (36.6 °C) (12/22/24 0355)  Pulse: 74 (12/22/24 0609)  Resp: 18 (12/22/24 0609)  BP: 133/77 (12/22/24 0355)  SpO2: (!) 94 % (12/22/24 0609) Vital Signs (24h Range):  Temp:  [97.2 °F (36.2 °C)-98 °F (36.7 °C)] 97.9 °F (36.6 °C)  Pulse:  [63-95] 74  Resp:  [16-20] 18  SpO2:  [90 %-99 %] 94 %  BP: (133-161)/(65-93) 133/77     Weight: 81.7 kg (180 lb 1.9 oz)  Body mass index is 28.21 kg/m².    Estimated Creatinine Clearance: 20.1 mL/min (A) (based on SCr of 2.3 mg/dL (H)).     Physical Exam  Vitals and nursing note reviewed.   HENT:      Head: Normocephalic.   Eyes:      Pupils: Pupils are equal, round, and reactive to light.   Cardiovascular:      Rate and Rhythm: Normal rate.    Pulmonary:      Effort: Pulmonary effort is normal.   Musculoskeletal:      Cervical back: Normal range of motion.   Neurological:      General: No focal deficit present.      Mental Status: He is alert.          Significant Labs: Blood Culture:   Recent Labs   Lab 12/10/24  1053 12/10/24  1056 12/12/24  0907 12/12/24  0912   LABBLOO Gram stain aer bottle: Gram positive cocci  Positive results previously called 12/12/2024  13:46  STAPHYLOCOCCUS PETTENKOFERI* Gram stain aer bottle: Gram positive cocci  Results called to and read back by: GEETA Holguin. 12/12/2024  05:15  STAPHYLOCOCCUS PETTENKOFERI* No growth after 5 days. No growth after 5 days.     CBC:   Recent Labs   Lab 12/21/24  0929 12/22/24  0415   WBC 7.49 7.06   HGB 13.3* 12.6*   HCT 41.4 38.3*    139*     CMP:   Recent Labs   Lab 12/21/24  0525 12/22/24  0414   * 134*   K 4.7 5.0   CL 95 96   CO2 28 25   * 306*   BUN 82* 83*   CREATININE 2.4* 2.3*   CALCIUM 8.5* 8.3*   ANIONGAP 12 13     All pertinent labs within the past 24 hours have been reviewed.    Significant Imaging: I have reviewed all pertinent imaging results/findings within the past 24 hours.

## 2024-12-22 NOTE — SUBJECTIVE & OBJECTIVE
Interval History:  No acute issues reported overnight    Review of Systems   Constitutional:  Negative for fatigue and fever.   HENT:  Negative for sinus pressure.    Eyes:  Negative for visual disturbance.   Respiratory:  Positive for cough and shortness of breath.    Cardiovascular:  Positive for leg swelling. Negative for chest pain.   Gastrointestinal:  Negative for nausea and vomiting.   Genitourinary:  Negative for difficulty urinating.   Musculoskeletal:  Negative for back pain.   Skin:  Negative for rash.   Neurological:  Negative for headaches.   Psychiatric/Behavioral:  Negative for confusion.      Objective:     Vital Signs (Most Recent):  Temp: 97.9 °F (36.6 °C) (12/22/24 0355)  Pulse: 82 (12/22/24 0742)  Resp: 20 (12/22/24 0742)  BP: 135/75 (12/22/24 0739)  SpO2: (!) 94 % (12/22/24 0742) Vital Signs (24h Range):  Temp:  [97.2 °F (36.2 °C)-98 °F (36.7 °C)] 97.9 °F (36.6 °C)  Pulse:  [63-95] 82  Resp:  [16-20] 20  SpO2:  [92 %-99 %] 94 %  BP: (133-161)/(66-93) 135/75     Weight: 81.7 kg (180 lb 1.9 oz)  Body mass index is 28.21 kg/m².  No intake or output data in the 24 hours ending 12/22/24 0943      Physical Exam  Vitals reviewed.   Constitutional:       General: He is not in acute distress.     Appearance: He is well-developed. He is ill-appearing. He is not toxic-appearing or diaphoretic.      Interventions: Nasal cannula in place.   HENT:      Head: Normocephalic and atraumatic.      Right Ear: Decreased hearing noted.      Left Ear: Decreased hearing noted.   Eyes:      Pupils: Pupils are equal, round, and reactive to light.   Cardiovascular:      Rate and Rhythm: Normal rate and regular rhythm.      Heart sounds: Normal heart sounds. No murmur heard.     No friction rub. No gallop.   Pulmonary:      Effort: Pulmonary effort is normal. No respiratory distress.      Breath sounds: Normal breath sounds. No stridor. No wheezing or rales.   Abdominal:      General: Bowel sounds are normal. There is no  distension.      Palpations: Abdomen is soft. There is no mass.      Tenderness: There is no abdominal tenderness. There is no guarding.   Musculoskeletal:      Right lower leg: Edema present.      Left lower leg: Edema present.   Skin:     General: Skin is warm.      Findings: Erythema and lesion present.   Neurological:      Mental Status: He is alert and oriented to person, place, and time.      Motor: Weakness present.   Psychiatric:         Mood and Affect: Mood normal.         Behavior: Behavior normal.             Significant Labs: All pertinent labs within the past 24 hours have been reviewed.    Significant Imaging: I have reviewed all pertinent imaging results/findings within the past 24 hours.

## 2024-12-22 NOTE — ASSESSMENT & PLAN NOTE
--Initial CXR reports lateral left upper lobe opacity, most likely early pneumonia.   --CXR 12/14 reports small pleural effusions but no new consolidation   --On Vapotherm (home baseline is 2 L)  --Will broaden to PO linezolid 600 mg BID and continue ceftriaxone at 2 g daily   --Require drug toxicity monitoring   --See edema assessment for more details on antibiotics   --Follow respiratory culture to see if MDR organism isolated   --Fluid and COPD management per primary   --Agree with pulmonology consultation and CT chest   --Above d/w primary team.      12/16/24- chest x-ray - interval worsening with near complete whiteout of the left hemithorax which appears to be a combination of near complete collapse of the left lung and left pleural effusion.     Will continue Rocephin/Zyvox- follow pulmonology     12/17- resp culture- staph aureus/candida - follow final drug susceptibility of staph aureus -on Zyvox/Rocephin    1218- tolerating meds- on Zyvox/Rocehin- follow  final cultures    12/21- will complete 12 days of regime - on Rocephin/Zyvox- monitor cBC closely

## 2024-12-22 NOTE — PROGRESS NOTES
[facility] ([facility])  [facility] Medicine  Progress Note    Patient Name: Jimmy Young  MRN: [id number]  Patient Class: IP- Inpatient   Admission Date: 12/10/2024  Length of Stay: 12 days  Attending Physician: Manuel Fermin MD  Primary Care Provider: Nacho Richardson MD        Subjective     Principal Problem:Acute on chronic respiratory failure with hypoxia        HPI:  Jimmy Young is a 89 y.o. male with a PMH  has a past medical history of Aneurysm (2016), Arthritis, Asthma, Atrial fibrillation, Cancer, CKD (chronic kidney disease), COPD (chronic obstructive pulmonary disease) (10/05/2017), Diabetes mellitus, Emphysema lung, Encounter for blood transfusion, and Hypertension.presented to the ED for swelling of the legs. Patient was referred from his primary care physician today for worsening lower extremity edema, shortness of breath, and cough. Patient is on chronic home O2 at 3L/min via NC. Reports compliance with his lasix and denies any excessive fluid or salt intake. Denies any other complaints at his time.      ER workup revealed CBC to be unremarkable.  CMP with BUN/creatinine at baseline of 29/2.2.   mg/dL.  Magnesium 1.2.  BNP, troponin, lactic acid within normal limits.  Flu/COVID negative.  Blood cultures obtained x2.  Ultrasound lower extremities negative for DVT.  Chest x-ray revealed left upper lobe opacity resembling early pneumonia.  EKG revealed a flutter with variable AV block and left axis deviation with a ventricular rate of 64 beats per minute with a QT/QTC of 416/429.  Vital signs stable.  Patient is at baseline oxygen saturation of 92-94% on 3 liters/minute via nasal cannula.  Patient received 12.5 mg carvedilol, 1 g Rocephin IV, 100 mg doxycycline p.o., 30 mg Lovenox, 40 mg Lasix IV, and 10 mg hydralazine p.o. at outside facility.  Hospital Medicine consulted to admit patient for CHF exacerbation and pneumonia. Patient and family in agreement with treatment plan.  Patient admitted under inpatient status.    PCP: Nacho Richardson      Overview/Hospital Course:  12/11 admitted for pneumonia. Wears supplemental oxygen at baseline, 2L. On 4L. Speech consulted. Schedule breathing treatments. Discontinue lasix. Replete lytes. Echocardiogram pending. Cardiology consulted. Relaxing normotensive range in this patient demographic.  12/12 blood culture positive for staph. Infectious disease consulted. Continue monitoring repeat blood cultures. Recent medication(s) change with linagliptin with risk for congestive heart failure and skin reaction. Discussed risk vs benefit. Hba1c 6.9. recommend goal hba1c 8-9.   12/13 bun/creatinine increasing. Discontinued intravenous lasix. Remains on increased supplemental oxygen, 5L. Wean as able. Home hydralazine resumed for elevated blood pressure. Repeat blood cultures negative growth to date x 1 day. Infectious disease following  12/14/2024  Patient required increasing to 40 L 100% FiO2 Vapotherm.  Pulmonology consult on case.  Solu-Medrol added.  Continue empiric antibiotics.  Repeat chest x-ray.  12/15/2024  Patient weaned down to 20 L 80% Vapotherm.  Will continue current management.  12/16/2024  Still on 20L 80% vapotherm. Unable to be weaned at this time. Cont abx. Pulm and cardiology on case.   12/17/2024  Chest x-ray improved today compared to yesterday.  Still on 20 L 90% Vapotherm.  Continue antibiotics.  12/18/2024  Currently on 18 L 100% Vapotherm.  Continue antibiotics.  Wean O2 as tolerated.  Discussed with family at bedside the prognosis is guarded.  Patient will likely need placement should he improve.  Discussed SNF versus inpatient hospice.  Will see how patient clinically progresses over the next few days.  12/19/2024  Currently on 25 L 100% Vapotherm.  Continue antibiotics.  Chest x-ray reviewed which is improving however patient clinically declining.  He is requiring increased O2.  Snf versus inpatient hospice.    12/20/2024  Patient agitated today.  Haldol p.r.n. ordered.  Continue antibiotics.  Continue steroids.  Patient is still requiring high-flow Vapotherm.  Discussed case with family at bedside.  Will continue max therapy over the weekend.  Should patient fail to improve family is agreeable to inpatient hospice.  12/21/2024  Agitation improved.  Continue Haldol p.r.n..  Continue antibiotics and steroids.  Patient weaned down to 18 L 100% FiO2.  Continue current therapy.  Pending clinical progress.  Possible inpatient hospice on Monday if patient failed to improve.  12/22/2024  Currently on 18 L 70% FiO2.  Continue antibiotic therapy.  Wean O2 as tolerated.    Interval History:  No acute issues reported overnight    Review of Systems   Constitutional:  Negative for fatigue and fever.   HENT:  Negative for sinus pressure.    Eyes:  Negative for visual disturbance.   Respiratory:  Positive for cough and shortness of breath.    Cardiovascular:  Positive for leg swelling. Negative for chest pain.   Gastrointestinal:  Negative for nausea and vomiting.   Genitourinary:  Negative for difficulty urinating.   Musculoskeletal:  Negative for back pain.   Skin:  Negative for rash.   Neurological:  Negative for headaches.   Psychiatric/Behavioral:  Negative for confusion.      Objective:     Vital Signs (Most Recent):  Temp: 97.9 °F (36.6 °C) (12/22/24 0355)  Pulse: 82 (12/22/24 0742)  Resp: 20 (12/22/24 0742)  BP: 135/75 (12/22/24 0739)  SpO2: (!) 94 % (12/22/24 0742) Vital Signs (24h Range):  Temp:  [97.2 °F (36.2 °C)-98 °F (36.7 °C)] 97.9 °F (36.6 °C)  Pulse:  [63-95] 82  Resp:  [16-20] 20  SpO2:  [92 %-99 %] 94 %  BP: (133-161)/(66-93) 135/75     Weight: 81.7 kg (180 lb 1.9 oz)  Body mass index is 28.21 kg/m².  No intake or output data in the 24 hours ending 12/22/24 0943      Physical Exam  Vitals reviewed.   Constitutional:       General: He is not in acute distress.     Appearance: He is well-developed. He is ill-appearing.  He is not toxic-appearing or diaphoretic.      Interventions: Nasal cannula in place.   HENT:      Head: Normocephalic and atraumatic.      Right Ear: Decreased hearing noted.      Left Ear: Decreased hearing noted.   Eyes:      Pupils: Pupils are equal, round, and reactive to light.   Cardiovascular:      Rate and Rhythm: Normal rate and regular rhythm.      Heart sounds: Normal heart sounds. No murmur heard.     No friction rub. No gallop.   Pulmonary:      Effort: Pulmonary effort is normal. No respiratory distress.      Breath sounds: Normal breath sounds. No stridor. No wheezing or rales.   Abdominal:      General: Bowel sounds are normal. There is no distension.      Palpations: Abdomen is soft. There is no mass.      Tenderness: There is no abdominal tenderness. There is no guarding.   Musculoskeletal:      Right lower leg: Edema present.      Left lower leg: Edema present.   Skin:     General: Skin is warm.      Findings: Erythema and lesion present.   Neurological:      Mental Status: He is alert and oriented to person, place, and time.      Motor: Weakness present.   Psychiatric:         Mood and Affect: Mood normal.         Behavior: Behavior normal.             Significant Labs: All pertinent labs within the past 24 hours have been reviewed.    Significant Imaging: I have reviewed all pertinent imaging results/findings within the past 24 hours.    Assessment and Plan     * Acute on chronic respiratory failure with hypoxia  12/22/2024  Patient down to 18 L 70% Vapotherm   Continue Solu-Medrol   Continue antibiotics   Pulmonology on case   Patient desats with minimal movement  Discussed case with family at bedside   Will continue aggressive therapy over the weekend   Should patient fail to improve   Plan for inpatient hospice on Monday   Family agreeable to plan        Delirium  Likely metabolic secondary to illness   Will order Haldol p.rnova      Lower extremity edema  Improving  Multifactorial:  "malnutrition, fluid indiscretion, venous stasis, recent medication(s) change, dependent edema  Counseling provided  Elevate legs   Manish hose and topicals    Atrial flutter  Controlled  Follow up outpatient primary cardiologist  Holding a/c due to pmh GI bleed       Pneumonia  Continue Zyvox and Rocephin  Mrsa in sputum     Chronic diastolic heart failure  Patient has  unspecified  heart failure that is Chronic. On presentation their CHF was . Most recent BNP and echo results are listed below.well compensated  No results for input(s): "BNP" in the last 72 hours.    Latest ECHO  Results for orders placed during the hospital encounter of 01/08/21    Echo Color Flow Doppler? Yes    Interpretation Summary  · The left ventricle is normal in size with moderate concentric hypertrophy and normal systolic function. The estimated ejection fraction is 60%  · Indeterminate left ventricular diastolic function.  · Normal right ventricular size with normal right ventricular systolic function.  · Mild tricuspid regurgitation.  · Normal central venous pressure (3 mmHg).  · The estimated PA systolic pressure is 32 mmHg.    Current Heart Failure Medications  , Every 12 hours, Oral  , 2 times daily with meals, Oral  hydrALAZINE injection 10 mg, Every 6 hours PRN, Intravenous  hydrALAZINE tablet 10 mg, Every 12 hours, Oral    Plan  - Monitor strict I&Os and daily weights.    - Place on telemetry  - Low sodium diet  - Place on fluid restriction of 1.5 L.   - Cardiology has been consulted  - The patient's volume status is at their baseline  Ambulatory oxygen evaluation   Nutrition consult   Discontinue intravenous lasix    12/21/2024  Will hold Bumex for now      Coronary artery disease of native artery of native heart with stable angina pectoris  Patient with known CAD which is controlled Will continue Statin and monitor for S/Sx of angina/ACS. Continue to monitor on telemetry.     CKD stage 3 secondary to diabetes  Creatine stable for " now. BMP reviewed- noted Estimated Creatinine Clearance: 19.2 mL/min (A) (based on SCr of 2.2 mg/dL (H)). according to latest data. Based on current GFR, CKD stage is stage 3 - GFR 30-59.  Monitor UOP and serial BMP and adjust therapy as needed. Renally dose meds. Avoid nephrotoxic medications and procedures.  Discontinue intravenous lasix  Monitor trend    Hypercholesterolemia  Patient is chronically on statin.will continue for now. Last Lipid Panel:   Lab Results   Component Value Date    CHOL 121 07/03/2023    HDL 37 (L) 07/03/2023    LDLCALC 57 07/03/2023    TRIG 160 (H) 07/03/2023    CHOLHDL 27.8 08/01/2019     Plan:  -Continue home medication  -low fat/low calorie diet        Atrial fibrillation  Patient with Paroxysmal (<7 days) atrial fibrillation which is controlled currently with Bbs and Calcium Channel Blocker. Patient is currently in sinus rhythm.[id number] Score: 4.   Discussed cardiology vs gastroenterology recommendations   Patient voices understanding  Will continue holding ac    Acquired hypothyroidism  Patient has chronic hypothyroidism. TFTs reviewed-   Lab Results   Component Value Date    TSH 1.780 07/03/2023   . Will continue chronic levothyroxine and adjust for and clinical changes.        Essential hypertension  Chronic, controlled.  Latest blood pressure and vitals reviewed-   Temp:  [97.4 °F (36.3 °C)-98.2 °F (36.8 °C)]   Pulse:  [63-89]   Resp:  [15-21]   BP: (151-203)/(63-89)   SpO2:  [87 %-96 %] .   Home meds for hypertension were reviewed and noted below.   Hypertension Medications               amLODIPine (NORVASC) 5 MG tablet Take 1 tablet (5 mg total) by mouth once daily.    carvediloL (COREG) 12.5 MG tablet Take 12.5 mg by mouth 2 (two) times daily with meals.    diltiaZEM (CARDIZEM CD) 300 MG 24 hr capsule Take 1 capsule by mouth every morning.    furosemide (LASIX) 20 MG tablet Take 1 tablet (20 mg total) by mouth once daily.    hydrALAZINE (APRESOLINE) 10 MG tablet Take 10 mg by  mouth every 12 (twelve) hours.            While in the hospital, will manage blood pressure as follows; Adjust home antihypertensive regimen as follows- elevated blood pressure, resume home hydralazine    Will utilize p.r.n. blood pressure medication only if patient's blood pressure greater than  180/110 and he develops symptoms such as worsening chest pain or shortness of breath.        VTE Risk Mitigation (From admission, onward)           Ordered     Place SALUD hose  Until discontinued         12/12/24 0921     Place SALUD hose  Until discontinued         12/11/24 0949     enoxaparin injection 30 mg  Daily         12/10/24 1922     IP VTE HIGH RISK PATIENT  Once         12/10/24 1922     Place sequential compression device  Until discontinued         12/10/24 1922                    Discharge Planning   SHEA:      Code Status: DNR   Medical Readiness for Discharge Date:   Discharge Plan A: Home, Home Health                Manuel Fermin MD  Department of Hospital Medicine   [facility] ([facility])

## 2024-12-22 NOTE — ASSESSMENT & PLAN NOTE
12/22/2024  Patient down to 18 L 70% Vapotherm   Continue Solu-Medrol   Continue antibiotics   Pulmonology on case   Patient desats with minimal movement  Discussed case with family at bedside   Will continue aggressive therapy over the weekend   Should patient fail to improve   Plan for inpatient hospice on Monday   Family agreeable to plan

## 2024-12-23 LAB
ANION GAP SERPL CALC-SCNC: 12 MMOL/L (ref 8–16)
BASOPHILS # BLD AUTO: 0.01 K/UL (ref 0–0.2)
BASOPHILS NFR BLD: 0.1 % (ref 0–1.9)
BUN SERPL-MCNC: 83 MG/DL (ref 10–30)
CALCIUM SERPL-MCNC: 8.4 MG/DL (ref 8.7–10.5)
CHLORIDE SERPL-SCNC: 95 MMOL/L (ref 95–110)
CO2 SERPL-SCNC: 26 MMOL/L (ref 23–29)
CREAT SERPL-MCNC: 2.3 MG/DL (ref 0.5–1.4)
DIFFERENTIAL METHOD BLD: ABNORMAL
EOSINOPHIL # BLD AUTO: 0 K/UL (ref 0–0.5)
EOSINOPHIL NFR BLD: 0 % (ref 0–8)
ERYTHROCYTE [DISTWIDTH] IN BLOOD BY AUTOMATED COUNT: 12.6 % (ref 11.5–14.5)
EST. GFR  (NO RACE VARIABLE): 26 ML/MIN/1.73 M^2
GLUCOSE SERPL-MCNC: 283 MG/DL (ref 70–110)
HCT VFR BLD AUTO: 37.7 % (ref 40–54)
HGB BLD-MCNC: 12.7 G/DL (ref 14–18)
IMM GRANULOCYTES # BLD AUTO: 0.2 K/UL (ref 0–0.04)
IMM GRANULOCYTES NFR BLD AUTO: 2.3 % (ref 0–0.5)
LYMPHOCYTES # BLD AUTO: 0.5 K/UL (ref 1–4.8)
LYMPHOCYTES NFR BLD: 5.3 % (ref 18–48)
MCH RBC QN AUTO: 30.5 PG (ref 27–31)
MCHC RBC AUTO-ENTMCNC: 33.7 G/DL (ref 32–36)
MCV RBC AUTO: 91 FL (ref 82–98)
MONOCYTES # BLD AUTO: 0.2 K/UL (ref 0.3–1)
MONOCYTES NFR BLD: 1.7 % (ref 4–15)
NEUTROPHILS # BLD AUTO: 8 K/UL (ref 1.8–7.7)
NEUTROPHILS NFR BLD: 90.6 % (ref 38–73)
NRBC BLD-RTO: 0 /100 WBC
OHS QRS DURATION: 74 MS
OHS QTC CALCULATION: 403 MS
PLATELET # BLD AUTO: 116 K/UL (ref 150–450)
PMV BLD AUTO: 11.2 FL (ref 9.2–12.9)
POTASSIUM SERPL-SCNC: 5 MMOL/L (ref 3.5–5.1)
RBC # BLD AUTO: 4.16 M/UL (ref 4.6–6.2)
SODIUM SERPL-SCNC: 133 MMOL/L (ref 136–145)
WBC # BLD AUTO: 8.85 K/UL (ref 3.9–12.7)

## 2024-12-23 PROCEDURE — 93010 ELECTROCARDIOGRAM REPORT: CPT | Mod: ,,, | Performed by: INTERNAL MEDICINE

## 2024-12-23 PROCEDURE — 80048 BASIC METABOLIC PNL TOTAL CA: CPT | Performed by: NURSE PRACTITIONER

## 2024-12-23 PROCEDURE — 21400001 HC TELEMETRY ROOM

## 2024-12-23 PROCEDURE — 99900035 HC TECH TIME PER 15 MIN (STAT)

## 2024-12-23 PROCEDURE — 97535 SELF CARE MNGMENT TRAINING: CPT

## 2024-12-23 PROCEDURE — 27000646 HC AEROBIKA DEVICE

## 2024-12-23 PROCEDURE — 25000242 PHARM REV CODE 250 ALT 637 W/ HCPCS: Performed by: INTERNAL MEDICINE

## 2024-12-23 PROCEDURE — 36415 COLL VENOUS BLD VENIPUNCTURE: CPT | Performed by: NURSE PRACTITIONER

## 2024-12-23 PROCEDURE — 27100171 HC OXYGEN HIGH FLOW UP TO 24 HOURS

## 2024-12-23 PROCEDURE — 25000003 PHARM REV CODE 250: Performed by: FAMILY MEDICINE

## 2024-12-23 PROCEDURE — 94664 DEMO&/EVAL PT USE INHALER: CPT

## 2024-12-23 PROCEDURE — 85025 COMPLETE CBC W/AUTO DIFF WBC: CPT | Performed by: FAMILY MEDICINE

## 2024-12-23 PROCEDURE — 63600175 PHARM REV CODE 636 W HCPCS: Performed by: NURSE PRACTITIONER

## 2024-12-23 PROCEDURE — 94640 AIRWAY INHALATION TREATMENT: CPT

## 2024-12-23 PROCEDURE — 97110 THERAPEUTIC EXERCISES: CPT

## 2024-12-23 PROCEDURE — 94799 UNLISTED PULMONARY SVC/PX: CPT

## 2024-12-23 PROCEDURE — 94799 UNLISTED PULMONARY SVC/PX: CPT | Mod: XB

## 2024-12-23 PROCEDURE — 93005 ELECTROCARDIOGRAM TRACING: CPT

## 2024-12-23 PROCEDURE — 27000207 HC ISOLATION

## 2024-12-23 PROCEDURE — 97530 THERAPEUTIC ACTIVITIES: CPT

## 2024-12-23 PROCEDURE — 94761 N-INVAS EAR/PLS OXIMETRY MLT: CPT

## 2024-12-23 PROCEDURE — 25000003 PHARM REV CODE 250: Performed by: NURSE PRACTITIONER

## 2024-12-23 PROCEDURE — 27000249 HC VAPOTHERM CIRCUIT

## 2024-12-23 PROCEDURE — 63600175 PHARM REV CODE 636 W HCPCS: Performed by: FAMILY MEDICINE

## 2024-12-23 PROCEDURE — 25000242 PHARM REV CODE 250 ALT 637 W/ HCPCS: Performed by: FAMILY MEDICINE

## 2024-12-23 RX ADMIN — CEFTRIAXONE 2 G: 2 INJECTION, POWDER, FOR SOLUTION INTRAMUSCULAR; INTRAVENOUS at 09:12

## 2024-12-23 RX ADMIN — METHYLPREDNISOLONE SODIUM SUCCINATE 40 MG: 40 INJECTION, POWDER, FOR SOLUTION INTRAMUSCULAR; INTRAVENOUS at 02:12

## 2024-12-23 RX ADMIN — IPRATROPIUM BROMIDE AND ALBUTEROL SULFATE 3 ML: 2.5; .5 SOLUTION RESPIRATORY (INHALATION) at 08:12

## 2024-12-23 RX ADMIN — METRONIDAZOLE: 10 GEL TOPICAL at 08:12

## 2024-12-23 RX ADMIN — MAGNESIUM OXIDE TAB 400 MG (241.3 MG ELEMENTAL MG) 400 MG: 400 (241.3 MG) TAB at 09:12

## 2024-12-23 RX ADMIN — ENOXAPARIN SODIUM 30 MG: 30 INJECTION SUBCUTANEOUS at 04:12

## 2024-12-23 RX ADMIN — METRONIDAZOLE: 10 GEL TOPICAL at 12:12

## 2024-12-23 RX ADMIN — ARFORMOTEROL TARTRATE 15 MCG: 15 SOLUTION RESPIRATORY (INHALATION) at 08:12

## 2024-12-23 RX ADMIN — SODIUM CHLORIDE 30 MG/ML INHALATION SOLUTION 4 ML: 30 SOLUTION INHALANT at 08:12

## 2024-12-23 RX ADMIN — IPRATROPIUM BROMIDE AND ALBUTEROL SULFATE 3 ML: 2.5; .5 SOLUTION RESPIRATORY (INHALATION) at 02:12

## 2024-12-23 RX ADMIN — BUDESONIDE INHALATION 0.5 MG: 0.5 SUSPENSION RESPIRATORY (INHALATION) at 08:12

## 2024-12-23 RX ADMIN — DILTIAZEM HYDROCHLORIDE 300 MG: 180 CAPSULE, COATED, EXTENDED RELEASE ORAL at 09:12

## 2024-12-23 RX ADMIN — MICONAZOLE NITRATE: 20 CREAM TOPICAL at 10:12

## 2024-12-23 RX ADMIN — HYDRALAZINE HYDROCHLORIDE 10 MG: 10 TABLET, FILM COATED ORAL at 09:12

## 2024-12-23 RX ADMIN — LINEZOLID 600 MG: 600 INJECTION, SOLUTION INTRAVENOUS at 12:12

## 2024-12-23 RX ADMIN — MAGNESIUM OXIDE TAB 400 MG (241.3 MG ELEMENTAL MG) 400 MG: 400 (241.3 MG) TAB at 08:12

## 2024-12-23 RX ADMIN — LINEZOLID 600 MG: 600 INJECTION, SOLUTION INTRAVENOUS at 11:12

## 2024-12-23 RX ADMIN — HYDRALAZINE HYDROCHLORIDE 10 MG: 10 TABLET, FILM COATED ORAL at 08:12

## 2024-12-23 RX ADMIN — PANTOPRAZOLE SODIUM 40 MG: 40 TABLET, DELAYED RELEASE ORAL at 04:12

## 2024-12-23 RX ADMIN — PRAVASTATIN SODIUM 40 MG: 20 TABLET ORAL at 09:12

## 2024-12-23 RX ADMIN — MICONAZOLE NITRATE: 20 CREAM TOPICAL at 05:12

## 2024-12-23 RX ADMIN — LEVOTHYROXINE SODIUM 50 MCG: 50 TABLET ORAL at 05:12

## 2024-12-23 NOTE — PT/OT/SLP PROGRESS
"Physical Therapy  Treatment    Jimmy Young   MRN: [id number]   Admitting Diagnosis: Acute on chronic respiratory failure with hypoxia    PT Received On: 12/23/24  PT Start Time: 0905     PT Stop Time: 0930    PT Total Time (min): 25 min       Billable Minutes:  Therapeutic Activity 15 and Therapeutic Exercise 10    Treatment Type: Treatment  PT/PTA: PT     Number of PTA visits since last PT visit: 0       General Precautions: Standard, fall, respiratory  Orthopedic Precautions: N/A  Braces: N/A  Respiratory Status:  vapotherm    Spiritual, Cultural Beliefs, Episcopal Practices, Values that Affect Care: no    Subjective:  Communicated with NURSE AND EPIC CHART REVIEW prior to session.   PT AGREED TO TX    Pain/Comfort  Pain Rating 1: 0/10  Pain Rating Post-Intervention 1: 0/10    Objective:   Patient found with: peripheral IV, telemetry, pulse ox (continuous), oxygen    Functional Mobility:  PT MET IN RM ASLEEP. HOWEVER WOKE AND AGREED TO TX. PT WITHOUT COMPLAINTS. PT LOG ROLLED TO RIGHT TO SIT EOB WITH MIN A. PT SCOOTED TO EOB WITH CGA. GT. BELT AND  SOCKS DONNED PRIOR TO OOB MOBILITY. PT STOOD WITH RW AND MAX A WITH POST LEAN. PT T/F TO CHAIR WITH MAX A. PT SEATED IN CHAIR FOR REST AND EDUCATED ON PURSED LIP BREATHING. PT COMPLETED AP, TKE AND MIP FOR LE STRENGTHENING AND ROM. PT O2 SATS RANGED FROM 87-95% WITH THERAPY. P.T. DONNED HOT PACK TO CERVICAL SPINE FOR COMFORT. PT LEFT SEATED IN CHAIR WITH ALL NEEDS MET AND SET UP FOR BREAKFAST.     Treatment and Education:  PT EDUCATED ON "CALL DON'T FALL", ENCOURAGED TO CALL FOR ASSISTANCE WITH ALL NEEDS FOR OOB MOBILITY.       AM-PAC 6 CLICK MOBILITY  How much help from another person does this patient currently need?   1 = Unable, Total/Dependent Assistance  2 = A lot, Maximum/Moderate Assistance  3 = A little, Minimum/Contact Guard/Supervision  4 = None, Modified [place]/Independent    Turning over in bed (including adjusting bedclothes, sheets and " blankets)?: 3  Sitting down on and standing up from a chair with arms (e.g., wheelchair, bedside commode, etc.): 2  Moving from lying on back to sitting on the side of the bed?: 3  Moving to and from a bed to a chair (including a wheelchair)?: 2  Need to walk in hospital room?: 1  Climbing 3-5 steps with a railing?: 1  Basic Mobility Total Score: 12    AM-PAC Raw Score CMS G-Code Modifier Level of Impairment Assistance   6 % Total / Unable   7 - 9 CM 80 - 100% Maximal Assist   10 - 14 CL 60 - 80% Moderate Assist   15 - 19 CK 40 - 60% Moderate Assist   20 - 22 CJ 20 - 40% Minimal Assist   23 CI 1-20% SBA / CGA   24 CH 0% Independent/ Mod I     Patient left up in chair with all lines intact, call button in reach, and chair alarm on.    Assessment:  PT ASHLEY OOB TO CHAIR WELL.     Rehab identified problem list/impairments: weakness, impaired endurance, impaired self care skills, impaired functional mobility, gait instability, impaired balance, decreased ROM, decreased safety awareness, impaired cardiopulmonary response to activity, decreased lower extremity function, decreased upper extremity function, decreased coordination    Rehab potential is fair.    Activity tolerance: Fair    Discharge recommendations: Low Intensity Therapy (24 hour caregivers)      Barriers to discharge:      Equipment recommendations: hospital bed     GOALS:   Multidisciplinary Problems       Physical Therapy Goals          Problem: Physical Therapy    Goal Priority Disciplines Outcome Interventions   Physical Therapy Goal     PT, PT/OT Progressing    Description: LT24  1. PT WILL COMPLETE BED MOBILITY IND  2. PT WILL GT TRAIN X 500' WITH RW MOD I  3. PT WILL COMPLETE STANDING TE X 20 REPS TO INC STRENGTH / BALANCE  4. PT WILL INC AMPAC SCORE BY 2 POINTS TO PROGRESS GROSS FUNC MOBILITY.                          PLAN:    Patient to be seen 3 x/week to address the above listed problems via gait training, therapeutic activities,  therapeutic exercises  Plan of Care expires: 12/25/24  Plan of Care reviewed with: patient, son         12/23/2024

## 2024-12-23 NOTE — PLAN OF CARE
Discussed case with primary. Recommend completing total 14 day course of antibiotics for pneumonia coverage, including days received inpatient since initiation of linezolid on Dec 14th. Can use PO cefdinir and linezolid at discharge. Stop date will be Dec 28th. Will sign off at this time. Please re-consult if new concerns arise.

## 2024-12-23 NOTE — PLAN OF CARE
Problem: Adult Inpatient Plan of Care  Goal: Plan of Care Review  Outcome: Progressing     Problem: Adult Inpatient Plan of Care  Goal: Absence of Hospital-Acquired Illness or Injury  Outcome: Progressing     Problem: Diabetes Comorbidity  Goal: Blood Glucose Level Within Targeted Range  Outcome: Progressing     Problem: Pneumonia  Goal: Fluid Balance  Outcome: Progressing     Problem: Fall Injury Risk  Goal: Absence of Fall and Fall-Related Injury  Outcome: Progressing     Problem: Skin Injury Risk Increased  Goal: Skin Health and Integrity  Outcome: Progressing     Problem: Infection  Goal: Absence of Infection Signs and Symptoms  Outcome: Progressing     POC reviewed with pt and family Pt verbalizes understanding of POC. No questions at this time.  AAOx2   A flutter on Cardiac Monitor  Pt remains free of falls.  No complaints at this time.  Safety measures in place. Will continue to monitor.  Informed pt to call for assistance before getting up. Pt verbalizes understanding.  Hourly rounding and chart check complete.

## 2024-12-23 NOTE — PROGRESS NOTES
[facility] - Telemetry ([facility])  Wound Care    Patient Name:  Jimmy Young   MRN:  [id number]  Date: 2024  Diagnosis: Acute on chronic respiratory failure with hypoxia    History:     Past Medical History:   Diagnosis Date    Aneurysm 2016    abdominal, stent placed    Arthritis     Asthma     Atrial fibrillation     Cancer     skin cancer on face    CKD (chronic kidney disease)     COPD (chronic obstructive pulmonary disease) 10/05/2017    Diabetes mellitus     Emphysema lung     Encounter for blood transfusion     Hypertension        Social History     Socioeconomic History    Marital status: Single   Tobacco Use    Smoking status: Former     Current packs/day: 0.00     Average packs/day: 2.0 packs/day for 20.0 years (40.0 ttl pk-yrs)     Types: Cigarettes     Start date: 1957     Quit date: 1977     Years since quittin.0    Smokeless tobacco: Former   Substance and Sexual Activity    Alcohol use: No    Drug use: No    Sexual activity: Not Currently     Social Drivers of Health     Financial Resource Strain: Patient Declined (2024)    Overall Financial Resource Strain (CARDIA)     Difficulty of Paying Living Expenses: Patient declined   Food Insecurity: Patient Declined (2024)    Hunger Vital Sign     Worried About Running Out of Food in the Last Year: Patient declined     Ran Out of Food in the Last Year: Patient declined   Transportation Needs: Patient Declined (2024)    TRANSPORTATION NEEDS     Transportation : Patient declined   Physical Activity: Inactive (7/3/2023)    Received from [facility]    Exercise Vital Sign     Days of Exercise per Week: 0 days     Minutes of Exercise per Session: 0 min   Stress: Patient Declined (2024)    [employer] - Occupational Stress Questionnaire     Feeling of Stress : Patient declined   Housing Stability: Patient Declined  (12/11/2024)    Housing Stability Vital Sign     Unable to Pay for Housing in the Last Year: Patient declined     Homeless in the Last Year: Patient declined       Precautions:     Allergies as of 12/10/2024    (No Known Allergies)       [facility] Assessment Details/Treatment     F/U with Mr. Young for DTI vs bruise to Right heel.   Patient now on contact precautions, proper PPE donned prior to entering.   Patient sitting up in the chair, therapy present in the room.   Removed sock and bordered heel foam from Right heel. Again noted intact purple nonblanchable discoloration to Right heel. Appears smaller in size this visit. Still unable to determine if area is a bruise or DTI. Will continue to follow to monitor progression.   Painted area with cavilon and reapplied bordered heel foam.   Recommend continued pressure injury preventions such as heel offloading and repositioning every 2 hours whether in the chair or bed.        12/23/24 0905   WOCN Assessment   WOCN Total Time (mins) 45   Visit Date 12/23/24   Visit Time 0905   Consult Type Follow Up   WOCN Speciality Wound   Wound At risk for pressure Injury   Intervention assessed;applied;chart review;orders   Teaching on-going         12/23/2024

## 2024-12-23 NOTE — PLAN OF CARE
Pt completed Therex with PT on arrival. Pt observed eating (I). Pt completed ADL of face wash and cleaning hands with Setup Assist. Pt's son stated that his Dad did not sleep well and was trying to get up by himself.

## 2024-12-23 NOTE — PROGRESS NOTES
Discussed patient with Dr. Sterling after chart review. Patient's family is considering IP hospice this week. Will sign off at this time. Please re-consult our team if needed.

## 2024-12-23 NOTE — PT/OT/SLP PROGRESS
"Occupational Therapy   Treatment    Name: Jimmy Young  MRN: [id number]  Admitting Diagnosis:  Acute on chronic respiratory failure with hypoxia       Recommendations:     Discharge Recommendations: Low Intensity Therapy  Discharge Equipment Recommendations:  hospital bed  Barriers to discharge:  None    Assessment:     Jimmy Young is a 89 y.o. male with a medical diagnosis of Acute on chronic respiratory failure with hypoxia.  He presents with the following performance deficits affecting function are weakness, impaired endurance, impaired self care skills, impaired functional mobility, gait instability, impaired cognition, decreased coordination, decreased upper extremity function, decreased lower extremity function, decreased safety awareness, decreased ROM, impaired coordination, impaired cardiopulmonary response to activity.     Rehab Prognosis:  Fair; patient would benefit from acute skilled OT services to address these deficits and reach maximum level of function.       Plan:     Patient to be seen 2 x/week to address the above listed problems via self-care/home management, therapeutic activities, therapeutic exercises  Plan of Care Expires: 12/25/24  Plan of Care Reviewed with: family, patient    Subjective     Chief Complaint: Pt did not sleep well last night  Patient/Family Comments/goals: "glad someone watches him" - Son  Pain/Comfort:  Pain Rating 1: 0/10    Objective:     Communicated with: Nya and EPIC prior to session.  Patient found up in chair with bed alarm, pulse ox (continuous), peripheral IV, telemetry upon OT entry to room.    General Precautions: Standard, fall, respiratory    Orthopedic Precautions:N/A  Braces: N/A  Respiratory Status: High flow, flow 18 L/min, concentration 100%       Activities of Daily Living:  Feeding:  supervision to eat meal with utensils  Grooming: Setup assistance to wash face with small towel  Lower Body Dressing: maximal assistance don/doff " socks        [facility] 6 Click ADL: 10    Treatment & Education:  Discussed with son the importance of keeping dad moving throughout the day, even if he is sitting in the bed or the chair and informed safety concerns.   Encouraged completion of B UE AROM therex throughout the day to increase functional strength and activity tolerance needed for ADL completion.  OT role, plan of care, progression of goals, importance of continued OOB activity, ADL/functional transfer and mobility retraining, discharge recommendation, call don't fall, safety precautions, fall prevention.  Room was cleared of excess and doubled hygiene items to reduce confusion between items. Room was organized to maximize Pt ability to recognize or use items.   Pt and son stated understanding and in agreement with POC    Patient left with bed in chair position with all lines intact, call button in reach, chair alarm on, and son present    GOALS:   Multidisciplinary Problems       Occupational Therapy Goals          Problem: Occupational Therapy    Goal Priority Disciplines Outcome Interventions   Occupational Therapy Goal     OT, PT/OT Progressing    Description: Goals to be met by: 12/25/24     Patient will increase functional independence with ADLs by performing:    Toileting from toilet with Contact Guard Assistance for hygiene and clothing management.   Toilet transfer to bedside commode with Contact Guard Assistance.  Upper extremity exercise program x10 reps per handout, with supervision.                         Time Tracking:     OT Date of Treatment: 12/23/24  OT Start Time: 0900  OT Stop Time: 0925  OT Total Time (min): 25 min    Billable Minutes:Self Care/Home Management 15  Therapeutic Activity 10    OT/MAGALY: MAGALY     Number of MAGALY visits since last OT visit: 2    ALFONSO Shirley  12/23/2024

## 2024-12-24 LAB
ALBUMIN SERPL BCP-MCNC: 2.8 G/DL (ref 3.5–5.2)
ALP SERPL-CCNC: 74 U/L (ref 40–150)
ALT SERPL W/O P-5'-P-CCNC: 60 U/L (ref 10–44)
ANION GAP SERPL CALC-SCNC: 14 MMOL/L (ref 8–16)
AST SERPL-CCNC: 29 U/L (ref 10–40)
BASOPHILS # BLD AUTO: 0.01 K/UL (ref 0–0.2)
BASOPHILS NFR BLD: 0.1 % (ref 0–1.9)
BILIRUB SERPL-MCNC: 0.6 MG/DL (ref 0.1–1)
BNP SERPL-MCNC: 34 PG/ML (ref 0–99)
BUN SERPL-MCNC: 84 MG/DL (ref 10–30)
CALCIUM SERPL-MCNC: 8.7 MG/DL (ref 8.7–10.5)
CHLORIDE SERPL-SCNC: 96 MMOL/L (ref 95–110)
CO2 SERPL-SCNC: 24 MMOL/L (ref 23–29)
CREAT SERPL-MCNC: 2.2 MG/DL (ref 0.5–1.4)
DIFFERENTIAL METHOD BLD: ABNORMAL
EOSINOPHIL # BLD AUTO: 0 K/UL (ref 0–0.5)
EOSINOPHIL NFR BLD: 0 % (ref 0–8)
ERYTHROCYTE [DISTWIDTH] IN BLOOD BY AUTOMATED COUNT: 12.3 % (ref 11.5–14.5)
EST. GFR  (NO RACE VARIABLE): 27 ML/MIN/1.73 M^2
GLUCOSE SERPL-MCNC: 300 MG/DL (ref 70–110)
HCT VFR BLD AUTO: 39.6 % (ref 40–54)
HGB BLD-MCNC: 12.9 G/DL (ref 14–18)
IMM GRANULOCYTES # BLD AUTO: 0.29 K/UL (ref 0–0.04)
IMM GRANULOCYTES NFR BLD AUTO: 3.2 % (ref 0–0.5)
LYMPHOCYTES # BLD AUTO: 0.4 K/UL (ref 1–4.8)
LYMPHOCYTES NFR BLD: 4.2 % (ref 18–48)
MCH RBC QN AUTO: 29.9 PG (ref 27–31)
MCHC RBC AUTO-ENTMCNC: 32.6 G/DL (ref 32–36)
MCV RBC AUTO: 92 FL (ref 82–98)
MONOCYTES # BLD AUTO: 0.1 K/UL (ref 0.3–1)
MONOCYTES NFR BLD: 1.4 % (ref 4–15)
NEUTROPHILS # BLD AUTO: 8.3 K/UL (ref 1.8–7.7)
NEUTROPHILS NFR BLD: 91.1 % (ref 38–73)
NRBC BLD-RTO: 0 /100 WBC
OHS QRS DURATION: 70 MS
OHS QTC CALCULATION: 424 MS
PLATELET # BLD AUTO: 85 K/UL (ref 150–450)
PMV BLD AUTO: 11.2 FL (ref 9.2–12.9)
POTASSIUM SERPL-SCNC: 5.3 MMOL/L (ref 3.5–5.1)
PROT SERPL-MCNC: 5.2 G/DL (ref 6–8.4)
RBC # BLD AUTO: 4.31 M/UL (ref 4.6–6.2)
SODIUM SERPL-SCNC: 134 MMOL/L (ref 136–145)
WBC # BLD AUTO: 9.1 K/UL (ref 3.9–12.7)

## 2024-12-24 PROCEDURE — 80053 COMPREHEN METABOLIC PANEL: CPT | Performed by: HOSPITALIST

## 2024-12-24 PROCEDURE — 25000003 PHARM REV CODE 250: Performed by: HOSPITALIST

## 2024-12-24 PROCEDURE — 94799 UNLISTED PULMONARY SVC/PX: CPT

## 2024-12-24 PROCEDURE — 83880 ASSAY OF NATRIURETIC PEPTIDE: CPT | Performed by: HOSPITALIST

## 2024-12-24 PROCEDURE — 63600175 PHARM REV CODE 636 W HCPCS: Performed by: NURSE PRACTITIONER

## 2024-12-24 PROCEDURE — 63600175 PHARM REV CODE 636 W HCPCS: Performed by: STUDENT IN AN ORGANIZED HEALTH CARE EDUCATION/TRAINING PROGRAM

## 2024-12-24 PROCEDURE — 25000242 PHARM REV CODE 250 ALT 637 W/ HCPCS: Performed by: INTERNAL MEDICINE

## 2024-12-24 PROCEDURE — 94761 N-INVAS EAR/PLS OXIMETRY MLT: CPT

## 2024-12-24 PROCEDURE — 94664 DEMO&/EVAL PT USE INHALER: CPT

## 2024-12-24 PROCEDURE — 63600175 PHARM REV CODE 636 W HCPCS: Performed by: FAMILY MEDICINE

## 2024-12-24 PROCEDURE — 27000249 HC VAPOTHERM CIRCUIT

## 2024-12-24 PROCEDURE — 25000242 PHARM REV CODE 250 ALT 637 W/ HCPCS: Performed by: FAMILY MEDICINE

## 2024-12-24 PROCEDURE — 36415 COLL VENOUS BLD VENIPUNCTURE: CPT | Performed by: HOSPITALIST

## 2024-12-24 PROCEDURE — 94640 AIRWAY INHALATION TREATMENT: CPT

## 2024-12-24 PROCEDURE — 27100092 HC HIGH FLOW DELIVERY CANNULA

## 2024-12-24 PROCEDURE — 93005 ELECTROCARDIOGRAM TRACING: CPT

## 2024-12-24 PROCEDURE — 93010 ELECTROCARDIOGRAM REPORT: CPT | Mod: ,,, | Performed by: INTERNAL MEDICINE

## 2024-12-24 PROCEDURE — 85025 COMPLETE CBC W/AUTO DIFF WBC: CPT | Performed by: HOSPITALIST

## 2024-12-24 PROCEDURE — 25000003 PHARM REV CODE 250: Performed by: FAMILY MEDICINE

## 2024-12-24 PROCEDURE — 21400001 HC TELEMETRY ROOM

## 2024-12-24 PROCEDURE — 25000003 PHARM REV CODE 250: Performed by: NURSE PRACTITIONER

## 2024-12-24 PROCEDURE — 99900035 HC TECH TIME PER 15 MIN (STAT)

## 2024-12-24 PROCEDURE — 27100171 HC OXYGEN HIGH FLOW UP TO 24 HOURS

## 2024-12-24 PROCEDURE — 27000207 HC ISOLATION

## 2024-12-24 RX ORDER — PREDNISONE 10 MG/1
10 TABLET ORAL DAILY
Status: DISCONTINUED | OUTPATIENT
Start: 2024-12-31 | End: 2024-12-26 | Stop reason: HOSPADM

## 2024-12-24 RX ORDER — MUPIROCIN 20 MG/G
OINTMENT TOPICAL 2 TIMES DAILY
Status: DISCONTINUED | OUTPATIENT
Start: 2024-12-24 | End: 2024-12-26 | Stop reason: HOSPADM

## 2024-12-24 RX ORDER — PREDNISONE 20 MG/1
20 TABLET ORAL DAILY
Status: DISCONTINUED | OUTPATIENT
Start: 2024-12-25 | End: 2024-12-26 | Stop reason: HOSPADM

## 2024-12-24 RX ORDER — DIPHENHYDRAMINE HYDROCHLORIDE 50 MG/ML
6.25 INJECTION INTRAMUSCULAR; INTRAVENOUS EVERY 6 HOURS PRN
Status: DISCONTINUED | OUTPATIENT
Start: 2024-12-24 | End: 2024-12-26 | Stop reason: HOSPADM

## 2024-12-24 RX ORDER — PREDNISONE 5 MG/1
5 TABLET ORAL DAILY
Status: DISCONTINUED | OUTPATIENT
Start: 2025-01-03 | End: 2024-12-26 | Stop reason: HOSPADM

## 2024-12-24 RX ADMIN — MUPIROCIN: 20 OINTMENT TOPICAL at 09:12

## 2024-12-24 RX ADMIN — IPRATROPIUM BROMIDE AND ALBUTEROL SULFATE 3 ML: 2.5; .5 SOLUTION RESPIRATORY (INHALATION) at 02:12

## 2024-12-24 RX ADMIN — CEFTRIAXONE 2 G: 2 INJECTION, POWDER, FOR SOLUTION INTRAMUSCULAR; INTRAVENOUS at 08:12

## 2024-12-24 RX ADMIN — ENOXAPARIN SODIUM 30 MG: 30 INJECTION SUBCUTANEOUS at 05:12

## 2024-12-24 RX ADMIN — PANTOPRAZOLE SODIUM 40 MG: 40 TABLET, DELAYED RELEASE ORAL at 05:12

## 2024-12-24 RX ADMIN — METHYLPREDNISOLONE SODIUM SUCCINATE 40 MG: 40 INJECTION, POWDER, FOR SOLUTION INTRAMUSCULAR; INTRAVENOUS at 01:12

## 2024-12-24 RX ADMIN — METRONIDAZOLE: 10 GEL TOPICAL at 09:12

## 2024-12-24 RX ADMIN — IPRATROPIUM BROMIDE AND ALBUTEROL SULFATE 3 ML: 2.5; .5 SOLUTION RESPIRATORY (INHALATION) at 07:12

## 2024-12-24 RX ADMIN — HYDRALAZINE HYDROCHLORIDE 10 MG: 10 TABLET, FILM COATED ORAL at 08:12

## 2024-12-24 RX ADMIN — MICONAZOLE NITRATE: 20 CREAM TOPICAL at 09:12

## 2024-12-24 RX ADMIN — BUDESONIDE INHALATION 0.5 MG: 0.5 SUSPENSION RESPIRATORY (INHALATION) at 07:12

## 2024-12-24 RX ADMIN — METRONIDAZOLE: 10 GEL TOPICAL at 12:12

## 2024-12-24 RX ADMIN — DILTIAZEM HYDROCHLORIDE 300 MG: 180 CAPSULE, COATED, EXTENDED RELEASE ORAL at 06:12

## 2024-12-24 RX ADMIN — ARFORMOTEROL TARTRATE 15 MCG: 15 SOLUTION RESPIRATORY (INHALATION) at 08:12

## 2024-12-24 RX ADMIN — IPRATROPIUM BROMIDE AND ALBUTEROL SULFATE 3 ML: 2.5; .5 SOLUTION RESPIRATORY (INHALATION) at 08:12

## 2024-12-24 RX ADMIN — LINEZOLID 600 MG: 600 INJECTION, SOLUTION INTRAVENOUS at 12:12

## 2024-12-24 RX ADMIN — BUDESONIDE INHALATION 0.5 MG: 0.5 SUSPENSION RESPIRATORY (INHALATION) at 08:12

## 2024-12-24 RX ADMIN — PRAVASTATIN SODIUM 40 MG: 20 TABLET ORAL at 08:12

## 2024-12-24 RX ADMIN — HYDRALAZINE HYDROCHLORIDE 10 MG: 10 TABLET, FILM COATED ORAL at 09:12

## 2024-12-24 RX ADMIN — ARFORMOTEROL TARTRATE 15 MCG: 15 SOLUTION RESPIRATORY (INHALATION) at 07:12

## 2024-12-24 RX ADMIN — MICONAZOLE NITRATE: 20 CREAM TOPICAL at 05:12

## 2024-12-24 RX ADMIN — LEVOTHYROXINE SODIUM 50 MCG: 50 TABLET ORAL at 06:12

## 2024-12-24 RX ADMIN — METHYLPREDNISOLONE SODIUM SUCCINATE 40 MG: 40 INJECTION, POWDER, FOR SOLUTION INTRAMUSCULAR; INTRAVENOUS at 02:12

## 2024-12-24 RX ADMIN — MAGNESIUM OXIDE TAB 400 MG (241.3 MG ELEMENTAL MG) 400 MG: 400 (241.3 MG) TAB at 08:12

## 2024-12-24 RX ADMIN — SODIUM CHLORIDE 30 MG/ML INHALATION SOLUTION 4 ML: 30 SOLUTION INHALANT at 08:12

## 2024-12-24 RX ADMIN — MAGNESIUM OXIDE TAB 400 MG (241.3 MG ELEMENTAL MG) 400 MG: 400 (241.3 MG) TAB at 09:12

## 2024-12-24 RX ADMIN — SODIUM CHLORIDE 30 MG/ML INHALATION SOLUTION 4 ML: 30 SOLUTION INHALANT at 07:12

## 2024-12-24 NOTE — ASSESSMENT & PLAN NOTE
Chronic, controlled.  Latest blood pressure and vitals reviewed-   Temp:  [97.4 °F (36.3 °C)-98.5 °F (36.9 °C)]   Pulse:  []   Resp:  [14-22]   BP: (124-182)/(71-86)   SpO2:  [88 %-95 %] .   Home meds for hypertension were reviewed and noted below.   Hypertension Medications               amLODIPine (NORVASC) 5 MG tablet Take 1 tablet (5 mg total) by mouth once daily.    carvediloL (COREG) 12.5 MG tablet Take 12.5 mg by mouth 2 (two) times daily with meals.    diltiaZEM (CARDIZEM CD) 300 MG 24 hr capsule Take 1 capsule by mouth every morning.    furosemide (LASIX) 20 MG tablet Take 1 tablet (20 mg total) by mouth once daily.    hydrALAZINE (APRESOLINE) 10 MG tablet Take 10 mg by mouth every 12 (twelve) hours.            While in the hospital, will manage blood pressure as follows; Adjust home antihypertensive regimen as follows- elevated blood pressure, resume home hydralazine    Will utilize p.r.n. blood pressure medication only if patient's blood pressure greater than  180/110 and he develops symptoms such as worsening chest pain or shortness of breath.

## 2024-12-24 NOTE — PROGRESS NOTES
[facility] - Telemetry ([facility] Medicine  Progress Note    Patient Name: Jimmy Young  MRN: [id number]  Patient Class: IP- Inpatient   Admission Date: 12/10/2024  Length of Stay: 13 days  Attending Physician: Cecilio Sterling MD  Primary Care Provider: Nacho Richardson MD        Subjective     Principal Problem:Acute on chronic respiratory failure with hypoxia        HPI:  Jimmy Young is a 89 y.o. male with a PMH  has a past medical history of Aneurysm (2016), Arthritis, Asthma, Atrial fibrillation, Cancer, CKD (chronic kidney disease), COPD (chronic obstructive pulmonary disease) (10/05/2017), Diabetes mellitus, Emphysema lung, Encounter for blood transfusion, and Hypertension.presented to the ED for swelling of the legs. Patient was referred from his primary care physician today for worsening lower extremity edema, shortness of breath, and cough. Patient is on chronic home O2 at 3L/min via NC. Reports compliance with his lasix and denies any excessive fluid or salt intake. Denies any other complaints at his time.      ER workup revealed CBC to be unremarkable.  CMP with BUN/creatinine at baseline of 29/2.2.   mg/dL.  Magnesium 1.2.  BNP, troponin, lactic acid within normal limits.  Flu/COVID negative.  Blood cultures obtained x2.  Ultrasound lower extremities negative for DVT.  Chest x-ray revealed left upper lobe opacity resembling early pneumonia.  EKG revealed a flutter with variable AV block and left axis deviation with a ventricular rate of 64 beats per minute with a QT/QTC of 416/429.  Vital signs stable.  Patient is at baseline oxygen saturation of 92-94% on 3 liters/minute via nasal cannula.  Patient received 12.5 mg carvedilol, 1 g Rocephin IV, 100 mg doxycycline p.o., 30 mg Lovenox, 40 mg Lasix IV, and 10 mg hydralazine p.o. at outside facility.  Hospital Medicine consulted to admit patient for CHF exacerbation and pneumonia. Patient and family in agreement with treatment  plan. Patient admitted under inpatient status.    PCP: Nacho Richardson      Overview/Hospital Course:  12/11 admitted for pneumonia. Wears supplemental oxygen at baseline, 2L. On 4L. Speech consulted. Schedule breathing treatments. Discontinue lasix. Replete lytes. Echocardiogram pending. Cardiology consulted. Relaxing normotensive range in this patient demographic.  12/12 blood culture positive for staph. Infectious disease consulted. Continue monitoring repeat blood cultures. Recent medication(s) change with linagliptin with risk for congestive heart failure and skin reaction. Discussed risk vs benefit. Hba1c 6.9. recommend goal hba1c 8-9.   12/13 bun/creatinine increasing. Discontinued intravenous lasix. Remains on increased supplemental oxygen, 5L. Wean as able. Home hydralazine resumed for elevated blood pressure. Repeat blood cultures negative growth to date x 1 day. Infectious disease following  12/14/2024  Patient required increasing to 40 L 100% FiO2 Vapotherm.  Pulmonology consult on case.  Solu-Medrol added.  Continue empiric antibiotics.  Repeat chest x-ray.  12/15/2024  Patient weaned down to 20 L 80% Vapotherm.  Will continue current management.  12/16/2024  Still on 20L 80% vapotherm. Unable to be weaned at this time. Cont abx. Pulm and cardiology on case.   12/17/2024  Chest x-ray improved today compared to yesterday.  Still on 20 L 90% Vapotherm.  Continue antibiotics.  12/18/2024  Currently on 18 L 100% Vapotherm.  Continue antibiotics.  Wean O2 as tolerated.  Discussed with family at bedside the prognosis is guarded.  Patient will likely need placement should he improve.  Discussed SNF versus inpatient hospice.  Will see how patient clinically progresses over the next few days.  12/19/2024  Currently on 25 L 100% Vapotherm.  Continue antibiotics.  Chest x-ray reviewed which is improving however patient clinically declining.  He is requiring increased O2.  Snf versus inpatient hospice.    12/20/2024  Patient agitated today.  Haldol p.r.n. ordered.  Continue antibiotics.  Continue steroids.  Patient is still requiring high-flow Vapotherm.  Discussed case with family at bedside.  Will continue max therapy over the weekend.  Should patient fail to improve family is agreeable to inpatient hospice.  12/21/2024  Agitation improved.  Continue Haldol p.r.n..  Continue antibiotics and steroids.  Patient weaned down to 18 L 100% FiO2.  Continue current therapy.  Pending clinical progress.  Possible inpatient hospice on Monday if patient failed to improve.  12/22/2024  Currently on 18 L 70% FiO2.  Continue antibiotic therapy.  Wean O2 as tolerated.  12/23/2024  NAEON, weaning HFNC. Patient with waxing and waning mental status. Discussed condition with tariq's son at bedside. Continue present management. Obtain CXR in AM, continue antibiotics. Discussed with ID and Pulmonary, appreciate assistance.      Review of Systems   All other systems reviewed and are negative.    Objective:     Vital Signs (Most Recent):  Temp: 97.4 °F (36.3 °C) (12/23/24 2033)  Pulse: 84 (12/23/24 2033)  Resp: 16 (12/23/24 2033)  BP: (!) 182/86 (12/23/24 2033)  SpO2: (!) 88 % (12/23/24 2033) Vital Signs (24h Range):  Temp:  [97.4 °F (36.3 °C)-98.5 °F (36.9 °C)] 97.4 °F (36.3 °C)  Pulse:  [] 84  Resp:  [14-22] 16  SpO2:  [88 %-95 %] 88 %  BP: (124-182)/(71-86) 182/86     Weight: 81.7 kg (180 lb 1.9 oz)  Body mass index is 28.21 kg/m².    Intake/Output Summary (Last 24 hours) at 12/23/2024 2221  Last data filed at 12/23/2024 0625  Gross per 24 hour   Intake --   Output 1 ml   Net -1 ml         Physical Exam  Constitutional:       General: He is not in acute distress.     Appearance: He is ill-appearing.   Cardiovascular:      Rate and Rhythm: Regular rhythm. Tachycardia present.      Heart sounds: No murmur heard.  Pulmonary:      Effort: Respiratory distress present.      Breath sounds: Wheezing and rhonchi present.       Comments: HFNC  Abdominal:      General: There is no distension.      Palpations: Abdomen is soft.      Tenderness: There is no abdominal tenderness.   Musculoskeletal:      Comments: RUE midline in place   Neurological:      Mental Status: He is disoriented.             Significant Labs: All pertinent labs within the past 24 hours have been reviewed.  Recent Lab Results         12/23/24  0654   12/23/24  0415        Anion Gap   12       Baso #   0.01       Basophil %   0.1       BUN   83       Calcium   8.4       Chloride   95       CO2   26       Creatinine   2.3       Differential Method   Automated       eGFR   26       Eos #   0.0       Eos %   0.0       Glucose   283       Gran # (ANC)   8.0       Gran %   90.6       Hematocrit   37.7       Hemoglobin   12.7       Immature Grans (Abs)   0.20  Comment: Mild elevation in immature granulocytes is non specific and   can be seen in a variety of conditions including stress response,   acute inflammation, trauma and pregnancy. Correlation with other   laboratory and clinical findings is essential.         Immature Granulocytes   2.3       Lymph #   0.5       Lymph %   5.3       MCH   30.5       MCHC   33.7       MCV   91       Mono #   0.2       Mono %   1.7       MPV   11.2       nRBC   0       QRS Duration 74         OHS QTC Calculation 403         Platelet Count   116       Potassium   5.0       RBC   4.16       RDW   12.6       Sodium   133       WBC   8.85               Significant Imaging: I have reviewed all pertinent imaging results/findings within the past 24 hours.    X-Ray Chest 1 View   Final Result      Stable chest.         Electronically signed by: Aaron Gupta MD   Date:    12/18/2024   Time:    12:51      X-Ray Chest 1 View   Final Result      1.  Overall, there is been interval improvement.         Electronically signed by: David Frost MD   Date:    12/17/2024   Time:    08:43      X-Ray Chest 1 View   Final Result      1.  Overall, there is  been interval worsening with near complete whiteout of the left hemithorax which appears to be a combination of near complete collapse of the left lung and left pleural effusion.      2.  Stable findings as noted above.         Electronically signed by: David Frost MD   Date:    12/16/2024   Time:    11:40      X-Ray Chest 1 View   Final Result      Hazy left greater than right lung base opacities suggestive of small bilateral pleural effusions.      Finalized on: 12/14/2024 10:23 AM By:  Federico Saha MD   [facility]# [id number]      2024-12-14 10:26:03.826    [facility]      US Lower Extremity Veins Bilateral   Final Result      No evidence of deep venous thrombosis in either lower extremity.         Electronically signed by: Ever Crowley   Date:    12/10/2024   Time:    13:35      X-Ray Chest AP Portable   Final Result      1.  Lateral left upper lobe opacity, most likely early pneumonia.  Treatment for presumed pneumonia and follow-up x-rays in 4-6 weeks recommended to ensure resolution after adequate treatment.      2.  Low lung volumes with vascular crowding or atelectasis in the lung bases.      3.  Stable findings as noted above.         Electronically signed by: David Frost MD   Date:    12/10/2024   Time:    11:02            Assessment and Plan     * Acute on chronic respiratory failure with hypoxia  Weaning vapotherm as tolerated  Continue Solu-Medrol   Continue antibiotics   Pulmonology and ID consulted, appreciate reccs  Patient desats with minimal movement  Discussed case with family at bedside   Will continue aggressive therapy for now  CXR in AM        Delirium  Likely metabolic secondary to illness   Will order Haldol p.r.n.      CKD stage 3 secondary to diabetes  Creatine stable for now. BMP reviewed- noted Estimated Creatinine Clearance: 20.1 mL/min (A) (based on SCr of 2.3 mg/dL (H)). according to latest data. Based on current GFR, CKD stage is stage 3 - GFR 30-59.  Monitor UOP and serial BMP and adjust  "therapy as needed. Renally dose meds. Avoid nephrotoxic medications and procedures.  Discontinue intravenous lasix  Monitor trend    Pneumonia  Continue Zyvox and Rocephin  Mrsa in sputum     Atrial fibrillation  Patient with Paroxysmal (<7 days) atrial fibrillation which is controlled currently with Bbs and Calcium Channel Blocker. Patient is currently in sinus rhythm.[id number] Score: 4.   Discussed cardiology vs gastroenterology recommendations   Patient voices understanding  Will continue holding ac    Coronary artery disease of native artery of native heart with stable angina pectoris  Patient with known CAD which is controlled Will continue Statin and monitor for S/Sx of angina/ACS. Continue to monitor on telemetry.     Acquired hypothyroidism  Patient has chronic hypothyroidism. TFTs reviewed-   Lab Results   Component Value Date    TSH 1.780 07/03/2023   . Will continue chronic levothyroxine and adjust for and clinical changes    Chronic diastolic heart failure  Patient has  unspecified  heart failure that is Chronic. On presentation their CHF was . Most recent BNP and echo results are listed below.well compensated  No results for input(s): "BNP" in the last 72 hours.    Latest ECHO  Results for orders placed during the hospital encounter of 01/08/21    Echo Color Flow Doppler? Yes    Interpretation Summary  · The left ventricle is normal in size with moderate concentric hypertrophy and normal systolic function. The estimated ejection fraction is 60%  · Indeterminate left ventricular diastolic function.  · Normal right ventricular size with normal right ventricular systolic function.  · Mild tricuspid regurgitation.  · Normal central venous pressure (3 mmHg).  · The estimated PA systolic pressure is 32 mmHg.    Current Heart Failure Medications  , Every 12 hours, Oral  , 2 times daily with meals, Oral  hydrALAZINE injection 10 mg, Every 6 hours PRN, Intravenous  hydrALAZINE tablet 10 mg, Every 12 hours, " Oral    Plan  - Monitor strict I&Os and daily weights.    - Place on telemetry  - Low sodium diet  - Place on fluid restriction of 1.5 L.   - Cardiology has been consulted  - The patient's volume status is at their baseline  Ambulatory oxygen evaluation   Nutrition consult   Discontinue intravenous lasix    12/23/2024  Holding further diuretics  AM labs and CXR    Lower extremity edema  Improving  Multifactorial: malnutrition, fluid indiscretion, venous stasis, recent medication(s) change, dependent edema  Counseling provided  Elevate legs   Manish hose and topicals    Atrial flutter  Controlled  Follow up outpatient primary cardiologist  Holding a/c due to pmh GI bleed     Essential hypertension  Chronic, controlled.  Latest blood pressure and vitals reviewed-   Temp:  [97.4 °F (36.3 °C)-98.5 °F (36.9 °C)]   Pulse:  []   Resp:  [14-22]   BP: (124-182)/(71-86)   SpO2:  [88 %-95 %] .   Home meds for hypertension were reviewed and noted below.   Hypertension Medications               amLODIPine (NORVASC) 5 MG tablet Take 1 tablet (5 mg total) by mouth once daily.    carvediloL (COREG) 12.5 MG tablet Take 12.5 mg by mouth 2 (two) times daily with meals.    diltiaZEM (CARDIZEM CD) 300 MG 24 hr capsule Take 1 capsule by mouth every morning.    furosemide (LASIX) 20 MG tablet Take 1 tablet (20 mg total) by mouth once daily.    hydrALAZINE (APRESOLINE) 10 MG tablet Take 10 mg by mouth every 12 (twelve) hours.            While in the hospital, will manage blood pressure as follows; Adjust home antihypertensive regimen as follows- elevated blood pressure, resume home hydralazine    Will utilize p.r.n. blood pressure medication only if patient's blood pressure greater than  180/110 and he develops symptoms such as worsening chest pain or shortness of breath.      Hypercholesterolemia  Patient is chronically on statin.will continue for now. Last Lipid Panel:   Lab Results   Component Value Date    CHOL 121 07/03/2023    HDL  37 (L) 07/03/2023    LDLCALC 57 07/03/2023    TRIG 160 (H) 07/03/2023    CHOLHDL 27.8 08/01/2019     Plan:  -Continue home medication  -low fat/low calorie diet    Severe protein-calorie malnutrition  Nutrition consulted. Most recent weight and BMI monitored-     Measurements:  Wt Readings from Last 1 Encounters:   12/18/24 81.7 kg (180 lb 1.9 oz)   Body mass index is 28.21 kg/m².    Patient has been screened and assessed by RD.    Malnutrition Type:  Context: acute illness or injury  Level: severe    Malnutrition Characteristic Summary:  Weight Loss (Malnutrition): greater than 2% in 1 week  Subcutaneous Fat (Malnutrition): moderate depletion  Muscle Mass (Malnutrition): moderate depletion  Fluid Accumulation (Malnutrition): moderate    Interventions/Recommendations (treatment strategy):  1. Recommend modify pt's diet to Cardiac, Soft and bite-sized (IDDSI Level 6), 1500 mL fluid restriction diet 2. Recommend pt continues Suplena once/day per pt preference 3. Recommend feeding assistance as warrented 4. Weigh twice weekly        VTE Risk Mitigation (From admission, onward)           Ordered     Place SALUD hose  Until discontinued         12/12/24 0921     Place SALUD hose  Until discontinued         12/11/24 0949     enoxaparin injection 30 mg  Daily         12/10/24 1922     IP VTE HIGH RISK PATIENT  Once         12/10/24 1922     Place sequential compression device  Until discontinued         12/10/24 1922                    Discharge Planning   SHEA:      Code Status: DNR   Medical Readiness for Discharge Date:   Discharge Plan A: Home, Home Health                Please place Justification for DME        Cecilio Sterling MD  Department of Hospital Medicine   [facility] - Telemetry ([facility])

## 2024-12-24 NOTE — ASSESSMENT & PLAN NOTE
Patient with Paroxysmal (<7 days) atrial fibrillation which is controlled currently with Bbs and Calcium Channel Blocker. Patient is currently in sinus rhythm.[id number] Score: 4.   Discussed cardiology vs gastroenterology recommendations   Patient voices understanding  Will continue holding ac

## 2024-12-24 NOTE — ASSESSMENT & PLAN NOTE
"Patient has  unspecified  heart failure that is Chronic. On presentation their CHF was . Most recent BNP and echo results are listed below.well compensated  No results for input(s): "BNP" in the last 72 hours.    Latest ECHO  Results for orders placed during the hospital encounter of 01/08/21    Echo Color Flow Doppler? Yes    Interpretation Summary  · The left ventricle is normal in size with moderate concentric hypertrophy and normal systolic function. The estimated ejection fraction is 60%  · Indeterminate left ventricular diastolic function.  · Normal right ventricular size with normal right ventricular systolic function.  · Mild tricuspid regurgitation.  · Normal central venous pressure (3 mmHg).  · The estimated PA systolic pressure is 32 mmHg.    Current Heart Failure Medications  , Every 12 hours, Oral  , 2 times daily with meals, Oral  hydrALAZINE injection 10 mg, Every 6 hours PRN, Intravenous  hydrALAZINE tablet 10 mg, Every 12 hours, Oral    Plan  - Monitor strict I&Os and daily weights.    - Place on telemetry  - Low sodium diet  - Place on fluid restriction of 1.5 L.   - Cardiology has been consulted  - The patient's volume status is at their baseline  Ambulatory oxygen evaluation   Nutrition consult   Discontinue intravenous lasix    12/23/2024  Holding further diuretics  AM labs and CXR  "

## 2024-12-24 NOTE — PLAN OF CARE
Problem: Adult Inpatient Plan of Care  Goal: Plan of Care Review  Outcome: Progressing     Problem: Adult Inpatient Plan of Care  Goal: Absence of Hospital-Acquired Illness or Injury  Outcome: Progressing     Problem: Diabetes Comorbidity  Goal: Blood Glucose Level Within Targeted Range  Outcome: Progressing     Problem: Pneumonia  Goal: Fluid Balance  Outcome: Progressing     Problem: Fall Injury Risk  Goal: Absence of Fall and Fall-Related Injury  Outcome: Progressing     Problem: Skin Injury Risk Increased  Goal: Skin Health and Integrity  Outcome: Progressing     Problem: Infection  Goal: Absence of Infection Signs and Symptoms  Outcome: Progressing    POC reviewed with family, family verbalizes understanding of POC. No questions at this time.  AAOx2  A-flutter on cardiac monitor.  Pt remains free of falls.  No complaints at this time.  Safety measures in place. Will continue to monitor.  Informed pt to call for assistance before getting up. Pt verbalizes understanding.  Hourly rounding and chart check complete.

## 2024-12-24 NOTE — PLAN OF CARE
Discussed inpatient hospice with son at bedside. Brochures left for [facility] [place], [facility] and [facility].

## 2024-12-24 NOTE — ASSESSMENT & PLAN NOTE
Creatine stable for now. BMP reviewed- noted Estimated Creatinine Clearance: 20.1 mL/min (A) (based on SCr of 2.3 mg/dL (H)). according to latest data. Based on current GFR, CKD stage is stage 3 - GFR 30-59.  Monitor UOP and serial BMP and adjust therapy as needed. Renally dose meds. Avoid nephrotoxic medications and procedures.  Discontinue intravenous lasix  Monitor trend

## 2024-12-24 NOTE — ASSESSMENT & PLAN NOTE
Nutrition consulted. Most recent weight and BMI monitored-     Measurements:  Wt Readings from Last 1 Encounters:   12/18/24 81.7 kg (180 lb 1.9 oz)   Body mass index is 28.21 kg/m².    Patient has been screened and assessed by RD.    Malnutrition Type:  Context: acute illness or injury  Level: severe    Malnutrition Characteristic Summary:  Weight Loss (Malnutrition): greater than 2% in 1 week  Subcutaneous Fat (Malnutrition): moderate depletion  Muscle Mass (Malnutrition): moderate depletion  Fluid Accumulation (Malnutrition): moderate    Interventions/Recommendations (treatment strategy):  1. Recommend modify pt's diet to Cardiac, Soft and bite-sized (IDDSI Level 6), 1500 mL fluid restriction diet 2. Recommend pt continues Suplena once/day per pt preference 3. Recommend feeding assistance as warrented 4. Weigh twice weekly

## 2024-12-24 NOTE — ASSESSMENT & PLAN NOTE
Weaning vapotherm as tolerated  Continue Solu-Medrol   Continue antibiotics   Pulmonology and ID consulted, appreciate reccs  Patient desats with minimal movement  Discussed case with family at bedside   Will continue aggressive therapy for now  CXR in AM

## 2024-12-24 NOTE — SUBJECTIVE & OBJECTIVE
Review of Systems   All other systems reviewed and are negative.    Objective:     Vital Signs (Most Recent):  Temp: 97.4 °F (36.3 °C) (12/23/24 2033)  Pulse: 84 (12/23/24 2033)  Resp: 16 (12/23/24 2033)  BP: (!) 182/86 (12/23/24 2033)  SpO2: (!) 88 % (12/23/24 2033) Vital Signs (24h Range):  Temp:  [97.4 °F (36.3 °C)-98.5 °F (36.9 °C)] 97.4 °F (36.3 °C)  Pulse:  [] 84  Resp:  [14-22] 16  SpO2:  [88 %-95 %] 88 %  BP: (124-182)/(71-86) 182/86     Weight: 81.7 kg (180 lb 1.9 oz)  Body mass index is 28.21 kg/m².    Intake/Output Summary (Last 24 hours) at 12/23/2024 2225  Last data filed at 12/23/2024 0625  Gross per 24 hour   Intake --   Output 1 ml   Net -1 ml         Physical Exam  Constitutional:       General: He is not in acute distress.     Appearance: He is ill-appearing.   Cardiovascular:      Rate and Rhythm: Regular rhythm. Tachycardia present.      Heart sounds: No murmur heard.  Pulmonary:      Effort: Respiratory distress present.      Breath sounds: Wheezing and rhonchi present.      Comments: HFNC  Abdominal:      General: There is no distension.      Palpations: Abdomen is soft.      Tenderness: There is no abdominal tenderness.   Musculoskeletal:      Comments: RUE midline in place   Neurological:      Mental Status: He is disoriented.             Significant Labs: All pertinent labs within the past 24 hours have been reviewed.  Recent Lab Results         12/23/24  0654   12/23/24  0415        Anion Gap   12       Baso #   0.01       Basophil %   0.1       BUN   83       Calcium   8.4       Chloride   95       CO2   26       Creatinine   2.3       Differential Method   Automated       eGFR   26       Eos #   0.0       Eos %   0.0       Glucose   283       Gran # (ANC)   8.0       Gran %   90.6       Hematocrit   37.7       Hemoglobin   12.7       Immature Grans (Abs)   0.20  Comment: Mild elevation in immature granulocytes is non specific and   can be seen in a variety of conditions including  stress response,   acute inflammation, trauma and pregnancy. Correlation with other   laboratory and clinical findings is essential.         Immature Granulocytes   2.3       Lymph #   0.5       Lymph %   5.3       MCH   30.5       MCHC   33.7       MCV   91       Mono #   0.2       Mono %   1.7       MPV   11.2       nRBC   0       QRS Duration 74         OHS QTC Calculation 403         Platelet Count   116       Potassium   5.0       RBC   4.16       RDW   12.6       Sodium   133       WBC   8.85               Significant Imaging: I have reviewed all pertinent imaging results/findings within the past 24 hours.    X-Ray Chest 1 View   Final Result      Stable chest.         Electronically signed by: Aaron Gupta MD   Date:    12/18/2024   Time:    12:51      X-Ray Chest 1 View   Final Result      1.  Overall, there is been interval improvement.         Electronically signed by: David Frost MD   Date:    12/17/2024   Time:    08:43      X-Ray Chest 1 View   Final Result      1.  Overall, there is been interval worsening with near complete whiteout of the left hemithorax which appears to be a combination of near complete collapse of the left lung and left pleural effusion.      2.  Stable findings as noted above.         Electronically signed by: David Frost MD   Date:    12/16/2024   Time:    11:40      X-Ray Chest 1 View   Final Result      Hazy left greater than right lung base opacities suggestive of small bilateral pleural effusions.      Finalized on: 12/14/2024 10:23 AM By:  Federico Saha MD   [facility]# [id number]      2024-12-14 10:26:03.826    [facility]      US Lower Extremity Veins Bilateral   Final Result      No evidence of deep venous thrombosis in either lower extremity.         Electronically signed by: Ever Crowley   Date:    12/10/2024   Time:    13:35      X-Ray Chest AP Portable   Final Result      1.  Lateral left upper lobe opacity, most likely early pneumonia.  Treatment for presumed pneumonia and  follow-up x-rays in 4-6 weeks recommended to ensure resolution after adequate treatment.      2.  Low lung volumes with vascular crowding or atelectasis in the lung bases.      3.  Stable findings as noted above.         Electronically signed by: David Frost MD   Date:    12/10/2024   Time:    11:02

## 2024-12-24 NOTE — ASSESSMENT & PLAN NOTE
Patient has chronic hypothyroidism. TFTs reviewed-   Lab Results   Component Value Date    TSH 1.780 07/03/2023   . Will continue chronic levothyroxine and adjust for and clinical changes

## 2024-12-25 PROCEDURE — 63600175 PHARM REV CODE 636 W HCPCS: Performed by: STUDENT IN AN ORGANIZED HEALTH CARE EDUCATION/TRAINING PROGRAM

## 2024-12-25 PROCEDURE — 94799 UNLISTED PULMONARY SVC/PX: CPT | Mod: XB

## 2024-12-25 PROCEDURE — 25000242 PHARM REV CODE 250 ALT 637 W/ HCPCS: Performed by: INTERNAL MEDICINE

## 2024-12-25 PROCEDURE — 94664 DEMO&/EVAL PT USE INHALER: CPT

## 2024-12-25 PROCEDURE — 94761 N-INVAS EAR/PLS OXIMETRY MLT: CPT

## 2024-12-25 PROCEDURE — 25000003 PHARM REV CODE 250: Performed by: FAMILY MEDICINE

## 2024-12-25 PROCEDURE — 27000221 HC OXYGEN, UP TO 24 HOURS

## 2024-12-25 PROCEDURE — 25000003 PHARM REV CODE 250: Performed by: HOSPITALIST

## 2024-12-25 PROCEDURE — 25000003 PHARM REV CODE 250: Performed by: NURSE PRACTITIONER

## 2024-12-25 PROCEDURE — 63600175 PHARM REV CODE 636 W HCPCS: Performed by: HOSPITALIST

## 2024-12-25 PROCEDURE — 94640 AIRWAY INHALATION TREATMENT: CPT

## 2024-12-25 PROCEDURE — 99900035 HC TECH TIME PER 15 MIN (STAT)

## 2024-12-25 PROCEDURE — 25000242 PHARM REV CODE 250 ALT 637 W/ HCPCS: Performed by: FAMILY MEDICINE

## 2024-12-25 PROCEDURE — 27000207 HC ISOLATION

## 2024-12-25 PROCEDURE — 63600175 PHARM REV CODE 636 W HCPCS: Performed by: NURSE PRACTITIONER

## 2024-12-25 PROCEDURE — 21400001 HC TELEMETRY ROOM

## 2024-12-25 RX ADMIN — BUDESONIDE INHALATION 0.5 MG: 0.5 SUSPENSION RESPIRATORY (INHALATION) at 07:12

## 2024-12-25 RX ADMIN — PREDNISONE 20 MG: 20 TABLET ORAL at 08:12

## 2024-12-25 RX ADMIN — PRAVASTATIN SODIUM 40 MG: 20 TABLET ORAL at 08:12

## 2024-12-25 RX ADMIN — LINEZOLID 600 MG: 600 INJECTION, SOLUTION INTRAVENOUS at 11:12

## 2024-12-25 RX ADMIN — ENOXAPARIN SODIUM 30 MG: 30 INJECTION SUBCUTANEOUS at 05:12

## 2024-12-25 RX ADMIN — IPRATROPIUM BROMIDE AND ALBUTEROL SULFATE 3 ML: 2.5; .5 SOLUTION RESPIRATORY (INHALATION) at 02:12

## 2024-12-25 RX ADMIN — ARFORMOTEROL TARTRATE 15 MCG: 15 SOLUTION RESPIRATORY (INHALATION) at 07:12

## 2024-12-25 RX ADMIN — HYDRALAZINE HYDROCHLORIDE 10 MG: 10 TABLET, FILM COATED ORAL at 09:12

## 2024-12-25 RX ADMIN — IPRATROPIUM BROMIDE AND ALBUTEROL SULFATE 3 ML: 2.5; .5 SOLUTION RESPIRATORY (INHALATION) at 07:12

## 2024-12-25 RX ADMIN — HYDRALAZINE HYDROCHLORIDE 10 MG: 10 TABLET, FILM COATED ORAL at 08:12

## 2024-12-25 RX ADMIN — MICONAZOLE NITRATE: 20 CREAM TOPICAL at 05:12

## 2024-12-25 RX ADMIN — MICONAZOLE NITRATE: 20 CREAM TOPICAL at 10:12

## 2024-12-25 RX ADMIN — CEFTRIAXONE 2 G: 2 INJECTION, POWDER, FOR SOLUTION INTRAMUSCULAR; INTRAVENOUS at 08:12

## 2024-12-25 RX ADMIN — MUPIROCIN: 20 OINTMENT TOPICAL at 08:12

## 2024-12-25 RX ADMIN — DIPHENHYDRAMINE HYDROCHLORIDE 6.25 MG: 50 INJECTION, SOLUTION INTRAMUSCULAR; INTRAVENOUS at 10:12

## 2024-12-25 RX ADMIN — METRONIDAZOLE: 10 GEL TOPICAL at 09:12

## 2024-12-25 RX ADMIN — DILTIAZEM HYDROCHLORIDE 300 MG: 180 CAPSULE, COATED, EXTENDED RELEASE ORAL at 06:12

## 2024-12-25 RX ADMIN — LEVOTHYROXINE SODIUM 50 MCG: 50 TABLET ORAL at 06:12

## 2024-12-25 RX ADMIN — PANTOPRAZOLE SODIUM 40 MG: 40 TABLET, DELAYED RELEASE ORAL at 05:12

## 2024-12-25 RX ADMIN — DIPHENHYDRAMINE HYDROCHLORIDE 6.25 MG: 50 INJECTION, SOLUTION INTRAMUSCULAR; INTRAVENOUS at 05:12

## 2024-12-25 RX ADMIN — MAGNESIUM OXIDE TAB 400 MG (241.3 MG ELEMENTAL MG) 400 MG: 400 (241.3 MG) TAB at 08:12

## 2024-12-25 RX ADMIN — LINEZOLID 600 MG: 600 INJECTION, SOLUTION INTRAVENOUS at 01:12

## 2024-12-25 RX ADMIN — METRONIDAZOLE: 10 GEL TOPICAL at 11:12

## 2024-12-25 RX ADMIN — MUPIROCIN: 20 OINTMENT TOPICAL at 09:12

## 2024-12-25 RX ADMIN — SODIUM CHLORIDE 30 MG/ML INHALATION SOLUTION 4 ML: 30 SOLUTION INHALANT at 07:12

## 2024-12-25 RX ADMIN — MAGNESIUM OXIDE TAB 400 MG (241.3 MG ELEMENTAL MG) 400 MG: 400 (241.3 MG) TAB at 09:12

## 2024-12-25 NOTE — SUBJECTIVE & OBJECTIVE
Review of Systems   All other systems reviewed and are negative.    Objective:     Vital Signs (Most Recent):  Temp: 97.7 °F (36.5 °C) (12/24/24 1630)  Pulse: 90 (12/24/24 1630)  Resp: 16 (12/24/24 1630)  BP: (!) 145/70 (12/24/24 1630)  SpO2: (!) 94 % (12/24/24 1630) Vital Signs (24h Range):  Temp:  [97.3 °F (36.3 °C)-98.3 °F (36.8 °C)] 97.7 °F (36.5 °C)  Pulse:  [63-90] 90  Resp:  [14-22] 16  SpO2:  [88 %-96 %] 94 %  BP: (129-182)/(61-86) 145/70     Weight: 81.7 kg (180 lb 1.9 oz)  Body mass index is 28.21 kg/m².    Intake/Output Summary (Last 24 hours) at 12/24/2024 1800  Last data filed at 12/24/2024 0911  Gross per 24 hour   Intake 320 ml   Output --   Net 320 ml         Physical Exam  Constitutional:       General: He is not in acute distress.     Appearance: He is ill-appearing.   Cardiovascular:      Rate and Rhythm: Regular rhythm. Tachycardia present.      Heart sounds: No murmur heard.  Pulmonary:      Effort: Respiratory distress present.      Breath sounds: Wheezing and rhonchi present.      Comments: HFNC  Abdominal:      General: There is no distension.      Palpations: Abdomen is soft.      Tenderness: There is no abdominal tenderness.   Musculoskeletal:      Comments: RUE midline in place   Neurological:      Mental Status: He is disoriented.             Significant Labs: All pertinent labs within the past 24 hours have been reviewed.  Recent Lab Results         12/24/24  0519   12/24/24  0452        Albumin   2.8       ALP   74       ALT   60       Anion Gap   14       AST   29       Baso #   0.01       Basophil %   0.1       BILIRUBIN TOTAL   0.6  Comment: For infants and newborns, interpretation of results should be based  on gestational age, weight and in agreement with clinical  observations.    Premature Infant recommended reference ranges:  Up to 24 hours.............<8.0 mg/dL  Up to 48 hours............<12.0 mg/dL  3-5 days..................<15.0 mg/dL  6-29 days.................<15.0  mg/dL         BNP   34  Comment: Values of less than 100 pg/ml are consistent with non-CHF populations.       BUN   84       Calcium   8.7       Chloride   96       CO2   24       Creatinine   2.2       Differential Method   Automated       eGFR   27       Eos #   0.0       Eos %   0.0       Glucose   300       Gran # (ANC)   8.3       Gran %   91.1       Hematocrit   39.6       Hemoglobin   12.9       Immature Grans (Abs)   0.29  Comment: Mild elevation in immature granulocytes is non specific and   can be seen in a variety of conditions including stress response,   acute inflammation, trauma and pregnancy. Correlation with other   laboratory and clinical findings is essential.         Immature Granulocytes   3.2       Lymph #   0.4       Lymph %   4.2       MCH   29.9       MCHC   32.6       MCV   92       Mono #   0.1       Mono %   1.4       MPV   11.2       nRBC   0       QRS Duration 70         OHS QTC Calculation 424         Platelet Count   85       Potassium   5.3       PROTEIN TOTAL   5.2       RBC   4.31       RDW   12.3       Sodium   134       WBC   9.10               Significant Imaging: I have reviewed all pertinent imaging results/findings within the past 24 hours.    X-Ray Chest 1 View   Final Result      See above.         Electronically signed by: Jose Dias   Date:    12/24/2024   Time:    09:15      X-Ray Chest 1 View   Final Result      Stable chest.         Electronically signed by: Aaron Gupta MD   Date:    12/18/2024   Time:    12:51      X-Ray Chest 1 View   Final Result      1.  Overall, there is been interval improvement.         Electronically signed by: David Frost MD   Date:    12/17/2024   Time:    08:43      X-Ray Chest 1 View   Final Result      1.  Overall, there is been interval worsening with near complete whiteout of the left hemithorax which appears to be a combination of near complete collapse of the left lung and left pleural effusion.      2.  Stable findings as noted  above.         Electronically signed by: David Frost MD   Date:    12/16/2024   Time:    11:40      X-Ray Chest 1 View   Final Result      Hazy left greater than right lung base opacities suggestive of small bilateral pleural effusions.      Finalized on: 12/14/2024 10:23 AM By:  Federico Saha MD   [facility]# [id number]      2024-12-14 10:26:03.826    [facility]      US Lower Extremity Veins Bilateral   Final Result      No evidence of deep venous thrombosis in either lower extremity.         Electronically signed by: Ever Crowley   Date:    12/10/2024   Time:    13:35      X-Ray Chest AP Portable   Final Result      1.  Lateral left upper lobe opacity, most likely early pneumonia.  Treatment for presumed pneumonia and follow-up x-rays in 4-6 weeks recommended to ensure resolution after adequate treatment.      2.  Low lung volumes with vascular crowding or atelectasis in the lung bases.      3.  Stable findings as noted above.         Electronically signed by: David Frost MD   Date:    12/10/2024   Time:    11:02

## 2024-12-25 NOTE — PLAN OF CARE
Problem: Adult Inpatient Plan of Care  Goal: Plan of Care Review  Outcome: Progressing     Problem: Adult Inpatient Plan of Care  Goal: Absence of Hospital-Acquired Illness or Injury  Outcome: Progressing     Problem: Diabetes Comorbidity  Goal: Blood Glucose Level Within Targeted Range  Outcome: Progressing     Problem: Pneumonia  Goal: Fluid Balance  Outcome: Progressing     Problem: Fall Injury Risk  Goal: Absence of Fall and Fall-Related Injury  Outcome: Progressing     POC reviewed with pt and family Pt and family verbalizes understanding of POC. No questions at this time.  AAOx3. NADN.  A-Flu on cardiac monitor.  Pt remains free of falls.  No complaints at this time.  Safety measures in place. Will continue to monitor.  Informed pt to call for assistance before getting up. Pt verbalizes understanding.  Hourly rounding and chart check complete.

## 2024-12-25 NOTE — ASSESSMENT & PLAN NOTE
Creatine stable for now. BMP reviewed- noted Estimated Creatinine Clearance: 21 mL/min (A) (based on SCr of 2.2 mg/dL (H)). according to latest data. Based on current GFR, CKD stage is stage 3 - GFR 30-59.  Monitor UOP and serial BMP and adjust therapy as needed. Renally dose meds. Avoid nephrotoxic medications and procedures.  Discontinue intravenous lasix  Monitor trend

## 2024-12-25 NOTE — PROGRESS NOTES
[facility] - Telemetry ([facility]  Progress Note    Patient Name: Jimmy Young  MRN: [id number]  Patient Class: IP- Inpatient   Admission Date: 12/10/2024  Length of Stay: 14 days  Attending Physician: Cecilio Sterling MD  Primary Care Provider: Nacho Richardson MD        Subjective     Principal Problem:Acute on chronic respiratory failure with hypoxia        HPI:  Jimmy Young is a 89 y.o. male with a PMH  has a past medical history of Aneurysm (2016), Arthritis, Asthma, Atrial fibrillation, Cancer, CKD (chronic kidney disease), COPD (chronic obstructive pulmonary disease) (10/05/2017), Diabetes mellitus, Emphysema lung, Encounter for blood transfusion, and Hypertension.presented to the ED for swelling of the legs. Patient was referred from his primary care physician today for worsening lower extremity edema, shortness of breath, and cough. Patient is on chronic home O2 at 3L/min via NC. Reports compliance with his lasix and denies any excessive fluid or salt intake. Denies any other complaints at his time.      ER workup revealed CBC to be unremarkable.  CMP with BUN/creatinine at baseline of 29/2.2.   mg/dL.  Magnesium 1.2.  BNP, troponin, lactic acid within normal limits.  Flu/COVID negative.  Blood cultures obtained x2.  Ultrasound lower extremities negative for DVT.  Chest x-ray revealed left upper lobe opacity resembling early pneumonia.  EKG revealed a flutter with variable AV block and left axis deviation with a ventricular rate of 64 beats per minute with a QT/QTC of 416/429.  Vital signs stable.  Patient is at baseline oxygen saturation of 92-94% on 3 liters/minute via nasal cannula.  Patient received 12.5 mg carvedilol, 1 g Rocephin IV, 100 mg doxycycline p.o., 30 mg Lovenox, 40 mg Lasix IV, and 10 mg hydralazine p.o. at outside facility.  Hospital Medicine consulted to admit patient for CHF exacerbation and pneumonia. Patient and family in agreement with treatment  plan. Patient admitted under inpatient status.    PCP: Nacho Richardson      Overview/Hospital Course:  12/11 admitted for pneumonia. Wears supplemental oxygen at baseline, 2L. On 4L. Speech consulted. Schedule breathing treatments. Discontinue lasix. Replete lytes. Echocardiogram pending. Cardiology consulted. Relaxing normotensive range in this patient demographic.  12/12 blood culture positive for staph. Infectious disease consulted. Continue monitoring repeat blood cultures. Recent medication(s) change with linagliptin with risk for congestive heart failure and skin reaction. Discussed risk vs benefit. Hba1c 6.9. recommend goal hba1c 8-9.   12/13 bun/creatinine increasing. Discontinued intravenous lasix. Remains on increased supplemental oxygen, 5L. Wean as able. Home hydralazine resumed for elevated blood pressure. Repeat blood cultures negative growth to date x 1 day. Infectious disease following  12/14/2024  Patient required increasing to 40 L 100% FiO2 Vapotherm.  Pulmonology consult on case.  Solu-Medrol added.  Continue empiric antibiotics.  Repeat chest x-ray.  12/15/2024  Patient weaned down to 20 L 80% Vapotherm.  Will continue current management.  12/16/2024  Still on 20L 80% vapotherm. Unable to be weaned at this time. Cont abx. Pulm and cardiology on case.   12/17/2024  Chest x-ray improved today compared to yesterday.  Still on 20 L 90% Vapotherm.  Continue antibiotics.  12/18/2024  Currently on 18 L 100% Vapotherm.  Continue antibiotics.  Wean O2 as tolerated.  Discussed with family at bedside the prognosis is guarded.  Patient will likely need placement should he improve.  Discussed SNF versus inpatient hospice.  Will see how patient clinically progresses over the next few days.  12/19/2024  Currently on 25 L 100% Vapotherm.  Continue antibiotics.  Chest x-ray reviewed which is improving however patient clinically declining.  He is requiring increased O2.  Snf versus inpatient hospice.    12/20/2024  Patient agitated today.  Haldol p.r.n. ordered.  Continue antibiotics.  Continue steroids.  Patient is still requiring high-flow Vapotherm.  Discussed case with family at bedside.  Will continue max therapy over the weekend.  Should patient fail to improve family is agreeable to inpatient hospice.  12/21/2024  Agitation improved.  Continue Haldol p.r.n..  Continue antibiotics and steroids.  Patient weaned down to 18 L 100% FiO2.  Continue current therapy.  Pending clinical progress.  Possible inpatient hospice on Monday if patient failed to improve.  12/22/2024  Currently on 18 L 70% FiO2.  Continue antibiotic therapy.  Wean O2 as tolerated.  12/23/2024  NAEON, weaning vapotherm. Patient with waxing and waning mental status. Discussed condition with tariq's son at bedside. Continue present management. Obtain CXR in AM, continue antibiotics. Discussed with ID and Pulmonary, appreciate assistance.  12/24/2024  NAEON. Weaning vapotherm to HFNC today. Family at bedside. Patient awake, alert, difficulty hearing which is a new for patient. Discussed overall prognosis and GOC, family wishes to consider hospice, will consult SW to assist. Continue present management.      Review of Systems   All other systems reviewed and are negative.    Objective:     Vital Signs (Most Recent):  Temp: 97.7 °F (36.5 °C) (12/24/24 1630)  Pulse: 90 (12/24/24 1630)  Resp: 16 (12/24/24 1630)  BP: (!) 145/70 (12/24/24 1630)  SpO2: (!) 94 % (12/24/24 1630) Vital Signs (24h Range):  Temp:  [97.3 °F (36.3 °C)-98.3 °F (36.8 °C)] 97.7 °F (36.5 °C)  Pulse:  [63-90] 90  Resp:  [14-22] 16  SpO2:  [88 %-96 %] 94 %  BP: (129-182)/(61-86) 145/70     Weight: 81.7 kg (180 lb 1.9 oz)  Body mass index is 28.21 kg/m².    Intake/Output Summary (Last 24 hours) at 12/24/2024 1800  Last data filed at 12/24/2024 0911  Gross per 24 hour   Intake 320 ml   Output --   Net 320 ml         Physical Exam  Constitutional:       General: He is not in acute  Male distress.     Appearance: He is ill-appearing.   Cardiovascular:      Rate and Rhythm: Regular rhythm. Tachycardia present.      Heart sounds: No murmur heard.  Pulmonary:      Effort: Respiratory distress present.      Breath sounds: Wheezing and rhonchi present.      Comments: HFNC  Abdominal:      General: There is no distension.      Palpations: Abdomen is soft.      Tenderness: There is no abdominal tenderness.   Musculoskeletal:      Comments: RUE midline in place   Neurological:      Mental Status: He is disoriented.             Significant Labs: All pertinent labs within the past 24 hours have been reviewed.  Recent Lab Results         12/24/24  0519   12/24/24  0452        Albumin   2.8       ALP   74       ALT   60       Anion Gap   14       AST   29       Baso #   0.01       Basophil %   0.1       BILIRUBIN TOTAL   0.6  Comment: For infants and newborns, interpretation of results should be based  on gestational age, weight and in agreement with clinical  observations.    Premature Infant recommended reference ranges:  Up to 24 hours.............<8.0 mg/dL  Up to 48 hours............<12.0 mg/dL  3-5 days..................<15.0 mg/dL  6-29 days.................<15.0 mg/dL         BNP   34  Comment: Values of less than 100 pg/ml are consistent with non-CHF populations.       BUN   84       Calcium   8.7       Chloride   96       CO2   24       Creatinine   2.2       Differential Method   Automated       eGFR   27       Eos #   0.0       Eos %   0.0       Glucose   300       Gran # (ANC)   8.3       Gran %   91.1       Hematocrit   39.6       Hemoglobin   12.9       Immature Grans (Abs)   0.29  Comment: Mild elevation in immature granulocytes is non specific and   can be seen in a variety of conditions including stress response,   acute inflammation, trauma and pregnancy. Correlation with other   laboratory and clinical findings is essential.         Immature Granulocytes   3.2       Lymph #   0.4       Lymph  %   4.2       MCH   29.9       MCHC   32.6       MCV   92       Mono #   0.1       Mono %   1.4       MPV   11.2       nRBC   0       QRS Duration 70         OHS QTC Calculation 424         Platelet Count   85       Potassium   5.3       PROTEIN TOTAL   5.2       RBC   4.31       RDW   12.3       Sodium   134       WBC   9.10               Significant Imaging: I have reviewed all pertinent imaging results/findings within the past 24 hours.    X-Ray Chest 1 View   Final Result      See above.         Electronically signed by: Jose Dias   Date:    12/24/2024   Time:    09:15      X-Ray Chest 1 View   Final Result      Stable chest.         Electronically signed by: Aaron Gupta MD   Date:    12/18/2024   Time:    12:51      X-Ray Chest 1 View   Final Result      1.  Overall, there is been interval improvement.         Electronically signed by: David Frost MD   Date:    12/17/2024   Time:    08:43      X-Ray Chest 1 View   Final Result      1.  Overall, there is been interval worsening with near complete whiteout of the left hemithorax which appears to be a combination of near complete collapse of the left lung and left pleural effusion.      2.  Stable findings as noted above.         Electronically signed by: David Frost MD   Date:    12/16/2024   Time:    11:40      X-Ray Chest 1 View   Final Result      Hazy left greater than right lung base opacities suggestive of small bilateral pleural effusions.      Finalized on: 12/14/2024 10:23 AM By:  Federico Saha MD   [facility]# [id number]      2024-12-14 10:26:03.826    [facility]      US Lower Extremity Veins Bilateral   Final Result      No evidence of deep venous thrombosis in either lower extremity.         Electronically signed by: Ever Crowley   Date:    12/10/2024   Time:    13:35      X-Ray Chest AP Portable   Final Result      1.  Lateral left upper lobe opacity, most likely early pneumonia.  Treatment for presumed pneumonia and follow-up x-rays in 4-6 weeks  recommended to ensure resolution after adequate treatment.      2.  Low lung volumes with vascular crowding or atelectasis in the lung bases.      3.  Stable findings as noted above.         Electronically signed by: David Frost MD   Date:    12/10/2024   Time:    11:02            Assessment and Plan     * Acute on chronic respiratory failure with hypoxia  Weaning vapotherm as tolerated  Continue Solu-Medrol   Continue antibiotics   Pulmonology and ID consulted, appreciate reccs  Patient desats with minimal movement  Discussed case with family at bedside   Continue aggressive therapy for now    12/24/24  Improvement noted with O2 requirements  Continue above management  Family considering hospice, consult SW    Delirium  Likely metabolic secondary to illness   PRN medications available for agitation and insomnia  Delirium precautions      CKD stage 3 secondary to diabetes  Creatine stable for now. BMP reviewed- noted Estimated Creatinine Clearance: 21 mL/min (A) (based on SCr of 2.2 mg/dL (H)). according to latest data. Based on current GFR, CKD stage is stage 3 - GFR 30-59.  Monitor UOP and serial BMP and adjust therapy as needed. Renally dose meds. Avoid nephrotoxic medications and procedures.  Discontinue intravenous lasix  Monitor trend    Pneumonia  Continue Zyvox and Rocephin  Sputum culture with MRSA    Atrial fibrillation  Patient with Paroxysmal (<7 days) atrial fibrillation which is controlled currently with Bbs and Calcium Channel Blocker. Patient is currently in sinus rhythm.[id number] Score: 4.   Discussed cardiology vs gastroenterology recommendations   Patient voices understanding  Will continue holding AC    Coronary artery disease of native artery of native heart with stable angina pectoris  Patient with known CAD which is controlled Will continue Statin and monitor for S/Sx of angina/ACS. Continue to monitor on telemetry.     Acquired hypothyroidism  Patient has chronic hypothyroidism. TFTs  reviewed-   Lab Results   Component Value Date    TSH 1.780 07/03/2023   Will continue chronic levothyroxine and adjust for and clinical changes    Chronic diastolic heart failure  Patient has  unspecified  heart failure that is Chronic. On presentation their CHF was . Most recent BNP and echo results are listed below.well compensated  Recent Labs     12/24/24  0452   BNP 34       Latest ECHO  Results for orders placed during the hospital encounter of 01/08/21    Echo Color Flow Doppler? Yes    Interpretation Summary  · The left ventricle is normal in size with moderate concentric hypertrophy and normal systolic function. The estimated ejection fraction is 60%  · Indeterminate left ventricular diastolic function.  · Normal right ventricular size with normal right ventricular systolic function.  · Mild tricuspid regurgitation.  · Normal central venous pressure (3 mmHg).  · The estimated PA systolic pressure is 32 mmHg.    Current Heart Failure Medications  , Every 12 hours, Oral  , 2 times daily with meals, Oral  hydrALAZINE injection 10 mg, Every 6 hours PRN, Intravenous  hydrALAZINE tablet 10 mg, Every 12 hours, Oral    Plan  - Monitor strict I&Os and daily weights.    - Place on telemetry  - Low sodium diet  - Place on fluid restriction of 1.5 L.   - Cardiology has been consulted  - The patient's volume status is at their baseline  Ambulatory oxygen evaluation   Nutrition consult   Discontinue intravenous lasix    12/24/2024  Holding further diuretics  AM labs and CXR    Lower extremity edema  Improving  Multifactorial: malnutrition, fluid indiscretion, venous stasis, recent medication(s) change, dependent edema  Counseling provided  Elevate legs   Manish hose and topicals    Atrial flutter  Controlled  Follow up outpatient primary cardiologist  Holding a/c due to pmh GI bleed     Essential hypertension  Chronic, controlled.  Latest blood pressure and vitals reviewed-   Temp:  [97.3 °F (36.3 °C)-98.3 °F (36.8 °C)]    Pulse:  [63-90]   Resp:  [14-22]   BP: (129-182)/(61-86)   SpO2:  [88 %-96 %] .   Home meds for hypertension were reviewed and noted below.   Hypertension Medications               amLODIPine (NORVASC) 5 MG tablet Take 1 tablet (5 mg total) by mouth once daily.    carvediloL (COREG) 12.5 MG tablet Take 12.5 mg by mouth 2 (two) times daily with meals.    diltiaZEM (CARDIZEM CD) 300 MG 24 hr capsule Take 1 capsule by mouth every morning.    furosemide (LASIX) 20 MG tablet Take 1 tablet (20 mg total) by mouth once daily.    hydrALAZINE (APRESOLINE) 10 MG tablet Take 10 mg by mouth every 12 (twelve) hours.            While in the hospital, will manage blood pressure as follows; Adjust home antihypertensive regimen as follows- elevated blood pressure, resume home hydralazine    Will utilize p.r.n. blood pressure medication only if patient's blood pressure greater than  180/110 and he develops symptoms such as worsening chest pain or shortness of breath.      Hypercholesterolemia  Patient is chronically on statin.will continue for now. Last Lipid Panel:   Lab Results   Component Value Date    CHOL 121 07/03/2023    HDL 37 (L) 07/03/2023    LDLCALC 57 07/03/2023    TRIG 160 (H) 07/03/2023    CHOLHDL 27.8 08/01/2019     Plan:  -Continue home medication  -low fat/low calorie diet    Severe protein-calorie malnutrition  Nutrition consulted. Most recent weight and BMI monitored-     Measurements:  Wt Readings from Last 1 Encounters:   12/18/24 81.7 kg (180 lb 1.9 oz)   Body mass index is 28.21 kg/m².    Patient has been screened and assessed by RD.    Malnutrition Type:  Context: acute illness or injury  Level: severe    Malnutrition Characteristic Summary:  Weight Loss (Malnutrition): greater than 2% in 1 week  Subcutaneous Fat (Malnutrition): moderate depletion  Muscle Mass (Malnutrition): moderate depletion  Fluid Accumulation (Malnutrition): moderate    Interventions/Recommendations (treatment strategy):  1. Recommend  modify pt's diet to Cardiac, Soft and bite-sized (IDDSI Level 6), 1500 mL fluid restriction diet 2. Recommend pt continues Suplena once/day per pt preference 3. Recommend feeding assistance as warrented 4. Weigh twice weekly        VTE Risk Mitigation (From admission, onward)           Ordered     Place SALUD hose  Until discontinued         12/12/24 0921     Place SALUD hose  Until discontinued         12/11/24 0949     enoxaparin injection 30 mg  Daily         12/10/24 1922     IP VTE HIGH RISK PATIENT  Once         12/10/24 1922     Place sequential compression device  Until discontinued         12/10/24 1922                    Discharge Planning   SHEA:      Code Status: DNR   Medical Readiness for Discharge Date:   Discharge Plan A: Home, Home Health              Cecilio Sterling MD  Department of Hospital Medicine   [facility] - Telemetry ([facility])

## 2024-12-25 NOTE — ASSESSMENT & PLAN NOTE
Patient has  unspecified  heart failure that is Chronic. On presentation their CHF was . Most recent BNP and echo results are listed below.well compensated  Recent Labs     12/24/24  0452   BNP 34       Latest ECHO  Results for orders placed during the hospital encounter of 01/08/21    Echo Color Flow Doppler? Yes    Interpretation Summary  · The left ventricle is normal in size with moderate concentric hypertrophy and normal systolic function. The estimated ejection fraction is 60%  · Indeterminate left ventricular diastolic function.  · Normal right ventricular size with normal right ventricular systolic function.  · Mild tricuspid regurgitation.  · Normal central venous pressure (3 mmHg).  · The estimated PA systolic pressure is 32 mmHg.    Current Heart Failure Medications  , Every 12 hours, Oral  , 2 times daily with meals, Oral  hydrALAZINE injection 10 mg, Every 6 hours PRN, Intravenous  hydrALAZINE tablet 10 mg, Every 12 hours, Oral    Plan  - Monitor strict I&Os and daily weights.    - Place on telemetry  - Low sodium diet  - Place on fluid restriction of 1.5 L.   - Cardiology has been consulted  - The patient's volume status is at their baseline  Ambulatory oxygen evaluation   Nutrition consult   Discontinue intravenous lasix    12/24/2024  Holding further diuretics  AM labs and CXR

## 2024-12-25 NOTE — ASSESSMENT & PLAN NOTE
Chronic, controlled.  Latest blood pressure and vitals reviewed-   Temp:  [97.3 °F (36.3 °C)-98.3 °F (36.8 °C)]   Pulse:  [63-90]   Resp:  [14-22]   BP: (129-182)/(61-86)   SpO2:  [88 %-96 %] .   Home meds for hypertension were reviewed and noted below.   Hypertension Medications               amLODIPine (NORVASC) 5 MG tablet Take 1 tablet (5 mg total) by mouth once daily.    carvediloL (COREG) 12.5 MG tablet Take 12.5 mg by mouth 2 (two) times daily with meals.    diltiaZEM (CARDIZEM CD) 300 MG 24 hr capsule Take 1 capsule by mouth every morning.    furosemide (LASIX) 20 MG tablet Take 1 tablet (20 mg total) by mouth once daily.    hydrALAZINE (APRESOLINE) 10 MG tablet Take 10 mg by mouth every 12 (twelve) hours.            While in the hospital, will manage blood pressure as follows; Adjust home antihypertensive regimen as follows- elevated blood pressure, resume home hydralazine    Will utilize p.r.n. blood pressure medication only if patient's blood pressure greater than  180/110 and he develops symptoms such as worsening chest pain or shortness of breath.

## 2024-12-25 NOTE — ASSESSMENT & PLAN NOTE
Weaning vapotherm as tolerated  Continue Solu-Medrol   Continue antibiotics   Pulmonology and ID consulted, appreciate reccs  Patient desats with minimal movement  Discussed case with family at bedside   Continue aggressive therapy for now    12/24/24  Improvement noted with O2 requirements  Continue above management  Family considering hospice, consult [facility]

## 2024-12-25 NOTE — ASSESSMENT & PLAN NOTE
Likely metabolic secondary to illness   PRN medications available for agitation and insomnia  Delirium precautions

## 2024-12-25 NOTE — ASSESSMENT & PLAN NOTE
Patient with Paroxysmal (<7 days) atrial fibrillation which is controlled currently with Bbs and Calcium Channel Blocker. Patient is currently in sinus rhythm.[id number] Score: 4.   Discussed cardiology vs gastroenterology recommendations   Patient voices understanding  Will continue holding AC

## 2024-12-25 NOTE — ASSESSMENT & PLAN NOTE
Patient has chronic hypothyroidism. TFTs reviewed-   Lab Results   Component Value Date    TSH 1.780 07/03/2023   Will continue chronic levothyroxine and adjust for and clinical changes

## 2024-12-26 VITALS
TEMPERATURE: 98 F | HEART RATE: 94 BPM | SYSTOLIC BLOOD PRESSURE: 154 MMHG | WEIGHT: 180.13 LBS | RESPIRATION RATE: 16 BRPM | DIASTOLIC BLOOD PRESSURE: 65 MMHG | BODY MASS INDEX: 28.27 KG/M2 | OXYGEN SATURATION: 85 % | HEIGHT: 67 IN

## 2024-12-26 LAB
BASOPHILS # BLD AUTO: 0.06 K/UL (ref 0–0.2)
BASOPHILS NFR BLD: 0.3 % (ref 0–1.9)
DIFFERENTIAL METHOD BLD: ABNORMAL
EOSINOPHIL # BLD AUTO: 0 K/UL (ref 0–0.5)
EOSINOPHIL NFR BLD: 0 % (ref 0–8)
ERYTHROCYTE [DISTWIDTH] IN BLOOD BY AUTOMATED COUNT: 12.9 % (ref 11.5–14.5)
HCT VFR BLD AUTO: 41.5 % (ref 40–54)
HGB BLD-MCNC: 13.3 G/DL (ref 14–18)
IMM GRANULOCYTES # BLD AUTO: 0.95 K/UL (ref 0–0.04)
IMM GRANULOCYTES NFR BLD AUTO: 4.9 % (ref 0–0.5)
LYMPHOCYTES # BLD AUTO: 1.2 K/UL (ref 1–4.8)
LYMPHOCYTES NFR BLD: 6.1 % (ref 18–48)
MCH RBC QN AUTO: 30.1 PG (ref 27–31)
MCHC RBC AUTO-ENTMCNC: 32 G/DL (ref 32–36)
MCV RBC AUTO: 94 FL (ref 82–98)
MONOCYTES # BLD AUTO: 1 K/UL (ref 0.3–1)
MONOCYTES NFR BLD: 4.9 % (ref 4–15)
NEUTROPHILS # BLD AUTO: 16.4 K/UL (ref 1.8–7.7)
NEUTROPHILS NFR BLD: 83.8 % (ref 38–73)
NRBC BLD-RTO: 0 /100 WBC
PLATELET # BLD AUTO: 55 K/UL (ref 150–450)
PLATELET BLD QL SMEAR: ABNORMAL
PMV BLD AUTO: 12.3 FL (ref 9.2–12.9)
POCT GLUCOSE: 198 MG/DL (ref 70–110)
POCT GLUCOSE: 214 MG/DL (ref 70–110)
POCT GLUCOSE: 236 MG/DL (ref 70–110)
RBC # BLD AUTO: 4.42 M/UL (ref 4.6–6.2)
WBC # BLD AUTO: 19.57 K/UL (ref 3.9–12.7)

## 2024-12-26 PROCEDURE — 25000242 PHARM REV CODE 250 ALT 637 W/ HCPCS: Performed by: INTERNAL MEDICINE

## 2024-12-26 PROCEDURE — 94640 AIRWAY INHALATION TREATMENT: CPT

## 2024-12-26 PROCEDURE — 63600175 PHARM REV CODE 636 W HCPCS: Performed by: FAMILY MEDICINE

## 2024-12-26 PROCEDURE — 25000003 PHARM REV CODE 250: Performed by: FAMILY MEDICINE

## 2024-12-26 PROCEDURE — 25000003 PHARM REV CODE 250: Performed by: HOSPITALIST

## 2024-12-26 PROCEDURE — 94761 N-INVAS EAR/PLS OXIMETRY MLT: CPT

## 2024-12-26 PROCEDURE — 25000003 PHARM REV CODE 250: Performed by: NURSE PRACTITIONER

## 2024-12-26 PROCEDURE — 63600175 PHARM REV CODE 636 W HCPCS: Performed by: HOSPITALIST

## 2024-12-26 PROCEDURE — 63600175 PHARM REV CODE 636 W HCPCS: Performed by: NURSE PRACTITIONER

## 2024-12-26 PROCEDURE — 63600175 PHARM REV CODE 636 W HCPCS: Performed by: STUDENT IN AN ORGANIZED HEALTH CARE EDUCATION/TRAINING PROGRAM

## 2024-12-26 PROCEDURE — 85025 COMPLETE CBC W/AUTO DIFF WBC: CPT | Performed by: HOSPITALIST

## 2024-12-26 PROCEDURE — 99900035 HC TECH TIME PER 15 MIN (STAT)

## 2024-12-26 PROCEDURE — 36415 COLL VENOUS BLD VENIPUNCTURE: CPT | Performed by: HOSPITALIST

## 2024-12-26 PROCEDURE — 94664 DEMO&/EVAL PT USE INHALER: CPT

## 2024-12-26 PROCEDURE — 27000221 HC OXYGEN, UP TO 24 HOURS

## 2024-12-26 PROCEDURE — 25000242 PHARM REV CODE 250 ALT 637 W/ HCPCS: Performed by: FAMILY MEDICINE

## 2024-12-26 RX ORDER — INSULIN ASPART 100 [IU]/ML
0-10 INJECTION, SOLUTION INTRAVENOUS; SUBCUTANEOUS
Status: DISCONTINUED | OUTPATIENT
Start: 2024-12-26 | End: 2024-12-26 | Stop reason: HOSPADM

## 2024-12-26 RX ORDER — IBUPROFEN 200 MG
16 TABLET ORAL
Status: DISCONTINUED | OUTPATIENT
Start: 2024-12-26 | End: 2024-12-26 | Stop reason: HOSPADM

## 2024-12-26 RX ORDER — GLUCAGON 1 MG
1 KIT INJECTION
Status: DISCONTINUED | OUTPATIENT
Start: 2024-12-26 | End: 2024-12-26 | Stop reason: HOSPADM

## 2024-12-26 RX ORDER — IBUPROFEN 200 MG
24 TABLET ORAL
Status: DISCONTINUED | OUTPATIENT
Start: 2024-12-26 | End: 2024-12-26 | Stop reason: HOSPADM

## 2024-12-26 RX ADMIN — MICONAZOLE NITRATE: 20 CREAM TOPICAL at 09:12

## 2024-12-26 RX ADMIN — LINEZOLID 600 MG: 600 INJECTION, SOLUTION INTRAVENOUS at 11:12

## 2024-12-26 RX ADMIN — DIPHENHYDRAMINE HYDROCHLORIDE 6.25 MG: 50 INJECTION, SOLUTION INTRAMUSCULAR; INTRAVENOUS at 01:12

## 2024-12-26 RX ADMIN — HYDRALAZINE HYDROCHLORIDE 10 MG: 20 INJECTION, SOLUTION INTRAMUSCULAR; INTRAVENOUS at 01:12

## 2024-12-26 RX ADMIN — ENOXAPARIN SODIUM 30 MG: 30 INJECTION SUBCUTANEOUS at 05:12

## 2024-12-26 RX ADMIN — METRONIDAZOLE: 10 GEL TOPICAL at 11:12

## 2024-12-26 RX ADMIN — IPRATROPIUM BROMIDE AND ALBUTEROL SULFATE 3 ML: 2.5; .5 SOLUTION RESPIRATORY (INHALATION) at 01:12

## 2024-12-26 RX ADMIN — PREDNISONE 20 MG: 20 TABLET ORAL at 09:12

## 2024-12-26 RX ADMIN — PRAVASTATIN SODIUM 40 MG: 20 TABLET ORAL at 09:12

## 2024-12-26 RX ADMIN — MICONAZOLE NITRATE: 20 CREAM TOPICAL at 05:12

## 2024-12-26 RX ADMIN — LEVOTHYROXINE SODIUM 50 MCG: 50 TABLET ORAL at 06:12

## 2024-12-26 RX ADMIN — CEFTRIAXONE 2 G: 2 INJECTION, POWDER, FOR SOLUTION INTRAMUSCULAR; INTRAVENOUS at 10:12

## 2024-12-26 RX ADMIN — INSULIN ASPART 2 UNITS: 100 INJECTION, SOLUTION INTRAVENOUS; SUBCUTANEOUS at 06:12

## 2024-12-26 RX ADMIN — HYDRALAZINE HYDROCHLORIDE 10 MG: 10 TABLET, FILM COATED ORAL at 09:12

## 2024-12-26 RX ADMIN — DILTIAZEM HYDROCHLORIDE 300 MG: 180 CAPSULE, COATED, EXTENDED RELEASE ORAL at 06:12

## 2024-12-26 RX ADMIN — BUDESONIDE INHALATION 0.5 MG: 0.5 SUSPENSION RESPIRATORY (INHALATION) at 07:12

## 2024-12-26 RX ADMIN — ARFORMOTEROL TARTRATE 15 MCG: 15 SOLUTION RESPIRATORY (INHALATION) at 07:12

## 2024-12-26 RX ADMIN — MUPIROCIN: 20 OINTMENT TOPICAL at 09:12

## 2024-12-26 RX ADMIN — MAGNESIUM OXIDE TAB 400 MG (241.3 MG ELEMENTAL MG) 400 MG: 400 (241.3 MG) TAB at 09:12

## 2024-12-26 RX ADMIN — LINEZOLID 600 MG: 600 INJECTION, SOLUTION INTRAVENOUS at 01:12

## 2024-12-26 RX ADMIN — IPRATROPIUM BROMIDE AND ALBUTEROL SULFATE 3 ML: 2.5; .5 SOLUTION RESPIRATORY (INHALATION) at 07:12

## 2024-12-26 NOTE — PT/OT/SLP PROGRESS
Occupational Therapy      Patient Name:  Jimmy Young   MRN:  [id number]    OT chart review completed. Patient and family electing to enroll pt in inpatient hospice at this time. D/C acute OT services.  Please re-consult if situation changes.    12/26/2024  Thu Ramos, OT   1016

## 2024-12-26 NOTE — PROGRESS NOTES
[facility] - Telemetry ([facility])  Wound Care    Patient Name:  Jimmy Young   MRN:  [id number]  Date: 2024  Diagnosis: Acute on chronic respiratory failure with hypoxia    History:     Past Medical History:   Diagnosis Date    Aneurysm 2016    abdominal, stent placed    Arthritis     Asthma     Atrial fibrillation     Cancer     skin cancer on face    CKD (chronic kidney disease)     COPD (chronic obstructive pulmonary disease) 10/05/2017    Diabetes mellitus     Emphysema lung     Encounter for blood transfusion     Hypertension        Social History     Socioeconomic History    Marital status: Single   Tobacco Use    Smoking status: Former     Current packs/day: 0.00     Average packs/day: 2.0 packs/day for 20.0 years (40.0 ttl pk-yrs)     Types: Cigarettes     Start date: 1957     Quit date: 1977     Years since quittin.0    Smokeless tobacco: Former   Substance and Sexual Activity    Alcohol use: No    Drug use: No    Sexual activity: Not Currently     Social Drivers of Health     Financial Resource Strain: Patient Declined (2024)    Overall Financial Resource Strain (CARDIA)     Difficulty of Paying Living Expenses: Patient declined   Food Insecurity: Patient Declined (2024)    Hunger Vital Sign     Worried About Running Out of Food in the Last Year: Patient declined     Ran Out of Food in the Last Year: Patient declined   Transportation Needs: Patient Declined (2024)    TRANSPORTATION NEEDS     Transportation : Patient declined   Physical Activity: Inactive (7/3/2023)    Received from [facility]    Exercise Vital Sign     Days of Exercise per Week: 0 days     Minutes of Exercise per Session: 0 min   Stress: Patient Declined (2024)    [employer] [place] - Occupational Stress Questionnaire     Feeling of Stress : Patient declined   Housing Stability: Patient Declined  (12/11/2024)    Housing Stability Vital Sign     Unable to Pay for Housing in the Last Year: Patient declined     Homeless in the Last Year: Patient declined       Precautions:     Allergies as of 12/10/2024    (No Known Allergies)       WO Assessment Details/Treatment     F/U with Mr. Young for monitoring of possible DTI vs bruise to the Right heel.   Patient on contact precautions, proper PPE donned prior to entering.   Patient resting in bed, awake appears to be very restless today. Patient is pulling at heart monitor and pulse oximetry monitoring.   --Foam dressing removed from Right heel. Again noted maroon/purple nonblanchable discoloration, area appears larger during this visit and deeper in color. No signs of surrounding yellow discoloration that would suggest a healing bruise, does appear to be a DTI. Area cleansed with saline, patted dry. Painted with cavilon. Applied bordered heel foam dressing.   --Patient noted to have incontinence associated dermatitis this visit to the buttock, perirectal region, and scrotum with scatter areas of partial thickness breakdown. Scant amount of bleeding noted around the scrotum. Cleansed with soap and water, applied zinc oxide containing moisture barrier paste. Recommend cleansing area BID and PRN after all incontinence episodes.     Heel offloading boots removed from the couch at bedside and applied to patients BLE.      12/26/24 0900   WOCN Assessment   WOCN Total Time (mins) 45   Visit Date 12/26/24   Visit Time 0900   Consult Type Follow Up   WOCN Speciality Wound   Wound deep tissue injury;moisture;At risk for pressure Injury   Intervention assessed;changed;applied;chart review;orders   Teaching on-going        Wound 12/26/24 1248 Pressure Injury Right Heel   Date First Assessed/Time First Assessed: 12/26/24 1248   Present on Original Admission: No  Primary Wound Type: Pressure Injury  Side: Right  Location: Heel   Wound Image    Pressure Injury Stage DTPI    Dressing Appearance Open to air   Drainage Amount None   Drainage Characteristics/Odor No odor   Appearance Intact;Maroon;Purple   Tissue loss description Not applicable   Care Cleansed with:;Sterile normal saline;Applied:;Skin Barrier   Dressing Applied;Foam   Dressing Change Due 12/30/24        Wound 12/26/24 1249 Incontinence associated dermatitis Perirectal   Date First Assessed/Time First Assessed: 12/26/24 1249   Primary Wound Type: Incontinence associated dermatitis  Location: (c) Perirectal   Wound Image    Dressing Appearance Open to air;Moist drainage   Drainage Amount Scant   Drainage Characteristics/Odor Sanguineous   Appearance Pink;Red;Moist;Bleeding   Tissue loss description Partial thickness   Care Cleansed with:;Soap and water   Dressing Applied  (zinc oxide moisture barrier paste)   Dressing Change Due 12/26/24 12/26/2024

## 2024-12-26 NOTE — SUBJECTIVE & OBJECTIVE
Review of Systems   All other systems reviewed and are negative.    Objective:     Vital Signs (Most Recent):  Temp: 98.5 °F (36.9 °C) (12/26/24 0726)  Pulse: 107 (12/26/24 0829)  Resp: 20 (Simultaneous filing. User may not have seen previous data.) (12/26/24 0726)  BP: (!) 169/107 (12/26/24 0726)  SpO2: 96 % (Simultaneous filing. User may not have seen previous data.) (12/26/24 0726) Vital Signs (24h Range):  Temp:  [97.8 °F (36.6 °C)-98.5 °F (36.9 °C)] 98.5 °F (36.9 °C)  Pulse:  [] 107  Resp:  [16-20] 20  SpO2:  [90 %-98 %] 96 %  BP: (143-194)/() 169/107     Weight: 81.7 kg (180 lb 1.9 oz)  Body mass index is 28.21 kg/m².    Intake/Output Summary (Last 24 hours) at 12/26/2024 0956  Last data filed at 12/25/2024 1804  Gross per 24 hour   Intake 600 ml   Output --   Net 600 ml         Physical Exam  Constitutional:       General: He is not in acute distress.     Appearance: He is ill-appearing.   Cardiovascular:      Rate and Rhythm: Regular rhythm. Tachycardia present.      Heart sounds: No murmur heard.  Pulmonary:      Effort: Respiratory distress present.      Breath sounds: Wheezing and rhonchi present.      Comments: HFNC  Abdominal:      General: There is no distension.      Palpations: Abdomen is soft.      Tenderness: There is no abdominal tenderness.   Musculoskeletal:      Comments: RUE midline in place   Neurological:      Mental Status: He is disoriented.             Significant Labs: All pertinent labs within the past 24 hours have been reviewed.  Recent Lab Results         12/26/24  0618   12/26/24  0425        Baso #   0.06       Basophil %   0.3       Differential Method   Automated       Eos #   0.0       Eos %   0.0       Gran # (ANC)   16.4       Gran %   83.8       Hematocrit   41.5       Hemoglobin   13.3       Immature Grans (Abs)   0.95  Comment: Mild elevation in immature granulocytes is non specific and   can be seen in a variety of conditions including stress response,    acute inflammation, trauma and pregnancy. Correlation with other   laboratory and clinical findings is essential.         Immature Granulocytes   4.9       Lymph #   1.2       Lymph %   6.1       MCH   30.1       MCHC   32.0       MCV   94       Mono #   1.0       Mono %   4.9       MPV   12.3       nRBC   0       Platelet Estimate   Decreased  Comment: Reviewed by Technologist.       Platelet Count   55       POCT Glucose 214         RBC   4.42       RDW   12.9       WBC   19.57               Significant Imaging: I have reviewed all pertinent imaging results/findings within the past 24 hours.    X-Ray Chest 1 View   Final Result      See above.         Electronically signed by: Jose Dias   Date:    12/24/2024   Time:    09:15      X-Ray Chest 1 View   Final Result      Stable chest.         Electronically signed by: Aaron Gupta MD   Date:    12/18/2024   Time:    12:51      X-Ray Chest 1 View   Final Result      1.  Overall, there is been interval improvement.         Electronically signed by: David Frost MD   Date:    12/17/2024   Time:    08:43      X-Ray Chest 1 View   Final Result      1.  Overall, there is been interval worsening with near complete whiteout of the left hemithorax which appears to be a combination of near complete collapse of the left lung and left pleural effusion.      2.  Stable findings as noted above.         Electronically signed by: David Frost MD   Date:    12/16/2024   Time:    11:40      X-Ray Chest 1 View   Final Result      Hazy left greater than right lung base opacities suggestive of small bilateral pleural effusions.      Finalized on: 12/14/2024 10:23 AM By:  Federico Saha MD   [facility]# [id number]      2024-12-14 10:26:03.826    [facility]      US Lower Extremity Veins Bilateral   Final Result      No evidence of deep venous thrombosis in either lower extremity.         Electronically signed by: Ever Crowley   Date:    12/10/2024   Time:    13:35      X-Ray Chest AP Portable    Final Result      1.  Lateral left upper lobe opacity, most likely early pneumonia.  Treatment for presumed pneumonia and follow-up x-rays in 4-6 weeks recommended to ensure resolution after adequate treatment.      2.  Low lung volumes with vascular crowding or atelectasis in the lung bases.      3.  Stable findings as noted above.         Electronically signed by: David Frost MD   Date:    12/10/2024   Time:    11:02

## 2024-12-26 NOTE — CONSULTS
SW met with patient, son, Ruben, and his spouse, Dee Dee, regarding hospice preference. Family preference obtained for [facility]. Family inquired about next steps from inpatient hospice. SW discussed home hospice and benefits and reiterated need for caregiver at home. Pt's son reports pt does not have a caregiver capable of managing present needs at home. Pt was independent, living alone and ambulatory prior to admission.   SW discussed transition to snf care with hospice and will provide handout for documents needed for NH placement. Son reports he and his sister, Chrissy, at [facility] [place] and have access to pt's financials.  Referral sent to [facility] via [employer]. LiaisonGabrielle, notified and will come to hospital to meet with family. SOPHIE awaiting ETA. SOPHIE to f/u

## 2024-12-26 NOTE — PROGRESS NOTES
[facility] - Telemetry ([facility]  Progress Note    Patient Name: Jimmy Young  MRN: [id number]  Patient Class: IP- Inpatient   Admission Date: 12/10/2024  Length of Stay: 16 days  Attending Physician: Cecilio Sterling MD  Primary Care Provider: Nacho Richardson MD        Subjective     Principal Problem:Acute on chronic respiratory failure with hypoxia        HPI:  Jimmy Young is a 89 y.o. male with a PMH  has a past medical history of Aneurysm (2016), Arthritis, Asthma, Atrial fibrillation, Cancer, CKD (chronic kidney disease), COPD (chronic obstructive pulmonary disease) (10/05/2017), Diabetes mellitus, Emphysema lung, Encounter for blood transfusion, and Hypertension.presented to the ED for swelling of the legs. Patient was referred from his primary care physician today for worsening lower extremity edema, shortness of breath, and cough. Patient is on chronic home O2 at 3L/min via NC. Reports compliance with his lasix and denies any excessive fluid or salt intake. Denies any other complaints at his time.      ER workup revealed CBC to be unremarkable.  CMP with BUN/creatinine at baseline of 29/2.2.   mg/dL.  Magnesium 1.2.  BNP, troponin, lactic acid within normal limits.  Flu/COVID negative.  Blood cultures obtained x2.  Ultrasound lower extremities negative for DVT.  Chest x-ray revealed left upper lobe opacity resembling early pneumonia.  EKG revealed a flutter with variable AV block and left axis deviation with a ventricular rate of 64 beats per minute with a QT/QTC of 416/429.  Vital signs stable.  Patient is at baseline oxygen saturation of 92-94% on 3 liters/minute via nasal cannula.  Patient received 12.5 mg carvedilol, 1 g Rocephin IV, 100 mg doxycycline p.o., 30 mg Lovenox, 40 mg Lasix IV, and 10 mg hydralazine p.o. at outside facility.  Hospital Medicine consulted to admit patient for CHF exacerbation and pneumonia. Patient and family in agreement with treatment  plan. Patient admitted under inpatient status.    PCP: Nacho Richardson      Overview/Hospital Course:  12/11 admitted for pneumonia. Wears supplemental oxygen at baseline, 2L. On 4L. Speech consulted. Schedule breathing treatments. Discontinue lasix. Replete lytes. Echocardiogram pending. Cardiology consulted. Relaxing normotensive range in this patient demographic.  12/12 blood culture positive for staph. Infectious disease consulted. Continue monitoring repeat blood cultures. Recent medication(s) change with linagliptin with risk for congestive heart failure and skin reaction. Discussed risk vs benefit. Hba1c 6.9. recommend goal hba1c 8-9.   12/13 bun/creatinine increasing. Discontinued intravenous lasix. Remains on increased supplemental oxygen, 5L. Wean as able. Home hydralazine resumed for elevated blood pressure. Repeat blood cultures negative growth to date x 1 day. Infectious disease following  12/14/2024  Patient required increasing to 40 L 100% FiO2 Vapotherm.  Pulmonology consult on case.  Solu-Medrol added.  Continue empiric antibiotics.  Repeat chest x-ray.  12/15/2024  Patient weaned down to 20 L 80% Vapotherm.  Will continue current management.  12/16/2024  Still on 20L 80% vapotherm. Unable to be weaned at this time. Cont abx. Pulm and cardiology on case.   12/17/2024  Chest x-ray improved today compared to yesterday.  Still on 20 L 90% Vapotherm.  Continue antibiotics.  12/18/2024  Currently on 18 L 100% Vapotherm.  Continue antibiotics.  Wean O2 as tolerated.  Discussed with family at bedside the prognosis is guarded.  Patient will likely need placement should he improve.  Discussed SNF versus inpatient hospice.  Will see how patient clinically progresses over the next few days.  12/19/2024  Currently on 25 L 100% Vapotherm.  Continue antibiotics.  Chest x-ray reviewed which is improving however patient clinically declining.  He is requiring increased O2.  Snf versus inpatient hospice.    12/20/2024  Patient agitated today.  Haldol p.r.n. ordered.  Continue antibiotics.  Continue steroids.  Patient is still requiring high-flow Vapotherm.  Discussed case with family at bedside.  Will continue max therapy over the weekend.  Should patient fail to improve family is agreeable to inpatient hospice.  12/21/2024  Agitation improved.  Continue Haldol p.r.n..  Continue antibiotics and steroids.  Patient weaned down to 18 L 100% FiO2.  Continue current therapy.  Pending clinical progress.  Possible inpatient hospice on Monday if patient failed to improve.  12/22/2024  Currently on 18 L 70% FiO2.  Continue antibiotic therapy.  Wean O2 as tolerated.  12/23/2024  NAEON, weaning vapotherm. Patient with waxing and waning mental status. Discussed condition with tariq's son at bedside. Continue present management. Obtain CXR in AM, continue antibiotics. Discussed with ID and Pulmonary, appreciate assistance.  12/24/2024  NAEON. Weaning vapotherm to HFNC today. Family at bedside. Patient awake, alert, difficulty hearing which is a new for patient. Discussed overall prognosis and GOC, family wishes to consider hospice, will consult SW to assist. Continue present management.  12/25/2024  NAEON. Patient awake, conversant. Family at bedside. Continue to wean HFNC, IV antibiotics. Ongoing family discussions regarding hospice placement.      Review of Systems   All other systems reviewed and are negative.    Objective:     Vital Signs (Most Recent):  Temp: 98.5 °F (36.9 °C) (12/26/24 0726)  Pulse: 107 (12/26/24 0829)  Resp: 20 (Simultaneous filing. User may not have seen previous data.) (12/26/24 0726)  BP: (!) 169/107 (12/26/24 0726)  SpO2: 96 % (Simultaneous filing. User may not have seen previous data.) (12/26/24 0726) Vital Signs (24h Range):  Temp:  [97.8 °F (36.6 °C)-98.5 °F (36.9 °C)] 98.5 °F (36.9 °C)  Pulse:  [] 107  Resp:  [16-20] 20  SpO2:  [90 %-98 %] 96 %  BP: (143-194)/() 169/107     Weight:  81.7 kg (180 lb 1.9 oz)  Body mass index is 28.21 kg/m².    Intake/Output Summary (Last 24 hours) at 12/26/2024 0956  Last data filed at 12/25/2024 1804  Gross per 24 hour   Intake 600 ml   Output --   Net 600 ml         Physical Exam  Constitutional:       General: He is not in acute distress.     Appearance: He is ill-appearing.   Cardiovascular:      Rate and Rhythm: Regular rhythm. Tachycardia present.      Heart sounds: No murmur heard.  Pulmonary:      Effort: Respiratory distress present.      Breath sounds: Wheezing and rhonchi present.      Comments: HFNC  Abdominal:      General: There is no distension.      Palpations: Abdomen is soft.      Tenderness: There is no abdominal tenderness.   Musculoskeletal:      Comments: RUE midline in place   Neurological:      Mental Status: He is disoriented.             Significant Labs: All pertinent labs within the past 24 hours have been reviewed.  Recent Lab Results         12/26/24  0618   12/26/24  0425        Baso #   0.06       Basophil %   0.3       Differential Method   Automated       Eos #   0.0       Eos %   0.0       Gran # (ANC)   16.4       Gran %   83.8       Hematocrit   41.5       Hemoglobin   13.3       Immature Grans (Abs)   0.95  Comment: Mild elevation in immature granulocytes is non specific and   can be seen in a variety of conditions including stress response,   acute inflammation, trauma and pregnancy. Correlation with other   laboratory and clinical findings is essential.         Immature Granulocytes   4.9       Lymph #   1.2       Lymph %   6.1       MCH   30.1       MCHC   32.0       MCV   94       Mono #   1.0       Mono %   4.9       MPV   12.3       nRBC   0       Platelet Estimate   Decreased  Comment: Reviewed by Technologist.       Platelet Count   55       POCT Glucose 214         RBC   4.42       RDW   12.9       WBC   19.57               Significant Imaging: I have reviewed all pertinent imaging results/findings within the past 24  hours.    X-Ray Chest 1 View   Final Result      See above.         Electronically signed by: Jose Dias   Date:    12/24/2024   Time:    09:15      X-Ray Chest 1 View   Final Result      Stable chest.         Electronically signed by: Aaron Gupta MD   Date:    12/18/2024   Time:    12:51      X-Ray Chest 1 View   Final Result      1.  Overall, there is been interval improvement.         Electronically signed by: David Frost MD   Date:    12/17/2024   Time:    08:43      X-Ray Chest 1 View   Final Result      1.  Overall, there is been interval worsening with near complete whiteout of the left hemithorax which appears to be a combination of near complete collapse of the left lung and left pleural effusion.      2.  Stable findings as noted above.         Electronically signed by: David Frost MD   Date:    12/16/2024   Time:    11:40      X-Ray Chest 1 View   Final Result      Hazy left greater than right lung base opacities suggestive of small bilateral pleural effusions.      Finalized on: 12/14/2024 10:23 AM By:  Federico Saha MD   [facility]# [id number]      2024-12-14 10:26:03.826    [facility]      US Lower Extremity Veins Bilateral   Final Result      No evidence of deep venous thrombosis in either lower extremity.         Electronically signed by: Ever Crowley   Date:    12/10/2024   Time:    13:35      X-Ray Chest AP Portable   Final Result      1.  Lateral left upper lobe opacity, most likely early pneumonia.  Treatment for presumed pneumonia and follow-up x-rays in 4-6 weeks recommended to ensure resolution after adequate treatment.      2.  Low lung volumes with vascular crowding or atelectasis in the lung bases.      3.  Stable findings as noted above.         Electronically signed by: David Frost MD   Date:    12/10/2024   Time:    11:02            Assessment and Plan     * Acute on chronic respiratory failure with hypoxia  Weaning vapotherm as tolerated  Continue Solu-Medrol   Continue antibiotics    Pulmonology and ID consulted, appreciate reccs  Patient desats with minimal movement  Discussed case with family at bedside   Continue aggressive therapy for now    12/24/24  Improvement noted with O2 requirements  Continue above management  Family considering hospice, consult SW    Delirium  Likely metabolic secondary to illness   PRN medications available for agitation and insomnia  Delirium precautions      CKD stage 3 secondary to diabetes  Creatine stable for now. BMP reviewed- noted Estimated Creatinine Clearance: 21 mL/min (A) (based on SCr of 2.2 mg/dL (H)). according to latest data. Based on current GFR, CKD stage is stage 3 - GFR 30-59.  Monitor UOP and serial BMP and adjust therapy as needed. Renally dose meds. Avoid nephrotoxic medications and procedures.  Discontinue intravenous lasix  Monitor trend    Pneumonia  Continue Zyvox and Rocephin  Sputum culture with MRSA    Atrial fibrillation  Patient with Paroxysmal (<7 days) atrial fibrillation which is controlled currently with Bbs and Calcium Channel Blocker. Patient is currently in sinus rhythm.[id number] Score: 4.   Discussed cardiology vs gastroenterology recommendations   Patient voices understanding  Will continue holding AC    Coronary artery disease of native artery of native heart with stable angina pectoris  Patient with known CAD which is controlled Will continue Statin and monitor for S/Sx of angina/ACS. Continue to monitor on telemetry.     Acquired hypothyroidism  Patient has chronic hypothyroidism. TFTs reviewed-   Lab Results   Component Value Date    TSH 1.780 07/03/2023   Will continue chronic levothyroxine and adjust for and clinical changes    Chronic diastolic heart failure  Patient has  unspecified  heart failure that is Chronic. On presentation their CHF was . Most recent BNP and echo results are listed below.well compensated  Recent Labs     12/24/24  0452   BNP 34       Latest ECHO  Results for orders placed during the hospital  encounter of 01/08/21    Echo Color Flow Doppler? Yes    Interpretation Summary  · The left ventricle is normal in size with moderate concentric hypertrophy and normal systolic function. The estimated ejection fraction is 60%  · Indeterminate left ventricular diastolic function.  · Normal right ventricular size with normal right ventricular systolic function.  · Mild tricuspid regurgitation.  · Normal central venous pressure (3 mmHg).  · The estimated PA systolic pressure is 32 mmHg.    Current Heart Failure Medications  , Every 12 hours, Oral  , 2 times daily with meals, Oral  hydrALAZINE injection 10 mg, Every 6 hours PRN, Intravenous  hydrALAZINE tablet 10 mg, Every 12 hours, Oral    Plan  - Monitor strict I&Os and daily weights.    - Place on telemetry  - Low sodium diet  - Place on fluid restriction of 1.5 L.   - Cardiology has been consulted  - The patient's volume status is at their baseline  Ambulatory oxygen evaluation   Nutrition consult   Discontinue intravenous lasix    12/24/2024  Holding further diuretics  AM labs and CXR    Lower extremity edema  Improving  Multifactorial: malnutrition, fluid indiscretion, venous stasis, recent medication(s) change, dependent edema  Counseling provided  Elevate legs   Manish hose and topicals    Atrial flutter  Controlled  Follow up outpatient primary cardiologist  Holding a/c due to pmh GI bleed     Essential hypertension  Chronic, controlled.  Latest blood pressure and vitals reviewed-   Temp:  [97.3 °F (36.3 °C)-98.3 °F (36.8 °C)]   Pulse:  [63-90]   Resp:  [14-22]   BP: (129-182)/(61-86)   SpO2:  [88 %-96 %] .   Home meds for hypertension were reviewed and noted below.   Hypertension Medications               amLODIPine (NORVASC) 5 MG tablet Take 1 tablet (5 mg total) by mouth once daily.    carvediloL (COREG) 12.5 MG tablet Take 12.5 mg by mouth 2 (two) times daily with meals.    diltiaZEM (CARDIZEM CD) 300 MG 24 hr capsule Take 1 capsule by mouth every morning.     furosemide (LASIX) 20 MG tablet Take 1 tablet (20 mg total) by mouth once daily.    hydrALAZINE (APRESOLINE) 10 MG tablet Take 10 mg by mouth every 12 (twelve) hours.            While in the hospital, will manage blood pressure as follows; Adjust home antihypertensive regimen as follows- elevated blood pressure, resume home hydralazine    Will utilize p.r.n. blood pressure medication only if patient's blood pressure greater than  180/110 and he develops symptoms such as worsening chest pain or shortness of breath.      Hypercholesterolemia  Patient is chronically on statin.will continue for now. Last Lipid Panel:   Lab Results   Component Value Date    CHOL 121 07/03/2023    HDL 37 (L) 07/03/2023    LDLCALC 57 07/03/2023    TRIG 160 (H) 07/03/2023    CHOLHDL 27.8 08/01/2019     Plan:  -Continue home medication  -low fat/low calorie diet    Severe protein-calorie malnutrition  Nutrition consulted. Most recent weight and BMI monitored-     Measurements:  Wt Readings from Last 1 Encounters:   12/18/24 81.7 kg (180 lb 1.9 oz)   Body mass index is 28.21 kg/m².    Patient has been screened and assessed by RD.    Malnutrition Type:  Context: acute illness or injury  Level: severe    Malnutrition Characteristic Summary:  Weight Loss (Malnutrition): greater than 2% in 1 week  Subcutaneous Fat (Malnutrition): moderate depletion  Muscle Mass (Malnutrition): moderate depletion  Fluid Accumulation (Malnutrition): moderate    Interventions/Recommendations (treatment strategy):  1. Recommend modify pt's diet to Cardiac, Soft and bite-sized (IDDSI Level 6), 1500 mL fluid restriction diet 2. Recommend pt continues Suplena once/day per pt preference 3. Recommend feeding assistance as warrented 4. Weigh twice weekly        VTE Risk Mitigation (From admission, onward)           Ordered     Place SALUD hose  Until discontinued         12/12/24 0921     Place SALUD hose  Until discontinued         12/11/24 0949     enoxaparin injection 30 mg   Daily         12/10/24 1922     IP VTE HIGH RISK PATIENT  Once         12/10/24 1922     Place sequential compression device  Until discontinued         12/10/24 1922                    Discharge Planning   SHEA:      Code Status: DNR   Medical Readiness for Discharge Date:   Discharge Plan A: Home, Home Health                Please place Justification for DME        Cecilio Sterling MD  Department of Hospital Medicine   [facility] - Telemetry ([facility])

## 2024-12-26 NOTE — PLAN OF CARE
[facility] - Telemetry (Hospital)  Discharge Final Note    Primary Care Provider: Nacho Richardson MD    Expected Discharge Date: 12/26/2024    Final Discharge Note (most recent)       Final Note - 12/26/24 1541          Final Note    Assessment Type Final Discharge Note     Anticipated Discharge Disposition Hospice/Medical Facility     Hospital Resources/Appts/Education Provided Post-Acute resouces added to AVS        Post-Acute Status    Discharge Delays None known at this time                   DC today with [facility] at the inpatient unit, [facility]. Family met with hospice liaisonGabrielle. DC orders provided via Epic.   Nursing staff provided with number for report. [facility] to arrange ambulance transport.     Important Message from Medicare              Follow-up providers       Nacho Richardson MD   Specialty: Internal Medicine   Relationship: PCP - General    [facility]  [address] [facility] [address]   Phone: [phone]       Next Steps: Follow up    Instructions: As needed              After-discharge care                Destination       *[facility]   Service: Inpatient Hospice    [address] [facility] [address]   Phone: [phone]

## 2024-12-27 NOTE — PLAN OF CARE
Problem: Adult Inpatient Plan of Care  Goal: Plan of Care Review  Outcome: Met  Goal: Patient-Specific Goal (Individualized)  Outcome: Met  Goal: Absence of Hospital-Acquired Illness or Injury  Outcome: Met  Goal: Optimal Comfort and Wellbeing  Outcome: Met  Goal: Readiness for Transition of Care  Outcome: Met     Problem: Diabetes Comorbidity  Goal: Blood Glucose Level Within Targeted Range  Outcome: Met     Problem: Diabetes Comorbidity  Goal: Blood Glucose Level Within Targeted Range  Outcome: Met     Problem: Pneumonia  Goal: Fluid Balance  Outcome: Met  Goal: Resolution of Infection Signs and Symptoms  Outcome: Met  Goal: Effective Oxygenation and Ventilation  Outcome: Met

## 2025-01-06 NOTE — DISCHARGE SUMMARY
[facility] - Telemetry ([facility])  [facility] Medicine  Discharge Summary      Patient Name: Jimmy Young  MRN: [id number]  [facility]: [id number]  Patient Class: IP- Inpatient  Admission Date: 12/10/2024  Hospital Length of Stay: 16 days  Discharge Date and Time: 12/26/2024  6:54 PM  Attending Physician: No att. providers found   Discharging Provider: Cecilio Sterling MD  Primary Care Provider: Nacho Richardson MD    Primary Care Team: Networked reference to record PCT     HPI:   Jimmy Young is a 89 y.o. male with a PMH  has a past medical history of Aneurysm (2016), Arthritis, Asthma, Atrial fibrillation, Cancer, CKD (chronic kidney disease), COPD (chronic obstructive pulmonary disease) (10/05/2017), Diabetes mellitus, Emphysema lung, Encounter for blood transfusion, and Hypertension.presented to the ED for swelling of the legs. Patient was referred from his primary care physician today for worsening lower extremity edema, shortness of breath, and cough. Patient is on chronic home O2 at 3L/min via NC. Reports compliance with his lasix and denies any excessive fluid or salt intake. Denies any other complaints at his time.      ER workup revealed CBC to be unremarkable.  CMP with BUN/creatinine at baseline of 29/2.2.   mg/dL.  Magnesium 1.2.  BNP, troponin, lactic acid within normal limits.  Flu/COVID negative.  Blood cultures obtained x2.  Ultrasound lower extremities negative for DVT.  Chest x-ray revealed left upper lobe opacity resembling early pneumonia.  EKG revealed a flutter with variable AV block and left axis deviation with a ventricular rate of 64 beats per minute with a QT/QTC of 416/429.  Vital signs stable.  Patient is at baseline oxygen saturation of 92-94% on 3 liters/minute via nasal cannula.  Patient received 12.5 mg carvedilol, 1 g Rocephin IV, 100 mg doxycycline p.o., 30 mg Lovenox, 40 mg Lasix IV, and 10 mg hydralazine p.o. at outside facility.  Hospital Medicine consulted to admit  patient for CHF exacerbation and pneumonia. Patient and family in agreement with treatment plan. Patient admitted under inpatient status.    PCP: Nacho Richardson      * No surgery found *      Hospital Course:   12/11 admitted for pneumonia. Wears supplemental oxygen at baseline, 2L. On 4L. Speech consulted. Schedule breathing treatments. Discontinue lasix. Replete lytes. Echocardiogram pending. Cardiology consulted. Relaxing normotensive range in this patient demographic.  12/12 blood culture positive for staph. Infectious disease consulted. Continue monitoring repeat blood cultures. Recent medication(s) change with linagliptin with risk for congestive heart failure and skin reaction. Discussed risk vs benefit. Hba1c 6.9. recommend goal hba1c 8-9.   12/13 bun/creatinine increasing. Discontinued intravenous lasix. Remains on increased supplemental oxygen, 5L. Wean as able. Home hydralazine resumed for elevated blood pressure. Repeat blood cultures negative growth to date x 1 day. Infectious disease following  12/14/2024  Patient required increasing to 40 L 100% FiO2 Vapotherm.  Pulmonology consult on case.  Solu-Medrol added.  Continue empiric antibiotics.  Repeat chest x-ray.  12/15/2024  Patient weaned down to 20 L 80% Vapotherm.  Will continue current management.  12/16/2024  Still on 20L 80% vapotherm. Unable to be weaned at this time. Cont abx. Pulm and cardiology on case.   12/17/2024  Chest x-ray improved today compared to yesterday.  Still on 20 L 90% Vapotherm.  Continue antibiotics.  12/18/2024  Currently on 18 L 100% Vapotherm.  Continue antibiotics.  Wean O2 as tolerated.  Discussed with family at bedside the prognosis is guarded.  Patient will likely need placement should he improve.  Discussed SNF versus inpatient hospice.  Will see how patient clinically progresses over the next few days.  12/19/2024  Currently on 25 L 100% Vapotherm.  Continue antibiotics.  Chest x-ray reviewed which is improving  however patient clinically declining.  He is requiring increased O2.  Snf versus inpatient hospice.   12/20/2024  Patient agitated today.  Haldol p.r.n. ordered.  Continue antibiotics.  Continue steroids.  Patient is still requiring high-flow Vapotherm.  Discussed case with family at bedside.  Will continue max therapy over the weekend.  Should patient fail to improve family is agreeable to inpatient hospice.  12/21/2024  Agitation improved.  Continue Haldol p.r.n..  Continue antibiotics and steroids.  Patient weaned down to 18 L 100% FiO2.  Continue current therapy.  Pending clinical progress.  Possible inpatient hospice on Monday if patient failed to improve.  12/22/2024  Currently on 18 L 70% FiO2.  Continue antibiotic therapy.  Wean O2 as tolerated.  12/23/2024  NAEON, weaning vapotherm. Patient with waxing and waning mental status. Discussed condition with tariq's son at bedside. Continue present management. Obtain CXR in AM, continue antibiotics. Discussed with ID and Pulmonary, appreciate assistance.  12/24/2024  NAEON. Weaning vapotherm to HFNC today. Family at bedside. Patient awake, alert, difficulty hearing which is a new for patient. Discussed overall prognosis and GOC, family wishes to consider hospice, will consult SW to assist. Continue present management.  12/25/2024  NAEON. Patient awake, conversant. Family at bedside. Continue to wean HFNC, IV antibiotics. Plans pending for hospice placement.     Goals of Care Treatment Preferences:  Code Status: DNR    Living Will: Yes              SDOH Screening:  The patient declined to be screened for utility difficulties, food insecurity, transport difficulties, housing insecurity, and interpersonal safety, so no concerns could be identified this admission.     Consults:   Consults (From admission, onward)          Status Ordering Provider     Inpatient consult to Social Work  Once        Provider:  (Not yet assigned)    Completed CHRISTIANE KELLY     Inpatient  consult to Pulmonology  Once        Provider:  Awais Villagomez MD    Completed MORENITA RAMOS     Inpatient consult to Registered Dietitian/Nutritionist  Once        Provider:  (Not yet assigned)    Completed RG LADD     Inpatient consult to Gastroenterology  Once        Provider:  Erica Brennan MD    Completed ROSANA GANNON     Inpatient consult to Cardiology  Once        Provider:  Rosana Gannon MD    Completed JONO MORRIS     Inpatient consult to Social Work/Case Management  Once        Provider:  (Not yet assigned)    Completed JONO MORRIS     Inpatient consult to Registered Dietitian/Nutritionist  Once        Provider:  (Not yet assigned)    Completed JONO MORRIS            * Acute on chronic respiratory failure with hypoxia  Weaning vapotherm as tolerated  Continue Solu-Medrol   Continue antibiotics   Pulmonology and ID consulted, appreciate reccs  Patient desats with minimal movement  Discussed case with family at bedside   Continue aggressive therapy for now    12/24/24  Improvement noted with O2 requirements  Continue above management  Family considering hospice, consult SW    Delirium  Likely metabolic secondary to illness   PRN medications available for agitation and insomnia  Delirium precautions      CKD stage 3 secondary to diabetes  Creatine stable for now. BMP reviewed- noted Estimated Creatinine Clearance: 21 mL/min (A) (based on SCr of 2.2 mg/dL (H)). according to latest data. Based on current GFR, CKD stage is stage 3 - GFR 30-59.  Monitor UOP and serial BMP and adjust therapy as needed. Renally dose meds. Avoid nephrotoxic medications and procedures.  Discontinue intravenous lasix  Monitor trend    Pneumonia  Continue Zyvox and Rocephin  Sputum culture with MRSA    Atrial fibrillation  Patient with Paroxysmal (<7 days) atrial fibrillation which is controlled currently with Bbs and Calcium Channel Blocker. Patient is currently in sinus rhythm.[id number] Score: 4.    Discussed cardiology vs gastroenterology recommendations   Patient voices understanding  Will continue holding AC    Coronary artery disease of native artery of native heart with stable angina pectoris  Patient with known CAD which is controlled Will continue Statin and monitor for S/Sx of angina/ACS. Continue to monitor on telemetry.     Acquired hypothyroidism  Patient has chronic hypothyroidism. TFTs reviewed-   Lab Results   Component Value Date    TSH 1.780 07/03/2023   Will continue chronic levothyroxine and adjust for and clinical changes    Chronic diastolic heart failure  Patient has  unspecified  heart failure that is Chronic. On presentation their CHF was . Most recent BNP and echo results are listed below.well compensated  Recent Labs     12/24/24  0452   BNP 34       Latest ECHO  Results for orders placed during the hospital encounter of 01/08/21    Echo Color Flow Doppler? Yes    Interpretation Summary  · The left ventricle is normal in size with moderate concentric hypertrophy and normal systolic function. The estimated ejection fraction is 60%  · Indeterminate left ventricular diastolic function.  · Normal right ventricular size with normal right ventricular systolic function.  · Mild tricuspid regurgitation.  · Normal central venous pressure (3 mmHg).  · The estimated PA systolic pressure is 32 mmHg.    Current Heart Failure Medications  , Every 12 hours, Oral  , 2 times daily with meals, Oral  hydrALAZINE injection 10 mg, Every 6 hours PRN, Intravenous  hydrALAZINE tablet 10 mg, Every 12 hours, Oral    Plan  - Monitor strict I&Os and daily weights.    - Place on telemetry  - Low sodium diet  - Place on fluid restriction of 1.5 L.   - Cardiology has been consulted  - The patient's volume status is at their baseline  Ambulatory oxygen evaluation   Nutrition consult   Discontinue intravenous lasix    12/24/2024  Holding further diuretics  AM labs and CXR    Lower extremity  edema  Improving  Multifactorial: malnutrition, fluid indiscretion, venous stasis, recent medication(s) change, dependent edema  Counseling provided  Elevate legs   Manish hose and topicals    Atrial flutter  Controlled  Follow up outpatient primary cardiologist  Holding a/c due to pmh GI bleed     Essential hypertension  Chronic, controlled.  Latest blood pressure and vitals reviewed-   Temp:  [97.3 °F (36.3 °C)-98.3 °F (36.8 °C)]   Pulse:  [63-90]   Resp:  [14-22]   BP: (129-182)/(61-86)   SpO2:  [88 %-96 %] .   Home meds for hypertension were reviewed and noted below.   Hypertension Medications               amLODIPine (NORVASC) 5 MG tablet Take 1 tablet (5 mg total) by mouth once daily.    carvediloL (COREG) 12.5 MG tablet Take 12.5 mg by mouth 2 (two) times daily with meals.    diltiaZEM (CARDIZEM CD) 300 MG 24 hr capsule Take 1 capsule by mouth every morning.    furosemide (LASIX) 20 MG tablet Take 1 tablet (20 mg total) by mouth once daily.    hydrALAZINE (APRESOLINE) 10 MG tablet Take 10 mg by mouth every 12 (twelve) hours.            While in the hospital, will manage blood pressure as follows; Adjust home antihypertensive regimen as follows- elevated blood pressure, resume home hydralazine    Will utilize p.r.n. blood pressure medication only if patient's blood pressure greater than  180/110 and he develops symptoms such as worsening chest pain or shortness of breath.      Hypercholesterolemia  Patient is chronically on statin.will continue for now. Last Lipid Panel:   Lab Results   Component Value Date    CHOL 121 07/03/2023    HDL 37 (L) 07/03/2023    LDLCALC 57 07/03/2023    TRIG 160 (H) 07/03/2023    CHOLHDL 27.8 08/01/2019     Plan:  -Continue home medication  -low fat/low calorie diet    Severe protein-calorie malnutrition  Nutrition consulted. Most recent weight and BMI monitored-     Measurements:  Wt Readings from Last 1 Encounters:   12/18/24 81.7 kg (180 lb 1.9 oz)   Body mass index is 28.21  kg/m².    Patient has been screened and assessed by RD.    Malnutrition Type:  Context: acute illness or injury  Level: severe    Malnutrition Characteristic Summary:  Weight Loss (Malnutrition): greater than 2% in 1 week  Subcutaneous Fat (Malnutrition): moderate depletion  Muscle Mass (Malnutrition): moderate depletion  Fluid Accumulation (Malnutrition): moderate    Interventions/Recommendations (treatment strategy):  1. Recommend modify pt's diet to Cardiac, Soft and bite-sized (IDDSI Level 6), 1500 mL fluid restriction diet 2. Recommend pt continues Suplena once/day per pt preference 3. Recommend feeding assistance as warrented 4. Weigh twice weekly        Final Active Diagnoses:    Diagnosis Date Noted POA    PRINCIPAL PROBLEM:  Acute on chronic respiratory failure with hypoxia [J96.21] 12/14/2024 Yes    Delirium [R41.0] 12/20/2024 Yes    CKD stage 3 secondary to diabetes [E11.22, N18.30] 04/10/2018 Yes    Pneumonia [J18.9] 12/11/2024 Yes    Atrial fibrillation [I48.91] 06/10/2017 Yes     Chronic    Coronary artery disease of native artery of native heart with stable angina pectoris [I25.118] 06/20/2020 Yes    Acquired hypothyroidism [E03.9] 06/10/2017 Yes     Chronic    Chronic diastolic heart failure [I50.32] 12/10/2024 Yes    Lower extremity edema [R60.0] 12/12/2024 Yes    Atrial flutter [I48.92] 12/11/2024 Yes    Essential hypertension [I10] 10/04/2015 Yes     Chronic    Hypercholesterolemia [E78.00] 10/27/2016 Yes    Severe protein-calorie malnutrition [E43] 12/13/2024 Yes      Problems Resolved During this Admission:    Diagnosis Date Noted Date Resolved POA    Bacteremia due to Staphylococcus [R78.81, B95.8] 12/12/2024 12/15/2024 Yes       Discharged Condition: stable    Disposition: Hospice/Medical Facility    Follow Up:   Contact information for follow-up providers       Nacho Richardson MD Follow up.    Specialty: Internal Medicine  Why: As needed  Contact information:  [id number] [facility]  [address]  [place] [address]  [phone]                       Contact information for after-discharge care       Destination       [facility], [facility] .    Service: Inpatient Hospice  Contact information:  [id number] [place] [id number] [phone]                                 Patient Instructions:   No discharge procedures on file.    Significant Diagnostic Studies: Labs: All labs within the past 24 hours have been reviewed    Pending Diagnostic Studies:       None           Medications:  Reconciled Home Medications:      Medication List        STOP taking these medications      amLODIPine 5 MG tablet  Commonly known as: NORVASC     carvediloL 12.5 MG tablet  Commonly known as: COREG     diltiaZEM 300 MG 24 hr capsule  Commonly known as: CARDIZEM CD     furosemide 20 MG tablet  Commonly known as: LASIX     hydrALAZINE 10 MG tablet  Commonly known as: APRESOLINE     levothyroxine 50 MCG tablet  Commonly known as: SYNTHROID     pantoprazole 40 MG tablet  Commonly known as: PROTONIX     pravastatin 40 MG tablet  Commonly known as: PRAVACHOL              Indwelling Lines/Drains at time of discharge:   Lines/Drains/Airways       None                   Time spent on the discharge of patient: 40 minutes         Cecilio Sterling MD  Department of Hospital Medicine  [facility] - Telemetry ([facility])